# Patient Record
Sex: MALE | Race: WHITE | NOT HISPANIC OR LATINO | Employment: OTHER | URBAN - METROPOLITAN AREA
[De-identification: names, ages, dates, MRNs, and addresses within clinical notes are randomized per-mention and may not be internally consistent; named-entity substitution may affect disease eponyms.]

---

## 2017-03-09 ENCOUNTER — ALLSCRIPTS OFFICE VISIT (OUTPATIENT)
Dept: OTHER | Facility: OTHER | Age: 72
End: 2017-03-09

## 2017-03-22 ENCOUNTER — ALLSCRIPTS OFFICE VISIT (OUTPATIENT)
Dept: OTHER | Facility: OTHER | Age: 72
End: 2017-03-22

## 2017-03-22 DIAGNOSIS — E11.40 TYPE 2 DIABETES MELLITUS WITH DIABETIC NEUROPATHY (HCC): ICD-10-CM

## 2017-03-22 DIAGNOSIS — I83.812 VARICOSE VEINS OF LEFT LOWER EXTREMITY WITH PAIN: ICD-10-CM

## 2017-03-22 DIAGNOSIS — R10.11 RIGHT UPPER QUADRANT PAIN: ICD-10-CM

## 2017-03-22 DIAGNOSIS — R60.0 LOCALIZED EDEMA: ICD-10-CM

## 2017-03-22 DIAGNOSIS — D32.9 BENIGN NEOPLASM OF MENINGES (HCC): ICD-10-CM

## 2017-03-22 DIAGNOSIS — I10 ESSENTIAL (PRIMARY) HYPERTENSION: ICD-10-CM

## 2017-03-24 ENCOUNTER — GENERIC CONVERSION - ENCOUNTER (OUTPATIENT)
Dept: OTHER | Facility: OTHER | Age: 72
End: 2017-03-24

## 2017-03-24 ENCOUNTER — APPOINTMENT (OUTPATIENT)
Dept: LAB | Facility: CLINIC | Age: 72
End: 2017-03-24
Payer: COMMERCIAL

## 2017-03-24 DIAGNOSIS — E11.40 TYPE 2 DIABETES MELLITUS WITH DIABETIC NEUROPATHY (HCC): ICD-10-CM

## 2017-03-24 DIAGNOSIS — R60.0 LOCALIZED EDEMA: ICD-10-CM

## 2017-03-24 DIAGNOSIS — I10 ESSENTIAL (PRIMARY) HYPERTENSION: ICD-10-CM

## 2017-03-24 DIAGNOSIS — D32.9 BENIGN NEOPLASM OF MENINGES (HCC): ICD-10-CM

## 2017-03-24 DIAGNOSIS — I83.812 VARICOSE VEINS OF LEFT LOWER EXTREMITY WITH PAIN: ICD-10-CM

## 2017-03-24 DIAGNOSIS — R10.11 RIGHT UPPER QUADRANT PAIN: ICD-10-CM

## 2017-03-24 LAB — TSH SERPL DL<=0.05 MIU/L-ACNC: 1.93 UIU/ML (ref 0.36–3.74)

## 2017-03-24 PROCEDURE — 84443 ASSAY THYROID STIM HORMONE: CPT

## 2017-03-24 PROCEDURE — 36415 COLL VENOUS BLD VENIPUNCTURE: CPT

## 2017-06-22 ENCOUNTER — ALLSCRIPTS OFFICE VISIT (OUTPATIENT)
Dept: OTHER | Facility: OTHER | Age: 72
End: 2017-06-22

## 2017-06-22 DIAGNOSIS — R10.11 RIGHT UPPER QUADRANT PAIN: ICD-10-CM

## 2017-06-22 DIAGNOSIS — M25.569 PAIN IN KNEE: ICD-10-CM

## 2017-07-03 ENCOUNTER — HOSPITAL ENCOUNTER (OUTPATIENT)
Dept: RADIOLOGY | Facility: HOSPITAL | Age: 72
Discharge: HOME | End: 2017-07-03
Attending: NEUROLOGICAL SURGERY
Payer: COMMERCIAL

## 2017-07-05 ENCOUNTER — HOSPITAL ENCOUNTER (OUTPATIENT)
Dept: RADIOLOGY | Facility: HOSPITAL | Age: 72
Discharge: HOME | End: 2017-07-05
Attending: NEUROLOGICAL SURGERY
Payer: COMMERCIAL

## 2017-07-05 DIAGNOSIS — D32.9 BENIGN NEOPLASM OF MENINGES (HCC): ICD-10-CM

## 2017-07-05 PROCEDURE — 70553 MRI BRAIN STEM W/O & W/DYE: CPT

## 2017-07-05 PROCEDURE — A9585 GADOBUTROL INJECTION: HCPCS | Performed by: NEUROLOGICAL SURGERY

## 2017-07-05 RX ADMIN — GADOBUTROL 9 ML: 604.72 INJECTION INTRAVENOUS at 09:58

## 2017-07-17 ENCOUNTER — ALLSCRIPTS OFFICE VISIT (OUTPATIENT)
Dept: OTHER | Facility: OTHER | Age: 72
End: 2017-07-17

## 2017-07-17 ENCOUNTER — TRANSCRIBE ORDERS (OUTPATIENT)
Dept: ADMINISTRATIVE | Facility: HOSPITAL | Age: 72
End: 2017-07-17

## 2017-07-17 DIAGNOSIS — E11.40 TYPE 2 DIABETES MELLITUS WITH DIABETIC NEUROPATHY (HCC): ICD-10-CM

## 2017-07-17 DIAGNOSIS — D32.9 BENIGN NEOPLASM OF MENINGES (HCC): ICD-10-CM

## 2017-07-17 DIAGNOSIS — I10 ESSENTIAL (PRIMARY) HYPERTENSION: ICD-10-CM

## 2017-07-17 DIAGNOSIS — Z12.5 ENCOUNTER FOR SCREENING FOR MALIGNANT NEOPLASM OF PROSTATE: ICD-10-CM

## 2017-07-17 DIAGNOSIS — I83.12 VARICOSE VEINS OF LEFT LOWER EXTREMITY WITH INFLAMMATION: ICD-10-CM

## 2017-07-17 DIAGNOSIS — N52.9 IMPOTENCE OF ORGANIC ORIGIN: ICD-10-CM

## 2017-07-17 DIAGNOSIS — N52.9 MALE ERECTILE DYSFUNCTION: ICD-10-CM

## 2017-07-17 DIAGNOSIS — I83.812 VARICOSE VEINS OF LEFT LOWER EXTREMITY WITH PAIN: ICD-10-CM

## 2017-07-17 DIAGNOSIS — E13.40 DIABETIC NEUROPATHY ASSOCIATED WITH OTHER SPECIFIED DIABETES MELLITUS: ICD-10-CM

## 2017-07-17 DIAGNOSIS — I10 UNSPECIFIED ESSENTIAL HYPERTENSION: Primary | ICD-10-CM

## 2017-07-26 ENCOUNTER — ALLSCRIPTS OFFICE VISIT (OUTPATIENT)
Dept: OTHER | Facility: OTHER | Age: 72
End: 2017-07-26

## 2017-07-26 DIAGNOSIS — N52.9 MALE ERECTILE DYSFUNCTION: ICD-10-CM

## 2017-07-26 DIAGNOSIS — I83.812 VARICOSE VEINS OF LEFT LOWER EXTREMITY WITH PAIN: ICD-10-CM

## 2017-07-26 DIAGNOSIS — E11.40 TYPE 2 DIABETES MELLITUS WITH DIABETIC NEUROPATHY (HCC): ICD-10-CM

## 2017-07-26 DIAGNOSIS — D32.9 BENIGN NEOPLASM OF MENINGES (HCC): ICD-10-CM

## 2017-07-26 DIAGNOSIS — Z12.5 ENCOUNTER FOR SCREENING FOR MALIGNANT NEOPLASM OF PROSTATE: ICD-10-CM

## 2017-07-26 DIAGNOSIS — I10 ESSENTIAL (PRIMARY) HYPERTENSION: ICD-10-CM

## 2017-07-27 ENCOUNTER — APPOINTMENT (OUTPATIENT)
Dept: LAB | Facility: CLINIC | Age: 72
End: 2017-07-27
Payer: COMMERCIAL

## 2017-07-27 LAB
ALBUMIN SERPL BCP-MCNC: 3.8 G/DL (ref 3.5–5)
ALP SERPL-CCNC: 97 U/L (ref 46–116)
ALT SERPL W P-5'-P-CCNC: 14 U/L (ref 12–78)
ANION GAP SERPL CALCULATED.3IONS-SCNC: 5 MMOL/L (ref 4–13)
AST SERPL W P-5'-P-CCNC: 11 U/L (ref 5–45)
BASOPHILS # BLD AUTO: 0.01 THOUSANDS/ΜL (ref 0–0.1)
BASOPHILS NFR BLD AUTO: 0 % (ref 0–1)
BILIRUB SERPL-MCNC: 0.47 MG/DL (ref 0.2–1)
BUN SERPL-MCNC: 21 MG/DL (ref 5–25)
CALCIUM SERPL-MCNC: 8.8 MG/DL (ref 8.3–10.1)
CHLORIDE SERPL-SCNC: 105 MMOL/L (ref 100–108)
CHOLEST SERPL-MCNC: 150 MG/DL (ref 50–200)
CO2 SERPL-SCNC: 28 MMOL/L (ref 21–32)
CREAT SERPL-MCNC: 0.69 MG/DL (ref 0.6–1.3)
EOSINOPHIL # BLD AUTO: 0.11 THOUSAND/ΜL (ref 0–0.61)
EOSINOPHIL NFR BLD AUTO: 2 % (ref 0–6)
ERYTHROCYTE [DISTWIDTH] IN BLOOD BY AUTOMATED COUNT: 13.3 % (ref 11.6–15.1)
EST. AVERAGE GLUCOSE BLD GHB EST-MCNC: 146 MG/DL
GFR SERPL CREATININE-BSD FRML MDRD: 96 ML/MIN/1.73SQ M
GLUCOSE P FAST SERPL-MCNC: 115 MG/DL (ref 65–99)
HBA1C MFR BLD: 6.7 % (ref 4.2–6.3)
HCT VFR BLD AUTO: 37.9 % (ref 36.5–49.3)
HDLC SERPL-MCNC: 26 MG/DL (ref 40–60)
HGB BLD-MCNC: 13 G/DL (ref 12–17)
LDLC SERPL CALC-MCNC: 88 MG/DL (ref 0–100)
LYMPHOCYTES # BLD AUTO: 1.19 THOUSANDS/ΜL (ref 0.6–4.47)
LYMPHOCYTES NFR BLD AUTO: 26 % (ref 14–44)
MCH RBC QN AUTO: 28.6 PG (ref 26.8–34.3)
MCHC RBC AUTO-ENTMCNC: 34.3 G/DL (ref 31.4–37.4)
MCV RBC AUTO: 83 FL (ref 82–98)
MONOCYTES # BLD AUTO: 0.59 THOUSAND/ΜL (ref 0.17–1.22)
MONOCYTES NFR BLD AUTO: 13 % (ref 4–12)
NEUTROPHILS # BLD AUTO: 2.58 THOUSANDS/ΜL (ref 1.85–7.62)
NEUTS SEG NFR BLD AUTO: 59 % (ref 43–75)
NRBC BLD AUTO-RTO: 0 /100 WBCS
PLATELET # BLD AUTO: 131 THOUSANDS/UL (ref 149–390)
PMV BLD AUTO: 9.7 FL (ref 8.9–12.7)
POTASSIUM SERPL-SCNC: 4.2 MMOL/L (ref 3.5–5.3)
PROT SERPL-MCNC: 7.1 G/DL (ref 6.4–8.2)
PSA SERPL-MCNC: 0.6 NG/ML (ref 0–4)
RBC # BLD AUTO: 4.55 MILLION/UL (ref 3.88–5.62)
SODIUM SERPL-SCNC: 138 MMOL/L (ref 136–145)
TRIGL SERPL-MCNC: 182 MG/DL
TSH SERPL DL<=0.05 MIU/L-ACNC: 2.69 UIU/ML (ref 0.36–3.74)
WBC # BLD AUTO: 4.51 THOUSAND/UL (ref 4.31–10.16)

## 2017-07-27 PROCEDURE — G0103 PSA SCREENING: HCPCS | Performed by: NEUROLOGICAL SURGERY

## 2017-07-27 PROCEDURE — 80053 COMPREHEN METABOLIC PANEL: CPT | Performed by: NEUROLOGICAL SURGERY

## 2017-07-27 PROCEDURE — 80061 LIPID PANEL: CPT | Performed by: NEUROLOGICAL SURGERY

## 2017-07-27 PROCEDURE — 36415 COLL VENOUS BLD VENIPUNCTURE: CPT | Performed by: NEUROLOGICAL SURGERY

## 2017-07-27 PROCEDURE — 84443 ASSAY THYROID STIM HORMONE: CPT | Performed by: NEUROLOGICAL SURGERY

## 2017-07-27 PROCEDURE — 85025 COMPLETE CBC W/AUTO DIFF WBC: CPT | Performed by: NEUROLOGICAL SURGERY

## 2017-07-27 PROCEDURE — 83036 HEMOGLOBIN GLYCOSYLATED A1C: CPT | Performed by: NEUROLOGICAL SURGERY

## 2017-07-28 ENCOUNTER — GENERIC CONVERSION - ENCOUNTER (OUTPATIENT)
Dept: OTHER | Facility: OTHER | Age: 72
End: 2017-07-28

## 2017-09-11 ENCOUNTER — ALLSCRIPTS OFFICE VISIT (OUTPATIENT)
Dept: OTHER | Facility: OTHER | Age: 72
End: 2017-09-11

## 2017-10-02 ENCOUNTER — GENERIC CONVERSION - ENCOUNTER (OUTPATIENT)
Dept: OTHER | Facility: OTHER | Age: 72
End: 2017-10-02

## 2017-10-17 ENCOUNTER — ALLSCRIPTS OFFICE VISIT (OUTPATIENT)
Dept: OTHER | Facility: OTHER | Age: 72
End: 2017-10-17

## 2017-10-17 DIAGNOSIS — E11.40 TYPE 2 DIABETES MELLITUS WITH DIABETIC NEUROPATHY (HCC): ICD-10-CM

## 2017-10-17 DIAGNOSIS — R51.9 HEADACHE: ICD-10-CM

## 2017-10-17 DIAGNOSIS — D32.9 BENIGN NEOPLASM OF MENINGES (HCC): ICD-10-CM

## 2017-10-17 DIAGNOSIS — I83.812 VARICOSE VEINS OF LEFT LOWER EXTREMITY WITH PAIN: ICD-10-CM

## 2017-10-18 NOTE — PROGRESS NOTES
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)   5  Hypertension (401 9) (I10)    Plan  Controlled diabetes mellitus with diabetic neuropathy, Headache, Meningioma, Varicose  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    · (1) CBC/PLT/DIFF; Status:Active; Requested for:17Oct2017;    · (1) COMPREHENSIVE METABOLIC PANEL; Status:Active; Requested for:17Oct2017;    · (1) HEMOGLOBIN A1C; Status:Active; Requested for:17Oct2017;    · (1) MICROALBUMIN CREATININE RATIO, RANDOM URINE; Status:Active; Requested  for:17Oct2017;    · *1 - [facility]  *  Status: Active  Requested for: 19RDY4997  Care Summary provided  : Yes  Headache    · Gabapentin 100 MG Oral Capsule; TAKE 2 CAPSULE Bedtime   · EKG/ECG- POC; Status:Complete - Retrospective By Protocol Authorization;   Done:  36PSZ8362 12:46PM    Discussion/Summary    EKGTO NEUROLOGY, IF [facility] DOES NOT PARTICIPATE, REFER TO  NEUROLOGYCURRENT MEDICATIONSMAGNESIUM 400MG AND VITAMIN B COMPLEX GABAPENTIN FOR HA'SCALL DAUGHTER ANALI TO HELP WITH UNDERSTANDING  4 MONTHSWITH ANY PROBLEMS      Possible side effects of new medications were reviewed with the patient/guardian today  The treatment plan was reviewed with the patient/guardian  The patient/guardian understands and agrees with the treatment plan      Chief Complaint  Patient presents today for headaches        History of Present Illness  HPI: HERE FOR C/O HEADACHEHAS HEADACHE ALL THE TIME, SAME HEADACHE AS BEFOREHA NEVER COMPLETELY GOES AWAY, THERE ALL THE TIMEBLURRED VISION OR DOUBLE VISIONNO HA CURRENTLY TO SEE IF HIS HEART WAS OK, FEELING WELL, NO C/O CHEST PAIN OR SOBTAKING THE GABAPENTIN THAT WAS PRESCRIBED LAST YEARSAW NEUROLOGY AS ORDERED BACK IN 2015 AND 2016PT TO HAVE LANGUAGE LINE AND PT DECLINED AGAIN   RIGHT NOW NO HA, CAN GET HA IN THE BACK OF HIS HEADCAN GET HA IN THE FRONT OF HIS HEAD ACROSS THE FRONTAL SINUS HEAD   Diabetes Type II (Follow-Up): The patient states he has been doing well with his Type II Diabetes control since the last visit  Comorbid Illnesses: hypertension,-- hyperlipidemia-- and-- obesity  He has no known diabetic complications  He has no significant interval events  Symptoms: The patient is currently asymptomatic  Associated symptoms include no polyuria,-- no polydipsia,-- no recent weight gain-- and-- no recent weight loss  Home monitoring: The patient checks his blood sugar sporadically  -- Glycemic control has been good  -- the patient reports no symptomatic hypoglycemic episodes  Medications: the patient is adherent with his medication regimen  -- He denies medication side effects  The patient is doing well with his diabetes goals  Due For: a urine microalbumin-- and-- a hemoglobin A1c  Hypertension (Follow-Up): The patient presents for follow-up of essential hypertension  The patient states he has been doing well with his blood pressure control since the last visit  He has no comorbid illnesses  He has no significant interval events  Symptoms: The patient is currently asymptomatic  Associated symptoms include headache, but-- no focal neurologic deficits-- and-- no memory loss  Home monitoring: The patient is not checking blood pressure at home  Blood pressure control has been poor  Medications: the patient is adherent with his medication regimen  -- He denies medication side effects  Disease Management: the patient is not doing well with his blood pressure goals  The patient is due for a lipid panel-- and-- a serum creatinine  Headache (Follow-Up): The patient is being seen for follow-up of headache  The patient reports no change in the condition  He has had no significant interval events     Interval symptoms:  worsened headache,-- denies nausea,-- denies vomiting,-- denies photophobia,-- denies phonophobia,-- denies paresthesias,-- denies localized weakness,-- denies aura-- and-- denies scotoma  Associated symptoms: neck pain, but-- no nasal congestion,-- no facial pain,-- no vertigo-- and-- no lightheadedness  The patient is not currently on medication for this problem  Disease management:  the patient is not doing well with his goals  Review of Systems    Constitutional: no fever or chills, feels well, no tiredness, no recent weight loss or weight gain  ENT: no complaints of earache, no loss of hearing, no nosebleeds or nasal discharge, no sore throat or hoarseness  Cardiovascular: no complaints of slow or fast heart rate, no chest pain, no palpitations, no leg claudication or lower extremity edema  Respiratory: no complaints of shortness of breath, no wheezing or cough, no dyspnea on exertion, no orthopnea or PND  Gastrointestinal: no complaints of abdominal pain, no constipation, no nausea or vomiting, no diarrhea or bloody stools  Genitourinary: no complaints of dysuria or incontinence, no hesitancy, no nocturia, no genital lesion, no inadequacy of penile erection  Musculoskeletal: as noted in HPI  Integumentary: no complaints of skin rash or lesion, no itching or dry skin, no skin wounds  Neurological: as noted in HPI  Active Problems  1  Acid indigestion (536 8) (K30)   2  Brain tumor (239 6) (D49 6)   3  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)   4  Controlled diabetes mellitus with diabetic neuropathy (250 60,357 2) (E11 40)   5  Dry skin (701 1) (L85 3)   6  Encounter for colorectal cancer screening (V76 51) (Z12 11,Z12 12)   7  Encounter for prostate cancer screening (V76 44) (Z12 5)   8  Epigastric pain (789 06) (R10 13)   9  Episodic cluster headache, not intractable (339 01) (G44 019)   10  GERD (gastroesophageal reflux disease) (530 81) (K21 9)   11  Headache (784 0) (R51)   12  Heart attack (410 90) (I21 9)   13  Hepatitis C (070 70) (B19 20)   14   Hip pain, left (719 45) (M25 552)   15  Hypertension (401 9) (I10)   16  Knee joint pain (719 46) (M25 569)   17  Knee Pain Elicited By Motion   18  Left elbow pain (719 42) (M25 522)   19  Male erectile dysfunction, unspecified (607 84) (N52 9)   20  Meningioma (225 2) (D32 9)   21  Overweight (278 02) (E66 3)   22  Pre-ulcerative corn or callous (700) (L84)   23  Screening for genitourinary condition (V81 6) (Z13 89)   24  Varicose 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 (454 8) (L59 342)    Past Medical History  Active Problems And Past Medical History Reviewed: The active problems and past medical history were reviewed and updated today  Surgical History  Surgical History Reviewed: The surgical history was reviewed and updated today  Social History   · Denied: History of Drug use   · Employed in [employer]   · Five children   · Former smoker (Q10 15) (J08 715)   ·    · Number of children   · 5   · Retired   · Uses safety equipment  The social history was reviewed and updated today  The social history was reviewed and is unchanged  Family History  Family History Reviewed: The family history was reviewed and updated today  Current Meds   1  Ammonium Lactate 12 % External Cream; APPLY  AND RUB  IN A THIN FILM TO   AFFECTED AREAS TWICE DAILY  (AM AND PM); Therapy: 69Snq7094 to (Evaluate:11Oct2017)  Requested for: 35Mvm6416; Last   Rx:34Lby9633 Ordered   2  Amoxicillin 875 MG Oral Tablet; TAKE 1 TABLET EVERY 12 HOURS DAILY; Therapy: 46GBC7738 to (Evaluate:03Hxd9729)  Requested for: 84EIN3077; Last   Rx:71Rwq7592 Ordered   3  Aspirin 81 MG TABS; TAKE 1 TABLET DAILY FOR STROKE PREVENTION; Therapy: 09ITE2284 to (Evaluate:60Uyu1076)  Requested for: 12SLN5363; Last   Rx:01Mar2016 Ordered   4  Blood Pressure Monitor Device; use as directed  dx 401 9   electronic; Therapy: 46UJF0715 to (Last Rx:15Jun2016) Ordered   5   Gabapentin 100 MG Oral Capsule; TAKE 2 CAPSULE Bedtime; Therapy: 99KDA0881 to (Evaluate:03Wdu8859)  Requested for: 25JJZ9970; Last   Rx:01Mar2016 Ordered   6  Lisinopril 20 MG Oral Tablet; Take 1 tablet daily; Therapy: 19Aac9037 to (Evaluate:18Mar2018)  Requested for: 47Uij3044; Last   Rx:22Svx2585 Ordered   7  MetFORMIN HCl - 500 MG Oral Tablet; TAKE 1 TABLET TWICE DAILY  Requested for:   02DRB1445; Last Rx:43Sea6167 Ordered   8  Omeprazole 20 MG Oral Capsule Delayed Release; TAKE 1 CAPSULE DAILY EVERY   MORNING BEFORE BREAKFAST; Therapy: 72Qmn0850 to (Evaluate:46Kva0248)  Requested for: 93IKG7942; Last   Rx:26Pnh7901 Ordered   9  Omeprazole 20 MG Oral Tablet Delayed Release; 1 tab po bid; Therapy: 37Qvf1458 to (Evaluate:08Jun2018)  Requested for: 85Fsv3343; Last   Rx:73Ayp9834 Ordered   10  OneTouch Ultra Blue In Vitro Strip; TEST ONCE DAILY; Therapy: 28YAZ3274 to (72 240 26 09)  Requested for: 40WRP9224; Last    Rx:09Mar2017 Ordered   11  Sildenafil Citrate 20 MG Oral Tablet; TAKE 1 TO 5 TABLETS DAILY AS NEEDED; Therapy: 46CQT1796 to (Last Rx:15Jun2016)  Requested for: 15PSE1167 Ordered   12  Viagra 50 MG Oral Tablet; TAKE AS DIRECTED; Therapy: 22DWP7645 to (Last Rx:44Sjr0026) Ordered    The medication list was reviewed and updated today  Allergies  1  No Known Drug Allergies  2  No Known Environmental Allergies   3  No Known Food Allergies    Vitals   Recorded: 46YTU4335 11:48AM   Temperature 97 9 F   Heart Rate 70   Respiration 16   Systolic 505   Diastolic 82   Height 5 ft 8 in   Weight 213 lb 0 8 oz   BMI Calculated 32 39   BSA Calculated 2 1   O2 Saturation 98     Physical Exam    Constitutional   General appearance: No acute distress, well appearing and well nourished  Eyes   Conjunctiva and lids: No swelling, erythema, or discharge  Pupils and irises: Equal, round and reactive to light      Ears, Nose, Mouth, and Throat   External inspection of ears and nose: Normal     Otoscopic examination: Tympanic membrance translucent with normal light reflex  Canals patent without erythema  Nasal mucosa, septum, and turbinates: Normal without edema or erythema  Oropharynx: Normal with no erythema, edema, exudate or lesions  Pulmonary   Respiratory effort: No increased work of breathing or signs of respiratory distress  Auscultation of lungs: Clear to auscultation, equal breath sounds bilaterally, no wheezes, no rales, no rhonci  Cardiovascular   Auscultation of heart: Normal rate and rhythm, normal S1 and S2, without murmurs  Examination of extremities for edema and/or varicosities: Normal     Abdomen   Abdomen: Non-tender, no masses  Liver and spleen: No hepatomegaly or splenomegaly  Lymphatic   Palpation of lymph nodes in neck: No lymphadenopathy  Musculoskeletal   Gait and station: Normal     Digits and nails: Normal without clubbing or cyanosis  Inspection/palpation of joints, bones, and muscles: Normal     Skin   Skin and subcutaneous tissue: Normal without rashes or lesions  Neurologic   Cranial nerves: Cranial nerves 2-12 intact  -- RHOMBERG NEG, 5/5 STRENGTH B/L UE, HEEL/TOE UNSTEADY  Reflexes: 2+ and symmetric  Sensation: No sensory loss  Psychiatric   Orientation to person, place and time: Normal     Mood and affect: Normal          Results/Data  PHQ-2 Adult Depression Screening 90VOL1431 11:51AM User, Ahs     Test Name Result Flag Reference   PHQ-2 Adult Depression Score 2     Over the last two weeks, how often have you been bothered by any of the following problems?   Little interest or pleasure in doing things: Several days - 1  Feeling down, depressed, or hopeless: Several days - 1   PHQ-2 Adult Depression Screening Negative         Future Appointments    Date/Time Provider Specialty Site   11/29/2017 08:00 AM [facility] [address]   02/28/2018 10:30 AM [facility] [address]   07/16/2018 02:00 PM Gwendolyn Damian, [facility]   07/16/2018 02:15 PM Yue Whyte MD PhD Raad Duval   Electronically signed by : Antione Trejo; Oct 17 2017  1:40PM EST                       (Author)    Electronically signed by : SUYAPA Gallo ; Oct 17 2017  4:56PM EST                       (Review)

## 2017-10-31 ENCOUNTER — TRANSCRIBE ORDERS (OUTPATIENT)
Dept: LAB | Facility: CLINIC | Age: 72
End: 2017-10-31

## 2017-10-31 ENCOUNTER — APPOINTMENT (OUTPATIENT)
Dept: LAB | Facility: CLINIC | Age: 72
End: 2017-10-31
Payer: COMMERCIAL

## 2017-10-31 DIAGNOSIS — R51.9 FACIAL PAIN: ICD-10-CM

## 2017-10-31 DIAGNOSIS — I83.812 VARICOSE VEINS OF LEFT LOWER EXTREMITY WITH PAIN: ICD-10-CM

## 2017-10-31 DIAGNOSIS — E13.49 OTHER DIABETIC NEUROLOGICAL COMPLICATION ASSOCIATED WITH OTHER SPECIFIED DIABETES MELLITUS (HCC): Primary | ICD-10-CM

## 2017-10-31 DIAGNOSIS — D32.9 BENIGN NEOPLASM OF MENINGES (HCC): ICD-10-CM

## 2017-10-31 LAB
ALBUMIN SERPL BCP-MCNC: 3.9 G/DL (ref 3.5–5)
ALP SERPL-CCNC: 105 U/L (ref 46–116)
ALT SERPL W P-5'-P-CCNC: 16 U/L (ref 12–78)
ANION GAP SERPL CALCULATED.3IONS-SCNC: 3 MMOL/L (ref 4–13)
AST SERPL W P-5'-P-CCNC: 9 U/L (ref 5–45)
BASOPHILS # BLD AUTO: 0.01 THOUSANDS/ΜL (ref 0–0.1)
BASOPHILS NFR BLD AUTO: 0 % (ref 0–1)
BILIRUB SERPL-MCNC: 0.62 MG/DL (ref 0.2–1)
BUN SERPL-MCNC: 24 MG/DL (ref 5–25)
CALCIUM SERPL-MCNC: 9.2 MG/DL (ref 8.3–10.1)
CHLORIDE SERPL-SCNC: 103 MMOL/L (ref 100–108)
CO2 SERPL-SCNC: 31 MMOL/L (ref 21–32)
CREAT SERPL-MCNC: 0.77 MG/DL (ref 0.6–1.3)
CREAT UR-MCNC: 115 MG/DL
EOSINOPHIL # BLD AUTO: 0.15 THOUSAND/ΜL (ref 0–0.61)
EOSINOPHIL NFR BLD AUTO: 2 % (ref 0–6)
ERYTHROCYTE [DISTWIDTH] IN BLOOD BY AUTOMATED COUNT: 13.2 % (ref 11.6–15.1)
EST. AVERAGE GLUCOSE BLD GHB EST-MCNC: 143 MG/DL
GFR SERPL CREATININE-BSD FRML MDRD: 91 ML/MIN/1.73SQ M
GLUCOSE P FAST SERPL-MCNC: 137 MG/DL (ref 65–99)
HBA1C MFR BLD: 6.6 % (ref 4.2–6.3)
HCT VFR BLD AUTO: 40.4 % (ref 36.5–49.3)
HGB BLD-MCNC: 13.7 G/DL (ref 12–17)
LYMPHOCYTES # BLD AUTO: 1.47 THOUSANDS/ΜL (ref 0.6–4.47)
LYMPHOCYTES NFR BLD AUTO: 22 % (ref 14–44)
MCH RBC QN AUTO: 28.3 PG (ref 26.8–34.3)
MCHC RBC AUTO-ENTMCNC: 33.9 G/DL (ref 31.4–37.4)
MCV RBC AUTO: 84 FL (ref 82–98)
MICROALBUMIN UR-MCNC: 12.5 MG/L (ref 0–20)
MICROALBUMIN/CREAT 24H UR: 11 MG/G CREATININE (ref 0–30)
MONOCYTES # BLD AUTO: 0.62 THOUSAND/ΜL (ref 0.17–1.22)
MONOCYTES NFR BLD AUTO: 9 % (ref 4–12)
NEUTROPHILS # BLD AUTO: 4.31 THOUSANDS/ΜL (ref 1.85–7.62)
NEUTS SEG NFR BLD AUTO: 67 % (ref 43–75)
NRBC BLD AUTO-RTO: 0 /100 WBCS
PLATELET # BLD AUTO: 170 THOUSANDS/UL (ref 149–390)
PMV BLD AUTO: 9.7 FL (ref 8.9–12.7)
POTASSIUM SERPL-SCNC: 4.4 MMOL/L (ref 3.5–5.3)
PROT SERPL-MCNC: 7.8 G/DL (ref 6.4–8.2)
RBC # BLD AUTO: 4.84 MILLION/UL (ref 3.88–5.62)
SODIUM SERPL-SCNC: 137 MMOL/L (ref 136–145)
WBC # BLD AUTO: 6.58 THOUSAND/UL (ref 4.31–10.16)

## 2017-10-31 PROCEDURE — 82043 UR ALBUMIN QUANTITATIVE: CPT

## 2017-10-31 PROCEDURE — 36415 COLL VENOUS BLD VENIPUNCTURE: CPT

## 2017-10-31 PROCEDURE — 85025 COMPLETE CBC W/AUTO DIFF WBC: CPT

## 2017-10-31 PROCEDURE — 82570 ASSAY OF URINE CREATININE: CPT

## 2017-10-31 PROCEDURE — 80053 COMPREHEN METABOLIC PANEL: CPT

## 2017-10-31 PROCEDURE — 83036 HEMOGLOBIN GLYCOSYLATED A1C: CPT

## 2017-11-03 ENCOUNTER — GENERIC CONVERSION - ENCOUNTER (OUTPATIENT)
Dept: OTHER | Facility: OTHER | Age: 72
End: 2017-11-03

## 2017-11-29 ENCOUNTER — ALLSCRIPTS OFFICE VISIT (OUTPATIENT)
Dept: OTHER | Facility: OTHER | Age: 72
End: 2017-11-29

## 2017-11-29 DIAGNOSIS — M25.569 PAIN IN KNEE: ICD-10-CM

## 2017-12-13 ENCOUNTER — TRANSCRIBE ORDERS (OUTPATIENT)
Dept: RADIOLOGY | Facility: HOSPITAL | Age: 72
End: 2017-12-13

## 2017-12-13 ENCOUNTER — HOSPITAL ENCOUNTER (OUTPATIENT)
Dept: RADIOLOGY | Facility: HOSPITAL | Age: 72
Discharge: HOME | End: 2017-12-13
Payer: COMMERCIAL

## 2017-12-13 DIAGNOSIS — M25.569 PAIN IN KNEE: ICD-10-CM

## 2017-12-13 PROCEDURE — 73562 X-RAY EXAM OF KNEE 3: CPT

## 2017-12-26 ENCOUNTER — GENERIC CONVERSION - ENCOUNTER (OUTPATIENT)
Dept: OTHER | Facility: OTHER | Age: 72
End: 2017-12-26

## 2017-12-27 ENCOUNTER — ALLSCRIPTS OFFICE VISIT (OUTPATIENT)
Dept: OTHER | Facility: OTHER | Age: 72
End: 2017-12-27

## 2018-01-10 NOTE — RESULT NOTES
Message   [facility] [place] [address] , WHOMEVER IS IN NETWORK         Verified Results  (1) CBC/PLT/DIFF 31Oct2017 09:19AM Stella Awad     Test Name Result Flag Reference   WBC COUNT 6 58 Thousand/uL  4 31-10 16   RBC COUNT 4 84 Million/uL  3 88-5 62   HEMOGLOBIN 13 7 g/dL  12 0-17 0   HEMATOCRIT 40 4 %  36 5-49 3   MCV 84 fL  82-98   MCH 28 3 pg  26 8-34 3   MCHC 33 9 g/dL  31 4-37 4   RDW 13 2 %  11 6-15 1   MPV 9 7 fL  8 9-12 7   PLATELET COUNT 514 Thousands/uL  149-390   nRBC AUTOMATED 0 /100 WBCs     NEUTROPHILS RELATIVE PERCENT 67 %  43-75   LYMPHOCYTES RELATIVE PERCENT 22 %  14-44   MONOCYTES RELATIVE PERCENT 9 %  4-12   EOSINOPHILS RELATIVE PERCENT 2 %  0-6   BASOPHILS RELATIVE PERCENT 0 %  0-1   NEUTROPHILS ABSOLUTE COUNT 4 31 Thousands/? ??L  1 85-7 62   LYMPHOCYTES ABSOLUTE COUNT 1 47 Thousands/? ??L  0 60-4 47   MONOCYTES ABSOLUTE COUNT 0 62 Thousand/? ??L  0 17-1 22   EOSINOPHILS ABSOLUTE COUNT 0 15 Thousand/? ??L  0 00-0 61   BASOPHILS ABSOLUTE COUNT 0 01 Thousands/? ??L  0 00-0 10   This is a patient instruction: This test is non-fasting  Please drink two glasses of water morning of bloodwork  (1) HEMOGLOBIN A1C 31Oct2017 09:19AM Stella Awad     Test Name Result Flag Reference   HEMOGLOBIN A1C 6 6 % H 4 2-6 3   EST  AVG   GLUCOSE 143 mg/dl       (1) COMPREHENSIVE METABOLIC PANEL 92HDA0723 32:37DJ Stella Awad     Test Name Result Flag Reference   SODIUM 137 mmol/L  136-145   POTASSIUM 4 4 mmol/L  3 5-5 3   CHLORIDE 103 mmol/L  100-108   CARBON DIOXIDE 31 mmol/L  21-32   ANION GAP (CALC) 3 mmol/L L 4-13   BLOOD UREA NITROGEN 24 mg/dL  5-25   CREATININE 0 77 mg/dL  0 60-1 30   Standardized to IDMS reference method   CALCIUM 9 2 mg/dL  8 3-10 1   BILI, TOTAL 0 62 mg/dL  0 20-1 00   ALK PHOSPHATAS 105 U/L     ALT (SGPT) 16 U/L  12-78   Specimen collection should occur prior to Sulfasalazine and/or Sulfapyridine administration due to the potential for falsely depressed results  AST(SGOT) 9 U/L  5-45   Specimen collection should occur prior to Sulfasalazine administration due to the potential for falsely depressed results  ALBUMIN 3 9 g/dL  3 5-5 0   TOTAL PROTEIN 7 8 g/dL  6 4-8 2   eGFR 91 ml/min/1 73sq m     National Kidney Disease Education Program recommendations are as follows:  GFR calculation is accurate only with a steady state creatinine  Chronic Kidney disease less than 60 ml/min/1 73 sq  meters  Kidney failure less than 15 ml/min/1 73 sq  meters  GLUCOSE FASTING 137 mg/dL H 65-99   Specimen collection should occur prior to Sulfasalazine administration due to the potential for falsely depressed results  Specimen collection should occur prior to Sulfapyridine administration due to the potential for falsely elevated results  (1) MICROALBUMIN CREATININE RATIO, RANDOM URINE 31Oct2017 09:19AM Andrea Morgan     Test Name Result Flag Reference   MICROALBUMIN/ CREAT R 11 mg/g creatinine  0-30   MICROALBUMIN,URINE 12 5 mg/L  0 0-20 0   CREATININE URINE 115 0 mg/dL         Discussion/Summary   DIABETES AND LABS WELL CONTROLLED    YOU NEED TO FOLLOW UP WITH THE NEUROLOGIST FOR THE HEADACHES YOU ARE HAVING  PLEASE MAKE APPOINTMENT WITH THEM

## 2018-01-11 NOTE — MISCELLANEOUS
Provider Comments  Provider Comments:   Pt was a n/s  Try to call phone but it said messages is full        Signatures   Electronically signed by : Nathaniel Mulligan, [address]; Sep 14 2016  2:49PM EST                       (Author)

## 2018-01-11 NOTE — RESULT NOTES
Verified Results  (1) COMPREHENSIVE METABOLIC PANEL [id number] 86:21PH Guillermina Mix Kidney Disease Education Program recommendations are as follows:  GFR calculation is accurate only with a steady state creatinine  Chronic Kidney disease less than 60 ml/min/1 73 sq  meters  Kidney failure less than 15 ml/min/1 73 sq  meters  Test Name Result Flag Reference   GLUCOSE,RANDM 128 mg/dL     SODIUM 140 mmol/L  136-145   POTASSIUM 4 6 mmol/L  3 5-5 3   CHLORIDE 105 mmol/L  100-108   CARBON DIOXIDE 31 mmol/L  21-32   ANION GAP (CALC) 4 mmol/L  4-13   BLOOD UREA NITROGEN 22 mg/dL  5-25   CREATININE 0 74 mg/dL  0 60-1 30   Standardized to IDMS reference method   CALCIUM 9 0 mg/dL  8 3-10 1   BILI, TOTAL 0 75 mg/dL  0 20-1 00   ALK PHOSPHATAS 106 U/L     ALT (SGPT) 12 U/L  12-78   AST(SGOT) 13 U/L  5-45   ALBUMIN 4 2 g/dL  3 5-5 0   TOTAL PROTEIN 7 4 g/dL  6 4-8 2   eGFR Non-African American      >60 0 ml/min/1 73sq m     (1) HEMOGLOBIN A1C 11VRR7762 09:25AM Jenn Callejas   5 7-6 4% impaired fasting glucose  >=6 5% diagnosis of diabetes    Falsely low levels are seen in conditions linked to short RBC life span-  hemolytic anemia, and splenomegaly  Falsely elevated levels are seen in situations where there is an increased production of RBC- receipt of erythropoietin or blood transfusions  Adopted from ADA-Clinical Practice Recommendations     Test Name Result Flag Reference   HEMOGLOBIN A1C 5 8 % H 4 0-5 6   EST  AVG   GLUCOSE 120 mg/dl       (1) CBC/PLT/DIFF [id number] 09:25AM Musa Gill     Test Name Result Flag Reference   WBC COUNT 5 45 Thousand/uL  4 31-10 16   RBC COUNT 5 03 Million/uL  3 88-5 62   HEMOGLOBIN 14 3 g/dL  12 0-17 0   HEMATOCRIT 42 1 %  36 5-49 3   MCV 84 fL  82-98   MCH 28 4 pg  26 8-34 3   MCHC 34 0 g/dL  31 4-37 4   RDW 13 2 %  11 6-15 1   MPV 10 1 fL  8 9-12 7   PLATELET COUNT 217 Thousands/uL  149-390   nRBC AUTOMATED 0 /100 WBCs     NEUTROPHILS RELATIVE PERCENT 60 % 43-75   LYMPHOCYTES RELATIVE PERCENT 29 %  14-44   MONOCYTES RELATIVE PERCENT 9 %  4-12   EOSINOPHILS RELATIVE PERCENT 2 %  0-6   BASOPHILS RELATIVE PERCENT 0 %  0-1   NEUTROPHILS ABSOLUTE COUNT 3 25 Thousands/µL  1 85-7 62   LYMPHOCYTES ABSOLUTE COUNT 1 58 Thousands/µL  0 60-4 47   MONOCYTES ABSOLUTE COUNT 0 50 Thousand/µL  0 17-1 22   EOSINOPHILS ABSOLUTE COUNT 0 10 Thousand/µL  0 00-0 61   BASOPHILS ABSOLUTE COUNT 0 01 Thousands/µL  0 00-0 10       Signatures   Electronically signed by : Ron Barber; Mar  3 2016  6:25AM EST                       (Author)

## 2018-01-12 VITALS
HEART RATE: 77 BPM | TEMPERATURE: 98.1 F | BODY MASS INDEX: 32.43 KG/M2 | OXYGEN SATURATION: 97 % | RESPIRATION RATE: 16 BRPM | HEIGHT: 68 IN | SYSTOLIC BLOOD PRESSURE: 136 MMHG | DIASTOLIC BLOOD PRESSURE: 80 MMHG | WEIGHT: 214 LBS

## 2018-01-12 VITALS
TEMPERATURE: 98.5 F | WEIGHT: 208.04 LBS | DIASTOLIC BLOOD PRESSURE: 80 MMHG | HEIGHT: 68 IN | HEART RATE: 69 BPM | BODY MASS INDEX: 31.53 KG/M2 | SYSTOLIC BLOOD PRESSURE: 140 MMHG | RESPIRATION RATE: 16 BRPM | OXYGEN SATURATION: 96 %

## 2018-01-12 VITALS
HEIGHT: 68 IN | WEIGHT: 216.04 LBS | SYSTOLIC BLOOD PRESSURE: 140 MMHG | RESPIRATION RATE: 18 BRPM | OXYGEN SATURATION: 98 % | HEART RATE: 71 BPM | TEMPERATURE: 98 F | DIASTOLIC BLOOD PRESSURE: 80 MMHG | BODY MASS INDEX: 32.74 KG/M2

## 2018-01-12 NOTE — RESULT NOTES
Verified Results  (1) TSH WITH FT4 REFLEX [id number] 08:09AM Juan José Fuentes Order Number: [id number]     Test Name Result Flag Reference   TSH 1 930 uIU/mL  0 358-3 740   Patients undergoing fluorescein dye angiography may retain small amounts of fluorescein in the body for 48-72 hours post procedure  Samples containing fluorescein can produce falsely depressed TSH values  If the patient had this procedure,a specimen should be resubmitted post fluorescein clearance         Discussion/Summary   THYROID IS GOOD

## 2018-01-13 VITALS
DIASTOLIC BLOOD PRESSURE: 82 MMHG | RESPIRATION RATE: 16 BRPM | TEMPERATURE: 97.9 F | BODY MASS INDEX: 32.29 KG/M2 | HEART RATE: 70 BPM | OXYGEN SATURATION: 98 % | SYSTOLIC BLOOD PRESSURE: 124 MMHG | WEIGHT: 213.05 LBS | HEIGHT: 68 IN

## 2018-01-13 VITALS
SYSTOLIC BLOOD PRESSURE: 126 MMHG | HEIGHT: 68 IN | DIASTOLIC BLOOD PRESSURE: 68 MMHG | WEIGHT: 203 LBS | RESPIRATION RATE: 16 BRPM | OXYGEN SATURATION: 97 % | HEART RATE: 88 BPM | TEMPERATURE: 97.7 F | BODY MASS INDEX: 30.77 KG/M2

## 2018-01-13 NOTE — MISCELLANEOUS
Provider Comments  Provider Comments:   Pt was a NOS  LMOM to call back and make new appt        Signatures   Electronically signed by : Hedy Arguelles; Oct  4 2017  6:04PM EST                       (Author)

## 2018-01-14 VITALS
HEART RATE: 61 BPM | SYSTOLIC BLOOD PRESSURE: 136 MMHG | RESPIRATION RATE: 16 BRPM | TEMPERATURE: 97.9 F | BODY MASS INDEX: 31.47 KG/M2 | WEIGHT: 207 LBS | OXYGEN SATURATION: 99 % | DIASTOLIC BLOOD PRESSURE: 82 MMHG

## 2018-01-14 VITALS
HEART RATE: 71 BPM | SYSTOLIC BLOOD PRESSURE: 140 MMHG | RESPIRATION RATE: 16 BRPM | OXYGEN SATURATION: 97 % | TEMPERATURE: 98.4 F | WEIGHT: 214.01 LBS | DIASTOLIC BLOOD PRESSURE: 68 MMHG | HEIGHT: 68 IN | BODY MASS INDEX: 32.43 KG/M2

## 2018-01-14 NOTE — RESULT NOTES
Verified Results  (1) TSH WITH FT4 REFLEX [id number] 08:40AM Johnson Ann Order Number: [id number]     Test Name Result Flag Reference   TSH 2 690 uIU/mL  0 358-3 740   Patients undergoing fluorescein dye angiography may retain small amounts of fluorescein in the body for 48-72 hours post procedure  Samples containing fluorescein can produce falsely depressed TSH values  If the patient had this procedure,a specimen should be resubmitted post fluorescein clearance  (1) LIPID PANEL FASTING W DIRECT LDL REFLEX [id number] 08:40AM Johnson Ann Order Number: [id number]     Test Name Result Flag Reference   CHOLESTEROL 150 mg/dL     LDL CHOLESTEROL CALCULATED 88 mg/dL  0-100   Triglyceride:         Normal              <150 mg/dl       Borderline High    150-199 mg/dl       High               200-499 mg/dl       Very High          >499 mg/dl  Cholesterol:         Desirable        <200 mg/dl      Borderline High  200-239 mg/dl      High             >239 mg/dl  HDL Cholesterol:        High    >59 mg/dL      Low     <41 mg/dL  LDL Cholesterol:        Optimal          <100 mg/dl        Near Optimal     100-129 mg/dl        Above Optimal          Borderline High   130-159 mg/dl          High              160-189 mg/dl          Very High        >189 mg/dl  LDL CALCULATED:    This screening LDL is a calculated result  It does not have the accuracy of the Direct Measured LDL in the monitoring of patients with hyperlipidemia and/or statin therapy  Direct Measure LDL (QQH801) must be ordered separately in these patients  TRIGLYCERIDES 182 mg/dL H <=150   Specimen collection should occur prior to N-Acetylcysteine or Metamizole administration due to the potential for falsely depressed results  HDL,DIRECT 26 mg/dL L 40-60   Specimen collection should occur prior to Metamizole administration due to the potential for falsely depressed results       (1) CBC/PLT/DIFF [id number] 08:40AM [employer] [facility]     Test Name Result Flag Reference   WBC COUNT 4 51 Thousand/uL  4 31-10 16   RBC COUNT 4 55 Million/uL  3 88-5 62   HEMOGLOBIN 13 0 g/dL  12 0-17 0   HEMATOCRIT 37 9 %  36 5-49 3   MCV 83 fL  82-98   MCH 28 6 pg  26 8-34 3   MCHC 34 3 g/dL  31 4-37 4   RDW 13 3 %  11 6-15 1   MPV 9 7 fL  8 9-12 7   PLATELET COUNT 117 Thousands/uL L 149-390   nRBC AUTOMATED 0 /100 WBCs     NEUTROPHILS RELATIVE PERCENT 59 %  43-75   LYMPHOCYTES RELATIVE PERCENT 26 %  14-44   MONOCYTES RELATIVE PERCENT 13 % H 4-12   EOSINOPHILS RELATIVE PERCENT 2 %  0-6   BASOPHILS RELATIVE PERCENT 0 %  0-1   NEUTROPHILS ABSOLUTE COUNT 2 58 Thousands/? ??L  1 85-7 62   LYMPHOCYTES ABSOLUTE COUNT 1 19 Thousands/? ??L  0 60-4 47   MONOCYTES ABSOLUTE COUNT 0 59 Thousand/? ??L  0 17-1 22   EOSINOPHILS ABSOLUTE COUNT 0 11 Thousand/? ??L  0 00-0 61   BASOPHILS ABSOLUTE COUNT 0 01 Thousands/? ??L  0 00-0 10   This bloodwork is non-fasting  Please drink two glasses of water morning of  bloodwork  (1) COMPREHENSIVE METABOLIC PANEL [id number] 16:93RG [employer]     Test Name Result Flag Reference   SODIUM 138 mmol/L  136-145   POTASSIUM 4 2 mmol/L  3 5-5 3   CHLORIDE 105 mmol/L  100-108   CARBON DIOXIDE 28 mmol/L  21-32   ANION GAP (CALC) 5 mmol/L  4-13   BLOOD UREA NITROGEN 21 mg/dL  5-25   CREATININE 0 69 mg/dL  0 60-1 30   Standardized to IDMS reference method   CALCIUM 8 8 mg/dL  8 3-10 1   BILI, TOTAL 0 47 mg/dL  0 20-1 00   ALK PHOSPHATAS 97 U/L     ALT (SGPT) 14 U/L  12-78   AST(SGOT) 11 U/L  5-45   ALBUMIN 3 8 g/dL  3 5-5 0   TOTAL PROTEIN 7 1 g/dL  6 4-8 2   eGFR 96 ml/min/1 73sq m     National Kidney Disease Education Program recommendations are as follows:  GFR calculation is accurate only with a steady state creatinine  Chronic Kidney disease less than 60 ml/min/1 73 sq  meters  Kidney failure less than 15 ml/min/1 73 sq  meters     GLUCOSE FASTING 115 mg/dL H 65-99     (1) PSA (SCREEN) (Dx V76 44 Screen for Prostate Cancer) [id number] 08:40AM [place] [facility]     Test Name Result Flag Reference   PROSTATE SPECIFIC ANTIGEN 0 6 ng/mL  0 0-4 0   American Urological Association Guidelines define biochemical recurrence of prostate cancer as a detectable or rising PSA value post-radical prostatectomy that is greater than or equal to 0 2 ng/mL with a second confirmatory level of greater than or equal to 0 2 ng/mL  This bloodwork is non-fasting  Please drink two glasses of water morning of  bloodwork  (1) HEMOGLOBIN A1C [id number] 08:40AM [place] [facility]     Test Name Result Flag Reference   HEMOGLOBIN A1C 6 7 % H 4 2-6 3   EST  AVG  GLUCOSE 146 mg/dl         Discussion/Summary   DIABETES A LITTLE HIGHER BUT OK  PLATELETS A LITTLE LOWER BUT OK, WILL MONITOR FOR NOW  PROSTATE OK

## 2018-01-16 NOTE — MISCELLANEOUS
Provider Comments  Provider Comments:   Pt was a n/s  SWP's daughter she was not aware of appt, will call back to make a new  appt        Signatures   Electronically signed by : Rosana Blackwell, [facility]; Nov 14 2016  7:50PM EST                       (Author)

## 2018-01-16 NOTE — PROGRESS NOTES
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)    Plan    · (1) BUN; Status:Active; Requested for:64Qmv7785;    Perform:[facility]; Due:78Lfx2550; Ordered; Nighat Sas; Ordered By:Moulding, Nobie Parents;   · (1) CREATININE; Status:Active; Requested for:69Dlb8965;    Perform:[facility]; Due:46Clr2747; Ordered; Nighat Sas; Ordered By:Moulding, Nobie Parents;   · * MRI BRAIN W WO CONTRAST; Status:Need Information - Financial Authorization; Requested for:38Erc1405;    Perform:[facility]; EHF:18OPV7195; Ordered; Nighat Sas; Ordered By:Moulding, Nobie Parents;   · Follow-up visit in 1 year Evaluation and Treatment  Follow-up  Status: Complete  Done:  43BUU7218   Ordered; For: Meningioma; Ordered By: Pervis Net Performed:  Due: 08NEE9445; Last Updated By: Mana Ludwig; 7/17/2017 4:11:13 PM    Discussion/Summary    I reviewed with the patient and his family his imaging studies  Shows a calcified right Roxana apex/incisural mass consistent with a meningioma  Difficult to attribute headaches to this finding  Headaches could easily be related to his elevated blood pressure, which the patient and should in the past  Episode of vertigo/nausea with which the patient presented, have not recurred  The mass does cause brain stem compression  However, this appears to be of long-standing  In addition, the masses heavily calcified, again speaking to its long standing presence  The patient did previously mention prior transient right facial numbness, and this lesion does abut/displace cranial nerve 5, and could be related  Patient has disconjugate gaze and ptosis on left, but as the mass is on the right, this would not explain the ptosis  May explain disconjugate gaze, although the patient denies diplopia  Patient denies right facial pain  NCCN guidelines were reviewed with the patient and his family   I have recommended repeat heat imaging in 12 months to assess for stability/growth  Other options for management such as radiation and surgery were briefly discussed  The patient and family are not interested in surgery, and I concur  His other medical conditions, not to mention the location of this mass, would make resection very treacherous with, at present, no clear up side  Should lesion show progression on followup imaging, radiosurgery/radiation therapy would be reasonable  The patient and his family agreed to observation, and we will see the patient back in 12 months with a new MRI, which I ordered  We discussed headache management  I made several suggestions for over-the-counter medications  He has not really tried any of these, should they fail, we can provide referral to Dr Marcus Steward, if appropriate  The patient was to speak about his wife's care  Since her last encounter, she underwent surgery with Dr Deborah Can for a sphenoid wing meningioma  Postoperative gamma knife/SRS was discussed but no referral as it has been made  She has some complaints of headache although they appear relatively mild  EQ5D5L 1 000, VAS   History of Present Illness  The patient is being seen for a routine clinic follow-up of a primary brain tumor  The tumor is located in the brainstem and incisura  The tumor is presumed to be a meningioma  Initial presentation was 8 month(s) ago  Presentation included headache, vertigo and nausea  Past evaluation has included brain MRI  This problem has not been previously treated  Symptoms:  no seizure, no cognitive impairment, no memory loss, no personality changes, no localized weakness, no speech difficulties, no visual field defect, no tinnitus, no gait disturbance and no nausea    The patient presents with complaints of intermittent episodes of moderate headache, described as stinging, non-radiating  Episodes years ago, each episode lasting about 10 minutes   Symptoms are made worse by exertion Symptoms are unchanged (0-8/10 daily , headaches locations vary , will use Advil 1-2 daily prn )  The patient presents with complaints of occasional episodes of no diplopia, described as blurry vision Symptoms are resolved (reported by daughter)   The patient presents with complaints of left ear hearing loss (daughter reports )   The patient presents with complaints of no vertigo  Symptoms are resolved  The patient presents with complaints of no loss of balance Symptoms are resolved (fall 3-4 mo ago)   The patient presents with complaints of no vomiting (x 1 reported by daughter )   The patient presents with complaints of no sensory symptoms (had previous <1 mo ago right facial numbness - at least V1 and V3 distribution - has resolved) (advil 1=2 po daily prn HA's ) Pertinent medical history:  Hep C, but no Von Hippel-Lindau syndrome, no HIV infection, no neurofibromatosis type 1, no neurofibromatosis type 2 and no tuberous sclerosis  Review of Systems    Constitutional: No fever or chills, feels well, no tiredness, no recent weight gain or weight loss  Eyes: No complaints of eye pain, no red eyes, no discharge from eyes, no itchy eyes  ENT: hearing loss  Cardiovascular: lower extremity edema and slight heart attack 2016, HTN, but the heart rate was not slow, no intermittent leg claudication and the heart rate was not fast    Respiratory: former smoker, but No complaints of shortness of breath, no wheezing, no cough, no SOB on exertion, no orthopnea or PND  Gastrointestinal: appetite good, but No complaints of abdominal pain, no constipation, no nausea or vomiting, no diarrhea or bloody stools  Genitourinary: occasional urgency, but No complaints of dysuria, no incontinence, no hesitancy, no nocturia, no genital lesion, no testicular pain  Musculoskeletal: arthralgias and cramps in legs  Integumentary: No complaints of skin rash or skin lesions, no itching, no skin wound, no dry skin     Neurological: headache, but no numbness, no tingling, no confusion, no dizziness, no limb weakness, no convulsions, no fainting and no difficulty walking  Psychiatric: Is not suicidal, no sleep disturbances, no anxiety or depression, no change in personality, no emotional problems  Endocrine: DM, but No complaints of proptosis, no hot flashes, no muscle weakness, no erectile dysfunction, no deepening of the voice, no feelings of weakness  Hematologic/Lymphatic: Hep C , asa 81 mg, but No complaints of swollen glands, no swollen glands in the neck, does not bleed easily, no easy bruising  Active Problems    1  Abdominal pain, acute, right upper quadrant (789 01,338 19) (R10 11)   2  Abdominal pain, RUQ (789 01) (R10 11)   3  Acid indigestion (536 8) (K30)   4  Brain tumor (239 6) (D49 6)   5  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)   6  Controlled diabetes mellitus with diabetic neuropathy (250 60,357 2) (E11 40)   7  Edema of left lower extremity (782 3) (R60 0)   8  Encounter for prostate cancer screening (V76 44) (Z12 5)   9  Epigastric pain (789 06) (R10 13)   10  Episodic cluster headache, not intractable (339 01) (G44 019)   11  Headache (784 0) (R51)   12  Heart attack (410 90) (I21 3)   13  Hepatitis C (070 70) (B19 20)   14  Hip pain, left (719 45) (M25 552)   15  Hypertension (401 9) (I10)   16  Knee joint pain (719 46) (M25 569)   17  Knee Pain Elicited By Motion   18  Left elbow pain (719 42) (M25 522)   19  Male erectile dysfunction, unspecified (607 84) (N52 9)   20  Meningioma (225 2) (D32 9)   21  Overweight (278 02) (E66 3)   22  Pre-ulcerative corn or callous (700) (L84)   23  Screening for genitourinary condition (V81 6) (Z13 89)   24  Varicose 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 (454 8) (H01 701)    Past Medical History    1  History of Acute nonintractable headache, unspecified headache type (784 0) (R51)   2  History of Brain mass (348 9) (G93 9)   3  History of Diabetes mellitus type 2, uncontrolled (250 02) (E11 65)   4  History of chest pain (V13 89) (Z87 898)   5  History of cough   6  History of upper respiratory infection (V12 09) (Z87 09)   7  History of Impaired fasting glucose (790 21) (R73 01)   8  History of Irregular heartbeat (427 9) (I49 9)   9  History of Need for prophylactic vaccination and inoculation against influenza (V04 81)   (Z23)   10  History of Need for vaccination with 13-polyvalent pneumococcal conjugate vaccine    (V03 82) (Z23)   11  History of Pain, joint, shoulder (719 41) (M25 519)   12  History of Screening for depression (V79 0) (Z13 89)   13  History of Screening for neurological condition (V80 09) (Z13 89)   14  History of Varicose 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 (454 8) (O66 813)    Surgical History    1  History of Inguinal Hernia Repair   2  History of Varicose Vein Ligation    Family History  Mother    1  No pertinent family history    Social History    · Denied: History of Drug use   · Employed in construction trade   · Five children   · Former smoker (V15 82) (V87 406)   ·    · Number of children   · Retired   · Uses safety equipment    Current Meds   1  Aspirin 81 MG TABS; TAKE 1 TABLET DAILY FOR STROKE PREVENTION; Therapy: 31LQS5040 to (Evaluate:29Gsj3090)  Requested for: 00OYC3309; Last   Rx:01Mar2016 Ordered   2  Blood Pressure Monitor Device; use as directed  dx 401 9   electronic; Therapy: 41YLA5927 to (Last Rx:15Jun2016) Ordered   3  Gabapentin 100 MG Oral Capsule; TAKE 2 CAPSULE Bedtime; Therapy: 94ZHU6379 to (Evaluate:00Ilv2575)  Requested for: 33DEL1337; Last   Rx:01Mar2016 Ordered   4  Lisinopril 20 MG Oral Tablet; Take 1 tablet daily  Requested for: 56NJJ0446; Last   Rx:22Mar2017 Ordered   5  MetFORMIN HCl - 500 MG Oral Tablet; TAKE 1 TABLET TWICE DAILY  Requested for:   09SKP1667; Last Rx:22Mar2017 Ordered   6  Omeprazole 20 MG Oral Capsule Delayed Release; TAKE 1 CAPSULE DAILY EVERY   MORNING BEFORE BREAKFAST;    Therapy: 52Vlg9426 to (Evaluate:41Uvr5483) Requested for: 40LPU7158; Last   Rx:15Jun2016 Ordered   7  OneTouch Ultra Blue In Vitro Strip; TEST ONCE DAILY; Therapy: 89WJP6697 to (22 217168)  Requested for: 43LFW7087; Last   Rx:09Mar2017 Ordered   8  Sildenafil Citrate 20 MG Oral Tablet; TAKE 1 TO 5 TABLETS DAILY AS NEEDED; Therapy: 09JEH1266 to (Last Rx:15Jun2016)  Requested for: 36MGR7572 Ordered   9  Viagra 100 MG Oral Tablet; TAKE AS DIRECTED; Therapy: 37NKO1609 to (Last Rx:22Jun2017) Ordered    Allergies    1  No Known Drug Allergies    2  No Known Environmental Allergies   3  No Known Food Allergies    Vitals  Vital Signs    Recorded: 60GPI6215 03:25PM   Temperature 98 8 F, Oral   Heart Rate 74   Respiration 16   Systolic 987, RUE, Sitting   Diastolic 70, RUE, Sitting   Height 5 ft 8 in   Weight 211 lb    BMI Calculated 32 08   BSA Calculated 2 09   Pain Scale 0     Physical Exam     Constitutional Patient appears healthy and well developed  No signs of acute distress present  well developed, comfortable, well nourished and overweight  Respiratory Respiratory effort: Normal    Neurologic - Mental Status: Alert and Oriented x3  Mood and affect: Affect is normal   Attention is WNL  Shelvy Mingle Speech is articulated and fluent  Grossly nonfocal   Judgment and insight: Normal     Cranial Nerve Exam:   3rd, 4th, and 6th cranial nerve: Abnormal   (EOM seem intact, but dysconjugate; no obvious palsy) Bilateral eyes: strabismus  dysconjugate gaze; ptosis on LEFT  7th cranial nerve: Abnormal   normal facial muscle strength  Motor System non focal   Coordination: Involuntary movements: None   Gait and Station: [place] with a normal gait  no aids  Results/Data  * MRI BRAIN W [facility] CONTRAST [id number] 08:58AM Beena You Order Number: [id number]   Performing Comments: in 1 year; include BRAVO   - Patient Instructions: To schedule this appointment, please contact Central Scheduling at [phone]     SCHEDULED ARACELY CENTENO  [facility] CARE [place] ZACHARIAH 6/8/17 @@ 10:30AM  PLEASE ARRIVE 15 MINUTES EARLY /SG10     Test Name Result Flag Reference   MRI BRAIN W WO CONTRAST (Report)     This is a summary report  The complete report is available in the patient's medical record  If you cannot access the medical record, please contact the sending organization for a detailed fax or copy  MRI BRAIN WITH AND WITHOUT CONTRAST     INDICATION: Meningioma  COMPARISON: 5/19/2016     TECHNIQUE:   Sagittal T1, axial T2, axial FLAIR, axial T1, axial Gradient, axial diffusion  Sagittal, axial and coronal T1 postcontrast  Axial BRAVO post contrast       IV Contrast: 9 mL of gadobutrol injection (MULTI-DOSE)       IMAGE QUALITY:  Diagnostic  FINDINGS:     BRAIN PARENCHYMA: Stable cerebral volume loss stable periventricular white matter hyperintensity on T2 and FLAIR imaging scattered within the cerebral hemispheres suggestive of chronic microangiopathy  Diffusion imaging demonstrates no evidence of    acute ischemia  Normal corpus callosum and hypothalamus  Brainstem and cerebellum appear stable  There is a calcified mass within the right anterior prepontine cistern which is hypointense on T2-weighted imaging resulting in moderate mass effect upon the right anterolateral aspect of the oliver  Mild edema within the adjacent prominence  This mass    is unchanged in size with heterogeneous enhancement  This measures 1 2 x 2 1 x 1 9 cm with thickening of the adjacent right anterior tentorial incisure  Thickening of the tentorium and dura extends anteriorly, to the superior aspect of the cavernous    sinus  Mass is located immediately below the right posterior cerebral artery  No evidence of cavernous sinus thrombosis  Postcontrast imaging of the remainder of the brain parenchyma is stable and unchanged  VENTRICLES: Mildly enlarged, consistent with the degree of atrophy and chronic microangiopathy       SELLA AND PITUITARY GLAND: Normal      ORBITS: Normal      PARANASAL SINUSES: Retention cyst formation within the inferior aspect of the right maxillary sinus  VASCULATURE: Evaluation of the major intracranial vasculature demonstrates appropriate flow voids  CALVARIUM AND SKULL BASE: Stable  EXTRACRANIAL SOFT TISSUES: Normal        IMPRESSION:     Stable calcified, heterogeneously enhancing extra-axial mass resulting in mass effect upon the right anterolateral aspect of the brainstem  Findings consistent with meningioma  Stable atrophy and chronic microangiopathic change of the brain parenchyma         Workstation performed: [id number]     Signed by:   Brandi Macias DO   7/5/17     Future Appointments    Date/Time Provider Specialty Site   07/26/2017 10:00 AM Walker Hinton, [address]  [facility]   08/22/2017 05:30 PM Blair Ross [facility]     Signatures   Electronically signed by : Myke Gilmore MD PhD; Jul 17 2017  4:12PM EST                       (Author)

## 2018-01-22 VITALS
HEIGHT: 68 IN | BODY MASS INDEX: 31.98 KG/M2 | HEART RATE: 74 BPM | RESPIRATION RATE: 16 BRPM | TEMPERATURE: 98.8 F | DIASTOLIC BLOOD PRESSURE: 70 MMHG | WEIGHT: 211 LBS | SYSTOLIC BLOOD PRESSURE: 132 MMHG

## 2018-01-22 VITALS
HEART RATE: 83 BPM | WEIGHT: 222 LBS | TEMPERATURE: 97.8 F | HEIGHT: 68 IN | DIASTOLIC BLOOD PRESSURE: 74 MMHG | RESPIRATION RATE: 18 BRPM | BODY MASS INDEX: 33.65 KG/M2 | SYSTOLIC BLOOD PRESSURE: 148 MMHG | OXYGEN SATURATION: 97 %

## 2018-01-23 NOTE — RESULT NOTES
Verified Results  * XR KNEE 3 VW LEFT NON INJURY 69Rrb3970 01:00PM Powered by [employer] Order Number: [id number]     Test Name Result Flag Reference   XR KNEE 3 VW LEFT (Report)     LEFT KNEE     INDICATION: Left knee pain  COMPARISON: None     VIEWS: 3     IMAGES: 3     FINDINGS:     There is deformity within the distal femur suggesting sequela from prior trauma  There is no acute fracture  Near complete loss of joint space is seen within the medial compartment with subchondral sclerosis within the medial tibial plateau  Moderate narrowing and marginal osteophyte summation is seen within the lateral and patellofemoral regions  No lytic or blastic lesions are seen  Soft tissues are unremarkable  IMPRESSION:     Advanced osteoarthritis left knee       Workstation performed: [id number]     Signed by:   Jesus Carreon MD   12/17/17     * XR KNEE 3 VW RIGHT NON INJURY 13Ukn6267 01:00PM Powered by [employer] Order Number: [id number]     Test Name Result Flag Reference   XR KNEE 3 VW RIGHT (Report)     RIGHT KNEE     INDICATION: Right knee pain  COMPARISON: None     VIEWS: 3     IMAGES: 3     FINDINGS:     There is no acute fracture or dislocation  There is no joint effusion  Tricompartmental joint space narrowing is noted  This is most pronounced in the patellofemoral region  Calcification of menisci seen laterally suggesting chondrocalcinosis  No lytic or blastic lesions are seen  Soft tissues are unremarkable  IMPRESSION:     Moderately advanced osteoarthritis right knee  Chondrocalcinosis       Workstation performed: [id number]     Signed by:   Jesus Carreon MD   12/17/17       Discussion/Summary   ADVANCED ARTHRITIS IN BOTH KNEES  YOU WOULD BENEFIT FROM SEEING AN ORTHOPEDIC DOCTOR   YOU MAY BENEFIT FROM INJECTIONS IN THE KNEE AND EVENTUALLY MAY NEED KNEE REPLACEMENTS IN FUTURE YEARS

## 2018-02-23 DIAGNOSIS — E11.9 TYPE 2 DIABETES MELLITUS WITHOUT COMPLICATION, WITHOUT LONG-TERM CURRENT USE OF INSULIN (HCC): Primary | ICD-10-CM

## 2018-05-09 ENCOUNTER — TELEPHONE (OUTPATIENT)
Dept: FAMILY MEDICINE CLINIC | Facility: CLINIC | Age: 73
End: 2018-05-09

## 2018-05-09 ENCOUNTER — OFFICE VISIT (OUTPATIENT)
Dept: FAMILY MEDICINE CLINIC | Facility: CLINIC | Age: 73
End: 2018-05-09
Payer: COMMERCIAL

## 2018-05-09 VITALS
RESPIRATION RATE: 18 BRPM | SYSTOLIC BLOOD PRESSURE: 148 MMHG | BODY MASS INDEX: 32.22 KG/M2 | WEIGHT: 212.6 LBS | DIASTOLIC BLOOD PRESSURE: 74 MMHG | HEART RATE: 74 BPM | HEIGHT: 68 IN

## 2018-05-09 DIAGNOSIS — I10 ESSENTIAL HYPERTENSION: ICD-10-CM

## 2018-05-09 DIAGNOSIS — Z00.00 MEDICARE ANNUAL WELLNESS VISIT, SUBSEQUENT: Primary | ICD-10-CM

## 2018-05-09 DIAGNOSIS — Z12.11 SCREENING FOR COLON CANCER: ICD-10-CM

## 2018-05-09 DIAGNOSIS — E78.49 OTHER HYPERLIPIDEMIA: ICD-10-CM

## 2018-05-09 DIAGNOSIS — M79.645 PAIN OF LEFT THUMB: Primary | ICD-10-CM

## 2018-05-09 DIAGNOSIS — E11.9 TYPE 2 DIABETES MELLITUS WITHOUT COMPLICATION, WITHOUT LONG-TERM CURRENT USE OF INSULIN (HCC): ICD-10-CM

## 2018-05-09 DIAGNOSIS — K59.00 CONSTIPATION, UNSPECIFIED CONSTIPATION TYPE: ICD-10-CM

## 2018-05-09 LAB — SL AMB POCT GLUCOSE BLD: 138

## 2018-05-09 PROCEDURE — G0439 PPPS, SUBSEQ VISIT: HCPCS | Performed by: NURSE PRACTITIONER

## 2018-05-09 PROCEDURE — 3725F SCREEN DEPRESSION PERFORMED: CPT | Performed by: NURSE PRACTITIONER

## 2018-05-09 PROCEDURE — 82948 REAGENT STRIP/BLOOD GLUCOSE: CPT | Performed by: NURSE PRACTITIONER

## 2018-05-09 RX ORDER — POLYETHYLENE GLYCOL 1000
1 POWDER (GRAM) MISCELLANEOUS DAILY
COMMUNITY
Start: 2017-11-29 | End: 2018-12-19

## 2018-05-09 RX ORDER — OMEPRAZOLE 20 MG/1
1 CAPSULE, DELAYED RELEASE ORAL 2 TIMES DAILY
Status: ON HOLD | COMMUNITY
Start: 2017-09-11 | End: 2018-07-10

## 2018-05-09 RX ORDER — GABAPENTIN 100 MG/1
200 CAPSULE ORAL
Refills: 2 | COMMUNITY
Start: 2018-03-24 | End: 2018-09-04 | Stop reason: SDUPTHER

## 2018-05-09 RX ORDER — SILDENAFIL CITRATE 20 MG/1
TABLET ORAL
COMMUNITY
Start: 2016-06-15 | End: 2018-12-19

## 2018-05-09 RX ORDER — AMMONIUM LACTATE 12 G/100G
CREAM TOPICAL 2 TIMES DAILY
COMMUNITY
Start: 2017-09-11 | End: 2021-03-17

## 2018-05-09 RX ORDER — ATORVASTATIN CALCIUM 10 MG/1
10 TABLET, FILM COATED ORAL DAILY
Qty: 90 TABLET | Refills: 1 | Status: SHIPPED | OUTPATIENT
Start: 2018-05-09 | End: 2018-11-06 | Stop reason: SDUPTHER

## 2018-05-09 RX ORDER — LISINOPRIL 20 MG/1
20 TABLET ORAL DAILY
Refills: 5 | COMMUNITY
Start: 2018-02-23 | End: 2018-05-09 | Stop reason: ALTCHOICE

## 2018-05-09 RX ORDER — ASPIRIN 81 MG/1
81 TABLET, CHEWABLE ORAL
COMMUNITY
Start: 2017-01-30 | End: 2018-05-09 | Stop reason: ALTCHOICE

## 2018-05-09 RX ORDER — LISINOPRIL 10 MG/1
10 TABLET ORAL DAILY
Qty: 90 TABLET | Refills: 1 | Status: SHIPPED | OUTPATIENT
Start: 2018-05-09 | End: 2018-11-06 | Stop reason: SDUPTHER

## 2018-05-09 RX ORDER — LISINOPRIL 5 MG/1
10 TABLET ORAL
COMMUNITY
Start: 2017-01-30 | End: 2018-05-09 | Stop reason: SDUPTHER

## 2018-05-09 RX ORDER — OMEPRAZOLE 40 MG/1
1 CAPSULE, DELAYED RELEASE ORAL DAILY
COMMUNITY
Start: 2015-08-25 | End: 2018-05-09 | Stop reason: ALTCHOICE

## 2018-05-09 RX ORDER — DOCUSATE SODIUM 100 MG/1
100 CAPSULE, LIQUID FILLED ORAL 2 TIMES DAILY
Qty: 60 CAPSULE | Refills: 5 | Status: SHIPPED | OUTPATIENT
Start: 2018-05-09 | End: 2019-07-24

## 2018-05-09 RX ORDER — LANCETS
EACH MISCELLANEOUS
Qty: 100 EACH | Refills: 0 | Status: SHIPPED | OUTPATIENT
Start: 2018-05-09 | End: 2018-06-26 | Stop reason: SDUPTHER

## 2018-05-09 RX ORDER — BLOOD PRESSURE TEST KIT-LARGE
KIT MISCELLANEOUS
COMMUNITY
Start: 2016-03-01 | End: 2018-05-11 | Stop reason: ALTCHOICE

## 2018-05-09 RX ORDER — SILDENAFIL 50 MG/1
50 TABLET, FILM COATED ORAL
COMMUNITY
Start: 2016-12-14 | End: 2018-10-08 | Stop reason: SDUPTHER

## 2018-05-09 RX ORDER — LISINOPRIL 20 MG/1
1 TABLET ORAL DAILY
COMMUNITY
Start: 2017-09-19 | End: 2018-05-09 | Stop reason: ALTCHOICE

## 2018-05-09 RX ORDER — TOPIRAMATE 50 MG/1
1 TABLET, FILM COATED ORAL DAILY
COMMUNITY
Start: 2017-12-27 | End: 2021-03-17

## 2018-05-09 NOTE — TELEPHONE ENCOUNTER
I will order an xray    It was his left thumb I believe, he showed it to me  Let me know if that is incorrect  jaki

## 2018-05-10 ENCOUNTER — APPOINTMENT (OUTPATIENT)
Dept: LAB | Facility: CLINIC | Age: 73
End: 2018-05-10
Payer: COMMERCIAL

## 2018-05-10 DIAGNOSIS — E78.49 OTHER HYPERLIPIDEMIA: ICD-10-CM

## 2018-05-10 DIAGNOSIS — E11.9 TYPE 2 DIABETES MELLITUS WITHOUT COMPLICATION, WITHOUT LONG-TERM CURRENT USE OF INSULIN (HCC): ICD-10-CM

## 2018-05-10 DIAGNOSIS — I10 ESSENTIAL HYPERTENSION: ICD-10-CM

## 2018-05-10 DIAGNOSIS — K59.00 CONSTIPATION, UNSPECIFIED CONSTIPATION TYPE: ICD-10-CM

## 2018-05-10 LAB
ALBUMIN SERPL BCP-MCNC: 3.6 G/DL (ref 3.5–5)
ALP SERPL-CCNC: 113 U/L (ref 46–116)
ALT SERPL W P-5'-P-CCNC: 19 U/L (ref 12–78)
ANION GAP SERPL CALCULATED.3IONS-SCNC: 6 MMOL/L (ref 4–13)
AST SERPL W P-5'-P-CCNC: 11 U/L (ref 5–45)
BASOPHILS # BLD AUTO: 0.01 THOUSANDS/ΜL (ref 0–0.1)
BASOPHILS NFR BLD AUTO: 0 % (ref 0–1)
BILIRUB SERPL-MCNC: 0.45 MG/DL (ref 0.2–1)
BUN SERPL-MCNC: 23 MG/DL (ref 5–25)
CALCIUM SERPL-MCNC: 8.8 MG/DL (ref 8.3–10.1)
CHLORIDE SERPL-SCNC: 106 MMOL/L (ref 100–108)
CHOLEST SERPL-MCNC: 152 MG/DL (ref 50–200)
CO2 SERPL-SCNC: 27 MMOL/L (ref 21–32)
CREAT SERPL-MCNC: 0.76 MG/DL (ref 0.6–1.3)
CREAT UR-MCNC: 99.2 MG/DL
EOSINOPHIL # BLD AUTO: 0.19 THOUSAND/ΜL (ref 0–0.61)
EOSINOPHIL NFR BLD AUTO: 3 % (ref 0–6)
ERYTHROCYTE [DISTWIDTH] IN BLOOD BY AUTOMATED COUNT: 13.4 % (ref 11.6–15.1)
EST. AVERAGE GLUCOSE BLD GHB EST-MCNC: 134 MG/DL
GFR SERPL CREATININE-BSD FRML MDRD: 92 ML/MIN/1.73SQ M
GLUCOSE P FAST SERPL-MCNC: 124 MG/DL (ref 65–99)
HBA1C MFR BLD: 6.3 % (ref 4.2–6.3)
HCT VFR BLD AUTO: 39.1 % (ref 36.5–49.3)
HDLC SERPL-MCNC: 30 MG/DL (ref 40–60)
HGB BLD-MCNC: 13.1 G/DL (ref 12–17)
LDLC SERPL CALC-MCNC: 92 MG/DL (ref 0–100)
LYMPHOCYTES # BLD AUTO: 1.56 THOUSANDS/ΜL (ref 0.6–4.47)
LYMPHOCYTES NFR BLD AUTO: 24 % (ref 14–44)
MCH RBC QN AUTO: 28.2 PG (ref 26.8–34.3)
MCHC RBC AUTO-ENTMCNC: 33.5 G/DL (ref 31.4–37.4)
MCV RBC AUTO: 84 FL (ref 82–98)
MICROALBUMIN UR-MCNC: 6.2 MG/L (ref 0–20)
MICROALBUMIN/CREAT 24H UR: 6 MG/G CREATININE (ref 0–30)
MONOCYTES # BLD AUTO: 0.69 THOUSAND/ΜL (ref 0.17–1.22)
MONOCYTES NFR BLD AUTO: 11 % (ref 4–12)
NEUTROPHILS # BLD AUTO: 4.02 THOUSANDS/ΜL (ref 1.85–7.62)
NEUTS SEG NFR BLD AUTO: 62 % (ref 43–75)
NRBC BLD AUTO-RTO: 0 /100 WBCS
PLATELET # BLD AUTO: 161 THOUSANDS/UL (ref 149–390)
PMV BLD AUTO: 10.2 FL (ref 8.9–12.7)
POTASSIUM SERPL-SCNC: 4.2 MMOL/L (ref 3.5–5.3)
PROT SERPL-MCNC: 7.2 G/DL (ref 6.4–8.2)
RBC # BLD AUTO: 4.65 MILLION/UL (ref 3.88–5.62)
SODIUM SERPL-SCNC: 139 MMOL/L (ref 136–145)
TRIGL SERPL-MCNC: 149 MG/DL
TSH SERPL DL<=0.05 MIU/L-ACNC: 2.27 UIU/ML (ref 0.36–3.74)
WBC # BLD AUTO: 6.5 THOUSAND/UL (ref 4.31–10.16)

## 2018-05-10 PROCEDURE — 85025 COMPLETE CBC W/AUTO DIFF WBC: CPT

## 2018-05-10 PROCEDURE — 80061 LIPID PANEL: CPT

## 2018-05-10 PROCEDURE — 36415 COLL VENOUS BLD VENIPUNCTURE: CPT

## 2018-05-10 PROCEDURE — 80053 COMPREHEN METABOLIC PANEL: CPT

## 2018-05-10 PROCEDURE — 82043 UR ALBUMIN QUANTITATIVE: CPT | Performed by: NURSE PRACTITIONER

## 2018-05-10 PROCEDURE — 83036 HEMOGLOBIN GLYCOSYLATED A1C: CPT

## 2018-05-10 PROCEDURE — 82570 ASSAY OF URINE CREATININE: CPT | Performed by: NURSE PRACTITIONER

## 2018-05-10 PROCEDURE — 84443 ASSAY THYROID STIM HORMONE: CPT

## 2018-05-11 PROBLEM — K21.9 GERD (GASTROESOPHAGEAL REFLUX DISEASE): Status: ACTIVE | Noted: 2017-09-11

## 2018-05-11 PROBLEM — K59.00 CONSTIPATION: Status: ACTIVE | Noted: 2017-11-29

## 2018-05-11 PROBLEM — G44.019 EPISODIC CLUSTER HEADACHE, NOT INTRACTABLE: Status: ACTIVE | Noted: 2017-03-09

## 2018-05-11 PROBLEM — I83.812 VARICOSE VEINS OF LEFT LOWER EXTREMITY WITH PAIN: Status: ACTIVE | Noted: 2017-06-22

## 2018-05-11 NOTE — PROGRESS NOTES
HPI:  Caryn Mao is a 71 y o  male here for his Subsequent Wellness Visit      Patient Active Problem List   Diagnosis    [facility]    Chronic hepatitis C virus infection ([facility] )    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 )    Constipation    Controlled diabetes mellitus with diabetic neuropathy ([facility] )    Episodic cluster headache, not intractable    GERD (gastroesophageal reflux disease)    Headache    Hypertension    ED (erectile dysfunction) of organic origin    Knee joint pain    Mening[facility]oma ([facility]    Varicose 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     Past Medical History:   Diagnosis Date    Brain mass     Last Assessed; 11/5/2015    Chest pain     Last Assessed: 1/22/2015    Diabetes type 2, uncontrolled ([facility] )     Last Assessed: 3/1/2016    Edema of left lower extremity     Last Assessed: 3/22/2017    Impaired fasting glucose     Last Assessed: 11/10/2015    Irregular heartbeat     Varicose 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     Last Assessed: 3/22/2017     Past Surgical History:   Procedure Laterality Date    INGUINAL HERNIA REPAIR      20+ years ago - [facility]; Last Assessed: 10/23/2014    VARICOSE VEIN SURGERY Left     x2 left leg - 40+ yrs ago, [facility]     Family History   Problem Relation Age of Onset    No Known Problems Mother      History   Smoking Status    Never Smoker   Smokeless Tobacco    Never Used     History   Alcohol use Not on file      History   Drug Use No     /74   Pulse 74   Resp 18   Ht 5' 8 19" (1 732 m)   Wt 96 4 kg (212 lb 9 6 oz)   BMI 32 15 kg/m²       Current Outpatient Prescriptions   Medication Sig Dispense Refill    ammonium lactate (LAC-HYDRIN) 12 % cream Apply topically Twice daily      aspirin 81 MG tablet Take by mouth      Glucose Blood (ONETOUCH ULTRA BLUE VI) by In Vitro route daily      lisinopril (ZESTRIL) 10 mg tablet Take 1 tablet (10 mg total) by mouth daily 90 tablet 1    omeprazole (PriLOSEC) 20 mg delayed release capsule Take 1 tablet by mouth 2 (two) times a day      Polyethylene Glycol 1000 LIQD Take 1 packet by mouth daily      sildenafil (REVATIO) 20 mg tablet Take by mouth      sildenafil (VIAGRA) 50 MG tablet Take 50 mg by mouth      topiramate (TOPAMAX) 50 MG tablet Take 1 tablet by mouth daily      atorvastatin (LIPITOR) 10 mg tablet Take 1 tablet (10 mg total) by mouth daily 90 tablet 1    docusate sodium (COLACE) 100 mg capsule Take 1 capsule (100 mg total) by mouth 2 (two) times a day 60 capsule 5    gabapentin (NEURONTIN) 100 mg capsule Take 200 mg by mouth daily at bedtime  2    glucose blood (ONE TOUCH ULTRA TEST) test strip Test once daily, e11 9 50 each 0    Lancets (ONETOUCH ULTRASOFT) lancets Use as instructed 100 each 0    metFORMIN (GLUCOPHAGE) 500 mg tablet Take 500 mg by mouth 2 (two) times a day  6     No current facility-administered medications for this visit        No Known Allergies  Immunization History   Administered Date(s) Administered    Influenza 10/27/2015, 10/01/2016, 10/13/2016    Influenza Split High Dose Preservative Free IM 10/23/2014, 10/27/2015, 10/01/2016    Pneumococcal Conjugate 13-Valent 08/25/2015, 01/19/2017       Patient Care Team:  Jurgen Mckeon DO as PCP - General (Family Medicine)  MD Rosana Obregon CRNP    Medicare Screening Tests and Risk Assessments:  AWV Clinical     ISAR:       Once in a Lifetime Medicare Screening:       Medicare Screening Tests and Risk Assessment:   AAA Risk Assessment    Osteoporosis Risk Assessment    HIV Risk Assessment        Drug and Alcohol Use:   Tobacco use    Tobacco use duration    Tobacco Cessation Readiness    Alcohol use    Alcohol Treatment Readiness   Illicit Drug Use        Diet & Exercise:   Diet   How many servings a day of the following:   Exercise        Cognitive Impairment Screening:   Cognitive Impairment Screening        Functional Ability/Level of Safety:   Hearing    Hearing Impairment Assessment    Current Activities    Help needed with the folllowing:    ADL    Fall Risk   Injury History       Home Safety:   Home Safety Risk Factors       Advanced Directives:   Advanced Directives    Patient's End of Life Decisions        Urinary Incontinence:       Glaucoma:            Provider Screening    No data filed        No exam data present    Physical Exam :  Physical Exam    phys   [facility] Prior Wellness Visits:   Last Medicare wellness visit information was reviewed, patient interviewed , no change since last AWVyes  Last Medicare wellness visit information was reviewed, patient interviewed and updates made to the record today yes    Assessment and Plan:  1  Medicare annual wellness visit, subsequent     2  Type 2 diabetes mellitus without complication, without long-term current use of insulin ([facility])  glucose blood (ONE TOUCH ULTRA TEST) test strip    Lancets (ONETOUCH ULTRASOFT) lancets    HEMOGLOBIN A1C W/ EAG ESTIMATION    Comprehensive metabolic panel    CBC and differential    TSH, 3rd generation with T4 reflex    Lipid Panel with Direct LDL reflex    TSH, 3rd generation with T4 reflex    Microalbumin / creatinine urine ratio    POCT blood glucose   3  Other hyperlipidemia  atorvastatin (LIPITOR) 10 mg tablet    HEMOGLOBIN A1C W/ EAG ESTIMATION    Comprehensive metabolic panel    CBC and differential    TSH, 3rd generation with T4 reflex    Lipid Panel with Direct LDL reflex    TSH, 3rd generation with T4 reflex    Microalbumin / creatinine urine ratio   4  Essential hypertension  lisinopril (ZESTRIL) 10 mg tablet    HEMOGLOBIN A1C W/ EAG ESTIMATION    Comprehensive metabolic panel    CBC and differential    TSH, 3rd generation with T4 reflex    Lipid Panel with Direct LDL reflex    TSH, 3rd generation with T4 reflex    Microalbumin / creatinine urine ratio   5   Constipation, unspecified constipation type  docusate sodium (COLACE) 100 mg capsule    Ambulatory referral to Gastroenterology HEMOGLOBIN A1C W/ EAG ESTIMATION    Comprehensive metabolic panel    CBC and differential    TSH, 3rd generation with T4 reflex    Lipid Panel with Direct LDL reflex    TSH, 3rd generation with T4 reflex    Microalbumin / creatinine urine ratio       Health Maintenance Due   Topic Date Due    COLONOSCOPY  1945    OPHTHALMOLOGY EXAM  07/11/1956    DTaP,Tdap,and Td Vaccines (1 - Tdap) 07/11/1967    PNEUMOCOCCAL POLYSACCHARIDE VACCINE AGE 65 AND OVER  07/11/2011    GLAUCOMA SCREENING 67+ YR  07/11/2013

## 2018-05-25 ENCOUNTER — TELEPHONE (OUTPATIENT)
Dept: FAMILY MEDICINE CLINIC | Facility: CLINIC | Age: 73
End: 2018-05-25

## 2018-05-29 ENCOUNTER — TELEPHONE (OUTPATIENT)
Dept: FAMILY MEDICINE CLINIC | Facility: CLINIC | Age: 73
End: 2018-05-29

## 2018-05-30 DIAGNOSIS — B18.2 CHRONIC HEPATITIS C WITHOUT HEPATIC COMA (HCC): Primary | ICD-10-CM

## 2018-05-30 NOTE — TELEPHONE ENCOUNTER
We did not test his hepatitis, last time it was ok  I could put in order for hepatitis  I did respond about his other labs  I will put order in for hepatitis  jaki

## 2018-05-31 ENCOUNTER — TELEPHONE (OUTPATIENT)
Dept: FAMILY MEDICINE CLINIC | Facility: CLINIC | Age: 73
End: 2018-05-31

## 2018-06-05 ENCOUNTER — APPOINTMENT (OUTPATIENT)
Dept: LAB | Facility: CLINIC | Age: 73
End: 2018-06-05
Payer: COMMERCIAL

## 2018-06-05 DIAGNOSIS — B18.2 CHRONIC HEPATITIS C WITHOUT HEPATIC COMA (HCC): ICD-10-CM

## 2018-06-05 PROCEDURE — 36415 COLL VENOUS BLD VENIPUNCTURE: CPT

## 2018-06-05 PROCEDURE — 87522 HEPATITIS C REVRS TRNSCRPJ: CPT

## 2018-06-08 LAB
HCV RNA SERPL NAA+PROBE-ACNC: NORMAL IU/ML
TEST INFORMATION: NORMAL

## 2018-06-15 ENCOUNTER — OFFICE VISIT (OUTPATIENT)
Dept: FAMILY MEDICINE CLINIC | Facility: CLINIC | Age: 73
End: 2018-06-15
Payer: COMMERCIAL

## 2018-06-15 VITALS
SYSTOLIC BLOOD PRESSURE: 110 MMHG | DIASTOLIC BLOOD PRESSURE: 60 MMHG | WEIGHT: 203.8 LBS | BODY MASS INDEX: 30.89 KG/M2 | HEIGHT: 68 IN

## 2018-06-15 DIAGNOSIS — H61.23 BILATERAL IMPACTED CERUMEN: Primary | ICD-10-CM

## 2018-06-15 PROCEDURE — 99213 OFFICE O/P EST LOW 20 MIN: CPT | Performed by: NURSE PRACTITIONER

## 2018-06-15 RX ORDER — LANCETS 33 GAUGE
EACH MISCELLANEOUS
COMMUNITY
End: 2018-09-04 | Stop reason: SDUPTHER

## 2018-06-15 RX ORDER — OMEPRAZOLE 10 MG/1
CAPSULE, DELAYED RELEASE ORAL
COMMUNITY
End: 2018-12-19 | Stop reason: SDUPTHER

## 2018-06-18 PROCEDURE — 69209 REMOVE IMPACTED EAR WAX UNI: CPT | Performed by: NURSE PRACTITIONER

## 2018-06-18 NOTE — PROGRESS NOTES
Assessment/Plan:         Problem List Items Addressed This Visit     None      Visit Diagnoses     Bilateral impacted cerumen    -  Primary    Relevant Orders    Ear cerumen removal            Subjective:      Patient ID: Alison Galeana is a 71 y o  male  Here for c/o not being able to hear out of both ears  Hx of wax in ears in past          The following portions of the patient's history were reviewed and updated as appropriate: allergies, current medications, past family history, past medical history, past social history, past surgical history and problem list     Review of Systems   Constitutional: Negative for fatigue and fever  HENT: Negative for tinnitus  Neurological: Negative for dizziness, light-headedness and headaches  Hematological: Negative for adenopathy           Objective:      /60   Ht 5' 8" (1 727 m)   Wt 92 4 kg (203 lb 12 8 oz)   BMI 30 99 kg/m²     Family History   Problem Relation Age of Onset    No Known Problems Mother      Past Medical History:   Diagnosis Date    Brain mass     Last Assessed; 11/5/2015    Chest pain     Last Assessed: 1/22/2015    Diabetes type 2, uncontrolled ([facility] [address] )     Last Assessed: 3/1/2016    Edema of left lower extremity     Last Assessed: 3/22/2017    Impaired fasting glucose     Last Assessed: 11/10/2015    Irregular heartbeat     Varicose 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     Last Assessed: 3/22/2017     Social History     Social History    Marital status: /Civil Union     Spouse name: N/A    Number of children: 11    Years of education: N/A     Occupational History    Retired: Construction      Social History Main Topics    Smoking status: Never Smoker    Smokeless tobacco: Never Used    Alcohol use Not on file    Drug use: No    Sexual activity: Not on file     Other Topics Concern    Not on file     Social History Narrative    Uses safety equipment               Current Outpatient Prescriptions:     ammonium lactate (LAC-HYDRIN) 12 % cream, Apply topically Twice daily, Disp: , Rfl:     aspirin 81 MG tablet, Take by mouth, Disp: , Rfl:     atorvastatin (LIPITOR) 10 mg tablet, Take 1 tablet (10 mg total) by mouth daily, Disp: 90 tablet, Rfl: 1    docusate sodium (COLACE) 100 mg capsule, Take 1 capsule (100 mg total) by mouth 2 (two) times a day, Disp: 60 capsule, Rfl: 5    gabapentin (NEURONTIN) 100 mg capsule, Take 200 mg by mouth daily at bedtime, Disp: , Rfl: 2    glucose blood (ONE TOUCH ULTRA TEST) test strip, Test once daily, e11 9, Disp: 50 each, Rfl: 0    Glucose Blood (ONETOUCH ULTRA BLUE VI), by In Vitro route daily, Disp: , Rfl:     glucose blood test strip, OneTouch Ultra [employer], Disp: , Rfl:     Lancets (ONETOUCH ULTRASOFT) lancets, Use as instructed, Disp: 100 each, Rfl: 0    lisinopril (ZESTRIL) 10 mg tablet, Take 1 tablet (10 mg total) by mouth daily, Disp: 90 tablet, Rfl: 1    metFORMIN (GLUCOPHAGE) 500 mg tablet, Take 500 mg by mouth 2 (two) times a day, Disp: , Rfl: 6    omeprazole (PriLOSEC) 10 mg delayed release capsule, omeprazole 20 mg capsule,delayed release, Disp: , Rfl:     omeprazole (PriLOSEC) 20 mg delayed release capsule, Take 1 tablet by mouth 2 (two) times a day, Disp: , Rfl:     ONETOUCH DELICA LANCETS 52P MISC, OneTouch Delica Lancets 33 gauge, Disp: , Rfl:     Polyethylene Glycol 1000 LIQD, Take 1 packet by mouth daily, Disp: , Rfl:     sildenafil (REVATIO) 20 mg tablet, Take by mouth, Disp: , Rfl:     sildenafil (VIAGRA) 50 MG tablet, Take 50 mg by mouth, Disp: , Rfl:     topiramate (TOPAMAX) 50 MG tablet, Take 1 tablet by mouth daily, Disp: , Rfl:   No Known Allergies     Physical Exam   Constitutional: He is oriented to person, place, and time  He appears well-developed and well-nourished  HENT:   Head: Normocephalic and atraumatic  Cerumen impaction bilateral ears    Neurological: He is alert and oriented to person, place, and time  Skin: Skin is warm and dry  Psychiatric: He has a normal mood and affect  His behavior is normal  Judgment and thought content normal    Nursing note and vitals reviewed  Ear cerumen removal  Date/Time: 6/18/2018 7:49 AM  Performed by: Maribell Banuelos by: Anitra Greenberg     Patient location:  Clinic  Other Assisting Provider: No    Consent:     Consent obtained:  Verbal    Consent given by:  Patient    Risks discussed:  Bleeding, dizziness, infection, incomplete removal, pain and TM perforation    Alternatives discussed:  No treatment, alternative treatment, observation and referral  Universal protocol:     Procedure explained and questions answered to patient or proxy's satisfaction: yes      Patient identity confirmed:  Verbally with patient  Procedure details:     Location:  L ear and R ear    Procedure type: irrigation      Approach:  Natural orifice endoscopic  Post-procedure details:     Complication:  None    Hearing quality:  Improved    Patient tolerance of procedure:   Tolerated well, no immediate complications

## 2018-06-26 DIAGNOSIS — E11.9 TYPE 2 DIABETES MELLITUS WITHOUT COMPLICATION, WITHOUT LONG-TERM CURRENT USE OF INSULIN (HCC): ICD-10-CM

## 2018-06-26 RX ORDER — LANCETS
EACH MISCELLANEOUS
Qty: 100 EACH | Refills: 5 | Status: SHIPPED | OUTPATIENT
Start: 2018-06-26 | End: 2018-09-25 | Stop reason: SDUPTHER

## 2018-07-06 ENCOUNTER — TELEPHONE (OUTPATIENT)
Dept: NEUROSURGERY | Facility: CLINIC | Age: 73
End: 2018-07-06

## 2018-07-09 DIAGNOSIS — E11.9 TYPE 2 DIABETES MELLITUS WITHOUT COMPLICATION, WITHOUT LONG-TERM CURRENT USE OF INSULIN (HCC): ICD-10-CM

## 2018-07-09 RX ORDER — LANCETS
EACH MISCELLANEOUS
Qty: 100 EACH | Refills: 5 | Status: SHIPPED | OUTPATIENT
Start: 2018-07-09 | End: 2018-12-19 | Stop reason: SDUPTHER

## 2018-07-10 ENCOUNTER — ANESTHESIA (OUTPATIENT)
Dept: GASTROENTEROLOGY | Facility: HOSPITAL | Age: 73
End: 2018-07-10
Payer: COMMERCIAL

## 2018-07-10 ENCOUNTER — OFFICE VISIT (OUTPATIENT)
Dept: FAMILY MEDICINE CLINIC | Facility: CLINIC | Age: 73
End: 2018-07-10
Payer: COMMERCIAL

## 2018-07-10 ENCOUNTER — HOSPITAL ENCOUNTER (OUTPATIENT)
Facility: HOSPITAL | Age: 73
Setting detail: OUTPATIENT SURGERY
Discharge: HOME | End: 2018-07-10
Attending: SURGERY | Admitting: SURGERY
Payer: COMMERCIAL

## 2018-07-10 ENCOUNTER — ANESTHESIA EVENT (OUTPATIENT)
Dept: GASTROENTEROLOGY | Facility: HOSPITAL | Age: 73
End: 2018-07-10
Payer: COMMERCIAL

## 2018-07-10 VITALS
HEART RATE: 58 BPM | DIASTOLIC BLOOD PRESSURE: 73 MMHG | HEIGHT: 68 IN | WEIGHT: 205 LBS | TEMPERATURE: 97 F | SYSTOLIC BLOOD PRESSURE: 128 MMHG | RESPIRATION RATE: 16 BRPM | OXYGEN SATURATION: 98 % | BODY MASS INDEX: 31.07 KG/M2

## 2018-07-10 VITALS
WEIGHT: 207.4 LBS | RESPIRATION RATE: 14 BRPM | HEART RATE: 78 BPM | HEIGHT: 68 IN | SYSTOLIC BLOOD PRESSURE: 150 MMHG | DIASTOLIC BLOOD PRESSURE: 80 MMHG | BODY MASS INDEX: 31.43 KG/M2

## 2018-07-10 DIAGNOSIS — E11.9 TYPE 2 DIABETES MELLITUS WITHOUT COMPLICATION, WITHOUT LONG-TERM CURRENT USE OF INSULIN (HCC): ICD-10-CM

## 2018-07-10 DIAGNOSIS — H61.23 BILATERAL IMPACTED CERUMEN: Primary | ICD-10-CM

## 2018-07-10 DIAGNOSIS — Z12.11 ENCOUNTER FOR SCREENING FOR MALIGNANT NEOPLASM OF COLON: ICD-10-CM

## 2018-07-10 PROCEDURE — 99213 OFFICE O/P EST LOW 20 MIN: CPT | Performed by: NURSE PRACTITIONER

## 2018-07-10 PROCEDURE — 88305 TISSUE EXAM BY PATHOLOGIST: CPT | Performed by: PATHOLOGY

## 2018-07-10 PROCEDURE — 69210 REMOVE IMPACTED EAR WAX UNI: CPT | Performed by: NURSE PRACTITIONER

## 2018-07-10 RX ORDER — LIDOCAINE HYDROCHLORIDE 10 MG/ML
INJECTION, SOLUTION INFILTRATION; PERINEURAL AS NEEDED
Status: DISCONTINUED | OUTPATIENT
Start: 2018-07-10 | End: 2018-07-10 | Stop reason: SURG

## 2018-07-10 RX ORDER — POLYETHYLENE GLYCOL 3350, SODIUM CHLORIDE, SODIUM BICARBONATE, POTASSIUM CHLORIDE 420; 11.2; 5.72; 1.48 G/4L; G/4L; G/4L; G/4L
POWDER, FOR SOLUTION ORAL
Refills: 0 | COMMUNITY
Start: 2018-07-09 | End: 2018-12-19

## 2018-07-10 RX ORDER — SODIUM CHLORIDE 9 MG/ML
INJECTION, SOLUTION INTRAVENOUS CONTINUOUS PRN
Status: DISCONTINUED | OUTPATIENT
Start: 2018-07-10 | End: 2018-07-10 | Stop reason: SURG

## 2018-07-10 RX ORDER — SODIUM CHLORIDE 9 MG/ML
125 INJECTION, SOLUTION INTRAVENOUS CONTINUOUS
Status: DISCONTINUED | OUTPATIENT
Start: 2018-07-10 | End: 2018-07-10 | Stop reason: HOSPADM

## 2018-07-10 RX ORDER — PROPOFOL 10 MG/ML
INJECTION, EMULSION INTRAVENOUS AS NEEDED
Status: DISCONTINUED | OUTPATIENT
Start: 2018-07-10 | End: 2018-07-10 | Stop reason: SURG

## 2018-07-10 RX ADMIN — PROPOFOL 30 MG: 10 INJECTION, EMULSION INTRAVENOUS at 09:09

## 2018-07-10 RX ADMIN — PROPOFOL 20 MG: 10 INJECTION, EMULSION INTRAVENOUS at 09:14

## 2018-07-10 RX ADMIN — LIDOCAINE HYDROCHLORIDE 50 MG: 10 INJECTION, SOLUTION INFILTRATION; PERINEURAL at 09:06

## 2018-07-10 RX ADMIN — PROPOFOL 150 MG: 10 INJECTION, EMULSION INTRAVENOUS at 09:06

## 2018-07-10 RX ADMIN — PROPOFOL 10 MG: 10 INJECTION, EMULSION INTRAVENOUS at 09:16

## 2018-07-10 RX ADMIN — PROPOFOL 10 MG: 10 INJECTION, EMULSION INTRAVENOUS at 09:10

## 2018-07-10 RX ADMIN — SODIUM CHLORIDE: 9 INJECTION, SOLUTION INTRAVENOUS at 09:01

## 2018-07-10 RX ADMIN — PROPOFOL 30 MG: 10 INJECTION, EMULSION INTRAVENOUS at 09:12

## 2018-07-10 NOTE — OP NOTE
**** GI/ENDOSCOPY REPORT ****     PATIENT NAME: Basil Pinto ------ VISIT ID:  Patient ID: [id number]   YOB: 1945     INTRODUCTION: Colonoscopy - A 71 male patient presents for an outpatient   Colonoscopy at [address] [place] [address]  PREVIOUS COLONOSCOPY:     INDICATIONS:Previous polyps Surveillance  CONSENT:  The benefits, risks, and alternatives to the procedure were   discussed and informed consent was obtained from the patient  PREPARATION: EKG, pulse, pulse oximetry and blood pressure were monitored   throughout the procedure  The patient was identified by myself both   verbally and by visual inspection of ID band  Airway Assessment   Classification: Airway class 2 - Visualization of the soft palate, fauces   and uvula  ASA Classification: Class 2 - Patient has mild to moderate   systemic disturbance that may or may not be related to the disorder   requiring surgery  MEDICATIONS: Anesthesia-check records     PROCEDURE:  The endoscope was passed without difficulty through the anus   under direct visualization and advanced to the cecum, confirmed by   appendiceal orifice and ileocecal valve  The scope was withdrawn and the   mucosa was carefully examined  A 1 0 cm sessile polyp was removed from the   transverse colon by hot forceps  The quality of the preparation was good  Cecal Intubation Time: Minute(s) Scope Withdrawal Time: Minute(s)     RECTAL EXAM: Normal rectal exam      FINDINGS: Polyp     COMPLICATIONS: There were no complications  IMPRESSIONS:Polyp     RECOMMENDATIONS:     ESTIMATED BLOOD LOSS:     PATHOLOGY SPECIMENS: Polyp     PROCEDURE CODES: : Colonoscopy with removal of tumor(s), polyp(s), or   other lesion(s) by hot biopsy forceps or bipolar cautery     ICD-9 Codes:     ICD-10 Codes:     PERFORMED BY: SUYAPA Zavala  on 07/10/2018  Version 1, electronically signed by SUYAPA Corona  on   07/10/2018 at 09:24

## 2018-07-10 NOTE — PRE-PROCEDURE INSTRUCTIONS
Shayne Tolliver bus call for ride [phone]  [facility] wife, in waiting room

## 2018-07-10 NOTE — PROGRESS NOTES
Assessment/Plan:           Problem List Items Addressed This Visit     None      Visit Diagnoses     Bilateral impacted cerumen    -  Primary    Relevant Orders    Ear cerumen removal            Subjective:      Patient ID: Raj Lucio is a 71 y o  male  Here for wax in his ears  Trouble hearing and discomfort both ears          The following portions of the patient's history were reviewed and updated as appropriate: allergies, current medications, past family history, past medical history, past social history, past surgical history and problem list     Review of Systems   Constitutional: Negative for fatigue  HENT: Negative for ear discharge, ear pain and rhinorrhea  Eyes: Negative for photophobia and visual disturbance  Respiratory: Negative for cough, shortness of breath and wheezing  Cardiovascular: Negative for chest pain and palpitations  Neurological: Negative for dizziness, light-headedness and headaches  Psychiatric/Behavioral: Negative for agitation and confusion           Objective:      /80   Pulse 78   Resp 14   Ht 5' 8" (1 727 m)   Wt 94 1 kg (207 lb 6 4 oz)   BMI 31 54 kg/m²     Family History   Problem Relation Age of Onset    No Known Problems Mother      Past Medical History:   Diagnosis Date    Brain mass     Last Assessed; 11/5/2015    Chest pain     Last Assessed: 1/22/2015    Diabetes type 2, uncontrolled ([facility] [address] )     Last Assessed: 3/1/2016    Edema of left lower extremity     Last Assessed: 3/22/2017    GERD (gastroesophageal reflux disease)     Hyperlipidemia     Hypertension     Impaired fasting glucose     Last Assessed: 11/10/2015    Irregular heartbeat     Varicose 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     Last Assessed: 3/22/2017     Social History     Social History    Marital status: /Civil Union     Spouse name: N/A    Number of children: 5    Years of education: N/A     Occupational History    Retired: Construction      Social History Main Topics    Smoking status: Former Smoker    Smokeless tobacco: Never Used    Alcohol use No    Drug use: No    Sexual activity: Not on file     Other Topics Concern    Not on file     Social History Narrative    Uses safety equipment               Current Outpatient Prescriptions:     ammonium lactate (LAC-HYDRIN) 12 % cream, Apply topically Twice daily, Disp: , Rfl:     aspirin 81 MG tablet, Take by mouth, Disp: , Rfl:     atorvastatin (LIPITOR) 10 mg tablet, Take 1 tablet (10 mg total) by mouth daily, Disp: 90 tablet, Rfl: 1    docusate sodium (COLACE) 100 mg capsule, Take 1 capsule (100 mg total) by mouth 2 (two) times a day, Disp: 60 capsule, Rfl: 5    gabapentin (NEURONTIN) 100 mg capsule, Take 200 mg by mouth daily at bedtime, Disp: , Rfl: 2    glucose blood (ONE TOUCH ULTRA TEST) test strip, Test once daily, e11 9, Disp: 50 each, Rfl: 0    Glucose Blood (ONETOUCH ULTRA BLUE VI), by In Vitro route daily, Disp: , Rfl:     glucose blood test strip, OneTouch Ultra [employer], Disp: , Rfl:     Lancets (ONETOUCH ULTRASOFT) lancets, Use as instructed Test once daily e05 1, Disp: 100 each, Rfl: 5    Lancets (ONETOUCH ULTRASOFT) lancets, USE AS INSTRUCTED, Disp: 100 each, Rfl: 5    lisinopril (ZESTRIL) 10 mg tablet, Take 1 tablet (10 mg total) by mouth daily, Disp: 90 tablet, Rfl: 1    metFORMIN (GLUCOPHAGE) 500 mg tablet, Take 500 mg by mouth 2 (two) times a day, Disp: , Rfl: 6    omeprazole (PriLOSEC) 10 mg delayed release capsule, omeprazole 20 mg capsule,delayed release, Disp: , Rfl:     ONETOUCH DELICA LANCETS 67X MISC, OneTouch [employer] Lancets 33 gauge, Disp: , Rfl:     Polyethylene Glycol 1000 LIQD, Take 1 packet by mouth daily, Disp: , Rfl:     polyethylene glycol-electrolytes (NULYTELY) 4000 mL solution, take 236 milliliter by mouth as directed, Disp: , Rfl: 0    sildenafil (REVATIO) 20 mg tablet, Take by mouth, Disp: , Rfl:     sildenafil (VIAGRA) 50 MG tablet, Take 50 mg by mouth, Disp: , Rfl:     topiramate (TOPAMAX) 50 MG tablet, Take 1 tablet by mouth daily, Disp: , Rfl:   No current facility-administered medications for this visit  No Known Allergies     Physical Exam   Constitutional: He is oriented to person, place, and time  He appears well-developed and well-nourished  HENT:   Head: Normocephalic and atraumatic  Nose: Nose normal    Mouth/Throat: Oropharynx is clear and moist    Cerumen impaction b/l ears     Cardiovascular: Normal rate, regular rhythm and normal heart sounds  Pulmonary/Chest: Effort normal and breath sounds normal    Musculoskeletal: Normal range of motion  Neurological: He is alert and oriented to person, place, and time  Skin: Skin is warm and dry  Psychiatric: He has a normal mood and affect  His behavior is normal  Judgment and thought content normal    Nursing note and vitals reviewed  Ear cerumen removal  Date/Time: 7/10/2018 3:47 PM  Performed by: Derrel Bernheim by: Cordell Dominique     Patient location:  Clinic  Other Assisting Provider: No    Consent:     Consent obtained:  Verbal    Consent given by:  Patient    Risks discussed:  Pain, TM perforation, incomplete removal, dizziness, infection and bleeding    Alternatives discussed:  No treatment, delayed treatment and referral  Universal protocol:     Procedure explained and questions answered to patient or proxy's satisfaction: yes      Patient identity confirmed:  Verbally with patient  Procedure details:     Local anesthetic:  None    Location:  L ear and R ear    Procedure type: irrigation      Approach:  Natural orifice    Equipment used:  Syringe, otoscope, hydrogen peroxide, water, curette, tweezers  Post-procedure details:     Complication:  None    Hearing quality:  Improved    Patient tolerance of procedure:   Tolerated well, no immediate complications

## 2018-07-10 NOTE — ANESTHESIA PREPROCEDURE EVALUATION
Review of Systems/Medical History          Cardiovascular  Hyperlipidemia, Hypertension ,    Pulmonary  Negative pulmonary ROS        GI/Hepatic    GERD well controlled, Liver disease , Hepatitis C,        Negative  ROS        Endo/Other  Diabetes well controlled type 2 ,      GYN       Hematology  Negative hematology ROS      Musculoskeletal  Negative musculoskeletal ROS        Neurology    Headaches,    Psychology   Negative psychology ROS              Physical Exam    Airway    Mallampati score: II  TM Distance: >3 FB  Neck ROM: full     Dental   Comment: Partial upper dentures , upper dentures,     Cardiovascular  Cardiovascular exam normal    Pulmonary  Pulmonary exam normal     Other Findings        Anesthesia Plan  ASA Score- 2     Anesthesia Type- IV sedation with anesthesia with ASA Monitors  Additional Monitors:   Airway Plan:         Plan Factors-  Patient did not smoke on day of surgery  Induction- intravenous  Postoperative Plan-     Informed Consent- Anesthetic plan and risks discussed with patient  I personally reviewed this patient with the CRNA  Discussed and agreed on the Anesthesia Plan with the CRNA  Adelso Santizo

## 2018-07-10 NOTE — ANESTHESIA POSTPROCEDURE EVALUATION
Post-Op Assessment Note      CV Status:  Stable    Mental Status:  Alert and awake    Hydration Status:  Euvolemic    PONV Controlled:  Controlled    Airway Patency:  Patent    Post Op Vitals Reviewed: Yes          Staff: CRNA           BP 95/58 (07/10/18 0925)    Temp     Pulse 63 (07/10/18 0925)   Resp 16 (07/10/18 0925)    SpO2 100 % (07/10/18 0925)

## 2018-07-11 NOTE — TELEPHONE ENCOUNTER
MRI dept called again to note the pt still ha not had required labs done  Called pt with no response  Message left again reminding him of labs needed prior to MRI tomorrow  Encourage a call back with questions

## 2018-07-13 ENCOUNTER — TELEPHONE (OUTPATIENT)
Dept: FAMILY MEDICINE CLINIC | Facility: CLINIC | Age: 73
End: 2018-07-13

## 2018-07-13 ENCOUNTER — TELEPHONE (OUTPATIENT)
Dept: NEUROSURGERY | Facility: CLINIC | Age: 73
End: 2018-07-13

## 2018-07-13 NOTE — TELEPHONE ENCOUNTER
Patient was notified that he missed his mri appointmentt on 7/12/18  Patient was instructed to call central scheduling to reschedule the mri and to call our office to reschedule our appointmentt  Both phone numbers were provided in message

## 2018-07-13 NOTE — TELEPHONE ENCOUNTER
Patient does not know what the original lancets were called, and how big the gauge is supposed to be

## 2018-07-13 NOTE — TELEPHONE ENCOUNTER
Patient came in to drop off the two boxes of lancets that were ordered - OneTouch UltraSoft lancets  Patient explained these were the wrong lancets as they do not fit   patient was adamant on not taking the lancets back  Patient does not want to pay for new ones  Please advise

## 2018-07-16 NOTE — TELEPHONE ENCOUNTER
I have no idea what lancets are correct, can the medical assistant call his pharmacy and get me the correct ones please  jaki

## 2018-07-17 NOTE — TELEPHONE ENCOUNTER
Called pharmacy, looks like the patient has used OneTouch Delica lancets at 35 gauge  Verbal order was given for patient

## 2018-07-19 ENCOUNTER — PROCEDURE VISIT (OUTPATIENT)
Dept: FAMILY MEDICINE CLINIC | Facility: CLINIC | Age: 73
End: 2018-07-19
Payer: COMMERCIAL

## 2018-07-19 VITALS
RESPIRATION RATE: 16 BRPM | TEMPERATURE: 97.3 F | WEIGHT: 206.4 LBS | SYSTOLIC BLOOD PRESSURE: 122 MMHG | DIASTOLIC BLOOD PRESSURE: 74 MMHG | HEART RATE: 80 BPM | BODY MASS INDEX: 31.38 KG/M2

## 2018-07-19 DIAGNOSIS — D32.9 MENINGIOMA (HCC): ICD-10-CM

## 2018-07-19 DIAGNOSIS — R51.9 NONINTRACTABLE HEADACHE, UNSPECIFIED CHRONICITY PATTERN, UNSPECIFIED HEADACHE TYPE: Primary | ICD-10-CM

## 2018-07-19 DIAGNOSIS — H60.532 ACUTE CONTACT OTITIS EXTERNA OF LEFT EAR: ICD-10-CM

## 2018-07-19 PROCEDURE — 99213 OFFICE O/P EST LOW 20 MIN: CPT | Performed by: NURSE PRACTITIONER

## 2018-07-19 RX ORDER — OFLOXACIN 3 MG/ML
10 SOLUTION AURICULAR (OTIC) DAILY
Qty: 5 ML | Refills: 0 | Status: SHIPPED | OUTPATIENT
Start: 2018-07-19 | End: 2018-12-19

## 2018-07-20 NOTE — PROGRESS NOTES
Assessment/Plan:           Problem List Items Addressed This Visit        Nervous and Auditory    [facility] ([facility]    Relevant Orders    MRI brain w wo contrast       Other    Headache - Primary      Other Visit Diagnoses     Acute contact otitis externa of left ear        Relevant Medications    ofloxacin (FLOXIN) 0 3 % otic solution            Subjective:      Patient ID: Joanna Adams is a 68 y o  male  Here for f/u to headache and to flush left ear one more time due to being unsuccessful last visit  Same headache as before   Still has not gone to see neurology  Overdue for f/u mri for meningioma  Follows with neurosurgery for this          The following portions of the patient's history were reviewed and updated as appropriate: allergies, current medications, past family history, past medical history, past social history, past surgical history and problem list     Review of Systems   Constitutional: Negative for fatigue and fever  HENT: Negative for ear pain and postnasal drip  Respiratory: Negative for cough, shortness of breath and wheezing  Cardiovascular: Negative for chest pain and palpitations  Gastrointestinal: Negative for constipation, diarrhea, nausea and vomiting  Musculoskeletal: Negative for back pain and myalgias  Skin: Negative for rash  Neurological: Positive for headaches  Negative for dizziness and light-headedness  Hematological: Negative for adenopathy  Psychiatric/Behavioral: Negative for agitation and dysphoric mood           Objective:      /74 (BP Location: Left arm, Patient Position: Sitting, Cuff Size: Standard)   Pulse 80   Temp (!) 97 3 °F (36 3 °C)   Resp 16   Wt 93 6 kg (206 lb 6 4 oz)   BMI 31 38 kg/m²     Family History   Problem Relation Age of Onset    No Known Problems Mother      Past Medical History:   Diagnosis Date    Brain mass     Last Assessed; 11/5/2015    Chest pain     Last Assessed: 1/22/2015    Diabetes type 2, uncontrolled ([facility] [address] ) Last Assessed: 3/1/2016    Edema of left lower extremity     Last Assessed: 3/22/2017    GERD (gastroesophageal reflux disease)     Hyperlipidemia     Hypertension     Impaired fasting glucose     Last Assessed: 11/10/2015    Irregular heartbeat     Varicose 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     Last Assessed: 3/22/2017     Social History     Social History    Marital status: /Civil Union     Spouse name: N/A    Number of children: 5    Years of education: N/A     Occupational History    Retired: Construction      Social History Main Topics    Smoking status: Former Smoker    Smokeless tobacco: Never Used    Alcohol use No    Drug use: No    Sexual activity: Not on file     Other Topics Concern    Not on file     Social History Narrative    Uses safety equipment               Current Outpatient Prescriptions:     ammonium lactate (LAC-HYDRIN) 12 % cream, Apply topically Twice daily, Disp: , Rfl:     aspirin 81 MG tablet, Take by mouth, Disp: , Rfl:     atorvastatin (LIPITOR) 10 mg tablet, Take 1 tablet (10 mg total) by mouth daily, Disp: 90 tablet, Rfl: 1    docusate sodium (COLACE) 100 mg capsule, Take 1 capsule (100 mg total) by mouth 2 (two) times a day, Disp: 60 capsule, Rfl: 5    gabapentin (NEURONTIN) 100 mg capsule, Take 200 mg by mouth daily at bedtime, Disp: , Rfl: 2    glucose blood (ONE TOUCH ULTRA TEST) test strip, Test once daily, e11 9, Disp: 50 each, Rfl: 11    Glucose Blood (ONETOUCH ULTRA BLUE VI), by In Vitro route daily, Disp: , Rfl:     glucose blood test strip, OneTouch Ultra [employer], Disp: , Rfl:     Lancets (ONETOUCH ULTRASOFT) lancets, Use as instructed Test once daily e05 1, Disp: 100 each, Rfl: 5    Lancets (ONETOUCH ULTRASOFT) lancets, USE AS INSTRUCTED, Disp: 100 each, Rfl: 5    lisinopril (ZESTRIL) 10 mg tablet, Take 1 tablet (10 mg total) by mouth daily, Disp: 90 tablet, Rfl: 1    metFORMIN (GLUCOPHAGE) 500 mg tablet, Take 500 mg by mouth 2 (two) times a day, Disp: , Rfl: 6    ofloxacin (FLOXIN) 0 3 % otic solution, Administer 10 drops into the left ear daily, Disp: 5 mL, Rfl: 0    omeprazole (PriLOSEC) 10 mg delayed release capsule, omeprazole 20 mg capsule,delayed release, Disp: , Rfl:     ONETOUCH DELICA LANCETS 90X MISC, OneTouch [employer] Lancets 33 gauge, Disp: , Rfl:     Polyethylene Glycol 1000 LIQD, Take 1 packet by mouth daily, Disp: , Rfl:     polyethylene glycol-electrolytes (NULYTELY) 4000 mL solution, take 236 milliliter by mouth as directed, Disp: , Rfl: 0    sildenafil (REVATIO) 20 mg tablet, Take by mouth, Disp: , Rfl:     sildenafil (VIAGRA) 50 MG tablet, Take 50 mg by mouth, Disp: , Rfl:     topiramate (TOPAMAX) 50 MG tablet, Take 1 tablet by mouth daily, Disp: , Rfl:   No Known Allergies     Physical Exam   Constitutional: He is oriented to person, place, and time  He appears well-developed and well-nourished  HENT:   Head: Normocephalic and atraumatic  Right Ear: External ear normal    Left Ear: External ear normal    Nose: Nose normal    Mouth/Throat: Oropharynx is clear and moist    Cardiovascular: Normal rate, regular rhythm and normal heart sounds  Pulmonary/Chest: Effort normal and breath sounds normal    Abdominal: Soft  Bowel sounds are normal    Musculoskeletal: Normal range of motion  Neurological: He is alert and oriented to person, place, and time  Skin: Skin is warm and dry  Psychiatric: He has a normal mood and affect  His behavior is normal  Judgment and thought content normal    Nursing note and vitals reviewed

## 2018-08-06 ENCOUNTER — TELEPHONE (OUTPATIENT)
Dept: NEUROSURGERY | Facility: CLINIC | Age: 73
End: 2018-08-06

## 2018-08-06 NOTE — TELEPHONE ENCOUNTER
Radiology LM on nurse line requesting BW for patient that is scheduled for an MRI next Monday  They require a creat and eGFR  Not sure if patient had any recent bloodwork done  Called patient to see if he has and if not order BW script but patient did not answer  LMOM for patient to call back at his convenience

## 2018-08-08 ENCOUNTER — TELEPHONE (OUTPATIENT)
Dept: NEUROSURGERY | Facility: CLINIC | Age: 73
End: 2018-08-08

## 2018-08-08 DIAGNOSIS — Z01.818 PREPROCEDURAL EXAMINATION: Primary | ICD-10-CM

## 2018-08-08 NOTE — TELEPHONE ENCOUNTER
Received call from radiology indicating the patient needed BUN and Creat completed prior to his MRI - noted no order placed - put order in to have labs drawn - plans to have completed today at [facility]

## 2018-08-09 ENCOUNTER — APPOINTMENT (OUTPATIENT)
Dept: LAB | Facility: CLINIC | Age: 73
End: 2018-08-09
Payer: COMMERCIAL

## 2018-08-09 DIAGNOSIS — Z01.818 PREPROCEDURAL EXAMINATION: ICD-10-CM

## 2018-08-09 LAB
BUN SERPL-MCNC: 35 MG/DL (ref 5–25)
CREAT SERPL-MCNC: 0.86 MG/DL (ref 0.6–1.3)
GFR SERPL CREATININE-BSD FRML MDRD: 87 ML/MIN/1.73SQ M

## 2018-08-09 PROCEDURE — 84520 ASSAY OF UREA NITROGEN: CPT

## 2018-08-09 PROCEDURE — 82565 ASSAY OF CREATININE: CPT

## 2018-08-09 PROCEDURE — 36415 COLL VENOUS BLD VENIPUNCTURE: CPT

## 2018-08-26 DIAGNOSIS — E11.9 TYPE 2 DIABETES MELLITUS WITHOUT COMPLICATION, WITHOUT LONG-TERM CURRENT USE OF INSULIN (HCC): ICD-10-CM

## 2018-08-31 ENCOUNTER — HOSPITAL ENCOUNTER (OUTPATIENT)
Dept: RADIOLOGY | Facility: HOSPITAL | Age: 73
Discharge: HOME | End: 2018-08-31
Attending: NEUROLOGICAL SURGERY
Payer: COMMERCIAL

## 2018-08-31 DIAGNOSIS — D32.9 BENIGN NEOPLASM OF MENINGES (HCC): ICD-10-CM

## 2018-08-31 PROCEDURE — 70553 MRI BRAIN STEM W/O & W/DYE: CPT

## 2018-08-31 PROCEDURE — A9585 GADOBUTROL INJECTION: HCPCS | Performed by: NEUROLOGICAL SURGERY

## 2018-08-31 RX ADMIN — GADOBUTROL 9 ML: 604.72 INJECTION INTRAVENOUS at 17:55

## 2018-09-04 ENCOUNTER — OFFICE VISIT (OUTPATIENT)
Dept: FAMILY MEDICINE CLINIC | Facility: CLINIC | Age: 73
End: 2018-09-04
Payer: COMMERCIAL

## 2018-09-04 VITALS
TEMPERATURE: 98.6 F | HEIGHT: 68 IN | SYSTOLIC BLOOD PRESSURE: 150 MMHG | DIASTOLIC BLOOD PRESSURE: 72 MMHG | WEIGHT: 205.8 LBS | OXYGEN SATURATION: 97 % | RESPIRATION RATE: 12 BRPM | HEART RATE: 62 BPM | BODY MASS INDEX: 31.19 KG/M2

## 2018-09-04 DIAGNOSIS — M25.561 ARTHRALGIA OF BOTH KNEES: ICD-10-CM

## 2018-09-04 DIAGNOSIS — E11.40 CONTROLLED TYPE 2 DIABETES MELLITUS WITH DIABETIC NEUROPATHY, WITHOUT LONG-TERM CURRENT USE OF INSULIN (HCC): ICD-10-CM

## 2018-09-04 DIAGNOSIS — I10 ESSENTIAL HYPERTENSION: ICD-10-CM

## 2018-09-04 DIAGNOSIS — G44.019 EPISODIC CLUSTER HEADACHE, NOT INTRACTABLE: ICD-10-CM

## 2018-09-04 DIAGNOSIS — M25.562 ARTHRALGIA OF BOTH KNEES: ICD-10-CM

## 2018-09-04 DIAGNOSIS — K21.9 GASTROESOPHAGEAL REFLUX DISEASE WITHOUT ESOPHAGITIS: ICD-10-CM

## 2018-09-04 DIAGNOSIS — E11.9 TYPE 2 DIABETES MELLITUS WITHOUT COMPLICATION, WITHOUT LONG-TERM CURRENT USE OF INSULIN (HCC): Primary | ICD-10-CM

## 2018-09-04 DIAGNOSIS — D32.9 MENINGIOMA (HCC): ICD-10-CM

## 2018-09-04 PROBLEM — M79.605 PAIN IN BOTH LOWER EXTREMITIES: Status: ACTIVE | Noted: 2018-09-04

## 2018-09-04 PROBLEM — M79.604 PAIN IN BOTH LOWER EXTREMITIES: Status: ACTIVE | Noted: 2018-09-04

## 2018-09-04 PROCEDURE — 99214 OFFICE O/P EST MOD 30 MIN: CPT | Performed by: NURSE PRACTITIONER

## 2018-09-04 RX ORDER — LANCETS 33 GAUGE
EACH MISCELLANEOUS DAILY
Qty: 100 EACH | Refills: 5 | Status: SHIPPED | OUTPATIENT
Start: 2018-09-04 | End: 2018-09-25 | Stop reason: SDUPTHER

## 2018-09-04 RX ORDER — GABAPENTIN 100 MG/1
200 CAPSULE ORAL
Qty: 60 CAPSULE | Refills: 5 | Status: SHIPPED | OUTPATIENT
Start: 2018-09-04 | End: 2018-11-05 | Stop reason: SDUPTHER

## 2018-09-06 NOTE — ASSESSMENT & PLAN NOTE
Lab Results   Component Value Date    HGBA1C 6 3 05/10/2018       No results for input(s): POCGLU in the last 72 hours      Blood Sugar Average: Last 72 hrs:  refill and cont current meds

## 2018-09-06 NOTE — PROGRESS NOTES
Assessment/Plan:           Problem List Items Addressed This Visit        Digestive    GERD (gastroesophageal reflux disease)     Cont current meds              Endocrine    Controlled diabetes mellitus with diabetic neuropathy ([facility] [address] )     Lab Results   Component Value Date    HGBA1C 6 3 05/10/2018       No results for input(s): POCGLU in the last 72 hours  Blood Sugar Average: Last 72 hrs:  refill and cont current meds              Cardiovascular and Mediastinum    Hypertension       Nervous and Auditory    Episodic cluster headache, not intractable    Relevant Medications    gabapentin (NEURONTIN) 100 mg capsule    Meningioma (HCC)     Stable on mri   Cont f/u with neurosurgery              Other    Knee joint pain    Relevant Medications    gabapentin (NEURONTIN) 100 mg capsule    Elastic Bandages & Supports ([address] [place] [address]) [facility]      Other Visit Diagnoses     Type 2 diabetes mellitus without complication, without long-term current use of insulin ([facility])    -  Primary    Relevant Medications    glucose blood (ONE TOUCH ULTRA TEST) test strip    ONETOUCH DELICA LANCETS 91S MISC            Subjective:      Patient ID: Mariaelena Pollock is a 68 y o  male      Here for f/u to chronic medical conditions  Diabetes well controlled, last labs reviewed with pt  He needs refills of diabetes supplies  States gets headaches on and off  Reviewed most recent mri of brain with patient, stable from last year, follows with neurosurgery for meningioma  Continues with b/l knee pain, was asking for knee braces to aid in ambulation/pain  Hep c was treated with medications and is stable  Feels well overall  Worried about his wife who had a cardiac event and is fatigued and not feeling well         The following portions of the patient's history were reviewed and updated as appropriate: allergies, current medications, past family history, past medical history, past social history, past surgical history and problem list     Review of Systems   Constitutional: Negative for fatigue and fever  HENT: Negative for congestion, postnasal drip and rhinorrhea  Eyes: Negative for photophobia and visual disturbance  Respiratory: Negative for cough, shortness of breath and wheezing  Cardiovascular: Negative for chest pain, palpitations and leg swelling  Gastrointestinal: Positive for constipation  Negative for diarrhea, nausea and vomiting  Endocrine: Negative for polydipsia, polyphagia and polyuria  Genitourinary: Negative for dysuria and frequency  Musculoskeletal: Negative for back pain, joint swelling and neck pain  Skin: Negative for rash  Neurological: Positive for headaches  Negative for dizziness and light-headedness  Hematological: Negative for adenopathy  Psychiatric/Behavioral: Negative for dysphoric mood and sleep disturbance  The patient is not nervous/anxious            Objective:      /72 (BP Location: Left arm, Patient Position: Sitting, Cuff Size: Standard)   Pulse 62   Temp 98 6 °F (37 °C)   Resp 12   Ht 5' 8" (1 727 m)   Wt 93 4 kg (205 lb 12 8 oz)   SpO2 97%   BMI 31 29 kg/m²     Family History   Problem Relation Age of Onset    No Known Problems Mother      Past Medical History:   Diagnosis Date    Brain mass     Last Assessed; 11/5/2015    Chest pain     Last Assessed: 1/22/2015    Diabetes type 2, uncontrolled ([address] )     Last Assessed: 3/1/2016    Edema of left lower extremity     Last Assessed: 3/22/2017    GERD (gastroesophageal reflux disease)     Hyperlipidemia     Hypertension     Impaired fasting glucose     Last Assessed: 11/10/2015    Irregular heartbeat     Varicose 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     Last Assessed: 3/22/2017     Social History     Social History    Marital status: /Civil Union     Spouse name: N/A    Number of children: 5    Years of education: N/A     Occupational History    Retired: Construction      Social History Main Topics    Smoking status: Former Smoker    Smokeless tobacco: Never Used    Alcohol use No    Drug use: No    Sexual activity: Not on file     Other Topics Concern    Not on file     Social History Narrative    Uses safety equipment               Current Outpatient Prescriptions:     ammonium lactate (LAC-HYDRIN) 12 % cream, Apply topically Twice daily, Disp: , Rfl:     aspirin 81 MG tablet, Take by mouth, Disp: , Rfl:     atorvastatin (LIPITOR) 10 mg tablet, Take 1 tablet (10 mg total) by mouth daily, Disp: 90 tablet, Rfl: 1    docusate sodium (COLACE) 100 mg capsule, Take 1 capsule (100 mg total) by mouth 2 (two) times a day, Disp: 60 capsule, Rfl: 5    Elastic Bandages & Supports ([address] [place] [address]) [facility], by Does not apply route daily, Disp: 2 each, Rfl: 0    gabapentin (NEURONTIN) 100 mg capsule, Take 2 capsules (200 mg total) by mouth daily at bedtime, Disp: 60 capsule, Rfl: 5    glucose blood (ONE TOUCH ULTRA TEST) test strip, Test once daily, e11 9, Disp: 50 each, Rfl: 11    glucose blood (ONE TOUCH ULTRA TEST) test strip, 1 each by Other route daily Test, Disp: 50 each, Rfl: 5    Glucose Blood (ONETOUCH ULTRA BLUE VI), by In Vitro route daily, Disp: , Rfl:     glucose blood test strip, OneTouch Ultra [employer], Disp: , Rfl:     Lancets (ONETOUCH ULTRASOFT) lancets, Use as instructed Test once daily e05 1, Disp: 100 each, Rfl: 5    Lancets (ONETOUCH ULTRASOFT) lancets, USE AS INSTRUCTED, Disp: 100 each, Rfl: 5    lisinopril (ZESTRIL) 10 mg tablet, Take 1 tablet (10 mg total) by mouth daily, Disp: 90 tablet, Rfl: 1    metFORMIN (GLUCOPHAGE) 500 mg tablet, Take 500 mg by mouth 2 (two) times a day, Disp: , Rfl: 6    ofloxacin (FLOXIN) 0 3 % otic solution, Administer 10 drops into the left ear daily, Disp: 5 mL, Rfl: 0    omeprazole (PriLOSEC) 10 mg delayed release capsule, omeprazole 20 mg capsule,delayed release, Disp: , Rfl:     ONETOUCH DELICA LANCETS 01L MISC, Inject under the skin daily, Disp: 100 each, Rfl: 5    Polyethylene Glycol 1000 LIQD, Take 1 packet by mouth daily, Disp: , Rfl:     polyethylene glycol-electrolytes (NULYTELY) 4000 mL solution, take 236 milliliter by mouth as directed, Disp: , Rfl: 0    sildenafil (REVATIO) 20 mg tablet, Take by mouth, Disp: , Rfl:     sildenafil (VIAGRA) 50 MG tablet, Take 50 mg by mouth, Disp: , Rfl:     topiramate (TOPAMAX) 50 MG tablet, Take 1 tablet by mouth daily, Disp: , Rfl:   No Known Allergies     Physical Exam   Constitutional: He is oriented to person, place, and time  He appears well-developed and well-nourished  HENT:   Head: Normocephalic and atraumatic  Right Ear: External ear normal    Left Ear: External ear normal    Nose: Nose normal    Mouth/Throat: Oropharynx is clear and moist    Eyes: Conjunctivae are normal    Neck: Normal range of motion  Neck supple  No thyromegaly present  Cardiovascular: Normal rate, regular rhythm and normal heart sounds  Pulses are no weak pulses  Pulses:       Dorsalis pedis pulses are 1+ on the right side, and 1+ on the left side  Posterior tibial pulses are 1+ on the right side, and 1+ on the left side  Pulmonary/Chest: Effort normal and breath sounds normal    Abdominal: Soft  Bowel sounds are normal    Musculoskeletal: Normal range of motion  Feet:   Right Foot:   Skin Integrity: Negative for ulcer, skin breakdown, erythema, warmth, callus or dry skin  Left Foot:   Skin Integrity: Negative for ulcer, skin breakdown, erythema, warmth, callus or dry skin  Neurological: He is alert and oriented to person, place, and time  Skin: Skin is warm and dry  Psychiatric: He has a normal mood and affect  His behavior is normal  Judgment and thought content normal    Nursing note and vitals reviewed  Patient's shoes and socks removed  Right Foot/Ankle   Right Foot Inspection  Skin Exam: skin normal and skin intact no dry skin, no warmth, no callus, no erythema, no maceration, no abnormal color, no pre-ulcer, no ulcer and no callus                          Toe Exam: ROM and strength within normal limits  Sensory   Vibration: intact  Proprioception: intact   Monofilament testing: intact  Vascular  Capillary refills: < 3 seconds  The right DP pulse is 1+  The right PT pulse is 1+  Left Foot/Ankle  Left Foot Inspection  Skin Exam: skin normal and skin intactno dry skin, no warmth, no erythema, no maceration, normal color, no pre-ulcer, no ulcer and no callus                         Toe Exam: ROM and strength within normal limits                   Sensory   Vibration: intact  Proprioception: intact  Monofilament: intact  Vascular  Capillary refills: < 3 seconds  The left DP pulse is 1+  The left PT pulse is 1+  Assign Risk Category:  No deformity present; No loss of protective sensation;  No weak pulses       Risk: 0

## 2018-09-11 ENCOUNTER — OFFICE VISIT (OUTPATIENT)
Dept: FAMILY MEDICINE CLINIC | Facility: CLINIC | Age: 73
End: 2018-09-11
Payer: COMMERCIAL

## 2018-09-11 VITALS
DIASTOLIC BLOOD PRESSURE: 84 MMHG | RESPIRATION RATE: 12 BRPM | SYSTOLIC BLOOD PRESSURE: 130 MMHG | BODY MASS INDEX: 31.55 KG/M2 | TEMPERATURE: 98.4 F | HEART RATE: 74 BPM | OXYGEN SATURATION: 98 % | WEIGHT: 208.2 LBS | HEIGHT: 68 IN

## 2018-09-11 DIAGNOSIS — I83.812 VARICOSE VEINS OF LEFT LOWER EXTREMITY WITH PAIN: ICD-10-CM

## 2018-09-11 DIAGNOSIS — M54.41 ACUTE BILATERAL LOW BACK PAIN WITH RIGHT-SIDED SCIATICA: ICD-10-CM

## 2018-09-11 DIAGNOSIS — M25.562 ARTHRALGIA OF BOTH KNEES: Primary | ICD-10-CM

## 2018-09-11 DIAGNOSIS — M25.561 ARTHRALGIA OF BOTH KNEES: Primary | ICD-10-CM

## 2018-09-11 PROCEDURE — 99213 OFFICE O/P EST LOW 20 MIN: CPT | Performed by: NURSE PRACTITIONER

## 2018-09-11 NOTE — PROGRESS NOTES
Assessment/Plan:           Problem List Items Addressed This Visit        Cardiovascular and Mediastinum    Varicose 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    Relevant Orders    Compression Stocking       Other    Knee joint pain - Primary    Relevant Orders    Ambulatory referral to Physical Therapy    Acute bilateral low back pain with right-sided sciatica    Relevant Orders    MRI lumbar spine wo contrast    Ambulatory referral to Physical Therapy            Subjective:      Patient ID: Armaan Amin is a 68 y o  male  Here with c/o right side sciatica  Radiating down to ankle  Having numbness and tingling of right leg and foot   Started last week  No known recent trauma or injury  Denies loss of bowel or bladder control  Also c/o bilat knee pain after fall earlier in year  Continues with knee pain  Limping from the pain  Knees feel weak and at times feels unsteady  Denies need for walker or cane despite feeling unsteady at times  Knees not giving out  Decrease rom of lumbar spine          The following portions of the patient's history were reviewed and updated as appropriate: allergies, current medications, past family history, past medical history, past social history, past surgical history and problem list     Review of Systems   Constitutional: Negative for fatigue, fever and unexpected weight change  Respiratory: Negative for cough, shortness of breath and wheezing  Cardiovascular: Negative for chest pain and palpitations  Gastrointestinal: Negative for abdominal pain, constipation, diarrhea, nausea and vomiting  Genitourinary: Negative for dysuria, frequency and urgency  Musculoskeletal: Positive for back pain, gait problem and joint swelling  Negative for arthralgias, myalgias and neck pain  Skin: Negative for rash  Neurological: Negative for dizziness, light-headedness and headaches  Hematological: Negative for adenopathy  Psychiatric/Behavioral: Negative for dysphoric mood   The patient is not nervous/anxious            Objective:      /84 (BP Location: Right arm, Patient Position: Sitting, Cuff Size: Standard)   Pulse 74   Temp 98 4 °F (36 9 °C)   Resp 12   Ht 5' 8" (1 727 m)   Wt 94 4 kg (208 lb 3 2 oz)   SpO2 98%   BMI 31 66 kg/m²     Family History   Problem Relation Age of Onset    No Known Problems Mother      Past Medical History:   Diagnosis Date    Brain mass     Last Assessed; 11/5/2015    Chest pain     Last Assessed: 1/22/2015    Diabetes type 2, uncontrolled ([facility] [address] )     Last Assessed: 3/1/2016    Edema of left lower extremity     Last Assessed: 3/22/2017    GERD (gastroesophageal reflux disease)     Hyperlipidemia     Hypertension     Impaired fasting glucose     Last Assessed: 11/10/2015    Irregular heartbeat     Varicose 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     Last Assessed: 3/22/2017     Social History     Social History    Marital status: /Civil Union     Spouse name: N/A    Number of children: 5    Years of education: N/A     Occupational History    Retired: Construction      Social History Main Topics    Smoking status: Former Smoker    Smokeless tobacco: Never Used    Alcohol use No    Drug use: No    Sexual activity: Not on file     Other Topics Concern    Not on file     Social History Narrative    Uses safety equipment               Current Outpatient Prescriptions:     ammonium lactate (LAC-HYDRIN) 12 % cream, Apply topically Twice daily, Disp: , Rfl:     aspirin 81 MG tablet, Take by mouth, Disp: , Rfl:     atorvastatin (LIPITOR) 10 mg tablet, Take 1 tablet (10 mg total) by mouth daily, Disp: 90 tablet, Rfl: 1    docusate sodium (COLACE) 100 mg capsule, Take 1 capsule (100 mg total) by mouth 2 (two) times a day, Disp: 60 capsule, Rfl: 5    Elastic Bandages & Supports ([address] [place] [address]) [facility], by Does not apply route daily, Disp: 2 each, Rfl: 0    gabapentin (NEURONTIN) 100 mg capsule, Take 2 capsules (200 mg total) by mouth daily at bedtime, Disp: 60 capsule, Rfl: 5    glucose blood (ONE TOUCH ULTRA TEST) test strip, Test once daily, e11 9, Disp: 50 each, Rfl: 11    glucose blood (ONE TOUCH ULTRA TEST) test strip, 1 each by Other route daily Test, Disp: 50 each, Rfl: 5    Glucose Blood (ONETOUCH ULTRA BLUE VI), by In Vitro route daily, Disp: , Rfl:     glucose blood test strip, OneTouch Ultra [employer], Disp: , Rfl:     Lancets (ONETOUCH ULTRASOFT) lancets, Use as instructed Test once daily e05 1, Disp: 100 each, Rfl: 5    Lancets (ONETOUCH ULTRASOFT) lancets, USE AS INSTRUCTED, Disp: 100 each, Rfl: 5    lisinopril (ZESTRIL) 10 mg tablet, Take 1 tablet (10 mg total) by mouth daily, Disp: 90 tablet, Rfl: 1    metFORMIN (GLUCOPHAGE) 500 mg tablet, Take 500 mg by mouth 2 (two) times a day, Disp: , Rfl: 6    ofloxacin (FLOXIN) 0 3 % otic solution, Administer 10 drops into the left ear daily, Disp: 5 mL, Rfl: 0    omeprazole (PriLOSEC) 10 mg delayed release capsule, omeprazole 20 mg capsule,delayed release, Disp: , Rfl:     ONETOUCH DELICA LANCETS 10Q MISC, Inject under the skin daily, Disp: 100 each, Rfl: 5    Polyethylene Glycol 1000 LIQD, Take 1 packet by mouth daily, Disp: , Rfl:     polyethylene glycol-electrolytes (NULYTELY) 4000 mL solution, take 236 milliliter by mouth as directed, Disp: , Rfl: 0    sildenafil (REVATIO) 20 mg tablet, Take by mouth, Disp: , Rfl:     sildenafil (VIAGRA) 50 MG tablet, Take 50 mg by mouth, Disp: , Rfl:     topiramate (TOPAMAX) 50 MG tablet, Take 1 tablet by mouth daily, Disp: , Rfl:   No Known Allergies     Physical Exam   Constitutional: He is oriented to person, place, and time  He appears well-developed and well-nourished  Eyes: Conjunctivae are normal    Cardiovascular: Normal rate, regular rhythm and normal heart sounds  Pulmonary/Chest: Effort normal and breath sounds normal    Musculoskeletal:        Right hip: He exhibits decreased range of motion  Right knee: He exhibits swelling  Left knee: He exhibits swelling  Lumbar back: He exhibits decreased range of motion, tenderness, bony tenderness and pain  Neurological: He is alert and oriented to person, place, and time  He has normal strength and normal reflexes  He displays a negative Romberg sign  Skin: Skin is warm and dry  Psychiatric: He has a normal mood and affect  His behavior is normal  Judgment and thought content normal    Nursing note and vitals reviewed

## 2018-09-25 DIAGNOSIS — E11.9 TYPE 2 DIABETES MELLITUS WITHOUT COMPLICATION, WITHOUT LONG-TERM CURRENT USE OF INSULIN (HCC): Primary | ICD-10-CM

## 2018-09-25 RX ORDER — LANCETS
EACH MISCELLANEOUS
Qty: 100 EACH | Refills: 5 | Status: SHIPPED | OUTPATIENT
Start: 2018-09-25 | End: 2018-12-19

## 2018-09-25 RX ORDER — LANCETS 33 GAUGE
EACH MISCELLANEOUS DAILY
Qty: 100 EACH | Refills: 5 | Status: SHIPPED | OUTPATIENT
Start: 2018-09-25 | End: 2018-12-19 | Stop reason: SDUPTHER

## 2018-09-25 NOTE — TELEPHONE ENCOUNTER
metFORMIN (GLUCOPHAGE) 500 mg tablet  Sig: Take 500 mg by mouth 2 (two) times a day  glucose blood (ONE TOUCH ULTRA TEST) test strip Si each by Other route daily Test   [facility] 19V MISC Sig: Inject under the skin daily  LAST OV 2018  Pharmacy - [facility]

## 2018-10-01 ENCOUNTER — HOSPITAL ENCOUNTER (OUTPATIENT)
Dept: RADIOLOGY | Facility: HOSPITAL | Age: 73
Discharge: HOME | End: 2018-10-01
Payer: COMMERCIAL

## 2018-10-01 DIAGNOSIS — M54.41 ACUTE BILATERAL LOW BACK PAIN WITH RIGHT-SIDED SCIATICA: ICD-10-CM

## 2018-10-01 PROCEDURE — 72148 MRI LUMBAR SPINE W/O DYE: CPT

## 2018-10-04 DIAGNOSIS — M51.26 BULGING LUMBAR DISC: ICD-10-CM

## 2018-10-04 DIAGNOSIS — N28.1 BILATERAL RENAL CYSTS: Primary | ICD-10-CM

## 2018-10-04 DIAGNOSIS — M48.07 SPINAL STENOSIS OF LUMBOSACRAL REGION: ICD-10-CM

## 2018-10-08 ENCOUNTER — OFFICE VISIT (OUTPATIENT)
Dept: FAMILY MEDICINE CLINIC | Facility: CLINIC | Age: 73
End: 2018-10-08
Payer: COMMERCIAL

## 2018-10-08 VITALS
TEMPERATURE: 97.7 F | RESPIRATION RATE: 12 BRPM | BODY MASS INDEX: 31.52 KG/M2 | HEIGHT: 68 IN | DIASTOLIC BLOOD PRESSURE: 90 MMHG | WEIGHT: 208 LBS | HEART RATE: 67 BPM | OXYGEN SATURATION: 98 % | SYSTOLIC BLOOD PRESSURE: 140 MMHG

## 2018-10-08 DIAGNOSIS — I83.812 VARICOSE VEINS OF LEFT LOWER EXTREMITY WITH PAIN: ICD-10-CM

## 2018-10-08 DIAGNOSIS — N52.9 ED (ERECTILE DYSFUNCTION) OF ORGANIC ORIGIN: ICD-10-CM

## 2018-10-08 DIAGNOSIS — M54.41 ACUTE BILATERAL LOW BACK PAIN WITH RIGHT-SIDED SCIATICA: ICD-10-CM

## 2018-10-08 DIAGNOSIS — M48.07 SPINAL STENOSIS OF LUMBOSACRAL REGION: Primary | ICD-10-CM

## 2018-10-08 PROCEDURE — 99213 OFFICE O/P EST LOW 20 MIN: CPT | Performed by: NURSE PRACTITIONER

## 2018-10-08 RX ORDER — SILDENAFIL 50 MG/1
50 TABLET, FILM COATED ORAL DAILY PRN
Qty: 10 TABLET | Refills: 2 | Status: SHIPPED | OUTPATIENT
Start: 2018-10-08 | End: 2018-12-19

## 2018-10-08 RX ORDER — DOCUSATE CALCIUM 240 MG
CAPSULE ORAL
COMMUNITY
Start: 2018-09-17 | End: 2018-12-19 | Stop reason: SDUPTHER

## 2018-10-08 NOTE — PROGRESS NOTES
Assessment/Plan:           Problem List Items Addressed This Visit        Cardiovascular and Mediastinum    Varicose 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     Compression stocking prn            Genitourinary    ED (erectile dysfunction) of organic origin    Relevant Medications    sildenafil (VIAGRA) 50 MG tablet       Other    Acute bilateral low back pain with right-sided sciatica     Refer to pain management  Cont gabapentin           Other Visit Diagnoses     Spinal stenosis of lumbosacral region    -  Primary    Relevant Orders    Ambulatory referral to Pain Management            Subjective:      Patient ID: Colton Cantor is a 68 y o  male  Here for f/u to leg pain and low back pain  Only going to physical therapy a few times, too costly $40 per session  Unable to afford too many visits  Having b/l leg pain  Right worse than left  Does have varicose veins of left leg but not reason for pain  Mri reviewed with pt   Spoke to shama velez on the phone  Also incidental b/l renal cysts, needs ultrasound  Pt using spc now to ambulate         The following portions of the patient's history were reviewed and updated as appropriate: allergies, current medications, past family history, past medical history, past social history, past surgical history and problem list     Review of Systems   Constitutional: Negative for fatigue and fever  Respiratory: Negative for cough, shortness of breath and wheezing  Cardiovascular: Negative for chest pain and palpitations  Gastrointestinal: Negative for constipation, diarrhea, nausea and vomiting  Genitourinary: Negative for dysuria and hematuria  Musculoskeletal: Positive for back pain and gait problem  Negative for myalgias  Skin: Negative for rash  Neurological: Negative for dizziness, light-headedness and headaches  Hematological: Negative for adenopathy  Psychiatric/Behavioral: Negative for agitation, dysphoric mood and sleep disturbance   The patient is not nervous/anxious            Objective:      /90 (BP Location: Left arm, Patient Position: Sitting, Cuff Size: Standard)   Pulse 67   Temp 97 7 °F (36 5 °C)   Resp 12   Ht 5' 8" (1 727 m)   Wt 94 3 kg (208 lb)   SpO2 98%   BMI 31 63 kg/m²   Family History   Problem Relation Age of Onset    No Known Problems Mother      Past Medical History:   Diagnosis Date    Brain mass     Last Assessed; 11/5/2015    Chest pain     Last Assessed: 1/22/2015    Diabetes type 2, uncontrolled ([facility] [address] )     Last Assessed: 3/1/2016    Edema of left lower extremity     Last Assessed: 3/22/2017    GERD (gastroesophageal reflux disease)     Hyperlipidemia     Hypertension     Impaired fasting glucose     Last Assessed: 11/10/2015    Irregular heartbeat     Varicose 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     Last Assessed: 3/22/2017     Social History     Social History    Marital status: /Civil Union     Spouse name: N/A    Number of children: 5    Years of education: N/A     Occupational History    Retired: Construction      Social History Main Topics    Smoking status: Former Smoker    Smokeless tobacco: Never Used    Alcohol use No    Drug use: No    Sexual activity: Not on file     Other Topics Concern    Not on file     Social History Narrative    Uses safety equipment               Current Outpatient Prescriptions:     docusate calcium (SURFAK) 240 mg capsule, TAKE 1 CAPSULE BY MOUTH TWICE A DAY, Disp: , Rfl:     ammonium lactate (LAC-HYDRIN) 12 % cream, Apply topically Twice daily, Disp: , Rfl:     aspirin 81 MG tablet, Take by mouth, Disp: , Rfl:     atorvastatin (LIPITOR) 10 mg tablet, Take 1 tablet (10 mg total) by mouth daily, Disp: 90 tablet, Rfl: 1    docusate sodium (COLACE) 100 mg capsule, Take 1 capsule (100 mg total) by mouth 2 (two) times a day, Disp: 60 capsule, Rfl: 5    Elastic Bandages & Supports ([facility]) MISC, by Does not apply route daily, Disp: 2 each, Rfl: 0   gabapentin (NEURONTIN) 100 mg capsule, Take 2 capsules (200 mg total) by mouth daily at bedtime, Disp: 60 capsule, Rfl: 5    glucose blood (ONE TOUCH ULTRA TEST) test strip, Test once daily, e11 9, Disp: 50 each, Rfl: 11    glucose blood (ONE TOUCH ULTRA TEST) test strip, 1 each by Other route daily Test, Disp: 50 each, Rfl: 5    Glucose Blood (ONETOUCH ULTRA BLUE VI), by In Vitro route daily, Disp: , Rfl:     glucose blood test strip, OneTouch Ultra [employer], Disp: , Rfl:     Lancets (ONETOUCH ULTRASOFT) lancets, USE AS INSTRUCTED, Disp: 100 each, Rfl: 5    Lancets (ONETOUCH ULTRASOFT) lancets, Use as instructed Test once daily, Disp: 100 each, Rfl: 5    lisinopril (ZESTRIL) 10 mg tablet, Take 1 tablet (10 mg total) by mouth daily, Disp: 90 tablet, Rfl: 1    metFORMIN (GLUCOPHAGE) 500 mg tablet, Take 1 tablet (500 mg total) by mouth 2 (two) times a day for 90 days, Disp: 30 tablet, Rfl: 3    ofloxacin (FLOXIN) 0 3 % otic solution, Administer 10 drops into the left ear daily, Disp: 5 mL, Rfl: 0    omeprazole (PriLOSEC) 10 mg delayed release capsule, omeprazole 20 mg capsule,delayed release, Disp: , Rfl:     ONETOUCH DELICA LANCETS 40B MISC, Inject under the skin daily for 90 days, Disp: 100 each, Rfl: 5    Polyethylene Glycol 1000 LIQD, Take 1 packet by mouth daily, Disp: , Rfl:     polyethylene glycol-electrolytes (NULYTELY) 4000 mL solution, take 236 milliliter by mouth as directed, Disp: , Rfl: 0    sildenafil (REVATIO) 20 mg tablet, Take by mouth, Disp: , Rfl:     sildenafil (VIAGRA) 50 MG tablet, Take 50 mg by mouth, Disp: , Rfl:     topiramate (TOPAMAX) 50 MG tablet, Take 1 tablet by mouth daily, Disp: , Rfl:   No Known Allergies       Physical Exam   Constitutional: He appears well-developed and well-nourished  HENT:   Head: Normocephalic and atraumatic     Right Ear: External ear normal    Left Ear: External ear normal    Nose: Nose normal    Mouth/Throat: Oropharynx is clear and moist    Cardiovascular: Normal rate, regular rhythm and normal heart sounds  Pulmonary/Chest: Effort normal and breath sounds normal    Musculoskeletal:        Lumbar back: He exhibits decreased range of motion, pain and spasm  Nursing note and vitals reviewed

## 2018-10-12 ENCOUNTER — CONSULT (OUTPATIENT)
Dept: PAIN MEDICINE | Facility: CLINIC | Age: 73
End: 2018-10-12
Payer: COMMERCIAL

## 2018-10-12 VITALS
HEART RATE: 70 BPM | HEIGHT: 68 IN | BODY MASS INDEX: 32.13 KG/M2 | DIASTOLIC BLOOD PRESSURE: 86 MMHG | SYSTOLIC BLOOD PRESSURE: 142 MMHG | WEIGHT: 212 LBS

## 2018-10-12 DIAGNOSIS — M51.26 BULGING LUMBAR DISC: ICD-10-CM

## 2018-10-12 DIAGNOSIS — M48.07 SPINAL STENOSIS OF LUMBOSACRAL REGION: Primary | ICD-10-CM

## 2018-10-12 DIAGNOSIS — M43.17 SPONDYLOLISTHESIS AT L5-S1 LEVEL: ICD-10-CM

## 2018-10-12 PROCEDURE — 99204 OFFICE O/P NEW MOD 45 MIN: CPT | Performed by: ANESTHESIOLOGY

## 2018-10-12 RX ORDER — MELOXICAM 7.5 MG/1
7.5 TABLET ORAL DAILY
Qty: 30 TABLET | Refills: 0 | Status: SHIPPED | OUTPATIENT
Start: 2018-10-12 | End: 2020-06-08

## 2018-10-12 NOTE — PROGRESS NOTES
Assessment:  1  Spinal stenosis of lumbosacral region    2  Spondylolisthesis at L5-S1 level    3  Bulging lumbar disc        Plan:  The patient's symptoms, history/physical are consistent with pain that is multifactorial in origin but predominantly result of his spinal stenosis at L5-S1 which is into the bilateral radicular symptoms  At this time, I discussed performing bilateral L5 transforaminal epidural steroid injections to help reduce swelling and inflammation which is leading to his pain symptoms  He was apprised of the most common risks and would like to proceed  He will be scheduled for an upcoming Tuesday or Thursday under fluoroscopic guidance  In the meantime, I will start him on meloxicam 7 5 milligrams daily to help with inflammation and pain  Complete risks and benefits including bleeding, infection, tissue reaction, nerve injury and allergic reaction were discussed  The approach was demonstrated using models and literature was provided  Verbal and written consent was obtained  My impressions and treatment recommendations were discussed in detail with the patient who verbalized understanding and had no further questions  Discharge instructions were provided  I personally saw and examined the patient and I agree with the above discussed plan of care  Orders Placed This Encounter   Procedures    FL spine and pain procedure     Standing Status:   Future     Standing Expiration Date:   10/12/2022     Order Specific Question:   Reason for Exam:     Answer:   Bilateral L5 TF YONG     Order Specific Question:   Anticoagulant hold needed?      Answer:   No     New Medications Ordered This Visit   Medications    meloxicam (MOBIC) 7 5 mg tablet     Sig: Take 1 tablet (7 5 mg total) by mouth daily     Dispense:  30 tablet     Refill:  0       History of Present Illness:    Brent Babcock is a 68 y o  male who presents for consultation in regards to lower back and bilateral leg pain as well as wrist pain  Symptoms have been present for 8 months after he fell in February 2018  Since then he has been experiencing moderate to severe pain that is rated 9-10/10 on a numeric rating scale and felt nearly constantly  Symptoms are burning, cramping, sharp, throbbing, pressure-like with numbness and paresthesias in both legs as well as both wrists  Symptoms are aggravated lying down, standing, bending, sitting, walking, exercise, coughing, sneezing and bowel movements  Treatment history has included heat/ice which has provided no relief  He takes Tylenol and Motrin which provides minimal relief  I have personally reviewed and/or updated the patient's past medical history, past surgical history, family history, social history, current medications, allergies, and vital signs today  Review of Systems:    Review of Systems   Constitutional: Negative for fever and unexpected weight change  HENT: Negative for trouble swallowing  Eyes: Negative for visual disturbance  Respiratory: Negative for shortness of breath and wheezing  Cardiovascular: Negative for chest pain and palpitations  Gastrointestinal: Negative for constipation, diarrhea, nausea and vomiting  Endocrine: Negative for cold intolerance, heat intolerance and polydipsia  Genitourinary: Negative for difficulty urinating and frequency  Musculoskeletal: Positive for joint swelling  Negative for arthralgias, gait problem and myalgias  Skin: Negative for rash  Neurological: Positive for weakness  Negative for dizziness, seizures, syncope and headaches  Hematological: Does not bruise/bleed easily  Psychiatric/Behavioral: Negative for dysphoric mood  All other systems reviewed and are negative        Patient Active Problem List   Diagnosis    [facility]    Chronic hepatitis C virus infection ([facility] )    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[facility] )    Constipation    Controlled diabetes mellitus with diabetic neuropathy (HCC)    Episodic cluster headache, not intractable    GERD (gastroesophageal reflux disease)    Headache    Hypertension    ED (erectile dysfunction) of organic origin    Knee joint pain    Meningioma (HCC)    Varicose 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    Pain in both lower extremities    Acute bilateral low back pain with right-sided sciatica    Spondylolisthesis at L5-S1 level       Past Medical History:   Diagnosis Date    Brain mass     Last Assessed; 11/5/2015    Chest pain     Last Assessed: 1/22/2015    Diabetes type 2, uncontrolled ([address] )     Last Assessed: 3/1/2016    Edema of left lower extremity     Last Assessed: 3/22/2017    GERD (gastroesophageal reflux disease)     Hyperlipidemia     Hypertension     Impaired fasting glucose     Last Assessed: 11/10/2015    Irregular heartbeat     Varicose 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     Last Assessed: 3/22/2017       Past Surgical History:   Procedure Laterality Date    COLONOSCOPY      COLONOSCOPY N/A 7/10/2018    Procedure: COLONOSCOPY;  Surgeon: Sagrario Vallejo MD;  Location: [facility];   Service: Colorectal    INGUINAL HERNIA REPAIR      20+ years ago - [address] [place] [address]; Last Assessed: 10/23/2014    TONSILLECTOMY      VARICOSE VEIN SURGERY Left     x2 left leg - 40+ yrs ago, [facility]       Family History   Problem Relation Age of Onset    No Known Problems Mother        Social History     Occupational History    Retired: Construction      Social History Main Topics    Smoking status: Former Smoker    Smokeless tobacco: Never Used    Alcohol use No    Drug use: No    Sexual activity: Not on file       Current Outpatient Prescriptions on File Prior to Visit   Medication Sig    ammonium lactate (LAC-HYDRIN) 12 % cream Apply topically Twice daily    aspirin 81 MG tablet Take by mouth    atorvastatin (LIPITOR) 10 mg tablet Take 1 tablet (10 mg total) by mouth daily    docusate calcium (SURFAK) 240 mg capsule TAKE 1 CAPSULE BY MOUTH TWICE A DAY    docusate sodium (COLACE) 100 mg capsule Take 1 capsule (100 mg total) by mouth 2 (two) times a day    Elastic Bandages & Supports ([facility]) MISC by Does not apply route daily    gabapentin (NEURONTIN) 100 mg capsule Take 2 capsules (200 mg total) by mouth daily at bedtime    glucose blood (ONE TOUCH ULTRA TEST) test strip Test once daily, e11 9    glucose blood (ONE TOUCH ULTRA TEST) test strip 1 each by Other route daily Test    Glucose Blood (ONETOUCH ULTRA BLUE VI) by In Vitro route daily    glucose blood test strip OneTouch Ultra Blue Test Strip    Lancets (ONETOUCH ULTRASOFT) lancets USE AS INSTRUCTED    Lancets (ONETOUCH ULTRASOFT) lancets Use as instructed Test once daily    lisinopril (ZESTRIL) 10 mg tablet Take 1 tablet (10 mg total) by mouth daily    metFORMIN (GLUCOPHAGE) 500 mg tablet Take 1 tablet (500 mg total) by mouth 2 (two) times a day for 90 days    ofloxacin (FLOXIN) 0 3 % otic solution Administer 10 drops into the left ear daily    omeprazole (PriLOSEC) 10 mg delayed release capsule omeprazole 20 mg capsule,delayed release    ONETOUCH DELICA LANCETS 19U MISC Inject under the skin daily for 90 days    Polyethylene Glycol 1000 LIQD Take 1 packet by mouth daily    polyethylene glycol-electrolytes (NULYTELY) 4000 mL solution take 236 milliliter by mouth as directed    sildenafil (REVATIO) 20 mg tablet Take by mouth    sildenafil (VIAGRA) 50 MG tablet Take 1 tablet (50 mg total) by mouth daily as needed for erectile dysfunction    topiramate (TOPAMAX) 50 MG tablet Take 1 tablet by mouth daily     No current facility-administered medications on file prior to visit  No Known Allergies    Physical Exam:    /86   Pulse 70   Ht 5' 8" (1 727 m)   Wt 96 2 kg (212 lb)   BMI 32 23 kg/m²     Constitutional: normal, well developed, well nourished, alert, in no distress and non-toxic and no overt pain behavior   and obese  Eyes: anicteric  HEENT: grossly intact  Neck: supple, symmetric, trachea midline and no masses   Pulmonary:even and unlabored  Cardiovascular:No edema or pitting edema present  Skin:Normal without rashes or lesions and well hydrated  Psychiatric:Mood and affect appropriate  Neurologic:Cranial Nerves II-XII grossly intact  Musculoskeletal:shuffling     Lumbar Spine Exam  Appearance:  Normal lordosis  Palpation/Tenderness:  left lumbar paraspinal tenderness  right lumbar paraspinal tenderness  left sacroiliac joint tenderness  right sacroiliac joint tenderness  left piriformis tenderness  right piriformis tenderness  Sensory:  no sensory deficits noted  Range of Motion:  Flexion: Moderately limited  with pain  Extension:  Moderately limited  with pain  Lateral Flexion - Left:  No limitation  with pain  Lateral Flexion - Right:  Moderately limited  with pain  Rotation - Left:  Moderately limited  with pain  Rotation - Right:  Moderately limited  with pain  Motor Strength:  Left hip flexion:  5/5  Left hip extension:  5/5  Right hip flexion:  5/5  Right hip extension:  5/5  Left knee flexion:  5/5  Left knee extension:  5/5  Right knee flexion:  5/5  Right knee extension:  5/5  Left foot dorsiflexion:  5/5  Left foot plantar flexion:  5/5  Right foot dorsiflexion:  5/5  Right foot plantar flexion:  5/5  Reflexes:  Left Patellar:  1+   Right Patellar:  1+   Left Achilles:  1+   Right Achilles:  1+   Special Tests:  Left Straight Leg Test:  positive  Right Straight Leg Test:  positive    Imaging    MRI LUMBAR SPINE WITHOUT CONTRAST (10/1/2018)     INDICATION: [id number]: Lumbago with sciatica, right side      COMPARISON:  None      TECHNIQUE:  Sagittal T1, sagittal T2, sagittal inversion recovery, axial T1 and axial T2, coronal T2        IMAGE QUALITY:  Diagnostic     FINDINGS:     ALIGNMENT:  Grade 1 anterior spondylolisthesis of L5 upon S1 with chronic bilateral L5 spondylolysis  No compression fracture    No scoliosis      MARROW SIGNAL:  Normal marrow signal is identified within the visualized bony structures  No discrete marrow lesion      DISTAL CORD AND CONUS:  Normal size and signal within the distal cord and conus  The conus ends at the L1-L2 level      PARASPINAL SOFT TISSUES:  Multiple bilateral renal cysts are identified  Some of these are larger and more exophytic all others are contained within the kidneys  These are incompletely evaluated on this examination      SACRUM:  Normal signal within the sacrum  No evidence of insufficiency or stress fracture      LOWER THORACIC DISC SPACES:  At T11-12 there is posterior element hypertrophic change on the left resulting in slight canal stenosis without impingement of the distal CORD or foraminal narrowing  T12-L1 is normal      LUMBAR DISC SPACES:     L1-L2:  Normal disc height and signal   No disc herniation, canal stenosis or foraminal narrowing      L2-L3:  Mild annular bulging  No disc herniation  No canal stenosis or foraminal narrowing      L3-L4:  There is a small focal right foraminal disc protrusion present  No canal stenosis  No foraminal nerve impingement      L4-L5:  Mild annular bulging with a small central annular fissure  No canal stenosis or foraminal narrowing      L5-S1:  There is loss of disc height  Chronic L5 spondylolysis with grade 1 anterior spondylolisthesis  There is diffuse annular bulging with uncovering of the cephalad disc margin  Mild endplate and facet arthropathy  There is no canal stenosis  Mild to moderate bilateral foraminal narrowing with disc material abutting both exiting nerves  Correlate for bilateral L5 radiculopathy      IMPRESSION:     Chronic L5 spondylolysis with grade 1 anterior spondylolisthesis and associated degenerative disc disease with mild to moderate bilateral foraminal narrowing    Correlate for corresponding L5 radiculopathy      Mild noncompressive lumbar degenerative disc disease L2-3, L3-4 and L4-5      Numerous bilateral renal cysts are identified which are incompletely evaluated on this examination    Consider renal ultrasound follow-up

## 2018-10-15 ENCOUNTER — TELEPHONE (OUTPATIENT)
Dept: PAIN MEDICINE | Facility: CLINIC | Age: 73
End: 2018-10-15

## 2018-10-16 NOTE — TELEPHONE ENCOUNTER
Patients daughter Devin Mcwilliams returned your call  Please contact Devin Mcwilliams after 12pm or after 3pm at [phone]  Thank you

## 2018-10-16 NOTE — TELEPHONE ENCOUNTER
S/w pt's dgtr, Merary Polk who is returning your call to discuss procedure, please call her tomorrow (before 11AM or ASAP   Call back # [phone]

## 2018-10-17 NOTE — TELEPHONE ENCOUNTER
Spoke to Catrachita and explained the diagnosis and the procedure    She will discuss with her mother and father and they will call back to schedule if they wish to proceed

## 2018-10-22 ENCOUNTER — OFFICE VISIT (OUTPATIENT)
Dept: FAMILY MEDICINE CLINIC | Facility: CLINIC | Age: 73
End: 2018-10-22
Payer: COMMERCIAL

## 2018-10-22 VITALS
RESPIRATION RATE: 12 BRPM | WEIGHT: 215.2 LBS | OXYGEN SATURATION: 96 % | BODY MASS INDEX: 32.72 KG/M2 | HEART RATE: 70 BPM | DIASTOLIC BLOOD PRESSURE: 78 MMHG | SYSTOLIC BLOOD PRESSURE: 122 MMHG

## 2018-10-22 DIAGNOSIS — M54.41 ACUTE BILATERAL LOW BACK PAIN WITH RIGHT-SIDED SCIATICA: ICD-10-CM

## 2018-10-22 DIAGNOSIS — R07.89 OTHER CHEST PAIN: Primary | ICD-10-CM

## 2018-10-22 PROCEDURE — 99213 OFFICE O/P EST LOW 20 MIN: CPT | Performed by: NURSE PRACTITIONER

## 2018-10-22 PROCEDURE — 4040F PNEUMOC VAC/ADMIN/RCVD: CPT | Performed by: NURSE PRACTITIONER

## 2018-10-24 PROBLEM — R07.89 OTHER CHEST PAIN: Status: ACTIVE | Noted: 2018-10-24

## 2018-10-24 PROCEDURE — 93000 ELECTROCARDIOGRAM COMPLETE: CPT | Performed by: NURSE PRACTITIONER

## 2018-10-24 NOTE — PROGRESS NOTES
Assessment/Plan:           Problem List Items Addressed This Visit        Other    Acute bilateral low back pain with right-sided sciatica     Encouraged patient to follow dr Nguyễn Cruz advice and make appt for injections for low back, he is in agreement           Other chest pain - Primary     ekg wnl  To er with any further chest pain           Relevant Orders    POCT ECG (Completed)            Subjective:      Patient ID: Magdy Davison is a 68 y o  male  Here to review his back pain  Saw pain management, was recommended to have back injections  He wanted to review this with me and see what my recommendation would be  He continues with radicular pain down both legs from his back pain  He is also complaining of intermittent chest wall pain for past few days  Denies shortness of breath or diaphoresis          The following portions of the patient's history were reviewed and updated as appropriate: allergies, current medications, past family history, past medical history, past social history, past surgical history and problem list     Review of Systems   Constitutional: Positive for fatigue  Negative for fever  HENT: Negative for rhinorrhea, sinus pain and sinus pressure  Eyes: Negative for photophobia and visual disturbance  Respiratory: Positive for chest tightness  Negative for cough, shortness of breath and wheezing  Cardiovascular: Positive for chest pain  Negative for palpitations and leg swelling  Gastrointestinal: Negative for constipation, diarrhea, nausea and vomiting  Endocrine: Negative for polydipsia, polyphagia and polyuria  Genitourinary: Negative for dysuria, frequency and hematuria  Musculoskeletal: Positive for back pain and gait problem  Skin: Negative for rash  Neurological: Negative for dizziness, light-headedness and headaches  Hematological: Negative for adenopathy  Psychiatric/Behavioral: Negative for dysphoric mood  The patient is not nervous/anxious  Objective:      /78 (BP Location: Left arm, Patient Position: Sitting, Cuff Size: Standard)   Pulse 70   Resp 12   Wt 97 6 kg (215 lb 3 2 oz)   SpO2 96%   BMI 32 72 kg/m²     Family History   Problem Relation Age of Onset    No Known Problems Mother      Past Medical History:   Diagnosis Date    Brain mass     Last Assessed; 11/5/2015    Chest pain     Last Assessed: 1/22/2015    Diabetes type 2, uncontrolled ([facility] )     Last Assessed: 3/1/2016    Edema of left lower extremity     Last Assessed: 3/22/2017    GERD (gastroesophageal reflux disease)     Hyperlipidemia     Hypertension     Impaired fasting glucose     Last Assessed: 11/10/2015    Irregular heartbeat     Varicose 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     Last Assessed: 3/22/2017     Social History     Social History    Marital status: /Civil Union     Spouse name: N/A    Number of children: 5    Years of education: N/A     Occupational History    Retired: Construction      Social History Main Topics    Smoking status: Former Smoker    Smokeless tobacco: Never Used    Alcohol use No    Drug use: No    Sexual activity: Not on file     Other Topics Concern    Not on file     Social History Narrative    Uses safety equipment               Current Outpatient Prescriptions:     ammonium lactate (LAC-HYDRIN) 12 % cream, Apply topically Twice daily, Disp: , Rfl:     aspirin 81 MG tablet, Take by mouth, Disp: , Rfl:     atorvastatin (LIPITOR) 10 mg tablet, Take 1 tablet (10 mg total) by mouth daily, Disp: 90 tablet, Rfl: 1    docusate calcium (SURFAK) 240 mg capsule, TAKE 1 CAPSULE BY MOUTH TWICE A DAY, Disp: , Rfl:     docusate sodium (COLACE) 100 mg capsule, Take 1 capsule (100 mg total) by mouth 2 (two) times a day, Disp: 60 capsule, Rfl: 5    Elastic Bandages & Supports ([address] [place] [address]) MISC, by Does not apply route daily, Disp: 2 each, Rfl: 0    gabapentin (NEURONTIN) 100 mg capsule, Take 2 capsules (200 mg total) by mouth daily at bedtime, Disp: 60 capsule, Rfl: 5    glucose blood (ONE TOUCH ULTRA TEST) test strip, Test once daily, e11 9, Disp: 50 each, Rfl: 11    glucose blood (ONE TOUCH ULTRA TEST) test strip, 1 each by Other route daily Test, Disp: 50 each, Rfl: 5    Glucose Blood (ONETOUCH ULTRA BLUE VI), by In Vitro route daily, Disp: , Rfl:     glucose blood test strip, OneTouch Ultra [employer], Disp: , Rfl:     Lancets (ONETOUCH ULTRASOFT) lancets, USE AS INSTRUCTED, Disp: 100 each, Rfl: 5    Lancets (ONETOUCH ULTRASOFT) lancets, Use as instructed Test once daily, Disp: 100 each, Rfl: 5    lisinopril (ZESTRIL) 10 mg tablet, Take 1 tablet (10 mg total) by mouth daily, Disp: 90 tablet, Rfl: 1    meloxicam (MOBIC) 7 5 mg tablet, Take 1 tablet (7 5 mg total) by mouth daily, Disp: 30 tablet, Rfl: 0    metFORMIN (GLUCOPHAGE) 500 mg tablet, Take 1 tablet (500 mg total) by mouth 2 (two) times a day for 90 days, Disp: 30 tablet, Rfl: 3    ofloxacin (FLOXIN) 0 3 % otic solution, Administer 10 drops into the left ear daily, Disp: 5 mL, Rfl: 0    omeprazole (PriLOSEC) 10 mg delayed release capsule, omeprazole 20 mg capsule,delayed release, Disp: , Rfl:     ONETOUCH DELICA LANCETS 37T MISC, Inject under the skin daily for 90 days, Disp: 100 each, Rfl: 5    Polyethylene Glycol 1000 LIQD, Take 1 packet by mouth daily, Disp: , Rfl:     polyethylene glycol-electrolytes (NULYTELY) 4000 mL solution, take 236 milliliter by mouth as directed, Disp: , Rfl: 0    sildenafil (REVATIO) 20 mg tablet, Take by mouth, Disp: , Rfl:     sildenafil (VIAGRA) 50 MG tablet, Take 1 tablet (50 mg total) by mouth daily as needed for erectile dysfunction, Disp: 10 tablet, Rfl: 2    topiramate (TOPAMAX) 50 MG tablet, Take 1 tablet by mouth daily, Disp: , Rfl:   No Known Allergies     Physical Exam   Constitutional: He is oriented to person, place, and time  He appears well-developed and well-nourished     HENT:   Head: Normocephalic and atraumatic  Right Ear: External ear normal    Left Ear: External ear normal    Nose: Nose normal    Mouth/Throat: Oropharynx is clear and moist    Eyes: Conjunctivae are normal    Neck: Normal range of motion  Neck supple  No thyromegaly present  Cardiovascular: Normal rate, regular rhythm and normal heart sounds  Pulmonary/Chest: Effort normal and breath sounds normal    Abdominal: Soft  Bowel sounds are normal    Musculoskeletal:        Right knee: He exhibits decreased range of motion  Right ankle: Achilles tendon exhibits pain  Lumbar back: He exhibits decreased range of motion, pain and spasm  Neurological: He is alert and oriented to person, place, and time  Skin: Skin is warm and dry  Psychiatric: He has a normal mood and affect  His behavior is normal  Judgment and thought content normal    Nursing note and vitals reviewed

## 2018-10-24 NOTE — ASSESSMENT & PLAN NOTE
Encouraged patient to follow dr Cony Miles advice and make appt for injections for low back, he is in agreement

## 2018-11-05 DIAGNOSIS — M25.561 ARTHRALGIA OF BOTH KNEES: ICD-10-CM

## 2018-11-05 DIAGNOSIS — M25.562 ARTHRALGIA OF BOTH KNEES: ICD-10-CM

## 2018-11-05 DIAGNOSIS — G44.019 EPISODIC CLUSTER HEADACHE, NOT INTRACTABLE: ICD-10-CM

## 2018-11-05 RX ORDER — GABAPENTIN 100 MG/1
200 CAPSULE ORAL
Qty: 60 CAPSULE | Refills: 1 | Status: CANCELLED | OUTPATIENT
Start: 2018-11-05

## 2018-11-05 RX ORDER — GABAPENTIN 100 MG/1
200 CAPSULE ORAL
Qty: 60 CAPSULE | Refills: 5 | Status: SHIPPED | OUTPATIENT
Start: 2018-11-05 | End: 2018-12-19 | Stop reason: SDUPTHER

## 2018-11-06 DIAGNOSIS — I10 ESSENTIAL HYPERTENSION: ICD-10-CM

## 2018-11-06 DIAGNOSIS — E78.49 OTHER HYPERLIPIDEMIA: ICD-10-CM

## 2018-11-07 RX ORDER — LISINOPRIL 10 MG/1
TABLET ORAL
Qty: 90 TABLET | Refills: 1 | Status: SHIPPED | OUTPATIENT
Start: 2018-11-07 | End: 2018-12-19 | Stop reason: SDUPTHER

## 2018-11-07 RX ORDER — ATORVASTATIN CALCIUM 10 MG/1
TABLET, FILM COATED ORAL
Qty: 90 TABLET | Refills: 1 | Status: SHIPPED | OUTPATIENT
Start: 2018-11-07 | End: 2018-12-19 | Stop reason: SDUPTHER

## 2018-11-08 DIAGNOSIS — M48.07 SPINAL STENOSIS OF LUMBOSACRAL REGION: ICD-10-CM

## 2018-11-08 NOTE — TELEPHONE ENCOUNTER
SW with daughter Jasen states that she will have to s/w her father to see if he is taking the Meloxicam and if it is helping him  Earl Rocha  Is going to ask her Dad about scheduling the B/L L5 TFESI procedure that was discussed with MERVIN  Jasen will give our office a call back later today

## 2018-11-16 NOTE — TELEPHONE ENCOUNTER
Left detailed vm for daughter Rodriguez asking if she could c/b and let us know if she s/w her Dad about meloxicam- if he is still taking and is it helping that he needs the refill that their [facility] has sent us and if they made any decisions about doing the B/L L5 TFESI inj  [id number] [address] for [facility] to c/b and leave us a detailed message  C/B # and [place] provided

## 2018-11-23 NOTE — TELEPHONE ENCOUNTER
I assume that they do not need Rx for  meloxicam since we s/w the daughter 2 wks ago and she was going to c/b after s/w her Dad and we also left a vm for daughter 1 wk ago and she has not called back reagrding the meloxicam Rf or the injection  Can you please refuse the refill request so we can complete this task?

## 2018-11-26 RX ORDER — MELOXICAM 7.5 MG/1
TABLET ORAL
Qty: 30 TABLET | Refills: 0 | OUTPATIENT
Start: 2018-11-26

## 2018-11-29 DIAGNOSIS — M48.07 SPINAL STENOSIS OF LUMBOSACRAL REGION: ICD-10-CM

## 2018-11-29 RX ORDER — MELOXICAM 7.5 MG/1
TABLET ORAL
Qty: 30 TABLET | Refills: 0 | OUTPATIENT
Start: 2018-11-29

## 2018-11-30 DIAGNOSIS — E11.9 TYPE 2 DIABETES MELLITUS WITHOUT COMPLICATION, WITHOUT LONG-TERM CURRENT USE OF INSULIN (HCC): ICD-10-CM

## 2018-12-19 ENCOUNTER — TRANSCRIBE ORDERS (OUTPATIENT)
Dept: LAB | Facility: CLINIC | Age: 73
End: 2018-12-19

## 2018-12-19 ENCOUNTER — OFFICE VISIT (OUTPATIENT)
Dept: FAMILY MEDICINE CLINIC | Facility: CLINIC | Age: 73
End: 2018-12-19
Payer: COMMERCIAL

## 2018-12-19 VITALS
RESPIRATION RATE: 12 BRPM | HEART RATE: 79 BPM | DIASTOLIC BLOOD PRESSURE: 70 MMHG | OXYGEN SATURATION: 97 % | TEMPERATURE: 97.4 F | WEIGHT: 213 LBS | BODY MASS INDEX: 32.28 KG/M2 | SYSTOLIC BLOOD PRESSURE: 142 MMHG | HEIGHT: 68 IN

## 2018-12-19 DIAGNOSIS — I10 ESSENTIAL HYPERTENSION: ICD-10-CM

## 2018-12-19 DIAGNOSIS — K21.9 GASTROESOPHAGEAL REFLUX DISEASE WITHOUT ESOPHAGITIS: ICD-10-CM

## 2018-12-19 DIAGNOSIS — K59.01 SLOW TRANSIT CONSTIPATION: ICD-10-CM

## 2018-12-19 DIAGNOSIS — D32.9 MENINGIOMA (HCC): ICD-10-CM

## 2018-12-19 DIAGNOSIS — E78.49 OTHER HYPERLIPIDEMIA: ICD-10-CM

## 2018-12-19 DIAGNOSIS — M79.605 PAIN IN BOTH LOWER EXTREMITIES: ICD-10-CM

## 2018-12-19 DIAGNOSIS — M25.562 ARTHRALGIA OF BOTH KNEES: ICD-10-CM

## 2018-12-19 DIAGNOSIS — E11.40 CONTROLLED TYPE 2 DIABETES MELLITUS WITH DIABETIC NEUROPATHY, WITHOUT LONG-TERM CURRENT USE OF INSULIN (HCC): Primary | ICD-10-CM

## 2018-12-19 DIAGNOSIS — E11.9 TYPE 2 DIABETES MELLITUS WITHOUT COMPLICATION, WITHOUT LONG-TERM CURRENT USE OF INSULIN (HCC): ICD-10-CM

## 2018-12-19 DIAGNOSIS — B18.2 CHRONIC HEPATITIS C WITHOUT HEPATIC COMA (HCC): ICD-10-CM

## 2018-12-19 DIAGNOSIS — G93.5: ICD-10-CM

## 2018-12-19 DIAGNOSIS — I83.812 VARICOSE VEINS OF LEFT LOWER EXTREMITY WITH PAIN: ICD-10-CM

## 2018-12-19 DIAGNOSIS — M43.17 SPONDYLOLISTHESIS AT L5-S1 LEVEL: ICD-10-CM

## 2018-12-19 DIAGNOSIS — M79.604 PAIN IN BOTH LOWER EXTREMITIES: ICD-10-CM

## 2018-12-19 DIAGNOSIS — Z23 NEED FOR INFLUENZA VACCINATION: ICD-10-CM

## 2018-12-19 DIAGNOSIS — M25.561 ARTHRALGIA OF BOTH KNEES: ICD-10-CM

## 2018-12-19 DIAGNOSIS — N52.9 ED (ERECTILE DYSFUNCTION) OF ORGANIC ORIGIN: ICD-10-CM

## 2018-12-19 DIAGNOSIS — G44.019 EPISODIC CLUSTER HEADACHE, NOT INTRACTABLE: ICD-10-CM

## 2018-12-19 PROBLEM — R07.89 OTHER CHEST PAIN: Status: RESOLVED | Noted: 2018-10-24 | Resolved: 2018-12-19

## 2018-12-19 PROBLEM — M54.41 ACUTE BILATERAL LOW BACK PAIN WITH RIGHT-SIDED SCIATICA: Status: RESOLVED | Noted: 2018-09-11 | Resolved: 2018-12-19

## 2018-12-19 PROCEDURE — G0008 ADMIN INFLUENZA VIRUS VAC: HCPCS

## 2018-12-19 PROCEDURE — 99214 OFFICE O/P EST MOD 30 MIN: CPT | Performed by: NURSE PRACTITIONER

## 2018-12-19 PROCEDURE — 90662 IIV NO PRSV INCREASED AG IM: CPT

## 2018-12-19 RX ORDER — LANCETS 33 GAUGE
EACH MISCELLANEOUS DAILY
Qty: 100 EACH | Refills: 0 | Status: SHIPPED | OUTPATIENT
Start: 2018-12-19 | End: 2019-03-19

## 2018-12-19 RX ORDER — GABAPENTIN 100 MG/1
200 CAPSULE ORAL
Qty: 60 CAPSULE | Refills: 5 | Status: SHIPPED | OUTPATIENT
Start: 2018-12-19 | End: 2019-02-18 | Stop reason: SDUPTHER

## 2018-12-19 RX ORDER — LISINOPRIL 10 MG/1
10 TABLET ORAL DAILY
Qty: 90 TABLET | Refills: 3 | Status: SHIPPED | OUTPATIENT
Start: 2018-12-19 | End: 2019-02-18

## 2018-12-19 RX ORDER — DOCUSATE CALCIUM 240 MG
240 CAPSULE ORAL 2 TIMES DAILY
Qty: 60 CAPSULE | Refills: 3 | Status: SHIPPED | OUTPATIENT
Start: 2018-12-19 | End: 2019-07-24

## 2018-12-19 RX ORDER — OMEPRAZOLE 20 MG/1
20 CAPSULE, DELAYED RELEASE ORAL DAILY
Qty: 30 CAPSULE | Refills: 5 | Status: SHIPPED | OUTPATIENT
Start: 2018-12-19 | End: 2021-03-17

## 2018-12-19 RX ORDER — LANCETS
EACH MISCELLANEOUS
Qty: 100 EACH | Refills: 11 | Status: SHIPPED | OUTPATIENT
Start: 2018-12-19 | End: 2019-10-14 | Stop reason: SDUPTHER

## 2018-12-19 RX ORDER — ATORVASTATIN CALCIUM 10 MG/1
10 TABLET, FILM COATED ORAL DAILY
Qty: 90 TABLET | Refills: 3 | Status: SHIPPED | OUTPATIENT
Start: 2018-12-19 | End: 2020-03-16

## 2018-12-19 RX ORDER — SILDENAFIL 100 MG/1
100 TABLET, FILM COATED ORAL DAILY PRN
Qty: 10 TABLET | Refills: 5 | Status: SHIPPED | OUTPATIENT
Start: 2018-12-19 | End: 2019-02-18

## 2018-12-19 NOTE — PROGRESS NOTES
Assessment/Plan:           Problem List Items Addressed This Visit        Digestive    Chronic hepatitis C virus infection ([facility] [address] )     Stable           Relevant Orders    HEMOGLOBIN A1C W/ EAG ESTIMATION    Comprehensive metabolic panel    CBC and differential    Lipid Panel with Direct LDL reflex    GERD (gastroesophageal reflux disease)     Stable  Cont current meds  refill         Relevant Medications    omeprazole (PriLOSEC) 20 mg delayed release capsule    Other Relevant Orders    HEMOGLOBIN A1C W/ EAG ESTIMATION    Comprehensive metabolic panel    CBC and differential    Lipid Panel with Direct LDL reflex       Endocrine    Controlled diabetes mellitus with diabetic neuropathy (HCC) - Primary    Relevant Medications    metFORMIN (GLUCOPHAGE) 500 mg tablet    Other Relevant Orders    HEMOGLOBIN A1C W/ EAG ESTIMATION    Comprehensive metabolic panel    CBC and differential    Lipid Panel with Direct LDL reflex       Cardiovascular and Mediastinum    Hypertension    Relevant Medications    lisinopril (ZESTRIL) 10 mg tablet    Other Relevant Orders    HEMOGLOBIN A1C W/ EAG ESTIMATION    Comprehensive metabolic panel    CBC and differential    Lipid Panel with Direct LDL reflex    Varicose 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     stable         Relevant Orders    HEMOGLOBIN A1C W/ EAG ESTIMATION    Comprehensive metabolic panel    CBC and differential    Lipid Panel with Direct LDL reflex       Nervous and Auditory    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HCC)    Relevant Orders    HEMOGLOBIN A1C W/ EAG ESTIMATION    Comprehensive metabolic panel    CBC and differential    Lipid Panel with Direct LDL reflex    Episodic cluster headache, not intractable     Stable           Relevant Medications    gabapentin (NEURONTIN) 100 mg capsule    Other Relevant Orders    HEMOGLOBIN A1C W/ EAG ESTIMATION    Comprehensive metabolic panel    CBC and differential    Lipid Panel with Direct LDL reflex    Meningioma (HCC)    Relevant Orders HEMOGLOBIN A1C W/ EAG ESTIMATION    Comprehensive metabolic panel    CBC and differential    Lipid Panel with Direct LDL reflex       Musculoskeletal and Integument    Spondylolisthesis at L5-S1 level     stable         Relevant Orders    HEMOGLOBIN A1C W/ EAG ESTIMATION    Comprehensive metabolic panel    CBC and differential    Lipid Panel with Direct LDL reflex       Genitourinary    ED (erectile dysfunction) of organic origin    Relevant Medications    sildenafil (VIAGRA) 100 mg tablet    Other Relevant Orders    HEMOGLOBIN A1C W/ EAG ESTIMATION    Comprehensive metabolic panel    CBC and differential    Lipid Panel with Direct LDL reflex       Other    Constipation     Trial surfak bid         Relevant Medications    docusate calcium (SURFAK) 240 mg capsule    glucose blood test strip    Other Relevant Orders    HEMOGLOBIN A1C W/ EAG ESTIMATION    Comprehensive metabolic panel    CBC and differential    Lipid Panel with Direct LDL reflex    Knee joint pain    Relevant Medications    gabapentin (NEURONTIN) 100 mg capsule    Other Relevant Orders    HEMOGLOBIN A1C W/ EAG ESTIMATION    Comprehensive metabolic panel    CBC and differential    Lipid Panel with Direct LDL reflex    Pain in both lower extremities     To consider pain management back injections           Relevant Orders    HEMOGLOBIN A1C W/ EAG ESTIMATION    Comprehensive metabolic panel    CBC and differential    Lipid Panel with Direct LDL reflex      Other Visit Diagnoses     Other hyperlipidemia        Relevant Medications    atorvastatin (LIPITOR) 10 mg tablet    Other Relevant Orders    HEMOGLOBIN A1C W/ EAG ESTIMATION    Comprehensive metabolic panel    CBC and differential    Lipid Panel with Direct LDL reflex    Type 2 diabetes mellitus without complication, without long-term current use of insulin (HCC)        Relevant Medications    Lancets (ONETOUCH ULTRASOFT) lancets    metFORMIN (GLUCOPHAGE) 500 mg tablet    ONETOUCH DELICA LANCETS 15M MISC    Other Relevant Orders    HEMOGLOBIN A1C W/ EAG ESTIMATION    Comprehensive metabolic panel    CBC and differential    Lipid Panel with Direct LDL reflex            Subjective:      Patient ID: Tj Guillermo is a 68 y o  male  Here for  C/o leg pain and knee pain  Ongoing  Had appt with pain management  Not sure to get low back injections  Needs med refills  Also needs stronger laxative, having constipation      Hypertension   This is a chronic problem  The current episode started more than 1 year ago  The problem is unchanged  The problem is controlled  Pertinent negatives include no chest pain, headaches, palpitations or shortness of breath  There are no associated agents to hypertension  Risk factors for coronary artery disease include diabetes mellitus, dyslipidemia, male gender and sedentary lifestyle  The current treatment provides significant improvement  There are no compliance problems  The following portions of the patient's history were reviewed and updated as appropriate: allergies, current medications, past family history, past medical history, past social history, past surgical history and problem list     Review of Systems   Constitutional: Negative for fatigue and fever  HENT: Negative for congestion, ear pain, sinus pain and sinus pressure  Eyes: Negative for photophobia and visual disturbance  Respiratory: Negative for cough, shortness of breath and wheezing  Cardiovascular: Negative for chest pain and palpitations  Gastrointestinal: Positive for constipation  Negative for diarrhea  Endocrine: Negative for polydipsia, polyphagia and polyuria  Genitourinary: Negative for dysuria and frequency  Musculoskeletal: Negative for back pain, gait problem and myalgias  Skin: Negative for rash  Neurological: Negative for dizziness, light-headedness and headaches  Hematological: Negative for adenopathy     Psychiatric/Behavioral: Negative for dysphoric mood and sleep disturbance  The patient is not nervous/anxious  Objective:      /70 (BP Location: Left arm, Patient Position: Sitting, Cuff Size: Standard)   Pulse 79   Temp (!) 97 4 °F (36 3 °C)   Resp 12   Ht 5' 8" (1 727 m)   Wt 96 6 kg (213 lb)   SpO2 97%   BMI 32 39 kg/m²       Family History   Problem Relation Age of Onset    No Known Problems Mother      Past Medical History:   Diagnosis Date    Brain mass     Last Assessed; 11/5/2015    Chest pain     Last Assessed: 1/22/2015    Diabetes type 2, uncontrolled ([facility] [address] )     Last Assessed: 3/1/2016    Edema of left lower extremity     Last Assessed: 3/22/2017    GERD (gastroesophageal reflux disease)     Hyperlipidemia     Hypertension     Impaired fasting glucose     Last Assessed: 11/10/2015    Irregular heartbeat     Varicose 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     Last Assessed: 3/22/2017     Social History     Social History    Marital status: /Civil Union     Spouse name: N/A    Number of children: 11    Years of education: N/A     Occupational History    Retired: Construction      Social History Main Topics    Smoking status: Former Smoker    Smokeless tobacco: Never Used    Alcohol use No    Drug use: No    Sexual activity: Not on file     Other Topics Concern    Not on file     Social History Narrative    Uses safety equipment               Current Outpatient Prescriptions:     ammonium lactate (LAC-HYDRIN) 12 % cream, Apply topically Twice daily, Disp: , Rfl:     aspirin 81 MG tablet, Take by mouth, Disp: , Rfl:     atorvastatin (LIPITOR) 10 mg tablet, TAKE 1 TABLET BY MOUTH EVERY DAY, Disp: 90 tablet, Rfl: 1    docusate calcium (SURFAK) 240 mg capsule, TAKE 1 CAPSULE BY MOUTH TWICE A DAY, Disp: , Rfl:     docusate sodium (COLACE) 100 mg capsule, Take 1 capsule (100 mg total) by mouth 2 (two) times a day, Disp: 60 capsule, Rfl: 5    Elastic Bandages & Supports ([facility]) MISC, by Does not apply route daily, Disp: 2 each, Rfl: 0    gabapentin (NEURONTIN) 100 mg capsule, Take 2 capsules (200 mg total) by mouth daily at bedtime, Disp: 60 capsule, Rfl: 5    glucose blood (ONE TOUCH ULTRA TEST) test strip, Test once daily, e11 9, Disp: 50 each, Rfl: 11    glucose blood (ONE TOUCH ULTRA TEST) test strip, 1 each by Other route daily Test, Disp: 50 each, Rfl: 5    Glucose Blood (ONETOUCH ULTRA BLUE VI), by In Vitro route daily, Disp: , Rfl:     glucose blood test strip, OneTouch Ultra [employer], Disp: , Rfl:     Lancets (ONETOUCH ULTRASOFT) lancets, USE AS INSTRUCTED, Disp: 100 each, Rfl: 5    Lancets (ONETOUCH ULTRASOFT) lancets, Use as instructed Test once daily, Disp: 100 each, Rfl: 5    lisinopril (ZESTRIL) 10 mg tablet, TAKE 1 TABLET BY MOUTH EVERY DAY, Disp: 90 tablet, Rfl: 1    meloxicam (MOBIC) 7 5 mg tablet, Take 1 tablet (7 5 mg total) by mouth daily, Disp: 30 tablet, Rfl: 0    metFORMIN (GLUCOPHAGE) 500 mg tablet, TAKE 1 TABLET BY MOUTH TWICE A DAY, Disp: 30 tablet, Rfl: 5    ofloxacin (FLOXIN) 0 3 % otic solution, Administer 10 drops into the left ear daily, Disp: 5 mL, Rfl: 0    omeprazole (PriLOSEC) 10 mg delayed release capsule, omeprazole 20 mg capsule,delayed release, Disp: , Rfl:     ONETOUCH DELICA LANCETS 97F MISC, Inject under the skin daily for 90 days, Disp: 100 each, Rfl: 5    Polyethylene Glycol 1000 LIQD, Take 1 packet by mouth daily, Disp: , Rfl:     polyethylene glycol-electrolytes (NULYTELY) 4000 mL solution, take 236 milliliter by mouth as directed, Disp: , Rfl: 0    sildenafil (REVATIO) 20 mg tablet, Take by mouth, Disp: , Rfl:     sildenafil (VIAGRA) 50 MG tablet, Take 1 tablet (50 mg total) by mouth daily as needed for erectile dysfunction, Disp: 10 tablet, Rfl: 2    topiramate (TOPAMAX) 50 MG tablet, Take 1 tablet by mouth daily, Disp: , Rfl:   No Known Allergies     Physical Exam   Constitutional: He is oriented to person, place, and time   He appears well-developed and well-nourished  HENT:   Head: Normocephalic and atraumatic  Right Ear: External ear normal    Left Ear: External ear normal    Nose: Nose normal    Mouth/Throat: Oropharynx is clear and moist    Eyes: Conjunctivae are normal    Neck: Normal range of motion  Neck supple  No thyromegaly present  Cardiovascular: Normal rate, regular rhythm and normal heart sounds  Pulmonary/Chest: Effort normal and breath sounds normal    Abdominal: Soft  Bowel sounds are normal    Musculoskeletal: Normal range of motion  Neurological: He is alert and oriented to person, place, and time  Skin: Skin is warm and dry  Psychiatric: He has a normal mood and affect  His behavior is normal  Judgment and thought content normal    Nursing note and vitals reviewed

## 2018-12-20 ENCOUNTER — APPOINTMENT (OUTPATIENT)
Dept: LAB | Facility: CLINIC | Age: 73
End: 2018-12-20
Payer: COMMERCIAL

## 2018-12-20 DIAGNOSIS — I10 ESSENTIAL HYPERTENSION: ICD-10-CM

## 2018-12-20 DIAGNOSIS — E11.40 CONTROLLED TYPE 2 DIABETES MELLITUS WITH DIABETIC NEUROPATHY, WITHOUT LONG-TERM CURRENT USE OF INSULIN (HCC): ICD-10-CM

## 2018-12-20 DIAGNOSIS — E78.49 OTHER HYPERLIPIDEMIA: ICD-10-CM

## 2018-12-20 DIAGNOSIS — E11.9 TYPE 2 DIABETES MELLITUS WITHOUT COMPLICATION, WITHOUT LONG-TERM CURRENT USE OF INSULIN (HCC): ICD-10-CM

## 2018-12-20 DIAGNOSIS — K59.01 SLOW TRANSIT CONSTIPATION: ICD-10-CM

## 2018-12-20 DIAGNOSIS — M25.562 ARTHRALGIA OF BOTH KNEES: ICD-10-CM

## 2018-12-20 DIAGNOSIS — G93.5: ICD-10-CM

## 2018-12-20 DIAGNOSIS — M79.605 PAIN IN BOTH LOWER EXTREMITIES: ICD-10-CM

## 2018-12-20 DIAGNOSIS — B18.2 CHRONIC HEPATITIS C WITHOUT HEPATIC COMA (HCC): ICD-10-CM

## 2018-12-20 DIAGNOSIS — G44.019 EPISODIC CLUSTER HEADACHE, NOT INTRACTABLE: ICD-10-CM

## 2018-12-20 DIAGNOSIS — K21.9 GASTROESOPHAGEAL REFLUX DISEASE WITHOUT ESOPHAGITIS: ICD-10-CM

## 2018-12-20 DIAGNOSIS — D32.9 MENINGIOMA (HCC): ICD-10-CM

## 2018-12-20 DIAGNOSIS — M43.17 SPONDYLOLISTHESIS AT L5-S1 LEVEL: ICD-10-CM

## 2018-12-20 DIAGNOSIS — N52.9 ED (ERECTILE DYSFUNCTION) OF ORGANIC ORIGIN: ICD-10-CM

## 2018-12-20 DIAGNOSIS — M25.561 ARTHRALGIA OF BOTH KNEES: ICD-10-CM

## 2018-12-20 DIAGNOSIS — I83.812 VARICOSE VEINS OF LEFT LOWER EXTREMITY WITH PAIN: ICD-10-CM

## 2018-12-20 DIAGNOSIS — M79.604 PAIN IN BOTH LOWER EXTREMITIES: ICD-10-CM

## 2018-12-20 LAB
ALBUMIN SERPL BCP-MCNC: 3.8 G/DL (ref 3.5–5)
ALP SERPL-CCNC: 113 U/L (ref 46–116)
ALT SERPL W P-5'-P-CCNC: 17 U/L (ref 12–78)
ANION GAP SERPL CALCULATED.3IONS-SCNC: 7 MMOL/L (ref 4–13)
AST SERPL W P-5'-P-CCNC: 9 U/L (ref 5–45)
BASOPHILS # BLD AUTO: 0.01 THOUSANDS/ΜL (ref 0–0.1)
BASOPHILS NFR BLD AUTO: 0 % (ref 0–1)
BILIRUB SERPL-MCNC: 0.45 MG/DL (ref 0.2–1)
BUN SERPL-MCNC: 22 MG/DL (ref 5–25)
CALCIUM SERPL-MCNC: 9.1 MG/DL (ref 8.3–10.1)
CHLORIDE SERPL-SCNC: 105 MMOL/L (ref 100–108)
CHOLEST SERPL-MCNC: 139 MG/DL (ref 50–200)
CO2 SERPL-SCNC: 27 MMOL/L (ref 21–32)
CREAT SERPL-MCNC: 0.74 MG/DL (ref 0.6–1.3)
EOSINOPHIL # BLD AUTO: 0.14 THOUSAND/ΜL (ref 0–0.61)
EOSINOPHIL NFR BLD AUTO: 2 % (ref 0–6)
ERYTHROCYTE [DISTWIDTH] IN BLOOD BY AUTOMATED COUNT: 12.7 % (ref 11.6–15.1)
EST. AVERAGE GLUCOSE BLD GHB EST-MCNC: 154 MG/DL
GFR SERPL CREATININE-BSD FRML MDRD: 92 ML/MIN/1.73SQ M
GLUCOSE P FAST SERPL-MCNC: 138 MG/DL (ref 65–99)
HBA1C MFR BLD: 7 % (ref 4.2–6.3)
HCT VFR BLD AUTO: 40.2 % (ref 36.5–49.3)
HDLC SERPL-MCNC: 33 MG/DL (ref 40–60)
HGB BLD-MCNC: 13 G/DL (ref 12–17)
IMM GRANULOCYTES # BLD AUTO: 0.02 THOUSAND/UL (ref 0–0.2)
IMM GRANULOCYTES NFR BLD AUTO: 0 % (ref 0–2)
LDLC SERPL CALC-MCNC: 75 MG/DL (ref 0–100)
LYMPHOCYTES # BLD AUTO: 0.94 THOUSANDS/ΜL (ref 0.6–4.47)
LYMPHOCYTES NFR BLD AUTO: 15 % (ref 14–44)
MCH RBC QN AUTO: 27.8 PG (ref 26.8–34.3)
MCHC RBC AUTO-ENTMCNC: 32.3 G/DL (ref 31.4–37.4)
MCV RBC AUTO: 86 FL (ref 82–98)
MONOCYTES # BLD AUTO: 0.52 THOUSAND/ΜL (ref 0.17–1.22)
MONOCYTES NFR BLD AUTO: 8 % (ref 4–12)
NEUTROPHILS # BLD AUTO: 4.86 THOUSANDS/ΜL (ref 1.85–7.62)
NEUTS SEG NFR BLD AUTO: 75 % (ref 43–75)
NRBC BLD AUTO-RTO: 0 /100 WBCS
PLATELET # BLD AUTO: 172 THOUSANDS/UL (ref 149–390)
PMV BLD AUTO: 10.3 FL (ref 8.9–12.7)
POTASSIUM SERPL-SCNC: 4.3 MMOL/L (ref 3.5–5.3)
PROT SERPL-MCNC: 7.4 G/DL (ref 6.4–8.2)
RBC # BLD AUTO: 4.67 MILLION/UL (ref 3.88–5.62)
SODIUM SERPL-SCNC: 139 MMOL/L (ref 136–145)
TRIGL SERPL-MCNC: 156 MG/DL
WBC # BLD AUTO: 6.49 THOUSAND/UL (ref 4.31–10.16)

## 2018-12-20 PROCEDURE — 36415 COLL VENOUS BLD VENIPUNCTURE: CPT

## 2018-12-20 PROCEDURE — 85025 COMPLETE CBC W/AUTO DIFF WBC: CPT

## 2018-12-20 PROCEDURE — 80061 LIPID PANEL: CPT

## 2018-12-20 PROCEDURE — 80053 COMPREHEN METABOLIC PANEL: CPT

## 2018-12-20 PROCEDURE — 83036 HEMOGLOBIN GLYCOSYLATED A1C: CPT

## 2019-02-18 ENCOUNTER — OFFICE VISIT (OUTPATIENT)
Dept: FAMILY MEDICINE CLINIC | Facility: CLINIC | Age: 74
End: 2019-02-18
Payer: COMMERCIAL

## 2019-02-18 VITALS
SYSTOLIC BLOOD PRESSURE: 132 MMHG | BODY MASS INDEX: 32.28 KG/M2 | OXYGEN SATURATION: 98 % | HEART RATE: 67 BPM | HEIGHT: 68 IN | DIASTOLIC BLOOD PRESSURE: 68 MMHG | WEIGHT: 213 LBS | RESPIRATION RATE: 12 BRPM

## 2019-02-18 DIAGNOSIS — K21.9 GASTROESOPHAGEAL REFLUX DISEASE WITHOUT ESOPHAGITIS: ICD-10-CM

## 2019-02-18 DIAGNOSIS — E11.40 CONTROLLED TYPE 2 DIABETES MELLITUS WITH DIABETIC NEUROPATHY, WITHOUT LONG-TERM CURRENT USE OF INSULIN (HCC): ICD-10-CM

## 2019-02-18 DIAGNOSIS — M25.561 ARTHRALGIA OF BOTH KNEES: ICD-10-CM

## 2019-02-18 DIAGNOSIS — M25.562 ARTHRALGIA OF BOTH KNEES: ICD-10-CM

## 2019-02-18 DIAGNOSIS — G44.019 EPISODIC CLUSTER HEADACHE, NOT INTRACTABLE: ICD-10-CM

## 2019-02-18 DIAGNOSIS — R07.9 CHEST PAIN, UNSPECIFIED TYPE: Primary | ICD-10-CM

## 2019-02-18 DIAGNOSIS — D32.9 MENINGIOMA (HCC): ICD-10-CM

## 2019-02-18 DIAGNOSIS — I10 ESSENTIAL HYPERTENSION: ICD-10-CM

## 2019-02-18 DIAGNOSIS — M79.605 PAIN IN BOTH LOWER EXTREMITIES: ICD-10-CM

## 2019-02-18 DIAGNOSIS — M79.604 PAIN IN BOTH LOWER EXTREMITIES: ICD-10-CM

## 2019-02-18 PROCEDURE — 93000 ELECTROCARDIOGRAM COMPLETE: CPT | Performed by: NURSE PRACTITIONER

## 2019-02-18 PROCEDURE — 99214 OFFICE O/P EST MOD 30 MIN: CPT | Performed by: NURSE PRACTITIONER

## 2019-02-18 RX ORDER — SILDENAFIL 50 MG/1
50 TABLET, FILM COATED ORAL
COMMUNITY
Start: 2016-12-14 | End: 2019-07-16 | Stop reason: SDUPTHER

## 2019-02-18 RX ORDER — GABAPENTIN 100 MG/1
200 CAPSULE ORAL
Qty: 60 CAPSULE | Refills: 5 | Status: SHIPPED | OUTPATIENT
Start: 2019-02-18 | End: 2021-03-17

## 2019-02-18 RX ORDER — TOPIRAMATE 50 MG/1
1 TABLET, FILM COATED ORAL
COMMUNITY
Start: 2017-12-27 | End: 2019-02-25 | Stop reason: SDUPTHER

## 2019-02-18 NOTE — ASSESSMENT & PLAN NOTE
Lab Results   Component Value Date    HGBA1C 7 0 (H) 12/20/2018     Stable  Cont current meds    No results for input(s): POCGLU in the last 72 hours      Blood Sugar Average: Last 72 hrs:

## 2019-02-18 NOTE — PROGRESS NOTES
Assessment/Plan:           Problem List Items Addressed This Visit        Digestive    GERD (gastroesophageal reflux disease)     Cont current meds              Endocrine    Controlled diabetes mellitus with diabetic neuropathy ([facility] [address] )     Lab Results   Component Value Date    HGBA1C 7 0 (H) 12/20/2018     Stable  Cont current meds    No results for input(s): POCGLU in the last 72 hours  Blood Sugar Average: Last 72 hrs:           Relevant Orders    Ambulatory referral to Cardiology       Cardiovascular and Mediastinum    Hypertension     Cont current meds           Relevant Orders    Ambulatory referral to Cardiology       Nervous and Auditory    Episodic cluster headache, not intractable    Relevant Medications    topiramate (TOPAMAX) 50 MG tablet    gabapentin (NEURONTIN) 100 mg capsule    Meningioma (HCC)       Other    Knee joint pain    Relevant Medications    gabapentin (NEURONTIN) 100 mg capsule    Pain in both lower extremities     Restart gabapentin           Chest pain - Primary     ekg  Refer to cardiology           Relevant Orders    POCT ECG    Ambulatory referral to Cardiology            Subjective:      Patient ID: Brent Babcock is a 68 y o  male      Here for f/u to chronic medical conditions  Diabetes under control per pt  No headaches recently  When gets them describes them as burning sensation posterior occipital region  Continues to have leg pain bilateral from knee to ankles  Yesterday had episode of left side chest wall pain  No diaphoresis or sob  Resolved on own  Patient with risk factors for CAD  No pain today        The following portions of the patient's history were reviewed and updated as appropriate: allergies, current medications, past family history, past medical history, past social history, past surgical history and problem list     Review of Systems      Objective:      /68 (BP Location: Right arm, Patient Position: Sitting, Cuff Size: Standard)   Pulse 67   Resp 12   Ht 5' 8" (1 727 m)   Wt 96 6 kg (213 lb)   SpO2 98%   BMI 32 39 kg/m²     Family History   Problem Relation Age of Onset    No Known Problems Mother      Past Medical History:   Diagnosis Date    Brain mass     Last Assessed; 11/5/2015    Chest pain     Last Assessed: 1/22/2015    Diabetes type 2, uncontrolled ([facility] [address] )     Last Assessed: 3/1/2016    Edema of left lower extremity     Last Assessed: 3/22/2017    GERD (gastroesophageal reflux disease)     Hyperlipidemia     Hypertension     Impaired fasting glucose     Last Assessed: 11/10/2015    Irregular heartbeat     Varicose 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     Last Assessed: 3/22/2017     Social History     Socioeconomic History    Marital status: /Civil Union     Spouse name: Not on file    Number of children: 5    Years of education: Not on file    Highest education level: Not on file   Occupational History    Occupation: Retired: Construction   Social Needs    Financial resource strain: Not on file    Food insecurity:     Worry: Not on file     Inability: Not on file   [employer]View needs:     Medical: Not on file     Non-medical: Not on file   Tobacco Use    Smoking status: Former Smoker    Smokeless tobacco: Never Used   Substance and Sexual Activity    Alcohol use: No    Drug use: No    Sexual activity: Not on file   Lifestyle    Physical activity:     Days per week: Not on file     Minutes per session: Not on file    Stress: Not on file   Relationships    Social connections:     Talks on phone: Not on file     Gets together: Not on file     Attends Confucianist service: Not on file     Active member of club or organization: Not on file     Attends meetings of clubs or organizations: Not on file     Relationship status: Not on file    Intimate partner violence:     Fear of current or ex partner: Not on file     Emotionally abused: Not on file     Physically abused: Not on file     Forced sexual activity: Not on file Other Topics Concern    Not on file   Social History Narrative    Uses safety equipment           Current Outpatient Medications:     sildenafil (VIAGRA) 50 MG tablet, Take 50 mg by mouth, Disp: , Rfl:     topiramate (TOPAMAX) 50 MG tablet, Take 1 tablet by mouth, Disp: , Rfl:     ammonium lactate (LAC-HYDRIN) 12 % cream, Apply topically Twice daily, Disp: , Rfl:     aspirin 81 MG tablet, Take by mouth, Disp: , Rfl:     atorvastatin (LIPITOR) 10 mg tablet, Take 1 tablet (10 mg total) by mouth daily, Disp: 90 tablet, Rfl: 3    docusate calcium (SURFAK) 240 mg capsule, Take 1 capsule (240 mg total) by mouth 2 (two) times a day, Disp: 60 capsule, Rfl: 3    docusate sodium (COLACE) 100 mg capsule, Take 1 capsule (100 mg total) by mouth 2 (two) times a day, Disp: 60 capsule, Rfl: 5    Elastic Bandages & Supports ([address] [place] [address]) MISC, by Does not apply route daily, Disp: 2 each, Rfl: 0    gabapentin (NEURONTIN) 100 mg capsule, Take 2 capsules (200 mg total) by mouth daily at bedtime, Disp: 60 capsule, Rfl: 5    Glucose Blood (ONETOUCH ULTRA BLUE VI), by In Vitro route daily, Disp: , Rfl:     glucose blood test strip, Test once daily e11 9, Disp: 100 each, Rfl: 11    Lancets (ONETOUCH ULTRASOFT) lancets, Test daily, e11 5, Disp: 100 each, Rfl: 11    meloxicam (MOBIC) 7 5 mg tablet, Take 1 tablet (7 5 mg total) by mouth daily, Disp: 30 tablet, Rfl: 0    metFORMIN (GLUCOPHAGE) 500 mg tablet, Take 1 tablet (500 mg total) by mouth 2 (two) times a day, Disp: 60 tablet, Rfl: 5    omeprazole (PriLOSEC) 20 mg delayed release capsule, Take 1 capsule (20 mg total) by mouth daily, Disp: 30 capsule, Rfl: 5    ONETOUCH DELICA LANCETS 79U MISC, Inject under the skin daily for 90 days, Disp: 100 each, Rfl: 0    topiramate (TOPAMAX) 50 MG tablet, Take 1 tablet by mouth daily, Disp: , Rfl:   No Known Allergies     Physical Exam    BMI Counseling: Body mass index is 32 39 kg/m²   Discussed the patient's BMI with him  The BMI is above average  BMI counseling and education was provided to the patient  Nutrition recommendations include reducing portion sizes, 3-5 servings of fruits/vegetables daily, reducing fast food intake, consuming healthier snacks, increasing intake of lean protein, reducing intake of saturated fat and trans fat and reducing intake of cholesterol  Exercise recommendations include exercising 3-5 times per week and strength training exercises       Recent Results (from the past 2688 hour(s))   HEMOGLOBIN A1C W/ EAG ESTIMATION    Collection Time: 12/20/18  8:53 AM   Result Value Ref Range    Hemoglobin A1C 7 0 (H) 4 2 - 6 3 %     mg/dl   Comprehensive metabolic panel    Collection Time: 12/20/18  8:53 AM   Result Value Ref Range    Sodium 139 136 - 145 mmol/L    Potassium 4 3 3 5 - 5 3 mmol/L    Chloride 105 100 - 108 mmol/L    CO2 27 21 - 32 mmol/L    ANION GAP 7 4 - 13 mmol/L    BUN 22 5 - 25 mg/dL    Creatinine 0 74 0 60 - 1 30 mg/dL    Glucose, Fasting 138 (H) 65 - 99 mg/dL    Calcium 9 1 8 3 - 10 1 mg/dL    AST 9 5 - 45 U/L    ALT 17 12 - 78 U/L    Alkaline Phosphatase 113 46 - 116 U/L    Total Protein 7 4 6 4 - 8 2 g/dL    Albumin 3 8 3 5 - 5 0 g/dL    Total Bilirubin 0 45 0 20 - 1 00 mg/dL    eGFR 92 ml/min/1 73sq m   CBC and differential    Collection Time: 12/20/18  8:53 AM   Result Value Ref Range    WBC 6 49 4 31 - 10 16 Thousand/uL    RBC 4 67 3 88 - 5 62 Million/uL    Hemoglobin 13 0 12 0 - 17 0 g/dL    Hematocrit 40 2 36 5 - 49 3 %    MCV 86 82 - 98 fL    MCH 27 8 26 8 - 34 3 pg    MCHC 32 3 31 4 - 37 4 g/dL    RDW 12 7 11 6 - 15 1 %    MPV 10 3 8 9 - 12 7 fL    Platelets 667 091 - 736 Thousands/uL    nRBC 0 /100 WBCs    Neutrophils Relative 75 43 - 75 %    Immat GRANS % 0 0 - 2 %    Lymphocytes Relative 15 14 - 44 %    Monocytes Relative 8 4 - 12 %    Eosinophils Relative 2 0 - 6 %    Basophils Relative 0 0 - 1 %    Neutrophils Absolute 4 86 1 85 - 7 62 Thousands/µL    Immature Grans Absolute 0 02 0 00 - 0 20 Thousand/uL    Lymphocytes Absolute 0 94 0 60 - 4 47 Thousands/µL    Monocytes Absolute 0 52 0 17 - 1 22 Thousand/µL    Eosinophils Absolute 0 14 0 00 - 0 61 Thousand/µL    Basophils Absolute 0 01 0 00 - 0 10 Thousands/µL   Lipid Panel with Direct LDL reflex    Collection Time: 12/20/18  8:53 AM   Result Value Ref Range    Cholesterol 139 50 - 200 mg/dL    Triglycerides 156 (H) <=150 mg/dL    HDL, Direct 33 (L) 40 - 60 mg/dL    LDL Calculated 75 0 - 100 mg/dL

## 2019-02-18 NOTE — PATIENT INSTRUCTIONS
Obesity   AMBULATORY CARE:   Obesity  is when your body mass index (BMI) is greater than 30  Your healthcare provider will use your height and weight to measure your BMI  The risks of obesity include  many health problems, such as injuries or physical disability  You may need tests to check for the following:  · Diabetes     · High blood pressure or high cholesterol     · Heart disease     · Gallbladder or liver disease     · Cancer of the colon, breast, prostate, liver, or kidney     · Sleep apnea     · Arthritis or gout  Seek care immediately if:   · You have a severe headache, confusion, or difficulty speaking  · You have weakness on one side of your body  · You have chest pain, sweating, or shortness of breath  Contact your healthcare provider if:   · You have symptoms of gallbladder or liver disease, such as pain in your upper abdomen  · You have knee or hip pain and discomfort while walking  · You have symptoms of diabetes, such as intense hunger and thirst, and frequent urination  · You have symptoms of sleep apnea, such as snoring or daytime sleepiness  · You have questions or concerns about your condition or care  Treatment for obesity  focuses on helping you lose weight to improve your health  Even a small decrease in BMI can reduce the risk for many health problems  Your healthcare provider will help you set a weight-loss goal   · Lifestyle changes  are the first step in treating obesity  These include making healthy food choices and getting regular physical activity  Your healthcare provider may suggest a weight-loss program that involves coaching, education, and therapy  · Medicine  may help you lose weight when it is used with a healthy diet and physical activity  · Surgery  can help you lose weight if you are very obese and have other health problems  There are several types of weight-loss surgery  Ask your healthcare provider for more information    Be successful losing weight:   · Set small, realistic goals  An example of a small goal is to walk for 20 minutes 5 days a week  Anther goal is to lose 5% of your body weight  · Tell friends, family members, and coworkers about your goals  and ask for their support  Ask a friend to lose weight with you, or join a weight-loss support group  · Identify foods or triggers that may cause you to overeat , and find ways to avoid them  Remove tempting high-calorie foods from your home and workplace  Place a bowl of fresh fruit on your kitchen counter  If stress causes you to eat, then find other ways to cope with stress  · Keep a diary to track what you eat and drink  Also write down how many minutes of physical activity you do each day  Weigh yourself once a week and record it in your diary  Eating changes: You will need to eat 500 to 1,000 fewer calories each day than you currently eat to lose 1 to 2 pounds a week  The following changes will help you cut calories:  · Eat smaller portions  Use small plates, no larger than 9 inches in diameter  Fill your plate half full of fruits and vegetables  Measure your food using measuring cups until you know what a serving size looks like  · Eat 3 meals and 1 or 2 snacks each day  Plan your meals in advance  Jame Yan and eat at home most of the time  Eat slowly  · Eat fruits and vegetables at every meal   They are low in calories and high in fiber, which makes you feel full  Do not add butter, margarine, or cream sauce to vegetables  Use herbs to season steamed vegetables  · Eat less fat and fewer fried foods  Eat more baked or grilled chicken and fish  These protein sources are lower in calories and fat than red meat  Limit fast food  Dress your salads with olive oil and vinegar instead of bottled dressing  · Limit the amount of sugar you eat  Do not drink sugary beverages  Limit alcohol  Activity changes:  Physical activity is good for your body in many ways   It helps you burn calories and build strong muscles  It decreases stress and depression, and improves your mood  It can also help you sleep better  Talk to your healthcare provider before you begin an exercise program   · Exercise for at least 30 minutes 5 days a week  Start slowly  Set aside time each day for physical activity that you enjoy and that is convenient for you  It is best to do both weight training and an activity that increases your heart rate, such as walking, bicycling, or swimming  · Find ways to be more active  Do yard work and housecleaning  Walk up the stairs instead of using elevators  Spend your leisure time going to events that require walking, such as outdoor festivals or fairs  This extra physical activity can help you lose weight and keep it off  Follow up with your healthcare provider as directed: You may need to meet with a dietitian  Write down your questions so you remember to ask them during your visits  © 2017 [employer] Information is for End User's use only and may not be sold, redistributed or otherwise used for commercial purposes  All illustrations and images included in CareNotes® are the copyrighted property of [employer]  or Claudio Marrero  The above information is an  only  It is not intended as medical advice for individual conditions or treatments  Talk to your doctor, nurse or pharmacist before following any medical regimen to see if it is safe and effective for you  Weight Management   AMBULATORY CARE:   Why it is important to manage your weight:  Being overweight increases your risk of health conditions such as heart disease, high blood pressure, type 2 diabetes, and certain types of cancer  It can also increase your risk for osteoarthritis, sleep apnea, and other respiratory problems  Aim for a slow, steady weight loss  Even a small amount of weight loss can lower your risk of health problems    How to lose weight safely:  A safe and healthy way to lose weight is to eat fewer calories and get regular exercise  You can lose up about 1 pound a week by decreasing the number of calories you eat by 500 calories each day  You can decrease calories by eating smaller portion sizes or by cutting out high-calorie foods  Read labels to find out how many calories are in the foods you eat  You can also burn calories with exercise such as walking, swimming, or biking  You will be more likely to keep weight off if you make these changes part of your lifestyle  Healthy meal plan for weight management:  A healthy meal plan includes a variety of foods, contains fewer calories, and helps you stay healthy  A healthy meal plan includes the following:  · Eat whole-grain foods more often  A healthy meal plan should contain fiber  Fiber is the part of grains, fruits, and vegetables that is not broken down by your body  Whole-grain foods are healthy and provide extra fiber in your diet  Some examples of whole-grain foods are whole-wheat breads and pastas, oatmeal, brown rice, and bulgur  · Eat a variety of vegetables every day  Include dark, leafy greens such as spinach, kale, robin greens, and mustard greens  Eat yellow and orange vegetables such as carrots, sweet potatoes, and winter squash  · Eat a variety of fruits every day  Choose fresh or canned fruit (canned in its own juice or light syrup) instead of juice  Fruit juice has very little or no fiber  · Eat low-fat dairy foods  Drink fat-free (skim) milk or 1% milk  Eat fat-free yogurt and low-fat cottage cheese  Try low-fat cheeses such as mozzarella and other reduced-fat cheeses  · Choose meat and other protein foods that are low in fat  Choose beans or other legumes such as split peas or lentils  Choose fish, skinless poultry (chicken or turkey), or lean cuts of red meat (beef or pork)  Before you cook meat or poultry, cut off any visible fat  · Use less fat and oil  Try baking foods instead of frying them  Add less fat, such as margarine, sour cream, regular salad dressing and mayonnaise to foods  Eat fewer high-fat foods  Some examples of high-fat foods include french fries, doughnuts, ice cream, and cakes  · Eat fewer sweets  Limit foods and drinks that are high in sugar  This includes candy, cookies, regular soda, and sweetened drinks  Ways to decrease calories:   · Eat smaller portions  ¨ Use a small plate with smaller servings  ¨ Do not eat second helpings  ¨ When you eat at a restaurant, ask for a box and place half of your meal in the box before you eat  ¨ Share an entrée with someone else  · Replace high-calorie snacks with healthy, low-calorie snacks  ¨ Choose fresh fruit, vegetables, fat-free rice cakes, or air-popped popcorn instead of potato chips, nuts, or chocolate  ¨ Choose water or calorie-free drinks instead of soda or sweetened drinks  · Eat regular meals  Skipping meals can lead to overeating later in the day  Eat a healthy snack in place of a meal if you do not have time to eat a regular meal      · Do not shop for groceries when you are hungry  You may be more likely to make unhealthy food choices  Take a grocery list of healthy foods and shop after you have eaten  Exercise:  Exercise at least 30 minutes per day on most days of the week  Some examples of exercise include walking, biking, dancing, and swimming  You can also fit in more physical activity by taking the stairs instead of the elevator or parking farther away from stores  Ask your healthcare provider about the best exercise plan for you  Other things to consider as you try to lose weight:   · Be aware of situations that may give you the urge to overeat, such as eating while watching television  Find ways to avoid these situations  For example, read a book, go for a walk, or do crafts      · Meet with a weight loss support group or friends who are also trying to lose weight  This may help you stay motivated to continue working on your weight loss goals  © 2017 [employer] Information is for End User's use only and may not be sold, redistributed or otherwise used for commercial purposes  All illustrations and images included in CareNotes® are the copyrighted property of [employer]  or Claudio Marrero  The above information is an  only  It is not intended as medical advice for individual conditions or treatments  Talk to your doctor, nurse or pharmacist before following any medical regimen to see if it is safe and effective for you

## 2019-02-25 ENCOUNTER — CONSULT (OUTPATIENT)
Dept: CARDIOLOGY CLINIC | Facility: CLINIC | Age: 74
End: 2019-02-25
Payer: COMMERCIAL

## 2019-02-25 VITALS
DIASTOLIC BLOOD PRESSURE: 90 MMHG | SYSTOLIC BLOOD PRESSURE: 152 MMHG | BODY MASS INDEX: 32.31 KG/M2 | HEIGHT: 68 IN | OXYGEN SATURATION: 97 % | HEART RATE: 64 BPM | WEIGHT: 213.2 LBS

## 2019-02-25 DIAGNOSIS — I10 ESSENTIAL HYPERTENSION: ICD-10-CM

## 2019-02-25 DIAGNOSIS — E11.40 CONTROLLED TYPE 2 DIABETES MELLITUS WITH DIABETIC NEUROPATHY, WITHOUT LONG-TERM CURRENT USE OF INSULIN (HCC): ICD-10-CM

## 2019-02-25 DIAGNOSIS — R07.9 CHEST PAIN, UNSPECIFIED TYPE: ICD-10-CM

## 2019-02-25 PROCEDURE — 99204 OFFICE O/P NEW MOD 45 MIN: CPT | Performed by: INTERNAL MEDICINE

## 2019-02-25 RX ORDER — AMLODIPINE BESYLATE 2.5 MG/1
5 TABLET ORAL DAILY
Qty: 30 TABLET | Refills: 6 | Status: SHIPPED | OUTPATIENT
Start: 2019-02-25 | End: 2019-06-24 | Stop reason: SDUPTHER

## 2019-02-25 NOTE — PROGRESS NOTES
Veronica Taran  1945  [id number]  [facility] [id number]    1  Chest pain, unspecified type  Ambulatory referral to Cardiology    Echo complete with contrast if indicated    NM myocardial perfusion spect (stress and/or rest)   2  Controlled type 2 diabetes mellitus with diabetic neuropathy, without long-term current use of insulin ([facility] )  Ambulatory referral to Cardiology    Echo complete with contrast if indicated    NM myocardial perfusion spect (stress and/or rest)   3  Essential hypertension  Ambulatory referral to Cardiology    Echo complete with contrast if indicated    NM myocardial perfusion spect (stress and/or rest)    amLODIPine (NORVASC) 2 5 mg tablet       Discussion/Summary:  Patient has atypical chest pain with risk factors for coronary artery disease  I have recommended an exercise nuclear stress test as well as the echocardiogram   Blood pressure is elevated I have added amlodipine 5 mg daily  Continue aspirin and statin  He has been low-dose secondary to history of hepatitis C and liver dysfunction  I counseled him on dietary modifications  I will see him back in 6 months  Interval History:  68year-old gentle with history of diabetes, hypertension, hyperlipidemia, hepatitis-C that was treated, meningioma presents for new patient evaluation on behalf of Eleuterio Spence  He had some chest heaviness in the upper region this was worse with cough and deep inspiration  He denies any exertional component  There is no diaphoresis or shortness of breath  He denies any radiation  The pain can come with and without exertion  He denies any palpitations, lightheadedness, dizziness, or syncope  He has been taking his medications as prescribed  He denies any lower extremity edema, PND, orthopnea  He has a good functional capacity for his age      Problem List     Brain tumor [facility])    Chronic hepatitis C virus infection ([facility] )    Overview Signed 5/11/2018 5:12 AM by ZAHRA Henning     Overview:   Treated in [facility] with Shanta Khan with labs confirming cure         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[facility] )    Constipation    Controlled diabetes mellitus with diabetic neuropathy ([facility] )    Overview Addendum 10/8/2018  9:35 AM by ZAHRA Henning     Transitioned From: Diabetes mellitus    Overview:   Overview:   Transitioned From: Diabetes mellitus    Last Assessment & Plan:   Formatting of this note may be different from the original   Lab Results   Component Value Date    HGBA1C 6 3 05/10/2018     No results for input(s): POCGLU in the last 72 hours  Blood Sugar Average: Last 72 hrs:  refill and cont current meds         Lab Results   Component Value Date    HGBA1C 7 0 (H) 12/20/2018       No results for input(s): POCGLU in the last 72 hours      Blood Sugar Average: Last 72 hrs:          Episodic cluster headache, not intractable    GERD (gastroesophageal reflux disease)    Hypertension    ED (erectile dysfunction) of organic origin    Knee joint pain    Meningioma ([facility] )    Overview Signed 10/8/2018  9:35 AM by ZAHRA Henning     Overview:   Last Assessment & Plan:   Stable on mri   Cont f/u with neurosurgery  [facility] - heterogeneously enhancing extra-axial mass within the right lateral prepontine cistern along the anterior aspect of the tentorium consistent with angiomatous meningioma  Stable mass effect upon the brainstem (oliver)         Varicose 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    Pain in both lower extremities    Spondylolisthesis at L5-S1 level    Chest pain        Past Medical History:   Diagnosis Date    Brain mass     Last Assessed; 11/5/2015    Chest pain     Last Assessed: 1/22/2015    Diabetes type 2, uncontrolled ([facility] )     Last Assessed: 3/1/2016    Edema of left lower extremity     Last Assessed: 3/22/2017    GERD (gastroesophageal reflux disease)     Hyperlipidemia     Hypertension     Impaired fasting glucose     Last Assessed: 11/10/2015    Irregular heartbeat     Varicose 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     Last Assessed: 3/22/2017     Social History     Socioeconomic History    Marital status: /Civil Union     Spouse name: Not on file    Number of children: 11    Years of education: Not on file    Highest education level: Not on file   Occupational History    Occupation: Retired: Construction   Social Needs    Financial resource strain: Not on file    Food insecurity:     Worry: Not on file     Inability: Not on file   [employer] needs:     Medical: Not on file     Non-medical: Not on file   Tobacco Use    Smoking status: Former Smoker    Smokeless tobacco: Never Used   Substance and Sexual Activity    Alcohol use: No    Drug use: No    Sexual activity: Not on file   Lifestyle    Physical activity:     Days per week: Not on file     Minutes per session: Not on file    Stress: Not on file   Relationships    Social connections:     Talks on phone: Not on file     Gets together: Not on file     Attends Church service: Not on file     Active member of club or organization: Not on file     Attends meetings of clubs or organizations: Not on file     Relationship status: Not on file    Intimate partner violence:     Fear of current or ex partner: Not on file     Emotionally abused: Not on file     Physically abused: Not on file     Forced sexual activity: Not on file   Other Topics Concern    Not on file   Social History Narrative    Uses safety equipment          Family History   Problem Relation Age of Onset    No Known Problems Mother      Past Surgical History:   Procedure Laterality Date    COLONOSCOPY      COLONOSCOPY N/A 7/10/2018    Procedure: COLONOSCOPY;  Surgeon: Amando Almaguer MD;  Location: [facility];   Service: Colorectal    INGUINAL HERNIA REPAIR      20+ years ago - [address] [place] [address]; Last Assessed: 10/23/2014    TONSILLECTOMY      VARICOSE VEIN SURGERY Left     x2 left leg - 40+ yrs ago, [place] and [facility]       Current Outpatient Medications:     ammonium lactate (LAC-HYDRIN) 12 % cream, Apply topically Twice daily, Disp: , Rfl:     aspirin 81 MG tablet, Take by mouth, Disp: , Rfl:     atorvastatin (LIPITOR) 10 mg tablet, Take 1 tablet (10 mg total) by mouth daily, Disp: 90 tablet, Rfl: 3    docusate calcium (SURFAK) 240 mg capsule, Take 1 capsule (240 mg total) by mouth 2 (two) times a day, Disp: 60 capsule, Rfl: 3    docusate sodium (COLACE) 100 mg capsule, Take 1 capsule (100 mg total) by mouth 2 (two) times a day, Disp: 60 capsule, Rfl: 5    Elastic Bandages & Supports ([address] [place] [address]) [facility] Does not apply route daily, Disp: 2 each, Rfl: 0    gabapentin (NEURONTIN) 100 mg capsule, Take 2 capsules (200 mg total) by mouth daily at bedtime, Disp: 60 capsule, Rfl: 5    Glucose Blood (ONETOUCH ULTRA BLUE VI), by In Vitro route daily, Disp: , Rfl:     glucose blood test strip, Test once daily e11 9, Disp: 100 each, Rfl: 11    Lancets (ONETOUCH ULTRASOFT) lancets, Test daily, e11 5, Disp: 100 each, Rfl: 11    metFORMIN (GLUCOPHAGE) 500 mg tablet, Take 1 tablet (500 mg total) by mouth 2 (two) times a day, Disp: 60 tablet, Rfl: 5    ONETOUCH DELICA LANCETS 69Z MISC, Inject under the skin daily for 90 days, Disp: 100 each, Rfl: 0    sildenafil (VIAGRA) 50 MG tablet, Take 50 mg by mouth, Disp: , Rfl:     topiramate (TOPAMAX) 50 MG tablet, Take 1 tablet by mouth daily, Disp: , Rfl:     amLODIPine (NORVASC) 2 5 mg tablet, Take 2 tablets (5 mg total) by mouth daily, Disp: 30 tablet, Rfl: 6    meloxicam (MOBIC) 7 5 mg tablet, Take 1 tablet (7 5 mg total) by mouth daily (Patient not taking: Reported on 2/25/2019), Disp: 30 tablet, Rfl: 0    omeprazole (PriLOSEC) 20 mg delayed release capsule, Take 1 capsule (20 mg total) by mouth daily (Patient not taking: Reported on 2/25/2019), Disp: 30 capsule, Rfl: 5  No Known Allergies    Labs:     Chemistry        Component Value Date/Time     10/28/2015 0852    K 4 3 12/20/2018 0853    K 4 3 10/28/2015 0852     12/20/2018 0853     10/28/2015 0852    CO2 27 12/20/2018 0853    CO2 31 10/28/2015 0852    BUN 22 12/20/2018 0853    BUN 18 05/17/2016 1135    CREATININE 0 74 12/20/2018 0853    CREATININE 0 72 (L) 05/17/2016 1135        Component Value Date/Time    CALCIUM 9 1 12/20/2018 0853    CALCIUM 9 0 10/28/2015 0852    ALKPHOS 113 12/20/2018 0853    ALKPHOS 108 08/27/2015 0844    AST 9 12/20/2018 0853    AST 12 08/27/2015 0844    ALT 17 12/20/2018 0853    ALT 19 08/27/2015 0844    BILITOT 0 53 08/27/2015 0844            Lab Results   Component Value Date    CHOL 154 08/27/2015     Lab Results   Component Value Date    HDL 33 (L) 12/20/2018    HDL 30 (L) 05/10/2018    HDL 26 (L) 07/27/2017     Lab Results   Component Value Date    LDLCALC 75 12/20/2018    LDLCALC 92 05/10/2018    LDLCALC 88 07/27/2017     Lab Results   Component Value Date    TRIG 156 (H) 12/20/2018    TRIG 149 05/10/2018    TRIG 182 (H) 07/27/2017     No results found for: CHOLHDL    Imaging: No results found  ECG:        Review of Systems   Constitution: Negative  HENT: Negative  Eyes: Negative  Cardiovascular: Positive for chest pain  Negative for dyspnea on exertion, leg swelling, orthopnea, palpitations and paroxysmal nocturnal dyspnea  Respiratory: Negative  Endocrine: Negative  Hematologic/Lymphatic: Negative  Skin: Negative  Musculoskeletal: Negative  Gastrointestinal: Negative  Genitourinary: Negative  Neurological: Negative  Psychiatric/Behavioral: Negative  Vitals:    02/25/19 1039   BP: 152/90   Pulse: 64   SpO2: 97%     Vitals:    02/25/19 1039   Weight: 96 7 kg (213 lb 3 2 oz)     Height: 5' 8" (172 7 cm)   Body mass index is 32 42 kg/m²  Physical Exam:  Vital signs reviewed    General appearance:  Appears stated age, alert, well appearing and in no distress  HEENT:  PERRLA, EOMI, no scleral icterus, no conjunctival pallor  NECK:  Supple, No elevated JVP, no thyromegaly, no carotid bruits  HEART:  Regular rate and rhythm, normal S1/S2, no S3/S4, no murmur or rub  LUNGS:  Clear to auscultation bilaterally no wheezes rales or rhonchi  ABDOMEN:  Soft, non-tender, positive bowel sounds, no rebound or guarding, no organomegaly   EXTREMITIES:  No edema no clubbing cyanosis good range of motion  VASCULAR:  Normal pedal pulses   SKIN: No lesions or rashes on exposed skin  NEURO:  CN II-XII intact, no focal deficits

## 2019-03-01 ENCOUNTER — APPOINTMENT (EMERGENCY)
Dept: RADIOLOGY | Facility: HOSPITAL | Age: 74
End: 2019-03-01
Payer: COMMERCIAL

## 2019-03-01 ENCOUNTER — HOSPITAL ENCOUNTER (EMERGENCY)
Facility: HOSPITAL | Age: 74
Discharge: HOME | End: 2019-03-02
Attending: EMERGENCY MEDICINE
Payer: COMMERCIAL

## 2019-03-01 VITALS
SYSTOLIC BLOOD PRESSURE: 155 MMHG | RESPIRATION RATE: 16 BRPM | HEART RATE: 69 BPM | TEMPERATURE: 97.7 F | DIASTOLIC BLOOD PRESSURE: 82 MMHG | OXYGEN SATURATION: 97 %

## 2019-03-01 DIAGNOSIS — I10 ESSENTIAL HYPERTENSION: Primary | ICD-10-CM

## 2019-03-01 DIAGNOSIS — R51.9 HEADACHE: ICD-10-CM

## 2019-03-01 LAB
ANION GAP SERPL CALCULATED.3IONS-SCNC: 5 MMOL/L (ref 4–13)
BUN SERPL-MCNC: 26 MG/DL (ref 5–25)
CALCIUM SERPL-MCNC: 8.5 MG/DL (ref 8.3–10.1)
CHLORIDE SERPL-SCNC: 104 MMOL/L (ref 100–108)
CO2 SERPL-SCNC: 29 MMOL/L (ref 21–32)
CREAT SERPL-MCNC: 0.82 MG/DL (ref 0.6–1.3)
GFR SERPL CREATININE-BSD FRML MDRD: 88 ML/MIN/1.73SQ M
GLUCOSE SERPL-MCNC: 195 MG/DL (ref 65–140)
MAGNESIUM SERPL-MCNC: 2.1 MG/DL (ref 1.6–2.6)
POTASSIUM SERPL-SCNC: 4.3 MMOL/L (ref 3.5–5.3)
SODIUM SERPL-SCNC: 138 MMOL/L (ref 136–145)
TROPONIN I SERPL-MCNC: <0.02 NG/ML

## 2019-03-01 PROCEDURE — 36415 COLL VENOUS BLD VENIPUNCTURE: CPT | Performed by: EMERGENCY MEDICINE

## 2019-03-01 PROCEDURE — 80048 BASIC METABOLIC PNL TOTAL CA: CPT | Performed by: EMERGENCY MEDICINE

## 2019-03-01 PROCEDURE — 70450 CT HEAD/BRAIN W/O DYE: CPT

## 2019-03-01 PROCEDURE — 84484 ASSAY OF TROPONIN QUANT: CPT | Performed by: EMERGENCY MEDICINE

## 2019-03-01 PROCEDURE — 83735 ASSAY OF MAGNESIUM: CPT | Performed by: EMERGENCY MEDICINE

## 2019-03-01 PROCEDURE — 96374 THER/PROPH/DIAG INJ IV PUSH: CPT

## 2019-03-01 PROCEDURE — 99284 EMERGENCY DEPT VISIT MOD MDM: CPT

## 2019-03-01 RX ORDER — LABETALOL 20 MG/4 ML (5 MG/ML) INTRAVENOUS SYRINGE
10 ONCE
Status: DISCONTINUED | OUTPATIENT
Start: 2019-03-01 | End: 2019-03-01

## 2019-03-01 RX ORDER — METOCLOPRAMIDE HYDROCHLORIDE 5 MG/ML
10 INJECTION INTRAMUSCULAR; INTRAVENOUS ONCE
Status: COMPLETED | OUTPATIENT
Start: 2019-03-01 | End: 2019-03-01

## 2019-03-01 RX ORDER — ACETAMINOPHEN 325 MG/1
975 TABLET ORAL ONCE
Status: DISCONTINUED | OUTPATIENT
Start: 2019-03-01 | End: 2019-03-01

## 2019-03-01 RX ADMIN — METOCLOPRAMIDE 10 MG: 5 INJECTION, SOLUTION INTRAMUSCULAR; INTRAVENOUS at 22:57

## 2019-03-02 NOTE — ED ATTENDING ATTESTATION
I, Isaiah Washington DO, saw and evaluated the patient  I have discussed the patient with the resident/non-physician practitioner and agree with the resident's/non-physician practitioner's findings, Plan of Care, and MDM as documented in the resident's/non-physician practitioner's note, except where noted  All available labs and Radiology studies were reviewed  I was present for key portions of any procedure(s) performed by the resident/non-physician practitioner and I was immediately available to provide assistance  At this point I agree with the current assessment done in the Emergency Department  I have conducted an independent evaluation of this patient a history and physical is as follows:      Critical Care Time  Procedures     68 yr old male to the ED with headache with HTN since this am   Nausea wo vomiting  Recently started on amlodipine for BP incr  Headache similar to prior in location and tigre but incr in intensity  No neck pain, chest pain, sob  Ex; cranial grossly intact  Neck supple  Oral moist   Heart: rrr  Lungs: cta  Pressure incr    Pln: tx for pressure, lab screen, CT head

## 2019-03-02 NOTE — ED PROVIDER NOTES
History  Chief Complaint   Patient presents with    Hypertension     Pt c/o HTN and nausea  Pt states he has a heaviness of his head but denies CP/SOB  Pt recently started taking amlodipine  67-year-old male with history of brain tumor (meningioma), diabetes, and hypertension presents to emergency department for headache and hypertension  Patient says that at 0900 he has developed a gradual onset headache localized to the bilateral frontal area radiating to the parietal area, it of 10 severity, sharp in quality, no relief with Tylenol  He has had similar headaches in the past most recently 2 days ago  He also complains of nausea without vomiting  He denies any fever, chills, vision changes, focal neurological symptoms, difficulty speaking, weakness, neck pain or stiffness, chest pain or shortness of breath, any additional complaints  Prior to Admission Medications   Prescriptions Last Dose Informant Patient Reported? Taking?    Elastic Bandages & Supports (FLEXLITE KNEE BRACE) [facility]  Child No No   Sig: by Does not apply route daily   Glucose Blood (ONETOUCH ULTRA BLUE VI)  Child Yes No   Sig: by In Vitro route daily   Lancets (ONETOUCH ULTRASOFT) lancets  Child No No   Sig: Test daily, J81 1   ONETOUCH DELICA LANCETS 53A MISC  Child No No   Sig: Inject under the skin daily for 90 days   amLODIPine (NORVASC) 2 5 mg tablet   No No   Sig: Take 2 tablets (5 mg total) by mouth daily   ammonium lactate (LAC-HYDRIN) 12 % cream  Child Yes No   Sig: Apply topically Twice daily   aspirin 81 MG tablet  Child Yes No   Sig: Take by mouth   atorvastatin (LIPITOR) 10 mg tablet  Child No No   Sig: Take 1 tablet (10 mg total) by mouth daily   docusate calcium (SURFAK) 240 mg capsule  Child No No   Sig: Take 1 capsule (240 mg total) by mouth 2 (two) times a day   docusate sodium (COLACE) 100 mg capsule  Child No No   Sig: Take 1 capsule (100 mg total) by mouth 2 (two) times a day   gabapentin (NEURONTIN) 100 mg capsule  Child No No   Sig: Take 2 capsules (200 mg total) by mouth daily at bedtime   glucose blood test strip  Child No No   Sig: Test once daily e11 9   meloxicam (MOBIC) 7 5 mg tablet  Child No No   Sig: Take 1 tablet (7 5 mg total) by mouth daily   Patient not taking: Reported on 2/25/2019   metFORMIN (GLUCOPHAGE) 500 mg tablet  Child No No   Sig: Take 1 tablet (500 mg total) by mouth 2 (two) times a day   omeprazole (PriLOSEC) 20 mg delayed release capsule  Child No No   Sig: Take 1 capsule (20 mg total) by mouth daily   Patient not taking: Reported on 2/25/2019   sildenafil (VIAGRA) 50 MG tablet  Child Yes No   Sig: Take 50 mg by mouth   topiramate (TOPAMAX) 50 MG tablet  Child Yes No   Sig: Take 1 tablet by mouth daily      Facility-Administered Medications: None       Past Medical History:   Diagnosis Date    Brain mass     Last Assessed; 11/5/2015    Chest pain     Last Assessed: 1/22/2015    Diabetes type 2, uncontrolled ([facility] [facility] )     Last Assessed: 3/1/2016    Edema of left lower extremity     Last Assessed: 3/22/2017    GERD (gastroesophageal reflux disease)     Hyperlipidemia     Hypertension     Impaired fasting glucose     Last Assessed: 11/10/2015    Irregular heartbeat     Varicose 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     Last Assessed: 3/22/2017       Past Surgical History:   Procedure Laterality Date    COLONOSCOPY      COLONOSCOPY N/A 7/10/2018    Procedure: COLONOSCOPY;  Surgeon: Rhina Gracia MD;  Location: [facility]; Service: Colorectal    INGUINAL HERNIA REPAIR      20+ years ago - [facility]; Last Assessed: 10/23/2014    TONSILLECTOMY      VARICOSE VEIN SURGERY Left     x2 left leg - 40+ yrs ago, [facility]       Family History   Problem Relation Age of Onset    No Known Problems Mother      I have reviewed and agree with the history as documented      Social History     Tobacco Use    Smoking status: Former Smoker    Smokeless tobacco: Never Used   Substance Use Topics  Alcohol use: No    Drug use: No        Review of Systems   Constitutional: Negative  Negative for chills and fever  HENT: Negative  Negative for congestion and rhinorrhea  Eyes: Negative  Respiratory: Negative  Negative for cough and shortness of breath  Cardiovascular: Negative  Negative for chest pain and leg swelling  Gastrointestinal: Positive for nausea  Negative for abdominal distention, abdominal pain, diarrhea and vomiting  Musculoskeletal: Negative  Negative for back pain and neck pain  Skin: Negative  Negative for rash  Neurological: Positive for headaches  Negative for light-headedness  Hematological: Negative  All other systems reviewed and are negative  Physical Exam  ED Triage Vitals [03/01/19 2217]   Temperature Pulse Respirations Blood Pressure SpO2   97 7 °F (36 5 °C) 69 16 (!) 201/93 97 %      Temp Source Heart Rate Source Patient Position - Orthostatic VS BP Location FiO2 (%)   Oral Monitor -- -- --      Pain Score       --           Orthostatic Vital Signs  Vitals:    03/01/19 2217 03/01/19 2254   BP: (!) 201/93 155/82   Pulse: 69        Physical Exam   Constitutional: He is oriented to person, place, and time  He appears well-developed and well-nourished  No distress  HENT:   Head: Normocephalic and atraumatic  Mouth/Throat: Oropharynx is clear and moist    Eyes: Pupils are equal, round, and reactive to light  Conjunctivae and EOM are normal    Neck: Normal range of motion  Neck supple  Cardiovascular: Normal rate, regular rhythm, normal heart sounds and intact distal pulses  Exam reveals no gallop and no friction rub  No murmur heard  Pulmonary/Chest: Effort normal and breath sounds normal  No respiratory distress  He has no wheezes  He has no rales  Abdominal: Soft  There is no tenderness  There is no rebound and no guarding  Musculoskeletal: He exhibits no edema or tenderness     Neurological: He is alert and oriented to person, place, and time  No cranial nerve deficit or sensory deficit  He exhibits normal muscle tone  Coordination normal    Clear fluent speech  5/5 strength in all four extremities  No pronator drift  Normal finger nose finger  Normal heel to shin  No dysdiadochokinesis  Visual fields intact  Skin: Skin is warm and dry  Capillary refill takes less than 2 seconds  Psychiatric: He has a normal mood and affect  Nursing note and vitals reviewed  ED Medications  Medications   metoclopramide (REGLAN) injection 10 mg (10 mg Intravenous Given 3/1/19 2257)       Diagnostic Studies  Results Reviewed     Procedure Component Value Units Date/Time    Troponin I [[id number]]  (Normal) Collected:  03/01/19 2251    Lab Status:  Final result Specimen:  Blood from Arm, Right Updated:  03/01/19 2345     Troponin I <0 02 ng/mL     Basic metabolic panel [[id number]]  (Abnormal) Collected:  03/01/19 2251    Lab Status:  Final result Specimen:  Blood from Arm, Right Updated:  03/01/19 2334     Sodium 138 mmol/L      Potassium 4 3 mmol/L      Chloride 104 mmol/L      CO2 29 mmol/L      ANION GAP 5 mmol/L      BUN 26 mg/dL      Creatinine 0 82 mg/dL      Glucose 195 mg/dL      Calcium 8 5 mg/dL      eGFR 88 ml/min/1 73sq m     Narrative:       National Kidney Disease Education Program recommendations are as follows:  GFR calculation is accurate only with a steady state creatinine  Chronic Kidney disease less than 60 ml/min/1 73 sq  meters  Kidney failure less than 15 ml/min/1 73 sq  meters      Magnesium [[id number]]  (Normal) Collected:  03/01/19 2251    Lab Status:  Final result Specimen:  Blood from Arm, Right Updated:  03/01/19 2330     Magnesium 2 1 mg/dL                  CT head without contrast   Final Result by Ariane Piedra DO (03/02 0030)   Redemonstration of calcified mass in the posterior right suprasellar cistern/supraclinoid region, measures approximately 1 7 cm in maximal dimensions, consistent with the patient's history of calcified meningioma, this does not appear significantly    changed when compared to the prior studies  No acute intracranial abnormality is seen  Other findings as above  Workstation performed: [id number]               Procedures  Procedures      Phone Consults  ED Phone Contact    ED Course                               MDM  Number of Diagnoses or Management Options  Essential hypertension:   Headache:   Diagnosis management comments: 52-year-old male with history of brain tumor (meningioma), diabetes, and hypertension presents to emergency department for headache and hypertension  Obtained cardiac labs and head CT, which were unremarkable for anything acute  No other signs of end-organ injury and patient is asymptomatic on re-examine after receiving IV Reglan  His hypertension has resolved spontaneously  Strict ED return precautions provided should symptoms worsen and patient can otherwise follow up outpatient  Patient expresses an understanding and agreement with the plan and remains in good condition for discharge  Disposition  Final diagnoses:   Essential hypertension   Headache     Time reflects when diagnosis was documented in both MDM as applicable and the Disposition within this note     Time User Action Codes Description Comment    3/2/2019 12:45 AM Jenn Espinoza Add [I10] Essential hypertension     3/2/2019 12:45 AM Jenn Espinoza Add [R51] Headache       ED Disposition     ED Disposition Condition Date/Time Comment    Discharge Good Sat Mar 2, 2019 12:45 AM Angela Hands discharge to home/self care              Follow-up Information     Follow up With Specialties Details Why Contact Info Additional Information    Carrie Victoria, [address], Internal Medicine, Nurse Practitioner Call in 1 day To make an appointment [address] [place] [address] [phone]       [address] [place] [address] Emergency Department Emergency Medicine Go to If symptoms worsen [address]  [phone]  ED, [address] [facility], [place], [place], [address]          Discharge Medication List as of 3/2/2019  1:14 AM      CONTINUE these medications which have NOT CHANGED    Details   amLODIPine (NORVASC) 2 5 mg tablet Take 2 tablets (5 mg total) by mouth daily, Starting Mon 2/25/2019, Normal      ammonium lactate (LAC-HYDRIN) 12 % cream Apply topically Twice daily, Starting Mon 9/11/2017, Historical Med      aspirin 81 MG tablet Take by mouth, Starting Tue 3/1/2016, Historical Med      atorvastatin (LIPITOR) 10 mg tablet Take 1 tablet (10 mg total) by mouth daily, Starting Wed 12/19/2018, Normal      docusate calcium (SURFAK) 240 mg capsule Take 1 capsule (240 mg total) by mouth 2 (two) times a day, Starting Wed 12/19/2018, Normal      docusate sodium (COLACE) 100 mg capsule Take 1 capsule (100 mg total) by mouth 2 (two) times a day, Starting Wed 5/9/2018, Normal      Elastic Bandages & Supports (FLEXLITE KNEE BRACE) MISC by Does not apply route daily, Starting Tue 9/4/2018, Print      gabapentin (NEURONTIN) 100 mg capsule Take 2 capsules (200 mg total) by mouth daily at bedtime, Starting Mon 2/18/2019, Normal      !! Glucose Blood (ONETOUCH ULTRA BLUE VI) by In Vitro route daily, Starting Fri 1/8/2016, Historical Med      !! glucose blood test strip Test once daily e11 9, Normal      !!  Lancets (ONETOUCH ULTRASOFT) lancets Test daily, e11 5, Normal      meloxicam (MOBIC) 7 5 mg tablet Take 1 tablet (7 5 mg total) by mouth daily, Starting Fri 10/12/2018, Normal      metFORMIN (GLUCOPHAGE) 500 mg tablet Take 1 tablet (500 mg total) by mouth 2 (two) times a day, Starting Wed 12/19/2018, Normal      omeprazole (PriLOSEC) 20 mg delayed release capsule Take 1 capsule (20 mg total) by mouth daily, Starting Wed 12/19/2018, Normal      !! ONETOUCH DELICA LANCETS 27W MISC Inject under the skin daily for 90 days, Starting Wed 12/19/2018, Until Tue 3/19/2019, Normal      sildenafil (VIAGRA) 50 MG tablet Take 50 mg by mouth, Starting Wed 12/14/2016, Historical Med      topiramate (TOPAMAX) 50 MG tablet Take 1 tablet by mouth daily, Starting Wed 12/27/2017, Historical Med       !! - Potential duplicate medications found  Please discuss with provider  No discharge procedures on file  ED Provider  Attending physically available and evaluated Alexis Button  I managed the patient along with the ED Attending      Electronically Signed by         Chance Delgado MD  03/02/19 5331

## 2019-03-04 LAB
ATRIAL RATE: 65 BPM
P AXIS: 69 DEGREES
PR INTERVAL: 162 MS
QRS AXIS: 22 DEGREES
QRSD INTERVAL: 78 MS
QT INTERVAL: 390 MS
QTC INTERVAL: 405 MS
T WAVE AXIS: 32 DEGREES
VENTRICULAR RATE: 65 BPM

## 2019-03-04 PROCEDURE — 93010 ELECTROCARDIOGRAM REPORT: CPT | Performed by: INTERNAL MEDICINE

## 2019-05-31 DIAGNOSIS — N52.9 ED (ERECTILE DYSFUNCTION) OF ORGANIC ORIGIN: Primary | ICD-10-CM

## 2019-05-31 RX ORDER — SILDENAFIL CITRATE 50 MG
50 TABLET ORAL DAILY PRN
Qty: 10 TABLET | Refills: 3 | Status: SHIPPED | OUTPATIENT
Start: 2019-05-31 | End: 2019-06-12 | Stop reason: SDUPTHER

## 2019-06-04 DIAGNOSIS — E11.9 TYPE 2 DIABETES MELLITUS WITHOUT COMPLICATION, WITHOUT LONG-TERM CURRENT USE OF INSULIN (HCC): ICD-10-CM

## 2019-06-12 DIAGNOSIS — N52.9 ED (ERECTILE DYSFUNCTION) OF ORGANIC ORIGIN: ICD-10-CM

## 2019-06-12 RX ORDER — SILDENAFIL CITRATE 50 MG
50 TABLET ORAL DAILY PRN
Qty: 10 TABLET | Refills: 3 | Status: SHIPPED | OUTPATIENT
Start: 2019-06-12 | End: 2019-07-16

## 2019-06-14 DIAGNOSIS — H53.9 VISION CHANGES: Primary | ICD-10-CM

## 2019-06-24 DIAGNOSIS — I10 ESSENTIAL HYPERTENSION: ICD-10-CM

## 2019-06-24 RX ORDER — AMLODIPINE BESYLATE 2.5 MG/1
5 TABLET ORAL DAILY
Qty: 90 TABLET | Refills: 2 | Status: SHIPPED | OUTPATIENT
Start: 2019-06-24 | End: 2022-01-02 | Stop reason: SDUPTHER

## 2019-07-16 ENCOUNTER — OFFICE VISIT (OUTPATIENT)
Dept: FAMILY MEDICINE CLINIC | Facility: CLINIC | Age: 74
End: 2019-07-16
Payer: COMMERCIAL

## 2019-07-16 VITALS
RESPIRATION RATE: 18 BRPM | HEART RATE: 68 BPM | HEIGHT: 68 IN | DIASTOLIC BLOOD PRESSURE: 78 MMHG | SYSTOLIC BLOOD PRESSURE: 132 MMHG | OXYGEN SATURATION: 98 % | BODY MASS INDEX: 31.92 KG/M2 | WEIGHT: 210.6 LBS | TEMPERATURE: 97.5 F

## 2019-07-16 DIAGNOSIS — M25.562 ARTHRALGIA OF BOTH KNEES: ICD-10-CM

## 2019-07-16 DIAGNOSIS — E11.40 CONTROLLED TYPE 2 DIABETES MELLITUS WITH DIABETIC NEUROPATHY, WITHOUT LONG-TERM CURRENT USE OF INSULIN (HCC): ICD-10-CM

## 2019-07-16 DIAGNOSIS — I10 ESSENTIAL HYPERTENSION: ICD-10-CM

## 2019-07-16 DIAGNOSIS — R10.11 RUQ PAIN: Primary | ICD-10-CM

## 2019-07-16 DIAGNOSIS — K59.09 OTHER CONSTIPATION: ICD-10-CM

## 2019-07-16 DIAGNOSIS — N52.9 ED (ERECTILE DYSFUNCTION) OF ORGANIC ORIGIN: ICD-10-CM

## 2019-07-16 DIAGNOSIS — M25.561 ARTHRALGIA OF BOTH KNEES: ICD-10-CM

## 2019-07-16 PROBLEM — R07.9 CHEST PAIN: Status: RESOLVED | Noted: 2019-02-18 | Resolved: 2019-07-16

## 2019-07-16 PROCEDURE — 3725F SCREEN DEPRESSION PERFORMED: CPT | Performed by: NURSE PRACTITIONER

## 2019-07-16 PROCEDURE — 99214 OFFICE O/P EST MOD 30 MIN: CPT | Performed by: NURSE PRACTITIONER

## 2019-07-16 RX ORDER — CELECOXIB 100 MG/1
100 CAPSULE ORAL 2 TIMES DAILY
Qty: 60 CAPSULE | Refills: 1 | Status: SHIPPED | OUTPATIENT
Start: 2019-07-16 | End: 2019-08-07 | Stop reason: SDUPTHER

## 2019-07-16 RX ORDER — SILDENAFIL 100 MG/1
100 TABLET, FILM COATED ORAL AS NEEDED
Qty: 30 TABLET | Refills: 5 | Status: SHIPPED | OUTPATIENT
Start: 2019-07-16 | End: 2020-09-16

## 2019-07-16 RX ORDER — POLYETHYLENE GLYCOL 3350 17 G/17G
17 POWDER, FOR SOLUTION ORAL DAILY
Qty: 500 G | Refills: 5 | Status: SHIPPED | OUTPATIENT
Start: 2019-07-16 | End: 2019-07-24 | Stop reason: SDUPTHER

## 2019-07-16 NOTE — PROGRESS NOTES
Assessment/Plan:           Problem List Items Addressed This Visit        Endocrine    Controlled diabetes mellitus with diabetic neuropathy (HCC)    Relevant Orders    Comprehensive metabolic panel    Amylase    Lipase    Lipid Panel with Direct LDL reflex    CBC and differential    HEMOGLOBIN A1C W/ EAG ESTIMATION       Cardiovascular and Mediastinum    Hypertension    Relevant Orders    Comprehensive metabolic panel    Amylase    Lipase    Lipid Panel with Direct LDL reflex    CBC and differential    HEMOGLOBIN A1C W/ EAG ESTIMATION       Genitourinary    ED (erectile dysfunction) of organic origin    Relevant Medications    sildenafil (VIAGRA) 100 mg tablet       Other    Constipation    Relevant Medications    polyethylene glycol (GLYCOLAX) powder    Other Relevant Orders    Comprehensive metabolic panel    Amylase    Lipase    Lipid Panel with Direct LDL reflex    CBC and differential    HEMOGLOBIN A1C W/ EAG ESTIMATION    Knee joint pain    Relevant Medications    celecoxib (CeleBREX) 100 mg capsule    RUQ pain - Primary    Relevant Orders    US abdomen complete    Comprehensive metabolic panel    Amylase    Lipase    Lipid Panel with Direct LDL reflex    CBC and differential    HEMOGLOBIN A1C W/ EAG ESTIMATION            Subjective:      Patient ID: Anup Salvador is a 69 y o  male  HPI    The following portions of the patient's history were reviewed and updated as appropriate: allergies, current medications, past family history, past medical history, past social history, past surgical history and problem list     Review of Systems   Constitutional: Negative for fatigue and fever  HENT: Negative for congestion, postnasal drip and rhinorrhea  Eyes: Negative for photophobia and visual disturbance  Respiratory: Negative for cough, shortness of breath and wheezing  Cardiovascular: Negative for chest pain and palpitations     Gastrointestinal: Negative for abdominal pain, constipation, diarrhea, nausea and vomiting  Endocrine: Negative for polydipsia, polyphagia and polyuria  Genitourinary: Negative for dysuria, frequency and hematuria  Musculoskeletal: Negative for back pain and myalgias  Skin: Negative for rash  Neurological: Negative for dizziness, light-headedness and headaches  Hematological: Negative for adenopathy  Psychiatric/Behavioral: Negative for decreased concentration and sleep disturbance  The patient is not nervous/anxious            Objective:      /80   Pulse 68   Temp 97 5 °F (36 4 °C)   Resp 18   Ht 5' 8" (1 727 m)   Wt 95 5 kg (210 lb 9 6 oz)   SpO2 98%   BMI 32 02 kg/m²     /78   Pulse 68   Temp 97 5 °F (36 4 °C)   Resp 18   Ht 5' 8" (1 727 m)   Wt 95 5 kg (210 lb 9 6 oz)   SpO2 98%   BMI 32 02 kg/m²       Family History   Problem Relation Age of Onset    No Known Problems Mother      Past Medical History:   Diagnosis Date    Brain mass     Last Assessed; 11/5/2015    Chest pain     Last Assessed: 1/22/2015    [facility]     Last Assessed: 3/1/2016    Edema of left lower extremity     Last Assessed: 3/22/2017    GERD (gastroesophageal reflux disease)     Hyperlipidemia     Hypertension     Impaired fasting glucose     Last Assessed: 11/10/2015    Irregular heartbeat     Varicose 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     Last Assessed: 3/22/2017     Social History     Socioeconomic History    Marital status: /Civil Union     Spouse name: Not on file    Number of children: 5    Years of education: Not on file    Highest education level: Not on file   Occupational History    Occupation: Retired: Construction   Social Needs    Financial resource strain: Not on file    Food insecurity:     Worry: Not on file     Inability: Not on file   [employer] needs:     Medical: Not on file     Non-medical: Not on file   Tobacco Use    Smoking status: Former Smoker    Smokeless tobacco: Never Used   Substance and Sexual Activity    Alcohol use: No    Drug use: No    Sexual activity: Not on file   Lifestyle    Physical activity:     Days per week: Not on file     Minutes per session: Not on file    Stress: Not on file   Relationships    Social connections:     Talks on phone: Not on file     Gets together: Not on file     Attends Jainism service: Not on file     Active member of club or organization: Not on file     Attends meetings of clubs or organizations: Not on file     Relationship status: Not on file    Intimate partner violence:     Fear of current or ex partner: Not on file     Emotionally abused: Not on file     Physically abused: Not on file     Forced sexual activity: Not on file   Other Topics Concern    Not on file   Social History Narrative    Uses safety equipment           Current Outpatient Medications:     amLODIPine (NORVASC) 2 5 mg tablet, TAKE 2 TABLETS (5 MG TOTAL) BY MOUTH DAILY, Disp: 90 tablet, Rfl: 2    ammonium lactate (LAC-HYDRIN) 12 % cream, Apply topically Twice daily, Disp: , Rfl:     aspirin 81 MG tablet, Take by mouth, Disp: , Rfl:     atorvastatin (LIPITOR) 10 mg tablet, Take 1 tablet (10 mg total) by mouth daily, Disp: 90 tablet, Rfl: 3    docusate calcium (SURFAK) 240 mg capsule, Take 1 capsule (240 mg total) by mouth 2 (two) times a day, Disp: 60 capsule, Rfl: 3    docusate sodium (COLACE) 100 mg capsule, Take 1 capsule (100 mg total) by mouth 2 (two) times a day, Disp: 60 capsule, Rfl: 5    Elastic Bandages & Supports ([address] [place] [address]) MISC, by Does not apply route daily, Disp: 2 each, Rfl: 0    gabapentin (NEURONTIN) 100 mg capsule, Take 2 capsules (200 mg total) by mouth daily at bedtime, Disp: 60 capsule, Rfl: 5    Glucose Blood (ONETOUCH ULTRA BLUE VI), by In Vitro route daily, Disp: , Rfl:     glucose blood test strip, Test once daily e11 9, Disp: 100 each, Rfl: 11    Lancets (ONETOUCH ULTRASOFT) lancets, Test daily, e11 5, Disp: 100 each, Rfl: 11   metFORMIN (GLUCOPHAGE) 500 mg tablet, TAKE 1 TABLET BY MOUTH TWICE A DAY, Disp: 60 tablet, Rfl: 4    sildenafil (VIAGRA) 50 MG tablet, Take 50 mg by mouth, Disp: , Rfl:     topiramate (TOPAMAX) 50 MG tablet, Take 1 tablet by mouth daily, Disp: , Rfl:     VIAGRA 50 MG tablet, Take 1 tablet (50 mg total) by mouth daily as needed for erectile dysfunction Brand necessary, Disp: 10 tablet, Rfl: 3    meloxicam (MOBIC) 7 5 mg tablet, Take 1 tablet (7 5 mg total) by mouth daily (Patient not taking: Reported on 7/16/2019), Disp: 30 tablet, Rfl: 0    omeprazole (PriLOSEC) 20 mg delayed release capsule, Take 1 capsule (20 mg total) by mouth daily (Patient not taking: Reported on 2/25/2019), Disp: 30 capsule, Rfl: 5    ONETOUCH DELICA LANCETS 67Z MISC, Inject under the skin daily for 90 days, Disp: 100 each, Rfl: 0    polyethylene glycol (GLYCOLAX) powder, Take 17 g by mouth daily, Disp: 500 g, Rfl: 5  No Known Allergies   Physical Exam   Constitutional: He is oriented to person, place, and time  He appears well-developed and well-nourished  HENT:   Head: Normocephalic and atraumatic  Right Ear: External ear normal    Left Ear: External ear normal    Nose: Nose normal    Mouth/Throat: Oropharynx is clear and moist    Eyes: Conjunctivae are normal    Neck: Normal range of motion  Neck supple  Cardiovascular: Normal rate, regular rhythm and normal heart sounds  Pulmonary/Chest: Effort normal and breath sounds normal    Abdominal: Soft  Normal appearance and bowel sounds are normal    Musculoskeletal: Normal range of motion  Neurological: He is alert and oriented to person, place, and time  Skin: Skin is warm and dry  Psychiatric: He has a normal mood and affect  His behavior is normal  Judgment and thought content normal    Nursing note and vitals reviewed

## 2019-07-18 ENCOUNTER — APPOINTMENT (OUTPATIENT)
Dept: LAB | Facility: CLINIC | Age: 74
End: 2019-07-18
Payer: COMMERCIAL

## 2019-07-18 LAB
CREAT UR-MCNC: 87.2 MG/DL
MICROALBUMIN UR-MCNC: 8.2 MG/L (ref 0–20)
MICROALBUMIN/CREAT 24H UR: 9 MG/G CREATININE (ref 0–30)

## 2019-07-18 PROCEDURE — 82043 UR ALBUMIN QUANTITATIVE: CPT | Performed by: NURSE PRACTITIONER

## 2019-07-18 PROCEDURE — 82570 ASSAY OF URINE CREATININE: CPT | Performed by: NURSE PRACTITIONER

## 2019-07-19 ENCOUNTER — HOSPITAL ENCOUNTER (OUTPATIENT)
Dept: RADIOLOGY | Facility: HOSPITAL | Age: 74
Discharge: HOME | End: 2019-07-19
Payer: COMMERCIAL

## 2019-07-19 DIAGNOSIS — R10.11 RUQ PAIN: ICD-10-CM

## 2019-07-19 PROCEDURE — 76700 US EXAM ABDOM COMPLETE: CPT

## 2019-07-24 DIAGNOSIS — K59.01 SLOW TRANSIT CONSTIPATION: ICD-10-CM

## 2019-07-24 DIAGNOSIS — K59.09 OTHER CONSTIPATION: ICD-10-CM

## 2019-07-24 RX ORDER — POLYETHYLENE GLYCOL 3350 17 G/17G
17 POWDER, FOR SOLUTION ORAL DAILY
Qty: 500 G | Refills: 5 | Status: SHIPPED | OUTPATIENT
Start: 2019-07-24 | End: 2020-09-16

## 2019-07-30 ENCOUNTER — OFFICE VISIT (OUTPATIENT)
Dept: FAMILY MEDICINE CLINIC | Facility: CLINIC | Age: 74
End: 2019-07-30
Payer: COMMERCIAL

## 2019-07-30 VITALS
BODY MASS INDEX: 31.95 KG/M2 | DIASTOLIC BLOOD PRESSURE: 70 MMHG | SYSTOLIC BLOOD PRESSURE: 112 MMHG | OXYGEN SATURATION: 95 % | WEIGHT: 210.8 LBS | HEIGHT: 68 IN | RESPIRATION RATE: 12 BRPM | HEART RATE: 87 BPM

## 2019-07-30 DIAGNOSIS — N28.1 RENAL CYST: ICD-10-CM

## 2019-07-30 DIAGNOSIS — R10.11 RIGHT UPPER QUADRANT ABDOMINAL PAIN: Primary | ICD-10-CM

## 2019-07-30 PROCEDURE — 99213 OFFICE O/P EST LOW 20 MIN: CPT | Performed by: NURSE PRACTITIONER

## 2019-07-30 NOTE — PROGRESS NOTES
Assessment/Plan:           Problem List Items Addressed This Visit        Genitourinary    Renal cyst    Relevant Orders    Ambulatory referral to Urology      Other Visit Diagnoses     Right upper quadrant abdominal pain    -  Primary    Relevant Orders    Ambulatory referral to Gastroenterology            Subjective:      Patient ID: Jai Robins is a 69 y o  male  Here for f/u to abd pain  RUQ abd pain  On and off  Continues to have intermittent constipation  Needs to have testing completed  No n/v/d  No sob/caldera  No chest pain  Due for labs       The following portions of the patient's history were reviewed and updated as appropriate: allergies, current medications, past family history, past medical history, past social history, past surgical history and problem list     Review of Systems   Constitutional: Negative for fatigue and fever  Eyes: Negative for photophobia and visual disturbance  Respiratory: Negative for cough, shortness of breath and wheezing  Cardiovascular: Negative for chest pain and palpitations  Gastrointestinal: Positive for abdominal distention, abdominal pain and constipation  Negative for diarrhea, nausea and vomiting  Genitourinary: Negative for dysuria, frequency and hematuria  Musculoskeletal: Positive for arthralgias and myalgias  Neurological: Negative for dizziness, light-headedness and headaches  Hematological: Negative for adenopathy  Psychiatric/Behavioral: Negative for decreased concentration and sleep disturbance  The patient is not nervous/anxious            Objective:      /70 (BP Location: Right arm, Patient Position: Sitting, Cuff Size: Standard)   Pulse 87   Resp 12   Ht 5' 8" (1 727 m)   Wt 95 6 kg (210 lb 12 8 oz)   SpO2 95%   BMI 32 05 kg/m²     Family History   Problem Relation Age of Onset    No Known Problems Mother      Past Medical History:   Diagnosis Date    Brain mass     Last Assessed; 11/5/2015    Chest pain     Last Assessed: 1/22/2015    Diabetes type 2, uncontrolled ([facility] [address] )     Last Assessed: 3/1/2016    Edema of left lower extremity     Last Assessed: 3/22/2017    GERD (gastroesophageal reflux disease)     Hyperlipidemia     Hypertension     Impaired fasting glucose     Last Assessed: 11/10/2015    Irregular heartbeat     Varicose 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     Last Assessed: 3/22/2017     Social History     Socioeconomic History    Marital status: /Civil Union     Spouse name: Not on file    Number of children: 5    Years of education: Not on file    Highest education level: Not on file   Occupational History    Occupation: Retired: Construction   Social Needs    Financial resource strain: Not on file    Food insecurity:     Worry: Not on file     Inability: Not on file   Elastica needs:     Medical: Not on file     Non-medical: Not on file   Tobacco Use    Smoking status: Former Smoker    Smokeless tobacco: Never Used   Substance and Sexual Activity    Alcohol use: No    Drug use: No    Sexual activity: Not on file   Lifestyle    Physical activity:     Days per week: Not on file     Minutes per session: Not on file    Stress: Not on file   Relationships    Social connections:     Talks on phone: Not on file     Gets together: Not on file     Attends Orthodox service: Not on file     Active member of club or organization: Not on file     Attends meetings of clubs or organizations: Not on file     Relationship status: Not on file    Intimate partner violence:     Fear of current or ex partner: Not on file     Emotionally abused: Not on file     Physically abused: Not on file     Forced sexual activity: Not on file   Other Topics Concern    Not on file   Social History Narrative    Uses safety equipment           Current Outpatient Medications:     amLODIPine (NORVASC) 2 5 mg tablet, TAKE 2 TABLETS (5 MG TOTAL) BY MOUTH DAILY, Disp: 90 tablet, Rfl: 2    ammonium lactate (LAC-HYDRIN) 12 % cream, Apply topically Twice daily, Disp: , Rfl:     aspirin 81 MG tablet, Take by mouth, Disp: , Rfl:     atorvastatin (LIPITOR) 10 mg tablet, Take 1 tablet (10 mg total) by mouth daily, Disp: 90 tablet, Rfl: 3    celecoxib (CeleBREX) 100 mg capsule, Take 1 capsule (100 mg total) by mouth 2 (two) times a day, Disp: 60 capsule, Rfl: 1    Elastic Bandages & Supports ([address] [place] [address]) MISC, by Does not apply route daily, Disp: 2 each, Rfl: 0    gabapentin (NEURONTIN) 100 mg capsule, Take 2 capsules (200 mg total) by mouth daily at bedtime, Disp: 60 capsule, Rfl: 5    Glucose Blood (ONETOUCH ULTRA BLUE VI), by In Vitro route daily, Disp: , Rfl:     glucose blood test strip, Test once daily e11 9, Disp: 100 each, Rfl: 11    Lancets (ONETOUCH ULTRASOFT) lancets, Test daily, e11 5, Disp: 100 each, Rfl: 11    meloxicam (MOBIC) 7 5 mg tablet, Take 1 tablet (7 5 mg total) by mouth daily (Patient not taking: Reported on 7/16/2019), Disp: 30 tablet, Rfl: 0    metFORMIN (GLUCOPHAGE) 500 mg tablet, TAKE 1 TABLET BY MOUTH TWICE A DAY, Disp: 60 tablet, Rfl: 4    omeprazole (PriLOSEC) 20 mg delayed release capsule, Take 1 capsule (20 mg total) by mouth daily (Patient not taking: Reported on 2/25/2019), Disp: 30 capsule, Rfl: 5    ONETOUCH DELICA LANCETS 94P MISC, Inject under the skin daily for 90 days, Disp: 100 each, Rfl: 0    polyethylene glycol (GLYCOLAX) powder, Take 17 g by mouth daily, Disp: 500 g, Rfl: 5    sildenafil (VIAGRA) 100 mg tablet, Take 1 tablet (100 mg total) by mouth as needed for erectile dysfunction, Disp: 30 tablet, Rfl: 5    topiramate (TOPAMAX) 50 MG tablet, Take 1 tablet by mouth daily, Disp: , Rfl:   No Known Allergies     Physical Exam   Constitutional: He is oriented to person, place, and time  He appears well-developed and well-nourished  Eyes: Conjunctivae are normal    Cardiovascular: Normal rate, regular rhythm and normal heart sounds     Pulmonary/Chest: Effort normal and breath sounds normal    Abdominal: Soft  Normal appearance and bowel sounds are normal    Musculoskeletal: Normal range of motion  Neurological: He is alert and oriented to person, place, and time  Skin: Skin is warm and dry  Capillary refill takes less than 2 seconds  Psychiatric: He has a normal mood and affect  His behavior is normal  Judgment and thought content normal    Nursing note and vitals reviewed

## 2019-08-07 DIAGNOSIS — M25.561 ARTHRALGIA OF BOTH KNEES: ICD-10-CM

## 2019-08-07 DIAGNOSIS — M25.562 ARTHRALGIA OF BOTH KNEES: ICD-10-CM

## 2019-08-07 RX ORDER — CELECOXIB 100 MG/1
CAPSULE ORAL
Qty: 60 CAPSULE | Refills: 1 | Status: SHIPPED | OUTPATIENT
Start: 2019-08-07 | End: 2019-09-01 | Stop reason: SDUPTHER

## 2019-08-09 DIAGNOSIS — K59.01 SLOW TRANSIT CONSTIPATION: ICD-10-CM

## 2019-08-12 ENCOUNTER — TELEPHONE (OUTPATIENT)
Dept: FAMILY MEDICINE CLINIC | Facility: CLINIC | Age: 74
End: 2019-08-12

## 2019-08-12 NOTE — TELEPHONE ENCOUNTER
Just tell her to ask for a urologist at [facility] urolog in [place]  Whole group of them  Any one is ok  jaki

## 2019-08-12 NOTE — TELEPHONE ENCOUNTER
PT DTR CALLED AND ASKED IF THERE IS A UROLOGIST CLOSER TO HIM IN [facility] HE CAN GO TOO   PLS ADVISE

## 2019-08-28 PROCEDURE — 88305 TISSUE EXAM BY PATHOLOGIST: CPT | Performed by: PATHOLOGY

## 2019-08-28 PROCEDURE — 88342 IMHCHEM/IMCYTCHM 1ST ANTB: CPT | Performed by: PATHOLOGY

## 2019-08-29 ENCOUNTER — LAB REQUISITION (OUTPATIENT)
Dept: LAB | Facility: HOSPITAL | Age: 74
End: 2019-08-29
Payer: COMMERCIAL

## 2019-08-29 DIAGNOSIS — R10.11 RIGHT UPPER QUADRANT PAIN: ICD-10-CM

## 2019-08-29 DIAGNOSIS — K22.89 OTHER SPECIFIED DISEASES OF ESOPHAGUS: ICD-10-CM

## 2019-08-29 DIAGNOSIS — R13.19 OTHER DYSPHAGIA: ICD-10-CM

## 2019-09-01 DIAGNOSIS — M25.562 ARTHRALGIA OF BOTH KNEES: ICD-10-CM

## 2019-09-01 DIAGNOSIS — M25.561 ARTHRALGIA OF BOTH KNEES: ICD-10-CM

## 2019-09-01 RX ORDER — CELECOXIB 100 MG/1
CAPSULE ORAL
Qty: 60 CAPSULE | Refills: 1 | Status: SHIPPED | OUTPATIENT
Start: 2019-09-01 | End: 2019-09-26 | Stop reason: SDUPTHER

## 2019-09-03 ENCOUNTER — OFFICE VISIT (OUTPATIENT)
Dept: FAMILY MEDICINE CLINIC | Facility: CLINIC | Age: 74
End: 2019-09-03
Payer: COMMERCIAL

## 2019-09-03 DIAGNOSIS — M25.562 ARTHRALGIA OF BOTH KNEES: Primary | ICD-10-CM

## 2019-09-03 DIAGNOSIS — D49.6 BRAIN TUMOR (HCC): ICD-10-CM

## 2019-09-03 DIAGNOSIS — E11.40 CONTROLLED TYPE 2 DIABETES MELLITUS WITH DIABETIC NEUROPATHY, WITHOUT LONG-TERM CURRENT USE OF INSULIN (HCC): ICD-10-CM

## 2019-09-03 DIAGNOSIS — R07.89 CHEST PRESSURE: ICD-10-CM

## 2019-09-03 DIAGNOSIS — G44.019 EPISODIC CLUSTER HEADACHE, NOT INTRACTABLE: ICD-10-CM

## 2019-09-03 DIAGNOSIS — M25.561 ARTHRALGIA OF BOTH KNEES: Primary | ICD-10-CM

## 2019-09-03 DIAGNOSIS — D32.9 MENINGIOMA (HCC): ICD-10-CM

## 2019-09-03 PROCEDURE — 99213 OFFICE O/P EST LOW 20 MIN: CPT | Performed by: NURSE PRACTITIONER

## 2019-09-03 NOTE — PROGRESS NOTES
Assessment/Plan:           Problem List Items Addressed This Visit        Endocrine    Controlled diabetes mellitus with diabetic neuropathy [facility])    Relevant Orders    Ambulatory referral to Ophthalmology    Ambulatory referral to Cardiology       Nervous and Auditory    Brain tumor [facility])    Relevant Orders    Ambulatory referral to Neurosurgery    Episodic cluster headache, not intractable    Relevant Orders    Ambulatory referral to Neurology    Ambulatory referral to Neurosurgery    Meningioma [facility])    Relevant Orders    Ambulatory referral to Neurosurgery       Other    Knee joint pain - Primary    Relevant Orders    XR knee 3 vw right non injury    XR knee 3 vw left non injury      Other Visit Diagnoses     Chest pressure        Relevant Orders    Ambulatory referral to Cardiology            Subjective:      Patient ID: Mariaelena Pollock is a 69 y o  male  Here for continued complaints of headache and pain occipital region of head  Discussed with patient this has been and on and off again problem and myself and neurosurgery referred him to neurology, he has not gone yet  Also c/o bilat knee pain  Again this has been an on and off again problem  He needs updated xrays and refer to ortho  Pain comes and goes in knees and worse with weight bearing  Ha, no loc, no blurry vision  Not worst ha of life  Stopped gabapentin given to him in past  Using tylenol for pain  Patient asking to see cardiology due to risk factors and family hx of cad  Diabetes good control lately and taking meds as prescribed        The following portions of the patient's history were reviewed and updated as appropriate: allergies, current medications, past family history, past medical history, past social history, past surgical history and problem list     Review of Systems   Constitutional: Positive for fatigue  Negative for fever and unexpected weight change  HENT: Negative for congestion, postnasal drip and rhinorrhea      Respiratory: Negative for cough, shortness of breath and wheezing  Cardiovascular: Negative for chest pain, palpitations and leg swelling  Gastrointestinal: Negative for constipation, diarrhea, nausea and vomiting  Musculoskeletal: Positive for joint swelling  Negative for neck pain  Skin: Negative for rash  Neurological: Negative for dizziness and light-headedness  Hematological: Negative for adenopathy  Psychiatric/Behavioral: Negative for dysphoric mood  The patient is not nervous/anxious            Objective:      /90 (BP Location: Left arm, Patient Position: Sitting, Cuff Size: Standard)   Pulse 72   Resp 12   Ht 5' 8" (1 727 m)   Wt 97 5 kg (215 lb)   SpO2 97%   BMI 32 69 kg/m²       /88   Pulse 72   Resp 12   Ht 5' 8" (1 727 m)   Wt 97 5 kg (215 lb)   SpO2 97%   BMI 32 69 kg/m²       Family History   Problem Relation Age of Onset    No Known Problems Mother      Past Medical History:   Diagnosis Date    Brain mass     Last Assessed; 11/5/2015    Chest pain     Last Assessed: 1/22/2015    Diabetes type 2, uncontrolled ([facility] [address] )     Last Assessed: 3/1/2016    Edema of left lower extremity     Last Assessed: 3/22/2017    GERD (gastroesophageal reflux disease)     Hyperlipidemia     Hypertension     Impaired fasting glucose     Last Assessed: 11/10/2015    Irregular heartbeat     Varicose 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     Last Assessed: 3/22/2017     Social History     Socioeconomic History    Marital status: /Civil Union     Spouse name: Not on file    Number of children: 5    Years of education: Not on file    Highest education level: Not on file   Occupational History    Occupation: Retired: Construction   Social Needs    Financial resource strain: Not on file    Food insecurity:     Worry: Not on file     Inability: Not on file   Exalead needs:     Medical: Not on file     Non-medical: Not on file   Tobacco Use    Smoking status: Former Smoker  Smokeless tobacco: Never Used   Substance and Sexual Activity    Alcohol use: No    Drug use: No    Sexual activity: Not on file   Lifestyle    Physical activity:     Days per week: Not on file     Minutes per session: Not on file    Stress: Not on file   Relationships    Social connections:     Talks on phone: Not on file     Gets together: Not on file     Attends Mosque service: Not on file     Active member of club or organization: Not on file     Attends meetings of clubs or organizations: Not on file     Relationship status: Not on file    Intimate partner violence:     Fear of current or ex partner: Not on file     Emotionally abused: Not on file     Physically abused: Not on file     Forced sexual activity: Not on file   Other Topics Concern    Not on file   Social History Narrative    Uses safety equipment           Current Outpatient Medications:     amLODIPine (NORVASC) 2 5 mg tablet, TAKE 2 TABLETS (5 MG TOTAL) BY MOUTH DAILY, Disp: 90 tablet, Rfl: 2    ammonium lactate (LAC-HYDRIN) 12 % cream, Apply topically Twice daily, Disp: , Rfl:     aspirin 81 MG tablet, Take by mouth, Disp: , Rfl:     atorvastatin (LIPITOR) 10 mg tablet, Take 1 tablet (10 mg total) by mouth daily, Disp: 90 tablet, Rfl: 3    celecoxib (CeleBREX) 100 mg capsule, TOME ALLY CAPSULA DOS VECES AL JEAN MARIE, Disp: 60 capsule, Rfl: 1    Elastic Bandages & Supports ([address] [place] [address]) MISC, by Does not apply route daily, Disp: 2 each, Rfl: 0    gabapentin (NEURONTIN) 100 mg capsule, Take 2 capsules (200 mg total) by mouth daily at bedtime, Disp: 60 capsule, Rfl: 5    Glucose Blood (ONETOUCH ULTRA BLUE VI), by In Vitro route daily, Disp: , Rfl:     glucose blood test strip, Test once daily e11 9, Disp: 100 each, Rfl: 11    Lancets (ONETOUCH ULTRASOFT) lancets, Test daily, e11 5, Disp: 100 each, Rfl: 11    meloxicam (MOBIC) 7 5 mg tablet, Take 1 tablet (7 5 mg total) by mouth daily, Disp: 30 tablet, Rfl: 0    metFORMIN (GLUCOPHAGE) 500 mg tablet, TAKE 1 TABLET BY MOUTH TWICE A DAY, Disp: 60 tablet, Rfl: 4    omeprazole (PriLOSEC) 20 mg delayed release capsule, Take 1 capsule (20 mg total) by mouth daily, Disp: 30 capsule, Rfl: 5    polyethylene glycol (GLYCOLAX) powder, Take 17 g by mouth daily, Disp: 500 g, Rfl: 5    sildenafil (VIAGRA) 100 mg tablet, Take 1 tablet (100 mg total) by mouth as needed for erectile dysfunction, Disp: 30 tablet, Rfl: 5    topiramate (TOPAMAX) 50 MG tablet, Take 1 tablet by mouth daily, Disp: , Rfl:     ONETOUCH DELICA LANCETS 20C MISC, Inject under the skin daily for 90 days, Disp: 100 each, Rfl: 0  No Known Allergies      Physical Exam   Constitutional: He is oriented to person, place, and time  He appears well-developed and well-nourished  HENT:   Head: Normocephalic  Right Ear: External ear normal    Left Ear: External ear normal    Nose: Nose normal    Mouth/Throat: Oropharynx is clear and moist    Eyes: Conjunctivae are normal    Neck: Normal range of motion  Neck supple  Cardiovascular: Normal rate, regular rhythm and normal heart sounds  Pulses are no weak pulses  Pulses:       Dorsalis pedis pulses are 2+ on the right side, and 2+ on the left side  Posterior tibial pulses are 2+ on the right side, and 2+ on the left side  Pulmonary/Chest: Effort normal and breath sounds normal    Abdominal: Soft  Bowel sounds are normal    Musculoskeletal:        Feet:    Feet:   Right Foot:   Skin Integrity: Negative for ulcer, skin breakdown, erythema, warmth, callus or dry skin  Left Foot:   Skin Integrity: Negative for ulcer, skin breakdown, erythema, warmth, callus or dry skin  Neurological: He is alert and oriented to person, place, and time  Skin: Skin is warm and dry  Capillary refill takes less than 2 seconds  Psychiatric: He has a normal mood and affect  His behavior is normal        Patient's shoes and socks removed  Right Foot/Ankle   Right Foot Inspection  Skin Exam: skin normal and skin intact no dry skin, no warmth, no callus, no erythema, no maceration, no abnormal color, no pre-ulcer, no ulcer and no callus                          Toe Exam: ROM and strength within normal limits  Sensory   Vibration: intact  Proprioception: intact   Monofilament testing: diminished  Vascular  Capillary refills: < 3 seconds  The right DP pulse is 2+  The right PT pulse is 2+  Left Foot/Ankle  Left Foot Inspection  Skin Exam: skin normal and skin intactno dry skin, no warmth, no erythema, no maceration, normal color, no pre-ulcer, no ulcer and no callus                         Toe Exam: ROM and strength within normal limits                   Sensory   Vibration: intact  Proprioception: intact  Monofilament: diminished  Vascular  Capillary refills: < 3 seconds  The left DP pulse is 2+  The left PT pulse is 2+  Assign Risk Category:  No deformity present; Loss of protective sensation;  No weak pulses       Risk: 2

## 2019-09-05 ENCOUNTER — TELEPHONE (OUTPATIENT)
Dept: NEUROSURGERY | Facility: CLINIC | Age: 74
End: 2019-09-05

## 2019-09-05 VITALS
SYSTOLIC BLOOD PRESSURE: 138 MMHG | OXYGEN SATURATION: 97 % | HEIGHT: 68 IN | BODY MASS INDEX: 32.58 KG/M2 | WEIGHT: 215 LBS | RESPIRATION RATE: 12 BRPM | HEART RATE: 72 BPM | DIASTOLIC BLOOD PRESSURE: 88 MMHG

## 2019-09-05 DIAGNOSIS — D32.9 BENIGN NEOPLASM OF MENINGES (HCC): Primary | ICD-10-CM

## 2019-09-05 NOTE — TELEPHONE ENCOUNTER
Called and spoke to patient's daughter Ofelia Young that Renae Emmanuel is due for follow up with our office and referral was received for same by PCP Gaetano Vanessa  As discussed with Dr Kurt Gupta, patient is to complete MRI and then come in for appointment with PA-C    Order placed for MRI Brain and is in system, Cece will call Central scheduling for MRI appointment and then call our office to schedule appointment for results follow up with a PA-C (she stated she would like a Monday appointment, but that can be confirmed when she calls to schedule)  Provided her numbers for both Central scheduling and our office  She was appreciative for the call

## 2019-09-26 ENCOUNTER — OFFICE VISIT (OUTPATIENT)
Dept: FAMILY MEDICINE CLINIC | Facility: CLINIC | Age: 74
End: 2019-09-26
Payer: COMMERCIAL

## 2019-09-26 VITALS
RESPIRATION RATE: 16 BRPM | OXYGEN SATURATION: 98 % | HEIGHT: 69 IN | BODY MASS INDEX: 31.76 KG/M2 | HEART RATE: 76 BPM | TEMPERATURE: 97.2 F | WEIGHT: 214.4 LBS | SYSTOLIC BLOOD PRESSURE: 132 MMHG | DIASTOLIC BLOOD PRESSURE: 72 MMHG

## 2019-09-26 DIAGNOSIS — M25.562 ARTHRALGIA OF BOTH KNEES: ICD-10-CM

## 2019-09-26 DIAGNOSIS — L30.9 DERMATITIS: Primary | ICD-10-CM

## 2019-09-26 DIAGNOSIS — M25.561 ARTHRALGIA OF BOTH KNEES: ICD-10-CM

## 2019-09-26 PROCEDURE — 99213 OFFICE O/P EST LOW 20 MIN: CPT | Performed by: FAMILY MEDICINE

## 2019-09-26 RX ORDER — CEPHALEXIN 500 MG/1
500 CAPSULE ORAL EVERY 12 HOURS SCHEDULED
Qty: 20 CAPSULE | Refills: 0 | Status: SHIPPED | OUTPATIENT
Start: 2019-09-26 | End: 2019-10-06

## 2019-09-26 RX ORDER — CELECOXIB 100 MG/1
CAPSULE ORAL
Qty: 60 CAPSULE | Refills: 1 | Status: SHIPPED | OUTPATIENT
Start: 2019-09-26 | End: 2020-06-08

## 2019-09-26 NOTE — PROGRESS NOTES
Assessment/Plan:    No problem-specific Assessment & Plan notes found for this encounter  Diagnoses and all orders for this visit:    Dermatitis  Comments:  Possible insect bites with localized reaction  Treat with with Westcort Cream topically, and Cephalexin as a precaution  Orders:  -     cephalexin (KEFLEX) 500 mg capsule; Take 1 capsule (500 mg total) by mouth every 12 (twelve) hours for 10 days  -     hydrocortisone (WESTCORT) 0 2 % cream; Apply topically 2 (two) times a day for 10 days          Subjective:      Patient ID: Kanu Becker is a 69 y o  male  Skin rash, itchy, to the left forearm / wrist x 1 day  He was painting at his apartment yesterday  No known bites  No pain at the site          The following portions of the patient's history were reviewed and updated as appropriate: allergies, current medications, past family history, past social history, past surgical history and problem list     Past Medical History:   Diagnosis Date    Brain mass     Last Assessed; 11/5/2015    Chest pain     Last Assessed: 1/22/2015    Diabetes type 2, uncontrolled ([facility] [address] )     Last Assessed: 3/1/2016    Edema of left lower extremity     Last Assessed: 3/22/2017    GERD (gastroesophageal reflux disease)     Hyperlipidemia     Hypertension     Impaired fasting glucose     Last Assessed: 11/10/2015    Irregular heartbeat     Varicose 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     Last Assessed: 3/22/2017       Current Outpatient Medications:     amLODIPine (NORVASC) 2 5 mg tablet, TAKE 2 TABLETS (5 MG TOTAL) BY MOUTH DAILY, Disp: 90 tablet, Rfl: 2    ammonium lactate (LAC-HYDRIN) 12 % cream, Apply topically Twice daily, Disp: , Rfl:     aspirin 81 MG tablet, Take by mouth, Disp: , Rfl:     atorvastatin (LIPITOR) 10 mg tablet, Take 1 tablet (10 mg total) by mouth daily, Disp: 90 tablet, Rfl: 3    celecoxib (CeleBREX) 100 mg capsule, TOME ALLY CAPSULA DOS VECES AL JEAN MARIE, Disp: 60 capsule, Rfl: 1   Elastic Bandages & Supports ([facility]) [facility], by Does not apply route daily, Disp: 2 each, Rfl: 0    gabapentin (NEURONTIN) 100 mg capsule, Take 2 capsules (200 mg total) by mouth daily at bedtime, Disp: 60 capsule, Rfl: 5    Glucose Blood (ONETOUCH ULTRA BLUE VI), by In Vitro route daily, Disp: , Rfl:     glucose blood test strip, Test once daily e11 9, Disp: 100 each, Rfl: 11    Lancets (ONETOUCH ULTRASOFT) lancets, Test daily, e11 5, Disp: 100 each, Rfl: 11    meloxicam (MOBIC) 7 5 mg tablet, Take 1 tablet (7 5 mg total) by mouth daily, Disp: 30 tablet, Rfl: 0    metFORMIN (GLUCOPHAGE) 500 mg tablet, TAKE 1 TABLET BY MOUTH TWICE A DAY, Disp: 60 tablet, Rfl: 4    omeprazole (PriLOSEC) 20 mg delayed release capsule, Take 1 capsule (20 mg total) by mouth daily, Disp: 30 capsule, Rfl: 5    polyethylene glycol (GLYCOLAX) powder, Take 17 g by mouth daily, Disp: 500 g, Rfl: 5    sildenafil (VIAGRA) 100 mg tablet, Take 1 tablet (100 mg total) by mouth as needed for erectile dysfunction, Disp: 30 tablet, Rfl: 5    topiramate (TOPAMAX) 50 MG tablet, Take 1 tablet by mouth daily, Disp: , Rfl:     cephalexin (KEFLEX) 500 mg capsule, Take 1 capsule (500 mg total) by mouth every 12 (twelve) hours for 10 days, Disp: 20 capsule, Rfl: 0    hydrocortisone (WESTCORT) 0 2 % cream, Apply topically 2 (two) times a day for 10 days, Disp: 45 g, Rfl: 0    ONETOUCH DELICA LANCETS 28M MISC, Inject under the skin daily for 90 days, Disp: 100 each, Rfl: 0    No Known Allergies    Social History     Tobacco Use    Smoking status: Former Smoker    Smokeless tobacco: Never Used   Substance Use Topics    Alcohol use: No    Drug use: No       Review of Systems   Constitutional: Negative for activity change and fever  Respiratory: Negative for shortness of breath  Cardiovascular: Negative for chest pain  Musculoskeletal: Negative for arthralgias  Skin: Positive for rash  Neurological: Negative for headaches  Objective:      /72   Pulse 76   Temp (!) 97 2 °F (36 2 °C)   Resp 16   Ht 5' 9 29" (1 76 m)   Wt 97 3 kg (214 lb 6 4 oz)   SpO2 98%   BMI 31 40 kg/m²          Physical Exam   Constitutional: He is oriented to person, place, and time  He appears well-developed and well-nourished  No distress  HENT:   Head: Normocephalic and atraumatic  Musculoskeletal:        Hands:  Neurological: He is alert and oriented to person, place, and time  Skin: He is not diaphoretic  Psychiatric: He has a normal mood and affect  His behavior is normal  Judgment and thought content normal    Nursing note and vitals reviewed

## 2019-10-01 DIAGNOSIS — E11.9 TYPE 2 DIABETES MELLITUS WITHOUT COMPLICATION, WITHOUT LONG-TERM CURRENT USE OF INSULIN (HCC): ICD-10-CM

## 2019-10-14 ENCOUNTER — OFFICE VISIT (OUTPATIENT)
Dept: FAMILY MEDICINE CLINIC | Facility: CLINIC | Age: 74
End: 2019-10-14
Payer: COMMERCIAL

## 2019-10-14 VITALS
WEIGHT: 217.2 LBS | SYSTOLIC BLOOD PRESSURE: 152 MMHG | TEMPERATURE: 97.4 F | HEIGHT: 69 IN | RESPIRATION RATE: 16 BRPM | BODY MASS INDEX: 32.17 KG/M2 | HEART RATE: 65 BPM | OXYGEN SATURATION: 97 % | DIASTOLIC BLOOD PRESSURE: 72 MMHG

## 2019-10-14 DIAGNOSIS — Z00.00 MEDICARE ANNUAL WELLNESS VISIT, SUBSEQUENT: Primary | ICD-10-CM

## 2019-10-14 DIAGNOSIS — K59.01 SLOW TRANSIT CONSTIPATION: ICD-10-CM

## 2019-10-14 DIAGNOSIS — E11.9 TYPE 2 DIABETES MELLITUS WITHOUT COMPLICATION, WITHOUT LONG-TERM CURRENT USE OF INSULIN (HCC): ICD-10-CM

## 2019-10-14 DIAGNOSIS — D32.9 MENINGIOMA (HCC): ICD-10-CM

## 2019-10-14 DIAGNOSIS — Z23 NEED FOR VACCINATION AGAINST STREPTOCOCCUS PNEUMONIAE USING PNEUMOCOCCAL CONJUGATE VACCINE 13: ICD-10-CM

## 2019-10-14 DIAGNOSIS — K21.9 GASTROESOPHAGEAL REFLUX DISEASE WITHOUT ESOPHAGITIS: ICD-10-CM

## 2019-10-14 DIAGNOSIS — E11.40 CONTROLLED TYPE 2 DIABETES MELLITUS WITH DIABETIC NEUROPATHY, WITHOUT LONG-TERM CURRENT USE OF INSULIN (HCC): ICD-10-CM

## 2019-10-14 DIAGNOSIS — Z23 NEED FOR INFLUENZA VACCINATION: ICD-10-CM

## 2019-10-14 PROCEDURE — 90662 IIV NO PRSV INCREASED AG IM: CPT

## 2019-10-14 PROCEDURE — 1170F FXNL STATUS ASSESSED: CPT

## 2019-10-14 PROCEDURE — G0008 ADMIN INFLUENZA VIRUS VAC: HCPCS

## 2019-10-14 PROCEDURE — 1125F AMNT PAIN NOTED PAIN PRSNT: CPT

## 2019-10-14 PROCEDURE — G0439 PPPS, SUBSEQ VISIT: HCPCS | Performed by: NURSE PRACTITIONER

## 2019-10-14 PROCEDURE — G0009 ADMIN PNEUMOCOCCAL VACCINE: HCPCS

## 2019-10-14 PROCEDURE — 90670 PCV13 VACCINE IM: CPT

## 2019-10-14 RX ORDER — LANCETS
EACH MISCELLANEOUS
Qty: 100 EACH | Refills: 11 | Status: SHIPPED | OUTPATIENT
Start: 2019-10-14 | End: 2021-07-08

## 2019-10-14 NOTE — PROGRESS NOTES
Assessment and Plan:     Problem List Items Addressed This Visit     None      Visit Diagnoses     Medicare annual wellness visit, subsequent    -  Primary           Preventive health issues were discussed with patient, and age appropriate screening tests were ordered as noted in patient's After Visit Summary  Personalized health advice and appropriate referrals for health education or preventive services given if needed, as noted in patient's After Visit Summary  History of Present Illness:     Patient presents for Welcome to Medicare visit  Patient Care Team:  ZAHRA Casanova as PCP - General (Family Medicine)  MD Genoveva Zamorano MD as Endoscopist     Review of Systems:     Review of Systems   Constitutional: Negative for fatigue and fever  HENT: Positive for hearing loss  Negative for congestion, ear discharge, ear pain and facial swelling  Eyes: Negative for photophobia and visual disturbance  Respiratory: Negative for cough, chest tightness, shortness of breath and wheezing  Cardiovascular: Negative for chest pain and palpitations  Gastrointestinal: Negative for abdominal pain and diarrhea  Endocrine: Negative for polydipsia, polyphagia and polyuria  Genitourinary: Negative for dysuria and frequency  Musculoskeletal: Negative for arthralgias and myalgias  Skin: Negative for rash  Neurological: Positive for headaches  Negative for dizziness and light-headedness  Hematological: Negative for adenopathy  Psychiatric/Behavioral: Negative for dysphoric mood  The patient is not nervous/anxious         Problem List:     Patient Active Problem List   Diagnosis    Brain tumor [facility] [address] [facility]    Chronic hepatitis C virus infection ([facility] [address] [facility]    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[facility] [address] [facility]    Constipation    Controlled diabetes mellitus with diabetic neuropathy ([facility] [address] )    Episodic cluster headache, not intractable    GERD (gastroesophageal reflux disease)    Hypertension    ED (erectile dysfunction) of organic origin    Knee joint pain    Meningioma (HCC)    Varicose 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    Pain in both lower extremities    Spondylolisthesis at L5-S1 level    RUQ pain    Renal cyst      Past Medical and Surgical History:     Past Medical History:   Diagnosis Date    Brain mass     Last Assessed; 11/5/2015    Chest pain     Last Assessed: 1/22/2015    Diabetes type 2, uncontrolled ([address] )     Last Assessed: 3/1/2016    Edema of left lower extremity     Last Assessed: 3/22/2017    GERD (gastroesophageal reflux disease)     Hyperlipidemia     Hypertension     Impaired fasting glucose     Last Assessed: 11/10/2015    Irregular heartbeat     Varicose 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     Last Assessed: 3/22/2017     Past Surgical History:   Procedure Laterality Date    COLONOSCOPY      COLONOSCOPY N/A 7/10/2018    Procedure: COLONOSCOPY;  Surgeon: Young Mason MD;  Location: [facility];   Service: Colorectal    INGUINAL HERNIA REPAIR      20+ years ago - [facility]; Last Assessed: 10/23/2014    TONSILLECTOMY      VARICOSE VEIN SURGERY Left     x2 left leg - 40+ yrs ago, [facility]      Family History:     Family History   Problem Relation Age of Onset    No Known Problems Mother       Social History:     Social History     Socioeconomic History    Marital status: /Civil Union     Spouse name: Not on file    Number of children: 11    Years of education: Not on file    Highest education level: Not on file   Occupational History    Occupation: Retired: Construction   Social Needs    Financial resource strain: Not on file    Food insecurity:     Worry: Not on file     Inability: Not on file   [employer] needs:     Medical: Not on file     Non-medical: Not on file   Tobacco Use    Smoking status: Former Smoker    Smokeless tobacco: Never Used   Substance and Sexual Activity    Alcohol use: No    Drug use: No    Sexual activity: Not on file   Lifestyle    Physical activity:     Days per week: Not on file     Minutes per session: Not on file    Stress: Not on file   Relationships    Social connections:     Talks on phone: Not on file     Gets together: Not on file     Attends Hoahaoism service: Not on file     Active member of club or organization: Not on file     Attends meetings of clubs or organizations: Not on file     Relationship status: Not on file    Intimate partner violence:     Fear of current or ex partner: Not on file     Emotionally abused: Not on file     Physically abused: Not on file     Forced sexual activity: Not on file   Other Topics Concern    Not on file   Social History Narrative    Uses safety equipment          Medications and Allergies:     Current Outpatient Medications   Medication Sig Dispense Refill    amLODIPine (NORVASC) 2 5 mg tablet TAKE 2 TABLETS (5 MG TOTAL) BY MOUTH DAILY 90 tablet 2    ammonium lactate (LAC-HYDRIN) 12 % cream Apply topically Twice daily      aspirin 81 MG tablet Take by mouth      atorvastatin (LIPITOR) 10 mg tablet Take 1 tablet (10 mg total) by mouth daily 90 tablet 3    celecoxib (CeleBREX) 100 mg capsule TOME ALLY CAPSULA DOS VECES AL JEAN MARIE 60 capsule 1    Elastic Bandages & Supports (FLEXLITE KNEE BRACE) MISC by Does not apply route daily 2 each 0    gabapentin (NEURONTIN) 100 mg capsule Take 2 capsules (200 mg total) by mouth daily at bedtime 60 capsule 5    Glucose Blood (ONETOUCH ULTRA BLUE VI) by In Vitro route daily      glucose blood test strip Test once daily e11 9 100 each 11    Lancets (ONETOUCH ULTRASOFT) lancets Test daily, e11 5 100 each 11    meloxicam (MOBIC) 7 5 mg tablet Take 1 tablet (7 5 mg total) by mouth daily 30 tablet 0    metFORMIN (GLUCOPHAGE) 500 mg tablet TAKE 1 TABLET BY MOUTH TWICE A  tablet 1    omeprazole (PriLOSEC) 20 mg delayed release capsule Take 1 capsule (20 mg total) by mouth daily 30 capsule 5    polyethylene glycol (GLYCOLAX) powder Take 17 g by mouth daily 500 g 5    sildenafil (VIAGRA) 100 mg tablet Take 1 tablet (100 mg total) by mouth as needed for erectile dysfunction 30 tablet 5    topiramate (TOPAMAX) 50 MG tablet Take 1 tablet by mouth daily      hydrocortisone (WESTCORT) 0 2 % cream Apply topically 2 (two) times a day for 10 days 45 g 0    ONETOUCH DELICA LANCETS 36V MISC Inject under the skin daily for 90 days 100 each 0     No current facility-administered medications for this visit  No Known Allergies   Immunizations:     Immunization History   Administered Date(s) Administered    INFLUENZA 10/27/2015, 10/01/2016, 10/13/2016, 12/19/2018    Influenza Split High Dose Preservative Free IM 10/23/2014, 10/27/2015, 10/01/2016, 10/13/2016, 10/13/2016, 10/13/2016    Influenza, high dose seasonal 0 5 mL 12/19/2018    Pneumococcal Conjugate 13-Valent 08/25/2015, 01/19/2017      Health Maintenance:         Topic Date Due    CRC Screening: Colonoscopy  07/10/2028    Hepatitis C Screening  Discontinued         Topic Date Due    INFLUENZA VACCINE  07/01/2019      Medicare Screening Tests and Risk Assessments:     Rose Marie Hickman is here for his Subsequent Wellness visit  Health Risk Assessment:   Patient rates overall health as good  Patient feels that their physical health rating is same  Eyesight was rated as same  Hearing was rated as same  Patient feels that their emotional and mental health rating is same  Pain experienced in the last 7 days has been some  Patient's pain rating has been 4/10  Patient states that he has experienced no weight loss or gain in last 6 months  Depression Screening:   PHQ-2 Score: 0      Fall Risk Screening: In the past year, patient has experienced: no history of falling in past year      Home Safety:  Patient does not have trouble with stairs inside or outside of their home  Patient has working smoke alarms and has working carbon monoxide detector   Home safety hazards include: none      Nutrition:   Current diet is Diabetic and Limited junk food  Medications:   Patient is not currently taking any over-the-counter supplements  Patient is able to manage medications  Activities of Daily Living (ADLs)/Instrumental Activities of Daily Living (IADLs):   Walk and transfer into and out of bed and chair?: Yes  Dress and groom yourself?: Yes    Bathe or shower yourself?: Yes    Feed yourself? Yes  Do your laundry/housekeeping?: Yes  Manage your money, pay your bills and track your expenses?: Yes  Make your own meals?: Yes    Do your own shopping?: Yes    Previous Hospitalizations:   Any hospitalizations or ED visits within the last 12 months?: No      Advance Care Planning:   Living will: No    Durable POA for healthcare: No    Advanced directive: No    Advanced directive counseling given: No    Five wishes given: Yes    End of Life Decisions reviewed with patient: Yes      Cognitive Screening:   Provider or family/friend/caregiver concerned regarding cognition?: No    PREVENTIVE SCREENINGS      Cardiovascular Screening:    General: Screening Not Indicated and History Lipid Disorder      Diabetes Screening:     General: Screening Not Indicated and History Diabetes      Colorectal Cancer Screening:     General: Screening Current      Prostate Cancer Screening:    General: Patient Declines      Osteoporosis Screening:    General: Screening Not Indicated      Abdominal Aortic Aneurysm (AAA) Screening:    Risk factors include: age between 73-69 yo and tobacco use        Lung Cancer Screening:     General: Screening Not Indicated      Hepatitis C Screening:    General: Screening Not Indicated and History Hepatitis C    Other Counseling Topics:   Car/seat belt/driving safety, skin self-exam, sunscreen and regular weightbearing exercise       No exam data present     Physical Exam:     /72 (BP Location: Right arm, Patient Position: Sitting, Cuff Size: Standard)   Pulse 65   Temp (!) 97 4 °F (36 3 °C) (Tympanic)   Resp 16   Ht 5' 9 29" (1 76 m)   Wt 98 5 kg (217 lb 3 2 oz)   SpO2 97%   BMI 31 81 kg/m²       Family History   Problem Relation Age of Onset    No Known Problems Mother      Past Medical History:   Diagnosis Date    Brain mass     Last Assessed; 11/5/2015    Chest pain     Last Assessed: 1/22/2015    Diabetes type 2, uncontrolled ([facility] [address] )     Last Assessed: 3/1/2016    Edema of left lower extremity     Last Assessed: 3/22/2017    GERD (gastroesophageal reflux disease)     Hyperlipidemia     Hypertension     Impaired fasting glucose     Last Assessed: 11/10/2015    Irregular heartbeat     Varicose 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     Last Assessed: 3/22/2017     Social History     Socioeconomic History    Marital status: /Civil Union     Spouse name: Not on file    Number of children: 5    Years of education: Not on file    Highest education level: Not on file   Occupational History    Occupation: Retired: Construction   Social Needs    Financial resource strain: Not on file    Food insecurity:     Worry: Not on file     Inability: Not on file   [employer] needs:     Medical: Not on file     Non-medical: Not on file   Tobacco Use    Smoking status: Former Smoker    Smokeless tobacco: Never Used   Substance and Sexual Activity    Alcohol use: No    Drug use: No    Sexual activity: Not on file   Lifestyle    Physical activity:     Days per week: Not on file     Minutes per session: Not on file    Stress: Not on file   Relationships    Social connections:     Talks on phone: Not on file     Gets together: Not on file     Attends Gnosticism service: Not on file     Active member of club or organization: Not on file     Attends meetings of clubs or organizations: Not on file     Relationship status: Not on file    Intimate partner violence:     Fear of current or ex partner: Not on file     Emotionally abused: Not on file     Physically abused: Not on file     Forced sexual activity: Not on file   Other Topics Concern    Not on file   Social History Narrative    Uses safety equipment           Current Outpatient Medications:     amLODIPine (NORVASC) 2 5 mg tablet, TAKE 2 TABLETS (5 MG TOTAL) BY MOUTH DAILY, Disp: 90 tablet, Rfl: 2    ammonium lactate (LAC-HYDRIN) 12 % cream, Apply topically Twice daily, Disp: , Rfl:     aspirin 81 MG tablet, Take by mouth, Disp: , Rfl:     atorvastatin (LIPITOR) 10 mg tablet, Take 1 tablet (10 mg total) by mouth daily, Disp: 90 tablet, Rfl: 3    celecoxib (CeleBREX) 100 mg capsule, TOME ALLY CAPSULA DOS VECES AL JEAN MARIE, Disp: 60 capsule, Rfl: 1    Elastic Bandages & Supports ([facility]) MIS, by Does not apply route daily, Disp: 2 each, Rfl: 0    gabapentin (NEURONTIN) 100 mg capsule, Take 2 capsules (200 mg total) by mouth daily at bedtime, Disp: 60 capsule, Rfl: 5    Glucose Blood (ONETOUCH ULTRA BLUE VI), by In Vitro route daily, Disp: , Rfl:     glucose blood test strip, Test once daily e11 9, Disp: 100 each, Rfl: 11    Lancets (ONETOUCH ULTRASOFT) lancets, Test daily, e11 5, Disp: 100 each, Rfl: 11    meloxicam (MOBIC) 7 5 mg tablet, Take 1 tablet (7 5 mg total) by mouth daily, Disp: 30 tablet, Rfl: 0    metFORMIN (GLUCOPHAGE) 500 mg tablet, TAKE 1 TABLET BY MOUTH TWICE A DAY, Disp: 180 tablet, Rfl: 1    omeprazole (PriLOSEC) 20 mg delayed release capsule, Take 1 capsule (20 mg total) by mouth daily, Disp: 30 capsule, Rfl: 5    polyethylene glycol (GLYCOLAX) powder, Take 17 g by mouth daily, Disp: 500 g, Rfl: 5    sildenafil (VIAGRA) 100 mg tablet, Take 1 tablet (100 mg total) by mouth as needed for erectile dysfunction, Disp: 30 tablet, Rfl: 5    topiramate (TOPAMAX) 50 MG tablet, Take 1 tablet by mouth daily, Disp: , Rfl:     hydrocortisone (WESTCORT) 0 2 % cream, Apply topically 2 (two) times a day for 10 days, Disp: 45 g, Rfl: 0    ONETOUCH DELICA LANCETS 43O MISC, Inject under the skin daily for 90 days, Disp: 100 each, Rfl: 0  No Known Allergies     Physical Exam   Constitutional: He is oriented to person, place, and time  He appears well-developed and well-nourished  HENT:   Head: Normocephalic  Right Ear: External ear normal    Left Ear: External ear normal    Nose: Nose normal    Mouth/Throat: Oropharynx is clear and moist    Eyes: Conjunctivae are normal    Neck: Normal range of motion  Neck supple  Cardiovascular: Normal rate, regular rhythm and normal heart sounds  Pulmonary/Chest: Effort normal and breath sounds normal    Abdominal: Soft  Bowel sounds are normal    Musculoskeletal: Normal range of motion  Neurological: He is alert and oriented to person, place, and time  Skin: Skin is warm and dry  Capillary refill takes less than 2 seconds  Psychiatric: He has a normal mood and affect  His behavior is normal  Judgment and thought content normal    Nursing note and vitals reviewed        ZAHRA Velázquez

## 2019-10-14 NOTE — PATIENT INSTRUCTIONS

## 2019-10-17 ENCOUNTER — HOSPITAL ENCOUNTER (OUTPATIENT)
Dept: RADIOLOGY | Facility: HOSPITAL | Age: 74
Discharge: HOME | End: 2019-10-17
Attending: NEUROLOGICAL SURGERY
Payer: COMMERCIAL

## 2019-10-17 DIAGNOSIS — D32.9 BENIGN NEOPLASM OF MENINGES (HCC): ICD-10-CM

## 2019-10-17 PROCEDURE — A9585 GADOBUTROL INJECTION: HCPCS | Performed by: NEUROLOGICAL SURGERY

## 2019-10-17 PROCEDURE — 70553 MRI BRAIN STEM W/O & W/DYE: CPT

## 2019-10-17 RX ADMIN — GADOBUTROL 10 ML: 604.72 INJECTION INTRAVENOUS at 09:26

## 2019-10-30 DIAGNOSIS — M25.561 ARTHRALGIA OF BOTH KNEES: ICD-10-CM

## 2019-10-30 DIAGNOSIS — M25.562 ARTHRALGIA OF BOTH KNEES: ICD-10-CM

## 2019-10-30 RX ORDER — CELECOXIB 100 MG/1
CAPSULE ORAL
Qty: 60 CAPSULE | Refills: 1 | Status: SHIPPED | OUTPATIENT
Start: 2019-10-30 | End: 2019-11-29 | Stop reason: SDUPTHER

## 2019-11-29 DIAGNOSIS — M25.561 ARTHRALGIA OF BOTH KNEES: ICD-10-CM

## 2019-11-29 DIAGNOSIS — M25.562 ARTHRALGIA OF BOTH KNEES: ICD-10-CM

## 2019-11-29 RX ORDER — CELECOXIB 100 MG/1
CAPSULE ORAL
Qty: 60 CAPSULE | Refills: 1 | Status: SHIPPED | OUTPATIENT
Start: 2019-11-29 | End: 2020-09-16

## 2019-12-17 ENCOUNTER — OFFICE VISIT (OUTPATIENT)
Dept: FAMILY MEDICINE CLINIC | Facility: CLINIC | Age: 74
End: 2019-12-17
Payer: COMMERCIAL

## 2019-12-17 VITALS
HEIGHT: 69 IN | SYSTOLIC BLOOD PRESSURE: 138 MMHG | BODY MASS INDEX: 33.21 KG/M2 | HEART RATE: 77 BPM | OXYGEN SATURATION: 97 % | WEIGHT: 224.2 LBS | DIASTOLIC BLOOD PRESSURE: 88 MMHG | RESPIRATION RATE: 18 BRPM

## 2019-12-17 DIAGNOSIS — M54.50 ACUTE BILATERAL LOW BACK PAIN WITHOUT SCIATICA: ICD-10-CM

## 2019-12-17 DIAGNOSIS — E11.40 CONTROLLED TYPE 2 DIABETES MELLITUS WITH DIABETIC NEUROPATHY, WITHOUT LONG-TERM CURRENT USE OF INSULIN (HCC): Primary | ICD-10-CM

## 2019-12-17 DIAGNOSIS — I10 ESSENTIAL HYPERTENSION: ICD-10-CM

## 2019-12-17 DIAGNOSIS — K21.9 GASTROESOPHAGEAL REFLUX DISEASE WITHOUT ESOPHAGITIS: ICD-10-CM

## 2019-12-17 LAB — SL AMB POCT HEMOGLOBIN AIC: 7 (ref ?–6.5)

## 2019-12-17 PROCEDURE — 99214 OFFICE O/P EST MOD 30 MIN: CPT | Performed by: NURSE PRACTITIONER

## 2019-12-17 PROCEDURE — 83036 HEMOGLOBIN GLYCOSYLATED A1C: CPT | Performed by: NURSE PRACTITIONER

## 2019-12-17 NOTE — PROGRESS NOTES
Assessment/Plan:           Problem List Items Addressed This Visit        Digestive    GERD (gastroesophageal reflux disease)    Relevant Orders    Comprehensive metabolic panel    CBC and differential    TSH, 3rd generation with Free T4 reflex       Endocrine    Controlled diabetes mellitus with diabetic neuropathy ([facility] [address] ) - Primary    Relevant Orders    Comprehensive metabolic panel    CBC and differential    TSH, 3rd generation with Free T4 reflex       Cardiovascular and Mediastinum    Hypertension    Relevant Orders    Comprehensive metabolic panel    CBC and differential    TSH, 3rd generation with Free T4 reflex       Other    Acute bilateral low back pain without sciatica    Relevant Orders    Ambulatory referral to Physical Therapy            Subjective:      Patient ID: Gerry De La Cruz is a 69 y o  male  Here for f/u to chronic medical conditions  Feeling well  Diabetes under control  Occasional headache and knee pain  Now having low back pain  No loss of bowel or bladder control  No numbness or tingling of b/l le  Did not take bp meds this am      The following portions of the patient's history were reviewed and updated as appropriate: allergies, current medications, past family history, past medical history, past social history, past surgical history and problem list     Review of Systems   Constitutional: Negative for fatigue and fever  HENT: Negative for congestion and postnasal drip  Respiratory: Negative for cough and shortness of breath  Cardiovascular: Negative for chest pain and palpitations  Gastrointestinal: Negative for constipation and diarrhea  Genitourinary: Negative for dysuria and frequency  Musculoskeletal: Positive for back pain  Negative for arthralgias and myalgias  Skin: Negative for rash  Neurological: Negative for dizziness and headaches  Hematological: Negative for adenopathy  Psychiatric/Behavioral: Negative for dysphoric mood   The patient is not nervous/anxious            Objective:    /88 (BP Location: Left arm, Patient Position: Sitting, Cuff Size: Standard)   Pulse 77   Resp 18   Ht 5' 9 29" (1 76 m)   Wt 102 kg (224 lb 3 2 oz)   SpO2 97%   BMI 32 83 kg/m²   Family History   Problem Relation Age of Onset    No Known Problems Mother      Past Medical History:   Diagnosis Date    Brain mass     Last Assessed; 11/5/2015    Chest pain     Last Assessed: 1/22/2015    Diabetes type 2, uncontrolled ([facility] [address] [facility]     Last Assessed: 3/1/2016    Edema of left lower extremity     Last Assessed: 3/22/2017    GERD (gastroesophageal reflux disease)     Hyperlipidemia     Hypertension     Impaired fasting glucose     Last Assessed: 11/10/2015    Irregular heartbeat     Varicose 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     Last Assessed: 3/22/2017     Social History     Socioeconomic History    Marital status: /Civil Union     Spouse name: Not on file    Number of children: 5    Years of education: Not on file    Highest education level: Not on file   Occupational History    Occupation: Retired: Construction   Social Needs    Financial resource strain: Not on file    Food insecurity:     Worry: Not on file     Inability: Not on file   [employer] needs:     Medical: Not on file     Non-medical: Not on file   Tobacco Use    Smoking status: Former Smoker    Smokeless tobacco: Never Used   Substance and Sexual Activity    Alcohol use: No    Drug use: No    Sexual activity: Not on file   Lifestyle    Physical activity:     Days per week: Not on file     Minutes per session: Not on file    Stress: Not on file   Relationships    Social connections:     Talks on phone: Not on file     Gets together: Not on file     Attends Jainism service: Not on file     Active member of club or organization: Not on file     Attends meetings of clubs or organizations: Not on file     Relationship status: Not on file    Intimate partner violence: Fear of current or ex partner: Not on file     Emotionally abused: Not on file     Physically abused: Not on file     Forced sexual activity: Not on file   Other Topics Concern    Not on file   Social History Narrative    Uses safety equipment           Current Outpatient Medications:     amLODIPine (NORVASC) 2 5 mg tablet, TAKE 2 TABLETS (5 MG TOTAL) BY MOUTH DAILY, Disp: 90 tablet, Rfl: 2    ammonium lactate (LAC-HYDRIN) 12 % cream, Apply topically Twice daily, Disp: , Rfl:     aspirin 81 MG tablet, Take by mouth, Disp: , Rfl:     atorvastatin (LIPITOR) 10 mg tablet, Take 1 tablet (10 mg total) by mouth daily, Disp: 90 tablet, Rfl: 3    celecoxib (CeleBREX) 100 mg capsule, TOME ALLY CAPSULA DOS VECES AL JEAN MARIE, Disp: 60 capsule, Rfl: 1    celecoxib (CeleBREX) 100 mg capsule, TOME ALLY CAPSULA DOS VECES AL JEAN MARIE, Disp: 60 capsule, Rfl: 1    Elastic Bandages & Supports ([address] [place] [address]) MIS, by Does not apply route daily, Disp: 2 each, Rfl: 0    gabapentin (NEURONTIN) 100 mg capsule, Take 2 capsules (200 mg total) by mouth daily at bedtime, Disp: 60 capsule, Rfl: 5    Glucose Blood (ONETOUCH ULTRA BLUE VI), by In Vitro route daily, Disp: , Rfl:     glucose blood test strip, Test once daily e11 9, Disp: 100 each, Rfl: 11    Lancets (ONETOUCH ULTRASOFT) lancets, Test daily, e11 5, Disp: 100 each, Rfl: 11    meloxicam (MOBIC) 7 5 mg tablet, Take 1 tablet (7 5 mg total) by mouth daily, Disp: 30 tablet, Rfl: 0    metFORMIN (GLUCOPHAGE) 500 mg tablet, TAKE 1 TABLET BY MOUTH TWICE A DAY, Disp: 180 tablet, Rfl: 1    omeprazole (PriLOSEC) 20 mg delayed release capsule, Take 1 capsule (20 mg total) by mouth daily, Disp: 30 capsule, Rfl: 5    polyethylene glycol (GLYCOLAX) powder, Take 17 g by mouth daily, Disp: 500 g, Rfl: 5    sildenafil (VIAGRA) 100 mg tablet, Take 1 tablet (100 mg total) by mouth as needed for erectile dysfunction, Disp: 30 tablet, Rfl: 5    topiramate (TOPAMAX) 50 MG tablet, Take 1 tablet by mouth daily, Disp: , Rfl:     hydrocortisone (WESTCORT) 0 2 % cream, Apply topically 2 (two) times a day for 10 days, Disp: 45 g, Rfl: 0    ONETOUCH DELICA LANCETS 88B MISC, Inject under the skin daily for 90 days, Disp: 100 each, Rfl: 0  No Known Allergies           Physical Exam   Constitutional: He is oriented to person, place, and time  He appears well-developed and well-nourished  HENT:   Head: Normocephalic  Right Ear: External ear normal    Left Ear: External ear normal    Nose: Nose normal    Mouth/Throat: Oropharynx is clear and moist    Eyes: Conjunctivae are normal    Neck: Normal range of motion  Neck supple  No thyromegaly present  Cardiovascular: Normal rate, regular rhythm and normal heart sounds  Pulmonary/Chest: Effort normal and breath sounds normal    Abdominal: Soft  Bowel sounds are normal    Musculoskeletal: Normal range of motion  Neurological: He is alert and oriented to person, place, and time  Skin: Skin is warm and dry  Capillary refill takes less than 2 seconds  Psychiatric: He has a normal mood and affect  His behavior is normal  Judgment and thought content normal    Nursing note and vitals reviewed

## 2019-12-18 ENCOUNTER — APPOINTMENT (OUTPATIENT)
Dept: LAB | Facility: CLINIC | Age: 74
End: 2019-12-18
Payer: COMMERCIAL

## 2019-12-18 DIAGNOSIS — K59.01 SLOW TRANSIT CONSTIPATION: ICD-10-CM

## 2019-12-18 DIAGNOSIS — I10 ESSENTIAL HYPERTENSION: ICD-10-CM

## 2019-12-18 DIAGNOSIS — Z00.00 MEDICARE ANNUAL WELLNESS VISIT, SUBSEQUENT: ICD-10-CM

## 2019-12-18 DIAGNOSIS — K21.9 GASTROESOPHAGEAL REFLUX DISEASE WITHOUT ESOPHAGITIS: ICD-10-CM

## 2019-12-18 DIAGNOSIS — D32.9 MENINGIOMA (HCC): ICD-10-CM

## 2019-12-18 DIAGNOSIS — E11.40 CONTROLLED TYPE 2 DIABETES MELLITUS WITH DIABETIC NEUROPATHY, WITHOUT LONG-TERM CURRENT USE OF INSULIN (HCC): ICD-10-CM

## 2019-12-18 LAB
ALBUMIN SERPL BCP-MCNC: 4.1 G/DL (ref 3.5–5)
ALP SERPL-CCNC: 104 U/L (ref 46–116)
ALT SERPL W P-5'-P-CCNC: 21 U/L (ref 12–78)
ANION GAP SERPL CALCULATED.3IONS-SCNC: 3 MMOL/L (ref 4–13)
AST SERPL W P-5'-P-CCNC: 9 U/L (ref 5–45)
BASOPHILS # BLD AUTO: 0.02 THOUSANDS/ΜL (ref 0–0.1)
BASOPHILS NFR BLD AUTO: 0 % (ref 0–1)
BILIRUB SERPL-MCNC: 0.64 MG/DL (ref 0.2–1)
BUN SERPL-MCNC: 25 MG/DL (ref 5–25)
CALCIUM SERPL-MCNC: 9.3 MG/DL (ref 8.3–10.1)
CHLORIDE SERPL-SCNC: 105 MMOL/L (ref 100–108)
CHOLEST SERPL-MCNC: 169 MG/DL (ref 50–200)
CO2 SERPL-SCNC: 31 MMOL/L (ref 21–32)
CREAT SERPL-MCNC: 0.85 MG/DL (ref 0.6–1.3)
EOSINOPHIL # BLD AUTO: 0.14 THOUSAND/ΜL (ref 0–0.61)
EOSINOPHIL NFR BLD AUTO: 2 % (ref 0–6)
ERYTHROCYTE [DISTWIDTH] IN BLOOD BY AUTOMATED COUNT: 13 % (ref 11.6–15.1)
EST. AVERAGE GLUCOSE BLD GHB EST-MCNC: 154 MG/DL
GFR SERPL CREATININE-BSD FRML MDRD: 86 ML/MIN/1.73SQ M
GLUCOSE P FAST SERPL-MCNC: 141 MG/DL (ref 65–99)
HBA1C MFR BLD: 7 % (ref 4.2–6.3)
HCT VFR BLD AUTO: 42.3 % (ref 36.5–49.3)
HDLC SERPL-MCNC: 34 MG/DL
HGB BLD-MCNC: 13.9 G/DL (ref 12–17)
IMM GRANULOCYTES # BLD AUTO: 0.03 THOUSAND/UL (ref 0–0.2)
IMM GRANULOCYTES NFR BLD AUTO: 1 % (ref 0–2)
LDLC SERPL CALC-MCNC: 105 MG/DL (ref 0–100)
LYMPHOCYTES # BLD AUTO: 1.44 THOUSANDS/ΜL (ref 0.6–4.47)
LYMPHOCYTES NFR BLD AUTO: 23 % (ref 14–44)
MCH RBC QN AUTO: 28.6 PG (ref 26.8–34.3)
MCHC RBC AUTO-ENTMCNC: 32.9 G/DL (ref 31.4–37.4)
MCV RBC AUTO: 87 FL (ref 82–98)
MONOCYTES # BLD AUTO: 0.6 THOUSAND/ΜL (ref 0.17–1.22)
MONOCYTES NFR BLD AUTO: 10 % (ref 4–12)
NEUTROPHILS # BLD AUTO: 4.11 THOUSANDS/ΜL (ref 1.85–7.62)
NEUTS SEG NFR BLD AUTO: 64 % (ref 43–75)
NRBC BLD AUTO-RTO: 0 /100 WBCS
PLATELET # BLD AUTO: 166 THOUSANDS/UL (ref 149–390)
PMV BLD AUTO: 10 FL (ref 8.9–12.7)
POTASSIUM SERPL-SCNC: 4.3 MMOL/L (ref 3.5–5.3)
PROT SERPL-MCNC: 7.6 G/DL (ref 6.4–8.2)
PSA SERPL-MCNC: 0.7 NG/ML (ref 0–4)
RBC # BLD AUTO: 4.86 MILLION/UL (ref 3.88–5.62)
SODIUM SERPL-SCNC: 139 MMOL/L (ref 136–145)
TRIGL SERPL-MCNC: 151 MG/DL
TSH SERPL DL<=0.05 MIU/L-ACNC: 2.89 UIU/ML (ref 0.36–3.74)
WBC # BLD AUTO: 6.34 THOUSAND/UL (ref 4.31–10.16)

## 2019-12-18 PROCEDURE — 85025 COMPLETE CBC W/AUTO DIFF WBC: CPT

## 2019-12-18 PROCEDURE — 36415 COLL VENOUS BLD VENIPUNCTURE: CPT

## 2019-12-18 PROCEDURE — 80053 COMPREHEN METABOLIC PANEL: CPT

## 2019-12-18 PROCEDURE — 83036 HEMOGLOBIN GLYCOSYLATED A1C: CPT

## 2019-12-18 PROCEDURE — G0103 PSA SCREENING: HCPCS

## 2019-12-18 PROCEDURE — 84443 ASSAY THYROID STIM HORMONE: CPT

## 2019-12-18 PROCEDURE — 80061 LIPID PANEL: CPT

## 2020-01-09 DIAGNOSIS — I10 ESSENTIAL HYPERTENSION: ICD-10-CM

## 2020-01-09 RX ORDER — LISINOPRIL 10 MG/1
TABLET ORAL
Qty: 90 TABLET | Refills: 3 | Status: SHIPPED | OUTPATIENT
Start: 2020-01-09 | End: 2020-12-18

## 2020-01-31 ENCOUNTER — OFFICE VISIT (OUTPATIENT)
Dept: FAMILY MEDICINE CLINIC | Facility: CLINIC | Age: 75
End: 2020-01-31
Payer: COMMERCIAL

## 2020-01-31 DIAGNOSIS — E11.40 CONTROLLED TYPE 2 DIABETES MELLITUS WITH DIABETIC NEUROPATHY, WITHOUT LONG-TERM CURRENT USE OF INSULIN (HCC): ICD-10-CM

## 2020-01-31 DIAGNOSIS — D32.9 MENINGIOMA (HCC): ICD-10-CM

## 2020-01-31 DIAGNOSIS — I10 ESSENTIAL HYPERTENSION: ICD-10-CM

## 2020-01-31 DIAGNOSIS — H61.22 IMPACTED CERUMEN OF LEFT EAR: Primary | ICD-10-CM

## 2020-01-31 DIAGNOSIS — R07.89 OTHER CHEST PAIN: ICD-10-CM

## 2020-01-31 DIAGNOSIS — B18.2 CHRONIC HEPATITIS C WITHOUT HEPATIC COMA (HCC): ICD-10-CM

## 2020-01-31 PROCEDURE — 3078F DIAST BP <80 MM HG: CPT | Performed by: NURSE PRACTITIONER

## 2020-01-31 PROCEDURE — 3075F SYST BP GE 130 - 139MM HG: CPT | Performed by: NURSE PRACTITIONER

## 2020-01-31 PROCEDURE — 4040F PNEUMOC VAC/ADMIN/RCVD: CPT | Performed by: NURSE PRACTITIONER

## 2020-01-31 PROCEDURE — 1160F RVW MEDS BY RX/DR IN RCRD: CPT | Performed by: NURSE PRACTITIONER

## 2020-01-31 PROCEDURE — 99214 OFFICE O/P EST MOD 30 MIN: CPT | Performed by: NURSE PRACTITIONER

## 2020-01-31 PROCEDURE — 1036F TOBACCO NON-USER: CPT | Performed by: NURSE PRACTITIONER

## 2020-02-13 VITALS
WEIGHT: 228 LBS | SYSTOLIC BLOOD PRESSURE: 138 MMHG | HEART RATE: 81 BPM | OXYGEN SATURATION: 97 % | BODY MASS INDEX: 33.77 KG/M2 | DIASTOLIC BLOOD PRESSURE: 78 MMHG | HEIGHT: 69 IN | RESPIRATION RATE: 16 BRPM

## 2020-02-13 PROBLEM — Z86.19 HISTORY OF HEPATITIS C: Status: ACTIVE | Noted: 2020-02-13

## 2020-02-13 PROCEDURE — 69210 REMOVE IMPACTED EAR WAX UNI: CPT | Performed by: NURSE PRACTITIONER

## 2020-02-13 RX ORDER — OMEPRAZOLE 40 MG/1
CAPSULE, DELAYED RELEASE ORAL
COMMUNITY
Start: 2020-01-08 | End: 2021-07-28

## 2020-02-13 NOTE — PROGRESS NOTES
Assessment/Plan:    Other chest pain  Refer to cardiology  any further cp to er for eval  Cont current meds      Meningioma ([facility] )  Stable on most recent imaging  Cont yearly follow up with neurosurgery    Controlled diabetes mellitus with diabetic neuropathy ([facility] )  Cont current meds and monitor labs    Lab Results   Component Value Date    HGBA1C 7 0 (H) 12/18/2019       Hypertension  Stable   Cont current meds           Problem List Items Addressed This Visit        Digestive    RESOLVED: Chronic hepatitis C virus infection ([facility] )       Endocrine    Controlled diabetes mellitus with diabetic neuropathy ([facility] )     Cont current meds and monitor labs    Lab Results   Component Value Date    HGBA1C 7 0 (H) 12/18/2019               Cardiovascular and Mediastinum    Hypertension     Stable   Cont current meds           Relevant Orders    Ambulatory referral to Cardiology       Nervous and Auditory    Meningioma ([facility] )     Stable on most recent imaging  Cont yearly follow up with neurosurgery            Other    Other chest pain     Refer to cardiology  any further cp to er for eval  Cont current meds           Relevant Orders    Ambulatory referral to Cardiology      Other Visit Diagnoses     Impacted cerumen of left ear    -  Primary    Relevant Orders    Ear cerumen removal            Subjective:      Patient ID: Richard Garnder is a 69 y o  male  Here for c/o wax in left ear  Trouble hearing  Did not try meds at home  Minimal discomfort  Also s/o chest pain on and off  States hx of MI  Has not followed with a cardiologist  No cp today  No caldera, sob, diaphoresis, palpitations, n/v/d        The following portions of the patient's history were reviewed and updated as appropriate: allergies, current medications, past family history, past medical history, past social history, past surgical history and problem list     Review of Systems   Constitutional: Negative for fatigue and fever     HENT: Negative for congestion and postnasal drip  Respiratory: Negative for cough and shortness of breath  Cardiovascular: Positive for chest pain  Negative for palpitations  Gastrointestinal: Negative for constipation and diarrhea  Musculoskeletal: Negative for arthralgias and myalgias  Skin: Negative for rash  Neurological: Negative for dizziness and headaches  Hematological: Negative for adenopathy  Psychiatric/Behavioral: Negative for dysphoric mood  The patient is not nervous/anxious            Objective:      /78 (BP Location: Right arm, Patient Position: Sitting, Cuff Size: Large)   Pulse 81   Resp 16   Ht 5' 9 29" (1 76 m)   Wt 103 kg (228 lb)   SpO2 97%   BMI 33 39 kg/m²     Family History   Problem Relation Age of Onset    No Known Problems Mother      Past Medical History:   Diagnosis Date    Brain mass     Last Assessed; 11/5/2015    Chest pain     Last Assessed: 1/22/2015    Diabetes type 2, uncontrolled ([facility] )     Last Assessed: 3/1/2016    Edema of left lower extremity     Last Assessed: 3/22/2017    GERD (gastroesophageal reflux disease)     Hyperlipidemia     Hypertension     Impaired fasting glucose     Last Assessed: 11/10/2015    Irregular heartbeat     Varicose 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     Last Assessed: 3/22/2017     Social History     Socioeconomic History    Marital status: /Civil Union     Spouse name: Not on file    Number of children: 5    Years of education: Not on file    Highest education level: Not on file   Occupational History    Occupation: Retired: Construction   Social Needs    Financial resource strain: Not on file    Food insecurity:     Worry: Not on file     Inability: Not on file   Smart Checkout needs:     Medical: Not on file     Non-medical: Not on file   Tobacco Use    Smoking status: Former Smoker    Smokeless tobacco: Never Used   Substance and Sexual Activity    Alcohol use: No    Drug use: No    Sexual activity: Not on file Lifestyle    Physical activity:     Days per week: Not on file     Minutes per session: Not on file    Stress: Not on file   Relationships    Social connections:     Talks on phone: Not on file     Gets together: Not on file     Attends Sabianism service: Not on file     Active member of club or organization: Not on file     Attends meetings of clubs or organizations: Not on file     Relationship status: Not on file    Intimate partner violence:     Fear of current or ex partner: Not on file     Emotionally abused: Not on file     Physically abused: Not on file     Forced sexual activity: Not on file   Other Topics Concern    Not on file   Social History Narrative    Uses safety equipment           Current Outpatient Medications:     amLODIPine (NORVASC) 2 5 mg tablet, TAKE 2 TABLETS (5 MG TOTAL) BY MOUTH DAILY, Disp: 90 tablet, Rfl: 2    ammonium lactate (LAC-HYDRIN) 12 % cream, Apply topically Twice daily, Disp: , Rfl:     aspirin 81 MG tablet, Take by mouth, Disp: , Rfl:     atorvastatin (LIPITOR) 10 mg tablet, Take 1 tablet (10 mg total) by mouth daily, Disp: 90 tablet, Rfl: 3    celecoxib (CeleBREX) 100 mg capsule, TOME ALLY CAPSULA DOS VECES AL JEAN MARIE, Disp: 60 capsule, Rfl: 1    celecoxib (CeleBREX) 100 mg capsule, TOME ALLY CAPSULA DOS VECES AL JEAN MARIE, Disp: 60 capsule, Rfl: 1    Elastic Bandages & Supports ([address] [place] [address]) MISC, by Does not apply route daily, Disp: 2 each, Rfl: 0    gabapentin (NEURONTIN) 100 mg capsule, Take 2 capsules (200 mg total) by mouth daily at bedtime, Disp: 60 capsule, Rfl: 5    Glucose Blood (ONETOUCH ULTRA BLUE VI), by In Vitro route daily, Disp: , Rfl:     glucose blood test strip, Test once daily e11 9, Disp: 100 each, Rfl: 11    hydrocortisone (WESTCORT) 0 2 % cream, Apply topically 2 (two) times a day for 10 days, Disp: 45 g, Rfl: 0    Lancets (ONETOUCH ULTRASOFT) lancets, Test daily, e11 5, Disp: 100 each, Rfl: 11    lisinopril (ZESTRIL) 10 mg tablet, TAKE 1 TABLET BY MOUTH EVERY DAY, Disp: 90 tablet, Rfl: 3    meloxicam (MOBIC) 7 5 mg tablet, Take 1 tablet (7 5 mg total) by mouth daily, Disp: 30 tablet, Rfl: 0    metFORMIN (GLUCOPHAGE) 500 mg tablet, TAKE 1 TABLET BY MOUTH TWICE A DAY, Disp: 180 tablet, Rfl: 1    omeprazole (PriLOSEC) 20 mg delayed release capsule, Take 1 capsule (20 mg total) by mouth daily, Disp: 30 capsule, Rfl: 5    omeprazole (PriLOSEC) 40 MG capsule, TAKE 1 CAPSULE BY MOUTH EVERY DAY 1/2 HOUR BEFORE BREAKFAST, Disp: , Rfl:     ONETOUCH DELICA LANCETS 39W MISC, Inject under the skin daily for 90 days, Disp: 100 each, Rfl: 0    polyethylene glycol (GLYCOLAX) powder, Take 17 g by mouth daily, Disp: 500 g, Rfl: 5    sildenafil (VIAGRA) 100 mg tablet, Take 1 tablet (100 mg total) by mouth as needed for erectile dysfunction, Disp: 30 tablet, Rfl: 5    topiramate (TOPAMAX) 50 MG tablet, Take 1 tablet by mouth daily, Disp: , Rfl:   No Known Allergies     Physical Exam   Constitutional: He is oriented to person, place, and time  He appears well-developed and well-nourished  HENT:   Head: Normocephalic and atraumatic  Right Ear: External ear normal    Nose: Nose normal    Mouth/Throat: Oropharynx is clear and moist    Cerumen occlusion left ear canal   Eyes: Conjunctivae are normal    Neck: Normal range of motion  Neck supple  Cardiovascular: Normal rate, regular rhythm, normal heart sounds and intact distal pulses  Pulses are weak pulses  Pulses:       Dorsalis pedis pulses are 1+ on the right side, and 1+ on the left side  Posterior tibial pulses are 1+ on the right side, and 1+ on the left side  Pulmonary/Chest: Effort normal and breath sounds normal    Abdominal: Soft  Bowel sounds are normal    Musculoskeletal: Normal range of motion  Feet:    Feet:   Right Foot:   Skin Integrity: Negative for ulcer, skin breakdown, erythema, warmth, callus or dry skin     Left Foot:   Skin Integrity: Negative for ulcer, skin breakdown, erythema, warmth, callus or dry skin  Neurological: He is alert and oriented to person, place, and time  Skin: Skin is warm and dry  Capillary refill takes less than 2 seconds  Psychiatric: He has a normal mood and affect  His behavior is normal    Nursing note and vitals reviewed  Ear cerumen removal  Date/Time: 2/13/2020 12:20 PM  Performed by: ZAHRA Villanueva  Authorized by: ZAHRA Villanueva     Patient location:  Clinic  Other Assisting Provider: No    Consent:     Consent obtained:  Verbal    Consent given by:  Patient    Risks discussed:  Bleeding, dizziness, infection, incomplete removal, pain and TM perforation    Alternatives discussed:  Delayed treatment, alternative treatment, observation, referral and no treatment  Universal protocol:     Procedure explained and questions answered to patient or proxy's satisfaction: yes    Procedure details:     Local anesthetic:  None    Location:  L ear    Procedure type: irrigation with instrumentation      Instrumentation: curette and forceps      Approach:  Natural orifice  Post-procedure details:     Complication:  None    Hearing quality:  Improved    Patient tolerance of procedure: Tolerated well, no immediate complications    Patient's shoes and socks removed  Right Foot/Ankle   Right Foot Inspection  Skin Exam: skin normal and skin intact no dry skin, no warmth, no callus, no erythema, no maceration, no abnormal color, no pre-ulcer, no ulcer and no callus                          Toe Exam: ROM and strength within normal limits  Sensory   Vibration: intact  Proprioception: intact   Monofilament testing: diminished  Vascular  Capillary refills: < 3 seconds  The right DP pulse is 1+  The right PT pulse is 1+       Left Foot/Ankle  Left Foot Inspection  Skin Exam: skin normal and skin intactno dry skin, no warmth, no erythema, no maceration, normal color, no pre-ulcer, no ulcer and no callus Sensory   Vibration: intact  Proprioception: intact  Monofilament: diminished  Vascular  Capillary refills: < 3 seconds  The left DP pulse is 1+  The left PT pulse is 1+  Assign Risk Category:  No deformity present; Loss of protective sensation; Weak pulses       Risk: 2        BMI Counseling: Body mass index is 33 39 kg/m²  The BMI is above normal  Nutrition recommendations include reducing portion sizes, decreasing overall calorie intake, 3-5 servings of fruits/vegetables daily, reducing fast food intake, consuming healthier snacks, decreasing soda and/or juice intake, moderation in carbohydrate intake, increasing intake of lean protein, reducing intake of saturated fat and trans fat and reducing intake of cholesterol  Exercise recommendations include moderate aerobic physical activity for 150 minutes/week, exercising 3-5 times per week and strength training exercises

## 2020-02-13 NOTE — ASSESSMENT & PLAN NOTE
Cont current meds and monitor labs    Lab Results   Component Value Date    HGBA1C 7 0 (H) 12/18/2019

## 2020-03-14 DIAGNOSIS — E78.49 OTHER HYPERLIPIDEMIA: ICD-10-CM

## 2020-03-16 RX ORDER — ATORVASTATIN CALCIUM 10 MG/1
TABLET, FILM COATED ORAL
Qty: 90 TABLET | Refills: 3 | Status: SHIPPED | OUTPATIENT
Start: 2020-03-16 | End: 2021-12-19 | Stop reason: SDUPTHER

## 2020-04-01 NOTE — PROGRESS NOTES
Assessment   1  Headache (784 0) (R51)   2  Meningioma (225 2) (D32 9)   3  Knee joint pain (719 46) (M25 569)   4  Acid indigestion (536 8) (K30)    Plan    Acid indigestion    · Omeprazole 40 MG Oral Capsule Delayed Release; take 1 capsule by mouth    daily  Controlled diabetes mellitus with diabetic neuropathy, Headache, Meningioma, Varicose    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    · *1 - [facility]  *  Status: Active  Requested for: 83TQM0511  Care Summary provided  : Yes  Headache    · Topiramate 50 MG Oral Tablet; TAKE 1 TABLET DAILY   · *1 - [facility]  *  Status: Active  Requested for: 87Yrg0776  Care Summary provided  : Yes  Knee joint pain    · 1 - Slime EPSTEIN, Minerva Sparks  (Orthopedic Surgery) Co-Management  *  Status: Active     Requested for: 01Bks4481  Care Summary provided  : Yes      Sildenafil Citrate 20 MG Oral Tablet; TAKE 1 TO 5 TABLETS DAILY AS NEEDED; Therapy: 67LFG7456 to (Evaluate:26Jan2018)  Requested for: 44Vpx6583; Last Rx:28Jyd8976; Status: ACTIVE Ordered     Rx By: Varghese Fuentes; Dispense: 30 Days ; #:30 Tablet; Refill: 0;      For: Male erectile dysfunction, unspecified; APRIL = N; Verified Transmission to [id number] Last Updated By: System, [employer]; 12/27/2017 2:50:10 PM       Discussion/Summary      See orthopedic for knee pain diet, increase omeprazole  neuro for headache  Possible side effects of new medications were reviewed with the patient/guardian today  The treatment plan was reviewed with the patient/guardian  The patient/guardian understands and agrees with the treatment plan      Chief Complaint   Patient presents today with headaches and stomach pain      Advance Directives   Advance Directive [facility]:    The patient is in agreement to receive the Advance Care Planning service--       YES - Patient has an advance health care directive        History of Present Illness   HPI: patient is here for co of headache that has been ongoing and persistent takes one gabapentin at night, has no been increasing the dose like instructed has a meningioma but neurosurgeon said that should not cause headache   not seen a neurologist like suggested   co epigastric pain, takes omeprazole 20mg   co bilat knee pain for a few weeks      Review of Systems        ENT: no earache-- and-- no hearing loss  Respiratory: no shortness of breath,-- no cough,-- no wheezing-- and-- no shortness of breath during exertion  Gastrointestinal: abdominal pain, but-- no nausea,-- no constipation-- and-- no diarrhea  Musculoskeletal: arthralgias, but-- no joint swelling,-- no myalgias-- and-- no joint stiffness  Integumentary: no rashes,-- no itching,-- no skin lesions-- and-- no skin wound  Neurological: headache, but-- no numbness,-- no confusion-- and-- no dizziness  Active Problems   1  Acid indigestion (536 8) (K30)   2  Advance directive discussed with patient (V65 49) (Z71 89)   3  Brain tumor (239 6) (D49 6)   4  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)   5  Constipation (564 00) (K59 00)   6  Controlled diabetes mellitus with diabetic neuropathy (250 60,357 2) (E11 40)   7  Dry skin (701 1) (L85 3)   8  Encounter for colorectal cancer screening (V76 51) (Z12 11,Z12 12)   9  Encounter for prostate cancer screening (V76 44) (Z12 5)   10  Epigastric pain (789 06) (R10 13)   11  Episodic cluster headache, not intractable (339 01) (G44 019)   12  GERD (gastroesophageal reflux disease) (530 81) (K21 9)   13  Headache (784 0) (R51)   14  Heart attack (410 90) (I21 9)   15  Hepatitis C (070 70) (B19 20)   16  Hip pain, left (719 45) (M25 552)   17  Hypertension (401 9) (I10)   18  Knee joint pain (719 46) (M25 569)   19  Knee Pain Elicited By Motion   20  Left elbow pain (719 42) (M25 522)   21  Male erectile dysfunction, unspecified (607 84) (N52 9)   22  Meningioma (225 2) (D32 9)   23  Overweight (278 02) (E66 3)   24   Pre-ulcerative corn or callous (700) (L84)   25  Right hand pain (729 5) (M79 641)   26  Screening for genitourinary condition (V81 6) (Z13 89)   27  Varicose 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 (454 8) (Q96 852)    Past Medical History   Active Problems And Past Medical History Reviewed: The active problems and past medical history were reviewed and updated today  Surgical History   Surgical History Reviewed: The surgical history was reviewed and updated today  Social History    · Denied: History of Drug use   · Employed in construction trade   · Five children   · Former smoker (I81 82) (Q38 242)   ·    · Number of children   · 5   · Retired   · Uses safety equipment  The social history was reviewed and updated today  The social history was reviewed and is unchanged  Family History   Family History Reviewed: The family history was reviewed and updated today  Current Meds    1  Ammonium Lactate 12 % External Cream; APPLY  AND RUB  IN A THIN FILM TO     AFFECTED AREAS TWICE DAILY  (AM AND PM); Therapy: 91Hzp2239 to (Evaluate:11Oct2017)  Requested for: 12Yly4240; Last     Rx:74Xrg6540 Ordered   2  Amoxicillin 875 MG Oral Tablet; TAKE 1 TABLET EVERY 12 HOURS DAILY; Therapy: 43ZOS2634 to (Evaluate:65Bfz8276)  Requested for: 25VNN5416; Last     Rx:27Evg5920 Ordered   3  Aspirin 81 MG TABS; TAKE 1 TABLET DAILY FOR STROKE PREVENTION; Therapy: 47HZO2799 to (Evaluate:00Djz0522)  Requested for: 70PZZ5144; Last     Rx:01Mar2016 Ordered   4  Blood Pressure Monitor Device; use as directed  dx 401 9     electronic; Therapy: 36MHB4616 to (Last Rx:15Jun2016) Ordered   5  Gabapentin 100 MG Oral Capsule; TAKE 1 CAPSULE DAILY AT BEDTIME -MAY     INCREASE TO 3 CAPSULES ATBEDTIMEAS TOLERATED -MAY CAUSE DROWSINESS     -DO NOT TAKE WITH ALCOHOL; Therapy: 76MPT3769 to ()  Requested for: 77HON8918; Last     Rx:29Nov2017 Ordered   6  Lisinopril 20 MG Oral Tablet;  Take 1 tablet daily; Therapy: 60Kwd2765 to (Evaluate:18Mar2018)  Requested for: 81Wuy7578; Last     Rx:12Kju2217 Ordered   7  MetFORMIN HCl - 500 MG Oral Tablet; TAKE 1 TABLET TWICE DAILY  Requested for:     86PBM2805; Last Rx:69Arq8707 Ordered   8  Omeprazole 20 MG Oral Capsule Delayed Release; TAKE 1 CAPSULE DAILY EVERY     MORNING BEFORE BREAKFAST; Therapy: 40Zkc4937 to (Evaluate:09Kpd1910)  Requested for: 69ICL0994; Last     Rx:36Cpd6631 Ordered   9  Omeprazole 20 MG Oral Tablet Delayed Release; 1 tab po bid; Therapy: 29Nhx2125 to (Evaluate:08Jun2018)  Requested for: 70Cxe2734; Last     Rx:55Efn6733 Ordered   10  OneTouch Ultra Blue In Vitro Strip; TEST ONCE DAILY; Therapy: 17VGN0814 to (Betito Hightower)  Requested for: 63OVR0294; Last      Rx:53Ads7461 Ordered   11  Polyethylene Glycol 3350 Oral Packet; take 1 packet daily; Therapy: 72OZG0305 to (Last Rx:72Nzm1945) Ordered   12  Sildenafil Citrate 20 MG Oral Tablet; TAKE 1 TO 5 TABLETS DAILY AS NEEDED; Therapy: 93XWE3767 to (Last Rx:15Jun2016)  Requested for: 88ZHA6600 Ordered   13  Viagra 50 MG Oral Tablet; TAKE AS DIRECTED; Therapy: 10IEY9963 to (Last Rx:71Dha9390) Ordered     The medication list was reviewed and updated today  Allergies   1  No Known Drug Allergies  2  No Known Environmental Allergies   3  No Known Food Allergies    Vitals    Recorded: 13XZR1691 01:07PM   Temperature 97 8 F, Rectal   Heart Rate 83   Respiration 18   Systolic 358, Sitting   Diastolic 74, Sitting   Height 5 ft 8 in   Weight 222 lb    BMI Calculated 33 76   BSA Calculated 2 14   O2 Saturation 97     Physical Exam        Constitutional      General appearance: No acute distress, well appearing and well nourished  Eyes      Conjunctiva and lids: No swelling, erythema, or discharge  Pupils and irises: Equal, round and reactive to light         Ears, Nose, Mouth, and Throat      External inspection of ears and nose: Normal        Otoscopic examination: Tympanic membrance translucent with normal light reflex  Canals patent without erythema  Nasal mucosa, septum, and turbinates: Normal without edema or erythema  Oropharynx: Normal with no erythema, edema, exudate or lesions  Pulmonary      Respiratory effort: No increased work of breathing or signs of respiratory distress  Auscultation of lungs: Clear to auscultation, equal breath sounds bilaterally, no wheezes, no rales, no rhonci  Cardiovascular      Palpation of heart: Normal PMI, no thrills  Auscultation of heart: Normal rate and rhythm, normal S1 and S2, without murmurs  Examination of extremities for edema and/or varicosities: Normal        Carotid pulses: Normal        Abdomen      Abdomen: Non-tender, no masses  Liver and spleen: No hepatomegaly or splenomegaly  Lymphatic      Palpation of lymph nodes in neck: No lymphadenopathy  Musculoskeletal      Gait and station: Normal        Digits and nails: Normal without clubbing or cyanosis  Inspection/palpation of joints, bones, and muscles: Normal        Skin      Skin and subcutaneous tissue: Normal without rashes or lesions  Neurologic      Cranial nerves: Cranial nerves 2-12 intact  Reflexes: 2+ and symmetric  Sensation: No sensory loss  Psychiatric      Orientation to person, place and time: Normal        Mood and affect: Normal           Results/Data   Falls Risk Assessment (Dx Z13 89 Screen for Neurologic Disorder) 34KVO4080 01:07PM User, s      Test Name Result Flag Reference   Falls Risk      No falls in the past year        Future Appointments      Date/Time Provider Specialty Site   02/07/2018 02:15 PM SUYAPA Talley   Orthopedic Surgery [facility]   02/28/2018 10:30 AM Jeremiah Lu [facility]   07/16/2018 02:00 PM Raghavendra Bernal, [facility] 07/16/2018 02:15 PM Noman Davis MD PhD [facility]    Electronically signed by : Valerie Gallardo; Jan 2 2018 10:11AM EST                       (Author)     Electronically signed by : Thuy Lang DO; Jan 2 2018  6:58PM EST                       (Author) PA KIMMIE: trop 20, 17, Cr elevated 1.58-1.73, CXR linear opacity in left lung base likely atelectasis. Discussed with attending, IVF ordered. Spoke with hospitalist, accepted patient. Pt admitted for generalized weakness, failure to thrive, r/o COVID. MAR text paged.

## 2020-04-03 DIAGNOSIS — E11.9 TYPE 2 DIABETES MELLITUS WITHOUT COMPLICATION, WITHOUT LONG-TERM CURRENT USE OF INSULIN (HCC): ICD-10-CM

## 2020-04-09 ENCOUNTER — TELEPHONE (OUTPATIENT)
Dept: CARDIOLOGY CLINIC | Facility: CLINIC | Age: 75
End: 2020-04-09

## 2020-04-14 ENCOUNTER — TELEPHONE (OUTPATIENT)
Dept: CARDIOLOGY CLINIC | Facility: CLINIC | Age: 75
End: 2020-04-14

## 2020-04-16 ENCOUNTER — TELEPHONE (OUTPATIENT)
Dept: NEUROLOGY | Facility: CLINIC | Age: 75
End: 2020-04-16

## 2020-05-15 ENCOUNTER — OFFICE VISIT (OUTPATIENT)
Dept: FAMILY MEDICINE CLINIC | Facility: CLINIC | Age: 75
End: 2020-05-15
Payer: COMMERCIAL

## 2020-05-15 VITALS
DIASTOLIC BLOOD PRESSURE: 82 MMHG | TEMPERATURE: 97 F | OXYGEN SATURATION: 98 % | HEART RATE: 62 BPM | SYSTOLIC BLOOD PRESSURE: 138 MMHG | HEIGHT: 69 IN | BODY MASS INDEX: 31.7 KG/M2 | RESPIRATION RATE: 18 BRPM | WEIGHT: 214 LBS

## 2020-05-15 DIAGNOSIS — M25.561 ARTHRALGIA OF BOTH KNEES: ICD-10-CM

## 2020-05-15 DIAGNOSIS — E11.40 CONTROLLED TYPE 2 DIABETES MELLITUS WITH DIABETIC NEUROPATHY, WITHOUT LONG-TERM CURRENT USE OF INSULIN (HCC): ICD-10-CM

## 2020-05-15 DIAGNOSIS — M25.562 ARTHRALGIA OF BOTH KNEES: ICD-10-CM

## 2020-05-15 DIAGNOSIS — R07.9 CHEST PAIN, UNSPECIFIED TYPE: Primary | ICD-10-CM

## 2020-05-15 PROBLEM — R10.11 RUQ PAIN: Status: RESOLVED | Noted: 2019-07-16 | Resolved: 2020-05-15

## 2020-05-15 PROCEDURE — 1036F TOBACCO NON-USER: CPT | Performed by: NURSE PRACTITIONER

## 2020-05-15 PROCEDURE — 4040F PNEUMOC VAC/ADMIN/RCVD: CPT | Performed by: NURSE PRACTITIONER

## 2020-05-15 PROCEDURE — 99214 OFFICE O/P EST MOD 30 MIN: CPT | Performed by: NURSE PRACTITIONER

## 2020-05-15 PROCEDURE — 93000 ELECTROCARDIOGRAM COMPLETE: CPT | Performed by: NURSE PRACTITIONER

## 2020-05-15 PROCEDURE — 3008F BODY MASS INDEX DOCD: CPT | Performed by: NURSE PRACTITIONER

## 2020-05-15 PROCEDURE — 3075F SYST BP GE 130 - 139MM HG: CPT | Performed by: NURSE PRACTITIONER

## 2020-05-15 PROCEDURE — 3079F DIAST BP 80-89 MM HG: CPT | Performed by: NURSE PRACTITIONER

## 2020-05-15 PROCEDURE — 1160F RVW MEDS BY RX/DR IN RCRD: CPT | Performed by: NURSE PRACTITIONER

## 2020-06-02 ENCOUNTER — OFFICE VISIT (OUTPATIENT)
Dept: URGENT CARE | Age: 75
End: 2020-06-02
Payer: COMMERCIAL

## 2020-06-02 VITALS
DIASTOLIC BLOOD PRESSURE: 78 MMHG | SYSTOLIC BLOOD PRESSURE: 143 MMHG | RESPIRATION RATE: 18 BRPM | TEMPERATURE: 98.7 F | OXYGEN SATURATION: 96 %

## 2020-06-02 DIAGNOSIS — R22.0 LOCALIZED SWELLING, MASS AND LUMP, HEAD: Primary | ICD-10-CM

## 2020-06-02 PROCEDURE — 99213 OFFICE O/P EST LOW 20 MIN: CPT | Performed by: NURSE PRACTITIONER

## 2020-06-08 ENCOUNTER — TELEPHONE (OUTPATIENT)
Dept: FAMILY MEDICINE CLINIC | Facility: CLINIC | Age: 75
End: 2020-06-08

## 2020-06-08 ENCOUNTER — OFFICE VISIT (OUTPATIENT)
Dept: FAMILY MEDICINE CLINIC | Facility: CLINIC | Age: 75
End: 2020-06-08
Payer: COMMERCIAL

## 2020-06-08 VITALS
SYSTOLIC BLOOD PRESSURE: 152 MMHG | RESPIRATION RATE: 18 BRPM | HEIGHT: 69 IN | OXYGEN SATURATION: 98 % | DIASTOLIC BLOOD PRESSURE: 100 MMHG | BODY MASS INDEX: 32.29 KG/M2 | WEIGHT: 218 LBS | HEART RATE: 66 BPM | TEMPERATURE: 97 F

## 2020-06-08 DIAGNOSIS — L98.9 SCALP LESION: Primary | ICD-10-CM

## 2020-06-08 DIAGNOSIS — M25.562 ARTHRALGIA OF BOTH KNEES: ICD-10-CM

## 2020-06-08 DIAGNOSIS — M25.561 ARTHRALGIA OF BOTH KNEES: ICD-10-CM

## 2020-06-08 DIAGNOSIS — E11.40 CONTROLLED TYPE 2 DIABETES MELLITUS WITH DIABETIC NEUROPATHY, WITHOUT LONG-TERM CURRENT USE OF INSULIN (HCC): ICD-10-CM

## 2020-06-08 DIAGNOSIS — R51.9 NONINTRACTABLE HEADACHE, UNSPECIFIED CHRONICITY PATTERN, UNSPECIFIED HEADACHE TYPE: ICD-10-CM

## 2020-06-08 DIAGNOSIS — I10 ESSENTIAL HYPERTENSION: ICD-10-CM

## 2020-06-08 DIAGNOSIS — R07.9 CHEST PAIN, UNSPECIFIED TYPE: ICD-10-CM

## 2020-06-08 PROCEDURE — 1160F RVW MEDS BY RX/DR IN RCRD: CPT | Performed by: NURSE PRACTITIONER

## 2020-06-08 PROCEDURE — 99214 OFFICE O/P EST MOD 30 MIN: CPT | Performed by: NURSE PRACTITIONER

## 2020-06-08 PROCEDURE — 4040F PNEUMOC VAC/ADMIN/RCVD: CPT | Performed by: NURSE PRACTITIONER

## 2020-06-08 PROCEDURE — 3077F SYST BP >= 140 MM HG: CPT | Performed by: NURSE PRACTITIONER

## 2020-06-08 PROCEDURE — 3080F DIAST BP >= 90 MM HG: CPT | Performed by: NURSE PRACTITIONER

## 2020-06-08 PROCEDURE — 1036F TOBACCO NON-USER: CPT | Performed by: NURSE PRACTITIONER

## 2020-06-08 RX ORDER — TRAMADOL HYDROCHLORIDE 50 MG/1
50 TABLET ORAL EVERY 6 HOURS PRN
Qty: 30 TABLET | Refills: 0 | Status: SHIPPED | OUTPATIENT
Start: 2020-06-08 | End: 2020-09-16

## 2020-06-12 ENCOUNTER — OFFICE VISIT (OUTPATIENT)
Dept: DERMATOLOGY | Facility: CLINIC | Age: 75
End: 2020-06-12
Payer: COMMERCIAL

## 2020-06-12 VITALS — BODY MASS INDEX: 31.83 KG/M2 | WEIGHT: 210 LBS | HEIGHT: 68 IN | TEMPERATURE: 97.7 F

## 2020-06-12 DIAGNOSIS — D48.5 NEOPLASM OF UNCERTAIN BEHAVIOR OF SKIN: Primary | ICD-10-CM

## 2020-06-12 DIAGNOSIS — L57.0 ACTINIC KERATOSIS: ICD-10-CM

## 2020-06-12 DIAGNOSIS — L25.8 CONTACT DERMATITIS DUE TO OTHER AGENT, UNSPECIFIED CONTACT DERMATITIS TYPE: ICD-10-CM

## 2020-06-12 PROCEDURE — 88305 TISSUE EXAM BY PATHOLOGIST: CPT | Performed by: STUDENT IN AN ORGANIZED HEALTH CARE EDUCATION/TRAINING PROGRAM

## 2020-06-12 PROCEDURE — 88341 IMHCHEM/IMCYTCHM EA ADD ANTB: CPT | Performed by: STUDENT IN AN ORGANIZED HEALTH CARE EDUCATION/TRAINING PROGRAM

## 2020-06-12 PROCEDURE — 88342 IMHCHEM/IMCYTCHM 1ST ANTB: CPT | Performed by: STUDENT IN AN ORGANIZED HEALTH CARE EDUCATION/TRAINING PROGRAM

## 2020-06-12 PROCEDURE — 99204 OFFICE O/P NEW MOD 45 MIN: CPT | Performed by: DERMATOLOGY

## 2020-06-12 PROCEDURE — 17003 DESTRUCT PREMALG LES 2-14: CPT | Performed by: DERMATOLOGY

## 2020-06-12 PROCEDURE — 11102 TANGNTL BX SKIN SINGLE LES: CPT | Performed by: DERMATOLOGY

## 2020-06-12 PROCEDURE — 17000 DESTRUCT PREMALG LESION: CPT | Performed by: DERMATOLOGY

## 2020-06-12 RX ORDER — CLOBETASOL PROPIONATE 0.05 MG/G
1 GEL TOPICAL 2 TIMES DAILY
Qty: 60 EACH | Refills: 1 | Status: SHIPPED | OUTPATIENT
Start: 2020-06-12 | End: 2021-03-17

## 2020-06-22 ENCOUNTER — TELEPHONE (OUTPATIENT)
Dept: HEMATOLOGY ONCOLOGY | Facility: CLINIC | Age: 75
End: 2020-06-22

## 2020-06-22 ENCOUNTER — TELEPHONE (OUTPATIENT)
Dept: DERMATOLOGY | Facility: CLINIC | Age: 75
End: 2020-06-22

## 2020-06-29 ENCOUNTER — TELEPHONE (OUTPATIENT)
Dept: SURGICAL ONCOLOGY | Facility: CLINIC | Age: 75
End: 2020-06-29

## 2020-07-01 ENCOUNTER — CONSULT (OUTPATIENT)
Dept: SURGICAL ONCOLOGY | Facility: CLINIC | Age: 75
End: 2020-07-01
Payer: COMMERCIAL

## 2020-07-01 VITALS
TEMPERATURE: 97.3 F | HEART RATE: 65 BPM | BODY MASS INDEX: 32.89 KG/M2 | HEIGHT: 68 IN | WEIGHT: 217 LBS | DIASTOLIC BLOOD PRESSURE: 86 MMHG | SYSTOLIC BLOOD PRESSURE: 128 MMHG

## 2020-07-01 DIAGNOSIS — L98.9 SCALP LESION: Primary | ICD-10-CM

## 2020-07-01 PROCEDURE — 99204 OFFICE O/P NEW MOD 45 MIN: CPT | Performed by: SURGERY

## 2020-07-01 NOTE — LETTER
July 1, 2020     Karthikeyan Lowry [facility]  [address] [address]    Patient: Alexis Hewitt   YOB: 1945   Date of Visit: 7/1/2020       Dear Dr Sharp Lunch:    Thank you for referring Alexis Hewitt to me for evaluation  Below are my notes for this consultation  If you have questions, please do not hesitate to call me  I look forward to following your patient along with you  Sincerely,        Mer Collins MD        CC: Price Spatz, MD Mills Carina, MD Majel Beady, MD  7/1/2020  1:44 PM  Sign at close encounter               Surgical Oncology Consult       [address] [facility]  [address] [facility] [address]    Alexis Hewitt  1945  [id number]  [address] [facility] SURGICAL ONCOLOGY [place]  [address] [facility] [address]    Diagnoses and all orders for this visit:    Scalp lesion        Chief Complaint   Patient presents with    Consult       No follow-ups on file  No history exists  History of Present Illness:  80-year-old male who in May of this year noticed a scalp lesion that was rapidly growing in size and bleeding  Biopsy was performed and this revealed an atypical spindle cell neoplasm  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  He comes in now to discuss the therapy  He is currently feeling well  No abdominal pain, nausea or vomiting  No unintentional weight loss  No shortness of breath no adenopathy  Review of Systems  Complete ROS Surg Onc:   Constitutional: The patient denies new or recent history of general fatigue, no recent weight loss, no change in appetite  Eyes: No complaints of visual problems, no scleral icterus  ENT: no complaints of ear pain, no hoarseness, no difficulty swallowing,  no tinnitus and no new masses in head, oral cavity, or neck     Cardiovascular: No complaints of chest pain, no palpitations, no ankle edema  Respiratory: No complaints of shortness of breath, no cough  Gastrointestinal: No complaints of jaundice, no bloody stools, no pale stools  Genitourinary: No complaints of dysuria, no hematuria, no nocturia, no frequent urination, no urethral discharge  Musculoskeletal: No complaints of weakness, paralysis, joint stiffness or arthralgias  Integumentary: No complaints of rash, no new lesions  Neurological: No complaints of convulsions, no seizures, no dizziness  Hematologic/Lymphatic: No complaints of easy bruising  Endocrine:  No hot or cold intolerance  No polydipsia, polyphagia, or polyuria  Allergy/immunology:  No environmental allergies  No food allergies  Not immunocompromised  Skin:  No pallor or rash  No wound            Patient Active Problem List   Diagnosis    Brain tumor ([facility] )    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[facility] )    Constipation    Controlled diabetes mellitus with diabetic neuropathy (HCC)    Episodic cluster headache, not intractable    GERD (gastroesophageal reflux disease)    Nonintractable headache    Hypertension    ED (erectile dysfunction) of organic origin    Knee joint pain    Meningioma ([facility] )    Varicose 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    Pain in both lower extremities    Spondylolisthesis at L5-S1 level    Other chest pain    Chest pain    Renal cyst    Acute bilateral low back pain without sciatica    History of hepatitis C    Scalp lesion     Past Medical History:   Diagnosis Date    Brain mass     Last Assessed; 11/5/2015    Chest pain     Last Assessed: 1/22/2015    Diabetes type 2, uncontrolled ([facility] )     Last Assessed: 3/1/2016    Edema of left lower extremity     Last Assessed: 3/22/2017    GERD (gastroesophageal reflux disease)     Hyperlipidemia     Hypertension     Impaired fasting glucose     Last Assessed: 11/10/2015    Irregular heartbeat     Varicose 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 Last Assessed: 3/22/2017     Past Surgical History:   Procedure Laterality Date    COLONOSCOPY      COLONOSCOPY N/A 7/10/2018    Procedure: COLONOSCOPY;  Surgeon: Raine Mendez MD;  [facility];   Service: Colorectal    INGUINAL HERNIA REPAIR      20+ years ago - [place]; Last Assessed: 10/23/2014    TONSILLECTOMY      VARICOSE VEIN SURGERY Left     x2 left leg - 40+ yrs ago, [place] and [place]     Family History   Problem Relation Age of Onset    No Known Problems Mother      Social History     Socioeconomic History    Marital status: /Civil Union     Spouse name: Not on file    Number of children: 11    Years of education: Not on file    Highest education level: Not on file   Occupational History    Occupation: Retired: Construction   Social Needs    Financial resource strain: Not on file    Food insecurity:     Worry: Not on file     Inability: Not on file   [employer] needs:     Medical: Not on file     Non-medical: Not on file   Tobacco Use    Smoking status: Former Smoker    Smokeless tobacco: Never Used   Substance and Sexual Activity    Alcohol use: No    Drug use: No    Sexual activity: Not on file   Lifestyle    Physical activity:     Days per week: Not on file     Minutes per session: Not on file    Stress: Not on file   Relationships    Social connections:     Talks on phone: Not on file     Gets together: Not on file     Attends Gnosticist service: Not on file     Active member of club or organization: Not on file     Attends meetings of clubs or organizations: Not on file     Relationship status: Not on file    Intimate partner violence:     Fear of current or ex partner: Not on file     Emotionally abused: Not on file     Physically abused: Not on file     Forced sexual activity: Not on file   Other Topics Concern    Not on file   Social History Narrative    Uses safety equipment           Current Outpatient Medications:     amLODIPine (NORVASC) 2 5 mg tablet, TAKE 2 TABLETS (5 MG TOTAL) BY MOUTH DAILY, Disp: 90 tablet, Rfl: 2    ammonium lactate (LAC-HYDRIN) 12 % cream, Apply topically Twice daily, Disp: , Rfl:     aspirin 81 MG tablet, Take by mouth, Disp: , Rfl:     atorvastatin (LIPITOR) 10 mg tablet, TAKE 1 TABLET BY MOUTH EVERY DAY, Disp: 90 tablet, Rfl: 3    celecoxib (CeleBREX) 100 mg capsule, TOME ALLY CAPSULA DOS VECES AL JEAN MARIE (Patient not taking: Reported on 6/12/2020), Disp: 60 capsule, Rfl: 1    clobetasol (TEMOVATE) 0 05 % GEL, Apply 1 application topically 2 (two) times a day for 14 days, Disp: 60 each, Rfl: 1    Elastic Bandages & Supports ([address] [place] [address]) [facility], by Does not apply route daily (Patient not taking: Reported on 6/12/2020), Disp: 2 each, Rfl: 0    gabapentin (NEURONTIN) 100 mg capsule, Take 2 capsules (200 mg total) by mouth daily at bedtime, Disp: 60 capsule, Rfl: 5    Glucose Blood (ONETOUCH ULTRA BLUE VI), by In Vitro route daily, Disp: , Rfl:     glucose blood test strip, Test once daily e11 9, Disp: 100 each, Rfl: 11    Lancets (ONETOUCH ULTRASOFT) lancets, Test daily, e11 5, Disp: 100 each, Rfl: 11    lisinopril (ZESTRIL) 10 mg tablet, TAKE 1 TABLET BY MOUTH EVERY DAY, Disp: 90 tablet, Rfl: 3    metFORMIN (GLUCOPHAGE) 500 mg tablet, TAKE 1 TABLET BY MOUTH TWICE A DAY, Disp: 180 tablet, Rfl: 3    omeprazole (PriLOSEC) 20 mg delayed release capsule, Take 1 capsule (20 mg total) by mouth daily, Disp: 30 capsule, Rfl: 5    omeprazole (PriLOSEC) 40 MG capsule, TAKE 1 CAPSULE BY MOUTH EVERY DAY 1/2 HOUR BEFORE BREAKFAST, Disp: , Rfl:     polyethylene glycol (GLYCOLAX) powder, Take 17 g by mouth daily (Patient not taking: Reported on 6/12/2020), Disp: 500 g, Rfl: 5    sildenafil (VIAGRA) 100 mg tablet, Take 1 tablet (100 mg total) by mouth as needed for erectile dysfunction (Patient not taking: Reported on 6/12/2020), Disp: 30 tablet, Rfl: 5    topiramate (TOPAMAX) 50 MG tablet, Take 1 tablet by mouth daily, Disp: , Rfl:    traMADol (ULTRAM) 50 mg tablet, Take 1 tablet (50 mg total) by mouth every 6 (six) hours as needed for moderate pain (Patient not taking: Reported on 6/12/2020), Disp: 30 tablet, Rfl: 0  No Known Allergies  Vitals:    07/01/20 1308   BP: 128/86   Pulse: 65   Temp: (!) 97 3 °F (36 3 °C)       Physical Exam   Constitutional: General appearance: The Patient is well-developed and well-nourished who appears the stated age in no acute distress  Patient is pleasant and talkative  HEENT:  Normocephalic  Sclerae are anicteric  Mucous membranes are moist  Neck is supple without adenopathy  No JVD  Chest: The lungs are clear to auscultation  Cardiac: Heart is regular rate  Abdomen: Abdomen is soft, non-tender, non-distended and without masses  Extremities: There is no clubbing or cyanosis  There is no edema  Symmetric  Neuro: Grossly nonfocal  Gait is normal      Lymphatic: No evidence of cervical adenopathy bilaterally  No evidence of axillary adenopathy bilaterally  No evidence of inguinal adenopathy bilaterally  Skin: Warm, anicteric  There are multiple healing scars on scalp  Psych:  Patient is pleasant and talkative  Breasts:      Pathology:  Final Diagnosis   A  Skin, left vertex, shave biopsy:     Atypical intradermal spindle cell neoplasm; 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 (see note)      Note: Diffuse ulceration and peripheral solar elastosis are present  The histologic differential diagnosis includes atypical fibroxanthoma or pleomorphic dermal sarcoma; this distinction depends on localization to the dermis or involvement of the subcutis, respectively  Accordingly, a more precise classification is deferred to the excision  Of note, foci consistent with tumor necrosis are seen, which would favor a diagnosis of pleomorphic dermal sarcoma; however, these foci are in close proximity to the surface ulceration, and so their significance is less clear   The lesional cells are negative for the immunostains SOX10, MART-1, CK AE1/AE3, p40, desmin, and CD31 and show focal positivity for SMA               Electronically signed by Alla Sharp MD on 6/18/2020 at  6:12 PM         Labs:      Imaging  No results found  I reviewed the above laboratory and imaging data  Discussion/Summary:  81-year-old male with an atypical intradermal spindle cell neoplasm  There are positive margins  It is unclear if this is an atypical fibroxanthoma verses pleomorphic dermal sarcoma  At any rate, I would recommend that this be further excised with wider margins  He will need a skin graft for closure  The risks were explained including bleeding, infection, need for further surgery, complications, and skin graft failure  Informed consent was obtained  We will schedule this at our earliest mutual convenience  Will set up with Plastic surgery to discuss skin graft  We also discussed observation without any further surgery but have elected wider excision  He and his family are agreeable to this  All their questions were answered

## 2020-07-01 NOTE — PROGRESS NOTES
Surgical Oncology Consult       [address] [facility]  [facility] [place]  [address] [facility] [address]    Zuleima Fear  1945  [id number]  [address] [facility]  [facility] [place]  [address] [facility] [address]  [facility] [address]    Diagnoses and all orders for this visit:    Scalp lesion        Chief Complaint   Patient presents with    Consult       No follow-ups on file  No history exists  History of Present Illness:  78-year-old male who in May of this year noticed a scalp lesion that was rapidly growing in size and bleeding  Biopsy was performed and this revealed an atypical spindle cell neoplasm  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  He comes in now to discuss the therapy  He is currently feeling well  No abdominal pain, nausea or vomiting  No unintentional weight loss  No shortness of breath no adenopathy  Review of Systems  Complete ROS Surg Onc:   Constitutional: The patient denies new or recent history of general fatigue, no recent weight loss, no change in appetite  Eyes: No complaints of visual problems, no scleral icterus  ENT: no complaints of ear pain, no hoarseness, no difficulty swallowing,  no tinnitus and no new masses in head, oral cavity, or neck  Cardiovascular: No complaints of chest pain, no palpitations, no ankle edema  Respiratory: No complaints of shortness of breath, no cough  Gastrointestinal: No complaints of jaundice, no bloody stools, no pale stools  Genitourinary: No complaints of dysuria, no hematuria, no nocturia, no frequent urination, no urethral discharge  Musculoskeletal: No complaints of weakness, paralysis, joint stiffness or arthralgias  Integumentary: No complaints of rash, no new lesions  Neurological: No complaints of convulsions, no seizures, no dizziness  Hematologic/Lymphatic: No complaints of easy bruising     Endocrine:  No hot or cold intolerance  No polydipsia, polyphagia, or polyuria  Allergy/immunology:  No environmental allergies  No food allergies  Not immunocompromised  Skin:  No pallor or rash  No wound  Patient Active Problem List   Diagnosis    [facility] )    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[facility] )    Constipation    Controlled diabetes mellitus with diabetic neuropathy (HCC)    Episodic cluster headache, not intractable    GERD (gastroesophageal reflux disease)    Nonintractable headache    Hypertension    ED (erectile dysfunction) of organic origin    Knee joint pain    Meningioma ([facility] )    Varicose 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    Pain in both lower extremities    Spondylolisthesis at L5-S1 level    Other chest pain    Chest pain    Renal cyst    Acute bilateral low back pain without sciatica    History of hepatitis C    Scalp lesion     Past Medical History:   Diagnosis Date    Brain mass     Last Assessed; 11/5/2015    Chest pain     Last Assessed: 1/22/2015    Diabetes type 2, uncontrolled ([facility] )     Last Assessed: 3/1/2016    Edema of left lower extremity     Last Assessed: 3/22/2017    GERD (gastroesophageal reflux disease)     Hyperlipidemia     Hypertension     Impaired fasting glucose     Last Assessed: 11/10/2015    Irregular heartbeat     Varicose 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     Last Assessed: 3/22/2017     Past Surgical History:   Procedure Laterality Date    COLONOSCOPY      COLONOSCOPY N/A 7/10/2018    Procedure: COLONOSCOPY;  Surgeon: Keyla Echavarria MD;  [facility];   Service: Colorectal    INGUINAL HERNIA REPAIR      20+ years ago - [facility]; Last Assessed: 10/23/2014    TONSILLECTOMY      VARICOSE VEIN SURGERY Left     x2 left leg - 40+ yrs ago, [facility]     Family History   Problem Relation Age of Onset    No Known Problems Mother      Social History     Socioeconomic History    Marital status: /Civil Union     Spouse name: Not on file    Number of children: 11    Years of education: Not on file    Highest education level: Not on file   Occupational History    Occupation: Retired: Construction   Social Needs    Financial resource strain: Not on file    Food insecurity:     Worry: Not on file     Inability: Not on file   [employer] needs:     Medical: Not on file     Non-medical: Not on file   Tobacco Use    Smoking status: Former Smoker    Smokeless tobacco: Never Used   Substance and Sexual Activity    Alcohol use: No    Drug use: No    Sexual activity: Not on file   Lifestyle    Physical activity:     Days per week: Not on file     Minutes per session: Not on file    Stress: Not on file   Relationships    Social connections:     Talks on phone: Not on file     Gets together: Not on file     Attends [employer] service: Not on file     Active member of club or organization: Not on file     Attends meetings of clubs or organizations: Not on file     Relationship status: Not on file    Intimate partner violence:     Fear of current or ex partner: Not on file     Emotionally abused: Not on file     Physically abused: Not on file     Forced sexual activity: Not on file   Other Topics Concern    Not on file   Social History Narrative    Uses safety equipment           Current Outpatient Medications:     amLODIPine (NORVASC) 2 5 mg tablet, TAKE 2 TABLETS (5 MG TOTAL) BY MOUTH DAILY, Disp: 90 tablet, Rfl: 2    ammonium lactate (LAC-HYDRIN) 12 % cream, Apply topically Twice daily, Disp: , Rfl:     aspirin 81 MG tablet, Take by mouth, Disp: , Rfl:     atorvastatin (LIPITOR) 10 mg tablet, TAKE 1 TABLET BY MOUTH EVERY DAY, Disp: 90 tablet, Rfl: 3    celecoxib (CeleBREX) 100 mg capsule, TOME ALLY CAPSULA DOS VECES AL JEAN MARIE (Patient not taking: Reported on 6/12/2020), Disp: 60 capsule, Rfl: 1    clobetasol (TEMOVATE) 0 05 % GEL, Apply 1 application topically 2 (two) times a day for 14 days, Disp: 60 each, Rfl: 1    Elastic Bandages & Supports ([address] [place] [address]) [facility], by Does not apply route daily (Patient not taking: Reported on 6/12/2020), Disp: 2 each, Rfl: 0    gabapentin (NEURONTIN) 100 mg capsule, Take 2 capsules (200 mg total) by mouth daily at bedtime, Disp: 60 capsule, Rfl: 5    Glucose Blood (ONETOUCH ULTRA BLUE VI), by In Vitro route daily, Disp: , Rfl:     glucose blood test strip, Test once daily e11 9, Disp: 100 each, Rfl: 11    Lancets (ONETOUCH ULTRASOFT) lancets, Test daily, e11 5, Disp: 100 each, Rfl: 11    lisinopril (ZESTRIL) 10 mg tablet, TAKE 1 TABLET BY MOUTH EVERY DAY, Disp: 90 tablet, Rfl: 3    metFORMIN (GLUCOPHAGE) 500 mg tablet, TAKE 1 TABLET BY MOUTH TWICE A DAY, Disp: 180 tablet, Rfl: 3    omeprazole (PriLOSEC) 20 mg delayed release capsule, Take 1 capsule (20 mg total) by mouth daily, Disp: 30 capsule, Rfl: 5    omeprazole (PriLOSEC) 40 MG capsule, TAKE 1 CAPSULE BY MOUTH EVERY DAY 1/2 HOUR BEFORE BREAKFAST, Disp: , Rfl:     polyethylene glycol (GLYCOLAX) powder, Take 17 g by mouth daily (Patient not taking: Reported on 6/12/2020), Disp: 500 g, Rfl: 5    sildenafil (VIAGRA) 100 mg tablet, Take 1 tablet (100 mg total) by mouth as needed for erectile dysfunction (Patient not taking: Reported on 6/12/2020), Disp: 30 tablet, Rfl: 5    topiramate (TOPAMAX) 50 MG tablet, Take 1 tablet by mouth daily, Disp: , Rfl:     traMADol (ULTRAM) 50 mg tablet, Take 1 tablet (50 mg total) by mouth every 6 (six) hours as needed for moderate pain (Patient not taking: Reported on 6/12/2020), Disp: 30 tablet, Rfl: 0  No Known Allergies  Vitals:    07/01/20 1308   BP: 128/86   Pulse: 65   Temp: (!) 97 3 °F (36 3 °C)       Physical Exam   Constitutional: General appearance: The Patient is well-developed and well-nourished who appears the stated age in no acute distress  Patient is pleasant and talkative  HEENT:  Normocephalic  Sclerae are anicteric   Mucous membranes are moist  Neck is supple without adenopathy  No JVD  Chest: The lungs are clear to auscultation  Cardiac: Heart is regular rate  Abdomen: Abdomen is soft, non-tender, non-distended and without masses  Extremities: There is no clubbing or cyanosis  There is no edema  Symmetric  Neuro: Grossly nonfocal  Gait is normal      Lymphatic: No evidence of cervical adenopathy bilaterally  No evidence of axillary adenopathy bilaterally  No evidence of inguinal adenopathy bilaterally  Skin: Warm, anicteric  There are multiple healing scars on scalp  Psych:  Patient is pleasant and talkative  Breasts:      Pathology:  Final Diagnosis   A  Skin, left vertex, shave biopsy:     Atypical intradermal spindle cell neoplasm; 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 (see note)      Note: Diffuse ulceration and peripheral solar elastosis are present  The histologic differential diagnosis includes atypical fibroxanthoma or pleomorphic dermal sarcoma; this distinction depends on localization to the dermis or involvement of the subcutis, respectively  Accordingly, a more precise classification is deferred to the excision  Of note, foci consistent with tumor necrosis are seen, which would favor a diagnosis of pleomorphic dermal sarcoma; however, these foci are in close proximity to the surface ulceration, and so their significance is less clear  The lesional cells are negative for the immunostains SOX10, MART-1, CK AE1/AE3, p40, desmin, and CD31 and show focal positivity for SMA               Electronically signed by Jayne Mark MD on 6/18/2020 at  6:12 PM         Labs:      Imaging  No results found  I reviewed the above laboratory and imaging data  Discussion/Summary:  71-year-old male with an atypical intradermal spindle cell neoplasm  There are positive margins  It is unclear if this is an atypical fibroxanthoma verses pleomorphic dermal sarcoma  At any rate, I would recommend that this be further excised with wider margins    He will need a skin graft for closure  The risks were explained including bleeding, infection, need for further surgery, complications, and skin graft failure  Informed consent was obtained  We will schedule this at our earliest mutual convenience  Will set up with Plastic surgery to discuss skin graft  We also discussed observation without any further surgery but have elected wider excision  He and his family are agreeable to this  All their questions were answered

## 2020-07-07 ENCOUNTER — TELEPHONE (OUTPATIENT)
Dept: PLASTIC SURGERY | Facility: CLINIC | Age: 75
End: 2020-07-07

## 2020-07-07 NOTE — TELEPHONE ENCOUNTER
Left v/m for patient to call back regarding if patient is still taking the medication  Dr Kalpana Knox would also like to know if the medication is helping

## 2020-07-09 ENCOUNTER — TELEPHONE (OUTPATIENT)
Dept: SURGICAL ONCOLOGY | Facility: CLINIC | Age: 75
End: 2020-07-09

## 2020-07-09 NOTE — TELEPHONE ENCOUNTER
I reached out to Dr Erasmo Rose questioning the location of the recent biopsy  Per Dr Erasmo Rose, on clinical image dated 7/1/2020, the more anterior scar is the one that corresponds to the recent biopsy

## 2020-07-14 ENCOUNTER — TELEPHONE (OUTPATIENT)
Dept: PLASTIC SURGERY | Facility: CLINIC | Age: 75
End: 2020-07-14

## 2020-08-06 ENCOUNTER — TELEPHONE (OUTPATIENT)
Dept: DERMATOLOGY | Facility: CLINIC | Age: 75
End: 2020-08-06

## 2020-08-10 ENCOUNTER — OFFICE VISIT (OUTPATIENT)
Dept: FAMILY MEDICINE CLINIC | Facility: CLINIC | Age: 75
End: 2020-08-10
Payer: COMMERCIAL

## 2020-08-10 DIAGNOSIS — C80.1 MALIGNANT SPINDLE CELL NEOPLASM (HCC): ICD-10-CM

## 2020-08-10 DIAGNOSIS — D49.6 BRAIN TUMOR (HCC): Primary | ICD-10-CM

## 2020-08-10 DIAGNOSIS — E11.40 CONTROLLED TYPE 2 DIABETES MELLITUS WITH DIABETIC NEUROPATHY, WITHOUT LONG-TERM CURRENT USE OF INSULIN (HCC): ICD-10-CM

## 2020-08-10 DIAGNOSIS — I10 ESSENTIAL HYPERTENSION: ICD-10-CM

## 2020-08-10 DIAGNOSIS — D32.9 MENINGIOMA (HCC): ICD-10-CM

## 2020-08-10 PROCEDURE — 4040F PNEUMOC VAC/ADMIN/RCVD: CPT | Performed by: NURSE PRACTITIONER

## 2020-08-10 PROCEDURE — 99214 OFFICE O/P EST MOD 30 MIN: CPT | Performed by: NURSE PRACTITIONER

## 2020-08-10 PROCEDURE — 1160F RVW MEDS BY RX/DR IN RCRD: CPT | Performed by: NURSE PRACTITIONER

## 2020-08-10 PROCEDURE — 3079F DIAST BP 80-89 MM HG: CPT | Performed by: NURSE PRACTITIONER

## 2020-08-10 PROCEDURE — 3075F SYST BP GE 130 - 139MM HG: CPT | Performed by: NURSE PRACTITIONER

## 2020-08-10 PROCEDURE — 1036F TOBACCO NON-USER: CPT | Performed by: NURSE PRACTITIONER

## 2020-08-10 RX ORDER — MELOXICAM 7.5 MG/1
7.5 TABLET ORAL DAILY
COMMUNITY
Start: 2020-06-08 | End: 2021-03-17

## 2020-08-10 NOTE — PROGRESS NOTES
Assessment/Plan:           Problem List Items Addressed This Visit        Endocrine    Controlled diabetes mellitus with diabetic neuropathy ([facility] )       Lab Results   Component Value Date    HGBA1C 7 0 (H) 12/18/2019   labs   Cont current meds  Due for eye exam              Cardiovascular and Mediastinum    Hypertension     Stable  Cont current meds              Nervous and Auditory    Brain tumor ([facility] ) - Primary    Relevant Orders    MRI brain w wo contrast    Meningioma ([facility] )    Relevant Orders    MRI brain w wo contrast       Other    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[facility] )     Declines recommended procedure with skin graft  Discussed with patient risk and he continues to decline                   Subjective:      Patient ID: Adri Vega is a 77 y o  male  Here for f/u to chronic medical conditions  Reviewed pathology and results of skin lesion  He declines another surgical procedure with skin graft  Due for labs   Does not check blood sugars at home  Having headache on and off        The following portions of the patient's history were reviewed and updated as appropriate: allergies, current medications, past family history, past medical history, past social history, past surgical history and problem list     Review of Systems   Constitutional: Negative for fatigue and fever  HENT: Positive for hearing loss  Negative for congestion and postnasal drip  Eyes: Negative for photophobia and visual disturbance  Respiratory: Negative for cough and shortness of breath  Cardiovascular: Negative for chest pain and palpitations  Gastrointestinal: Negative for constipation and diarrhea  Endocrine: Negative for polydipsia, polyphagia and polyuria  Genitourinary: Negative for dysuria and frequency  Musculoskeletal: Negative for arthralgias and myalgias  Skin: Negative for rash  Neurological: Positive for headaches  Hematological: Negative for adenopathy     Psychiatric/Behavioral: Negative for dysphoric mood and sleep disturbance  The patient is not nervous/anxious            Objective:      /82   Pulse 85   Temp 98 2 °F (36 8 °C)   Resp 18   Ht 5' 8" (1 727 m)   Wt 98 4 kg (217 lb)   SpO2 96%   BMI 32 99 kg/m²     Family History   Problem Relation Age of Onset    No Known Problems Mother     No Known Problems Father     Melanoma Daughter 16     Past Medical History:   Diagnosis Date    Brain mass     Last Assessed; 11/5/2015    Chest pain     Last Assessed: 1/22/2015    Diabetes type 2, uncontrolled ([facility] )     Last Assessed: 3/1/2016    Edema of left lower extremity     Last Assessed: 3/22/2017    GERD (gastroesophageal reflux disease)     Hyperlipidemia     Hypertension     Impaired fasting glucose     Last Assessed: 11/10/2015    Irregular heartbeat     Varicose 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     Last Assessed: 3/22/2017     Social History     Socioeconomic History    Marital status: /Civil Union     Spouse name: Not on file    Number of children: 5    Years of education: Not on file    Highest education level: Not on file   Occupational History    Occupation: Retired: Construction   Social Needs    Financial resource strain: Not on file    Food insecurity     Worry: Not on file     Inability: Not on file   [employer] needs     Medical: Not on file     Non-medical: Not on file   Tobacco Use    Smoking status: Never Smoker    Smokeless tobacco: Never Used   Substance and Sexual Activity    Alcohol use: No    Drug use: No    Sexual activity: Not on file   Lifestyle    Physical activity     Days per week: Not on file     Minutes per session: Not on file    Stress: Not on file   Relationships    Social connections     Talks on phone: Not on file     Gets together: Not on file     Attends Judaism service: Not on file     Active member of club or organization: Not on file     Attends meetings of clubs or organizations: Not on file Relationship status: Not on file    Intimate partner violence     Fear of current or ex partner: Not on file     Emotionally abused: Not on file     Physically abused: Not on file     Forced sexual activity: Not on file   Other Topics Concern    Not on file   Social History Narrative    Uses safety equipment           Current Outpatient Medications:     amLODIPine (NORVASC) 2 5 mg tablet, TAKE 2 TABLETS (5 MG TOTAL) BY MOUTH DAILY, Disp: 90 tablet, Rfl: 2    ammonium lactate (LAC-HYDRIN) 12 % cream, Apply topically Twice daily, Disp: , Rfl:     aspirin 81 MG tablet, Take by mouth, Disp: , Rfl:     atorvastatin (LIPITOR) 10 mg tablet, TAKE 1 TABLET BY MOUTH EVERY DAY, Disp: 90 tablet, Rfl: 3    gabapentin (NEURONTIN) 100 mg capsule, Take 2 capsules (200 mg total) by mouth daily at bedtime, Disp: 60 capsule, Rfl: 5    Glucose Blood (ONETOUCH ULTRA BLUE VI), by In Vitro route daily, Disp: , Rfl:     glucose blood test strip, Test once daily e11 9, Disp: 100 each, Rfl: 11    Lancets (ONETOUCH ULTRASOFT) lancets, Test daily, e11 5, Disp: 100 each, Rfl: 11    lisinopril (ZESTRIL) 10 mg tablet, TAKE 1 TABLET BY MOUTH EVERY DAY, Disp: 90 tablet, Rfl: 3    metFORMIN (GLUCOPHAGE) 500 mg tablet, TAKE 1 TABLET BY MOUTH TWICE A DAY, Disp: 180 tablet, Rfl: 3    omeprazole (PriLOSEC) 20 mg delayed release capsule, Take 1 capsule (20 mg total) by mouth daily, Disp: 30 capsule, Rfl: 5    omeprazole (PriLOSEC) 40 MG capsule, TAKE 1 CAPSULE BY MOUTH EVERY DAY 1/2 HOUR BEFORE BREAKFAST, Disp: , Rfl:     topiramate (TOPAMAX) 50 MG tablet, Take 1 tablet by mouth daily, Disp: , Rfl:     celecoxib (CeleBREX) 100 mg capsule, TOME ALLY GANESHA DOS VECES AL JEAN MARIE (Patient not taking: Reported on 6/12/2020), Disp: 60 capsule, Rfl: 1    clobetasol (TEMOVATE) 0 05 % GEL, Apply 1 application topically 2 (two) times a day for 14 days, Disp: 60 each, Rfl: 1    Elastic Bandages & Supports ([address] [place] [address]) MISC, by Does not apply route daily (Patient not taking: Reported on 6/12/2020), Disp: 2 each, Rfl: 0    meloxicam (MOBIC) 7 5 mg tablet, Take 7 5 mg by mouth daily, Disp: , Rfl:     polyethylene glycol (GLYCOLAX) powder, Take 17 g by mouth daily (Patient not taking: Reported on 6/12/2020), Disp: 500 g, Rfl: 5    sildenafil (VIAGRA) 100 mg tablet, Take 1 tablet (100 mg total) by mouth as needed for erectile dysfunction (Patient not taking: Reported on 6/12/2020), Disp: 30 tablet, Rfl: 5    traMADol (ULTRAM) 50 mg tablet, Take 1 tablet (50 mg total) by mouth every 6 (six) hours as needed for moderate pain (Patient not taking: Reported on 6/12/2020), Disp: 30 tablet, Rfl: 0  No Known Allergies   Physical Exam  Vitals signs and nursing note reviewed  Constitutional:       Appearance: Normal appearance  HENT:      Head: Normocephalic and atraumatic  Right Ear: Tympanic membrane, ear canal and external ear normal       Left Ear: Tympanic membrane, ear canal and external ear normal       Mouth/Throat:      Mouth: Mucous membranes are moist    Eyes:      Conjunctiva/sclera: Conjunctivae normal    Neck:      Musculoskeletal: Normal range of motion and neck supple  Cardiovascular:      Rate and Rhythm: Normal rate and regular rhythm  Pulses: Normal pulses  Heart sounds: Normal heart sounds  Pulmonary:      Effort: Pulmonary effort is normal       Breath sounds: Normal breath sounds  Abdominal:      General: Bowel sounds are normal    Musculoskeletal: Normal range of motion  Skin:     General: Skin is warm and dry  Capillary Refill: Capillary refill takes less than 2 seconds  Neurological:      General: No focal deficit present  Mental Status: He is alert and oriented to person, place, and time  Psychiatric:         Mood and Affect: Mood normal          Behavior: Behavior normal          Thought Content:  Thought content normal          Judgment: Judgment normal

## 2020-08-12 VITALS
HEIGHT: 68 IN | BODY MASS INDEX: 32.89 KG/M2 | SYSTOLIC BLOOD PRESSURE: 134 MMHG | DIASTOLIC BLOOD PRESSURE: 82 MMHG | OXYGEN SATURATION: 96 % | WEIGHT: 217 LBS | HEART RATE: 85 BPM | TEMPERATURE: 98.2 F | RESPIRATION RATE: 18 BRPM

## 2020-08-12 PROBLEM — M79.604 PAIN IN BOTH LOWER EXTREMITIES: Status: RESOLVED | Noted: 2018-09-04 | Resolved: 2020-08-12

## 2020-08-12 PROBLEM — R07.9 CHEST PAIN: Status: RESOLVED | Noted: 2019-02-18 | Resolved: 2020-08-12

## 2020-08-12 PROBLEM — C80.1 MALIGNANT SPINDLE CELL NEOPLASM (HCC): Status: ACTIVE | Noted: 2020-08-12

## 2020-08-12 PROBLEM — R07.89 OTHER CHEST PAIN: Status: RESOLVED | Noted: 2018-10-24 | Resolved: 2020-08-12

## 2020-08-12 PROBLEM — M54.50 ACUTE BILATERAL LOW BACK PAIN WITHOUT SCIATICA: Status: RESOLVED | Noted: 2019-12-17 | Resolved: 2020-08-12

## 2020-08-12 PROBLEM — M79.605 PAIN IN BOTH LOWER EXTREMITIES: Status: RESOLVED | Noted: 2018-09-04 | Resolved: 2020-08-12

## 2020-08-12 PROBLEM — K59.00 CONSTIPATION: Status: RESOLVED | Noted: 2017-11-29 | Resolved: 2020-08-12

## 2020-08-12 NOTE — ASSESSMENT & PLAN NOTE
Declines recommended procedure with skin graft  Discussed with patient risk and he continues to decline

## 2020-08-12 NOTE — ASSESSMENT & PLAN NOTE
Lab Results   Component Value Date    HGBA1C 7 0 (H) 12/18/2019   labs   Cont current meds  Due for eye exam

## 2020-08-13 RX ORDER — MELOXICAM 7.5 MG/1
TABLET ORAL
Qty: 30 TABLET | Refills: 0 | OUTPATIENT
Start: 2020-08-13

## 2020-08-21 DIAGNOSIS — K59.01 SLOW TRANSIT CONSTIPATION: ICD-10-CM

## 2020-08-21 RX ORDER — BLOOD SUGAR DIAGNOSTIC
STRIP MISCELLANEOUS
Qty: 25 EACH | Refills: 47 | Status: SHIPPED | OUTPATIENT
Start: 2020-08-21 | End: 2021-09-01

## 2020-08-25 ENCOUNTER — APPOINTMENT (OUTPATIENT)
Dept: LAB | Facility: HOSPITAL | Age: 75
End: 2020-08-25
Payer: COMMERCIAL

## 2020-08-25 DIAGNOSIS — L98.9 SCALP LESION: ICD-10-CM

## 2020-08-25 DIAGNOSIS — R07.9 CHEST PAIN, UNSPECIFIED TYPE: ICD-10-CM

## 2020-08-25 DIAGNOSIS — R51.9 NONINTRACTABLE HEADACHE, UNSPECIFIED CHRONICITY PATTERN, UNSPECIFIED HEADACHE TYPE: ICD-10-CM

## 2020-08-25 DIAGNOSIS — I10 ESSENTIAL HYPERTENSION: ICD-10-CM

## 2020-08-25 DIAGNOSIS — M25.562 ARTHRALGIA OF BOTH KNEES: ICD-10-CM

## 2020-08-25 DIAGNOSIS — E11.40 CONTROLLED TYPE 2 DIABETES MELLITUS WITH DIABETIC NEUROPATHY, WITHOUT LONG-TERM CURRENT USE OF INSULIN (HCC): ICD-10-CM

## 2020-08-25 DIAGNOSIS — M25.561 ARTHRALGIA OF BOTH KNEES: ICD-10-CM

## 2020-08-25 LAB
ALBUMIN SERPL BCP-MCNC: 3.9 G/DL (ref 3.5–5)
ALP SERPL-CCNC: 112 U/L (ref 46–116)
ALT SERPL W P-5'-P-CCNC: 19 U/L (ref 12–78)
ANION GAP SERPL CALCULATED.3IONS-SCNC: 4 MMOL/L (ref 4–13)
AST SERPL W P-5'-P-CCNC: 7 U/L (ref 5–45)
BASOPHILS # BLD AUTO: 0.02 THOUSANDS/ΜL (ref 0–0.1)
BASOPHILS NFR BLD AUTO: 0 % (ref 0–1)
BILIRUB SERPL-MCNC: 0.63 MG/DL (ref 0.2–1)
BUN SERPL-MCNC: 21 MG/DL (ref 5–25)
CALCIUM SERPL-MCNC: 9.1 MG/DL (ref 8.3–10.1)
CHLORIDE SERPL-SCNC: 106 MMOL/L (ref 100–108)
CHOLEST SERPL-MCNC: 195 MG/DL (ref 50–200)
CO2 SERPL-SCNC: 29 MMOL/L (ref 21–32)
CREAT SERPL-MCNC: 0.71 MG/DL (ref 0.6–1.3)
CREAT UR-MCNC: 39.8 MG/DL
EOSINOPHIL # BLD AUTO: 0.15 THOUSAND/ΜL (ref 0–0.61)
EOSINOPHIL NFR BLD AUTO: 3 % (ref 0–6)
ERYTHROCYTE [DISTWIDTH] IN BLOOD BY AUTOMATED COUNT: 13 % (ref 11.6–15.1)
EST. AVERAGE GLUCOSE BLD GHB EST-MCNC: 146 MG/DL
GFR SERPL CREATININE-BSD FRML MDRD: 92 ML/MIN/1.73SQ M
GLUCOSE P FAST SERPL-MCNC: 136 MG/DL (ref 65–99)
HBA1C MFR BLD: 6.7 %
HCT VFR BLD AUTO: 39.9 % (ref 36.5–49.3)
HDLC SERPL-MCNC: 31 MG/DL
HGB BLD-MCNC: 13.3 G/DL (ref 12–17)
IMM GRANULOCYTES # BLD AUTO: 0.06 THOUSAND/UL (ref 0–0.2)
IMM GRANULOCYTES NFR BLD AUTO: 1 % (ref 0–2)
LDLC SERPL CALC-MCNC: 127 MG/DL (ref 0–100)
LYMPHOCYTES # BLD AUTO: 1.4 THOUSANDS/ΜL (ref 0.6–4.47)
LYMPHOCYTES NFR BLD AUTO: 24 % (ref 14–44)
MCH RBC QN AUTO: 28.5 PG (ref 26.8–34.3)
MCHC RBC AUTO-ENTMCNC: 33.3 G/DL (ref 31.4–37.4)
MCV RBC AUTO: 85 FL (ref 82–98)
MICROALBUMIN UR-MCNC: 5.3 MG/L (ref 0–20)
MICROALBUMIN/CREAT 24H UR: 13 MG/G CREATININE (ref 0–30)
MONOCYTES # BLD AUTO: 0.45 THOUSAND/ΜL (ref 0.17–1.22)
MONOCYTES NFR BLD AUTO: 8 % (ref 4–12)
NEUTROPHILS # BLD AUTO: 3.69 THOUSANDS/ΜL (ref 1.85–7.62)
NEUTS SEG NFR BLD AUTO: 64 % (ref 43–75)
NRBC BLD AUTO-RTO: 0 /100 WBCS
PLATELET # BLD AUTO: 167 THOUSANDS/UL (ref 149–390)
PMV BLD AUTO: 9.8 FL (ref 8.9–12.7)
POTASSIUM SERPL-SCNC: 4.5 MMOL/L (ref 3.5–5.3)
PROT SERPL-MCNC: 7.3 G/DL (ref 6.4–8.2)
RBC # BLD AUTO: 4.67 MILLION/UL (ref 3.88–5.62)
SODIUM SERPL-SCNC: 139 MMOL/L (ref 136–145)
TRIGL SERPL-MCNC: 184 MG/DL
WBC # BLD AUTO: 5.77 THOUSAND/UL (ref 4.31–10.16)

## 2020-08-25 PROCEDURE — 82570 ASSAY OF URINE CREATININE: CPT | Performed by: NURSE PRACTITIONER

## 2020-08-25 PROCEDURE — 83036 HEMOGLOBIN GLYCOSYLATED A1C: CPT

## 2020-08-25 PROCEDURE — 80061 LIPID PANEL: CPT

## 2020-08-25 PROCEDURE — 36415 COLL VENOUS BLD VENIPUNCTURE: CPT

## 2020-08-25 PROCEDURE — 82043 UR ALBUMIN QUANTITATIVE: CPT | Performed by: NURSE PRACTITIONER

## 2020-08-25 PROCEDURE — 85025 COMPLETE CBC W/AUTO DIFF WBC: CPT

## 2020-08-25 PROCEDURE — 80053 COMPREHEN METABOLIC PANEL: CPT

## 2020-08-27 ENCOUNTER — TELEPHONE (OUTPATIENT)
Dept: SURGICAL ONCOLOGY | Facility: CLINIC | Age: 75
End: 2020-08-27

## 2020-09-16 ENCOUNTER — OFFICE VISIT (OUTPATIENT)
Dept: FAMILY MEDICINE CLINIC | Facility: CLINIC | Age: 75
End: 2020-09-16
Payer: COMMERCIAL

## 2020-09-16 VITALS
OXYGEN SATURATION: 99 % | BODY MASS INDEX: 32.95 KG/M2 | TEMPERATURE: 96.6 F | HEIGHT: 68 IN | HEART RATE: 74 BPM | WEIGHT: 217.4 LBS | DIASTOLIC BLOOD PRESSURE: 80 MMHG | RESPIRATION RATE: 18 BRPM | SYSTOLIC BLOOD PRESSURE: 128 MMHG

## 2020-09-16 DIAGNOSIS — M17.0 PRIMARY OSTEOARTHRITIS OF BOTH KNEES: ICD-10-CM

## 2020-09-16 DIAGNOSIS — H61.23 BILATERAL IMPACTED CERUMEN: Primary | ICD-10-CM

## 2020-09-16 PROCEDURE — 99213 OFFICE O/P EST LOW 20 MIN: CPT | Performed by: NURSE PRACTITIONER

## 2020-09-16 PROCEDURE — 69210 REMOVE IMPACTED EAR WAX UNI: CPT | Performed by: NURSE PRACTITIONER

## 2020-09-16 NOTE — PROGRESS NOTES
Assessment/Plan:           Problem List Items Addressed This Visit        Nervous and Auditory    Bilateral impacted cerumen - Primary     Patient tolerated well              Other    Primary osteoarthritis of both knees    Relevant Medications    diclofenac sodium (VOLTAREN) 1 %             Subjective:      Patient ID: Howie Hernandez is a 77 y o  male      Here for c/o wax in ears with decreased hearing  Also c/o bilat knee pain, chronic  Would like to try cream for pain        The following portions of the patient's history were reviewed and updated as appropriate: allergies, current medications, past family history, past medical history, past social history, past surgical history and problem list     Review of Systems      Objective:      /80 (BP Location: Left arm, Patient Position: Sitting, Cuff Size: Standard)   Pulse 74   Temp (!) 96 6 °F (35 9 °C) (Tympanic)   Resp 18   Ht 5' 8" (1 727 m)   Wt 98 6 kg (217 lb 6 4 oz)   SpO2 99%   BMI 33 06 kg/m²     Family History   Problem Relation Age of Onset    No Known Problems Mother     No Known Problems Father     Melanoma Daughter 16     Past Medical History:   Diagnosis Date    Brain mass     Last Assessed; 11/5/2015    Chest pain     Last Assessed: 1/22/2015    Diabetes type 2, uncontrolled ([facility] )     Last Assessed: 3/1/2016    Edema of left lower extremity     Last Assessed: 3/22/2017    GERD (gastroesophageal reflux disease)     Hyperlipidemia     Hypertension     Impaired fasting glucose     Last Assessed: 11/10/2015    Irregular heartbeat     Varicose 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     Last Assessed: 3/22/2017     Social History     Socioeconomic History    Marital status: /Civil Union     Spouse name: Not on file    Number of children: 11    Years of education: Not on file    Highest education level: Not on file   Occupational History    Occupation: Retired: Construction   Social Needs    Financial resource strain: Not on file    Food insecurity     Worry: Not on file     Inability: Not on file    Transportation needs     Medical: Not on file     Non-medical: Not on file   Tobacco Use    Smoking status: Never Smoker    Smokeless tobacco: Never Used   Substance and Sexual Activity    Alcohol use: No    Drug use: No    Sexual activity: Not on file   Lifestyle    Physical activity     Days per week: Not on file     Minutes per session: Not on file    Stress: Not on file   Relationships    Social connections     Talks on phone: Not on file     Gets together: Not on file     Attends Lutheran service: Not on file     Active member of club or organization: Not on file     Attends meetings of clubs or organizations: Not on file     Relationship status: Not on file    Intimate partner violence     Fear of current or ex partner: Not on file     Emotionally abused: Not on file     Physically abused: Not on file     Forced sexual activity: Not on file   Other Topics Concern    Not on file   Social History Narrative    Uses safety equipment           Current Outpatient Medications:     amLODIPine (NORVASC) 2 5 mg tablet, TAKE 2 TABLETS (5 MG TOTAL) BY MOUTH DAILY, Disp: 90 tablet, Rfl: 2    ammonium lactate (LAC-HYDRIN) 12 % cream, Apply topically Twice daily, Disp: , Rfl:     aspirin 81 MG tablet, Take by mouth, Disp: , Rfl:     atorvastatin (LIPITOR) 10 mg tablet, TAKE 1 TABLET BY MOUTH EVERY DAY, Disp: 90 tablet, Rfl: 3    clobetasol (TEMOVATE) 0 05 % GEL, Apply 1 application topically 2 (two) times a day for 14 days, Disp: 60 each, Rfl: 1    diclofenac sodium (VOLTAREN) 1 %, Apply 2 g topically 4 (four) times a day, Disp: 150 g, Rfl: 1    gabapentin (NEURONTIN) 100 mg capsule, Take 2 capsules (200 mg total) by mouth daily at bedtime, Disp: 60 capsule, Rfl: 5    Glucose Blood (ONETOUCH ULTRA BLUE VI), by In Vitro route daily, Disp: , Rfl:     Lancets (ONETOUCH ULTRASOFT) lancets, Test daily, e11 5, Disp: 100 each, Rfl: 11    lisinopril (ZESTRIL) 10 mg tablet, TAKE 1 TABLET BY MOUTH EVERY DAY, Disp: 90 tablet, Rfl: 3    meloxicam (MOBIC) 7 5 mg tablet, Take 7 5 mg by mouth daily, Disp: , Rfl:     metFORMIN (GLUCOPHAGE) 500 mg tablet, TAKE 1 TABLET BY MOUTH TWICE A DAY, Disp: 180 tablet, Rfl: 3    omeprazole (PriLOSEC) 20 mg delayed release capsule, Take 1 capsule (20 mg total) by mouth daily, Disp: 30 capsule, Rfl: 5    omeprazole (PriLOSEC) 40 MG capsule, TAKE 1 CAPSULE BY MOUTH EVERY DAY 1/2 HOUR BEFORE BREAKFAST, Disp: , Rfl:     OneTouch Ultra test strip, TEST ONCE DAILY E11 9, Disp: 25 each, Rfl: 47    topiramate (TOPAMAX) 50 MG tablet, Take 1 tablet by mouth daily, Disp: , Rfl:   No Known Allergies     Physical Exam  Vitals signs and nursing note reviewed  Constitutional:       Appearance: Normal appearance  HENT:      Head: Normocephalic and atraumatic  Right Ear: There is impacted cerumen  Left Ear: There is impacted cerumen  Eyes:      Conjunctiva/sclera: Conjunctivae normal    Cardiovascular:      Rate and Rhythm: Normal rate and regular rhythm  Pulmonary:      Effort: Pulmonary effort is normal       Breath sounds: Normal breath sounds  Musculoskeletal: Normal range of motion  Skin:     General: Skin is warm and dry  Neurological:      Mental Status: He is alert and oriented to person, place, and time     Psychiatric:         Mood and Affect: Mood normal          Behavior: Behavior normal        Ear cerumen removal    Date/Time: 9/16/2020 12:02 PM  Performed by: ZAHRA Moyer  Authorized by: ZAHRA Moyer     Patient location:  Clinic  Indications / Diagnosis:  Cerumen impaction  Other Assisting Provider: Yes (comment)    Consent:     Consent obtained:  Verbal    Consent given by:  Patient    Risks discussed:  Bleeding, dizziness, infection, incomplete removal, pain and TM perforation    Alternatives discussed:  No treatment, delayed treatment, alternative treatment, observation and referral  Universal protocol:     Procedure explained and questions answered to patient or proxy's satisfaction: yes    Procedure details:     Local anesthetic:  None    Location:  L ear and R ear    Procedure type: irrigation with instrumentation      Instrumentation: curette      Approach:  Natural orifice  Post-procedure details:     Complication:  None    Hearing quality:  Improved    Patient tolerance of procedure:   Tolerated well, no immediate complications

## 2020-10-01 ENCOUNTER — TELEPHONE (OUTPATIENT)
Dept: FAMILY MEDICINE CLINIC | Facility: CLINIC | Age: 75
End: 2020-10-01

## 2020-10-05 ENCOUNTER — OFFICE VISIT (OUTPATIENT)
Dept: FAMILY MEDICINE CLINIC | Facility: CLINIC | Age: 75
End: 2020-10-05
Payer: COMMERCIAL

## 2020-10-05 VITALS
OXYGEN SATURATION: 97 % | RESPIRATION RATE: 18 BRPM | BODY MASS INDEX: 33.43 KG/M2 | HEART RATE: 101 BPM | SYSTOLIC BLOOD PRESSURE: 138 MMHG | HEIGHT: 68 IN | DIASTOLIC BLOOD PRESSURE: 88 MMHG | WEIGHT: 220.6 LBS | TEMPERATURE: 97 F
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
## 2020-10-05 DIAGNOSIS — M25.561 ACUTE PAIN OF BOTH KNEES: ICD-10-CM

## 2020-10-05 DIAGNOSIS — M25.562 ACUTE PAIN OF BOTH KNEES: ICD-10-CM

## 2020-10-05 DIAGNOSIS — M25.521 ELBOW PAIN, RIGHT: ICD-10-CM

## 2020-10-05 PROCEDURE — 99214 OFFICE O/P EST MOD 30 MIN: CPT | Performed by: NURSE PRACTITIONER

## 2020-10-05 RX ORDER — TRAMADOL HYDROCHLORIDE 50 MG/1
50 TABLET ORAL EVERY 6 HOURS PRN
Qty: 30 TABLET | Refills: 0 | Status: SHIPPED | OUTPATIENT
Start: 2020-10-05 | End: 2021-03-17

## 2020-10-07 ENCOUNTER — HOSPITAL ENCOUNTER (OUTPATIENT)
Dept: RADIOLOGY | Facility: HOSPITAL | Age: 75
Discharge: HOME | End: 2020-10-07
Payer: COMMERCIAL

## 2020-10-07 ENCOUNTER — TRANSCRIBE ORDERS (OUTPATIENT)
Dept: RADIOLOGY | Facility: HOSPITAL | Age: 75
End: 2020-10-07

## 2020-10-07 DIAGNOSIS — M25.521 ELBOW PAIN, RIGHT: ICD-10-CM
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
## 2020-10-07 DIAGNOSIS — M25.561 ACUTE PAIN OF BOTH KNEES: ICD-10-CM

## 2020-10-07 DIAGNOSIS — M25.562 ACUTE PAIN OF BOTH KNEES: ICD-10-CM

## 2020-10-07 PROCEDURE — 73564 X-RAY EXAM KNEE 4 OR MORE: CPT

## 2020-10-07 PROCEDURE — 73080 X-RAY EXAM OF ELBOW: CPT

## 2020-10-13 ENCOUNTER — EVALUATION (OUTPATIENT)
Dept: PHYSICAL THERAPY | Facility: CLINIC | Age: 75
End: 2020-10-13
Payer: COMMERCIAL

## 2020-10-13 DIAGNOSIS — M25.521 ELBOW PAIN, RIGHT: ICD-10-CM

## 2020-10-13 DIAGNOSIS — M25.561 ACUTE PAIN OF BOTH KNEES: Primary | ICD-10-CM

## 2020-10-13 DIAGNOSIS — M17.0 PRIMARY OSTEOARTHRITIS OF BOTH KNEES: ICD-10-CM

## 2020-10-13 DIAGNOSIS — M25.562 ACUTE PAIN OF BOTH KNEES: Primary | ICD-10-CM

## 2020-10-13 PROCEDURE — 97110 THERAPEUTIC EXERCISES: CPT | Performed by: PHYSICAL THERAPIST

## 2020-10-13 PROCEDURE — 97535 SELF CARE MNGMENT TRAINING: CPT | Performed by: PHYSICAL THERAPIST

## 2020-10-13 PROCEDURE — 97162 PT EVAL MOD COMPLEX 30 MIN: CPT | Performed by: PHYSICAL THERAPIST

## 2020-10-13 PROCEDURE — 97112 NEUROMUSCULAR REEDUCATION: CPT | Performed by: PHYSICAL THERAPIST

## 2020-10-14 ENCOUNTER — HOSPITAL ENCOUNTER (OUTPATIENT)
Dept: RADIOLOGY | Facility: HOSPITAL | Age: 75
Discharge: HOME | End: 2020-10-14
Payer: COMMERCIAL

## 2020-10-14 DIAGNOSIS — D32.9 MENINGIOMA (HCC): ICD-10-CM

## 2020-10-14 DIAGNOSIS — D49.6 BRAIN TUMOR (HCC): ICD-10-CM

## 2020-10-14 PROCEDURE — G1004 CDSM NDSC: HCPCS

## 2020-10-14 PROCEDURE — A9585 GADOBUTROL INJECTION: HCPCS | Performed by: NURSE PRACTITIONER

## 2020-10-14 PROCEDURE — 70553 MRI BRAIN STEM W/O & W/DYE: CPT

## 2020-10-14 RX ADMIN — GADOBUTROL 10 ML: 604.72 INJECTION INTRAVENOUS at 18:47

## 2020-10-19 ENCOUNTER — OFFICE VISIT (OUTPATIENT)
Dept: PHYSICAL THERAPY | Facility: CLINIC | Age: 75
End: 2020-10-19
Payer: COMMERCIAL

## 2020-10-19 DIAGNOSIS — M25.561 ACUTE PAIN OF BOTH KNEES: ICD-10-CM

## 2020-10-19 DIAGNOSIS — M17.0 PRIMARY OSTEOARTHRITIS OF BOTH KNEES: Primary | ICD-10-CM

## 2020-10-19 DIAGNOSIS — M25.562 ACUTE PAIN OF BOTH KNEES: ICD-10-CM

## 2020-10-19 DIAGNOSIS — M25.521 ELBOW PAIN, RIGHT: ICD-10-CM

## 2020-10-19 PROCEDURE — 97140 MANUAL THERAPY 1/> REGIONS: CPT

## 2020-10-19 PROCEDURE — 97110 THERAPEUTIC EXERCISES: CPT

## 2020-10-26 ENCOUNTER — OFFICE VISIT (OUTPATIENT)
Dept: PHYSICAL THERAPY | Facility: CLINIC | Age: 75
End: 2020-10-26
Payer: COMMERCIAL

## 2020-10-26 DIAGNOSIS — M17.0 PRIMARY OSTEOARTHRITIS OF BOTH KNEES: Primary | ICD-10-CM

## 2020-10-26 DIAGNOSIS — M25.521 ELBOW PAIN, RIGHT: ICD-10-CM

## 2020-10-26 DIAGNOSIS — M25.562 ACUTE PAIN OF BOTH KNEES: ICD-10-CM

## 2020-10-26 DIAGNOSIS — M25.561 ACUTE PAIN OF BOTH KNEES: ICD-10-CM

## 2020-10-26 PROCEDURE — 97140 MANUAL THERAPY 1/> REGIONS: CPT | Performed by: PHYSICAL THERAPIST

## 2020-10-26 PROCEDURE — 97110 THERAPEUTIC EXERCISES: CPT | Performed by: PHYSICAL THERAPIST

## 2020-10-27 ENCOUNTER — TELEPHONE (OUTPATIENT)
Dept: SURGICAL ONCOLOGY | Facility: CLINIC | Age: 75
End: 2020-10-27

## 2020-11-02 ENCOUNTER — OFFICE VISIT (OUTPATIENT)
Dept: FAMILY MEDICINE CLINIC | Facility: CLINIC | Age: 75
End: 2020-11-02
Payer: COMMERCIAL

## 2020-11-02 VITALS
WEIGHT: 224.4 LBS | BODY MASS INDEX: 34.01 KG/M2 | TEMPERATURE: 96.7 F | RESPIRATION RATE: 18 BRPM | HEART RATE: 56 BPM | OXYGEN SATURATION: 97 % | HEIGHT: 68 IN | SYSTOLIC BLOOD PRESSURE: 126 MMHG | DIASTOLIC BLOOD PRESSURE: 62 MMHG

## 2020-11-02 DIAGNOSIS — E11.40 CONTROLLED TYPE 2 DIABETES MELLITUS WITH DIABETIC NEUROPATHY, WITHOUT LONG-TERM CURRENT USE OF INSULIN (HCC): ICD-10-CM

## 2020-11-02 DIAGNOSIS — Z00.00 MEDICARE ANNUAL WELLNESS VISIT, SUBSEQUENT: Primary | ICD-10-CM

## 2020-11-02 DIAGNOSIS — M17.0 PRIMARY OSTEOARTHRITIS OF BOTH KNEES: ICD-10-CM

## 2020-11-02 DIAGNOSIS — D32.9 MENINGIOMA (HCC): ICD-10-CM

## 2020-11-02 DIAGNOSIS — C80.1 MALIGNANT SPINDLE CELL NEOPLASM (HCC): ICD-10-CM

## 2020-11-02 DIAGNOSIS — I10 ESSENTIAL HYPERTENSION: ICD-10-CM

## 2020-11-02 DIAGNOSIS — R51.9 NONINTRACTABLE HEADACHE, UNSPECIFIED CHRONICITY PATTERN, UNSPECIFIED HEADACHE TYPE: ICD-10-CM

## 2020-11-02 PROCEDURE — 99214 OFFICE O/P EST MOD 30 MIN: CPT | Performed by: NURSE PRACTITIONER

## 2020-11-02 PROCEDURE — G0439 PPPS, SUBSEQ VISIT: HCPCS | Performed by: NURSE PRACTITIONER

## 2020-11-03 ENCOUNTER — OFFICE VISIT (OUTPATIENT)
Dept: PHYSICAL THERAPY | Facility: CLINIC | Age: 75
End: 2020-11-03
Payer: COMMERCIAL

## 2020-11-03 DIAGNOSIS — M25.561 ACUTE PAIN OF BOTH KNEES: ICD-10-CM

## 2020-11-03 DIAGNOSIS — M25.562 ACUTE PAIN OF BOTH KNEES: ICD-10-CM

## 2020-11-03 DIAGNOSIS — M17.0 PRIMARY OSTEOARTHRITIS OF BOTH KNEES: Primary | ICD-10-CM

## 2020-11-03 DIAGNOSIS — M25.521 ELBOW PAIN, RIGHT: ICD-10-CM

## 2020-11-03 PROBLEM — H61.23 BILATERAL IMPACTED CERUMEN: Status: RESOLVED | Noted: 2020-09-16 | Resolved: 2020-11-03

## 2020-11-03 PROCEDURE — 97140 MANUAL THERAPY 1/> REGIONS: CPT | Performed by: PHYSICAL THERAPIST

## 2020-11-03 PROCEDURE — 97112 NEUROMUSCULAR REEDUCATION: CPT | Performed by: PHYSICAL THERAPIST

## 2020-11-03 PROCEDURE — 97110 THERAPEUTIC EXERCISES: CPT | Performed by: PHYSICAL THERAPIST

## 2020-11-09 ENCOUNTER — OFFICE VISIT (OUTPATIENT)
Dept: PHYSICAL THERAPY | Facility: CLINIC | Age: 75
End: 2020-11-09
Payer: COMMERCIAL

## 2020-11-09 ENCOUNTER — TELEPHONE (OUTPATIENT)
Dept: FAMILY MEDICINE CLINIC | Facility: CLINIC | Age: 75
End: 2020-11-09

## 2020-11-09 DIAGNOSIS — G89.29 CHRONIC BILATERAL LOW BACK PAIN WITHOUT SCIATICA: ICD-10-CM

## 2020-11-09 DIAGNOSIS — M54.2 NECK PAIN: Primary | ICD-10-CM

## 2020-11-09 DIAGNOSIS — M54.50 CHRONIC BILATERAL LOW BACK PAIN WITHOUT SCIATICA: ICD-10-CM

## 2020-11-09 DIAGNOSIS — M25.521 ELBOW PAIN, RIGHT: ICD-10-CM

## 2020-11-09 DIAGNOSIS — M25.562 ACUTE PAIN OF BOTH KNEES: ICD-10-CM

## 2020-11-09 DIAGNOSIS — M17.0 PRIMARY OSTEOARTHRITIS OF BOTH KNEES: Primary | ICD-10-CM

## 2020-11-09 DIAGNOSIS — M25.561 ACUTE PAIN OF BOTH KNEES: ICD-10-CM

## 2020-11-09 PROCEDURE — 97112 NEUROMUSCULAR REEDUCATION: CPT | Performed by: PHYSICAL THERAPIST

## 2020-11-09 PROCEDURE — 97140 MANUAL THERAPY 1/> REGIONS: CPT | Performed by: PHYSICAL THERAPIST

## 2020-11-09 PROCEDURE — 97110 THERAPEUTIC EXERCISES: CPT | Performed by: PHYSICAL THERAPIST

## 2020-11-11 DIAGNOSIS — G89.29 CHRONIC THORACIC BACK PAIN, UNSPECIFIED BACK PAIN LATERALITY: ICD-10-CM

## 2020-11-11 DIAGNOSIS — M54.2 NECK PAIN: Primary | ICD-10-CM

## 2020-11-11 DIAGNOSIS — M54.6 CHRONIC THORACIC BACK PAIN, UNSPECIFIED BACK PAIN LATERALITY: ICD-10-CM

## 2020-11-17 ENCOUNTER — OFFICE VISIT (OUTPATIENT)
Dept: PHYSICAL THERAPY | Facility: CLINIC | Age: 75
End: 2020-11-17
Payer: COMMERCIAL

## 2020-11-17 DIAGNOSIS — M17.0 PRIMARY OSTEOARTHRITIS OF BOTH KNEES: Primary | ICD-10-CM

## 2020-11-17 DIAGNOSIS — M25.562 ACUTE PAIN OF BOTH KNEES: ICD-10-CM

## 2020-11-17 DIAGNOSIS — M25.521 ELBOW PAIN, RIGHT: ICD-10-CM

## 2020-11-17 DIAGNOSIS — M25.561 ACUTE PAIN OF BOTH KNEES: ICD-10-CM

## 2020-11-17 PROCEDURE — 97140 MANUAL THERAPY 1/> REGIONS: CPT

## 2020-11-17 PROCEDURE — 97110 THERAPEUTIC EXERCISES: CPT

## 2020-11-17 PROCEDURE — 97112 NEUROMUSCULAR REEDUCATION: CPT

## 2020-11-23 ENCOUNTER — EVALUATION (OUTPATIENT)
Dept: PHYSICAL THERAPY | Facility: CLINIC | Age: 75
End: 2020-11-23
Payer: COMMERCIAL

## 2020-11-23 DIAGNOSIS — M25.561 ACUTE PAIN OF BOTH KNEES: ICD-10-CM

## 2020-11-23 DIAGNOSIS — M25.521 ELBOW PAIN, RIGHT: ICD-10-CM

## 2020-11-23 DIAGNOSIS — M54.50 ACUTE LOW BACK PAIN WITHOUT SCIATICA, UNSPECIFIED BACK PAIN LATERALITY: ICD-10-CM

## 2020-11-23 DIAGNOSIS — M25.562 ACUTE PAIN OF BOTH KNEES: ICD-10-CM

## 2020-11-23 DIAGNOSIS — M54.2 CERVICAL PAIN: ICD-10-CM

## 2020-11-23 DIAGNOSIS — M17.0 PRIMARY OSTEOARTHRITIS OF BOTH KNEES: Primary | ICD-10-CM

## 2020-11-23 PROCEDURE — 97112 NEUROMUSCULAR REEDUCATION: CPT | Performed by: PHYSICAL THERAPIST

## 2020-11-23 PROCEDURE — 97110 THERAPEUTIC EXERCISES: CPT | Performed by: PHYSICAL THERAPIST

## 2020-11-23 PROCEDURE — 97140 MANUAL THERAPY 1/> REGIONS: CPT | Performed by: PHYSICAL THERAPIST

## 2020-11-30 ENCOUNTER — VBI (OUTPATIENT)
Dept: ADMINISTRATIVE | Facility: OTHER | Age: 75
End: 2020-11-30

## 2020-12-01 ENCOUNTER — OFFICE VISIT (OUTPATIENT)
Dept: PHYSICAL THERAPY | Facility: CLINIC | Age: 75
End: 2020-12-01
Payer: COMMERCIAL

## 2020-12-01 DIAGNOSIS — M54.2 CERVICAL PAIN: ICD-10-CM

## 2020-12-01 DIAGNOSIS — M25.521 ELBOW PAIN, RIGHT: ICD-10-CM

## 2020-12-01 DIAGNOSIS — M17.0 PRIMARY OSTEOARTHRITIS OF BOTH KNEES: Primary | ICD-10-CM

## 2020-12-01 DIAGNOSIS — M25.562 ACUTE PAIN OF BOTH KNEES: ICD-10-CM

## 2020-12-01 DIAGNOSIS — M25.561 ACUTE PAIN OF BOTH KNEES: ICD-10-CM

## 2020-12-01 DIAGNOSIS — M54.50 ACUTE LOW BACK PAIN WITHOUT SCIATICA, UNSPECIFIED BACK PAIN LATERALITY: ICD-10-CM

## 2020-12-01 PROCEDURE — 97112 NEUROMUSCULAR REEDUCATION: CPT

## 2020-12-01 PROCEDURE — 97110 THERAPEUTIC EXERCISES: CPT

## 2020-12-01 PROCEDURE — 97140 MANUAL THERAPY 1/> REGIONS: CPT

## 2020-12-07 ENCOUNTER — APPOINTMENT (OUTPATIENT)
Dept: PHYSICAL THERAPY | Facility: CLINIC | Age: 75
End: 2020-12-07
Payer: COMMERCIAL

## 2020-12-08 ENCOUNTER — APPOINTMENT (OUTPATIENT)
Dept: PHYSICAL THERAPY | Facility: CLINIC | Age: 75
End: 2020-12-08
Payer: COMMERCIAL

## 2020-12-11 ENCOUNTER — OFFICE VISIT (OUTPATIENT)
Dept: PHYSICAL THERAPY | Facility: CLINIC | Age: 75
End: 2020-12-11
Payer: COMMERCIAL

## 2020-12-11 DIAGNOSIS — M54.2 CERVICAL PAIN: ICD-10-CM

## 2020-12-11 DIAGNOSIS — M54.50 ACUTE LOW BACK PAIN WITHOUT SCIATICA, UNSPECIFIED BACK PAIN LATERALITY: ICD-10-CM

## 2020-12-11 DIAGNOSIS — M25.561 ACUTE PAIN OF BOTH KNEES: ICD-10-CM

## 2020-12-11 DIAGNOSIS — M17.0 PRIMARY OSTEOARTHRITIS OF BOTH KNEES: Primary | ICD-10-CM

## 2020-12-11 DIAGNOSIS — M25.562 ACUTE PAIN OF BOTH KNEES: ICD-10-CM

## 2020-12-11 DIAGNOSIS — M25.521 ELBOW PAIN, RIGHT: ICD-10-CM

## 2020-12-11 PROCEDURE — 97112 NEUROMUSCULAR REEDUCATION: CPT | Performed by: PHYSICAL THERAPIST

## 2020-12-11 PROCEDURE — 97140 MANUAL THERAPY 1/> REGIONS: CPT | Performed by: PHYSICAL THERAPIST

## 2020-12-11 PROCEDURE — 97110 THERAPEUTIC EXERCISES: CPT | Performed by: PHYSICAL THERAPIST

## 2020-12-15 ENCOUNTER — OFFICE VISIT (OUTPATIENT)
Dept: PHYSICAL THERAPY | Facility: CLINIC | Age: 75
End: 2020-12-15
Payer: COMMERCIAL

## 2020-12-15 DIAGNOSIS — M54.2 CERVICAL PAIN: ICD-10-CM

## 2020-12-15 DIAGNOSIS — M17.0 PRIMARY OSTEOARTHRITIS OF BOTH KNEES: Primary | ICD-10-CM

## 2020-12-15 DIAGNOSIS — M25.562 ACUTE PAIN OF BOTH KNEES: ICD-10-CM

## 2020-12-15 DIAGNOSIS — M25.561 ACUTE PAIN OF BOTH KNEES: ICD-10-CM

## 2020-12-15 DIAGNOSIS — M25.521 ELBOW PAIN, RIGHT: ICD-10-CM

## 2020-12-15 DIAGNOSIS — M54.50 ACUTE LOW BACK PAIN WITHOUT SCIATICA, UNSPECIFIED BACK PAIN LATERALITY: ICD-10-CM

## 2020-12-15 PROCEDURE — 97140 MANUAL THERAPY 1/> REGIONS: CPT

## 2020-12-15 PROCEDURE — 97112 NEUROMUSCULAR REEDUCATION: CPT

## 2020-12-15 PROCEDURE — 97110 THERAPEUTIC EXERCISES: CPT

## 2020-12-18 DIAGNOSIS — I10 ESSENTIAL HYPERTENSION: ICD-10-CM

## 2020-12-18 RX ORDER — LISINOPRIL 10 MG/1
TABLET ORAL
Qty: 90 TABLET | Refills: 3 | Status: SHIPPED | OUTPATIENT
Start: 2020-12-18 | End: 2021-12-17 | Stop reason: SDUPTHER

## 2020-12-22 ENCOUNTER — OFFICE VISIT (OUTPATIENT)
Dept: PHYSICAL THERAPY | Facility: CLINIC | Age: 75
End: 2020-12-22
Payer: COMMERCIAL

## 2020-12-22 DIAGNOSIS — M25.561 ACUTE PAIN OF BOTH KNEES: ICD-10-CM

## 2020-12-22 DIAGNOSIS — M25.521 ELBOW PAIN, RIGHT: ICD-10-CM

## 2020-12-22 DIAGNOSIS — M17.0 PRIMARY OSTEOARTHRITIS OF BOTH KNEES: Primary | ICD-10-CM

## 2020-12-22 DIAGNOSIS — M54.50 ACUTE LOW BACK PAIN WITHOUT SCIATICA, UNSPECIFIED BACK PAIN LATERALITY: ICD-10-CM

## 2020-12-22 DIAGNOSIS — M54.2 CERVICAL PAIN: ICD-10-CM

## 2020-12-22 DIAGNOSIS — M25.562 ACUTE PAIN OF BOTH KNEES: ICD-10-CM

## 2020-12-22 PROCEDURE — 97140 MANUAL THERAPY 1/> REGIONS: CPT

## 2020-12-22 PROCEDURE — 97110 THERAPEUTIC EXERCISES: CPT

## 2020-12-22 PROCEDURE — 97112 NEUROMUSCULAR REEDUCATION: CPT

## 2020-12-29 ENCOUNTER — APPOINTMENT (OUTPATIENT)
Dept: PHYSICAL THERAPY | Facility: CLINIC | Age: 75
End: 2020-12-29
Payer: COMMERCIAL

## 2020-12-30 ENCOUNTER — OFFICE VISIT (OUTPATIENT)
Dept: PHYSICAL THERAPY | Facility: CLINIC | Age: 75
End: 2020-12-30
Payer: COMMERCIAL

## 2020-12-30 DIAGNOSIS — M17.0 PRIMARY OSTEOARTHRITIS OF BOTH KNEES: Primary | ICD-10-CM

## 2020-12-30 DIAGNOSIS — M54.2 CERVICAL PAIN: ICD-10-CM

## 2020-12-30 DIAGNOSIS — M25.562 ACUTE PAIN OF BOTH KNEES: ICD-10-CM

## 2020-12-30 DIAGNOSIS — M54.50 ACUTE LOW BACK PAIN WITHOUT SCIATICA, UNSPECIFIED BACK PAIN LATERALITY: ICD-10-CM

## 2020-12-30 DIAGNOSIS — M25.561 ACUTE PAIN OF BOTH KNEES: ICD-10-CM

## 2020-12-30 DIAGNOSIS — M25.521 ELBOW PAIN, RIGHT: ICD-10-CM

## 2020-12-30 PROCEDURE — 97112 NEUROMUSCULAR REEDUCATION: CPT | Performed by: PHYSICAL THERAPIST

## 2020-12-30 PROCEDURE — 97110 THERAPEUTIC EXERCISES: CPT | Performed by: PHYSICAL THERAPIST

## 2020-12-30 PROCEDURE — 97140 MANUAL THERAPY 1/> REGIONS: CPT | Performed by: PHYSICAL THERAPIST

## 2021-01-04 ENCOUNTER — APPOINTMENT (OUTPATIENT)
Dept: PHYSICAL THERAPY | Facility: CLINIC | Age: 76
End: 2021-01-04
Payer: COMMERCIAL

## 2021-01-05 ENCOUNTER — OFFICE VISIT (OUTPATIENT)
Dept: PHYSICAL THERAPY | Facility: CLINIC | Age: 76
End: 2021-01-05
Payer: COMMERCIAL

## 2021-01-05 DIAGNOSIS — M25.561 ACUTE PAIN OF BOTH KNEES: ICD-10-CM

## 2021-01-05 DIAGNOSIS — M54.2 CERVICAL PAIN: ICD-10-CM

## 2021-01-05 DIAGNOSIS — M17.0 PRIMARY OSTEOARTHRITIS OF BOTH KNEES: Primary | ICD-10-CM

## 2021-01-05 DIAGNOSIS — M25.521 ELBOW PAIN, RIGHT: ICD-10-CM

## 2021-01-05 DIAGNOSIS — M54.50 ACUTE LOW BACK PAIN WITHOUT SCIATICA, UNSPECIFIED BACK PAIN LATERALITY: ICD-10-CM

## 2021-01-05 DIAGNOSIS — M25.562 ACUTE PAIN OF BOTH KNEES: ICD-10-CM

## 2021-01-05 PROCEDURE — 97110 THERAPEUTIC EXERCISES: CPT

## 2021-01-05 PROCEDURE — 97112 NEUROMUSCULAR REEDUCATION: CPT

## 2021-01-05 PROCEDURE — 97140 MANUAL THERAPY 1/> REGIONS: CPT

## 2021-01-12 ENCOUNTER — APPOINTMENT (OUTPATIENT)
Dept: PHYSICAL THERAPY | Facility: CLINIC | Age: 76
End: 2021-01-12
Payer: COMMERCIAL

## 2021-01-13 ENCOUNTER — OFFICE VISIT (OUTPATIENT)
Dept: PHYSICAL THERAPY | Facility: CLINIC | Age: 76
End: 2021-01-13
Payer: COMMERCIAL

## 2021-01-13 DIAGNOSIS — M25.521 ELBOW PAIN, RIGHT: ICD-10-CM

## 2021-01-13 DIAGNOSIS — M25.561 ACUTE PAIN OF BOTH KNEES: ICD-10-CM

## 2021-01-13 DIAGNOSIS — M17.0 PRIMARY OSTEOARTHRITIS OF BOTH KNEES: Primary | ICD-10-CM

## 2021-01-13 DIAGNOSIS — M54.2 CERVICAL PAIN: ICD-10-CM

## 2021-01-13 DIAGNOSIS — M54.50 ACUTE LOW BACK PAIN WITHOUT SCIATICA, UNSPECIFIED BACK PAIN LATERALITY: ICD-10-CM

## 2021-01-13 DIAGNOSIS — M25.562 ACUTE PAIN OF BOTH KNEES: ICD-10-CM

## 2021-01-13 PROCEDURE — 97140 MANUAL THERAPY 1/> REGIONS: CPT

## 2021-01-13 PROCEDURE — 97110 THERAPEUTIC EXERCISES: CPT

## 2021-01-13 PROCEDURE — 97112 NEUROMUSCULAR REEDUCATION: CPT

## 2021-01-13 NOTE — PROGRESS NOTES
Daily Note     Today's date: 2021  Patient name: Allie Padilla  : 1945  MRN: [id number]  Referring provider: ZAHRA Maldonado  Dx:   Encounter Diagnosis     ICD-10-CM    1  Primary osteoarthritis of both knees  M17 0    2  Elbow pain, right  M25 521    3  Acute pain of both knees  M25 561     M25 562    4  Acute low back pain without sciatica, unspecified back pain laterality  M54 5    5  Cervical pain  M54 2                   Subjective: Pt reports 7/10 cv and low back pain pre tx  Objective: See treatment diary below  Assessment: Pt demonstrated high R UT and moderate QL tone  Pt required max vcs for TE program for correct form with exercises  Pt demonstrated decreased pain levels post tx  Plan: Continue per plan of care        Precautions: requires close supervision for correct exercise form due to cognitive deficits, GERD, DM, HTN, Hx of meningioma, headaches      Manuals           B Knee PROM             R elbow PROM             IASTM: R UT/LS 4 min 5 min 5 min          IASTM: R QL 4 min 5 min 5 min          Neuro Re-Ed             Biodex: FT EO             Biodex: FT EC             Biodex: semi-tandem             B Quad Set             Scap Retraction 5"x15 5"x15 5"x15          TA Set 5"x15 5"x15 5"x15          Supine Clamshells 3x12 RTB 3x12  RTB 3x15 RTB          Ther Ex             B Heel slides             Gastroc St               HS St               SAQ             Elbow Flex St               Elbow Ext St               Elbow AROM             Bike: S10 5 min 5 min 5 min          UT St   20"x3 20"x3 20"x3          LS St   20"x3 20"x3 20"x3          Seated Lumbar Flex St   20"x3  20"x3          B LTR 20"x3 ea 20"x3   ea 20"x3 ea                       Ther Activity                                       Gait Training                                       Modalities

## 2021-01-14 ENCOUNTER — OFFICE VISIT (OUTPATIENT)
Dept: FAMILY MEDICINE CLINIC | Facility: CLINIC | Age: 76
End: 2021-01-14
Payer: COMMERCIAL

## 2021-01-14 VITALS
WEIGHT: 220.2 LBS | HEIGHT: 68 IN | HEART RATE: 68 BPM | DIASTOLIC BLOOD PRESSURE: 80 MMHG | OXYGEN SATURATION: 96 % | BODY MASS INDEX: 33.37 KG/M2 | TEMPERATURE: 97.5 F | RESPIRATION RATE: 20 BRPM | SYSTOLIC BLOOD PRESSURE: 136 MMHG

## 2021-01-14 DIAGNOSIS — I10 ESSENTIAL HYPERTENSION: Primary | ICD-10-CM

## 2021-01-14 DIAGNOSIS — E11.40 CONTROLLED TYPE 2 DIABETES MELLITUS WITH DIABETIC NEUROPATHY, WITHOUT LONG-TERM CURRENT USE OF INSULIN (HCC): ICD-10-CM

## 2021-01-14 DIAGNOSIS — R51.9 NONINTRACTABLE HEADACHE, UNSPECIFIED CHRONICITY PATTERN, UNSPECIFIED HEADACHE TYPE: ICD-10-CM

## 2021-01-14 DIAGNOSIS — K21.9 GASTROESOPHAGEAL REFLUX DISEASE WITHOUT ESOPHAGITIS: ICD-10-CM

## 2021-01-14 DIAGNOSIS — C80.1 MALIGNANT SPINDLE CELL NEOPLASM (HCC): ICD-10-CM

## 2021-01-14 PROCEDURE — 99214 OFFICE O/P EST MOD 30 MIN: CPT | Performed by: FAMILY MEDICINE

## 2021-01-14 NOTE — PROGRESS NOTES
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[facility])  Assessment & Plan:  Long discussion with patient about getting back to derm to get lesion rechecked  It was gone and now returned  Discussed risk with patient of type of skin lesion it is and risk  Will have staff call daughter Sommer Crowe and discuss with her and he finally agreed to go and see specialist again with his daughter  Orders:  -     Comprehensive metabolic panel; Future  -     HEMOGLOBIN A1C W/ EAG ESTIMATION; Future  -     Microalbumin / creatinine urine ratio  -     TSH, 3rd generation with Free T4 reflex; Future  -     Lipid Panel with Direct LDL reflex; Future  -     CBC and differential; Future    3  Gastroesophageal reflux disease without esophagitis  -     Comprehensive metabolic panel; Future  -     HEMOGLOBIN A1C W/ EAG ESTIMATION; Future  -     Microalbumin / creatinine urine ratio  -     TSH, 3rd generation with Free T4 reflex; Future  -     Lipid Panel with Direct LDL reflex; Future  -     CBC and differential; Future    4  Nonintractable headache, unspecified chronicity pattern, unspecified headache type  -     Comprehensive metabolic panel; Future  -     HEMOGLOBIN A1C W/ EAG ESTIMATION; Future  -     Microalbumin / creatinine urine ratio  -     TSH, 3rd generation with Free T4 reflex; Future  -     Lipid Panel with Direct LDL reflex; Future  -     CBC and differential; Future    5   Controlled type 2 diabetes mellitus with diabetic neuropathy, without long-term current use of insulin ([facility] [address] )  Assessment & Plan:  Cont current meds  Stable    Lab Results   Component Value Date    HGBA1C 6 7 (H) 08/25/2020                    Chief Complaint     Chief Complaint   Patient presents with    Follow-up       History of Present Illness   Whitney Bautista is a 77y o -year-old male who is here for f/u to chronic medical condition  Diabetes is well controlled  Due for labs  Continues to have knee pain and following with physical therapy  Cost is a factor, cannot afford copays  Had cancer lesion on forehead, was to have repeat surgery and he declined  Lesion is now back and getting larger  Discussed with patient risk of spread of cancer, 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  Review of Systems   Review of Systems   Constitutional: Negative for fatigue and fever  HENT: Negative for congestion and postnasal drip  Eyes: Negative for photophobia and visual disturbance  Respiratory: Negative for cough and shortness of breath  Cardiovascular: Negative for chest pain and palpitations  Gastrointestinal: Negative for constipation and diarrhea  Genitourinary: Negative for dysuria and frequency  Musculoskeletal: Negative for arthralgias and myalgias  Skin: Positive for wound  Neurological: Negative for dizziness, light-headedness and headaches  Hematological: Negative for adenopathy  Psychiatric/Behavioral: Negative for dysphoric mood and sleep disturbance  The patient is not nervous/anxious          Active Problem List     Patient Active Problem List   Diagnosis    Brain tumor ([facility] [address] )    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[address] )    Controlled diabetes mellitus with diabetic neuropathy (HCC)    Episodic cluster headache, not intractable    GERD (gastroesophageal reflux disease)    Nonintractable headache    Hypertension    Acute pain of both knees    Meningioma ([address] )    Varicose 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    Spondylolisthesis at L5-S1 level    Renal cyst    History of hepatitis C    Scalp lesion    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HCC)    Primary osteoarthritis of both knees         Past Medical History:  Past Medical History:   Diagnosis Date    Brain mass     Last Assessed; 11/5/2015    Chest pain     Last Assessed: 1/22/2015    Diabetes type 2, uncontrolled ([address] )     Last Assessed: 3/1/2016    Edema of left lower extremity     Last Assessed: 3/22/2017    GERD (gastroesophageal reflux disease)     Hyperlipidemia     Hypertension     Impaired fasting glucose     Last Assessed: 11/10/2015    Irregular heartbeat     Varicose 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     Last Assessed: 3/22/2017       Past Surgical History:  Past Surgical History:   Procedure Laterality Date    COLONOSCOPY      COLONOSCOPY N/A 7/10/2018    Procedure: COLONOSCOPY;  Surgeon: Sky Arellano MD;  Location: [facility];   Service: Colorectal    INGUINAL HERNIA REPAIR      20+ years ago - [facility]; Last Assessed: 10/23/2014    TONSILLECTOMY      VARICOSE VEIN SURGERY Left     x2 left leg - 40+ yrs ago, [facility]       Family History:  Family History   Problem Relation Age of Onset    No Known Problems Mother     No Known Problems Father     Melanoma Daughter 16       Social History:  Social History     Socioeconomic History    Marital status: /Civil Union     Spouse name: Not on file    Number of children: 11    Years of education: Not on file    Highest education level: Not on file   Occupational History    Occupation: Retired: Construction   Social Needs    Financial resource strain: Not on file    Food insecurity     Worry: Not on file     Inability: Not on file   [employer] needs     Medical: Not on file     Non-medical: Not on file   Tobacco Use    Smoking status: Never Smoker    Smokeless tobacco: Never Used   Substance and Sexual Activity    Alcohol use: No    Drug use: No    Sexual activity: Not on file Lifestyle    Physical activity     Days per week: Not on file     Minutes per session: Not on file    Stress: Not on file   Relationships    Social connections     Talks on phone: Not on file     Gets together: Not on file     Attends Yazdanism service: Not on file     Active member of club or organization: Not on file     Attends meetings of clubs or organizations: Not on file     Relationship status: Not on file    Intimate partner violence     Fear of current or ex partner: Not on file     Emotionally abused: Not on file     Physically abused: Not on file     Forced sexual activity: Not on file   Other Topics Concern    Not on file   Social History Narrative    Uses safety equipment           Objective     Vitals:    01/14/21 1327   BP: 136/80   Pulse: 68   Resp: 20   Temp: 97 5 °F (36 4 °C)   SpO2: 96%     Wt Readings from Last 3 Encounters:   01/14/21 99 9 kg (220 lb 3 2 oz)   11/02/20 102 kg (224 lb 6 4 oz)   10/05/20 100 kg (220 lb 9 6 oz)       Physical Exam  Vitals signs and nursing note reviewed  Constitutional:       Appearance: Normal appearance  HENT:      Head: Normocephalic and atraumatic  Right Ear: Tympanic membrane, ear canal and external ear normal       Left Ear: Tympanic membrane, ear canal and external ear normal       Nose: Nose normal       Mouth/Throat:      Mouth: Mucous membranes are moist    Eyes:      Extraocular Movements: Extraocular movements intact  Conjunctiva/sclera: Conjunctivae normal       Pupils: Pupils are equal, round, and reactive to light  Neck:      Musculoskeletal: Normal range of motion and neck supple  Cardiovascular:      Rate and Rhythm: Normal rate and regular rhythm  Pulses: no weak pulses          Dorsalis pedis pulses are 1+ on the right side and 1+ on the left side  Posterior tibial pulses are 1+ on the right side and 1+ on the left side  Heart sounds: Normal heart sounds     Pulmonary:      Effort: Pulmonary effort is normal       Breath sounds: Normal breath sounds  Abdominal:      General: Bowel sounds are normal    Musculoskeletal: Normal range of motion  Feet:      Right foot:      Skin integrity: No ulcer, skin breakdown, erythema, warmth, callus or dry skin  Left foot:      Skin integrity: No ulcer, skin breakdown, erythema, warmth, callus or dry skin  Skin:     General: Skin is warm  Capillary Refill: Capillary refill takes less than 2 seconds  Comments: 2mm scab on forehead noted  No erythema  Non tender      Neurological:      General: No focal deficit present  Mental Status: He is alert and oriented to person, place, and time  Psychiatric:         Mood and Affect: Mood normal          Behavior: Behavior normal          Thought Content: Thought content normal          Judgment: Judgment normal          Pertinent Laboratory/Diagnostic Studies:  Lab Results   Component Value Date    GLUCOSE 121 10/28/2015    BUN 21 08/25/2020    CREATININE 0 71 08/25/2020    CALCIUM 9 1 08/25/2020     10/28/2015    K 4 5 08/25/2020    CO2 29 08/25/2020     08/25/2020     Lab Results   Component Value Date    ALT 19 08/25/2020    AST 7 08/25/2020    ALKPHOS 112 08/25/2020    BILITOT 0 53 08/27/2015       Lab Results   Component Value Date    WBC 5 77 08/25/2020    HGB 13 3 08/25/2020    HCT 39 9 08/25/2020    MCV 85 08/25/2020     08/25/2020       No results found for: TSH    Lab Results   Component Value Date    CHOL 154 08/27/2015     Lab Results   Component Value Date    TRIG 184 (H) 08/25/2020     Lab Results   Component Value Date    HDL 31 (L) 08/25/2020     Lab Results   Component Value Date    LDLCALC 127 (H) 08/25/2020     Lab Results   Component Value Date    HGBA1C 6 7 (H) 08/25/2020       Results for orders placed or performed in visit on 08/25/20   HEMOGLOBIN A1C W/ EAG ESTIMATION   Result Value Ref Range    Hemoglobin A1C 6 7 (H) Normal 3 8-5 6%;  PreDiabetic 5 7-6 4%; Diabetic >=6 5%; Glycemic control for adults with diabetes <7 0% %     mg/dl   CBC and differential   Result Value Ref Range    WBC 5 77 4 31 - 10 16 Thousand/uL    RBC 4 67 3 88 - 5 62 Million/uL    Hemoglobin 13 3 12 0 - 17 0 g/dL    Hematocrit 39 9 36 5 - 49 3 %    MCV 85 82 - 98 fL    MCH 28 5 26 8 - 34 3 pg    MCHC 33 3 31 4 - 37 4 g/dL    RDW 13 0 11 6 - 15 1 %    MPV 9 8 8 9 - 12 7 fL    Platelets 386 884 - 828 Thousands/uL    nRBC 0 /100 WBCs    Neutrophils Relative 64 43 - 75 %    Immat GRANS % 1 0 - 2 %    Lymphocytes Relative 24 14 - 44 %    Monocytes Relative 8 4 - 12 %    Eosinophils Relative 3 0 - 6 %    Basophils Relative 0 0 - 1 %    Neutrophils Absolute 3 69 1 85 - 7 62 Thousands/µL    Immature Grans Absolute 0 06 0 00 - 0 20 Thousand/uL    Lymphocytes Absolute 1 40 0 60 - 4 47 Thousands/µL    Monocytes Absolute 0 45 0 17 - 1 22 Thousand/µL    Eosinophils Absolute 0 15 0 00 - 0 61 Thousand/µL    Basophils Absolute 0 02 0 00 - 0 10 Thousands/µL   Comprehensive metabolic panel   Result Value Ref Range    Sodium 139 136 - 145 mmol/L    Potassium 4 5 3 5 - 5 3 mmol/L    Chloride 106 100 - 108 mmol/L    CO2 29 21 - 32 mmol/L    ANION GAP 4 4 - 13 mmol/L    BUN 21 5 - 25 mg/dL    Creatinine 0 71 0 60 - 1 30 mg/dL    Glucose, Fasting 136 (H) 65 - 99 mg/dL    Calcium 9 1 8 3 - 10 1 mg/dL    AST 7 5 - 45 U/L    ALT 19 12 - 78 U/L    Alkaline Phosphatase 112 46 - 116 U/L    Total Protein 7 3 6 4 - 8 2 g/dL    Albumin 3 9 3 5 - 5 0 g/dL    Total Bilirubin 0 63 0 20 - 1 00 mg/dL    eGFR 92 ml/min/1 73sq m   Lipid Panel with Direct LDL reflex   Result Value Ref Range    Cholesterol 195 50 - 200 mg/dL    Triglycerides 184 (H) <=150 mg/dL    HDL, Direct 31 (L) >=40 mg/dL    LDL Calculated 127 (H) 0 - 100 mg/dL       Orders Placed This Encounter   Procedures    Comprehensive metabolic panel    HEMOGLOBIN A1C W/ EAG ESTIMATION    Microalbumin / creatinine urine ratio    TSH, 3rd generation with Free T4 reflex    Lipid Panel with Direct LDL reflex    CBC and differential       ALLERGIES:  No Known Allergies    Current Medications     Current Outpatient Medications   Medication Sig Dispense Refill    amLODIPine (NORVASC) 2 5 mg tablet TAKE 2 TABLETS (5 MG TOTAL) BY MOUTH DAILY 90 tablet 2    ammonium lactate (LAC-HYDRIN) 12 % cream Apply topically Twice daily      aspirin 81 MG tablet Take by mouth      atorvastatin (LIPITOR) 10 mg tablet TAKE 1 TABLET BY MOUTH EVERY DAY 90 tablet 3    clobetasol (TEMOVATE) 0 05 % GEL Apply 1 application topically 2 (two) times a day for 14 days 60 each 1    diclofenac sodium (VOLTAREN) 1 % Apply 2 g topically 4 (four) times a day 150 g 1    gabapentin (NEURONTIN) 100 mg capsule Take 2 capsules (200 mg total) by mouth daily at bedtime 60 capsule 5    Glucose Blood (ONETOUCH ULTRA BLUE VI) by In Vitro route daily      Lancets (ONETOUCH ULTRASOFT) lancets Test daily, e11 5 100 each 11    lisinopril (ZESTRIL) 10 mg tablet TOME ALLY TABLETA TODOS LOS NAVA 90 tablet 3    meloxicam (MOBIC) 7 5 mg tablet Take 7 5 mg by mouth daily      metFORMIN (GLUCOPHAGE) 500 mg tablet TAKE 1 TABLET BY MOUTH TWICE A  tablet 3    omeprazole (PriLOSEC) 20 mg delayed release capsule Take 1 capsule (20 mg total) by mouth daily 30 capsule 5    omeprazole (PriLOSEC) 40 MG capsule TAKE 1 CAPSULE BY MOUTH EVERY DAY 1/2 HOUR BEFORE BREAKFAST      OneTouch Ultra test strip TEST ONCE DAILY E11 9 25 each 47    topiramate (TOPAMAX) 50 MG tablet Take 1 tablet by mouth daily      traMADol (ULTRAM) 50 mg tablet Take 1 tablet (50 mg total) by mouth every 6 (six) hours as needed for moderate pain 30 tablet 0     No current facility-administered medications for this visit            Health Maintenance     Health Maintenance   Topic Date Due    Hepatitis A Vaccine (1 of 2 - Risk 2-dose series) 07/11/1946    DM Eye Exam  07/11/1955    Influenza Vaccine (1) 09/01/2020    Pneumococcal Vaccine: 65+ Years (2 of 2 - PPSV23) 10/14/2020    PT PLAN OF CARE  12/23/2020    BMI: Followup Plan  02/13/2021    Diabetic Foot Exam  02/13/2021    HEMOGLOBIN A1C  02/25/2021    Hepatitis B Vaccine (1 of 3 - Risk 3-dose series) 05/15/2021 (Originally 7/11/1964)    DTaP,Tdap,and Td Vaccines (1 - Tdap) 08/12/2021 (Originally 7/11/1966)    Depression Screening PHQ  11/02/2021    Medicare Annual Wellness Visit (AWV)  11/02/2021    Falls: Plan of Care  11/03/2021    Fall Risk  11/23/2021    BMI: Adult  01/14/2022    Colorectal Cancer Screening  07/10/2028    HIB Vaccine  Aged Out    IPV Vaccine  Aged Out    Meningococcal ACWY Vaccine  Aged Out    HPV Vaccine  Aged Out    Hepatitis C Screening  Discontinued     Immunization History   Administered Date(s) Administered    INFLUENZA 10/27/2015, 10/01/2016, 10/13/2016, 12/19/2018    Influenza Split High Dose Preservative Free IM 10/23/2014, 10/27/2015, 10/01/2016, 10/13/2016, 10/13/2016, 10/13/2016    Influenza, high dose seasonal 0 7 mL 12/19/2018, 10/14/2019    Pneumococcal Conjugate 13-Valent 08/25/2015, 01/19/2017, 10/14/2019     BMI Counseling: Body mass index is 33 48 kg/m²  The BMI is above normal  Nutrition recommendations include decreasing portion sizes, encouraging healthy choices of fruits and vegetables, decreasing fast food intake, consuming healthier snacks, limiting drinks that contain sugar, moderation in carbohydrate intake, increasing intake of lean protein, reducing intake of saturated and trans fat and reducing intake of cholesterol  Exercise recommendations include moderate physical activity 150 minutes/week, exercising 3-5 times per week and strength training exercises  No pharmacotherapy was ordered  Falls Plan of Care: balance, strength, and gait training instructions were provided  Patient's shoes and socks removed  Right Foot/Ankle   Right Foot Inspection  Skin Exam: skin normal and skin intact no dry skin, no warmth, no callus, no erythema, no maceration, no abnormal color, no pre-ulcer, no ulcer and no callus                          Toe Exam: ROM and strength within normal limits  Sensory   Vibration: intact  Proprioception: intact   Monofilament testing: intact  Vascular  Capillary refills: < 3 seconds  The right DP pulse is 1+  The right PT pulse is 1+  Left Foot/Ankle  Left Foot Inspection  Skin Exam: skin normal and skin intactno dry skin, no warmth, no erythema, no maceration, normal color, no pre-ulcer, no ulcer and no callus                         Toe Exam: ROM and strength within normal limits                   Sensory   Vibration: intact  Proprioception: intact  Monofilament: intact  Vascular  Capillary refills: < 3 seconds  The left DP pulse is 1+  The left PT pulse is 1+  Assign Risk Category:  No deformity present; No loss of protective sensation;  No weak pulses       Risk: ZAHRA Singleton
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
## 2021-01-15 NOTE — ASSESSMENT & PLAN NOTE
Long discussion with patient about getting back to derm to get lesion rechecked  It was gone and now returned  Discussed risk with patient of type of skin lesion it is and risk  Will have staff call daughter Alee Fernandez and discuss with her and he finally agreed to go and see specialist again with his daughter

## 2021-01-21 ENCOUNTER — TELEPHONE (OUTPATIENT)
Dept: FAMILY MEDICINE CLINIC | Facility: CLINIC | Age: 76
End: 2021-01-21

## 2021-01-21 NOTE — TELEPHONE ENCOUNTER
Left message on daughter Cece's voicemail that Byron Gonzalez was in to see Leti Doss for f/u and she is concerned b/c the lesion on his head is back  Pt has declined tx for this before but stressed to her that he should have this examined by dermatology  Left our number to call back if she needs to discuss

## 2021-01-28 DIAGNOSIS — C80.1 MALIGNANT SPINDLE CELL NEOPLASM (HCC): ICD-10-CM

## 2021-01-28 DIAGNOSIS — L98.9 SCALP LESION: Primary | ICD-10-CM

## 2021-03-10 DIAGNOSIS — Z23 ENCOUNTER FOR IMMUNIZATION: ICD-10-CM

## 2021-03-17 ENCOUNTER — OFFICE VISIT (OUTPATIENT)
Dept: FAMILY MEDICINE CLINIC | Facility: CLINIC | Age: 76
End: 2021-03-17
Payer: COMMERCIAL

## 2021-03-17 DIAGNOSIS — R30.0 BURNING WITH URINATION: ICD-10-CM

## 2021-03-17 DIAGNOSIS — C80.1 MALIGNANT SPINDLE CELL NEOPLASM (HCC): ICD-10-CM

## 2021-03-17 DIAGNOSIS — E11.40 CONTROLLED TYPE 2 DIABETES MELLITUS WITH DIABETIC NEUROPATHY, WITHOUT LONG-TERM CURRENT USE OF INSULIN (HCC): ICD-10-CM

## 2021-03-17 DIAGNOSIS — D32.9 MENINGIOMA (HCC): Primary | ICD-10-CM

## 2021-03-17 LAB
SL AMB  POCT GLUCOSE, UA: NORMAL
SL AMB LEUKOCYTE ESTERASE,UA: NORMAL
SL AMB POCT BILIRUBIN,UA: NORMAL
SL AMB POCT BLOOD,UA: NORMAL
SL AMB POCT CLARITY,UA: CLEAR
SL AMB POCT COLOR,UA: YELLOW
SL AMB POCT KETONES,UA: NORMAL
SL AMB POCT NITRITE,UA: NORMAL
SL AMB POCT PH,UA: 6
SL AMB POCT SPECIFIC GRAVITY,UA: 1.02
SL AMB POCT URINE PROTEIN: NORMAL
SL AMB POCT UROBILINOGEN: NORMAL

## 2021-03-17 PROCEDURE — 81002 URINALYSIS NONAUTO W/O SCOPE: CPT | Performed by: NURSE PRACTITIONER

## 2021-03-17 PROCEDURE — 82043 UR ALBUMIN QUANTITATIVE: CPT | Performed by: NURSE PRACTITIONER

## 2021-03-17 PROCEDURE — 99214 OFFICE O/P EST MOD 30 MIN: CPT | Performed by: NURSE PRACTITIONER

## 2021-03-17 PROCEDURE — 82570 ASSAY OF URINE CREATININE: CPT | Performed by: NURSE PRACTITIONER

## 2021-03-17 PROCEDURE — 87086 URINE CULTURE/COLONY COUNT: CPT | Performed by: NURSE PRACTITIONER

## 2021-03-17 NOTE — PROGRESS NOTES
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[facility])  Assessment & Plan:  Continues to refuse follow up for this lesion on scalp  Discussed in past with daughter to have him follow up with derm or oncology, I do not see any follow up in chart                   Chief Complaint     Chief Complaint   Patient presents with    Painful Urination       History of Present Illness   Leola Barlow is a 77y o -year-old male who is here with c/o urinary frequency and burning with urination for the past few days  No other symptoms  Due for labs  Continues to c/o intermittent ha but this is chronic and not new for patient      Review of Systems   Review of Systems   Constitutional: Negative for fatigue and fever  Respiratory: Negative for cough and shortness of breath  Cardiovascular: Negative for chest pain and palpitations  Gastrointestinal: Negative for constipation and diarrhea  Genitourinary: Positive for dysuria, frequency and urgency  Negative for decreased urine volume, discharge, enuresis, flank pain, genital sores, hematuria, penile pain, penile swelling, scrotal swelling and testicular pain  Musculoskeletal: Negative for arthralgias and myalgias  Skin: Negative for rash  Neurological: Positive for headaches  Negative for dizziness  Hematological: Negative for adenopathy  Psychiatric/Behavioral: Negative for dysphoric mood  The patient is not nervous/anxious          Active Problem List     Patient Active Problem List   Diagnosis    Brain tumor ([facility] )    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[facility] )    Controlled diabetes mellitus with diabetic neuropathy ([facility] )    Episodic cluster headache, not intractable    GERD (gastroesophageal reflux disease)    Nonintractable headache    Hypertension    Acute pain of both knees    Meningioma ([facility] )    Varicose 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    Spondylolisthesis at L5-S1 level    Renal cyst    History of hepatitis C    Scalp lesion    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HCC)    Primary osteoarthritis of both knees         Past Medical History:  Past Medical History:   Diagnosis Date    Brain mass     Last Assessed; 11/5/2015    Chest pain     Last Assessed: 1/22/2015    Diabetes type 2, uncontrolled ([facility] )     Last Assessed: 3/1/2016    Edema of left lower extremity     Last Assessed: 3/22/2017    GERD (gastroesophageal reflux disease)     Hyperlipidemia     Hypertension     Impaired fasting glucose     Last Assessed: 11/10/2015    Irregular heartbeat     Varicose 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     Last Assessed: 3/22/2017       Past Surgical History:  Past Surgical History:   Procedure Laterality Date    COLONOSCOPY      COLONOSCOPY N/A 7/10/2018    Procedure: COLONOSCOPY;  Surgeon: Young Mason MD;  Location: [facility];   Service: Colorectal    INGUINAL HERNIA REPAIR      20+ years ago - [address] [place] [address]; Last Assessed: 10/23/2014    TONSILLECTOMY      VARICOSE VEIN SURGERY Left     x2 left leg - 40+ yrs ago, [address] [place] [address]       Family History:  Family History   Problem Relation Age of Onset    No Known Problems Mother     No Known Problems Father     Melanoma Daughter 16       Social History:  Social History     Socioeconomic History    Marital status: /Civil Union     Spouse name: Not on file    Number of children: 11    Years of education: Not on file    Highest education level: Not on file   Occupational History    Occupation: Retired: Construction   Social Needs    Financial resource strain: Not on file    Food insecurity     Worry: Not on file     Inability: Not on file    Transportation needs     Medical: Not on file     Non-medical: Not on file   Tobacco Use    Smoking status: Never Smoker    Smokeless tobacco: Never Used   Substance and Sexual Activity    Alcohol use: No    Drug use: No    Sexual activity: Not on file   Lifestyle    Physical activity     Days per week: Not on file     Minutes per session: Not on file    Stress: Not on file   Relationships    Social connections     Talks on phone: Not on file     Gets together: Not on file     Attends [employer] service: Not on file     Active member of club or organization: Not on file     Attends meetings of clubs or organizations: Not on file     Relationship status: Not on file    Intimate partner violence     Fear of current or ex partner: Not on file     Emotionally abused: Not on file     Physically abused: Not on file     Forced sexual activity: Not on file   Other Topics Concern    Not on file   Social History Narrative    Uses safety equipment           Objective     Vitals:    03/17/21 1718   BP: 120/70   Pulse: 72   Resp: 18   Temp: 97 7 °F (36 5 °C)   SpO2: 97%     Wt Readings from Last 3 Encounters:   03/17/21 97 kg (213 lb 12 8 oz)   01/14/21 99 9 kg (220 lb 3 2 oz)   11/02/20 102 kg (224 lb 6 4 oz)       Physical Exam  Vitals signs and nursing note reviewed  Constitutional:       Appearance: Normal appearance  HENT:      Head: Normocephalic and atraumatic  Eyes:      Conjunctiva/sclera: Conjunctivae normal    Cardiovascular:      Rate and Rhythm: Normal rate and regular rhythm  Heart sounds: Normal heart sounds  Pulmonary:      Effort: Pulmonary effort is normal       Breath sounds: Normal breath sounds  Musculoskeletal: Normal range of motion  Skin:     General: Skin is warm and dry  Neurological:      Mental Status: He is alert and oriented to person, place, and time  Psychiatric:         Mood and Affect: Mood normal          Behavior: Behavior normal          Thought Content: Thought content normal          Judgment: Judgment normal          Pertinent Laboratory/Diagnostic Studies:  Lab Results   Component Value Date    GLUCOSE 121 10/28/2015    BUN 21 08/25/2020    CREATININE 0 71 08/25/2020    CALCIUM 9 1 08/25/2020     10/28/2015    K 4 5 08/25/2020    CO2 29 08/25/2020     08/25/2020     Lab Results   Component Value Date    ALT 19 08/25/2020    AST 7 08/25/2020    ALKPHOS 112 08/25/2020    BILITOT 0 53 08/27/2015       Lab Results   Component Value Date    WBC 5 77 08/25/2020    HGB 13 3 08/25/2020    HCT 39 9 08/25/2020    MCV 85 08/25/2020     08/25/2020       No results found for: TSH    Lab Results   Component Value Date    CHOL 154 08/27/2015     Lab Results   Component Value Date    TRIG 184 (H) 08/25/2020     Lab Results   Component Value Date    HDL 31 (L) 08/25/2020     Lab Results   Component Value Date    LDLCALC 127 (H) 08/25/2020     Lab Results   Component Value Date    HGBA1C 6 7 (H) 08/25/2020       Results for orders placed or performed in visit on 03/17/21   Urine culture    Specimen: Urine, Other   Result Value Ref Range    Urine Culture No Growth <1000 cfu/mL    Microalbumin / creatinine urine ratio   Result Value Ref Range    Creatinine, Ur 79 5 mg/dL    Microalbum  ,U,Random 8 6 0 0 - 20 0 mg/L    Microalb Creat Ratio 11 0 - 30 mg/g creatinine   POCT urine dip   Result Value Ref Range    LEUKOCYTE ESTERASE,UA neg     NITRITE,UA neg     SL AMB POCT UROBILINOGEN +-     POCT URINE PROTEIN neg      PH,UA 6 0     BLOOD,UA neg     SPECIFIC GRAVITY,UA 1 025     KETONES,UA neg     BILIRUBIN,UA neg     GLUCOSE, UA neg      COLOR,UA yellow     CLARITY,UA clear        Orders Placed This Encounter   Procedures    Urine culture    Microalbumin / creatinine urine ratio    Ambulatory referral to Neurosurgery    POCT urine dip ALLERGIES:  No Known Allergies    Current Medications     Current Outpatient Medications   Medication Sig Dispense Refill    amLODIPine (NORVASC) 2 5 mg tablet TAKE 2 TABLETS (5 MG TOTAL) BY MOUTH DAILY 90 tablet 2    aspirin 81 MG tablet Take by mouth      atorvastatin (LIPITOR) 10 mg tablet TAKE 1 TABLET BY MOUTH EVERY DAY 90 tablet 3    diclofenac sodium (VOLTAREN) 1 % Apply 2 g topically 4 (four) times a day 150 g 1    Glucose Blood (ONETOUCH ULTRA BLUE VI) by In Vitro route daily      Lancets (ONETOUCH ULTRASOFT) lancets Test daily, e11 5 100 each 11    lisinopril (ZESTRIL) 10 mg tablet TOME ALLY TABLETA TODOS LOS NAVA 90 tablet 3    metFORMIN (GLUCOPHAGE) 500 mg tablet TAKE 1 TABLET BY MOUTH TWICE A  tablet 3    omeprazole (PriLOSEC) 40 MG capsule TAKE 1 CAPSULE BY MOUTH EVERY DAY 1/2 HOUR BEFORE BREAKFAST      OneTouch Ultra test strip TEST ONCE DAILY E11 9 25 each 47     No current facility-administered medications for this visit            Health Maintenance     Health Maintenance   Topic Date Due    DM Eye Exam  Never done    COVID-19 Vaccine (1) Never done    Pneumococcal Vaccine: 65+ Years (2 of 2 - PPSV23) 10/14/2020    HEMOGLOBIN A1C  02/25/2021    Hepatitis B Vaccine (1 of 3 - Risk 3-dose series) 05/15/2021 (Originally 7/11/1964)    Influenza Vaccine (1) 06/30/2021 (Originally 9/1/2020)    DTaP,Tdap,and Td Vaccines (1 - Tdap) 08/12/2021 (Originally [birth year]11/1966)    Medicare Annual Wellness Visit (AWV)  11/02/2021    Depression Screening PHQ  01/14/2022    Fall Risk  01/15/2022    BMI: Followup Plan  01/15/2022    Falls: Plan of Care  01/15/2022    Diabetic Foot Exam  01/15/2022    BMI: Adult  03/17/2022    Colorectal Cancer Screening  07/10/2028    HIB Vaccine  Aged Out    IPV Vaccine  Aged Out    Hepatitis A Vaccine  Aged Out    Meningococcal ACWY Vaccine  Aged Out    HPV Vaccine  Aged Out    Hepatitis C Screening  Discontinued     Immunization History Administered Date(s) Administered    INFLUENZA 10/27/2015, 10/01/2016, 10/13/2016, 12/19/2018    Influenza Split High Dose Preservative Free IM 10/23/2014, 10/27/2015, 10/01/2016, 10/13/2016, 10/13/2016, 10/13/2016    Influenza, high dose seasonal 0 7 mL 12/19/2018, 10/14/2019    Pneumococcal Conjugate 13-Valent 08/25/2015, 01/19/2017, 10/14/2019          Almond Harada, CRNP

## 2021-03-18 LAB
BACTERIA UR CULT: NORMAL
CREAT UR-MCNC: 79.5 MG/DL
MICROALBUMIN UR-MCNC: 8.6 MG/L (ref 0–20)
MICROALBUMIN/CREAT 24H UR: 11 MG/G CREATININE (ref 0–30)

## 2021-03-19 ENCOUNTER — TELEPHONE (OUTPATIENT)
Dept: FAMILY MEDICINE CLINIC | Facility: CLINIC | Age: 76
End: 2021-03-19

## 2021-03-19 VITALS
WEIGHT: 213.8 LBS | SYSTOLIC BLOOD PRESSURE: 120 MMHG | HEIGHT: 68 IN | OXYGEN SATURATION: 97 % | RESPIRATION RATE: 18 BRPM | DIASTOLIC BLOOD PRESSURE: 70 MMHG | BODY MASS INDEX: 32.4 KG/M2 | TEMPERATURE: 97.7 F | HEART RATE: 72 BPM

## 2021-03-19 DIAGNOSIS — R30.0 BURNING WITH URINATION: Primary | ICD-10-CM

## 2021-03-19 NOTE — ASSESSMENT & PLAN NOTE
Continues to refuse follow up for this lesion on scalp  Discussed in past with daughter to have him follow up with derm or oncology, I do not see any follow up in chart

## 2021-03-19 NOTE — TELEPHONE ENCOUNTER
Daughter called re: checking on the status of the lab results  Advised to be reviewed by provider and notified

## 2021-03-25 ENCOUNTER — TELEPHONE (OUTPATIENT)
Dept: UROLOGY | Facility: AMBULATORY SURGERY CENTER | Age: 76
End: 2021-03-25

## 2021-03-25 NOTE — TELEPHONE ENCOUNTER
Working on [employer], 1st attempt to contact pt, lmom to call the office to schedule an appointment  NEW PT being referred to Dr Ginette Bustillo from Dr Marilyn Cox, [facility] for R30 0 (ICD-10-CM) - Burning with urination     Stan Rothman No appointment spot held for this patient

## 2021-03-29 ENCOUNTER — IMMUNIZATIONS (OUTPATIENT)
Dept: FAMILY MEDICINE CLINIC | Facility: HOSPITAL | Age: 76
End: 2021-03-29

## 2021-03-29 DIAGNOSIS — Z23 ENCOUNTER FOR IMMUNIZATION: Primary | ICD-10-CM

## 2021-03-29 PROCEDURE — 0001A SARS-COV-2 / COVID-19 MRNA VACCINE (PFIZER-BIONTECH) 30 MCG: CPT

## 2021-03-29 PROCEDURE — 91300 SARS-COV-2 / COVID-19 MRNA VACCINE (PFIZER-BIONTECH) 30 MCG: CPT

## 2021-04-01 DIAGNOSIS — E11.9 TYPE 2 DIABETES MELLITUS WITHOUT COMPLICATION, WITHOUT LONG-TERM CURRENT USE OF INSULIN (HCC): ICD-10-CM

## 2021-04-21 ENCOUNTER — IMMUNIZATIONS (OUTPATIENT)
Dept: FAMILY MEDICINE CLINIC | Facility: HOSPITAL | Age: 76
End: 2021-04-21

## 2021-04-21 DIAGNOSIS — Z23 ENCOUNTER FOR IMMUNIZATION: Primary | ICD-10-CM

## 2021-04-21 PROCEDURE — 91300 SARS-COV-2 / COVID-19 MRNA VACCINE (PFIZER-BIONTECH) 30 MCG: CPT

## 2021-04-21 PROCEDURE — 0002A SARS-COV-2 / COVID-19 MRNA VACCINE (PFIZER-BIONTECH) 30 MCG: CPT

## 2021-04-28 ENCOUNTER — HOSPITAL ENCOUNTER (EMERGENCY)
Facility: HOSPITAL | Age: 76
Discharge: HOME | End: 2021-04-29
Attending: EMERGENCY MEDICINE | Admitting: EMERGENCY MEDICINE
Payer: COMMERCIAL

## 2021-04-28 ENCOUNTER — APPOINTMENT (EMERGENCY)
Dept: RADIOLOGY | Facility: HOSPITAL | Age: 76
End: 2021-04-28
Payer: COMMERCIAL

## 2021-04-28 DIAGNOSIS — N13.9 URINARY OBSTRUCTION: ICD-10-CM

## 2021-04-28 DIAGNOSIS — Z97.8 FOLEY CATHETER IN PLACE: ICD-10-CM

## 2021-04-28 DIAGNOSIS — K62.89 PROCTITIS: ICD-10-CM

## 2021-04-28 DIAGNOSIS — K59.00 CONSTIPATION: Primary | ICD-10-CM

## 2021-04-28 DIAGNOSIS — K56.41 FECAL IMPACTION (HCC): ICD-10-CM

## 2021-04-28 LAB
ALBUMIN SERPL BCP-MCNC: 4.3 G/DL (ref 3.5–5)
ALP SERPL-CCNC: 102 U/L (ref 46–116)
ALT SERPL W P-5'-P-CCNC: 17 U/L (ref 12–78)
ANION GAP SERPL CALCULATED.3IONS-SCNC: 6 MMOL/L (ref 4–13)
AST SERPL W P-5'-P-CCNC: 12 U/L (ref 5–45)
BACTERIA UR QL AUTO: NORMAL /HPF
BASOPHILS # BLD AUTO: 0.02 THOUSANDS/ΜL (ref 0–0.1)
BASOPHILS NFR BLD AUTO: 0 % (ref 0–1)
BILIRUB SERPL-MCNC: 0.9 MG/DL (ref 0.2–1)
BILIRUB UR QL STRIP: NEGATIVE
BUN SERPL-MCNC: 19 MG/DL (ref 5–25)
CALCIUM SERPL-MCNC: 9.4 MG/DL (ref 8.3–10.1)
CHLORIDE SERPL-SCNC: 107 MMOL/L (ref 100–108)
CLARITY UR: CLEAR
CO2 SERPL-SCNC: 24 MMOL/L (ref 21–32)
COLOR UR: YELLOW
COLOR, POC: YELLOW
CREAT SERPL-MCNC: 0.75 MG/DL (ref 0.6–1.3)
EOSINOPHIL # BLD AUTO: 0.01 THOUSAND/ΜL (ref 0–0.61)
EOSINOPHIL NFR BLD AUTO: 0 % (ref 0–6)
ERYTHROCYTE [DISTWIDTH] IN BLOOD BY AUTOMATED COUNT: 13.1 % (ref 11.6–15.1)
GFR SERPL CREATININE-BSD FRML MDRD: 90 ML/MIN/1.73SQ M
GLUCOSE SERPL-MCNC: 153 MG/DL (ref 65–140)
GLUCOSE UR STRIP-MCNC: ABNORMAL MG/DL
HCT VFR BLD AUTO: 40 % (ref 36.5–49.3)
HGB BLD-MCNC: 13.5 G/DL (ref 12–17)
HGB UR QL STRIP.AUTO: ABNORMAL
HYALINE CASTS #/AREA URNS LPF: NORMAL /LPF
IMM GRANULOCYTES # BLD AUTO: 0.04 THOUSAND/UL (ref 0–0.2)
IMM GRANULOCYTES NFR BLD AUTO: 0 % (ref 0–2)
KETONES UR STRIP-MCNC: NEGATIVE MG/DL
LEUKOCYTE ESTERASE UR QL STRIP: NEGATIVE
LIPASE SERPL-CCNC: 14 U/L (ref 73–393)
LYMPHOCYTES # BLD AUTO: 1.05 THOUSANDS/ΜL (ref 0.6–4.47)
LYMPHOCYTES NFR BLD AUTO: 11 % (ref 14–44)
MCH RBC QN AUTO: 28.4 PG (ref 26.8–34.3)
MCHC RBC AUTO-ENTMCNC: 33.8 G/DL (ref 31.4–37.4)
MCV RBC AUTO: 84 FL (ref 82–98)
MONOCYTES # BLD AUTO: 0.66 THOUSAND/ΜL (ref 0.17–1.22)
MONOCYTES NFR BLD AUTO: 7 % (ref 4–12)
NEUTROPHILS # BLD AUTO: 8.04 THOUSANDS/ΜL (ref 1.85–7.62)
NEUTS SEG NFR BLD AUTO: 82 % (ref 43–75)
NITRITE UR QL STRIP: NEGATIVE
NON-SQ EPI CELLS URNS QL MICRO: NORMAL /HPF
NRBC BLD AUTO-RTO: 0 /100 WBCS
PH UR STRIP.AUTO: 5 [PH] (ref 4.5–8)
PLATELET # BLD AUTO: 197 THOUSANDS/UL (ref 149–390)
PMV BLD AUTO: 9.8 FL (ref 8.9–12.7)
POTASSIUM SERPL-SCNC: 3.5 MMOL/L (ref 3.5–5.3)
PROT SERPL-MCNC: 8 G/DL (ref 6.4–8.2)
PROT UR STRIP-MCNC: NEGATIVE MG/DL
RBC # BLD AUTO: 4.75 MILLION/UL (ref 3.88–5.62)
RBC #/AREA URNS AUTO: NORMAL /HPF
SODIUM SERPL-SCNC: 137 MMOL/L (ref 136–145)
SP GR UR STRIP.AUTO: >=1.03 (ref 1–1.03)
TROPONIN I SERPL-MCNC: <0.02 NG/ML
UROBILINOGEN UR QL STRIP.AUTO: 0.2 E.U./DL
WBC # BLD AUTO: 9.82 THOUSAND/UL (ref 4.31–10.16)
WBC #/AREA URNS AUTO: NORMAL /HPF

## 2021-04-28 PROCEDURE — 93005 ELECTROCARDIOGRAM TRACING: CPT

## 2021-04-28 PROCEDURE — 36415 COLL VENOUS BLD VENIPUNCTURE: CPT | Performed by: EMERGENCY MEDICINE

## 2021-04-28 PROCEDURE — 80053 COMPREHEN METABOLIC PANEL: CPT | Performed by: EMERGENCY MEDICINE

## 2021-04-28 PROCEDURE — G1004 CDSM NDSC: HCPCS

## 2021-04-28 PROCEDURE — 85025 COMPLETE CBC W/AUTO DIFF WBC: CPT | Performed by: EMERGENCY MEDICINE

## 2021-04-28 PROCEDURE — 99285 EMERGENCY DEPT VISIT HI MDM: CPT | Performed by: EMERGENCY MEDICINE

## 2021-04-28 PROCEDURE — 99284 EMERGENCY DEPT VISIT MOD MDM: CPT

## 2021-04-28 PROCEDURE — 74177 CT ABD & PELVIS W/CONTRAST: CPT

## 2021-04-28 PROCEDURE — 81001 URINALYSIS AUTO W/SCOPE: CPT

## 2021-04-28 PROCEDURE — 84484 ASSAY OF TROPONIN QUANT: CPT | Performed by: EMERGENCY MEDICINE

## 2021-04-28 PROCEDURE — 83690 ASSAY OF LIPASE: CPT | Performed by: EMERGENCY MEDICINE

## 2021-04-28 RX ORDER — POLYETHYLENE GLYCOL 3350 17 G/17G
17 POWDER, FOR SOLUTION ORAL 2 TIMES DAILY
Qty: 238 G | Refills: 0 | Status: SHIPPED | OUTPATIENT
Start: 2021-04-28 | End: 2021-05-17

## 2021-04-28 RX ORDER — LIDOCAINE HYDROCHLORIDE 20 MG/ML
1 JELLY TOPICAL ONCE
Status: DISCONTINUED | OUTPATIENT
Start: 2021-04-28 | End: 2021-04-28

## 2021-04-28 RX ORDER — ACETAMINOPHEN 325 MG/1
975 TABLET ORAL ONCE
Status: COMPLETED | OUTPATIENT
Start: 2021-04-28 | End: 2021-04-28

## 2021-04-28 RX ADMIN — IOHEXOL 100 ML: 350 INJECTION, SOLUTION INTRAVENOUS at 21:01

## 2021-04-28 RX ADMIN — ACETAMINOPHEN 975 MG: 325 TABLET ORAL at 20:01

## 2021-04-29 ENCOUNTER — HOSPITAL ENCOUNTER (EMERGENCY)
Facility: HOSPITAL | Age: 76
Discharge: HOME | End: 2021-04-30
Attending: EMERGENCY MEDICINE
Payer: COMMERCIAL

## 2021-04-29 VITALS
HEIGHT: 68 IN | RESPIRATION RATE: 20 BRPM | OXYGEN SATURATION: 94 % | BODY MASS INDEX: 33.34 KG/M2 | WEIGHT: 220 LBS | SYSTOLIC BLOOD PRESSURE: 174 MMHG | DIASTOLIC BLOOD PRESSURE: 77 MMHG | HEART RATE: 72 BPM | TEMPERATURE: 98.5 F

## 2021-04-29 DIAGNOSIS — R10.9 ABDOMINAL PAIN: ICD-10-CM

## 2021-04-29 DIAGNOSIS — N39.0 UTI (URINARY TRACT INFECTION): ICD-10-CM

## 2021-04-29 DIAGNOSIS — K59.00 CONSTIPATION: Primary | ICD-10-CM

## 2021-04-29 LAB
ATRIAL RATE: 91 BPM
P AXIS: 46 DEGREES
PR INTERVAL: 162 MS
QRS AXIS: 20 DEGREES
QRSD INTERVAL: 84 MS
QT INTERVAL: 330 MS
QTC INTERVAL: 405 MS
T WAVE AXIS: -4 DEGREES
VENTRICULAR RATE: 91 BPM

## 2021-04-29 PROCEDURE — 99285 EMERGENCY DEPT VISIT HI MDM: CPT

## 2021-04-29 PROCEDURE — 93010 ELECTROCARDIOGRAM REPORT: CPT | Performed by: INTERNAL MEDICINE

## 2021-04-29 NOTE — ED ATTENDING ATTESTATION
4/28/2021  IRosi MD, saw and evaluated the patient  I have discussed the patient with the resident/non-physician practitioner and agree with the resident's/non-physician practitioner's findings, Plan of Care, and MDM as documented in the resident's/non-physician practitioner's note, except where noted  All available labs and Radiology studies were reviewed  I was present for key portions of any procedure(s) performed by the resident/non-physician practitioner and I was immediately available to provide assistance  At this point I agree with the current assessment done in the Emergency Department  I have conducted an independent evaluation of this patient a history and physical is as follows:  Patient is fasting for Ramadan, decreased p o  For fluids and solids during the day  Patient has had difficult time moving his bowels  Had an episode of diarrhea today, which she states had blood in it  Patient additionally has been having difficulty urinating and pain with urination  No fevers or chills  Complains of lower abdominal cramping pain which radiates into his back  Review of systems otherwise -12 systems reviewed  On exam patient has a palpable bladder fundus  He has mild tenderness over his distended bladder  The remainder of his exam is benign  Impression:  Likely constipation with overflow diarrhea and urinary retention from the constipation    Will plan to CT to rule out surgical pathology, place Pryor catheter, check urine for infection, bowel regimen  ED Course         Critical Care Time  Procedures

## 2021-04-29 NOTE — ED NOTES
Pt had small amount of liquid stool  Dr Osito Villaseñor advised        Yoselin Garcia, RN  04/28/21 0993

## 2021-04-29 NOTE — DISCHARGE INSTRUCTIONS
I attempted to reach your daughter to discuss this further, I left a voicemail for her  We discussed the options for loosening your stool, and you agreed at this time you would rather take oral medications instead of rectal medications despite your fasting at this time  You will have to keep the lion catheter in place until you have several bowel movements over the next day or two, ensuring us that the obstruction has improved  Otherwise, you may develop urinary obstruction again because of your constipation  You have been prescribed miralax, please take as prescribed  Return in 1-2 days to the ED or your primary care doctor for removal of the catheter  Please empty the bag regularly  If you develop any worsening abdominal pain, you are not draining urine, nausea or vomiting, worsening diarrhea, please return for re-evaluation  Additionally, follow all provided return precautions  Thank you

## 2021-04-29 NOTE — ED PROVIDER NOTES
History  Chief Complaint   Patient presents with    Abdominal Pain     Pt reports generalized abdominal pain, diarrhea, rectal bleeding  Family reports pt is a [employer] and has been fasting so he is dehydrated     77year old M who presents for constipation x2 days, followed by bloody diarrhea today and suprapubic pain  Patient is currently fasting for Ramadan and has not been eating for most of the day  Patient states he has been taking his medications however  He describes that after feeling constipated for several days, today he had large amounts of diarrhea that was bloody, he says he was unsure how much blood was observed  He describes that he has had pain with urination in his penis, and has also had feelings that he had to push  He thought his urine has been dark recently  He does report bilateral lower back pain, as well as suprapubic pain  No fevers, no chest pain, no shortness of breath or cough  No N/V  ROS otherwise negative  Prior to Admission Medications   Prescriptions Last Dose Informant Patient Reported? Taking?    Glucose Blood (ONETOUCH ULTRA BLUE VI)  Child Yes No   Sig: by In Vitro route daily   Lancets (ONETOUCH ULTRASOFT) lancets   No No   Sig: Test daily, e11 5   OneTouch Ultra test strip   No No   Sig: TEST ONCE DAILY E11 9   amLODIPine (NORVASC) 2 5 mg tablet   No Yes   Sig: TAKE 2 TABLETS (5 MG TOTAL) BY MOUTH DAILY   aspirin 81 MG tablet  Child Yes No   Sig: Take by mouth   atorvastatin (LIPITOR) 10 mg tablet   No Yes   Sig: TAKE 1 TABLET BY MOUTH EVERY DAY   diclofenac sodium (VOLTAREN) 1 %   No Yes   Sig: Apply 2 g topically 4 (four) times a day   lisinopril (ZESTRIL) 10 mg tablet   No Yes   Sig: TOME ALLY TABLETA TODOS LOS NAVA   metFORMIN (GLUCOPHAGE) 500 mg tablet   No Yes   Sig: TAKE 1 TABLET BY MOUTH TWICE A DAY   omeprazole (PriLOSEC) 40 MG capsule   Yes Yes   Sig: TAKE 1 CAPSULE BY MOUTH EVERY DAY 1/2 HOUR BEFORE BREAKFAST      Facility-Administered Medications: None       Past Medical History:   Diagnosis Date    Brain mass     Last Assessed; 11/5/2015    Chest pain     Last Assessed: 1/22/2015    Diabetes type 2, uncontrolled ([address] )     Last Assessed: 3/1/2016    Edema of left lower extremity     Last Assessed: 3/22/2017    GERD (gastroesophageal reflux disease)     Hyperlipidemia     Hypertension     Impaired fasting glucose     Last Assessed: 11/10/2015    Irregular heartbeat     Varicose 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     Last Assessed: 3/22/2017       Past Surgical History:   Procedure Laterality Date    COLONOSCOPY      COLONOSCOPY N/A 7/10/2018    Procedure: COLONOSCOPY;  Surgeon: Wan Javier MD;  Location: [facility]; Service: Colorectal    INGUINAL HERNIA REPAIR      20+ years ago - [facility]; Last Assessed: 10/23/2014    TONSILLECTOMY      VARICOSE VEIN SURGERY Left     x2 left leg - 40+ yrs ago, [facility] and [facility]       Family History   Problem Relation Age of Onset    No Known Problems Mother     No Known Problems Father     Melanoma Daughter 16     I have reviewed and agree with the history as documented  E-Cigarette/Vaping    E-Cigarette Use Never User      E-Cigarette/Vaping Substances     Social History     Tobacco Use    Smoking status: Never Smoker    Smokeless tobacco: Never Used   Substance Use Topics    Alcohol use: No    Drug use: No        Review of Systems   Constitutional: Negative for chills, diaphoresis, fatigue and fever  HENT: Negative for congestion and sore throat  Eyes: Negative for visual disturbance  Respiratory: Negative for cough, chest tightness and shortness of breath  Cardiovascular: Negative for chest pain, palpitations and leg swelling  Gastrointestinal: Positive for abdominal pain and diarrhea  Negative for abdominal distention, constipation, nausea and vomiting  Genitourinary: Positive for difficulty urinating  Negative for dysuria and hematuria     Musculoskeletal: Positive for back pain  Negative for arthralgias and myalgias  Skin: Negative for rash  Neurological: Negative for dizziness, weakness, light-headedness, numbness and headaches  Psychiatric/Behavioral: Negative for agitation, behavioral problems and confusion  The patient is not nervous/anxious  All other systems reviewed and are negative  Physical Exam  ED Triage Vitals [04/28/21 1928]   Temperature Pulse Respirations Blood Pressure SpO2   98 5 °F (36 9 °C) 94 18 (!) 205/99 96 %      Temp Source Heart Rate Source Patient Position - Orthostatic VS BP Location FiO2 (%)   Oral Monitor Lying Right arm --      Pain Score       Worst Possible Pain             Orthostatic Vital Signs  Vitals:    04/28/21 1928 04/28/21 2200 04/28/21 2300   BP: (!) 205/99 143/68 154/86   Pulse: 94 68 80   Patient Position - Orthostatic VS: Lying Lying Lying       Physical Exam  Constitutional:       Appearance: He is well-developed  HENT:      Head: Normocephalic and atraumatic  Cardiovascular:      Rate and Rhythm: Normal rate and regular rhythm  Heart sounds: Normal heart sounds  No murmur  Pulmonary:      Effort: Pulmonary effort is normal  No respiratory distress  Breath sounds: Normal breath sounds  Abdominal:      General: Bowel sounds are normal  There is no distension  Palpations: Abdomen is soft  Tenderness: There is abdominal tenderness in the right lower quadrant, suprapubic area and left lower quadrant  There is no right CVA tenderness or left CVA tenderness  Musculoskeletal:         General: No deformity  Skin:     General: Skin is warm  Findings: No rash  Neurological:      Mental Status: He is alert and oriented to person, place, and time  Psychiatric:         Behavior: Behavior normal          Thought Content:  Thought content normal          Judgment: Judgment normal          ED Medications  Medications   acetaminophen (TYLENOL) tablet 975 mg (975 mg Oral Given 4/28/21 2001) iohexol (OMNIPAQUE) 350 MG/ML injection (MULTI-DOSE) 100 mL (100 mL Intravenous Given 4/28/21 2101)       Diagnostic Studies  Results Reviewed     Procedure Component Value Units Date/Time    Urine Microscopic [[id number]]  (Normal) Collected: 04/28/21 2053    Lab Status: Final result Specimen: Urine, Clean Catch Updated: 04/28/21 2114     RBC, UA 2-4 /hpf      WBC, UA None Seen /hpf      Epithelial Cells None Seen /hpf      Bacteria, UA None Seen /hpf      Hyaline Casts, UA None Seen /lpf     POCT urinalysis dipstick [[id number]]  (Normal) Resulted: 04/28/21 2056    Lab Status: Final result Updated: 04/28/21 2056     Color, UA yellow    Troponin I [[id number]]  (Normal) Collected: 04/28/21 2018    Lab Status: Final result Specimen: Blood from Arm, Left Updated: 04/28/21 2055     Troponin I <0 02 ng/mL     Urine Macroscopic, POC [[id number]]  (Abnormal) Collected: 04/28/21 2053    Lab Status: Final result Specimen: Urine Updated: 04/28/21 2055     Color, UA Yellow     Clarity, UA Clear     pH, UA 5 0     Leukocytes, UA Negative     Nitrite, UA Negative     Protein, UA Negative mg/dl      Glucose,  (1/4%) mg/dl      Ketones, UA Negative mg/dl      Urobilinogen, UA 0 2 E U /dl      Bilirubin, UA Negative     Blood, UA Trace     Specific Gravity, UA >=1 030    Narrative:      CLINITEK RESULT    Comprehensive metabolic panel [[id number]]  (Abnormal) Collected: 04/28/21 2000    Lab Status: Final result Specimen: Blood from Arm, Left Updated: 04/28/21 2036     Sodium 137 mmol/L      Potassium 3 5 mmol/L      Chloride 107 mmol/L      CO2 24 mmol/L      ANION GAP 6 mmol/L      BUN 19 mg/dL      Creatinine 0 75 mg/dL      Glucose 153 mg/dL      Calcium 9 4 mg/dL      AST 12 U/L      ALT 17 U/L      Alkaline Phosphatase 102 U/L      Total Protein 8 0 g/dL      Albumin 4 3 g/dL      Total Bilirubin 0 90 mg/dL      eGFR 90 ml/min/1 73sq m     Narrative:      [place] guidelines for Chronic Kidney Disease (CKD):     Stage 1 with normal or high GFR (GFR > 90 mL/min/1 73 square meters)    Stage 2 Mild CKD (GFR = 60-89 mL/min/1 73 square meters)    Stage 3A Moderate CKD (GFR = 45-59 mL/min/1 73 square meters)    Stage 3B Moderate CKD (GFR = 30-44 mL/min/1 73 square meters)    Stage 4 Severe CKD (GFR = 15-29 mL/min/1 73 square meters)    Stage 5 End Stage CKD (GFR <15 mL/min/1 73 square meters)  Note: GFR calculation is accurate only with a steady state creatinine    Lipase [[id number]]  (Abnormal) Collected: 04/28/21 2000    Lab Status: Final result Specimen: Blood from Arm, Left Updated: 04/28/21 2036     Lipase 14 u/L     CBC and differential [[id number]]  (Abnormal) Collected: 04/28/21 2000    Lab Status: Final result Specimen: Blood from Arm, Left Updated: 04/28/21 2013     WBC 9 82 Thousand/uL      RBC 4 75 Million/uL      Hemoglobin 13 5 g/dL      Hematocrit 40 0 %      MCV 84 fL      MCH 28 4 pg      MCHC 33 8 g/dL      RDW 13 1 %      MPV 9 8 fL      Platelets 953 Thousands/uL      nRBC 0 /100 WBCs      Neutrophils Relative 82 %      Immat GRANS % 0 %      Lymphocytes Relative 11 %      Monocytes Relative 7 %      Eosinophils Relative 0 %      Basophils Relative 0 %      Neutrophils Absolute 8 04 Thousands/µL      Immature Grans Absolute 0 04 Thousand/uL      Lymphocytes Absolute 1 05 Thousands/µL      Monocytes Absolute 0 66 Thousand/µL      Eosinophils Absolute 0 01 Thousand/µL      Basophils Absolute 0 02 Thousands/µL                  CT abdomen pelvis with contrast   Final Result by Nichelle Cruz DO (04/28 2132)   Exam was limited by excessive motion artifact  Evaluation of GI tract further limited without oral contrast    Mild cardiomegaly  Intrapulmonary vascular congestion  Splenomegaly  Bilateral simple cysts both kidneys as well as subcentimeter lesions which are too small to characterize, also likely cysts  Small sliding hiatal hernia    Large volume of formed stool in the distal sigmoid and rectum causing distention of the rectum up to 7 5 cm diameter likely representing fecal impaction, and mild circumferential rectal wall thickening/mild perirectal    inflammation suggesting proctitis  The study was marked in [facility] for immediate notification  Workstation performed: [id number]               Procedures  Procedures      ED Course  ED Course as of Apr 29 0004   Wed Apr 28, 2021 2053 Creatinine: 0 75                             SBIRT 22yo+      Most Recent Value   SBIRT (23 yo +)   In order to provide better care to our patients, we are screening all of our patients for alcohol and drug use  Would it be okay to ask you these screening questions? Yes Filed at: 04/28/2021 1937   Initial Alcohol Screen: US AUDIT-C    1  How often do you have a drink containing alcohol?  0 Filed at: 04/28/2021 1937   2  How many drinks containing alcohol do you have on a typical day you are drinking? 0 Filed at: 04/28/2021 1937   3a  Male UNDER 65: How often do you have five or more drinks on one occasion? 0 Filed at: 04/28/2021 1939   3b  FEMALE Any Age, or MALE 65+: How often do you have 4 or more drinks on one occassion? 0 Filed at: 04/28/2021 1939   Audit-C Score  0 Filed at: 04/28/2021 1939   GÉNESIS: How many times in the past year have you    Used an illegal drug or used a prescription medication for non-medical reasons? Never Filed at: 04/28/2021 1939                MDM  Number of Diagnoses or Management Options  Constipation:   Fecal impaction [facility]): Pryor catheter in place:   Proctitis:   Urinary obstruction:   Diagnosis management comments: Patient's presentation is concerning for constipation with secondary bladder obstruction  Will perform CT abdomen pelvis, will perform GI labs, bladder ultrasound showed a full bladder and a Pryor catheter was placed  CT abdomen pelvis showed significant amount of stool with proctitis and a sigmoid colon and rectum    Soapsuds enema was performed  Fluids were given  Urinalysis not show infection  Patient was discharged with script for MiraLax, oral, as patient described back for me that he did not want a per rectum medication and would take his oral medication despite fasting  Patient was discharged with strict return precautions  The voicemail was left for the daughter explaining this information  Disposition  Final diagnoses:   Constipation   Fecal impaction ([address] )   Proctitis   Urinary obstruction   Pryor catheter in place     Time reflects when diagnosis was documented in both MDM as applicable and the Disposition within this note     Time User Action Codes Description Comment    4/28/2021 11:24 PM Yvetta Royals Add [K59 00] Constipation     4/28/2021 11:24 PM Yvetta Royals Add [K56 41] Fecal impaction ([address] )     4/28/2021 11:25 PM Saba Palumbo [K62 89] Proctitis     4/28/2021 11:25 PM Yvetta Royals Add [N13 9] Urinary obstruction     4/28/2021 11:25 PM Yvetta Royals Add [Z97 8] Pryor catheter in place       ED Disposition     ED Disposition Condition Date/Time Comment    Discharge Stable Wed Apr 28, 2021 11:24 PM Efraín Tavo discharge to home/self care  Follow-up Information     Follow up With Specialties Details Why Contact Info    Vickie Ma, [address], Internal Medicine, Nurse Practitioner Call  For re-evaluation [address] [place] [address]  [phone]            Patient's Medications   Discharge Prescriptions    POLYETHYLENE GLYCOL (MIRALAX) 17 G PACKET    Take 17 g by mouth 2 (two) times a day for 7 days       Start Date: 4/28/2021 End Date: 5/5/2021       Order Dose: 17 g       Quantity: 238 g    Refills: 0     No discharge procedures on file  PDMP Review       Value Time User    PDMP Reviewed  Yes 10/5/2020  2:20 PM Vickie Ma, [address]           ED Provider  Attending physically available and evaluated Edarlette Vo   I managed the patient along with the ED Attending      Electronically Signed by         Ryan Cabral MD  04/29/21 7012

## 2021-04-30 ENCOUNTER — APPOINTMENT (EMERGENCY)
Dept: CT IMAGING | Facility: HOSPITAL | Age: 76
End: 2021-04-30
Payer: COMMERCIAL

## 2021-04-30 ENCOUNTER — OFFICE VISIT (OUTPATIENT)
Dept: FAMILY MEDICINE CLINIC | Facility: CLINIC | Age: 76
End: 2021-04-30
Payer: COMMERCIAL

## 2021-04-30 ENCOUNTER — TELEPHONE (OUTPATIENT)
Dept: UROLOGY | Facility: MEDICAL CENTER | Age: 76
End: 2021-04-30

## 2021-04-30 VITALS
BODY MASS INDEX: 33.08 KG/M2 | SYSTOLIC BLOOD PRESSURE: 141 MMHG | HEART RATE: 72 BPM | HEIGHT: 68 IN | TEMPERATURE: 98.3 F | WEIGHT: 218.26 LBS | OXYGEN SATURATION: 96 % | DIASTOLIC BLOOD PRESSURE: 82 MMHG | RESPIRATION RATE: 16 BRPM

## 2021-04-30 VITALS
BODY MASS INDEX: 31.28 KG/M2 | HEIGHT: 68 IN | WEIGHT: 206.4 LBS | TEMPERATURE: 97.2 F | HEART RATE: 83 BPM | DIASTOLIC BLOOD PRESSURE: 70 MMHG | RESPIRATION RATE: 18 BRPM | SYSTOLIC BLOOD PRESSURE: 128 MMHG | OXYGEN SATURATION: 98 %

## 2021-04-30 DIAGNOSIS — Z96.0 INDWELLING URINARY CATHETER PRESENT: Primary | ICD-10-CM

## 2021-04-30 DIAGNOSIS — R33.8 ACUTE URINARY RETENTION: ICD-10-CM

## 2021-04-30 LAB
ALBUMIN SERPL BCP-MCNC: 3.9 G/DL (ref 3.5–5)
ALP SERPL-CCNC: 101 U/L (ref 46–116)
ALT SERPL W P-5'-P-CCNC: 18 U/L (ref 12–78)
ANION GAP SERPL CALCULATED.3IONS-SCNC: 11 MMOL/L (ref 4–13)
APTT PPP: 27 SECONDS (ref 23–37)
AST SERPL W P-5'-P-CCNC: 17 U/L (ref 5–45)
BACTERIA UR QL AUTO: NORMAL /HPF
BASOPHILS # BLD AUTO: 0.02 THOUSANDS/ΜL (ref 0–0.1)
BASOPHILS NFR BLD AUTO: 0 % (ref 0–1)
BILIRUB SERPL-MCNC: 0.83 MG/DL (ref 0.2–1)
BILIRUB UR QL STRIP: NEGATIVE
BUN SERPL-MCNC: 16 MG/DL (ref 5–25)
CALCIUM SERPL-MCNC: 8.7 MG/DL (ref 8.3–10.1)
CHLORIDE SERPL-SCNC: 103 MMOL/L (ref 100–108)
CLARITY UR: CLEAR
CO2 SERPL-SCNC: 27 MMOL/L (ref 21–32)
COLOR UR: ABNORMAL
CREAT SERPL-MCNC: 0.9 MG/DL (ref 0.6–1.3)
EOSINOPHIL # BLD AUTO: 0.14 THOUSAND/ΜL (ref 0–0.61)
EOSINOPHIL NFR BLD AUTO: 2 % (ref 0–6)
ERYTHROCYTE [DISTWIDTH] IN BLOOD BY AUTOMATED COUNT: 13.1 % (ref 11.6–15.1)
GFR SERPL CREATININE-BSD FRML MDRD: 84 ML/MIN/1.73SQ M
GLUCOSE SERPL-MCNC: 164 MG/DL (ref 65–140)
GLUCOSE SERPL-MCNC: 173 MG/DL (ref 65–140)
GLUCOSE UR STRIP-MCNC: NEGATIVE MG/DL
HCT VFR BLD AUTO: 38.3 % (ref 36.5–49.3)
HGB BLD-MCNC: 12.8 G/DL (ref 12–17)
HGB UR QL STRIP.AUTO: ABNORMAL
IMM GRANULOCYTES # BLD AUTO: 0.04 THOUSAND/UL (ref 0–0.2)
IMM GRANULOCYTES NFR BLD AUTO: 1 % (ref 0–2)
INR PPP: 1.12 (ref 0.84–1.19)
KETONES UR STRIP-MCNC: NEGATIVE MG/DL
LACTATE SERPL-SCNC: 1 MMOL/L (ref 0.5–2)
LEUKOCYTE ESTERASE UR QL STRIP: ABNORMAL
LIPASE SERPL-CCNC: 28 U/L (ref 73–393)
LYMPHOCYTES # BLD AUTO: 1.33 THOUSANDS/ΜL (ref 0.6–4.47)
LYMPHOCYTES NFR BLD AUTO: 15 % (ref 14–44)
MCH RBC QN AUTO: 28.3 PG (ref 26.8–34.3)
MCHC RBC AUTO-ENTMCNC: 33.4 G/DL (ref 31.4–37.4)
MCV RBC AUTO: 85 FL (ref 82–98)
MONOCYTES # BLD AUTO: 0.96 THOUSAND/ΜL (ref 0.17–1.22)
MONOCYTES NFR BLD AUTO: 11 % (ref 4–12)
NEUTROPHILS # BLD AUTO: 6.29 THOUSANDS/ΜL (ref 1.85–7.62)
NEUTS SEG NFR BLD AUTO: 71 % (ref 43–75)
NITRITE UR QL STRIP: NEGATIVE
NON-SQ EPI CELLS URNS QL MICRO: NORMAL /HPF
NRBC BLD AUTO-RTO: 0 /100 WBCS
PH UR STRIP.AUTO: 6 [PH]
PLATELET # BLD AUTO: 177 THOUSANDS/UL (ref 149–390)
PMV BLD AUTO: 9.9 FL (ref 8.9–12.7)
POTASSIUM SERPL-SCNC: 3.4 MMOL/L (ref 3.5–5.3)
PROT SERPL-MCNC: 7.4 G/DL (ref 6.4–8.2)
PROT UR STRIP-MCNC: NEGATIVE MG/DL
PROTHROMBIN TIME: 14.5 SECONDS (ref 11.6–14.5)
RBC # BLD AUTO: 4.52 MILLION/UL (ref 3.88–5.62)
RBC #/AREA URNS AUTO: NORMAL /HPF
SODIUM SERPL-SCNC: 141 MMOL/L (ref 136–145)
SP GR UR STRIP.AUTO: <=1.005 (ref 1–1.03)
UROBILINOGEN UR QL STRIP.AUTO: 0.2 E.U./DL
WBC # BLD AUTO: 8.78 THOUSAND/UL (ref 4.31–10.16)
WBC #/AREA URNS AUTO: NORMAL /HPF

## 2021-04-30 PROCEDURE — 82948 REAGENT STRIP/BLOOD GLUCOSE: CPT

## 2021-04-30 PROCEDURE — 85610 PROTHROMBIN TIME: CPT | Performed by: PHYSICIAN ASSISTANT

## 2021-04-30 PROCEDURE — 85730 THROMBOPLASTIN TIME PARTIAL: CPT | Performed by: PHYSICIAN ASSISTANT

## 2021-04-30 PROCEDURE — 83605 ASSAY OF LACTIC ACID: CPT | Performed by: PHYSICIAN ASSISTANT

## 2021-04-30 PROCEDURE — 81001 URINALYSIS AUTO W/SCOPE: CPT | Performed by: PHYSICIAN ASSISTANT

## 2021-04-30 PROCEDURE — 85025 COMPLETE CBC W/AUTO DIFF WBC: CPT | Performed by: PHYSICIAN ASSISTANT

## 2021-04-30 PROCEDURE — 83690 ASSAY OF LIPASE: CPT | Performed by: PHYSICIAN ASSISTANT

## 2021-04-30 PROCEDURE — 74177 CT ABD & PELVIS W/CONTRAST: CPT

## 2021-04-30 PROCEDURE — 96361 HYDRATE IV INFUSION ADD-ON: CPT

## 2021-04-30 PROCEDURE — 80053 COMPREHEN METABOLIC PANEL: CPT | Performed by: PHYSICIAN ASSISTANT

## 2021-04-30 PROCEDURE — 96374 THER/PROPH/DIAG INJ IV PUSH: CPT

## 2021-04-30 PROCEDURE — 36415 COLL VENOUS BLD VENIPUNCTURE: CPT | Performed by: PHYSICIAN ASSISTANT

## 2021-04-30 PROCEDURE — 93005 ELECTROCARDIOGRAM TRACING: CPT

## 2021-04-30 PROCEDURE — 99285 EMERGENCY DEPT VISIT HI MDM: CPT | Performed by: PHYSICIAN ASSISTANT

## 2021-04-30 PROCEDURE — 96375 TX/PRO/DX INJ NEW DRUG ADDON: CPT

## 2021-04-30 PROCEDURE — 87040 BLOOD CULTURE FOR BACTERIA: CPT | Performed by: PHYSICIAN ASSISTANT

## 2021-04-30 PROCEDURE — 99213 OFFICE O/P EST LOW 20 MIN: CPT | Performed by: NURSE PRACTITIONER

## 2021-04-30 RX ORDER — KETOROLAC TROMETHAMINE 30 MG/ML
15 INJECTION, SOLUTION INTRAMUSCULAR; INTRAVENOUS ONCE
Status: COMPLETED | OUTPATIENT
Start: 2021-04-30 | End: 2021-04-30

## 2021-04-30 RX ORDER — SULFAMETHOXAZOLE AND TRIMETHOPRIM 800; 160 MG/1; MG/1
1 TABLET ORAL 2 TIMES DAILY
Qty: 14 TABLET | Refills: 0 | Status: SHIPPED | OUTPATIENT
Start: 2021-04-30 | End: 2021-05-07

## 2021-04-30 RX ORDER — SULFAMETHOXAZOLE AND TRIMETHOPRIM 800; 160 MG/1; MG/1
1 TABLET ORAL ONCE
Status: COMPLETED | OUTPATIENT
Start: 2021-04-30 | End: 2021-04-30

## 2021-04-30 RX ORDER — FENTANYL CITRATE 50 UG/ML
50 INJECTION, SOLUTION INTRAMUSCULAR; INTRAVENOUS ONCE
Status: COMPLETED | OUTPATIENT
Start: 2021-04-30 | End: 2021-04-30

## 2021-04-30 RX ADMIN — SULFAMETHOXAZOLE AND TRIMETHOPRIM 1 TABLET: 800; 160 TABLET ORAL at 04:33

## 2021-04-30 RX ADMIN — IOHEXOL 50 ML: 240 INJECTION, SOLUTION INTRATHECAL; INTRAVASCULAR; INTRAVENOUS; ORAL at 01:30

## 2021-04-30 RX ADMIN — SODIUM CHLORIDE 1000 ML: 0.9 INJECTION, SOLUTION INTRAVENOUS at 00:27

## 2021-04-30 RX ADMIN — KETOROLAC TROMETHAMINE 15 MG: 30 INJECTION, SOLUTION INTRAMUSCULAR at 04:34

## 2021-04-30 RX ADMIN — IOHEXOL 85 ML: 350 INJECTION, SOLUTION INTRAVENOUS at 03:56

## 2021-04-30 RX ADMIN — FENTANYL CITRATE 50 MCG: 50 INJECTION INTRAMUSCULAR; INTRAVENOUS at 00:24

## 2021-04-30 NOTE — TELEPHONE ENCOUNTER
Please Triage - [facility] Patient-     What is the reason for the patients appointment? Clista Purchase from [employer]  Patient was in ER for urine retention  Patent has a lion catheter  They would like appointment for lion catheter removal  Patient can be contacted directly       Imaging/Lab Results:      Do we accept the patient's insurance or is the patient Self-Pay? Provider & Plan: aarp medicare complete  Member ID#: Has the patient had any previous urologist(s)?no       Have patient records been requested?in epic       Has the patient had any outside testing done? Does the patient have a personal history of cancer? Meningioma, spindal cell neoplasm      Patient can be reached at :[phone] (m)

## 2021-04-30 NOTE — PROGRESS NOTES
FAMILY PRACTICE OFFICE VISIT       NAME: Sada Cooley  AGE: 77 y o  SEX: male       : 1945        MRN: [id number]    DATE: 2021  TIME: 12:13 PM    Assessment and Plan   1  Indwelling urinary catheter present  -     Ambulatory referral to Urology; Future    2  Acute urinary retention  -     Ambulatory referral to Urology; Future                 Chief Complaint     Chief Complaint   Patient presents with    Follow-up     ER follow up       History of Present Illness   Sada Cooley is a 77y o -year-old male who is here for er f/u x2  Originally went to er for abdominal pain  He was having loose stools, abd pain and blood in his stools  He is a practicing Oriental orthodox and is fasting for Ramadan  Also having pain with urination  Ct abd showed large formed stool in distal sigmoid and rectum causing distention of the rectum up to 7 5cm likely representing fecal impaction and perirectal inflammation suggesting proctitis  He had a lion placed and was sent home instructed to use miralax  Patient admitted to having a large stool and felt much improvement  Yesterday developed fever, was instructed to go back to ER for check for urosepsis  Testing was negative  Here today for f/u  Needs urology f/u   Anxious to get lion removed  States he is no longer going to fast for Ramadan due to medical conditions  Feeling improvement and no further abdominal pain          Review of Systems   Review of Systems   Constitutional: Negative for fatigue and fever  HENT: Negative for congestion and postnasal drip  Respiratory: Negative for cough and shortness of breath  Cardiovascular: Negative for chest pain and palpitations  Gastrointestinal: Negative for constipation and diarrhea  Genitourinary: Negative for decreased urine volume, discharge, dysuria, frequency, scrotal swelling, testicular pain and urgency  Musculoskeletal: Negative for arthralgias and myalgias  Skin: Negative for rash     Neurological: Negative for dizziness and headaches  Hematological: Negative for adenopathy  Psychiatric/Behavioral: Negative for dysphoric mood and sleep disturbance  The patient is not nervous/anxious  Active Problem List     Patient Active Problem List   Diagnosis    [facility]    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[facility] )    Controlled diabetes mellitus with diabetic neuropathy (HCC)    Episodic cluster headache, not intractable    GERD (gastroesophageal reflux disease)    Nonintractable headache    Hypertension    Acute pain of both knees    Meningioma ([facility] )    Varicose 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    Spondylolisthesis at L5-S1 level    Renal cyst    History of hepatitis C    Scalp lesion    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HCC)    Primary osteoarthritis of both knees         Past Medical History:  Past Medical History:   Diagnosis Date    Brain mass     Last Assessed; 11/5/2015    Chest pain     Last Assessed: 1/22/2015    Diabetes type 2, uncontrolled ([facility] )     Last Assessed: 3/1/2016    Edema of left lower extremity     Last Assessed: 3/22/2017    GERD (gastroesophageal reflux disease)     Hyperlipidemia     Hypertension     Impaired fasting glucose     Last Assessed: 11/10/2015    Irregular heartbeat     Varicose 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     Last Assessed: 3/22/2017       Past Surgical History:  Past Surgical History:   Procedure Laterality Date    COLONOSCOPY      COLONOSCOPY N/A 7/10/2018    Procedure: COLONOSCOPY;  Surgeon: Catheline Favre, MD;  Location: [facility];   Service: Colorectal    INGUINAL HERNIA REPAIR      20+ years ago - [facility]; Last Assessed: 10/23/2014    TONSILLECTOMY      VARICOSE VEIN SURGERY Left     x2 left leg - 40+ yrs ago, [facility]       Family History:  Family History   Problem Relation Age of Onset    No Known Problems Mother     No Known Problems Father     Melanoma Daughter 16       Social History:  Social History     Socioeconomic History    Marital status: /Civil Union     Spouse name: Not on file    Number of children: 11    Years of education: Not on file    Highest education level: Not on file   Occupational History    Occupation: Retired: Construction   Social Needs    Financial resource strain: Not on file    Food insecurity     Worry: Not on file     Inability: Not on file   [employer] needs     Medical: Not on file     Non-medical: Not on file   Tobacco Use    Smoking status: Never Smoker    Smokeless tobacco: Never Used   Substance and Sexual Activity    Alcohol use: No    Drug use: No    Sexual activity: Not on file   Lifestyle    Physical activity     Days per week: Not on file     Minutes per session: Not on file    Stress: Not on file   Relationships    Social connections     Talks on phone: Not on file     Gets together: Not on file     Attends Rastafari service: Not on file     Active member of club or organization: Not on file     Attends meetings of clubs or organizations: Not on file     Relationship status: Not on file    Intimate partner violence     Fear of current or ex partner: Not on file     Emotionally abused: Not on file     Physically abused: Not on file     Forced sexual activity: Not on file   Other Topics Concern    Not on file   Social History Narrative    Uses safety equipment           Objective     Vitals:    04/30/21 0935   BP: 128/70   Pulse: 83   Resp: 18   Temp: (!) 97 2 °F (36 2 °C)   SpO2: 98%     Wt Readings from Last 3 Encounters:   04/30/21 93 6 kg (206 lb 6 4 oz)   04/29/21 99 kg (218 lb 4 1 oz)   04/28/21 99 8 kg (220 lb)       Physical Exam  Vitals signs and nursing note reviewed  Constitutional:       Appearance: Normal appearance  HENT:      Head: Normocephalic and atraumatic  Nose: Nose normal    Eyes:      Conjunctiva/sclera: Conjunctivae normal    Neck:      Musculoskeletal: Normal range of motion and neck supple     Cardiovascular:      Rate and Rhythm: Normal rate and regular rhythm  Heart sounds: Normal heart sounds  Pulmonary:      Effort: Pulmonary effort is normal       Breath sounds: Normal breath sounds  Abdominal:      General: Bowel sounds are normal    Genitourinary:     Comments: Pryor patent for clear yellow urine    Musculoskeletal: Normal range of motion  Skin:     General: Skin is warm and dry  Capillary Refill: Capillary refill takes less than 2 seconds  Neurological:      General: No focal deficit present  Mental Status: He is alert and oriented to person, place, and time  Psychiatric:         Mood and Affect: Mood normal          Behavior: Behavior normal          Pertinent Laboratory/Diagnostic Studies:  Lab Results   Component Value Date    GLUCOSE 121 10/28/2015    BUN 16 04/30/2021    CREATININE 0 90 04/30/2021    CALCIUM 8 7 04/30/2021     10/28/2015    K 3 4 (L) 04/30/2021    CO2 27 04/30/2021     04/30/2021     Lab Results   Component Value Date    ALT 18 04/30/2021    AST 17 04/30/2021    ALKPHOS 101 04/30/2021    BILITOT 0 53 08/27/2015       Lab Results   Component Value Date    WBC 8 78 04/30/2021    HGB 12 8 04/30/2021    HCT 38 3 04/30/2021    MCV 85 04/30/2021     04/30/2021       No results found for: TSH    Lab Results   Component Value Date    CHOL 154 08/27/2015     Lab Results   Component Value Date    TRIG 184 (H) 08/25/2020     Lab Results   Component Value Date    HDL 31 (L) 08/25/2020     Lab Results   Component Value Date    LDLCALC 127 (H) 08/25/2020     Lab Results   Component Value Date    HGBA1C 6 7 (H) 08/25/2020       Results for orders placed or performed during the hospital encounter of 04/29/21   Blood culture #1    Specimen: Arm, Right; Blood   Result Value Ref Range    Blood Culture Received in Microbiology Lab  Culture in Progress  Blood culture #2    Specimen: Arm, Left; Blood   Result Value Ref Range    Blood Culture Received in Microbiology Lab   Culture in Progress      CBC and differential   Result Value Ref Range    WBC 8 78 4 31 - 10 16 Thousand/uL    RBC 4 52 3 88 - 5 62 Million/uL    Hemoglobin 12 8 12 0 - 17 0 g/dL    Hematocrit 38 3 36 5 - 49 3 %    MCV 85 82 - 98 fL    MCH 28 3 26 8 - 34 3 pg    MCHC 33 4 31 4 - 37 4 g/dL    RDW 13 1 11 6 - 15 1 %    MPV 9 9 8 9 - 12 7 fL    Platelets 461 384 - 957 Thousands/uL    nRBC 0 /100 WBCs    Neutrophils Relative 71 43 - 75 %    Immat GRANS % 1 0 - 2 %    Lymphocytes Relative 15 14 - 44 %    Monocytes Relative 11 4 - 12 %    Eosinophils Relative 2 0 - 6 %    Basophils Relative 0 0 - 1 %    Neutrophils Absolute 6 29 1 85 - 7 62 Thousands/µL    Immature Grans Absolute 0 04 0 00 - 0 20 Thousand/uL    Lymphocytes Absolute 1 33 0 60 - 4 47 Thousands/µL    Monocytes Absolute 0 96 0 17 - 1 22 Thousand/µL    Eosinophils Absolute 0 14 0 00 - 0 61 Thousand/µL    Basophils Absolute 0 02 0 00 - 0 10 Thousands/µL   Comprehensive metabolic panel   Result Value Ref Range    Sodium 141 136 - 145 mmol/L    Potassium 3 4 (L) 3 5 - 5 3 mmol/L    Chloride 103 100 - 108 mmol/L    CO2 27 21 - 32 mmol/L    ANION GAP 11 4 - 13 mmol/L    BUN 16 5 - 25 mg/dL    Creatinine 0 90 0 60 - 1 30 mg/dL    Glucose 173 (H) 65 - 140 mg/dL    Calcium 8 7 8 3 - 10 1 mg/dL    AST 17 5 - 45 U/L    ALT 18 12 - 78 U/L    Alkaline Phosphatase 101 46 - 116 U/L    Total Protein 7 4 6 4 - 8 2 g/dL    Albumin 3 9 3 5 - 5 0 g/dL    Total Bilirubin 0 83 0 20 - 1 00 mg/dL    eGFR 84 ml/min/1 73sq m   Lactic acid, plasma   Result Value Ref Range    LACTIC ACID 1 0 0 5 - 2 0 mmol/L   Lipase   Result Value Ref Range    Lipase 28 (L) 73 - 393 u/L   Protime-INR   Result Value Ref Range    Protime 14 5 11 6 - 14 5 seconds    INR 1 12 0 84 - 1 19   APTT   Result Value Ref Range    PTT 27 23 - 37 seconds   UA w Reflex to Microscopic w Reflex to Culture    Specimen: Urine, Indwelling Pryor Catheter   Result Value Ref Range    Color, UA Light Yellow     Clarity, UA Clear Specific Gravity, UA <=1 005 1 003 - 1 030    pH, UA 6 0 4 5, 5 0, 5 5, 6 0, 6 5, 7 0, 7 5, 8 0    Leukocytes, UA Small (A) Negative    Nitrite, UA Negative Negative    Protein, UA Negative Negative mg/dl    Glucose, UA Negative Negative mg/dl    Ketones, UA Negative Negative mg/dl    Urobilinogen, UA 0 2 0 2, 1 0 E U /dl E U /dl    Bilirubin, UA Negative Negative    Blood, UA Moderate (A) Negative   Urine Microscopic   Result Value Ref Range    RBC, UA 1-2 None Seen, 0-1, 1-2, 2-4, 0-5 /hpf    WBC, UA 2-4 None Seen, 0-1, 1-2, 0-5, 2-4 /hpf    Epithelial Cells None Seen None Seen, Occasional /hpf    Bacteria, UA None Seen None Seen, Occasional /hpf   Fingerstick Glucose (POCT)   Result Value Ref Range    POC Glucose 164 (H) 65 - 140 mg/dl       Orders Placed This Encounter   Procedures    Ambulatory referral to Urology       ALLERGIES:  No Known Allergies    Current Medications     Current Outpatient Medications   Medication Sig Dispense Refill    amLODIPine (NORVASC) 2 5 mg tablet TAKE 2 TABLETS (5 MG TOTAL) BY MOUTH DAILY 90 tablet 2    aspirin 81 MG tablet Take 81 mg by mouth daily       atorvastatin (LIPITOR) 10 mg tablet TAKE 1 TABLET BY MOUTH EVERY DAY 90 tablet 3    diclofenac sodium (VOLTAREN) 1 % Apply 2 g topically 4 (four) times a day 150 g 1    Glucose Blood (ONETOUCH ULTRA BLUE VI) by In Vitro route daily      Lancets (ONETOUCH ULTRASOFT) lancets Test daily, e11 5 100 each 11    lisinopril (ZESTRIL) 10 mg tablet TOME ALLY TABLETA TODOS LOS NAVA 90 tablet 3    metFORMIN (GLUCOPHAGE) 500 mg tablet TAKE 1 TABLET BY MOUTH TWICE A  tablet 3    omeprazole (PriLOSEC) 40 MG capsule TAKE 1 CAPSULE BY MOUTH EVERY DAY 1/2 HOUR BEFORE BREAKFAST      OneTouch Ultra test strip TEST ONCE DAILY E11 9 25 each 47    polyethylene glycol (MIRALAX) 17 g packet Take 17 g by mouth 2 (two) times a day for 7 days (Patient not taking: Reported on 4/29/2021) 238 g 0    sulfamethoxazole-trimethoprim (BACTRIM DS) 800-160 mg per tablet Take 1 tablet by mouth 2 (two) times a day for 7 days smx-tmp DS (BACTRIM) 800-160 mg tabs (1tab q12 D10) 14 tablet 0     No current facility-administered medications for this visit            Health Maintenance     Health Maintenance   Topic Date Due    DM Eye Exam  Never done    Pneumococcal Vaccine: 65+ Years (2 of 2 - PPSV23) 10/14/2020    HEMOGLOBIN A1C  02/25/2021    Hepatitis B Vaccine (1 of 3 - Risk 3-dose series) 05/15/2021 (Originally 7/11/1964)    Influenza Vaccine (1) 06/30/2021 (Originally 9/1/2020)    DTaP,Tdap,and Td Vaccines (1 - Tdap) 08/12/2021 (Originally 7/11/1967)    Medicare Annual Wellness Visit (AWV)  11/02/2021    Fall Risk  01/15/2022    BMI: Followup Plan  01/15/2022    Falls: Plan of Care  01/15/2022    Diabetic Foot Exam  01/15/2022    Depression Screening PHQ  03/17/2022    BMI: Adult  04/30/2022    Colorectal Cancer Screening  07/10/2028    COVID-19 Vaccine  Completed    HIB Vaccine  Aged Out    IPV Vaccine  Aged Out    Hepatitis A Vaccine  Aged Out    Meningococcal ACWY Vaccine  Aged Out    HPV Vaccine  Aged Out    Hepatitis C Screening  Discontinued     Immunization History   Administered Date(s) Administered    INFLUENZA 10/27/2015, 10/01/2016, 10/13/2016, 12/19/2018    Influenza Split High Dose Preservative Free IM 10/23/2014, 10/27/2015, 10/01/2016, 10/13/2016, 10/13/2016, 10/13/2016    Influenza, high dose seasonal 0 7 mL 12/19/2018, 10/14/2019    Pneumococcal Conjugate 13-Valent 08/25/2015, 01/19/2017, 10/14/2019    SARS-CoV-2 / COVID-19 mRNA IM (Pfizer-BioNTech) 03/29/2021, 04/21/2021          ZAHRA Bailey

## 2021-04-30 NOTE — TELEPHONE ENCOUNTER
Spoke with Jerone Pallas and scheduled Nellie Simeon for next Friday @ 8:00 and 2:00 Explained that needs to see provider first and then nurse in afternoon to make sure he is uriating

## 2021-05-02 ENCOUNTER — HOSPITAL ENCOUNTER (EMERGENCY)
Facility: HOSPITAL | Age: 76
Discharge: HOME | End: 2021-05-02
Attending: EMERGENCY MEDICINE | Admitting: EMERGENCY MEDICINE
Payer: COMMERCIAL

## 2021-05-02 VITALS
SYSTOLIC BLOOD PRESSURE: 180 MMHG | TEMPERATURE: 97.1 F | WEIGHT: 205 LBS | RESPIRATION RATE: 18 BRPM | HEART RATE: 78 BPM | DIASTOLIC BLOOD PRESSURE: 81 MMHG | OXYGEN SATURATION: 99 % | BODY MASS INDEX: 31.17 KG/M2

## 2021-05-02 DIAGNOSIS — Z46.6 ENCOUNTER FOR FOLEY CATHETER REMOVAL: ICD-10-CM

## 2021-05-02 DIAGNOSIS — T83.9XXA FOLEY CATHETER PROBLEM, INITIAL ENCOUNTER (HCC): Primary | ICD-10-CM

## 2021-05-02 LAB
ATRIAL RATE: 234 BPM
QRS AXIS: 10 DEGREES
QRSD INTERVAL: 88 MS
QT INTERVAL: 390 MS
QTC INTERVAL: 427 MS
T WAVE AXIS: 8 DEGREES
VENTRICULAR RATE: 72 BPM

## 2021-05-02 PROCEDURE — 99283 EMERGENCY DEPT VISIT LOW MDM: CPT

## 2021-05-02 PROCEDURE — 99282 EMERGENCY DEPT VISIT SF MDM: CPT | Performed by: EMERGENCY MEDICINE

## 2021-05-02 PROCEDURE — 93010 ELECTROCARDIOGRAM REPORT: CPT | Performed by: INTERNAL MEDICINE

## 2021-05-02 RX ORDER — ACETAMINOPHEN 325 MG/1
650 TABLET ORAL EVERY 6 HOURS PRN
Qty: 30 TABLET | Refills: 0 | Status: SHIPPED | OUTPATIENT
Start: 2021-05-02

## 2021-05-02 RX ORDER — ACETAMINOPHEN 325 MG/1
975 TABLET ORAL ONCE
Status: COMPLETED | OUTPATIENT
Start: 2021-05-02 | End: 2021-05-02

## 2021-05-02 RX ADMIN — ACETAMINOPHEN 975 MG: 325 TABLET, FILM COATED ORAL at 11:22

## 2021-05-02 NOTE — ED PROVIDER NOTES
Final Diagnosis:  1  Lion catheter problem, initial encounter ([facility] [address] )    2  Encounter for Lion catheter removal      ED Course as of May 02 1202   Hai Lopez May 02, 2021   1201 Patient doesn't feel he needs to urinate  I gave oral return precautions for what to return for in addition to the written return precautions  The patient (and any family present: son) verbalized understanding of the discharge instructions and warnings that would necessitate return to the Emergency Department  I specifically highlighted areas of special concern regarding the written and verbal discharge instructions and return precautions  All questions were answered prior to discharge  Chief Complaint   Patient presents with    Urinary Catheter Problem     pt presents ambulatory with c/o discomfort in lion catheter  placed several days ago d/t retention     This is a 77 y o  old male presenting for evaluation of the following: The patient was doing okay but then was constipated  He came in because he was unable to have a bowel movement  He came in on 4/28  He had imaging done which demonstrated a hiatal hernia, some kidney cysts, splenomegaly, but other wise seemed like constipation  The patient was then given a soap suds enema, had a lion placed for the urinary retention and discharged home  Since going home, on Saturday he had "the biggest poop ever" (they have pictures of it in the toilet)  It was large, hard  There was some associated blood  Denied f/ch/n/v/cp/sob  Ever since that large bowel movement though he has been feeling more or less back to normal except for his penis  He has been noticing on the LEFT lateral edge of the meatus, there's been some irritation where the catheter was under some tension and also had a small amount of blood noted opposite to this on the underwear  He has come in for evaluation of this   He was supposed to have the catheter removed this Friday but his PCP told him to have it removed in the ER if it is bothering him that much today  He has urine that is urinoid smell, not bloody  PMH:   has a past medical history of Brain mass, Chest pain, Diabetes type 2, uncontrolled ([address] ), Edema of left lower extremity, GERD (gastroesophageal reflux disease), Hyperlipidemia, Hypertension, Impaired fasting glucose, Irregular heartbeat, and Varicose 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  PSH:   has a past surgical history that includes Inguinal hernia repair; Varicose vein surgery (Left); Colonoscopy; Tonsillectomy; and Colonoscopy (N/A, 7/10/2018)  Social:  Social History     Substance and Sexual Activity   Alcohol Use No     Social History     Tobacco Use   Smoking Status Never Smoker   Smokeless Tobacco Never Used     Social History     Substance and Sexual Activity   Drug Use No     PE:   Vitals:    05/02/21 1102   BP: (!) 180/81   Pulse: 78   Resp: 18   Temp: (!) 97 1 °F (36 2 °C)   TempSrc: Tympanic   SpO2: 99%   Weight: 93 kg (205 lb)       General: VSS, NAD, awake, alert  Well-nourished, well-developed  Appears stated age  Head: Normocephalic, atraumatic, nontender  Eyes: PERRL, EOM-I  No diplopia  No hyphema  No subconjunctival hemorrhages  Symmetrical lids  ENTAtraumatic external nose and ears  MMM  No stridor  Normal phonation  No drooling  Base of mouth is soft  No mastoid tenderness  Neck: Symmetric, trachea midline  No JVD  CV: Peripheral pulses +2 throughout  No chest wall tenderness  Lungs:   Unlabored   No retractions  No crepitus  No tachypnea  No paradoxical motion  Abd: +BS, soft, NT/ND    MSK:   FROM   No lower extremity edema  Back:   No CVAT  Skin: Dry, intact  Neuro: AAOx3, GCS 15, CN II-XII grossly intact  Motor grossly intact  Psychiatric/Behavioral: Appropriate mood and affect   Exam: normal gait  Exam done with nurse in the room    There was some small amount (very tiny spotting) onto the underwear opposite to the catheter  The catheter itself had a tiny stringy blood at the meatus  We removed the catheter without any issues  It looks like the catheter was under tension, hanging lateral and pulling LEFT lateral due to the tension causing irritation at meatus  A/P:  - Pryor irritation ? removed  - Discussed RTER precautions for urinary retention  - Discussed possibility of needing to have it replaced  - Son who is translating (the patient himself is from [place], speaks okay English, but son is fluent in [place] and    whatever language Yesi-tommy speak)   - Anyways, he is comfortable going home  Discussed keeping him here for about 30 minutes to see if he can pee  - If he feels he needs to urinate and cannot, will do bladder scan  If empty, DC, RTER in 12 hours if still no urine    - Patient + son understand      - 13 point ROS was performed and all are normal unless stated in the history above  - Nursing note reviewed  Vitals reviewed  - Orders placed by myself and/or advanced practitioner / resident     - Previous chart was reviewed  - [place] language barrier    - History obtained from patient son  - There are no limitations to the history obtained  - Critical care time: Not applicable for this patient  Medications   acetaminophen (TYLENOL) tablet 975 mg (975 mg Oral Given 5/2/21 1122)     No orders to display     Orders Placed This Encounter   Procedures    Diet Regular; Regular House    Nursing Communication Please remove Pryor  Labs Reviewed - No data to display  Time reflects when diagnosis was documented in both MDM as applicable and the Disposition within this note     Time User Action Codes Description Comment    5/2/2021 11:15 AM Brenda Lea Add [T83  9XXA] Pryor catheter problem[facility] )     5/2/2021 11:15 AM Brenda Lea Add [Z46 [id number]] Encounter for Pryor catheter removal       ED Disposition     ED Disposition Condition Date/Time Comment    Discharge Stable Sun May 2, 2021 11:15 AM Keri Lopez discharge to home/self care  Follow-up Information     Follow up With Specialties Details Why Contact Info Additional [address] Emergency Department Emergency Medicine Go to  If symptoms worsen [address]  [address] [facility] Emergency Department, [address], [facility] [address]    [facility] Call today To make appt  for evaluation in the next month [address] [facility] [address]  [address] [facility], [address] [place], [place], [address]        Patient's Medications   Discharge Prescriptions    ACETAMINOPHEN (TYLENOL) 325 MG TABLET    Take 2 tablets (650 mg total) by mouth every 6 (six) hours as needed for mild pain or fever       Start Date: 5/2/2021  End Date: --       Order Dose: 650 mg       Quantity: 30 tablet    Refills: 0     No discharge procedures on file  Prior to Admission Medications   Prescriptions Last Dose Informant Patient Reported? Taking?    Glucose Blood (ONETOUCH ULTRA BLUE VI)  Child Yes No   Sig: by In Vitro route daily   Lancets (ONETOUCH ULTRASOFT) lancets   No No   Sig: Test daily, e11 5   OneTouch Ultra test strip   No No   Sig: TEST ONCE DAILY E11 9   amLODIPine (NORVASC) 2 5 mg tablet   No No   Sig: TAKE 2 TABLETS (5 MG TOTAL) BY MOUTH DAILY   aspirin 81 MG tablet  Child Yes No   Sig: Take 81 mg by mouth daily    atorvastatin (LIPITOR) 10 mg tablet   No No   Sig: TAKE 1 TABLET BY MOUTH EVERY DAY   diclofenac sodium (VOLTAREN) 1 %   No No   Sig: Apply 2 g topically 4 (four) times a day   lisinopril (ZESTRIL) 10 mg tablet   No No   Sig: TOME ALLY TABLETA TODOS LOS NAVA   metFORMIN (GLUCOPHAGE) 500 mg tablet   No No   Sig: TAKE 1 TABLET BY MOUTH TWICE A DAY   omeprazole (PriLOSEC) 40 MG capsule   Yes No   Sig: TAKE 1 CAPSULE BY MOUTH EVERY DAY 1/2 HOUR BEFORE BREAKFAST   polyethylene glycol (MIRALAX) 17 g packet   No No   Sig: Take 17 g by mouth 2 (two) times a day for 7 days   Patient not taking: Reported on 4/29/2021   sulfamethoxazole-trimethoprim (BACTRIM DS) 800-160 mg per tablet   No No   Sig: Take 1 tablet by mouth 2 (two) times a day for 7 days smx-tmp DS (BACTRIM) 800-160 mg tabs (1tab q12 D10)      Facility-Administered Medications: None       Portions of the record may have been created with voice recognition software  Occasional wrong word or "sound a like" substitutions may have occurred due to the inherent limitations of voice recognition software  Read the chart carefully and recognize, using context, where substitutions have occurred      Electronically signed by:  Ulises Vidal MD  05/02/21 [id number]

## 2021-05-02 NOTE — DISCHARGE INSTRUCTIONS
If you're unable to produce urine for 12 hours    OR    Feel like you need to pee but are unable to    Come back to the ER for the lion to be replaced  Expect the urine will burn for a couple days due to the abraded tissue like we talked about

## 2021-05-03 ENCOUNTER — OFFICE VISIT (OUTPATIENT)
Dept: NEUROSURGERY | Facility: CLINIC | Age: 76
End: 2021-05-03
Payer: COMMERCIAL

## 2021-05-03 VITALS
BODY MASS INDEX: 31.83 KG/M2 | HEIGHT: 68 IN | DIASTOLIC BLOOD PRESSURE: 70 MMHG | WEIGHT: 210 LBS | TEMPERATURE: 97 F | SYSTOLIC BLOOD PRESSURE: 130 MMHG

## 2021-05-03 DIAGNOSIS — D32.9 MENINGIOMA (HCC): ICD-10-CM

## 2021-05-03 PROCEDURE — 99204 OFFICE O/P NEW MOD 45 MIN: CPT | Performed by: PHYSICIAN ASSISTANT

## 2021-05-03 NOTE — PATIENT INSTRUCTIONS
Meningioma   WHAT YOU NEED TO KNOW:   A meningioma is a tumor that starts in the meninges of the brain and spinal cord  The meninges are the tissues that cover the brain and spinal cord  They prevent germs and other substances from entering the brain and spinal cord  Most meningiomas are slow-growing and benign (not cancer)  DISCHARGE INSTRUCTIONS:   Medicines:   · Medicines  may be given to kill the tumor cells and decrease the size of the meningioma  · Take your medicine as directed  Contact your healthcare provider if you think your medicine is not helping or if you have side effects  Tell him or her if you are allergic to any medicine  Keep a list of the medicines, vitamins, and herbs you take  Include the amounts, and when and why you take them  Bring the list or the pill bottles to follow-up visits  Carry your medicine list with you in case of an emergency  Follow up with your healthcare provider or surgeon as directed:  Write down your questions so you remember to ask them during your visits  Self-care:   · Drink liquids as directed  Ask how much liquid to drink each day and which liquids are best for you  If you have nausea or diarrhea from treatment, extra liquids may help decrease your risk for dehydration  · Eat healthy foods  Healthy foods include fruits, vegetables, whole-grain breads, low-fat dairy products, beans, lean meats, and fish  This may help you feel better during treatment and decrease side effects  You may need to change what you eat during treatment  Do not eat foods or drink liquids that cause gas, such as cabbage, beans, onions, or soft drinks  A nutritionist may help to plan the best meals and snacks for you  · Exercise  Ask about the best exercise plan for you  Exercise may improve your energy levels and appetite  Contact your healthcare provider if:   · You have a fever  · You vomit repeatedly, and cannot keep any food or liquids down      · You have a severe headache, or you feel dizzy  · You have questions or concerns about your condition or care  Return to the emergency department if:   · You have any of the following signs of a stroke:      ? Numbness or drooping on one side of your face     ? Weakness in an arm or leg    ? Confusion or difficulty speaking    ? Dizziness, a severe headache, or vision loss      © Copyright [address] Information is for End User's use only and may not be sold, redistributed or otherwise used for commercial purposes  All illustrations and images included in CareNotes® are the copyrighted property of A D A "[employer]  or [facility]   The above information is an  only  It is not intended as medical advice for individual conditions or treatments  Talk to your doctor, nurse or pharmacist before following any medical regimen to see if it is safe and effective for you

## 2021-05-03 NOTE — ASSESSMENT & PLAN NOTE
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 without gliosis  Stable chronic microangiopathic changes  Stable ventriculomegaly  Plan:   · Continue to monitor neurologic symptoms  · MRI brain in October/november for his ongoing yearly surveillance per NCCN guidelines  · Recommend follow up with Dermatology to discuss squamous cell carcinoma  · Patient appears to be relatively asymptomatic from [facility] standpoint at this time  Can consider complete a Dariela Leonard referral to [facility] if patient becomes more symptomatic  · No anticipated neurosurgical intervention at this time  · Follow-up at next scheduled appointment after completion of MRI brain for ongoing surveillance of meningioma  Call with any questions concerns

## 2021-05-03 NOTE — PROGRESS NOTES
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  Stable chronic microangiopathic changes  Stable ventriculomegaly  Plan:   · Continue to monitor neurologic symptoms  · MRI brain in October/november for his ongoing yearly surveillance per NCCN guidelines  · Recommend follow up with Dermatology to discuss squamous cell carcinoma  · Patient appears to be relatively asymptomatic from NPH standpoint at this time  Can consider complete a Vanessa Guzman referral to [facility] if patient becomes more symptomatic  · No anticipated neurosurgical intervention at this time  · Follow-up at next scheduled appointment after completion of MRI brain for ongoing surveillance of meningioma  Call with any questions concerns  Diagnoses and all orders for this visit:    Meningioma [facility])  -     Ambulatory referral to Neurosurgery  -     MRI brain w wo contrast; Future            CHIEF COMPLAINT    Chief Complaint   Patient presents with    Consult     Meningioma       HISTORY    History of Present Illness     77y o  year old male     Patient is a 80-year-old male with known history of a meningioma  He was previously following with [facility] neurosurgery and undergoing yearly MRI imaging despite not being seen in the office since 2017  He presents today as a new patient consult for ongoing follow up   Patient has been complaining of mild headaches and transient dizziness that does not persist  He states over the last year he has no significant changes aside from having a lesion biopsied on his scalp with concern for squamous cell carcinoma  We spoke about this during his visit and I encouraged him to speak with his family doctor and to see a dermatologist  From a meningioma standpoint patient has no acute issues  He has very mild balance  Difficulties, but he states its not that bad and he does not use any assistive device for ambulation  Patient and patients son state that he has been reluctant and stubborn to seek medical care for himself  His children as being more proactive about ensuring he sees necessary doctors  He currently at this time denies any headaches, change in vision, trouble speech, facial weakness, trouble eating drinking, dizziness, lightheadedness, weakness and legs  Denies any chest pain, shortness of breath pain  Patient appears to be relatively asymptomatic from an [facility] standpoint  Can consider ambulatory referral to [facility] if symptoms appear to worsen for evaluation  See Discussion    REVIEW OF SYSTEMS    Review of Systems   Constitutional: Negative  HENT: Negative  Eyes: Negative  Respiratory: Negative  Cardiovascular: Negative  Gastrointestinal: Negative  Endocrine: Negative  Genitourinary: Negative  Musculoskeletal:        Left femur metal gavin  Skin: Negative  Allergic/Immunologic: Negative  Neurological: Positive for dizziness and headaches  Hematological: Negative  Psychiatric/Behavioral: Negative            Meds/Allergies     Current Outpatient Medications   Medication Sig Dispense Refill    acetaminophen (TYLENOL) 325 mg tablet Take 2 tablets (650 mg total) by mouth every 6 (six) hours as needed for mild pain or fever 30 tablet 0    amLODIPine (NORVASC) 2 5 mg tablet TAKE 2 TABLETS (5 MG TOTAL) BY MOUTH DAILY 90 tablet 2    aspirin 81 MG tablet Take 81 mg by mouth daily       atorvastatin (LIPITOR) 10 mg tablet TAKE 1 TABLET BY MOUTH EVERY DAY 90 tablet 3    diclofenac sodium (VOLTAREN) 1 % Apply 2 g topically 4 (four) times a day 150 g 1    Glucose Blood (ONETOUCH ULTRA BLUE VI) by In Vitro route daily      Lancets (ONETOUCH ULTRASOFT) lancets Test daily, e11 5 100 each 11    lisinopril (ZESTRIL) 10 mg tablet TOME ALLY TABLETA TODOS LOS NAVA 90 tablet 3    metFORMIN (GLUCOPHAGE) 500 mg tablet TAKE 1 TABLET BY MOUTH TWICE A  tablet 3    omeprazole (PriLOSEC) 40 MG capsule TAKE 1 CAPSULE BY MOUTH EVERY DAY 1/2 HOUR BEFORE BREAKFAST      OneTouch Ultra test strip TEST ONCE DAILY E11 9 25 each 47    sulfamethoxazole-trimethoprim (BACTRIM DS) 800-160 mg per tablet Take 1 tablet by mouth 2 (two) times a day for 7 days smx-tmp DS (BACTRIM) 800-160 mg tabs (1tab q12 D10) 14 tablet 0    polyethylene glycol (MIRALAX) 17 g packet Take 17 g by mouth 2 (two) times a day for 7 days (Patient not taking: Reported on 4/29/2021) 238 g 0     No current facility-administered medications for this visit  No Known Allergies    PAST HISTORY    Past Medical History:   Diagnosis Date    Brain mass     Last Assessed; 11/5/2015    Chest pain     Last Assessed: 1/22/2015    [facility] )     Last Assessed: 3/1/2016    Edema of left lower extremity     Last Assessed: 3/22/2017    GERD (gastroesophageal reflux disease)     Hyperlipidemia     Hypertension     Impaired fasting glucose     Last Assessed: 11/10/2015    Irregular heartbeat     Skin cancer (melanoma) (HCC)     Scalp     Varicose 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     Last Assessed: 3/22/2017       Past Surgical History:   Procedure Laterality Date    COLONOSCOPY      COLONOSCOPY N/A 7/10/2018    Procedure: COLONOSCOPY;  Surgeon: Rhina Gracia MD;  Location: [facility];   Service: Colorectal    INGUINAL HERNIA REPAIR      20+ years ago - [place]; Last Assessed: 10/23/2014    TONSILLECTOMY      VARICOSE VEIN SURGERY Left     x2 left leg - 40+ yrs ago, [place] and [place]       Social History     Tobacco Use    Smoking status: Never Smoker    Smokeless tobacco: Never Used   Substance Use Topics    Alcohol use: No    Drug use: No       Family History   Problem Relation Age of Onset    No Known Problems Mother     No Known Problems Father     Melanoma Daughter 16         Above history personally reviewed  EXAM    Vitals:Blood pressure 130/70, temperature (!) 97 °F (36 1 °C), temperature source Temporal, height 5' 8" (1 727 m), weight 95 3 kg (210 lb)  ,Body mass index is 31 93 kg/m²  Physical Exam  Constitutional:       Appearance: Normal appearance  He is well-developed  HENT:      Head: Normocephalic and atraumatic  Eyes:      General:         Right eye: No discharge  Extraocular Movements: Extraocular movements intact and EOM normal       Conjunctiva/sclera: Conjunctivae normal       Pupils: Pupils are equal, round, and reactive to light  Neck:      Musculoskeletal: Normal range of motion and neck supple  Vascular: No JVD  Cardiovascular:      Rate and Rhythm: Normal rate  Pulmonary:      Effort: Pulmonary effort is normal    Abdominal:      General: There is no distension  Palpations: Abdomen is soft  Tenderness: There is no abdominal tenderness  Musculoskeletal: Normal range of motion  General: No tenderness or deformity  Skin:     General: Skin is warm and dry  Neurological:      General: No focal deficit present  Mental Status: He is alert and oriented to person, place, and time  Cranial Nerves: No cranial nerve deficit  Sensory: No sensory deficit  Motor: No weakness  Gait: Gait is intact  Deep Tendon Reflexes: Reflexes are normal and symmetric  Reflex Scores:       Bicep reflexes are 2+ on the right side and 2+ on the left side  Patellar reflexes are 2+ on the right side and 2+ on the left side    Psychiatric:         Speech: Speech normal          Behavior: Behavior normal          Thought Content: Thought content normal          Neurologic Exam     Mental Status   Oriented to person, place, and time  Attention: normal    Speech: speech is normal   Level of consciousness: alert  Knowledge: good  Able to perform simple calculations  Able to repeat  Normal comprehension  Cranial Nerves     CN III, IV, VI   Pupils are equal, round, and reactive to light  Extraocular motions are normal    Upgaze: normal  Downgaze: normal    CN V   Facial sensation intact  CN VII   Facial expression full, symmetric  CN VIII   CN VIII normal    Hearing: intact    CN XII   CN XII normal    Tongue deviation: none    Motor Exam   Muscle bulk: normal  Right arm tone: normal  Left arm tone: normal  Right leg tone: normal  Left leg tone: normal    Strength   Right deltoid: 5/5  Left deltoid: 5/5  Right biceps: 5/5  Left biceps: 5/5  Right triceps: 5/5  Left triceps: 5/5  Right wrist flexion: 5/5  Left wrist flexion: 5/5  Right wrist extension: 5/5  Left wrist extension: 5/5  Right iliopsoas: 5/5  Left iliopsoas: 5/5  Right quadriceps: 5/5  Left quadriceps: 5/5  Right hamstrin/5  Left hamstrin/5  Right anterior tibial: 5/5  Left anterior tibial: 5/5  Right gastroc: 5/5  Left gastroc: 5/5    Sensory Exam   Light touch normal      Gait, Coordination, and Reflexes     Gait  Gait: normal    Tremor   Resting tremor: absent  Action tremor: absent    Reflexes   Right biceps: 2+  Left biceps: 2+  Right patellar: 2+  Left patellar: 2+  Right Clark: absent  Left Clark: absent  Right ankle clonus: absent  Left ankle clonus: absent        MEDICAL DECISION MAKING    Imaging Studies:     Ct Abdomen Pelvis With Contrast    Result Date: 2021  Narrative: CT ABDOMEN AND PELVIS WITH IV CONTRAST INDICATION:   Abdominal pain, distention  COMPARISON:  2021 TECHNIQUE:  CT examination of the abdomen and pelvis was performed   Axial, sagittal, and coronal 2D reformatted images were created from the source data and submitted for interpretation  Radiation dose length product (DLP) for this visit:  604 mGy-cm   This examination, like all CT scans performed in the [facility], was performed utilizing techniques to minimize radiation dose exposure, including the use of iterative reconstruction and automated exposure control  IV Contrast:  85 mL of iohexol (OMNIPAQUE) 50 mL of iohexol (OMNIPAQUE) Enteric Contrast:  Enteric contrast was not administered  FINDINGS: ABDOMEN LOWER CHEST:  Small hiatal hernia noted  No other clinically significant abnormality identified in the visualized lower chest  LIVER/BILIARY TREE:  Unremarkable  GALLBLADDER:  No calcified gallstones  No pericholecystic inflammatory change  SPLEEN:  Unremarkable  PANCREAS:  Unremarkable  ADRENAL GLANDS:  Unremarkable  KIDNEYS/URETERS:  One or more simple renal cyst(s) is noted  Otherwise unremarkable kidneys  No hydronephrosis  STOMACH AND BOWEL:  Large rectal fecal impaction  APPENDIX:  No findings to suggest appendicitis  ABDOMINOPELVIC CAVITY:  No ascites  No pneumoperitoneum  No lymphadenopathy  VESSELS:  Unremarkable for patient's age  PELVIS REPRODUCTIVE ORGANS:  Unremarkable for patient's age  URINARY BLADDER:  Bladder wall thickening and pericystic inflammatory change  ABDOMINAL WALL/INGUINAL REGIONS:  Unremarkable  OSSEOUS STRUCTURES:  No acute fracture or destructive osseous lesion  Impression: Findings consistent with cystitis  Large rectal fecal impaction  Workstation performed: [id number]     Ct Abdomen Pelvis With Contrast    Result Date: 4/28/2021  Narrative: CT ABDOMEN AND PELVIS WITH IV CONTRAST INDICATION:   Suprapubic, LLQ, RLQ pain  COMPARISON:  Abdominal ultrasound dated July 19, 2019 TECHNIQUE:  CT examination of the abdomen and pelvis was performed  Axial, sagittal, and coronal 2D reformatted images were created from the source data and submitted for interpretation   Radiation dose length product (DLP) for this visit:  1430 86 mGy-cm   This examination, like all CT scans performed in the [facility], was performed utilizing techniques to minimize radiation dose exposure, including the use of iterative reconstruction and automated exposure control  IV Contrast:  100 mL of iohexol (OMNIPAQUE) Enteric Contrast:  Enteric contrast was not administered  FINDINGS: The exam is limited by excessive motion artifact  ABDOMEN LOWER CHEST:  Mild cardiomegaly with four-chamber enlargement  No pericardial effusion  Intrapulmonary vascular congestion  Atelectatic changes both lung bases  LIVER/BILIARY TREE:  Limited evaluation due to motion artifact  No large mass lesions are seen  Biliary ducts are blurred by artifact and could not be accurately measured  GALLBLADDER:  Grossly unremarkable although blurred by artifact  SPLEEN:  Enlarged  No obvious splenic mass  PANCREAS:  Blurred by motion artifact  No focal mass or duct dilatation  ADRENAL GLANDS:  Unremarkable  KIDNEYS/URETERS:  Bilateral simple cysts as well as smaller cortical lesions which are too small to characterize, also likely cysts  Exam limited by motion artifact  No hydronephrosis or hydroureter STOMACH AND BOWEL:  Limited evaluation of GI tract without oral contrast   Sliding hiatal hernia  Stomach mostly collapsed  Small bowel is average caliber  Small amounts of gas and stool in the right colon  Increasing amounts of gas and stool in the left colon  Increasing volume of formed stool in the distal sigmoid and rectum  The rectum measures up to 7 5 cm diameter, with mild rectal wall thickening and perirectal inflammation suggesting proctitis  APPENDIX:  No findings to suggest appendicitis  ABDOMINOPELVIC CAVITY:  No ascites  No pneumoperitoneum  No lymphadenopathy  VESSELS:  Unremarkable for patient's age  PELVIS REPRODUCTIVE ORGANS:  Unremarkable for patient's age   URINARY BLADDER:  Urinary bladder collapsed by Pryor catheter  ABDOMINAL WALL/INGUINAL REGIONS:  Unremarkable  OSSEOUS STRUCTURES:  No acute fracture or destructive osseous lesion  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  Impression: Exam was limited by excessive motion artifact  Evaluation of GI tract further limited without oral contrast  Mild cardiomegaly  Intrapulmonary vascular congestion  Splenomegaly  Bilateral simple cysts both kidneys as well as subcentimeter lesions which are too small to characterize, also likely cysts  Small sliding hiatal hernia  Large volume of formed stool in the distal sigmoid and rectum causing distention of the rectum up to 7 5 cm diameter likely representing fecal impaction, and mild circumferential rectal wall thickening/mild perirectal inflammation suggesting proctitis  The study was marked in [facility] for immediate notification  Workstation performed: [id number]       I have personally reviewed pertinent reports     and I have personally reviewed pertinent films in PACS

## 2021-05-05 LAB
BACTERIA BLD CULT: NORMAL
BACTERIA BLD CULT: NORMAL

## 2021-05-05 NOTE — TELEPHONE ENCOUNTER
Call returned to daughter  Advised to keep 8am appointment for 5/7 to evaluate and do PVR to ensure patient is emptying his bladder appropriately  Daughter verbalized understanding and agrees with plan  She states that she will have her father keep her on speakerphone while at the appointment as she is unable to attend with him

## 2021-05-05 NOTE — TELEPHONE ENCOUNTER
Patient's daughter called stating patient went on Sunday 05/02 and had lion catheter removed  He has appointment on 05/07 for lion removal  Appointment will be cancelled  She wants to know about setting up an appointment for follow up   He still experiencing some burning

## 2021-05-06 NOTE — ED PROVIDER NOTES
EMERGENCY MEDICINE NOTE        PATIENT IDENTIFICATION PHYSICIAN/SERVICE INFORMATION   Name: Veronica Chirinos  MRN: [id number]  YOB: 1945  Age/Sex: 77 y o  male  Preferred Language: English  Code Status: No Order  Encounter Date: 4/29/2021  Attending Physician: Marie Huff MD  Admitting Physician: No admitting provider for patient encounter  Primary Care Physician: ZAHRA Michaels         Primary Care Phone: [phone]       CHIEF COMPLAINT     Chief Complaint   Patient presents with    Abdominal Pain     Patient reports having severe abd pain, right groin pain, b/l flank pain  Seen at ED yesterday for constipation/fever/urinary retention - given enema and had lion cath placed  Reports good urinary output, but having >8 episodes diarrhea today  HISTORY OF PRESENT ILLNESS     Veronica Chirinos is a 77 y o  male who presents due to Abdominal Pain  Pt reports gradual onset of bloating pain abdomen with associated constipation, was seen at [address] [place] [address] and dx with heavy stool burden, given enema (not soap suds) and laxatives for constipation, as well as lion inserted as patient had been retaining likely 2/2 heavy stool burden  Pt reports he was instructed to put loin around neck to avoid this tripping him--subsequently appears to be retaining urine as bag appears relatively empty and abdomen mildly distended--relieved with gravity causing bladder to empty  Daughter reports father with diarrhea/loose stools s/p laxatives, thus stopped additional doses today  History provided by:  Relative and patient   used:  Yes    Abdominal Pain  Associated symptoms: constipation    Associated symptoms: no chest pain, no chills, no cough, no diarrhea, no dysuria, no fatigue, no fever, no hematuria, no nausea, no shortness of breath and no vomiting          PAST MEDICAL AND SURGICAL HISTORY     Past Medical History:   Diagnosis Date    Brain mass     Last Assessed; 11/5/2015    Chest pain     Last Assessed: 1/22/2015    Diabetes type 2, uncontrolled ([facility] [address] )     Last Assessed: 3/1/2016    Edema of left lower extremity     Last Assessed: 3/22/2017    GERD (gastroesophageal reflux disease)     Hyperlipidemia     Hypertension     Impaired fasting glucose     Last Assessed: 11/10/2015    Irregular heartbeat     Skin cancer (melanoma) (HCC)     Scalp     Varicose 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     Last Assessed: 3/22/2017       Past Surgical History:   Procedure Laterality Date    COLONOSCOPY      COLONOSCOPY N/A 7/10/2018    Procedure: COLONOSCOPY;  Surgeon: Ana Rosa Segovia MD;  Location: [facility]; Service: Colorectal    INGUINAL HERNIA REPAIR      20+ years ago - [facility]; Last Assessed: 10/23/2014    TONSILLECTOMY      VARICOSE VEIN SURGERY Left     x2 left leg - 40+ yrs ago, [facility]       Family History   Problem Relation Age of Onset    No Known Problems Mother     No Known Problems Father     Melanoma Daughter 16       E-Cigarette/Vaping    E-Cigarette Use Never User      E-Cigarette/Vaping Substances     Social History     Tobacco Use    Smoking status: Never Smoker    Smokeless tobacco: Never Used   Substance Use Topics    Alcohol use: No    Drug use: No         ALLERGIES     No Known Allergies      HOME MEDICATIONS     Prior to Admission Medications   Prescriptions Last Dose Informant Patient Reported? Taking?    Glucose Blood (ONETOUCH ULTRA BLUE VI)  Child Yes Yes   Sig: by In Vitro route daily   Lancets (ONETOUCH ULTRASOFT) lancets   No Yes   Sig: Test daily, e11 5   OneTouch Ultra test strip   No Yes   Sig: TEST ONCE DAILY E11 9   amLODIPine (NORVASC) 2 5 mg tablet   No Yes   Sig: TAKE 2 TABLETS (5 MG TOTAL) BY MOUTH DAILY   aspirin 81 MG tablet  Child Yes Yes   Sig: Take 81 mg by mouth daily    atorvastatin (LIPITOR) 10 mg tablet   No Yes   Sig: TAKE 1 TABLET BY MOUTH EVERY DAY   diclofenac sodium (VOLTAREN) 1 %   No Yes   Sig: Apply 2 g topically 4 (four) times a day   lisinopril (ZESTRIL) 10 mg tablet   No Yes   Sig: TRAVIS MAYES TOS LOS NAVA   metFORMIN (GLUCOPHAGE) 500 mg tablet   No Yes   Sig: TAKE 1 TABLET BY MOUTH TWICE A DAY   omeprazole (PriLOSEC) 40 MG capsule   Yes Yes   Sig: TAKE 1 CAPSULE BY MOUTH EVERY DAY 1/2 HOUR BEFORE BREAKFAST   polyethylene glycol (MIRALAX) 17 g packet Not Taking at Unknown time  No No   Sig: Take 17 g by mouth 2 (two) times a day for 7 days   Patient not taking: Reported on 4/29/2021      Facility-Administered Medications: None         REVIEW OF SYSTEMS     Review of Systems   Constitutional: Negative for activity change, appetite change, chills, diaphoresis, fatigue and fever  Respiratory: Negative for cough and shortness of breath  Cardiovascular: Negative for chest pain  Gastrointestinal: Positive for abdominal distention, abdominal pain and constipation  Negative for anal bleeding, blood in stool, diarrhea, nausea, rectal pain and vomiting  Genitourinary: Negative for decreased urine volume, difficulty urinating, discharge, dysuria, enuresis, flank pain, frequency, genital sores, hematuria, penile pain, penile swelling, scrotal swelling, testicular pain and urgency  Skin: Negative for rash  Neurological: Negative for dizziness, light-headedness and headaches  All other systems reviewed and are negative  PHYSICAL EXAMINATION     ED Triage Vitals [04/29/21 2351]   Temperature Pulse Respirations Blood Pressure SpO2   99 °F (37 2 °C) 86 22 (!) 216/95 96 %      Temp Source Heart Rate Source Patient Position - Orthostatic VS BP Location FiO2 (%)   Oral Monitor Sitting Right arm --      Pain Score       8         Wt Readings from Last 3 Encounters:   05/03/21 95 3 kg (210 lb)   05/02/21 93 kg (205 lb)   04/30/21 93 6 kg (206 lb 6 4 oz)         Physical Exam  Vitals signs and nursing note reviewed  Constitutional:       General: He is not in acute distress  Appearance: He is well-developed   He is not ill-appearing, toxic-appearing or diaphoretic  HENT:      Head: Normocephalic and atraumatic  Mouth/Throat:      Mouth: Mucous membranes are moist    Eyes:      Conjunctiva/sclera: Conjunctivae normal       Pupils: Pupils are equal, round, and reactive to light  Neck:      Musculoskeletal: Normal range of motion and neck supple  Vascular: No carotid bruit, hepatojugular reflux or JVD  Cardiovascular:      Rate and Rhythm: Normal rate and regular rhythm  No extrasystoles are present  Chest Wall: PMI is not displaced  Pulses: Normal pulses  Radial pulses are 2+ on the right side and 2+ on the left side  Dorsalis pedis pulses are 2+ on the right side and 2+ on the left side  Posterior tibial pulses are 2+ on the right side and 2+ on the left side  Heart sounds: Normal heart sounds  No murmur  No friction rub  No gallop  Pulmonary:      Effort: Pulmonary effort is normal  No respiratory distress  Breath sounds: Normal breath sounds  No stridor  No wheezing or rales  Chest:      Chest wall: No tenderness  Abdominal:      General: Bowel sounds are normal  There is no distension  Palpations: Abdomen is soft  Abdomen is not rigid  There is no mass  Tenderness: There is no abdominal tenderness  There is no guarding or rebound  Negative signs include Ryan's sign and McBurney's sign  Hernia: No hernia is present  Comments: Negative Ryan's  Negative Appendiceal signs (Psoas, Rovsing's, Obturator)  Negative Peritoneal Signs   Musculoskeletal: Normal range of motion  Skin:     General: Skin is warm and dry  Capillary Refill: Capillary refill takes less than 2 seconds  Findings: No rash  Neurological:      Mental Status: He is alert and oriented to person, place, and time             DIAGNOSTIC RESULTS     Laboratory results:    Labs Reviewed   COMPREHENSIVE METABOLIC PANEL - Abnormal       Result Value Ref Range Status Sodium 141  136 - 145 mmol/L Final    Potassium 3 4 (*) 3 5 - 5 3 mmol/L Final    Chloride 103  100 - 108 mmol/L Final    CO2 27  21 - 32 mmol/L Final    ANION GAP 11  4 - 13 mmol/L Final    BUN 16  5 - 25 mg/dL Final    Creatinine 0 90  0 60 - 1 30 mg/dL Final    Comment: Standardized to IDMS reference method    Glucose 173 (*) 65 - 140 mg/dL Final    Comment: If the patient is fasting, the ADA then defines impaired fasting glucose as > 100 mg/dL and diabetes as > or equal to 123 mg/dL  Specimen collection should occur prior to Sulfasalazine administration due to the potential for falsely depressed results  Specimen collection should occur prior to Sulfapyridine administration due to the potential for falsely elevated results  Calcium 8 7  8 3 - 10 1 mg/dL Final    AST 17  5 - 45 U/L Final    Comment: Specimen collection should occur prior to Sulfasalazine administration due to the potential for falsely depressed results  ALT 18  12 - 78 U/L Final    Comment: Specimen collection should occur prior to Sulfasalazine administration due to the potential for falsely depressed results  Alkaline Phosphatase 101  46 - 116 U/L Final    Total Protein 7 4  6 4 - 8 2 g/dL Final    Albumin 3 9  3 5 - 5 0 g/dL Final    Total Bilirubin 0 83  0 20 - 1 00 mg/dL Final    Comment: Use of this assay is not recommended for patients undergoing treatment with eltrombopag due to the potential for falsely elevated results      eGFR 84  ml/min/1 73sq m Final    Narrative:     [place] guidelines for Chronic Kidney Disease (CKD):     Stage 1 with normal or high GFR (GFR > 90 mL/min/1 73 square meters)    Stage 2 Mild CKD (GFR = 60-89 mL/min/1 73 square meters)    Stage 3A Moderate CKD (GFR = 45-59 mL/min/1 73 square meters)    Stage 3B Moderate CKD (GFR = 30-44 mL/min/1 73 square meters)    Stage 4 Severe CKD (GFR = 15-29 mL/min/1 73 square meters)    Stage 5 End Stage CKD (GFR <15 mL/min/1 73 square meters)  Note: GFR calculation is accurate only with a steady state creatinine   LIPASE - Abnormal    Lipase 28 (*) 73 - 393 u/L Final   UA W REFLEX TO MICROSCOPIC WITH REFLEX TO CULTURE - Abnormal    Color, UA Light Yellow   Final    Clarity, UA Clear   Final    Specific Gravity, UA <=1 005  1 003 - 1 030 Final    pH, UA 6 0  4 5, 5 0, 5 5, 6 0, 6 5, 7 0, 7 5, 8 0 Final    Leukocytes, UA Small (*) Negative Final    Nitrite, UA Negative  Negative Final    Protein, UA Negative  Negative mg/dl Final    Glucose, UA Negative  Negative mg/dl Final    Ketones, UA Negative  Negative mg/dl Final    Urobilinogen, UA 0 2  0 2, 1 0 E U /dl E U /dl Final    Bilirubin, UA Negative  Negative Final    Blood, UA Moderate (*) Negative Final   POCT GLUCOSE - Abnormal    POC Glucose 164 (*) 65 - 140 mg/dl Final   LACTIC ACID, PLASMA - Normal    LACTIC ACID 1 0  0 5 - 2 0 mmol/L Final    Narrative:     Result may be elevated if tourniquet was used during collection     PROTIME-INR - Normal    Protime 14 5  11 6 - 14 5 seconds Final    INR 1 12  0 84 - 1 19 Final   APTT - Normal    PTT 27  23 - 37 seconds Final    Comment: Therapeutic Heparin Range =  60-90 seconds   URINE MICROSCOPIC - Normal    RBC, UA 1-2  None Seen, 0-1, 1-2, 2-4, 0-5 /hpf Final    WBC, UA 2-4  None Seen, 0-1, 1-2, 0-5, 2-4 /hpf Final    Epithelial Cells None Seen  None Seen, Occasional /hpf Final    Bacteria, UA None Seen  None Seen, Occasional /hpf Final   CBC AND DIFFERENTIAL    WBC 8 78  4 31 - 10 16 Thousand/uL Final    RBC 4 52  3 88 - 5 62 Million/uL Final    Hemoglobin 12 8  12 0 - 17 0 g/dL Final    Hematocrit 38 3  36 5 - 49 3 % Final    MCV 85  82 - 98 fL Final    MCH 28 3  26 8 - 34 3 pg Final    MCHC 33 4  31 4 - 37 4 g/dL Final    RDW 13 1  11 6 - 15 1 % Final    MPV 9 9  8 9 - 12 7 fL Final    Platelets 640  828 - 390 Thousands/uL Final    nRBC 0  /100 WBCs Final    Neutrophils Relative 71  43 - 75 % Final    Immat GRANS % 1  0 - 2 % Final Lymphocytes Relative 15  14 - 44 % Final    Monocytes Relative 11  4 - 12 % Final    Eosinophils Relative 2  0 - 6 % Final    Basophils Relative 0  0 - 1 % Final    Neutrophils Absolute 6 29  1 85 - 7 62 Thousands/µL Final    Immature Grans Absolute 0 04  0 00 - 0 20 Thousand/uL Final    Lymphocytes Absolute 1 33  0 60 - 4 47 Thousands/µL Final    Monocytes Absolute 0 96  0 17 - 1 22 Thousand/µL Final    Eosinophils Absolute 0 14  0 00 - 0 61 Thousand/µL Final    Basophils Absolute 0 02  0 00 - 0 10 Thousands/µL Final       All labs reviewed and utilized in the medical decision making process    Radiology results:    CT abdomen pelvis with contrast   Final Result      Findings consistent with cystitis  Large rectal fecal impaction  Workstation performed: [id number]             All radiology studies independently viewed by me and interpreted by the radiologist       PROCEDURES     Procedures      Invasive Devices     None                 ASSESSMENT AND PLAN     MDM  Number of Diagnoses or Management Options  Abdominal pain: new, needed workup  Constipation: new, needed workup  UTI (urinary tract infection): new, needed workup     Amount and/or Complexity of Data Reviewed  Clinical lab tests: ordered and reviewed  Tests in the radiology section of CPT®: ordered and reviewed  Tests in the medicine section of CPT®: reviewed and ordered  Obtain history from someone other than the patient: yes  Review and summarize past medical records: yes  Independent visualization of images, tracings, or specimens: yes    Risk of Complications, Morbidity, and/or Mortality  Presenting problems: moderate  Diagnostic procedures: moderate  Management options: moderate    Patient Progress  Patient progress: improved      Initial ED assessment:  Rock Schaffer is a 77 y o  male who presents with Abdominal Pain  Vitals signs reviewed and WNL   Physical examination is remarkable for mild distended abdomen, lion now draining after reported tubing from neck    Initial Ddx  includes but is not limited to:   Constipation, obstipation, pseudo-obstruction, fecal impaction, bowel obstruction, ileus; doubt acute surgical intraabdominal process  Initial ED plan:   Plan will be to perform diagnostic testing of Blood labs, Urinalysis and CT of abdomen and treat symptomatically   Final ED summary/disposition: Discussed results of diagnostic testing with Patient and Family with Permission in detail  Greater than 10 minutes of education regarding diagnosis, testing, and ample opportunity for questions  Home care recommendations given with discharge paperwork  Return to ED instructions given if new/worsening sxs  Verbalized understanding      MDM  Reviewed: previous chart, nursing note and vitals  Reviewed previous: CT scan and labs  Interpretation: labs and CT scan          ED COURSE OF CARE AND REASSESSMENT     ED Course as of May 06 0442   Fri Apr 30, 2021   0342 Leukocytes, UA(!): Small   0442 Temperature: 98 3 °F (36 8 °C)   0442 Pulse: 70   0442 WBC: 8 78   0515 Patient with massive relief after soap sudz expelling large amount of stool                                          Medications   fentanyl citrate (PF) 100 MCG/2ML 50 mcg (50 mcg Intravenous Given 4/30/21 0024)   sodium chloride 0 9 % bolus 1,000 mL (0 mL Intravenous Stopped 4/30/21 0245)   iohexol (OMNIPAQUE) 240 MG/ML solution 50 mL (50 mL Oral Given by Other 4/30/21 0130)   iohexol (OMNIPAQUE) 350 MG/ML injection (SINGLE-DOSE) 85 mL (85 mL Intravenous Given 4/30/21 0356)   sulfamethoxazole-trimethoprim (BACTRIM DS) 800-160 mg per tablet 1 tablet (1 tablet Oral Given 4/30/21 0433)   ketorolac (TORADOL) injection 15 mg (15 mg Intravenous Given 4/30/21 0434)         FINAL IMPRESSION     Final diagnoses:   Abdominal pain   Constipation   UTI (urinary tract infection)         DISPOSITION AND PLANNING     Time reflects when diagnosis was documented in both MDM as applicable and the Disposition within this note     Time User Action Codes Description Comment    4/30/2021  4:52 AM Laurence Matters Add [R10 9] Abdominal pain     4/30/2021  4:52 AM Laurence Matters Add [K59 00] Constipation     4/30/2021  4:52 AM Laurence Matters Modify [R10 9] Abdominal pain     4/30/2021  4:52 AM Laurence Matters Modify [K59 00] Constipation     4/30/2021  4:52 AM Laurence Matters Add [N39 0] UTI (urinary tract infection)       ED Disposition     ED Disposition Condition Date/Time Comment    Discharge  Fri Apr 30, 2021  5:36 AM Uday Marquez discharge to home/self care            Follow-up Information     Follow up With Specialties Details Why Contact Info Additional Information    [employer], [address] [facility], Internal Medicine, Nurse Practitioner Call in 1 day For follow up [address] [place], [place] [id number]  [phone]       [facility] [place] [facility] [place] [facility] Call  For follow up [address] [address] [phone] [facility] [place] [facility] [place], 940 [facility] [address] [place], [place] [facility] [place], [place], [address] [facility] For Urology [facility], [place] [place] [facility] Call in 1 day  [address] [address]  704  [facility] [place] [facility] [place], [address] [facility] [place], [place], 169 [facility] 107 Emergency Department Emergency Medicine Go to  If symptoms worsen [address] [facility] [address] [facility] 107 Emergency Department, [address], [place] [facility] [place], [place], 101 [facility] [address]  [address] [facility] 83[address] 100  [address] Kerrie Briggs  [phone]    Call in 1 day  For follow up    [facility] [place] [facility] [place]  [address] [facility] 44 [phone]  Call   For follow up    [facility] For Urology [place]  [address] [facility] [facility] [address] [phone]  Call in 1 day      [facility]  [address] [place] [address]  [phone]  Go to   If symptoms worsen        DISCHARGE MEDICATIONS     Discharge Medication List as of 4/30/2021  4:53 AM      START taking these medications    Details   sulfamethoxazole-trimethoprim (BACTRIM DS) 800-160 mg per tablet Take 1 tablet by mouth 2 (two) times a day for 7 days smx-tmp DS (BACTRIM) 800-160 mg tabs (1tab q12 D10), Starting Fri 4/30/2021, Until Fri 5/7/2021, Normal         CONTINUE these medications which have NOT CHANGED    Details   amLODIPine (NORVASC) 2 5 mg tablet TAKE 2 TABLETS (5 MG TOTAL) BY MOUTH DAILY, Starting Mon 6/24/2019, Normal      atorvastatin (LIPITOR) 10 mg tablet TAKE 1 TABLET BY MOUTH EVERY DAY, Normal      diclofenac sodium (VOLTAREN) 1 % Apply 2 g topically 4 (four) times a day, Starting Wed 9/16/2020, Normal      lisinopril (ZESTRIL) 10 mg tablet TOME ALLY TABLETA TODOS LOS NAVA, Normal      metFORMIN (GLUCOPHAGE) 500 mg tablet TAKE 1 TABLET BY MOUTH TWICE A DAY, Normal      omeprazole (PriLOSEC) 40 MG capsule TAKE 1 CAPSULE BY MOUTH EVERY DAY 1/2 HOUR BEFORE BREAKFAST, Historical Med      aspirin 81 MG tablet Take 81 mg by mouth daily , Starting Tue 3/1/2016, Historical Med      !! Glucose Blood (ONETOUCH ULTRA BLUE VI) by In Vitro route daily, Starting Fri 1/8/2016, Historical Med      Lancets (ONETOUCH ULTRASOFT) lancets Test daily, e11 5, Normal      !! OneTouch Ultra test strip TEST ONCE DAILY E11 9, Normal      polyethylene glycol (MIRALAX) 17 g packet Take 17 g by mouth 2 (two) times a day for 7 days, Starting Wed 4/28/2021, Until Wed 5/5/2021, Normal       !! - Potential duplicate medications found  Please discuss with provider  No discharge procedures on file      PDMP Review       Value Time User    PDMP Reviewed  Yes 10/5/2020  2:20 PM Jacki Carter, [address], [place], [place]  05/06/21 6403

## 2021-05-13 DIAGNOSIS — K59.00 CONSTIPATION, UNSPECIFIED CONSTIPATION TYPE: Primary | ICD-10-CM

## 2021-05-13 RX ORDER — LACTULOSE 20 G/30ML
20 SOLUTION ORAL DAILY PRN
Qty: 90 ML | Refills: 0 | Status: SHIPPED | OUTPATIENT
Start: 2021-05-13 | End: 2021-05-17

## 2021-05-14 ENCOUNTER — TELEPHONE (OUTPATIENT)
Dept: FAMILY MEDICINE CLINIC | Facility: CLINIC | Age: 76
End: 2021-05-14

## 2021-05-14 NOTE — TELEPHONE ENCOUNTER
Pharmacy left a message stating that when they put the order in for the Lactulose that a warning showed that the patient has a diagnosis of GI Track issues and the Lactulose is not a recommended medication  They want to know if you still want to continue with your order

## 2021-05-16 DIAGNOSIS — K59.00 CONSTIPATION, UNSPECIFIED CONSTIPATION TYPE: ICD-10-CM

## 2021-05-17 ENCOUNTER — OFFICE VISIT (OUTPATIENT)
Dept: FAMILY MEDICINE CLINIC | Facility: CLINIC | Age: 76
End: 2021-05-17
Payer: COMMERCIAL

## 2021-05-17 VITALS
WEIGHT: 208.8 LBS | BODY MASS INDEX: 31.64 KG/M2 | HEART RATE: 80 BPM | DIASTOLIC BLOOD PRESSURE: 64 MMHG | SYSTOLIC BLOOD PRESSURE: 128 MMHG | TEMPERATURE: 97.4 F | RESPIRATION RATE: 18 BRPM | OXYGEN SATURATION: 96 % | HEIGHT: 68 IN

## 2021-05-17 DIAGNOSIS — I10 ESSENTIAL HYPERTENSION: ICD-10-CM

## 2021-05-17 DIAGNOSIS — E11.40 CONTROLLED TYPE 2 DIABETES MELLITUS WITH DIABETIC NEUROPATHY, WITHOUT LONG-TERM CURRENT USE OF INSULIN (HCC): Primary | ICD-10-CM

## 2021-05-17 DIAGNOSIS — K21.9 GASTROESOPHAGEAL REFLUX DISEASE WITHOUT ESOPHAGITIS: ICD-10-CM

## 2021-05-17 DIAGNOSIS — C80.1 MALIGNANT SPINDLE CELL NEOPLASM (HCC): ICD-10-CM

## 2021-05-17 LAB — SL AMB POCT HEMOGLOBIN AIC: 6.6 (ref ?–6.5)

## 2021-05-17 PROCEDURE — 83036 HEMOGLOBIN GLYCOSYLATED A1C: CPT | Performed by: NURSE PRACTITIONER

## 2021-05-17 PROCEDURE — 99214 OFFICE O/P EST MOD 30 MIN: CPT | Performed by: NURSE PRACTITIONER

## 2021-05-17 RX ORDER — LACTULOSE 10 G/15ML
SOLUTION ORAL
Qty: 270 ML | Refills: 1 | Status: SHIPPED | OUTPATIENT
Start: 2021-05-17 | End: 2021-09-08 | Stop reason: SDUPTHER

## 2021-05-17 NOTE — PROGRESS NOTES
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[facility])  Assessment & Plan:  Continues to refuse further treatment        4  Gastroesophageal reflux disease without esophagitis  Assessment & Plan:  Stable  Cont current meds                   Chief Complaint     Chief Complaint   Patient presents with    Follow-up       History of Present Illness   Angela Belle is a 77y o -year-old male who is here for f/u   Had brought lactulose today  Did not get to take yet  States he is feeling better now      Review of Systems   Review of Systems   Constitutional: Negative for fatigue and fever  HENT: Negative for congestion, postnasal drip and rhinorrhea  Eyes: Negative for photophobia and visual disturbance  Respiratory: Negative for cough and shortness of breath  Cardiovascular: Negative for chest pain and palpitations  Gastrointestinal: Negative for constipation, diarrhea, nausea and vomiting  Genitourinary: Negative for dysuria and hematuria  Musculoskeletal: Negative for arthralgias and myalgias  Skin: Negative for rash  Neurological: Negative for dizziness and headaches  Hematological: Negative for adenopathy  Psychiatric/Behavioral: Negative for dysphoric mood and sleep disturbance  The patient is not nervous/anxious          Active Problem List     Patient Active Problem List   Diagnosis    Brain tumor ([facility] [address] )    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[facility] [address] )    Controlled diabetes mellitus with diabetic neuropathy (HCC)    Episodic cluster headache, not intractable    GERD (gastroesophageal reflux disease)    Nonintractable headache    Hypertension    Acute pain of both knees    Meningioma ([facility] [address] )    Varicose 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    Spondylolisthesis at L5-S1 level    Renal cyst    History of hepatitis C    Scalp lesion    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HCC)    Primary osteoarthritis of both knees         Past Medical History:  Past Medical History:   Diagnosis Date    Brain mass     Last Assessed; 11/5/2015    Chest pain     Last Assessed: 1/22/2015    Diabetes type 2, uncontrolled ([facility] [address] )     Last Assessed: 3/1/2016    Edema of left lower extremity     Last Assessed: 3/22/2017    GERD (gastroesophageal reflux disease)     Hyperlipidemia     Hypertension     Impaired fasting glucose     Last Assessed: 11/10/2015    Irregular heartbeat     Skin cancer (melanoma) (HCC)     Scalp     Varicose 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     Last Assessed: 3/22/2017       Past Surgical History:  Past Surgical History:   Procedure Laterality Date    COLONOSCOPY      COLONOSCOPY N/A 7/10/2018    Procedure: COLONOSCOPY;  Surgeon: Courtney Moore MD;  [facility]: [facility];   Service: Colorectal    INGUINAL HERNIA REPAIR      20+ years ago - [employer]; Last Assessed: 10/23/2014    TONSILLECTOMY      VARICOSE VEIN SURGERY Left     x2 left leg - 40+ yrs ago, [employer] and [place]       Family History:  Family History   Problem Relation Age of Onset    No Known Problems Mother     No Known Problems Father     Melanoma Daughter 16       Social History:  Social History     Socioeconomic History    Marital status: /Civil Union     Spouse name: Not on file    Number of children: 11    Years of education: Not on file    Highest education level: Not on file   Occupational History    Occupation: Retired: Construction   Social Needs    Financial resource strain: Not on file   [place] insecurity     Worry: Not on file     Inability: Not on file    Transportation needs     Medical: Not on file     Non-medical: Not on file   Tobacco Use    Smoking status: Never Smoker    Smokeless tobacco: Never Used   Substance and Sexual Activity    Alcohol use: No    Drug use: No    Sexual activity: Not on file   Lifestyle    Physical activity     Days per week: Not on file     Minutes per session: Not on file    Stress: Not on file   Relationships    Social connections     Talks on phone: Not on file     Gets together: Not on file     Attends Taoism service: Not on file     Active member of club or organization: Not on file     Attends meetings of clubs or organizations: Not on file     Relationship status: Not on file    Intimate partner violence     Fear of current or ex partner: Not on file     Emotionally abused: Not on file     Physically abused: Not on file     Forced sexual activity: Not on file   Other Topics Concern    Not on file   Social History Narrative    Uses safety equipment           Objective     Vitals:    05/17/21 1329   BP: 128/64   Pulse: 80   Resp: 18   Temp: (!) 97 4 °F (36 3 °C)   SpO2: 96%     Wt Readings from Last 3 Encounters:   05/17/21 94 7 kg (208 lb 12 8 oz)   05/03/21 95 3 kg (210 lb)   05/02/21 93 kg (205 lb)       Physical Exam  Vitals signs and nursing note reviewed  Constitutional:       Appearance: Normal appearance  HENT:      Head: Normocephalic and atraumatic  Right Ear: Tympanic membrane, ear canal and external ear normal       Left Ear: Tympanic membrane, ear canal and external ear normal       Nose: Nose normal       Mouth/Throat:      Mouth: Mucous membranes are moist    Eyes:      Conjunctiva/sclera: Conjunctivae normal    Neck:      Musculoskeletal: Normal range of motion and neck supple  Cardiovascular:      Rate and Rhythm: Normal rate and regular rhythm  Pulses: Normal pulses  Heart sounds: Normal heart sounds     Pulmonary: Effort: Pulmonary effort is normal       Breath sounds: Normal breath sounds  Abdominal:      General: Bowel sounds are normal       Palpations: Abdomen is soft  Musculoskeletal: Normal range of motion  Skin:     General: Skin is warm and dry  Neurological:      General: No focal deficit present  Mental Status: He is alert and oriented to person, place, and time  Psychiatric:         Mood and Affect: Mood normal          Behavior: Behavior normal          Thought Content:  Thought content normal          Judgment: Judgment normal          Pertinent Laboratory/Diagnostic Studies:  Lab Results   Component Value Date    GLUCOSE 121 10/28/2015    BUN 16 04/30/2021    CREATININE 0 90 04/30/2021    CALCIUM 8 7 04/30/2021     10/28/2015    K 3 4 (L) 04/30/2021    CO2 27 04/30/2021     04/30/2021     Lab Results   Component Value Date    ALT 18 04/30/2021    AST 17 04/30/2021    ALKPHOS 101 04/30/2021    BILITOT 0 53 08/27/2015       Lab Results   Component Value Date    WBC 8 78 04/30/2021    HGB 12 8 04/30/2021    HCT 38 3 04/30/2021    MCV 85 04/30/2021     04/30/2021       No results found for: TSH    Lab Results   Component Value Date    CHOL 154 08/27/2015     Lab Results   Component Value Date    TRIG 184 (H) 08/25/2020     Lab Results   Component Value Date    HDL 31 (L) 08/25/2020     Lab Results   Component Value Date    LDLCALC 127 (H) 08/25/2020     Lab Results   Component Value Date    HGBA1C 6 6 (A) 05/17/2021       Results for orders placed or performed in visit on 05/17/21   POCT hemoglobin A1c   Result Value Ref Range    Hemoglobin A1C 6 6 (A) 6 5       Orders Placed This Encounter   Procedures    POCT hemoglobin A1c       ALLERGIES:  No Known Allergies    Current Medications     Current Outpatient Medications   Medication Sig Dispense Refill    acetaminophen (TYLENOL) 325 mg tablet Take 2 tablets (650 mg total) by mouth every 6 (six) hours as needed for mild pain or fever 30 tablet 0    amLODIPine (NORVASC) 2 5 mg tablet TAKE 2 TABLETS (5 MG TOTAL) BY MOUTH DAILY 90 tablet 2    aspirin 81 MG tablet Take 81 mg by mouth daily       atorvastatin (LIPITOR) 10 mg tablet TAKE 1 TABLET BY MOUTH EVERY DAY 90 tablet 3    diclofenac sodium (VOLTAREN) 1 % Apply 2 g topically 4 (four) times a day 150 g 1    Glucose Blood (ONETOUCH ULTRA BLUE VI) by In Vitro route daily      lactulose (CHRONULAC) 10 g/15 mL solution TAKE 30 ML (20 G TOTAL) BY MOUTH DAILY AS NEEDED (CONSTIPATION) 270 mL 1    Lancets (ONETOUCH ULTRASOFT) lancets Test daily, e11 5 100 each 11    lisinopril (ZESTRIL) 10 mg tablet TOME ALLY TABLETA TODOS LOS NAVA 90 tablet 3    metFORMIN (GLUCOPHAGE) 500 mg tablet TAKE 1 TABLET BY MOUTH TWICE A  tablet 3    omeprazole (PriLOSEC) 40 MG capsule TAKE 1 CAPSULE BY MOUTH EVERY DAY 1/2 HOUR BEFORE BREAKFAST      OneTouch Ultra test strip TEST ONCE DAILY E11 9 25 each 47     No current facility-administered medications for this visit            Health Maintenance     Health Maintenance   Topic Date Due    DM Eye Exam  Never done    Pneumococcal Vaccine: 65+ Years (2 of 2 - PPSV23) 10/14/2020    Hepatitis B Vaccine (1 of 3 - Risk 3-dose series) 06/17/2021 (Originally 7/11/1964)    DTaP,Tdap,and Td Vaccines (1 - Tdap) 08/12/2021 (Originally 7/11/1967)    Influenza Vaccine (Season Ended) 09/01/2021    Medicare Annual Wellness Visit (AWV)  11/02/2021    HEMOGLOBIN A1C  11/17/2021    Fall Risk  01/15/2022    BMI: Followup Plan  01/15/2022    Falls: Plan of Care  01/15/2022    Diabetic Foot Exam  01/15/2022    Depression Screening PHQ  03/17/2022    BMI: Adult  05/17/2022    Colorectal Cancer Screening  07/10/2028    COVID-19 Vaccine  Completed    HIB Vaccine  Aged Out    IPV Vaccine  Aged Out    Hepatitis A Vaccine  Aged Out    Meningococcal ACWY Vaccine  Aged Out    HPV Vaccine  Aged Out    Hepatitis C Screening  Discontinued     Immunization History Administered Date(s) Administered    INFLUENZA 10/27/2015, 10/01/2016, 10/13/2016, 12/19/2018    Influenza Split High Dose Preservative Free IM 10/23/2014, 10/27/2015, 10/01/2016, 10/13/2016, 10/13/2016, 10/13/2016    Influenza, high dose seasonal 0 7 mL 12/19/2018, 10/14/2019    Pneumococcal Conjugate 13-Valent 08/25/2015, 01/19/2017, 10/14/2019    SARS-CoV-2 / COVID-19 mRNA IM (Pfizer-BioNTech) 03/29/2021, 04/21/2021          ZAHRA Mg

## 2021-05-25 ENCOUNTER — OFFICE VISIT (OUTPATIENT)
Dept: FAMILY MEDICINE CLINIC | Facility: CLINIC | Age: 76
End: 2021-05-25
Payer: COMMERCIAL

## 2021-05-25 DIAGNOSIS — H61.23 BILATERAL IMPACTED CERUMEN: Primary | ICD-10-CM

## 2021-05-25 PROCEDURE — 99213 OFFICE O/P EST LOW 20 MIN: CPT | Performed by: NURSE PRACTITIONER

## 2021-05-25 PROCEDURE — 69210 REMOVE IMPACTED EAR WAX UNI: CPT | Performed by: NURSE PRACTITIONER

## 2021-05-25 NOTE — PROGRESS NOTES
FAMILY PRACTICE OFFICE VISIT       NAME: Alexis Hewitt  AGE: 77 y o  SEX: male       : 1945        MRN: [id number]    DATE: 2021  TIME: 12:24 PM    Assessment and Plan   1  Bilateral impacted cerumen  -     Ear cerumen removal                 Chief Complaint     Chief Complaint   Patient presents with    Cerumen Impaction       History of Present Illness   Alexis Hewitt is a 77y o -year-old male who is here for wax in ears and trouble hearing due to wax in ear      Review of Systems   Review of Systems   Constitutional: Negative for fatigue and fever  HENT: Positive for hearing loss  Respiratory: Negative for cough and shortness of breath  Cardiovascular: Negative for chest pain and palpitations  Skin: Negative for rash  Neurological: Negative for dizziness and headaches  Hematological: Negative for adenopathy  Psychiatric/Behavioral: Negative for dysphoric mood  The patient is not nervous/anxious          Active Problem List     Patient Active Problem List   Diagnosis    Brain tumor ([address]    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[address] )    Controlled diabetes mellitus with diabetic neuropathy (HCC)    Episodic cluster headache, not intractable    GERD (gastroesophageal reflux disease)    Nonintractable headache    Hypertension    Acute pain of both knees    Meningioma ([address] )    Varicose 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    Spondylolisthesis at L5-S1 level    Renal cyst    History of hepatitis C    Scalp lesion    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HCC)    Primary osteoarthritis of both knees         Past Medical History:  Past Medical History:   Diagnosis Date    Brain mass     Last Assessed; 2015    Chest pain     Last Assessed: 2015    Diabetes type 2, uncontrolled ([address] )     Last Assessed: 3/1/2016    Edema of left lower extremity     Last Assessed: 3/22/2017    GERD (gastroesophageal reflux disease)     Hyperlipidemia     Hypertension     Impaired fasting glucose     Last Assessed: 11/10/2015    Irregular heartbeat     Skin cancer (melanoma) (HCC)     Scalp     Varicose 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     Last Assessed: 3/22/2017       Past Surgical History:  Past Surgical History:   Procedure Laterality Date    COLONOSCOPY      COLONOSCOPY N/A 7/10/2018    Procedure: COLONOSCOPY;  Surgeon: Rhina Gracia MD;  [facility];   Service: Colorectal    INGUINAL HERNIA REPAIR      20+ years ago - [facility]; Last Assessed: 10/23/2014    TONSILLECTOMY      VARICOSE VEIN SURGERY Left     x2 left leg - 40+ yrs ago, [facility] and [place]       Family History:  Family History   Problem Relation Age of Onset    No Known Problems Mother     No Known Problems Father     Melanoma Daughter 16       Social History:  Social History     Socioeconomic History    Marital status: /Civil Union     Spouse name: Not on file    Number of children: 11    Years of education: Not on file    Highest education level: Not on file   Occupational History    Occupation: Retired: Construction   Social Needs    Financial resource strain: Not on file    Food insecurity     Worry: Not on file     Inability: Not on file   Ac[employer] needs     Medical: Not on file     Non-medical: Not on file   Tobacco Use    Smoking status: Never Smoker    Smokeless tobacco: Never Used   Substance and Sexual Activity    Alcohol use: No    Drug use: No    Sexual activity: Not on file   Lifestyle    Physical activity     Days per week: Not on file     Minutes per session: Not on file    Stress: Not on file   Relationships    Social connections     Talks on phone: Not on file     Gets together: Not on file     Attends Taoist service: Not on file     Active member of club or organization: Not on file     Attends meetings of clubs or organizations: Not on file     Relationship status: Not on file    Intimate partner violence     Fear of current or ex partner: Not on file Emotionally abused: Not on file     Physically abused: Not on file     Forced sexual activity: Not on file   Other Topics Concern    Not on file   Social History Narrative    Uses safety equipment           Objective   There were no vitals filed for this visit  Wt Readings from Last 3 Encounters:   05/17/21 94 7 kg (208 lb 12 8 oz)   05/03/21 95 3 kg (210 lb)   05/02/21 93 kg (205 lb)       Physical Exam  Vitals signs and nursing note reviewed  Constitutional:       Appearance: Normal appearance  HENT:      Head: Normocephalic and atraumatic  Right Ear: Hearing and tympanic membrane normal  There is impacted cerumen  Left Ear: Hearing and tympanic membrane normal  There is impacted cerumen  Mouth/Throat:      Mouth: Mucous membranes are moist    Eyes:      Conjunctiva/sclera: Conjunctivae normal    Neck:      Musculoskeletal: Normal range of motion  Cardiovascular:      Rate and Rhythm: Normal rate and regular rhythm  Pulses: Normal pulses  Heart sounds: Normal heart sounds  Pulmonary:      Effort: Pulmonary effort is normal       Breath sounds: Normal breath sounds  Musculoskeletal: Normal range of motion  Skin:     General: Skin is warm and dry  Capillary Refill: Capillary refill takes less than 2 seconds  Neurological:      Mental Status: He is alert and oriented to person, place, and time  Psychiatric:         Mood and Affect: Mood normal          Behavior: Behavior normal          Thought Content:  Thought content normal          Judgment: Judgment normal          Pertinent Laboratory/Diagnostic Studies:  Lab Results   Component Value Date    GLUCOSE 121 10/28/2015    BUN 16 04/30/2021    CREATININE 0 90 04/30/2021    CALCIUM 8 7 04/30/2021     10/28/2015    K 3 4 (L) 04/30/2021    CO2 27 04/30/2021     04/30/2021     Lab Results   Component Value Date    ALT 18 04/30/2021    AST 17 04/30/2021    ALKPHOS 101 04/30/2021    BILITOT 0 53 08/27/2015 Lab Results   Component Value Date    WBC 8 78 04/30/2021    HGB 12 8 04/30/2021    HCT 38 3 04/30/2021    MCV 85 04/30/2021     04/30/2021       No results found for: TSH    Lab Results   Component Value Date    CHOL 154 08/27/2015     Lab Results   Component Value Date    TRIG 184 (H) 08/25/2020     Lab Results   Component Value Date    HDL 31 (L) 08/25/2020     Lab Results   Component Value Date    LDLCALC 127 (H) 08/25/2020     Lab Results   Component Value Date    HGBA1C 6 6 (A) 05/17/2021       Results for orders placed or performed in visit on 05/17/21   POCT hemoglobin A1c   Result Value Ref Range    Hemoglobin A1C 6 6 (A) 6 5       Orders Placed This Encounter   Procedures    Ear cerumen removal       ALLERGIES:  No Known Allergies    Current Medications     Current Outpatient Medications   Medication Sig Dispense Refill    acetaminophen (TYLENOL) 325 mg tablet Take 2 tablets (650 mg total) by mouth every 6 (six) hours as needed for mild pain or fever 30 tablet 0    amLODIPine (NORVASC) 2 5 mg tablet TAKE 2 TABLETS (5 MG TOTAL) BY MOUTH DAILY 90 tablet 2    aspirin 81 MG tablet Take 81 mg by mouth daily       atorvastatin (LIPITOR) 10 mg tablet TAKE 1 TABLET BY MOUTH EVERY DAY 90 tablet 3    diclofenac sodium (VOLTAREN) 1 % Apply 2 g topically 4 (four) times a day 150 g 1    Glucose Blood (ONETOUCH ULTRA BLUE VI) by In Vitro route daily      lactulose (CHRONULAC) 10 g/15 mL solution TAKE 30 ML (20 G TOTAL) BY MOUTH DAILY AS NEEDED (CONSTIPATION) 270 mL 1    Lancets (ONETOUCH ULTRASOFT) lancets Test daily, e11 5 100 each 11    lisinopril (ZESTRIL) 10 mg tablet TOME ALLY TABLETA TODOS LOS NAVA 90 tablet 3    metFORMIN (GLUCOPHAGE) 500 mg tablet TAKE 1 TABLET BY MOUTH TWICE A  tablet 3    omeprazole (PriLOSEC) 40 MG capsule TAKE 1 CAPSULE BY MOUTH EVERY DAY 1/2 HOUR BEFORE BREAKFAST      OneTouch Ultra test strip TEST ONCE DAILY E11 9 25 each 47     No current facility-administered medications for this visit  Health Maintenance     Health Maintenance   Topic Date Due    DM Eye Exam  Never done    Pneumococcal Vaccine: 65+ Years (2 of 2 - PPSV23) 10/14/2020    Hepatitis B Vaccine (1 of 3 - Risk 3-dose series) 06/17/2021 (Originally 7/11/1964)    DTaP,Tdap,and Td Vaccines (1 - Tdap) 08/12/2021 (Originally 7/11/1967)    Influenza Vaccine (Season Ended) 09/01/2021    Medicare Annual Wellness Visit (AWV)  11/02/2021    HEMOGLOBIN A1C  11/17/2021    Fall Risk  01/15/2022    BMI: Followup Plan  01/15/2022    Falls: Plan of Care  01/15/2022    Diabetic Foot Exam  01/15/2022    Depression Screening PHQ  03/17/2022    BMI: Adult  05/17/2022    Colorectal Cancer Screening  07/10/2028    COVID-19 Vaccine  Completed    HIB Vaccine  Aged Out    IPV Vaccine  Aged Out    Hepatitis A Vaccine  Aged Out    Meningococcal ACWY Vaccine  Aged Out    HPV Vaccine  Aged Out    Hepatitis C Screening  Discontinued     Immunization History   Administered Date(s) Administered    INFLUENZA 10/27/2015, 10/01/2016, 10/13/2016, 12/19/2018    Influenza Split High Dose Preservative Free IM 10/23/2014, 10/27/2015, 10/01/2016, 10/13/2016, 10/13/2016, 10/13/2016    Influenza, high dose seasonal 0 7 mL 12/19/2018, 10/14/2019    Pneumococcal Conjugate 13-Valent 08/25/2015, 01/19/2017, 10/14/2019    SARS-CoV-2 / COVID-19 mRNA IM (Pfizer-BioNTech) 03/29/2021, 04/21/2021     Ear cerumen removal    Date/Time: 5/25/2021 12:17 PM  Performed by: ZAHRA Henning  Authorized by: ZAHRA Henning   Universal Protocol:  Consent: Verbal consent obtained    Risks and benefits: risks, benefits and alternatives were discussed  Consent given by: patient  Patient understanding: patient states understanding of the procedure being performed  Patient consent: the patient's understanding of the procedure matches consent given  Procedure consent: procedure consent matches procedure scheduled      Patient location:  Clinic  Procedure details:     Location:  L ear and R ear    Procedure type: irrigation with instrumentation      Instrumentation: forceps      Approach:  Natural orifice  Post-procedure details:     Complication:  None    Hearing quality:  Improved    Patient tolerance of procedure:   Tolerated well, no immediate complications        ZAHRA Kamara

## 2021-07-08 DIAGNOSIS — E11.9 TYPE 2 DIABETES MELLITUS WITHOUT COMPLICATION, WITHOUT LONG-TERM CURRENT USE OF INSULIN (HCC): ICD-10-CM

## 2021-07-08 RX ORDER — LANCETS 33 GAUGE
EACH MISCELLANEOUS
Qty: 100 EACH | Refills: 11 | Status: SHIPPED | OUTPATIENT
Start: 2021-07-08 | End: 2022-07-15 | Stop reason: SDUPTHER

## 2021-07-28 ENCOUNTER — OFFICE VISIT (OUTPATIENT)
Dept: FAMILY MEDICINE CLINIC | Facility: CLINIC | Age: 76
End: 2021-07-28
Payer: COMMERCIAL

## 2021-07-28 VITALS
WEIGHT: 207.4 LBS | DIASTOLIC BLOOD PRESSURE: 60 MMHG | BODY MASS INDEX: 31.43 KG/M2 | TEMPERATURE: 96.6 F | RESPIRATION RATE: 16 BRPM | SYSTOLIC BLOOD PRESSURE: 130 MMHG | HEART RATE: 63 BPM | OXYGEN SATURATION: 98 % | HEIGHT: 68 IN

## 2021-07-28 DIAGNOSIS — K21.9 GASTROESOPHAGEAL REFLUX DISEASE WITHOUT ESOPHAGITIS: Primary | ICD-10-CM

## 2021-07-28 PROCEDURE — 99213 OFFICE O/P EST LOW 20 MIN: CPT | Performed by: NURSE PRACTITIONER

## 2021-07-28 RX ORDER — OMEPRAZOLE 40 MG/1
40 CAPSULE, DELAYED RELEASE ORAL DAILY
Qty: 30 CAPSULE | Refills: 5 | Status: SHIPPED | OUTPATIENT
Start: 2021-07-28 | End: 2022-01-18

## 2021-07-28 NOTE — PROGRESS NOTES
FAMILY PRACTICE OFFICE VISIT       NAME: Senia Ram  AGE: 77 y o  SEX: male       : 1945        MRN: [id number]    DATE: 2021  TIME: 9:04 AM    Assessment and Plan   1  Gastroesophageal reflux disease without esophagitis  -     omeprazole (PriLOSEC) 40 MG capsule; Take 1 capsule (40 mg total) by mouth daily                 Chief Complaint     Chief Complaint   Patient presents with    Abdominal Pain     PT is being seen today for right upper quardant pain X 3 weeks       History of Present Illness   Senia Ram is a 77y o -year-old male who is having right upper quadrant abdominal pain   Happened before and was given medicine and it helped   Now off medicine and would like to go back on it      Review of Systems   Review of Systems   Constitutional: Negative for fatigue and fever  HENT: Negative for congestion and postnasal drip  Respiratory: Negative for cough and shortness of breath  Cardiovascular: Negative for chest pain and palpitations  Gastrointestinal: Positive for abdominal distention and abdominal pain  Endocrine: Negative for polydipsia, polyphagia and polyuria  Genitourinary: Negative for dysuria and frequency  Musculoskeletal: Negative for arthralgias and myalgias  Skin: Negative for rash  Neurological: Negative for dizziness and headaches  Hematological: Negative for adenopathy  Psychiatric/Behavioral: Negative for dysphoric mood and sleep disturbance  The patient is not nervous/anxious          Active Problem List     Patient Active Problem List   Diagnosis    [facility] [address]    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[address] )    Controlled diabetes mellitus with diabetic neuropathy (HCC)    Episodic cluster headache, not intractable    GERD (gastroesophageal reflux disease)    Nonintractable headache    Hypertension    Acute pain of both knees    Meningioma ([facility] [address] )    Varicose 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    Spondylolisthesis at L5-S1 level    Renal cyst  History of hepatitis C    Scalp lesion    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HCC)    Primary osteoarthritis of both knees         Past Medical History:  Past Medical History:   Diagnosis Date    Brain mass     Last Assessed; 11/5/2015    Chest pain     Last Assessed: 1/22/2015    Diabetes type 2, uncontrolled ([address] )     Last Assessed: 3/1/2016    Edema of left lower extremity     Last Assessed: 3/22/2017    GERD (gastroesophageal reflux disease)     Hyperlipidemia     Hypertension     Impaired fasting glucose     Last Assessed: 11/10/2015    Irregular heartbeat     Skin cancer (melanoma) (HCC)     Scalp     Varicose 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     Last Assessed: 3/22/2017       Past Surgical History:  Past Surgical History:   Procedure Laterality Date    COLONOSCOPY      COLONOSCOPY N/A 7/10/2018    Procedure: COLONOSCOPY;  Surgeon: Willam Junior MD;  [facility];   Service: Colorectal    INGUINAL HERNIA REPAIR      20+ years ago - [address] [place] [address]; Last Assessed: 10/23/2014    TONSILLECTOMY      VARICOSE VEIN SURGERY Left     x2 left leg - 40+ yrs ago, [facility]       Family History:  Family History   Problem Relation Age of Onset    No Known Problems Mother     No Known Problems Father     Melanoma Daughter 16       Social History:  Social History     Socioeconomic History    Marital status: /Civil Union     Spouse name: Not on file    Number of children: 11    Years of education: Not on file    Highest education level: Not on file   Occupational History    Occupation: Retired: Construction   Tobacco Use    Smoking status: Never Smoker    Smokeless tobacco: Never Used   Vaping Use    Vaping Use: Never used   Substance and Sexual Activity    Alcohol use: No    Drug use: No    Sexual activity: Not on file   Other Topics Concern    Not on file   Social History Narrative    Uses safety equipment         Social Determinants of Health     Financial Resource Strain:  Difficulty of Paying Living Expenses:    Food Insecurity:     Worried About Running Out of Food in the Last Year:     Ran Out of Food in the Last Year:    Transportation Needs:     Lack of Transportation (Medical):  Lack of Transportation (Non-Medical):    Physical Activity:     Days of Exercise per Week:     Minutes of Exercise per Session:    Stress:     Feeling of Stress :    Social Connections:     Frequency of Communication with Friends and Family:     Frequency of Social Gatherings with Friends and Family:     Attends Denominational Services:     Active Member of Clubs or Organizations:     Attends Club or Organization Meetings:     Marital Status:    Intimate Partner Violence:     Fear of Current or Ex-Partner:     Emotionally Abused:     Physically Abused:     Sexually Abused:        Objective     Vitals:    07/28/21 1830   BP: 130/60   Pulse: 63   Resp: 16   Temp: (!) 96 6 °F (35 9 °C)   SpO2: 98%     Wt Readings from Last 3 Encounters:   07/28/21 94 1 kg (207 lb 6 4 oz)   05/17/21 94 7 kg (208 lb 12 8 oz)   05/03/21 95 3 kg (210 lb)       Physical Exam  Vitals and nursing note reviewed  Constitutional:       Appearance: He is well-developed  HENT:      Head: Normocephalic and atraumatic  Mouth/Throat:      Mouth: Mucous membranes are moist    Eyes:      Conjunctiva/sclera: Conjunctivae normal    Cardiovascular:      Rate and Rhythm: Normal rate and regular rhythm  Pulmonary:      Effort: Pulmonary effort is normal       Breath sounds: Normal breath sounds  Abdominal:      General: Abdomen is flat  Bowel sounds are normal       Palpations: Abdomen is soft  Tenderness: There is no abdominal tenderness  Hernia: No hernia is present  Musculoskeletal:      Cervical back: Normal range of motion  Skin:     General: Skin is warm and dry  Capillary Refill: Capillary refill takes less than 2 seconds     Neurological:      Mental Status: He is alert and oriented to person, place, and time  Psychiatric:         Mood and Affect: Mood normal          Behavior: Behavior normal          Pertinent Laboratory/Diagnostic Studies:  Lab Results   Component Value Date    GLUCOSE 121 10/28/2015    BUN 16 04/30/2021    CREATININE 0 90 04/30/2021    CALCIUM 8 7 04/30/2021     10/28/2015    K 3 4 (L) 04/30/2021    CO2 27 04/30/2021     04/30/2021     Lab Results   Component Value Date    ALT 18 04/30/2021    AST 17 04/30/2021    ALKPHOS 101 04/30/2021    BILITOT 0 53 08/27/2015       Lab Results   Component Value Date    WBC 8 78 04/30/2021    HGB 12 8 04/30/2021    HCT 38 3 04/30/2021    MCV 85 04/30/2021     04/30/2021       No results found for: TSH    Lab Results   Component Value Date    CHOL 154 08/27/2015     Lab Results   Component Value Date    TRIG 184 (H) 08/25/2020     Lab Results   Component Value Date    HDL 31 (L) 08/25/2020     Lab Results   Component Value Date    LDLCALC 127 (H) 08/25/2020     Lab Results   Component Value Date    HGBA1C 6 6 (A) 05/17/2021       Results for orders placed or performed in visit on 05/17/21   POCT hemoglobin A1c   Result Value Ref Range    Hemoglobin A1C 6 6 (A) 6 5       No orders of the defined types were placed in this encounter        ALLERGIES:  No Known Allergies    Current Medications     Current Outpatient Medications   Medication Sig Dispense Refill    acetaminophen (TYLENOL) 325 mg tablet Take 2 tablets (650 mg total) by mouth every 6 (six) hours as needed for mild pain or fever 30 tablet 0    amLODIPine (NORVASC) 2 5 mg tablet TAKE 2 TABLETS (5 MG TOTAL) BY MOUTH DAILY 90 tablet 2    aspirin 81 MG tablet Take 81 mg by mouth daily       atorvastatin (LIPITOR) 10 mg tablet TAKE 1 TABLET BY MOUTH EVERY DAY 90 tablet 3    diclofenac sodium (VOLTAREN) 1 % Apply 2 g topically 4 (four) times a day 150 g 1    Glucose Blood (ONETOUCH ULTRA BLUE VI) by In Vitro route daily      lactulose (CHRONULAC) 10 g/15 mL solution TAKE 30 ML (20 G TOTAL) BY MOUTH DAILY AS NEEDED (CONSTIPATION) 270 mL 1    Lancets (OneTouch Delica Plus [id number]) MISC TEST DAILY, U87 5--XI CONFIMED DELICA 33 G 210 each 11    lisinopril (ZESTRIL) 10 mg tablet TOME ALLY TABLETA TODOS LOS NAVA 90 tablet 3    metFORMIN (GLUCOPHAGE) 500 mg tablet TAKE 1 TABLET BY MOUTH TWICE A  tablet 3    omeprazole (PriLOSEC) 40 MG capsule Take 1 capsule (40 mg total) by mouth daily 30 capsule 5    OneTouch Ultra test strip TEST ONCE DAILY E11 9 25 each 47     No current facility-administered medications for this visit           Health Maintenance     Health Maintenance   Topic Date Due    DM Eye Exam  Never done    Hepatitis B Vaccine (1 of 3 - Risk 3-dose series) Never done    Pneumococcal Vaccine: 65+ Years (1 of 2 - PPSV23) 12/09/2019    Influenza Vaccine (1) 09/01/2021    BMI: Followup Plan  01/15/2022    DTaP,Tdap,and Td Vaccines (1 - Tdap) 08/12/2021 (Originally 7/11/1966)    Medicare Annual Wellness Visit (AWV)  11/02/2021    HEMOGLOBIN A1C  11/17/2021    Fall Risk  01/15/2022    Falls: Plan of Care  01/15/2022    Diabetic Foot Exam  01/15/2022    Depression Screening PHQ  03/17/2022    BMI: Adult  07/28/2022    Colorectal Cancer Screening  07/10/2028    COVID-19 Vaccine  Completed    HIB Vaccine  Aged Out    IPV Vaccine  Aged Out    Hepatitis A Vaccine  Aged Out    Meningococcal ACWY Vaccine  Aged Out    HPV Vaccine  Aged Out    Hepatitis C Screening  Discontinued     Immunization History   Administered Date(s) Administered    INFLUENZA 10/27/2015, 10/01/2016, 10/13/2016, 12/19/2018    Influenza Split High Dose Preservative Free IM 10/23/2014, 10/27/2015, 10/01/2016, 10/13/2016, 10/13/2016, 10/13/2016    Influenza, high dose seasonal 0 7 mL 12/19/2018, 10/14/2019    Pneumococcal Conjugate 13-Valent 08/25/2015, 01/19/2017, 10/14/2019    SARS-CoV-2 / COVID-19 mRNA IM (Pfizer-BioNTech) 03/29/2021, 04/21/2021          Helen Regan Amaury Styles

## 2021-08-23 ENCOUNTER — TELEPHONE (OUTPATIENT)
Dept: FAMILY MEDICINE CLINIC | Facility: CLINIC | Age: 76
End: 2021-08-23

## 2021-08-23 NOTE — TELEPHONE ENCOUNTER
Pt called and needs a refill of hydrocortisone 0 2% cream however I dont see it on his list  pls advise

## 2021-08-27 DIAGNOSIS — M54.50 CHRONIC BILATERAL LOW BACK PAIN WITHOUT SCIATICA: Primary | ICD-10-CM

## 2021-08-27 DIAGNOSIS — G89.29 CHRONIC BILATERAL LOW BACK PAIN WITHOUT SCIATICA: Primary | ICD-10-CM

## 2021-08-28 RX ORDER — TRAMADOL HYDROCHLORIDE 50 MG/1
50 TABLET ORAL EVERY 6 HOURS PRN
Qty: 30 TABLET | Refills: 0 | Status: SHIPPED | OUTPATIENT
Start: 2021-08-28 | End: 2022-03-31 | Stop reason: SDUPTHER

## 2021-09-01 DIAGNOSIS — K59.01 SLOW TRANSIT CONSTIPATION: ICD-10-CM

## 2021-09-01 RX ORDER — BLOOD SUGAR DIAGNOSTIC
STRIP MISCELLANEOUS
Qty: 25 STRIP | Refills: 47 | Status: SHIPPED | OUTPATIENT
Start: 2021-09-01

## 2021-09-08 ENCOUNTER — TELEPHONE (OUTPATIENT)
Dept: FAMILY MEDICINE CLINIC | Facility: CLINIC | Age: 76
End: 2021-09-08

## 2021-09-29 ENCOUNTER — OFFICE VISIT (OUTPATIENT)
Dept: FAMILY MEDICINE CLINIC | Facility: CLINIC | Age: 76
End: 2021-09-29
Payer: COMMERCIAL

## 2021-09-29 VITALS
RESPIRATION RATE: 16 BRPM | DIASTOLIC BLOOD PRESSURE: 90 MMHG | TEMPERATURE: 97.5 F | HEART RATE: 89 BPM | WEIGHT: 211.4 LBS | OXYGEN SATURATION: 97 % | SYSTOLIC BLOOD PRESSURE: 150 MMHG | HEIGHT: 68 IN | BODY MASS INDEX: 32.04 KG/M2

## 2021-09-29 DIAGNOSIS — K59.09 CHRONIC CONSTIPATION: Primary | ICD-10-CM

## 2021-09-29 DIAGNOSIS — G44.229 CHRONIC TENSION-TYPE HEADACHE, NOT INTRACTABLE: ICD-10-CM

## 2021-09-29 PROCEDURE — 99213 OFFICE O/P EST LOW 20 MIN: CPT | Performed by: NURSE PRACTITIONER

## 2021-10-03 PROBLEM — K59.09 CHRONIC CONSTIPATION: Status: ACTIVE | Noted: 2017-11-29

## 2021-10-05 ENCOUNTER — OFFICE VISIT (OUTPATIENT)
Dept: FAMILY MEDICINE CLINIC | Facility: CLINIC | Age: 76
End: 2021-10-05
Payer: COMMERCIAL

## 2021-10-05 VITALS
SYSTOLIC BLOOD PRESSURE: 138 MMHG | WEIGHT: 208.4 LBS | HEIGHT: 68 IN | BODY MASS INDEX: 31.58 KG/M2 | TEMPERATURE: 95.9 F | DIASTOLIC BLOOD PRESSURE: 70 MMHG | RESPIRATION RATE: 16 BRPM | HEART RATE: 68 BPM | OXYGEN SATURATION: 98 %

## 2021-10-05 DIAGNOSIS — Z23 NEED FOR INFLUENZA VACCINATION: ICD-10-CM

## 2021-10-05 DIAGNOSIS — K21.9 GASTROESOPHAGEAL REFLUX DISEASE WITHOUT ESOPHAGITIS: ICD-10-CM

## 2021-10-05 DIAGNOSIS — Z23 NEED FOR PNEUMOCOCCAL VACCINATION: ICD-10-CM

## 2021-10-05 DIAGNOSIS — I10 PRIMARY HYPERTENSION: ICD-10-CM

## 2021-10-05 DIAGNOSIS — M17.0 PRIMARY OSTEOARTHRITIS OF BOTH KNEES: ICD-10-CM

## 2021-10-05 DIAGNOSIS — E11.40 CONTROLLED TYPE 2 DIABETES MELLITUS WITH DIABETIC NEUROPATHY, WITHOUT LONG-TERM CURRENT USE OF INSULIN (HCC): Primary | ICD-10-CM

## 2021-10-05 LAB — SL AMB POCT HEMOGLOBIN AIC: 6.6 (ref ?–6.5)

## 2021-10-05 PROCEDURE — 83036 HEMOGLOBIN GLYCOSYLATED A1C: CPT | Performed by: NURSE PRACTITIONER

## 2021-10-05 PROCEDURE — G0008 ADMIN INFLUENZA VIRUS VAC: HCPCS

## 2021-10-05 PROCEDURE — G0009 ADMIN PNEUMOCOCCAL VACCINE: HCPCS

## 2021-10-05 PROCEDURE — 99214 OFFICE O/P EST MOD 30 MIN: CPT | Performed by: NURSE PRACTITIONER

## 2021-10-05 PROCEDURE — 90662 IIV NO PRSV INCREASED AG IM: CPT

## 2021-10-05 PROCEDURE — 90732 PPSV23 VACC 2 YRS+ SUBQ/IM: CPT

## 2021-10-15 ENCOUNTER — TELEPHONE (OUTPATIENT)
Dept: FAMILY MEDICINE CLINIC | Facility: CLINIC | Age: 76
End: 2021-10-15

## 2021-10-19 ENCOUNTER — TELEPHONE (OUTPATIENT)
Dept: NEUROSURGERY | Facility: CLINIC | Age: 76
End: 2021-10-19

## 2021-11-09 ENCOUNTER — IMMUNIZATIONS (OUTPATIENT)
Dept: FAMILY MEDICINE CLINIC | Facility: HOSPITAL | Age: 76
End: 2021-11-09

## 2021-11-09 DIAGNOSIS — Z23 ENCOUNTER FOR IMMUNIZATION: Primary | ICD-10-CM

## 2021-11-09 PROCEDURE — 0001A COVID-19 PFIZER VACC 0.3 ML: CPT

## 2021-11-09 PROCEDURE — 91300 COVID-19 PFIZER VACC 0.3 ML: CPT

## 2021-11-16 ENCOUNTER — HOSPITAL ENCOUNTER (OUTPATIENT)
Dept: RADIOLOGY | Facility: HOSPITAL | Age: 76
Discharge: HOME | End: 2021-11-16
Payer: COMMERCIAL

## 2021-11-16 DIAGNOSIS — D32.9 MENINGIOMA (HCC): ICD-10-CM

## 2021-11-16 PROCEDURE — 70553 MRI BRAIN STEM W/O & W/DYE: CPT

## 2021-11-16 PROCEDURE — G1004 CDSM NDSC: HCPCS

## 2021-11-16 PROCEDURE — A9585 GADOBUTROL INJECTION: HCPCS | Performed by: PHYSICIAN ASSISTANT

## 2021-11-16 RX ADMIN — GADOBUTROL 9 ML: 604.72 INJECTION INTRAVENOUS at 09:29

## 2021-11-17 ENCOUNTER — VBI (OUTPATIENT)
Dept: ADMINISTRATIVE | Facility: OTHER | Age: 76
End: 2021-11-17

## 2021-11-18 ENCOUNTER — OFFICE VISIT (OUTPATIENT)
Dept: FAMILY MEDICINE CLINIC | Facility: CLINIC | Age: 76
End: 2021-11-18
Payer: COMMERCIAL

## 2021-11-18 VITALS
WEIGHT: 212.6 LBS | OXYGEN SATURATION: 97 % | HEIGHT: 68 IN | SYSTOLIC BLOOD PRESSURE: 126 MMHG | BODY MASS INDEX: 32.22 KG/M2 | HEART RATE: 66 BPM | DIASTOLIC BLOOD PRESSURE: 62 MMHG | RESPIRATION RATE: 16 BRPM

## 2021-11-18 DIAGNOSIS — D32.9 MENINGIOMA (HCC): ICD-10-CM

## 2021-11-18 DIAGNOSIS — D49.6 BRAIN TUMOR (HCC): Primary | ICD-10-CM

## 2021-11-18 DIAGNOSIS — K21.9 GASTROESOPHAGEAL REFLUX DISEASE WITHOUT ESOPHAGITIS: ICD-10-CM

## 2021-11-18 DIAGNOSIS — R07.9 CHEST PAIN, UNSPECIFIED TYPE: ICD-10-CM

## 2021-11-18 DIAGNOSIS — G44.229 CHRONIC TENSION-TYPE HEADACHE, NOT INTRACTABLE: ICD-10-CM

## 2021-11-18 PROBLEM — R10.9 ABDOMINAL WALL PAIN: Status: ACTIVE | Noted: 2021-11-18

## 2021-11-18 PROCEDURE — 99214 OFFICE O/P EST MOD 30 MIN: CPT | Performed by: FAMILY MEDICINE

## 2021-11-19 DIAGNOSIS — K59.09 CHRONIC CONSTIPATION: ICD-10-CM

## 2021-11-19 RX ORDER — LINACLOTIDE 145 UG/1
CAPSULE, GELATIN COATED ORAL
Qty: 30 CAPSULE | Refills: 1 | Status: SHIPPED | OUTPATIENT
Start: 2021-11-19 | End: 2022-01-10

## 2021-11-24 ENCOUNTER — PATIENT OUTREACH (OUTPATIENT)
Dept: FAMILY MEDICINE CLINIC | Facility: CLINIC | Age: 76
End: 2021-11-24

## 2021-12-10 ENCOUNTER — OFFICE VISIT (OUTPATIENT)
Dept: FAMILY MEDICINE CLINIC | Facility: CLINIC | Age: 76
End: 2021-12-10
Payer: COMMERCIAL

## 2021-12-10 VITALS
RESPIRATION RATE: 16 BRPM | OXYGEN SATURATION: 97 % | TEMPERATURE: 97.5 F | DIASTOLIC BLOOD PRESSURE: 84 MMHG | SYSTOLIC BLOOD PRESSURE: 130 MMHG | HEART RATE: 69 BPM | BODY MASS INDEX: 31.9 KG/M2 | WEIGHT: 209.8 LBS

## 2021-12-10 DIAGNOSIS — I10 PRIMARY HYPERTENSION: ICD-10-CM

## 2021-12-10 DIAGNOSIS — I20.0 UNSTABLE ANGINA (HCC): Primary | ICD-10-CM

## 2021-12-10 DIAGNOSIS — E11.40 CONTROLLED TYPE 2 DIABETES MELLITUS WITH DIABETIC NEUROPATHY, WITHOUT LONG-TERM CURRENT USE OF INSULIN (HCC): ICD-10-CM

## 2021-12-10 PROCEDURE — 99214 OFFICE O/P EST MOD 30 MIN: CPT | Performed by: FAMILY MEDICINE

## 2021-12-17 ENCOUNTER — OFFICE VISIT (OUTPATIENT)
Dept: CARDIOLOGY CLINIC | Facility: CLINIC | Age: 76
End: 2021-12-17
Payer: COMMERCIAL

## 2021-12-17 VITALS
HEART RATE: 67 BPM | HEIGHT: 68 IN | OXYGEN SATURATION: 97 % | BODY MASS INDEX: 31.99 KG/M2 | WEIGHT: 211.1 LBS | SYSTOLIC BLOOD PRESSURE: 140 MMHG | DIASTOLIC BLOOD PRESSURE: 86 MMHG

## 2021-12-17 DIAGNOSIS — I10 ESSENTIAL HYPERTENSION: ICD-10-CM

## 2021-12-17 DIAGNOSIS — R00.2 PALPITATIONS: ICD-10-CM

## 2021-12-17 DIAGNOSIS — I10 PRIMARY HYPERTENSION: Primary | ICD-10-CM

## 2021-12-17 DIAGNOSIS — R07.89 OTHER CHEST PAIN: ICD-10-CM

## 2021-12-17 PROCEDURE — 99204 OFFICE O/P NEW MOD 45 MIN: CPT | Performed by: INTERNAL MEDICINE

## 2021-12-17 RX ORDER — COLCHICINE 0.6 MG/1
0.6 TABLET ORAL DAILY
Qty: 90 TABLET | Refills: 3 | Status: SHIPPED | OUTPATIENT
Start: 2021-12-17

## 2021-12-17 RX ORDER — LISINOPRIL 10 MG/1
TABLET ORAL
Qty: 90 TABLET | Refills: 3 | Status: SHIPPED | OUTPATIENT
Start: 2021-12-17

## 2021-12-17 RX ORDER — LISINOPRIL 20 MG/1
20 TABLET ORAL DAILY
Qty: 90 TABLET | Refills: 3 | Status: SHIPPED | OUTPATIENT
Start: 2021-12-17

## 2021-12-19 DIAGNOSIS — E78.49 OTHER HYPERLIPIDEMIA: ICD-10-CM

## 2021-12-20 ENCOUNTER — APPOINTMENT (OUTPATIENT)
Dept: LAB | Facility: HOSPITAL | Age: 76
End: 2021-12-20
Payer: COMMERCIAL

## 2021-12-20 DIAGNOSIS — R07.89 OTHER CHEST PAIN: ICD-10-CM

## 2021-12-20 DIAGNOSIS — M17.0 PRIMARY OSTEOARTHRITIS OF BOTH KNEES: ICD-10-CM

## 2021-12-20 DIAGNOSIS — R51.9 NONINTRACTABLE HEADACHE, UNSPECIFIED CHRONICITY PATTERN, UNSPECIFIED HEADACHE TYPE: ICD-10-CM

## 2021-12-20 DIAGNOSIS — I10 ESSENTIAL HYPERTENSION: ICD-10-CM

## 2021-12-20 DIAGNOSIS — C80.1 MALIGNANT SPINDLE CELL NEOPLASM (HCC): ICD-10-CM

## 2021-12-20 DIAGNOSIS — K21.9 GASTROESOPHAGEAL REFLUX DISEASE WITHOUT ESOPHAGITIS: ICD-10-CM

## 2021-12-20 DIAGNOSIS — I10 PRIMARY HYPERTENSION: ICD-10-CM

## 2021-12-20 DIAGNOSIS — E11.40 CONTROLLED TYPE 2 DIABETES MELLITUS WITH DIABETIC NEUROPATHY, WITHOUT LONG-TERM CURRENT USE OF INSULIN (HCC): ICD-10-CM

## 2021-12-20 LAB
ALBUMIN SERPL BCP-MCNC: 3.9 G/DL (ref 3.5–5)
ALP SERPL-CCNC: 117 U/L (ref 46–116)
ALT SERPL W P-5'-P-CCNC: 16 U/L (ref 12–78)
ANION GAP SERPL CALCULATED.3IONS-SCNC: 1 MMOL/L (ref 4–13)
AST SERPL W P-5'-P-CCNC: 8 U/L (ref 5–45)
BASOPHILS # BLD AUTO: 0.01 THOUSANDS/ΜL (ref 0–0.1)
BASOPHILS NFR BLD AUTO: 0 % (ref 0–1)
BILIRUB SERPL-MCNC: 0.44 MG/DL (ref 0.2–1)
BUN SERPL-MCNC: 28 MG/DL (ref 5–25)
CALCIUM SERPL-MCNC: 8.9 MG/DL (ref 8.3–10.1)
CHLORIDE SERPL-SCNC: 108 MMOL/L (ref 100–108)
CHOLEST SERPL-MCNC: 191 MG/DL
CO2 SERPL-SCNC: 30 MMOL/L (ref 21–32)
CREAT SERPL-MCNC: 0.74 MG/DL (ref 0.6–1.3)
CREAT UR-MCNC: 90.4 MG/DL
CRP SERPL QL: <3 MG/L
EOSINOPHIL # BLD AUTO: 0.09 THOUSAND/ΜL (ref 0–0.61)
EOSINOPHIL NFR BLD AUTO: 2 % (ref 0–6)
ERYTHROCYTE [DISTWIDTH] IN BLOOD BY AUTOMATED COUNT: 13 % (ref 11.6–15.1)
EST. AVERAGE GLUCOSE BLD GHB EST-MCNC: 137 MG/DL
GFR SERPL CREATININE-BSD FRML MDRD: 90 ML/MIN/1.73SQ M
GLUCOSE P FAST SERPL-MCNC: 146 MG/DL (ref 65–99)
HBA1C MFR BLD: 6.4 %
HCT VFR BLD AUTO: 39.7 % (ref 36.5–49.3)
HDLC SERPL-MCNC: 33 MG/DL
HGB BLD-MCNC: 13 G/DL (ref 12–17)
IMM GRANULOCYTES # BLD AUTO: 0.02 THOUSAND/UL (ref 0–0.2)
IMM GRANULOCYTES NFR BLD AUTO: 0 % (ref 0–2)
LDLC SERPL CALC-MCNC: 126 MG/DL (ref 0–100)
LYMPHOCYTES # BLD AUTO: 1.27 THOUSANDS/ΜL (ref 0.6–4.47)
LYMPHOCYTES NFR BLD AUTO: 25 % (ref 14–44)
MCH RBC QN AUTO: 28.4 PG (ref 26.8–34.3)
MCHC RBC AUTO-ENTMCNC: 32.7 G/DL (ref 31.4–37.4)
MCV RBC AUTO: 87 FL (ref 82–98)
MICROALBUMIN UR-MCNC: 6.5 MG/L (ref 0–20)
MICROALBUMIN/CREAT 24H UR: 7 MG/G CREATININE (ref 0–30)
MONOCYTES # BLD AUTO: 0.45 THOUSAND/ΜL (ref 0.17–1.22)
MONOCYTES NFR BLD AUTO: 9 % (ref 4–12)
NEUTROPHILS # BLD AUTO: 3.22 THOUSANDS/ΜL (ref 1.85–7.62)
NEUTS SEG NFR BLD AUTO: 64 % (ref 43–75)
NRBC BLD AUTO-RTO: 0 /100 WBCS
PLATELET # BLD AUTO: 167 THOUSANDS/UL (ref 149–390)
PMV BLD AUTO: 9.8 FL (ref 8.9–12.7)
POTASSIUM SERPL-SCNC: 4.4 MMOL/L (ref 3.5–5.3)
PROT SERPL-MCNC: 7.2 G/DL (ref 6.4–8.2)
RBC # BLD AUTO: 4.58 MILLION/UL (ref 3.88–5.62)
SODIUM SERPL-SCNC: 139 MMOL/L (ref 136–145)
TRIGL SERPL-MCNC: 160 MG/DL
TSH SERPL DL<=0.05 MIU/L-ACNC: 1.67 UIU/ML (ref 0.36–3.74)
WBC # BLD AUTO: 5.06 THOUSAND/UL (ref 4.31–10.16)

## 2021-12-20 PROCEDURE — 36415 COLL VENOUS BLD VENIPUNCTURE: CPT

## 2021-12-20 PROCEDURE — 82043 UR ALBUMIN QUANTITATIVE: CPT | Performed by: NURSE PRACTITIONER

## 2021-12-20 PROCEDURE — 86140 C-REACTIVE PROTEIN: CPT

## 2021-12-20 PROCEDURE — 84443 ASSAY THYROID STIM HORMONE: CPT

## 2021-12-20 PROCEDURE — 83036 HEMOGLOBIN GLYCOSYLATED A1C: CPT

## 2021-12-20 PROCEDURE — 82570 ASSAY OF URINE CREATININE: CPT | Performed by: NURSE PRACTITIONER

## 2021-12-20 PROCEDURE — 80061 LIPID PANEL: CPT

## 2021-12-20 PROCEDURE — 85025 COMPLETE CBC W/AUTO DIFF WBC: CPT

## 2021-12-20 PROCEDURE — 80053 COMPREHEN METABOLIC PANEL: CPT | Performed by: INTERNAL MEDICINE

## 2021-12-22 RX ORDER — ATORVASTATIN CALCIUM 10 MG/1
10 TABLET, FILM COATED ORAL DAILY
Qty: 90 TABLET | Refills: 3 | Status: SHIPPED | OUTPATIENT
Start: 2021-12-22

## 2022-01-02 DIAGNOSIS — I10 ESSENTIAL HYPERTENSION: ICD-10-CM

## 2022-01-03 ENCOUNTER — TELEMEDICINE (OUTPATIENT)
Dept: NEUROSURGERY | Facility: CLINIC | Age: 77
End: 2022-01-03
Payer: COMMERCIAL

## 2022-01-03 DIAGNOSIS — Z01.812 PRE-PROCEDURAL LABORATORY EXAMINATIONS: ICD-10-CM

## 2022-01-03 DIAGNOSIS — G93.89 BRAIN MASS: Primary | ICD-10-CM

## 2022-01-03 DIAGNOSIS — D49.6 BRAIN TUMOR (HCC): ICD-10-CM

## 2022-01-03 DIAGNOSIS — D32.9 MENINGIOMA (HCC): ICD-10-CM

## 2022-01-03 PROCEDURE — 99214 OFFICE O/P EST MOD 30 MIN: CPT | Performed by: NURSE PRACTITIONER

## 2022-01-03 RX ORDER — AMLODIPINE BESYLATE 5 MG/1
5 TABLET ORAL DAILY
Qty: 90 TABLET | Refills: 3 | Status: SHIPPED | OUTPATIENT
Start: 2022-01-03

## 2022-01-03 NOTE — ASSESSMENT & PLAN NOTE
· As addressed in HPI  · Denies tinnitus, dizziness  Vertigo, gait instability , urinary incontinence   Daughter denies confusion or mental status changes   · During call he demonstrated steady gait with no balance deficit  · He admits to occasional headache about 2 times per week  Comes and goes, last one day before yesterday  · Location he points to parietal occipital area  Characterized headache as burning sensation onset right before he eats   Headache severity  6-7/10  Treated w/ acetaminophen, is efficacious     · No symptoms of NPH, gait normal no balance deficit, denies urinary incontinence , denies mental status changes, daughter attest      Chuch[address] 40 in 1 year , MRI Brain w/ and w/out contrast surveillance

## 2022-01-03 NOTE — ASSESSMENT & PLAN NOTE
· Imagining MRI  Brain 11/16/2021 8 mm Lesion left pituitary  Gland  Stable in multiple prior exams ,  · No prior hormonal assessment in record review, no evidance indicating need     ·  TSH and T3 WNL      PLAN  MRI brain with /without contrast repeat in 1 year

## 2022-01-03 NOTE — PROGRESS NOTES
Virtual Regular Visit    Verification of patient location:    Patient is located in the following state in which I hold an active license [place]      Assessment/Plan:    Problem List Items Addressed This Visit        Nervous and Auditory    Brain tumor ([facility] [address] )     · Imagining MRI  Brain 11/16/2021 8 mm Lesion left pituitary  Gland  Stable in multiple prior exams ,  · No prior hormonal assessment in record review, no evidance indicating need  ·  TSH and T3 WNL      PLAN  MRI brain with /without contrast repeat in 1 year          Meningioma ([facility] [address] )     · As addressed in HPI  · Denies tinnitus, dizziness  Vertigo, gait instability , urinary incontinence   Daughter denies confusion or mental status changes   · During call he demonstrated steady gait with no balance deficit  · He admits to occasional headache about 2 times per week  Comes and goes, last one day before yesterday  · Location he points to parietal occipital area  Characterized headache as burning sensation onset right before he eats   Headache severity  6-7/10  Treated w/ acetaminophen, is efficacious  · No symptoms of NPH, gait normal no balance deficit, denies urinary incontinence , denies mental status changes, daughter attest      Plan  RTO in 1 year , MRI Brain w/ and w/out contrast surveillance          Relevant Orders    MRI brain with and without contrast      Other Visit Diagnoses     Meningioma    -  Primary               Reason for visit is   Chief Complaint   Patient presents with    Virtual Regular Visit     follow up with MRI        Encounter provider Michael Argueta    Provider located at [address] [place]  [address] [place] [id number] [phone]      Recent Visits  No visits were found meeting these conditions    Showing recent visits within past 7 days and meeting all other requirements  Today's Visits  Date Type Provider Dept   01/03/22 Telemedicine [facility] today's visits and meeting all other requirements  Future Appointments  No visits were found meeting these conditions  Showing future appointments within next 150 days and meeting all other requirements       The patient was identified by name and date of birth  Rahel Conley was informed that this is a telemedicine visit and that the visit is being conducted through [address] [employer] and patient was informed that this is a secure, HIPAA-compliant platform  He agrees to proceed     My office door was closed  No one else was in the room  He acknowledged consent and understanding of privacy and security of the video platform  The patient has agreed to participate and understands they can discontinue the visit at any time  Patient is aware this is a billable service  Subjective  Rahel Conley is a 77 y o  male    Language barrier , native language Dive[place]      [facility]   History of meningioma returns for 1 year f/u after last office visit 5/3/2021   Seen initially in neurosurgery for meningioma last visit 2017 was to have ongoing yearly MRI for surveillance of meningioma, missed scheduled 2018 appointment  He returned to office in [phone]  As a new consult  Had been complaining of mild headaches and transient dizziness that did not persist     He had reported balance issues but was not using an assistive device  He was asymptomatic during visit  He did not show signs of NPH, no indication ofr referral to NPH clinic  He has a history of being followed by dermatology fir a suspicious scalp lesion for melanoma , biopsied in  2020  Dermatology , Dr Verner Rosella telephoned patient in June 2020 letf message to call and schedule office f/u to discuss BX result  There is another documented  Call 8/6/202  Form dermatology requesting patient call and schedule office appointment     Daughter reports patient is resistant to undergo SX , for melanoma removal , wife had similar sx now with persistent headache  She is provided with Dermatology office phone number and encouraged to call for appointment    Neurologic Exam     Mental Status   Oriented to person, place, and time  Oriented to person  Oriented to place  Oriented to country, city and area  Oriented to year, month, date, day and season  Attention: normal    Speech: speech is normal   Level of consciousness: alert  Knowledge: good  Gait, Coordination, and Reflexes     Gait  Gait: normal    Coordination   Finger to nose coordination: normal        IMAGINING  MRI Brain  11/16/21  IMPRESSION:   1   Stable 1 7 cm right petroclival extra-axial lesion most consistent with calcified meningioma  Stable mass effect upon right oliver with minimal parenchymal edema    2   Stable multiple small foci of dural calcification versus calcified meningiomas along the right infratentorial and right tentorial dura    3  An 8 mm lesion within the left pituitary gland, stable in multiple prior exams dating back to 11/4/2015 suggesting a microadenoma  Correlate with hormonal assessment    4   Stable chronic microangiopathic change    5   Prominent ventricular system slightly out of proportion to the degree of peripheral atrophy  Correlate with clinical assessment for NPH      MRI Brain 8/31/2018  BRAIN PARENCHYMA:   Dural base calcified heterogeneously enhancing lesion centered at the right petroclival region measuring approximately 2 x 1 2 x 1 6 cm (AP by transverse by craniocaudal), with some mass effect or adjacent right oliver, grossly unchanged from 11/4/2015  Nonspecific nonenhancing periventricular and deep subcortical white matter FLAIR hyperintense foci most likely representing chronic microangiopathic disease     There is no evidence of acute infarction and diffusion imaging is unremarkable       IMPRESSION:   1   Right petroclival region calcified heterogeneously enhancing extra-axial mass with dural tail and some mass effect over adjacent brainstem, not significantly changed from 11/4/2015, consistent with meningioma    2   Changes of microangiopathic disease    Workstation performed: [id number]    Past Medical History:   Diagnosis Date    Brain mass     Last Assessed; 11/5/2015    Chest pain     Last Assessed: 1/22/2015    Diabetes type 2, uncontrolled ([facility] [address] )     Last Assessed: 3/1/2016    Edema of left lower extremity     Last Assessed: 3/22/2017    GERD (gastroesophageal reflux disease)     Hyperlipidemia     Hypertension     Impaired fasting glucose     Last Assessed: 11/10/2015    Irregular heartbeat     Skin cancer (melanoma) (HCC)     Scalp     Varicose 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     Last Assessed: 3/22/2017       Past Surgical History:   Procedure Laterality Date    COLONOSCOPY      COLONOSCOPY N/A 7/10/2018    Procedure: COLONOSCOPY;  Surgeon: Major Brown MD;  [facility];   Service: Colorectal    INGUINAL HERNIA REPAIR      20+ years ago - [place]; Last Assessed: 10/23/2014    TONSILLECTOMY      VARICOSE VEIN SURGERY Left     x2 left leg - 40+ yrs ago, [place] [facility]       Current Outpatient Medications   Medication Sig Dispense Refill    acetaminophen (TYLENOL) 325 mg tablet Take 2 tablets (650 mg total) by mouth every 6 (six) hours as needed for mild pain or fever 30 tablet 0    amLODIPine (NORVASC) 5 mg tablet Take 1 tablet (5 mg total) by mouth daily 90 tablet 3    aspirin 81 MG tablet Take 81 mg by mouth daily       atorvastatin (LIPITOR) 10 mg tablet Take 1 tablet (10 mg total) by mouth daily 90 tablet 3    colchicine (COLCRYS) 0 6 mg tablet Take 1 tablet (0 6 mg total) by mouth daily 90 tablet 3    diclofenac sodium (VOLTAREN) 1 % Apply 2 g topically 4 (four) times a day 150 g 1    hydrocortisone 2 5 % cream Apply topically 2 (two) times a day as needed for rash 30 g 0    Linzess 145 MCG CAPS TAKE 1 CAPSULE BY MOUTH EVERY DAY 30 capsule 1    lisinopril (ZESTRIL) 20 mg tablet Take 1 tablet (20 mg total) by mouth daily 90 tablet 3    MELATONIN PO Take 10 mg by mouth daily at bedtime      metFORMIN (GLUCOPHAGE) 500 mg tablet TAKE 1 TABLET BY MOUTH TWICE A  tablet 3    omeprazole (PriLOSEC) 40 MG capsule Take 1 capsule (40 mg total) by mouth daily 30 capsule 5    traMADol (ULTRAM) 50 mg tablet Take 1 tablet (50 mg total) by mouth every 6 (six) hours as needed for moderate pain 30 tablet 0    Glucose Blood (ONETOUCH ULTRA BLUE VI) by In Vitro route daily      Lancets (OneTouch Delica Plus [id number]) MISC TEST DAILY, M74 1--HP CONFIMED DELICA 33 G 842 each 11    lisinopril (ZESTRIL) 10 mg tablet TAKE 1 TABLET BY MOUTH EVERY DAY (Patient not taking: Reported on 1/3/2022) 90 tablet 3    OneTouch Ultra test strip TEST ONCE DAILY 25 strip 47     No current facility-administered medications for this visit  No Known Allergies    Review of Systems   Constitutional: Negative  HENT: Negative  Eyes: Negative  Respiratory: Negative  Cardiovascular: Negative  Gastrointestinal: Positive for constipation  Endocrine: Negative  Genitourinary: Negative  Musculoskeletal: Positive for arthralgias  Left femur metal gavin     Skin: Negative  Allergic/Immunologic: Negative  Neurological: Negative for dizziness, tremors, seizures, speech difficulty, weakness, light-headedness, numbness and headaches  Hematological: Negative  Psychiatric/Behavioral: Negative  Video Exam    There were no vitals filed for this visit  Physical Exam  Exam conducted with a chaperone present (daughter Elmer fernando)  Pulmonary:      Effort: Pulmonary effort is normal  No respiratory distress  Neurological:      General: No focal deficit present  Mental Status: He is alert and oriented to person, place, and time  Coordination: Finger-Nose-Finger Test normal       Gait: Gait is intact     Psychiatric:         Mood and Affect: Mood normal          Speech: Speech normal          Behavior: Behavior normal           I spent 45 minutes with patient today in which greater than 50% of the time was spent in counseling/coordination of care regarding nature of disease , imagining findings, review of symptoms , continues  yearly surveillance per NCCN guidelines , need for compliance with other discipline follow-up , dermatology, conmtact information provided to schedule appointment  VIRTUAL VISIT DISCLAIMER      Alexis Marcelina verbally agrees to participate in [employer]  Pt is aware that [employer] could be limited without vital signs or the ability to perform a full hands-on physical Cristino Alfaro understands he or the provider may request at any time to terminate the video visit and request the patient to seek care or treatment in person

## 2022-01-03 NOTE — PATIENT INSTRUCTIONS
Brain Tumors   WHAT YOU NEED TO KNOW:   What is a brain tumor? A brain tumor is a mass that grows in your brain, or in an area near the brain  Examples include nerves in your skull, pituitary gland, or the membranes that cover your brain  The tumor may start in your brain or travel to your brain from another body area  There are many kinds of brain tumors  Each kind is named for where it begins and what it does in the brain  A tumor may be malignant (cancer), or benign (not cancer)  It may grow quickly or slowly  What causes or increases my risk for a brain tumor? · A family history of brain tumors    · Older age    · Exposure to radiation    · Certain types of cancer that can spread to the brain such as breast, lung, or colon cancer    · A weakened immune system    What are the signs and symptoms of a brain tumor? Signs and symptoms will depend on the kind of tumor you have, and where it is in your brain  Brain tumors often cause problems on only one side of the body  Some tumors can cause problems on both sides  You may have any of the following:  · New or different headaches, or headaches that become more frequent or severe    · Speech, hearing, or memory problems    · Nausea or vomiting    · Vision problems, such as blurred or double vision, or hearing loss    · Confusion, personality changes, or seizures    · Areas that are weak or numb in an arm or leg, or loss of balance    · Bladder or bowel control problems    How is a brain tumor diagnosed? Your healthcare provider will examine you and ask about your symptoms  Tell him if you have ever had cancer, and what kind you had  Tell him if you have a family history of brain tumors or cancer  You may need any of the following:  · A neurologic exam  can show healthcare providers how your brain is working  Other names for this test include neuro signs, neuro checks, or neuro status  Healthcare providers will check how your pupils react to light   They may check your memory and how easily you wake up  Your hand grasp and balance may also be tested  · X-ray, MRI, or CT pictures  may show the size and location of any tumors  You may be given contrast liquid help to help the tumors show up better  Tell the healthcare provider if you have ever had an allergic reaction to contrast liquid  Do not enter the MRI room with any metal  Metal can cause serious injury  Tell the healthcare provider if you have any metal in or on your body  · A PET scan  is used to take pictures of areas in your body  A small amount of radiation, called tracer, is put into your body before the PET scan  The tracer shows how chemicals, such as glucose (sugar), are working in your tissues  A PET scan may show a tumor or if a tumor has spread  · A biopsy  is a procedure used to take a sample of the tumor to be tested  Tests will show if the tumor is benign or malignant, and what kind of tumor it is  · A lumbar puncture,  or spinal tap, is a procedure used to take a sample of spinal cord fluid  The sample is tested for cancer cells in the fluid  This test will be used if other tests show it is a cancer that spreads through spinal fluid  How is a brain tumor treated? A tumor that travels to the brain will need treatment for the original kind of cancer  For example, breast cancer that travels to the brain may respond to drugs used to treat breast cancer  You may need treatment for the tumor, or for problems caused by the tumor  You may need any of the following:  · Medicines  may be given to lower your risk for seizures, or to reduce swelling  This may help relieve symptoms such as headaches  Hormone medicine may also be given if a part of the brain that produces hormones is affected  You may also need blood thinners to prevent a blood clot  A brain tumor can increase your risk for blood clots  · Chemotherapy  is medicine to help kill cancer cells   The medicine is usually given through an IV      · Radiation  is used to help treat brain tumors and to prevent new tumors from forming  Radiation can help you maintain your normal daily activities during treatment  · Surgery  may be used to remove the tumor  This is done if the tumor is in an area where surgery can be done safely  During surgery, such as craniotomy, healthcare providers will open your skull and remove the tumor  Surgery is used for both malignant and benign tumors  Any brain tumor can grow into another part of the brain and destroy healthy brain tissue  The goal of surgery is to remove as much of the tumor as possible  · Radiosurgery  targets cancer cells without harming healthy brain tissue  You may need radiosurgery if you have more than one tumor or if you cannot have open surgery, such as a craniotomy  What can I do to manage my symptoms? · Ask for support  A brain tumor can change the way you act, think, and feel  Your memory, concentration, and ability to learn may decline  You may act without thinking or become more emotional  Talk with family and friends about these changes and about continuing care, treatments, and home services  Go to all follow-up appointments  · Rest as needed  You may need more rest than usual, especially after cancer treatment  · Do not smoke  Nicotine and other chemicals in cigarettes and cigars can cause brain and lung damage  Ask your healthcare provider for information if you currently smoke and need help to quit  E-cigarettes or smokeless tobacco still contain nicotine  Talk to your healthcare provider before you use these products  · Eat a variety of healthy foods  Healthy foods include fruits, vegetables, whole-grain breads, low-fat dairy products, lean meats, fish, nuts, and cooked beans  Try to eat small meals if you have any nausea  Ask if you need to be on a special diet  · Exercise as directed    Exercise can increase your energy and help keep your immune system strong  Ask your healthcare provider how much exercises you need and which exercises are best for you  · Go to physical, occupational, or speech therapy as directed  A physical therapist can help you build muscle strength and coordination  An occupational therapist can help you find ways to do your daily activities more easily  A speech therapist can help you if your tumor caused problems with speaking  Where can I find support and more information? · [employer]  [address], [address] [facility] , [address] [place] [address]  Phone: 1- [phone]   Web Address: [employer] at  org    Call your local emergency number (911 in the [address]) if:   · You have a seizure  When should I seek immediate care? · Your arm or leg feels warm, tender, and painful  It may look swollen and red  · You have new problems walking or moving one side of your body  · You have new or worsening headaches or body swelling  When should I call my doctor? · You have questions or concerns about your condition or care  CARE AGREEMENT:   You have the right to help plan your care  Learn about your health condition and how it may be treated  Discuss treatment options with your healthcare providers to decide what care you want to receive  You always have the right to refuse treatment  The above information is an  only  It is not intended as medical advice for individual conditions or treatments  Talk to your doctor, nurse or pharmacist before following any medical regimen to see if it is safe and effective for you  © Copyright [employer] 2021 Information is for End User's use only and may not be sold, redistributed or otherwise used for commercial purposes   All illustrations and images included in CareNotes® are the copyrighted property of [employer]  or [address] [place], [place] [address]

## 2022-01-06 DIAGNOSIS — R21 RASH: ICD-10-CM

## 2022-01-10 DIAGNOSIS — K59.09 CHRONIC CONSTIPATION: ICD-10-CM

## 2022-01-10 RX ORDER — LINACLOTIDE 145 UG/1
CAPSULE, GELATIN COATED ORAL
Qty: 30 CAPSULE | Refills: 1 | Status: SHIPPED | OUTPATIENT
Start: 2022-01-10 | End: 2022-03-06

## 2022-01-18 DIAGNOSIS — K21.9 GASTROESOPHAGEAL REFLUX DISEASE WITHOUT ESOPHAGITIS: ICD-10-CM

## 2022-01-18 RX ORDER — OMEPRAZOLE 40 MG/1
CAPSULE, DELAYED RELEASE ORAL
Qty: 90 CAPSULE | Refills: 1 | Status: SHIPPED | OUTPATIENT
Start: 2022-01-18 | End: 2022-07-23

## 2022-01-19 ENCOUNTER — VBI (OUTPATIENT)
Dept: ADMINISTRATIVE | Facility: OTHER | Age: 77
End: 2022-01-19

## 2022-01-24 ENCOUNTER — HOSPITAL ENCOUNTER (OUTPATIENT)
Dept: NON INVASIVE DIAGNOSTICS | Facility: CLINIC | Age: 77
End: 2022-01-24
Payer: COMMERCIAL

## 2022-01-24 ENCOUNTER — HOSPITAL ENCOUNTER (OUTPATIENT)
Dept: NON INVASIVE DIAGNOSTICS | Facility: CLINIC | Age: 77
Discharge: HOME | End: 2022-01-24
Payer: COMMERCIAL

## 2022-01-24 VITALS
SYSTOLIC BLOOD PRESSURE: 140 MMHG | BODY MASS INDEX: 31.98 KG/M2 | HEART RATE: 67 BPM | HEIGHT: 68 IN | DIASTOLIC BLOOD PRESSURE: 86 MMHG | WEIGHT: 211 LBS

## 2022-01-24 DIAGNOSIS — I10 ESSENTIAL HYPERTENSION: ICD-10-CM

## 2022-01-24 DIAGNOSIS — R07.89 OTHER CHEST PAIN: ICD-10-CM

## 2022-01-24 LAB
AORTIC ROOT: 2.5 CM
AORTIC VALVE MEAN VELOCITY: 10 M/S
APICAL FOUR CHAMBER EJECTION FRACTION: 63 %
ASCENDING AORTA: 3 CM (ref 2.11–3.16)
AV LVOT MEAN GRADIENT: 1 MMHG
AV LVOT PEAK GRADIENT: 3 MMHG
AV MEAN GRADIENT: 5 MMHG
AV PEAK GRADIENT: 10 MMHG
AV REGURGITATION PRESSURE HALF TIME: 441 MS
DOP CALC AO PEAK VEL: 1.56 M/S
DOP CALC AO VTI: 29.71 CM
DOP CALC LVOT PEAK VEL VTI: 17.44 CM
DOP CALC LVOT PEAK VEL: 0.9 M/S
E WAVE DECELERATION TIME: 230 MS
E/A RATIO: 0.63
FRACTIONAL SHORTENING: 35 (ref 28–44)
INTERVENTRICULAR SEPTUM IN DIASTOLE (PARASTERNAL SHORT AXIS VIEW): 1 CM (ref 0.55–1.03)
LEFT ATRIUM AREA SYSTOLE SINGLE PLANE A4C: 16 CM2
LEFT ATRIUM SIZE: 3.2 CM
LEFT INTERNAL DIMENSION IN SYSTOLE: 3 CM (ref 2.1–4)
LEFT VENTRICULAR INTERNAL DIMENSION IN DIASTOLE: 4.6 CM (ref 5.88–8.77)
LEFT VENTRICULAR POSTERIOR WALL IN END DIASTOLE: 0.9 CM (ref 0.54–1.02)
LEFT VENTRICULAR STROKE VOLUME: 64 ML
MV E'TISSUE VEL-SEP: 8 CM/S
MV PEAK A VEL: 0.76 M/S
MV PEAK E VEL: 48 CM/S
MV STENOSIS PRESSURE HALF TIME: 0 MS
MV VALVE AREA P 1/2 METHOD: 3.3
RIGHT ATRIUM AREA SYSTOLE A4C: 14.7 CM2
RIGHT VENTRICLE ID DIMENSION: 2.9 CM
SL CV AV DECELERATION TIME RETROGRADE: 1520 MS
SL CV AV PEAK GRADIENT RETROGRADE: 75 MMHG
SL CV LV EF: 60
SL CV PED ECHO LEFT VENTRICLE DIASTOLIC VOLUME (MOD BIPLANE) 2D: 99 ML
SL CV PED ECHO LEFT VENTRICLE SYSTOLIC VOLUME (MOD BIPLANE) 2D: 35 ML

## 2022-01-24 PROCEDURE — 93306 TTE W/DOPPLER COMPLETE: CPT | Performed by: INTERNAL MEDICINE

## 2022-01-24 PROCEDURE — 93306 TTE W/DOPPLER COMPLETE: CPT

## 2022-01-28 ENCOUNTER — TELEPHONE (OUTPATIENT)
Dept: CARDIOLOGY CLINIC | Facility: CLINIC | Age: 77
End: 2022-01-28

## 2022-01-28 NOTE — TELEPHONE ENCOUNTER
Called patient to give results  No answer and couldn't leave a message mailbox was full  Please let patient know that echo looks OK  Normal cardiac function with mild regurgitation of mitral and aortic valves       ----- Message from Emili Mendoza MD sent at 1/25/2022  2:30 PM EST -----  Please let patient know that echo looks OK  Normal cardiac function with mild regurgitation of mitral and aortic valves  Thanks

## 2022-02-07 ENCOUNTER — HOSPITAL ENCOUNTER (OUTPATIENT)
Dept: NON INVASIVE DIAGNOSTICS | Facility: CLINIC | Age: 77
Discharge: HOME | End: 2022-02-07
Payer: COMMERCIAL

## 2022-02-07 VITALS
WEIGHT: 211 LBS | HEART RATE: 70 BPM | SYSTOLIC BLOOD PRESSURE: 140 MMHG | BODY MASS INDEX: 31.98 KG/M2 | DIASTOLIC BLOOD PRESSURE: 80 MMHG | OXYGEN SATURATION: 98 % | HEIGHT: 68 IN

## 2022-02-07 LAB
BASELINE ST DEPRESSION: 0 MM
MAX HR PERCENT: 64 %
NUC STRESS EJECTION FRACTION: 69 %
RATE PRESSURE PRODUCT: NORMAL
SL CV REST NUCLEAR ISOTOPE DOSE: 10.28 MCI
SL CV STRESS NUCLEAR ISOTOPE DOSE: 33 MCI
SL CV STRESS RECOVERY BP: NORMAL MMHG
SL CV STRESS RECOVERY HR: 89 BPM
SL CV STRESS RECOVERY O2 SAT: 98 %
STRESS ANGINA INDEX: 0
STRESS BASELINE BP: NORMAL MMHG
STRESS BASELINE HR: 70 BPM
STRESS DUKE TREADMILL SCORE: 3
STRESS O2 SAT REST: 98 %
STRESS PEAK HR: 93 BPM
STRESS PERCENT HR: 64 %
STRESS POST ESTIMATED WORKLOAD: 1 METS
STRESS POST EXERCISE DUR MIN: 3 MIN
STRESS POST EXERCISE DUR SEC: 0 SEC
STRESS POST O2 SAT PEAK: 98 %
STRESS POST PEAK BP: 132 MMHG
STRESS ST DEPRESSION: 0 MM
STRESS TARGET HR: 93 BPM
STRESS/REST PERFUSION RATIO: 0.95

## 2022-02-07 PROCEDURE — 78452 HT MUSCLE IMAGE SPECT MULT: CPT | Performed by: INTERNAL MEDICINE

## 2022-02-07 PROCEDURE — G1004 CDSM NDSC: HCPCS

## 2022-02-07 PROCEDURE — 93018 CV STRESS TEST I&R ONLY: CPT | Performed by: INTERNAL MEDICINE

## 2022-02-07 PROCEDURE — 93016 CV STRESS TEST SUPVJ ONLY: CPT | Performed by: INTERNAL MEDICINE

## 2022-02-07 PROCEDURE — 93017 CV STRESS TEST TRACING ONLY: CPT

## 2022-02-07 PROCEDURE — 78452 HT MUSCLE IMAGE SPECT MULT: CPT

## 2022-02-07 RX ADMIN — REGADENOSON 0.4 MG: 0.08 INJECTION, SOLUTION INTRAVENOUS at 09:15

## 2022-02-08 LAB
CHEST PAIN STATEMENT: NORMAL
MAX DIASTOLIC BP: 80 MMHG
MAX HEART RATE: 93 BPM
MAX PREDICTED HEART RATE: 145 BPM
MAX. SYSTOLIC BP: 140 MMHG
PROTOCOL NAME: NORMAL
REASON FOR TERMINATION: NORMAL
TARGET HR FORMULA: NORMAL
TEST INDICATION: NORMAL
TIME IN EXERCISE PHASE: NORMAL

## 2022-02-28 ENCOUNTER — TELEPHONE (OUTPATIENT)
Dept: CARDIOLOGY CLINIC | Facility: CLINIC | Age: 77
End: 2022-02-28

## 2022-02-28 NOTE — TELEPHONE ENCOUNTER
Daughter called stating pt will not be able to come into appt today and asking for stress results   Please advise    C/b# [phone]

## 2022-02-28 NOTE — TELEPHONE ENCOUNTER
Stress test looked OK  No obvious suggestions of significant blockages in his coronary arteries  I hope he is doing ok  Thanks

## 2022-03-06 DIAGNOSIS — K59.09 CHRONIC CONSTIPATION: ICD-10-CM

## 2022-03-06 RX ORDER — LINACLOTIDE 145 UG/1
CAPSULE, GELATIN COATED ORAL
Qty: 30 CAPSULE | Refills: 1 | Status: SHIPPED | OUTPATIENT
Start: 2022-03-06 | End: 2022-03-07 | Stop reason: SDUPTHER

## 2022-03-07 RX ORDER — LINACLOTIDE 145 UG/1
1 CAPSULE, GELATIN COATED ORAL DAILY
Qty: 30 CAPSULE | Refills: 1 | Status: SHIPPED | OUTPATIENT
Start: 2022-03-07 | End: 2022-04-09

## 2022-03-29 RX ORDER — AMOXICILLIN 500 MG/1
CAPSULE ORAL
COMMUNITY
Start: 2022-03-21

## 2022-03-30 ENCOUNTER — TELEPHONE (OUTPATIENT)
Dept: FAMILY MEDICINE CLINIC | Facility: CLINIC | Age: 77
End: 2022-03-30

## 2022-03-31 ENCOUNTER — OFFICE VISIT (OUTPATIENT)
Dept: FAMILY MEDICINE CLINIC | Facility: CLINIC | Age: 77
End: 2022-03-31
Payer: COMMERCIAL

## 2022-03-31 VITALS
HEART RATE: 63 BPM | RESPIRATION RATE: 16 BRPM | WEIGHT: 208.4 LBS | BODY MASS INDEX: 31.58 KG/M2 | TEMPERATURE: 97.3 F | OXYGEN SATURATION: 98 % | SYSTOLIC BLOOD PRESSURE: 158 MMHG | DIASTOLIC BLOOD PRESSURE: 98 MMHG | HEIGHT: 68 IN

## 2022-03-31 DIAGNOSIS — D49.6 BRAIN TUMOR (HCC): ICD-10-CM

## 2022-03-31 DIAGNOSIS — Z00.00 MEDICARE ANNUAL WELLNESS VISIT, SUBSEQUENT: Primary | ICD-10-CM

## 2022-03-31 DIAGNOSIS — R51.9 NONINTRACTABLE HEADACHE, UNSPECIFIED CHRONICITY PATTERN, UNSPECIFIED HEADACHE TYPE: ICD-10-CM

## 2022-03-31 DIAGNOSIS — E11.9 TYPE 2 DIABETES MELLITUS WITHOUT COMPLICATION, WITHOUT LONG-TERM CURRENT USE OF INSULIN (HCC): ICD-10-CM

## 2022-03-31 DIAGNOSIS — I10 PRIMARY HYPERTENSION: ICD-10-CM

## 2022-03-31 DIAGNOSIS — M54.50 CHRONIC BILATERAL LOW BACK PAIN WITHOUT SCIATICA: ICD-10-CM

## 2022-03-31 DIAGNOSIS — G89.29 CHRONIC BILATERAL LOW BACK PAIN WITHOUT SCIATICA: ICD-10-CM

## 2022-03-31 DIAGNOSIS — K21.9 GASTROESOPHAGEAL REFLUX DISEASE WITHOUT ESOPHAGITIS: ICD-10-CM

## 2022-03-31 PROBLEM — R10.9 ABDOMINAL WALL PAIN: Status: RESOLVED | Noted: 2021-11-18 | Resolved: 2022-03-31

## 2022-03-31 PROBLEM — F11.20 CONTINUOUS OPIOID DEPENDENCE (HCC): Status: RESOLVED | Noted: 2022-03-31 | Resolved: 2022-03-31

## 2022-03-31 PROBLEM — F11.20 CONTINUOUS OPIOID DEPENDENCE (HCC): Status: ACTIVE | Noted: 2022-03-31

## 2022-03-31 LAB — SL AMB POCT HEMOGLOBIN AIC: 6.8 (ref ?–6.5)

## 2022-03-31 PROCEDURE — G0439 PPPS, SUBSEQ VISIT: HCPCS | Performed by: NURSE PRACTITIONER

## 2022-03-31 PROCEDURE — 83036 HEMOGLOBIN GLYCOSYLATED A1C: CPT | Performed by: NURSE PRACTITIONER

## 2022-03-31 PROCEDURE — 99214 OFFICE O/P EST MOD 30 MIN: CPT | Performed by: NURSE PRACTITIONER

## 2022-03-31 RX ORDER — FAMOTIDINE 20 MG/1
20 TABLET, FILM COATED ORAL DAILY
Qty: 30 TABLET | Refills: 2 | Status: SHIPPED | OUTPATIENT
Start: 2022-03-31 | End: 2022-04-09

## 2022-03-31 RX ORDER — PHENOL 1.4 %
600 AEROSOL, SPRAY (ML) MUCOUS MEMBRANE DAILY
Qty: 30 TABLET | Refills: 3 | Status: SHIPPED | OUTPATIENT
Start: 2022-03-31

## 2022-03-31 RX ORDER — TRAMADOL HYDROCHLORIDE 50 MG/1
50 TABLET ORAL EVERY 6 HOURS PRN
Qty: 30 TABLET | Refills: 0 | Status: SHIPPED | OUTPATIENT
Start: 2022-03-31

## 2022-03-31 NOTE — PATIENT INSTRUCTIONS
Medicare Preventive Visit Patient Instructions  Thank you for completing your Welcome to Medicare Visit or Medicare Annual Wellness Visit today  Your next wellness visit will be due in one year (4/1/2023)  The screening/preventive services that you may require over the next 5-10 years are detailed below  Some tests may not apply to you based off risk factors and/or age  Screening tests ordered at today's visit but not completed yet may show as past due  Also, please note that scanned in results may not display below  Preventive Screenings:  Service Recommendations Previous Testing/Comments   Colorectal Cancer Screening  · Colonoscopy    · Fecal Occult Blood Test (FOBT)/Fecal Immunochemical Test (FIT)  · Fecal DNA/Cologuard Test  · Flexible Sigmoidoscopy Age: 54-65 years old   Colonoscopy: every 10 years (May be performed more frequently if at higher risk)  OR  FOBT/FIT: every 1 year  OR  Cologuard: every 3 years  OR  Sigmoidoscopy: every 5 years  Screening may be recommended earlier than age 48 if at higher risk for colorectal cancer  Also, an individualized decision between you and your healthcare provider will decide whether screening between the ages of 74-80 would be appropriate  Colonoscopy: 07/10/2018  FOBT/FIT: Not on file  Cologuard: Not on file  Sigmoidoscopy: Not on file          Prostate Cancer Screening Individualized decision between patient and health care provider in men between ages of 53-78   Medicare will cover every 12 months beginning on the day after your 50th birthday PSA: 0 7 ng/mL           Hepatitis C Screening Once for adults born between 1945 and 1965  More frequently in patients at high risk for Hepatitis C Hep C Antibody: 06/05/2018        Diabetes Screening 1-2 times per year if you're at risk for diabetes or have pre-diabetes Fasting glucose: 146 mg/dL   A1C: 6 8        Cholesterol Screening Once every 5 years if you don't have a lipid disorder   May order more often based on risk factors  Lipid panel: 12/20/2021           Other Preventive Screenings Covered by Medicare:  1  Abdominal Aortic Aneurysm (AAA) Screening: covered once if your at risk  You're considered to be at risk if you have a family history of AAA or a male between the age of 73-68 who smoking at least 100 cigarettes in your lifetime  2  Lung Cancer Screening: covers low dose CT scan once per year if you meet all of the following conditions: (1) Age 50-69; (2) No signs or symptoms of lung cancer; (3) Current smoker or have quit smoking within the last 15 years; (4) You have a tobacco smoking history of at least 30 pack years (packs per day x number of years you smoked); (5) You get a written order from a healthcare provider  3  Glaucoma Screening: covered annually if you're considered high risk: (1) You have diabetes OR (2) Family history of glaucoma OR (3)  aged 48 and older OR (3)  American aged 72 and older  3  Osteoporosis Screening: covered every 2 years if you meet one of the following conditions: (1) Have a vertebral abnormality; (2) On glucocorticoid therapy for more than 3 months; (3) Have primary hyperparathyroidism; (4) On osteoporosis medications and need to assess response to drug therapy  5  HIV Screening: covered annually if you're between the age of 12-76  Also covered annually if you are younger than 13 and older than 72 with risk factors for HIV infection  For pregnant patients, it is covered up to 3 times per pregnancy  Immunizations:  Immunization Recommendations   Influenza Vaccine Annual influenza vaccination during flu season is recommended for all persons aged >= 6 months who do not have contraindications   Pneumococcal Vaccine (Prevnar and Pneumovax)  * Prevnar = PCV13  * Pneumovax = PPSV23 Adults 25-60 years old: 1-3 doses may be recommended based on certain risk factors  Adults 72 years old: Prevnar (PCV13) vaccine recommended followed by Pneumovax (PPSV23) vaccine   If already received PPSV23 since turning 65, then PCV13 recommended at least one year after PPSV23 dose  Hepatitis B Vaccine 3 dose series if at intermediate or high risk (ex: diabetes, end stage renal disease, liver disease)   Tetanus (Td) Vaccine - COST NOT COVERED BY MEDICARE PART B Following completion of primary series, a booster dose should be given every 10 years to maintain immunity against tetanus  Td may also be given as tetanus wound prophylaxis  Tdap Vaccine - COST NOT COVERED BY MEDICARE PART B Recommended at least once for all adults  For pregnant patients, recommended with each pregnancy  Shingles Vaccine (Shingrix) - COST NOT COVERED BY MEDICARE PART B  2 shot series recommended in those aged 48 and above     Health Maintenance Due:      Topic Date Due    Colorectal Cancer Screening  07/10/2028    Hepatitis C Screening  Discontinued     Immunizations Due:  There are no preventive care reminders to display for this patient  Advance Directives   What are advance directives? Advance directives are legal documents that state your wishes and plans for medical care  These plans are made ahead of time in case you lose your ability to make decisions for yourself  Advance directives can apply to any medical decision, such as the treatments you want, and if you want to donate organs  What are the types of advance directives? There are many types of advance directives, and each state has rules about how to use them  You may choose a combination of any of the following:  · Living will: This is a written record of the treatment you want  You can also choose which treatments you do not want, which to limit, and which to stop at a certain time  This includes surgery, medicine, IV fluid, and tube feedings  · Durable power of  for healthcare [facility]): This is a written record that states who you want to make healthcare choices for you when you are unable to make them for yourself   This person, called a proxy, is usually a family member or a friend  You may choose more than 1 proxy  · Do not resuscitate (DNR) order:  A DNR order is used in case your heart stops beating or you stop breathing  It is a request not to have certain forms of treatment, such as CPR  A DNR order may be included in other types of advance directives  · Medical directive: This covers the care that you want if you are in a coma, near death, or unable to make decisions for yourself  You can list the treatments you want for each condition  Treatment may include pain medicine, surgery, blood transfusions, dialysis, IV or tube feedings, and a ventilator (breathing machine)  · Values history: This document has questions about your views, beliefs, and how you feel and think about life  This information can help others choose the care that you would choose  Why are advance directives important? An advance directive helps you control your care  Although spoken wishes may be used, it is better to have your wishes written down  Spoken wishes can be misunderstood, or not followed  Treatments may be given even if you do not want them  An advance directive may make it easier for your family to make difficult choices about your care  Weight Management   Why it is important to manage your weight:  Being overweight increases your risk of health conditions such as heart disease, high blood pressure, type 2 diabetes, and certain types of cancer  It can also increase your risk for osteoarthritis, sleep apnea, and other respiratory problems  Aim for a slow, steady weight loss  Even a small amount of weight loss can lower your risk of health problems  How to lose weight safely:  A safe and healthy way to lose weight is to eat fewer calories and get regular exercise  You can lose up about 1 pound a week by decreasing the number of calories you eat by 500 calories each day     Healthy meal plan for weight management:  A healthy meal plan includes a variety of foods, contains fewer calories, and helps you stay healthy  A healthy meal plan includes the following:  · Eat whole-grain foods more often  A healthy meal plan should contain fiber  Fiber is the part of grains, fruits, and vegetables that is not broken down by your body  Whole-grain foods are healthy and provide extra fiber in your diet  Some examples of whole-grain foods are whole-wheat breads and pastas, oatmeal, brown rice, and bulgur  · Eat a variety of vegetables every day  Include dark, leafy greens such as spinach, kale, robin greens, and mustard greens  Eat yellow and orange vegetables such as carrots, sweet potatoes, and winter squash  · Eat a variety of fruits every day  Choose fresh or canned fruit (canned in its own juice or light syrup) instead of juice  Fruit juice has very little or no fiber  · Eat low-fat dairy foods  Drink fat-free (skim) milk or 1% milk  Eat fat-free yogurt and low-fat cottage cheese  Try low-fat cheeses such as mozzarella and other reduced-fat cheeses  · Choose meat and other protein foods that are low in fat  Choose beans or other legumes such as split peas or lentils  Choose fish, skinless poultry (chicken or turkey), or lean cuts of red meat (beef or pork)  Before you cook meat or poultry, cut off any visible fat  · Use less fat and oil  Try baking foods instead of frying them  Add less fat, such as margarine, sour cream, regular salad dressing and mayonnaise to foods  Eat fewer high-fat foods  Some examples of high-fat foods include french fries, doughnuts, ice cream, and cakes  · Eat fewer sweets  Limit foods and drinks that are high in sugar  This includes candy, cookies, regular soda, and sweetened drinks  Exercise:  Exercise at least 30 minutes per day on most days of the week  Some examples of exercise include walking, biking, dancing, and swimming   You can also fit in more physical activity by taking the stairs instead of the elevator or parking farther away from stores  Ask your healthcare provider about the best exercise plan for you  © Copyright [employer] 2018 Information is for End User's use only and may not be sold, redistributed or otherwise used for commercial purposes  All illustrations and images included in CareNotes® are the copyrighted property of [employer]  or [facility] Preventive Visit Patient Instructions  Thank you for completing your Welcome to Medicare Visit or Medicare Annual Wellness Visit today  Your next wellness visit will be due in one year (4/1/2023)  The screening/preventive services that you may require over the next 5-10 years are detailed below  Some tests may not apply to you based off risk factors and/or age  Screening tests ordered at today's visit but not completed yet may show as past due  Also, please note that scanned in results may not display below  Preventive Screenings:  Service Recommendations Previous Testing/Comments   Colorectal Cancer Screening  · Colonoscopy    · Fecal Occult Blood Test (FOBT)/Fecal Immunochemical Test (FIT)  · Fecal DNA/Cologuard Test  · Flexible Sigmoidoscopy Age: 54-65 years old   Colonoscopy: every 10 years (May be performed more frequently if at higher risk)  OR  FOBT/FIT: every 1 year  OR  Cologuard: every 3 years  OR  Sigmoidoscopy: every 5 years  Screening may be recommended earlier than age 48 if at higher risk for colorectal cancer  Also, an individualized decision between you and your healthcare provider will decide whether screening between the ages of 74-80 would be appropriate   Colonoscopy: 07/10/2018  FOBT/FIT: Not on file  Cologuard: Not on file  Sigmoidoscopy: Not on file    Screening Current     Prostate Cancer Screening Individualized decision between patient and health care provider in men between ages of 53-78   Medicare will cover every 12 months beginning on the day after your 50th birthday PSA: 0 7 ng/mL     Screening Not Indicated     Hepatitis C Screening Once for adults born between 1945 and 1965  More frequently in patients at high risk for Hepatitis C Hep C Antibody: 06/05/2018    Screening Not Indicated  History Hepatitis C   Diabetes Screening 1-2 times per year if you're at risk for diabetes or have pre-diabetes Fasting glucose: 146 mg/dL   A1C: 6 8    Screening Not Indicated  History Diabetes   Cholesterol Screening Once every 5 years if you don't have a lipid disorder  May order more often based on risk factors  Lipid panel: 12/20/2021    Screening Not Indicated  History Lipid Disorder      Other Preventive Screenings Covered by Medicare:  6  Abdominal Aortic Aneurysm (AAA) Screening: covered once if your at risk  You're considered to be at risk if you have a family history of AAA or a male between the age of 73-68 who smoking at least 100 cigarettes in your lifetime  7  Lung Cancer Screening: covers low dose CT scan once per year if you meet all of the following conditions: (1) Age 50-69; (2) No signs or symptoms of lung cancer; (3) Current smoker or have quit smoking within the last 15 years; (4) You have a tobacco smoking history of at least 30 pack years (packs per day x number of years you smoked); (5) You get a written order from a healthcare provider  8  Glaucoma Screening: covered annually if you're considered high risk: (1) You have diabetes OR (2) Family history of glaucoma OR (3)  aged 48 and older OR (3)  American aged 72 and older  5  Osteoporosis Screening: covered every 2 years if you meet one of the following conditions: (1) Have a vertebral abnormality; (2) On glucocorticoid therapy for more than 3 months; (3) Have primary hyperparathyroidism; (4) On osteoporosis medications and need to assess response to drug therapy  10  HIV Screening: covered annually if you're between the age of 12-76   Also covered annually if you are younger than 13 and older than 72 with risk factors for HIV infection  For pregnant patients, it is covered up to 3 times per pregnancy  Immunizations:  Immunization Recommendations   Influenza Vaccine Annual influenza vaccination during flu season is recommended for all persons aged >= 6 months who do not have contraindications   Pneumococcal Vaccine (Prevnar and Pneumovax)  * Prevnar = PCV13  * Pneumovax = PPSV23 Adults 25-60 years old: 1-3 doses may be recommended based on certain risk factors  Adults 72 years old: Prevnar (PCV13) vaccine recommended followed by Pneumovax (PPSV23) vaccine  If already received PPSV23 since turning 65, then PCV13 recommended at least one year after PPSV23 dose  Hepatitis B Vaccine 3 dose series if at intermediate or high risk (ex: diabetes, end stage renal disease, liver disease)   Tetanus (Td) Vaccine - COST NOT COVERED BY MEDICARE PART B Following completion of primary series, a booster dose should be given every 10 years to maintain immunity against tetanus  Td may also be given as tetanus wound prophylaxis  Tdap Vaccine - COST NOT COVERED BY MEDICARE PART B Recommended at least once for all adults  For pregnant patients, recommended with each pregnancy  Shingles Vaccine (Shingrix) - COST NOT COVERED BY MEDICARE PART B  2 shot series recommended in those aged 48 and above     Health Maintenance Due:      Topic Date Due    Colorectal Cancer Screening  07/10/2028    Hepatitis C Screening  Discontinued     Immunizations Due:  There are no preventive care reminders to display for this patient  Advance Directives   What are advance directives? Advance directives are legal documents that state your wishes and plans for medical care  These plans are made ahead of time in case you lose your ability to make decisions for yourself  Advance directives can apply to any medical decision, such as the treatments you want, and if you want to donate organs  What are the types of advance directives?   There are many types of advance directives, and each state has rules about how to use them  You may choose a combination of any of the following:  · Living will: This is a written record of the treatment you want  You can also choose which treatments you do not want, which to limit, and which to stop at a certain time  This includes surgery, medicine, IV fluid, and tube feedings  · Durable power of  for healthcare [place] [facility]): This is a written record that states who you want to make healthcare choices for you when you are unable to make them for yourself  This person, called a proxy, is usually a family member or a friend  You may choose more than 1 proxy  · Do not resuscitate (DNR) order:  A DNR order is used in case your heart stops beating or you stop breathing  It is a request not to have certain forms of treatment, such as CPR  A DNR order may be included in other types of advance directives  · Medical directive: This covers the care that you want if you are in a coma, near death, or unable to make decisions for yourself  You can list the treatments you want for each condition  Treatment may include pain medicine, surgery, blood transfusions, dialysis, IV or tube feedings, and a ventilator (breathing machine)  · Values history: This document has questions about your views, beliefs, and how you feel and think about life  This information can help others choose the care that you would choose  Why are advance directives important? An advance directive helps you control your care  Although spoken wishes may be used, it is better to have your wishes written down  Spoken wishes can be misunderstood, or not followed  Treatments may be given even if you do not want them  An advance directive may make it easier for your family to make difficult choices about your care  Fall Prevention    Fall prevention  includes ways to make your home and other areas safer  It also includes ways you can move more carefully to prevent a fall   Health conditions that cause changes in your blood pressure, vision, or muscle strength and coordination may increase your risk for falls  Medicines may also increase your risk for falls if they make you dizzy, weak, or sleepy  Fall prevention tips:   · Stand or sit up slowly  · Use assistive devices as directed  · Wear shoes that fit well and have soles that   · Wear a personal alarm  · Stay active  · Manage your medical conditions  Home Safety Tips:  · Add items to prevent falls in the bathroom  · Keep paths clear  · Install bright lights in your home  · Keep items you use often on shelves within reach  · Paint or place reflective tape on the edges of your stairs  Weight Management   Why it is important to manage your weight:  Being overweight increases your risk of health conditions such as heart disease, high blood pressure, type 2 diabetes, and certain types of cancer  It can also increase your risk for osteoarthritis, sleep apnea, and other respiratory problems  Aim for a slow, steady weight loss  Even a small amount of weight loss can lower your risk of health problems  How to lose weight safely:  A safe and healthy way to lose weight is to eat fewer calories and get regular exercise  You can lose up about 1 pound a week by decreasing the number of calories you eat by 500 calories each day  Healthy meal plan for weight management:  A healthy meal plan includes a variety of foods, contains fewer calories, and helps you stay healthy  A healthy meal plan includes the following:  · Eat whole-grain foods more often  A healthy meal plan should contain fiber  Fiber is the part of grains, fruits, and vegetables that is not broken down by your body  Whole-grain foods are healthy and provide extra fiber in your diet  Some examples of whole-grain foods are whole-wheat breads and pastas, oatmeal, brown rice, and bulgur  · Eat a variety of vegetables every day    Include dark, leafy greens such as spinach, kale, robin greens, and mustard greens  Eat yellow and orange vegetables such as carrots, sweet potatoes, and winter squash  · Eat a variety of fruits every day  Choose fresh or canned fruit (canned in its own juice or light syrup) instead of juice  Fruit juice has very little or no fiber  · Eat low-fat dairy foods  Drink fat-free (skim) milk or 1% milk  Eat fat-free yogurt and low-fat cottage cheese  Try low-fat cheeses such as mozzarella and other reduced-fat cheeses  · Choose meat and other protein foods that are low in fat  Choose beans or other legumes such as split peas or lentils  Choose fish, skinless poultry (chicken or turkey), or lean cuts of red meat (beef or pork)  Before you cook meat or poultry, cut off any visible fat  · Use less fat and oil  Try baking foods instead of frying them  Add less fat, such as margarine, sour cream, regular salad dressing and mayonnaise to foods  Eat fewer high-fat foods  Some examples of high-fat foods include french fries, doughnuts, ice cream, and cakes  · Eat fewer sweets  Limit foods and drinks that are high in sugar  This includes candy, cookies, regular soda, and sweetened drinks  Exercise:  Exercise at least 30 minutes per day on most days of the week  Some examples of exercise include walking, biking, dancing, and swimming  You can also fit in more physical activity by taking the stairs instead of the elevator or parking farther away from stores  Ask your healthcare provider about the best exercise plan for you  © Copyright [employer] 2018 Information is for End User's use only and may not be sold, redistributed or otherwise used for commercial purposes   All illustrations and images included in CareNotes® are the copyrighted property of [employer]  or [address] [place] [address] [employer]

## 2022-03-31 NOTE — PROGRESS NOTES
Assessment and Plan:     Problem List Items Addressed This Visit        Digestive    GERD (gastroesophageal reflux disease)     Start famotidine           Relevant Medications    calcium carbonate (OS-ANGELIKA) 600 MG tablet    famotidine (PEPCID) 20 mg tablet       Cardiovascular and Mediastinum    Hypertension     Stable  Cont meds              Nervous and Auditory    Brain tumor (HCC)     Stable  Cont f/u with neurosurgery yearly              Other    Nonintractable headache    Relevant Medications    traMADol (ULTRAM) 50 mg tablet      Other Visit Diagnoses     Medicare annual wellness visit, subsequent    -  Primary    Type 2 diabetes mellitus without complication, without long-term current use of insulin (HCC)        Relevant Orders    POCT hemoglobin A1c (Completed)    Chronic bilateral low back pain without sciatica        Relevant Medications    traMADol (ULTRAM) 50 mg tablet           Preventive health issues were discussed with patient, and age appropriate screening tests were ordered as noted in patient's After Visit Summary  Personalized health advice and appropriate referrals for health education or preventive services given if needed, as noted in patient's After Visit Summary       History of Present Illness:     Patient presents for Medicare Annual Wellness visit    Patient Care Team:  ZAHRA Moreira as PCP - General (Family Medicine)  Kaylin Ruth DO as PCP - [address] [place], [place] [id number] [facility] ([facility])  MD Francisca Ojeda MD as Endoscopist  Gianna Turner MD (Dermatology)  Tressia Pallas, MD (Oncology)     Problem List:     Patient Active Problem List   Diagnosis    Brain tumor [facility])    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[facility] [facility] )    Chronic constipation    Controlled diabetes mellitus with diabetic neuropathy ([facility] [facility] )    Episodic cluster headache, not intractable    GERD (gastroesophageal reflux disease)    Nonintractable headache    Hypertension    Acute pain of both knees    Meningioma ([facility] )    Varicose 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    Spondylolisthesis at L5-S1 level    Other chest pain    Renal cyst    History of hepatitis C    Scalp lesion    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HCC)    Primary osteoarthritis of both knees    Unstable angina (HCC)    Palpitations      Past Medical and Surgical History:     Past Medical History:   Diagnosis Date    Brain mass     Last Assessed; 11/5/2015    Chest pain     Last Assessed: 1/22/2015    Diabetes type 2, uncontrolled ([facility] [facility] )     Last Assessed: 3/1/2016    Edema of left lower extremity     Last Assessed: 3/22/2017    GERD (gastroesophageal reflux disease)     Hyperlipidemia     Hypertension     Impaired fasting glucose     Last Assessed: 11/10/2015    Irregular heartbeat     Skin cancer (melanoma) (HCC)     Scalp     Varicose 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     Last Assessed: 3/22/2017     Past Surgical History:   Procedure Laterality Date    COLONOSCOPY      COLONOSCOPY N/A 7/10/2018    Procedure: COLONOSCOPY;  Surgeon: Shikha Bergman MD;  Location: [facility];   Service: Colorectal    INGUINAL HERNIA REPAIR      20+ years ago - [facility]; Last Assessed: 10/23/2014    TONSILLECTOMY      VARICOSE VEIN SURGERY Left     x2 left leg - 40+ yrs ago, [facility] and [place]      Family History:     Family History   Problem Relation Age of Onset    No Known Problems Mother     No Known Problems Father     Melanoma Daughter 16      Social History:     Social History     Socioeconomic History    Marital status: /Civil Union     Spouse name: None    Number of children: 5    Years of education: None    Highest education level: None   Occupational History    Occupation: Retired: Construction   Tobacco Use    Smoking status: Never Smoker    Smokeless tobacco: Never Used   Vaping Use    Vaping Use: Never used   Substance and Sexual Activity    Alcohol use: No    Drug use: No    Sexual activity: Not Currently Partners: Female   Other Topics Concern    None   Social History Narrative    Uses safety equipment         Social Determinants of Health     Financial Resource Strain: Not on file   Food Insecurity: Not on file   Transportation Needs: Not on file   Physical Activity: Not on file   Stress: Not on file   Social Connections: Not on file   Intimate Partner Violence: Not on file   Housing Stability: Not on file      Medications and Allergies:     Current Outpatient Medications   Medication Sig Dispense Refill    acetaminophen (TYLENOL) 325 mg tablet Take 2 tablets (650 mg total) by mouth every 6 (six) hours as needed for mild pain or fever 30 tablet 0    amLODIPine (NORVASC) 5 mg tablet Take 1 tablet (5 mg total) by mouth daily 90 tablet 3    amoxicillin (AMOXIL) 500 mg capsule       aspirin 81 MG tablet Take 81 mg by mouth daily       atorvastatin (LIPITOR) 10 mg tablet Take 1 tablet (10 mg total) by mouth daily 90 tablet 3    calcium carbonate (OS-ANGELIKA) 600 MG tablet Take 1 tablet (600 mg total) by mouth daily 30 tablet 3    colchicine (COLCRYS) 0 6 mg tablet Take 1 tablet (0 6 mg total) by mouth daily 90 tablet 3    diclofenac sodium (VOLTAREN) 1 % Apply 2 g topically 4 (four) times a day 150 g 1    famotidine (PEPCID) 20 mg tablet Take 1 tablet (20 mg total) by mouth daily 30 tablet 2    Glucose Blood (ONETOUCH ULTRA BLUE VI) by In Vitro route daily      hydrocortisone 2 5 % cream APPLY TOPICALLY 2 (TWO) TIMES A DAY AS NEEDED FOR RASH 28 35 g 0    Lancets (OneTouch Delica Plus [id number]) MISC TEST DAILY, Z89 4--XJ CONFIMED DELICA 33 G 537 each 11    linaCLOtide (Linzess) 145 MCG CAPS Take 1 capsule (145 mcg total) by mouth daily 30 capsule 1    lisinopril (ZESTRIL) 10 mg tablet TAKE 1 TABLET BY MOUTH EVERY DAY 90 tablet 3    lisinopril (ZESTRIL) 20 mg tablet Take 1 tablet (20 mg total) by mouth daily 90 tablet 3    MELATONIN PO Take 10 mg by mouth daily at bedtime      metFORMIN (GLUCOPHAGE) 500 mg tablet TAKE 1 TABLET BY MOUTH TWICE A  tablet 1    omeprazole (PriLOSEC) 40 MG capsule TAKE 1 CAPSULE BY MOUTH EVERY DAY 90 capsule 1    OneTouch Ultra test strip TEST ONCE DAILY 25 strip 47    traMADol (ULTRAM) 50 mg tablet Take 1 tablet (50 mg total) by mouth every 6 (six) hours as needed for moderate pain 30 tablet 0     No current facility-administered medications for this visit  No Known Allergies   Immunizations:     Immunization History   Administered Date(s) Administered    COVID-19 PFIZER VACCINE 0 3 ML IM 03/29/2021, 04/21/2021, 11/09/2021    INFLUENZA 10/27/2015, 10/01/2016, 10/13/2016, 12/19/2018    Influenza Split High Dose Preservative Free IM 10/23/2014, 10/27/2015, 10/01/2016, 10/13/2016, 10/13/2016, 10/13/2016    Influenza, high dose seasonal 0 7 mL 12/19/2018, 10/14/2019, 10/05/2021    Pneumococcal Conjugate 13-Valent 08/25/2015, 01/19/2017, 10/14/2019    Pneumococcal Polysaccharide PPV23 10/05/2021      Health Maintenance:         Topic Date Due    Colorectal Cancer Screening  07/10/2028    Hepatitis C Screening  Discontinued     There are no preventive care reminders to display for this patient  Medicare Health Risk Assessment:     /98 (BP Location: Left arm)   Pulse 63   Temp (!) 97 3 °F (36 3 °C)   Resp 16   Ht 5' 8" (1 727 m)   Wt 94 5 kg (208 lb 6 4 oz)   SpO2 98%   BMI 31 69 kg/m²      Naina Kevin is here for his Subsequent Wellness visit  Last Medicare Wellness visit information reviewed, patient interviewed and updates made to the record today  Health Risk Assessment:   Patient rates overall health as good  Patient feels that their physical health rating is same  Patient is satisfied with their life  Eyesight was rated as same  Hearing was rated as same  Patient feels that their emotional and mental health rating is same  Patients states they are never, rarely angry  Patient states they are sometimes unusually tired/fatigued   Pain experienced in the last 7 days has been some  Patient's pain rating has been 4/10  Patient states that he has experienced no weight loss or gain in last 6 months  Depression Screening:   PHQ-2 Score: 0      Fall Risk Screening: In the past year, patient has experienced: history of falling in past year    Number of falls: 1  Injured during fall?: No    Feels unsteady when standing or walking?: No    Worried about falling?: Yes      Home Safety:  Patient does not have trouble with stairs inside or outside of their home  Patient has working smoke alarms and has working carbon monoxide detector  Home safety hazards include: none  Medications:   Patient is currently taking over-the-counter supplements  OTC medications include: see medication list  Patient is able to manage medications  Activities of Daily Living (ADLs)/Instrumental Activities of Daily Living (IADLs):   Walk and transfer into and out of bed and chair?: Yes  Dress and groom yourself?: Yes    Bathe or shower yourself?: Yes    Feed yourself?  Yes  Do your laundry/housekeeping?: No  Manage your money, pay your bills and track your expenses?: Yes  Make your own meals?: No    Do your own shopping?: Yes    Previous Hospitalizations:   Any hospitalizations or ED visits within the last 12 months?: Yes    How many hospitalizations have you had in the last year?: 1-2    PREVENTIVE SCREENINGS      Cardiovascular Screening:    General: Screening Not Indicated and History Lipid Disorder      Diabetes Screening:     General: Screening Not Indicated and History Diabetes      Colorectal Cancer Screening:     General: Screening Current      Prostate Cancer Screening:    General: Screening Not Indicated      Abdominal Aortic Aneurysm (AAA) Screening:    Risk factors include: age between 73-69 yo        Lung Cancer Screening:     General: Screening Not Indicated      Hepatitis C Screening:    General: Screening Not Indicated and History Hepatitis C    Screening, Brief Intervention, and Referral to Treatment (SBIRT)    Screening  Typical number of drinks in a day: 0  Typical number of drinks in a week: 0  Interpretation: Low risk drinking behavior        ZAHRA Moreira

## 2022-03-31 NOTE — PROGRESS NOTES
FAMILY PRACTICE OFFICE VISIT       NAME: Noel Nielsen  AGE: 77 y o  SEX: male       : 1945        MRN: [id number]    DATE: 3/31/2022  TIME: 10:45 AM    Assessment and Plan   1  Medicare annual wellness visit, subsequent    2  Type 2 diabetes mellitus without complication, without long-term current use of insulin (HCC)  -     POCT hemoglobin A1c    3  Gastroesophageal reflux disease without esophagitis  Assessment & Plan:  Start famotidine      Orders:  -     calcium carbonate (OS-ANGELIKA) 600 MG tablet; Take 1 tablet (600 mg total) by mouth daily  -     famotidine (PEPCID) 20 mg tablet; Take 1 tablet (20 mg total) by mouth daily    4  Brain tumor [facility])  Assessment & Plan:  Stable  Cont f/u with neurosurgery yearly        5  Chronic bilateral low back pain without sciatica  -     traMADol (ULTRAM) 50 mg tablet; Take 1 tablet (50 mg total) by mouth every 6 (six) hours as needed for moderate pain    6  Primary hypertension  Assessment & Plan:  Stable  Cont meds        7  Nonintractable headache, unspecified chronicity pattern, unspecified headache type  -     traMADol (ULTRAM) 50 mg tablet; Take 1 tablet (50 mg total) by mouth every 6 (six) hours as needed for moderate pain               Chief Complaint     Chief Complaint   Patient presents with    Follow-up     Patient has left leg pain    Headache       History of Present Illness   Noel Nielsen is a 77y o -year-old male who is here for medicare wellness and f/u to chronic medical conditions  Continues to have headaches on and off      Review of Systems   Review of Systems   Constitutional: Negative for fatigue and fever  HENT: Negative for congestion and postnasal drip  Eyes: Negative for photophobia and visual disturbance  Respiratory: Negative for cough and shortness of breath  Cardiovascular: Negative for chest pain and palpitations  Gastrointestinal: Negative for constipation and diarrhea  Genitourinary: Negative for dysuria and frequency  Musculoskeletal: Positive for arthralgias and myalgias  Skin: Negative for rash  Neurological: Positive for headaches  Hematological: Negative for adenopathy  Psychiatric/Behavioral: Negative for dysphoric mood and sleep disturbance  The patient is not nervous/anxious  Active Problem List     Patient Active Problem List   Diagnosis    [facility] tumor ([facility] [address] )    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[facility] )    Chronic constipation    Controlled diabetes mellitus with diabetic neuropathy (HCC)    Episodic cluster headache, not intractable    GERD (gastroesophageal reflux disease)    Nonintractable headache    Hypertension    Acute pain of both knees    Meningioma ([facility] )    Varicose 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    Spondylolisthesis at L5-S1 level    Other chest pain    Renal cyst    History of hepatitis C    Scalp lesion    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HCC)    Primary osteoarthritis of both knees    Unstable angina (HCC)    Palpitations         Past Medical History:  Past Medical History:   Diagnosis Date    Brain mass     Last Assessed; 11/5/2015    Chest pain     Last Assessed: 1/22/2015    Diabetes type 2, uncontrolled ([facility] [address] )     Last Assessed: 3/1/2016    Edema of left lower extremity     Last Assessed: 3/22/2017    GERD (gastroesophageal reflux disease)     Hyperlipidemia     Hypertension     Impaired fasting glucose     Last Assessed: 11/10/2015    Irregular heartbeat     Skin cancer (melanoma) (HCC)     Scalp     Varicose 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     Last Assessed: 3/22/2017       Past Surgical History:  Past Surgical History:   Procedure Laterality Date    COLONOSCOPY      COLONOSCOPY N/A 7/10/2018    Procedure: COLONOSCOPY;  Surgeon: Mukesh Sanchez MD;  Location: [facility];   Service: Colorectal    INGUINAL HERNIA REPAIR      20+ years ago - [address] [place] [address]; Last Assessed: 10/23/2014    TONSILLECTOMY      VARICOSE VEIN SURGERY Left     x2 left leg - 40+ yrs ago, [facility]       Family History:  Family History   Problem Relation Age of Onset    No Known Problems Mother     No Known Problems Father     Melanoma Daughter 16       Social History:  Social History     Socioeconomic History    Marital status: /Civil Union     Spouse name: Not on file    Number of children: 11    Years of education: Not on file    Highest education level: Not on file   Occupational History    Occupation: Retired: Construction   Tobacco Use    Smoking status: Never Smoker    Smokeless tobacco: Never Used   Vaping Use    Vaping Use: Never used   Substance and Sexual Activity    Alcohol use: No    Drug use: No    Sexual activity: Not Currently     Partners: Female   Other Topics Concern    Not on file   Social History Narrative    Uses safety equipment         Social Determinants of Health     Financial Resource Strain: Not on file   Food Insecurity: Not on file   Transportation Needs: Not on file   Physical Activity: Not on file   Stress: Not on file   Social Connections: Not on file   Intimate Partner Violence: Not on file   Housing Stability: Not on file       Objective     Vitals:    03/31/22 0947   BP: 158/98   Pulse: 63   Resp: 16   Temp: (!) 97 3 °F (36 3 °C)   SpO2: 98%     Wt Readings from Last 3 Encounters:   03/31/22 94 5 kg (208 lb 6 4 oz)   02/07/22 95 7 kg (211 lb)   01/24/22 95 7 kg (211 lb)       Physical Exam  Vitals and nursing note reviewed  Constitutional:       Appearance: Normal appearance  HENT:      Head: Normocephalic and atraumatic  Right Ear: Tympanic membrane, ear canal and external ear normal       Left Ear: Tympanic membrane, ear canal and external ear normal       Nose: Nose normal       Mouth/Throat:      Mouth: Mucous membranes are moist    Eyes:      Conjunctiva/sclera: Conjunctivae normal    Cardiovascular:      Rate and Rhythm: Normal rate and regular rhythm  Pulses: Pulses are weak             Dorsalis pedis pulses are 1+ on the right side and 1+ on the left side  Posterior tibial pulses are 1+ on the right side and 1+ on the left side  Heart sounds: Normal heart sounds  Pulmonary:      Effort: Pulmonary effort is normal       Breath sounds: Normal breath sounds  Abdominal:      General: Bowel sounds are normal    Musculoskeletal:         General: Normal range of motion  Cervical back: Normal range of motion and neck supple  Feet:    Feet:      Right foot:      Skin integrity: No ulcer, skin breakdown, erythema, warmth, callus or dry skin  Left foot:      Skin integrity: No ulcer, skin breakdown, erythema, warmth, callus or dry skin  Skin:     General: Skin is warm and dry  Capillary Refill: Capillary refill takes less than 2 seconds  Neurological:      General: No focal deficit present  Mental Status: He is alert and oriented to person, place, and time  Psychiatric:         Mood and Affect: Mood normal          Behavior: Behavior normal          Thought Content:  Thought content normal          Judgment: Judgment normal          Pertinent Laboratory/Diagnostic Studies:  Lab Results   Component Value Date    GLUCOSE 121 10/28/2015    BUN 28 (H) 12/20/2021    CREATININE 0 74 12/20/2021    CALCIUM 8 9 12/20/2021     10/28/2015    K 4 4 12/20/2021    CO2 30 12/20/2021     12/20/2021     Lab Results   Component Value Date    ALT 16 12/20/2021    AST 8 12/20/2021    ALKPHOS 117 (H) 12/20/2021    BILITOT 0 53 08/27/2015       Lab Results   Component Value Date    WBC 5 06 12/20/2021    HGB 13 0 12/20/2021    HCT 39 7 12/20/2021    MCV 87 12/20/2021     12/20/2021       No results found for: TSH    Lab Results   Component Value Date    CHOL 154 08/27/2015     Lab Results   Component Value Date    TRIG 160 (H) 12/20/2021     Lab Results   Component Value Date    HDL 33 (L) 12/20/2021     Lab Results   Component Value Date    LDLCALC 126 (H) 12/20/2021     Lab Results Component Value Date    HGBA1C 6 8 (A) 03/31/2022       Results for orders placed or performed in visit on 03/31/22   POCT hemoglobin A1c   Result Value Ref Range    Hemoglobin A1C 6 8 (A) 6 5       Orders Placed This Encounter   Procedures    POCT hemoglobin A1c       ALLERGIES:  No Known Allergies    Current Medications     Current Outpatient Medications   Medication Sig Dispense Refill    acetaminophen (TYLENOL) 325 mg tablet Take 2 tablets (650 mg total) by mouth every 6 (six) hours as needed for mild pain or fever 30 tablet 0    amLODIPine (NORVASC) 5 mg tablet Take 1 tablet (5 mg total) by mouth daily 90 tablet 3    amoxicillin (AMOXIL) 500 mg capsule       aspirin 81 MG tablet Take 81 mg by mouth daily       atorvastatin (LIPITOR) 10 mg tablet Take 1 tablet (10 mg total) by mouth daily 90 tablet 3    calcium carbonate (OS-ANGELIKA) 600 MG tablet Take 1 tablet (600 mg total) by mouth daily 30 tablet 3    colchicine (COLCRYS) 0 6 mg tablet Take 1 tablet (0 6 mg total) by mouth daily 90 tablet 3    diclofenac sodium (VOLTAREN) 1 % Apply 2 g topically 4 (four) times a day 150 g 1    famotidine (PEPCID) 20 mg tablet Take 1 tablet (20 mg total) by mouth daily 30 tablet 2    Glucose Blood (ONETOUCH ULTRA BLUE VI) by In Vitro route daily      hydrocortisone 2 5 % cream APPLY TOPICALLY 2 (TWO) TIMES A DAY AS NEEDED FOR RASH 28 35 g 0    Lancets (OneTouch Delica Plus [id number]) MISC TEST DAILY, I67 6--FU CONFIMED DELICA 33 G 360 each 11    linaCLOtide (Linzess) 145 MCG CAPS Take 1 capsule (145 mcg total) by mouth daily 30 capsule 1    lisinopril (ZESTRIL) 10 mg tablet TAKE 1 TABLET BY MOUTH EVERY DAY 90 tablet 3    lisinopril (ZESTRIL) 20 mg tablet Take 1 tablet (20 mg total) by mouth daily 90 tablet 3    MELATONIN PO Take 10 mg by mouth daily at bedtime      metFORMIN (GLUCOPHAGE) 500 mg tablet TAKE 1 TABLET BY MOUTH TWICE A  tablet 1    omeprazole (PriLOSEC) 40 MG capsule TAKE 1 CAPSULE BY MOUTH EVERY DAY 90 capsule 1    OneTouch Ultra test strip TEST ONCE DAILY 25 strip 47    traMADol (ULTRAM) 50 mg tablet Take 1 tablet (50 mg total) by mouth every 6 (six) hours as needed for moderate pain 30 tablet 0     No current facility-administered medications for this visit  Health Maintenance     Health Maintenance   Topic Date Due    DM Eye Exam  Never done   [facility] Annual Wellness Visit (AWV)  11/02/2021    BMI: Followup Plan  01/15/2022    DTaP,Tdap,and Td Vaccines (1 - Tdap) 10/03/2022 (Originally 7/11/1966)    Hepatitis B Vaccine (1 of 3 - Risk 3-dose series) 10/03/2022 (Originally 7/11/1964)    HEMOGLOBIN A1C  09/30/2022    Fall Risk  03/31/2023    Depression Screening  03/31/2023    BMI: Adult  03/31/2023    Diabetic Foot Exam  03/31/2023    Colorectal Cancer Screening  07/10/2028    Pneumococcal Vaccine: 65+ Years  Completed    Influenza Vaccine  Completed    COVID-19 Vaccine  Completed    HIB Vaccine  Aged Out    IPV Vaccine  Aged Out    Hepatitis A Vaccine  Aged Out    Meningococcal ACWY Vaccine  Aged Out    HPV Vaccine  Aged Out    Hepatitis C Screening  Discontinued     Immunization History   Administered Date(s) Administered    COVID-19 PFIZER VACCINE 0 3 ML IM 03/29/2021, 04/21/2021, 11/09/2021    INFLUENZA 10/27/2015, 10/01/2016, 10/13/2016, 12/19/2018    Influenza Split High Dose Preservative Free IM 10/23/2014, 10/27/2015, 10/01/2016, 10/13/2016, 10/13/2016, 10/13/2016    Influenza, high dose seasonal 0 7 mL 12/19/2018, 10/14/2019, 10/05/2021    Pneumococcal Conjugate 13-Valent 08/25/2015, 01/19/2017, 10/14/2019    Pneumococcal Polysaccharide PPV23 10/05/2021     BMI Counseling: Body mass index is 31 69 kg/m²   The BMI is above normal  Nutrition recommendations include decreasing portion sizes, encouraging healthy choices of fruits and vegetables, decreasing fast food intake, consuming healthier snacks, limiting drinks that contain sugar, moderation in carbohydrate intake, increasing intake of lean protein, reducing intake of saturated and trans fat and reducing intake of cholesterol  Exercise recommendations include exercising 3-5 times per week and strength training exercises  No pharmacotherapy was ordered  Rationale for BMI follow-up plan is due to patient being overweight or obese  Depression Screening and Follow-up Plan: Patient was screened for depression during today's encounter  They screened negative with a PHQ-2 score of 0         Family History   Problem Relation Age of Onset    No Known Problems Mother     No Known Problems Father     Melanoma Daughter 16     Past Medical History:   Diagnosis Date    Brain mass     Last Assessed; 11/5/2015    Chest pain     Last Assessed: 1/22/2015    Diabetes type 2, uncontrolled ([facility] [address] )     Last Assessed: 3/1/2016    Edema of left lower extremity     Last Assessed: 3/22/2017    GERD (gastroesophageal reflux disease)     Hyperlipidemia     Hypertension     Impaired fasting glucose     Last Assessed: 11/10/2015    Irregular heartbeat     Skin cancer (melanoma) (HCC)     Scalp     Varicose 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     Last Assessed: 3/22/2017     Social History     Socioeconomic History    Marital status: /Civil Union     Spouse name: Not on file    Number of children: 5    Years of education: Not on file    Highest education level: Not on file   Occupational History    Occupation: Retired: Construction   Tobacco Use    Smoking status: Never Smoker    Smokeless tobacco: Never Used   Vaping Use    Vaping Use: Never used   Substance and Sexual Activity    Alcohol use: No    Drug use: No    Sexual activity: Not Currently     Partners: Female   Other Topics Concern    Not on file   Social History Narrative    Uses safety equipment         Social Determinants of Health     Financial Resource Strain: Not on file   Food Insecurity: Not on file   Transportation Needs: Not on file   Physical Activity: Not on file   Stress: Not on file   Social Connections: Not on file   Intimate Partner Violence: Not on file   Housing Stability: Not on file       Current Outpatient Medications:     acetaminophen (TYLENOL) 325 mg tablet, Take 2 tablets (650 mg total) by mouth every 6 (six) hours as needed for mild pain or fever, Disp: 30 tablet, Rfl: 0    amLODIPine (NORVASC) 5 mg tablet, Take 1 tablet (5 mg total) by mouth daily, Disp: 90 tablet, Rfl: 3    amoxicillin (AMOXIL) 500 mg capsule, , Disp: , Rfl:     aspirin 81 MG tablet, Take 81 mg by mouth daily , Disp: , Rfl:     atorvastatin (LIPITOR) 10 mg tablet, Take 1 tablet (10 mg total) by mouth daily, Disp: 90 tablet, Rfl: 3    calcium carbonate (OS-ANGELIKA) 600 MG tablet, Take 1 tablet (600 mg total) by mouth daily, Disp: 30 tablet, Rfl: 3    colchicine (COLCRYS) 0 6 mg tablet, Take 1 tablet (0 6 mg total) by mouth daily, Disp: 90 tablet, Rfl: 3    diclofenac sodium (VOLTAREN) 1 %, Apply 2 g topically 4 (four) times a day, Disp: 150 g, Rfl: 1    famotidine (PEPCID) 20 mg tablet, Take 1 tablet (20 mg total) by mouth daily, Disp: 30 tablet, Rfl: 2    Glucose Blood (ONETOUCH ULTRA BLUE VI), by In Vitro route daily, Disp: , Rfl:     hydrocortisone 2 5 % cream, APPLY TOPICALLY 2 (TWO) TIMES A DAY AS NEEDED FOR RASH, Disp: 28 35 g, Rfl: 0    Lancets (OneTouch Delica Plus [id number]) MISC, TEST DAILY, G07 0--JE CONFIMED DELICA 33 G, Disp: 049 each, Rfl: 11    linaCLOtide (Linzess) 145 MCG CAPS, Take 1 capsule (145 mcg total) by mouth daily, Disp: 30 capsule, Rfl: 1    lisinopril (ZESTRIL) 10 mg tablet, TAKE 1 TABLET BY MOUTH EVERY DAY, Disp: 90 tablet, Rfl: 3    lisinopril (ZESTRIL) 20 mg tablet, Take 1 tablet (20 mg total) by mouth daily, Disp: 90 tablet, Rfl: 3    MELATONIN PO, Take 10 mg by mouth daily at bedtime, Disp: , Rfl:     metFORMIN (GLUCOPHAGE) 500 mg tablet, TAKE 1 TABLET BY MOUTH TWICE A DAY, Disp: 180 tablet, Rfl: 1   omeprazole (PriLOSEC) 40 MG capsule, TAKE 1 CAPSULE BY MOUTH EVERY DAY, Disp: 90 capsule, Rfl: 1    OneTouch Ultra test strip, TEST ONCE DAILY, Disp: 25 strip, Rfl: 47    traMADol (ULTRAM) 50 mg tablet, Take 1 tablet (50 mg total) by mouth every 6 (six) hours as needed for moderate pain, Disp: 30 tablet, Rfl: 0  No Known Allergies  Patient's shoes and socks removed  Right Foot/Ankle   Right Foot Inspection  Skin Exam: skin normal and skin intact  No dry skin, no warmth, no callus, no erythema, no maceration, no abnormal color, no pre-ulcer, no ulcer and no callus  Toe Exam: ROM and strength within normal limits  Sensory   Monofilament testing: diminished    Vascular  Capillary refills: < 3 seconds  The right DP pulse is 1+  The right PT pulse is 1+  Left Foot/Ankle  Left Foot Inspection  Skin Exam: skin normal and skin intact  No dry skin, no warmth, no erythema, no maceration, normal color, no pre-ulcer, no ulcer and no callus  Toe Exam: ROM and strength within normal limits  Sensory   Monofilament testing: diminished    Vascular  Capillary refills: < 3 seconds  The left DP pulse is 1+  The left PT pulse is 1+       Assign Risk Category  No deformity present  Loss of protective sensation  Weak pulses  Risk: Deborah 224, ZAHRA

## 2022-03-31 NOTE — PROGRESS NOTES
Assessment and Plan:     Problem List Items Addressed This Visit        Digestive    GERD (gastroesophageal reflux disease)     Start famotidine           Relevant Medications    calcium carbonate (OS-ANGELIKA) 600 MG tablet       Endocrine    Type 2 diabetes mellitus without complication, without long-term current use of insulin (HCC)    Relevant Orders    POCT hemoglobin A1c (Completed)       Cardiovascular and Mediastinum    Hypertension     Stable  Cont meds              Nervous and Auditory    Brain tumor ([facility] )     Stable  Cont f/u with neurosurgery yearly              Other    Nonintractable headache    Relevant Medications    traMADol (ULTRAM) 50 mg tablet      Other Visit Diagnoses     Medicare annual wellness visit, subsequent    -  Primary    Chronic bilateral low back pain without sciatica        Relevant Medications    traMADol (ULTRAM) 50 mg tablet          Depression Screening and Follow-up Plan: Patient was screened for depression during today's encounter  They screened negative with a PHQ-2 score of 0  Preventive health issues were discussed with patient, and age appropriate screening tests were ordered as noted in patient's After Visit Summary  Personalized health advice and appropriate referrals for health education or preventive services given if needed, as noted in patient's After Visit Summary       History of Present Illness:     Patient presents for Medicare Annual Wellness visit    Patient Care Team:  ZAHRA Ornelas as PCP - General (Family Medicine)  Bryna Fabry, DO as PCP - [address] [place], [place] [address] [facility] ([facility])  MD Brodie Kearns MD as Endoscopist  Marilyn Sawyer MD (Dermatology)  Magdalena Omer MD (Oncology)     Problem List:     Patient Active Problem List   Diagnosis    Brain tumor [facility])    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[address] )    Chronic constipation    [facility] with [facility]    Episodic cluster headache, not intractable  GERD (gastroesophageal reflux disease)    Nonintractable headache    Hypertension    Acute pain of both knees    Meningioma ([address] )    Varicose 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    Spondylolisthesis at L5-S1 level    Other chest pain    Renal cyst    History of hepatitis C    Scalp lesion    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HCC)    Primary osteoarthritis of both knees    Unstable angina (HCC)    Palpitations    Type 2 diabetes mellitus without complication, without long-term current use of insulin ([address] )      Past Medical and Surgical History:     Past Medical History:   Diagnosis Date    Brain mass     Last Assessed; 11/5/2015    Chest pain     Last Assessed: 1/22/2015    Diabetes type 2, uncontrolled ([address] )     Last Assessed: 3/1/2016    Edema of left lower extremity     Last Assessed: 3/22/2017    GERD (gastroesophageal reflux disease)     Hyperlipidemia     Hypertension     Impaired fasting glucose     Last Assessed: 11/10/2015    Irregular heartbeat     Skin cancer (melanoma) (HCC)     Scalp     Varicose 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     Last Assessed: 3/22/2017     Past Surgical History:   Procedure Laterality Date    COLONOSCOPY      COLONOSCOPY N/A 7/10/2018    Procedure: COLONOSCOPY;  Surgeon: Genoveva Csaey MD;  Location: [facility];   Service: Colorectal    INGUINAL HERNIA REPAIR      20+ years ago - [employer]; Last Assessed: 10/23/2014    TONSILLECTOMY      VARICOSE VEIN SURGERY Left     x2 left leg - 40+ yrs ago, [employer] and [facility]      Family History:     Family History   Problem Relation Age of Onset    No Known Problems Mother     No Known Problems Father     Melanoma Daughter 16      Social History:     Social History     Socioeconomic History    Marital status: /Civil Union     Spouse name: None    Number of children: 5    Years of education: None    Highest education level: None   Occupational History    Occupation: Retired: Construction Tobacco Use    Smoking status: Never Smoker    Smokeless tobacco: Never Used   Vaping Use    Vaping Use: Never used   Substance and Sexual Activity    Alcohol use: No    Drug use: No    Sexual activity: Not Currently     Partners: Female   Other Topics Concern    None   Social History Narrative    Uses safety equipment         Social Determinants of Health     Financial Resource Strain: Not on file   Food Insecurity: Not on file   Transportation Needs: Not on file   Physical Activity: Not on file   Stress: Not on file   Social Connections: Not on file   Intimate Partner Violence: Not on file   Housing Stability: Not on file      Medications and Allergies:     Current Outpatient Medications   Medication Sig Dispense Refill    acetaminophen (TYLENOL) 325 mg tablet Take 2 tablets (650 mg total) by mouth every 6 (six) hours as needed for mild pain or fever 30 tablet 0    amLODIPine (NORVASC) 5 mg tablet Take 1 tablet (5 mg total) by mouth daily 90 tablet 3    amoxicillin (AMOXIL) 500 mg capsule       aspirin 81 MG tablet Take 81 mg by mouth daily       atorvastatin (LIPITOR) 10 mg tablet Take 1 tablet (10 mg total) by mouth daily 90 tablet 3    calcium carbonate (OS-ANGELIKA) 600 MG tablet Take 1 tablet (600 mg total) by mouth daily 30 tablet 3    colchicine (COLCRYS) 0 6 mg tablet Take 1 tablet (0 6 mg total) by mouth daily 90 tablet 3    diclofenac sodium (VOLTAREN) 1 % Apply 2 g topically 4 (four) times a day 150 g 1    famotidine (PEPCID) 20 mg tablet TAKE 1 TABLET BY MOUTH EVERY DAY 90 tablet 0    Glucose Blood (ONETOUCH ULTRA BLUE VI) by In Vitro route daily      hydrocortisone 2 5 % cream APPLY TOPICALLY 2 TIMES A DAY AS NEEDED FOR RASH  28 35 g 0    Lancets (OneTouch Delica Plus UHRJHC17X) MISC TEST DAILY, B84 7--BF CONFIMED DELICA 33 G 511 each 11    Linzess 145 MCG CAPS TAKE 1 CAPSULE BY MOUTH EVERY DAY 30 capsule 1    lisinopril (ZESTRIL) 10 mg tablet TAKE 1 TABLET BY MOUTH EVERY DAY 90 tablet 3    lisinopril (ZESTRIL) 20 mg tablet Take 1 tablet (20 mg total) by mouth daily 90 tablet 3    MELATONIN PO Take 10 mg by mouth daily at bedtime      metFORMIN (GLUCOPHAGE) 500 mg tablet TAKE 1 TABLET BY MOUTH TWICE A  tablet 1    omeprazole (PriLOSEC) 40 MG capsule TAKE 1 CAPSULE BY MOUTH EVERY DAY 90 capsule 1    OneTouch Ultra test strip TEST ONCE DAILY 25 strip 47    traMADol (ULTRAM) 50 mg tablet Take 1 tablet (50 mg total) by mouth every 6 (six) hours as needed for moderate pain 30 tablet 0     No current facility-administered medications for this visit  No Known Allergies   Immunizations:     Immunization History   Administered Date(s) Administered    COVID-19 PFIZER VACCINE 0 3 ML IM 03/29/2021, 04/21/2021, 11/09/2021    INFLUENZA 10/27/2015, 10/01/2016, 10/13/2016, 12/19/2018    Influenza Split High Dose Preservative Free IM 10/23/2014, 10/27/2015, 10/01/2016, 10/13/2016, 10/13/2016, 10/13/2016    Influenza, high dose seasonal 0 7 mL 12/19/2018, 10/14/2019, 10/05/2021    Pneumococcal Conjugate 13-Valent 08/25/2015, 01/19/2017, 10/14/2019    Pneumococcal Polysaccharide PPV23 10/05/2021      Health Maintenance:         Topic Date Due    Colorectal Cancer Screening  07/10/2028    Hepatitis C Screening  Discontinued     There are no preventive care reminders to display for this patient  Medicare Health Risk Assessment:     /98 (BP Location: Left arm)   Pulse 63   Temp (!) 97 3 °F (36 3 °C)   Resp 16   Ht 5' 8" (1 727 m)   Wt 94 5 kg (208 lb 6 4 oz)   SpO2 98%   BMI 31 69 kg/m²      Arturo Cruz is here for his Subsequent Wellness visit  Health Risk Assessment:   Patient rates overall health as very good  Patient feels that their physical health rating is same  Patient is satisfied with their life  Eyesight was rated as same  Hearing was rated as same  Patient feels that their emotional and mental health rating is same  Patients states they are never, rarely angry  Patient states they are never, rarely unusually tired/fatigued  Pain experienced in the last 7 days has been some  Patient's pain rating has been 5/10  Patient states that he has experienced no weight loss or gain in last 6 months  Depression Screening:   PHQ-2 Score: 0      Fall Risk Screening: In the past year, patient has experienced: no history of falling in past year      Home Safety:  Patient does not have trouble with stairs inside or outside of their home  Patient has working smoke alarms and has working carbon monoxide detector  Home safety hazards include: none  Nutrition:   Current diet is Regular and Diabetic  Medications:   Patient is not currently taking any over-the-counter supplements  Patient is able to manage medications  Activities of Daily Living (ADLs)/Instrumental Activities of Daily Living (IADLs):   Walk and transfer into and out of bed and chair?: Yes  Dress and groom yourself?: Yes    Bathe or shower yourself?: Yes    Feed yourself?  Yes  Do your laundry/housekeeping?: Yes  Manage your money, pay your bills and track your expenses?: Yes  Make your own meals?: Yes    Do your own shopping?: Yes    Previous Hospitalizations:   Any hospitalizations or ED visits within the last 12 months?: No      Advance Care Planning:   Living will: No    Durable POA for healthcare: No    Advanced directive: No    Five wishes given: Yes      Cognitive Screening:   Provider or family/friend/caregiver concerned regarding cognition?: No    PREVENTIVE SCREENINGS      Cardiovascular Screening:    General: Screening Not Indicated and History Lipid Disorder      Diabetes Screening:     General: Screening Not Indicated and History Diabetes      Colorectal Cancer Screening:     General: Screening Current      Prostate Cancer Screening:    General: Screening Not Indicated      Osteoporosis Screening:    General: Screening Not Indicated      Abdominal Aortic Aneurysm (AAA) Screening:    Risk factors include: age between 73-67 yo        Lung Cancer Screening:     General: Screening Not Indicated      Hepatitis C Screening:    General: Screening Not Indicated and History Hepatitis C    Screening, Brief Intervention, and Referral to Treatment (SBIRT)    Screening  Typical number of drinks in a day: 0  Typical number of drinks in a week: 0  Interpretation: Low risk drinking behavior  Single Item Drug Screening:  How often have you used an illegal drug (including marijuana) or a prescription medication for non-medical reasons in the past year? never    Single Item Drug Screen Score: 0  Interpretation: Negative screen for possible drug use disorder    Brief Intervention  Alcohol & drug use screenings were reviewed  No concerns regarding substance use disorder identified  Other Counseling Topics:   Car/seat belt/driving safety, skin self-exam, sunscreen and calcium and vitamin D intake and regular weightbearing exercise         ZAHRA Ruiz

## 2022-04-09 DIAGNOSIS — K59.09 CHRONIC CONSTIPATION: ICD-10-CM

## 2022-04-09 DIAGNOSIS — K21.9 GASTROESOPHAGEAL REFLUX DISEASE WITHOUT ESOPHAGITIS: ICD-10-CM

## 2022-04-09 DIAGNOSIS — R21 RASH: ICD-10-CM

## 2022-04-09 RX ORDER — FAMOTIDINE 20 MG/1
TABLET, FILM COATED ORAL
Qty: 90 TABLET | Refills: 0 | Status: SHIPPED | OUTPATIENT
Start: 2022-04-09

## 2022-04-09 RX ORDER — LINACLOTIDE 145 UG/1
CAPSULE, GELATIN COATED ORAL
Qty: 30 CAPSULE | Refills: 1 | Status: SHIPPED | OUTPATIENT
Start: 2022-04-09 | End: 2022-06-26

## 2022-04-18 PROBLEM — E11.9 TYPE 2 DIABETES MELLITUS WITHOUT COMPLICATION, WITHOUT LONG-TERM CURRENT USE OF INSULIN (HCC): Status: ACTIVE | Noted: 2022-04-18

## 2022-06-21 DIAGNOSIS — R21 RASH: ICD-10-CM

## 2022-06-22 ENCOUNTER — TELEPHONE (OUTPATIENT)
Dept: DERMATOLOGY | Facility: CLINIC | Age: 77
End: 2022-06-22

## 2022-06-26 DIAGNOSIS — K59.09 CHRONIC CONSTIPATION: ICD-10-CM

## 2022-06-26 RX ORDER — LINACLOTIDE 145 UG/1
CAPSULE, GELATIN COATED ORAL
Qty: 30 CAPSULE | Refills: 1 | Status: SHIPPED | OUTPATIENT
Start: 2022-06-26

## 2022-06-28 ENCOUNTER — OFFICE VISIT (OUTPATIENT)
Dept: DERMATOLOGY | Facility: CLINIC | Age: 77
End: 2022-06-28
Payer: COMMERCIAL

## 2022-06-28 VITALS — WEIGHT: 203 LBS | TEMPERATURE: 98.3 F | BODY MASS INDEX: 30.77 KG/M2 | HEIGHT: 68 IN

## 2022-06-28 DIAGNOSIS — L57.0 ACTINIC KERATOSIS: ICD-10-CM

## 2022-06-28 DIAGNOSIS — D48.9 NEOPLASM OF UNCERTAIN BEHAVIOR: Primary | ICD-10-CM

## 2022-06-28 PROCEDURE — 11103 TANGNTL BX SKIN EA SEP/ADDL: CPT | Performed by: DERMATOLOGY

## 2022-06-28 PROCEDURE — 88305 TISSUE EXAM BY PATHOLOGIST: CPT | Performed by: STUDENT IN AN ORGANIZED HEALTH CARE EDUCATION/TRAINING PROGRAM

## 2022-06-28 PROCEDURE — 11102 TANGNTL BX SKIN SINGLE LES: CPT | Performed by: DERMATOLOGY

## 2022-06-28 PROCEDURE — 99214 OFFICE O/P EST MOD 30 MIN: CPT | Performed by: DERMATOLOGY

## 2022-06-28 NOTE — PROGRESS NOTES
[facility] Follow Up Note    Patient Name: Enrico Thomas  Encounter Date: 6/28/2022    Today's Chief Concerns:   Concern #1:  Skin lesion spot on head       Current Medications:    Current Outpatient Medications:     acetaminophen (TYLENOL) 325 mg tablet, Take 2 tablets (650 mg total) by mouth every 6 (six) hours as needed for mild pain or fever, Disp: 30 tablet, Rfl: 0    amLODIPine (NORVASC) 5 mg tablet, Take 1 tablet (5 mg total) by mouth daily, Disp: 90 tablet, Rfl: 3    amoxicillin (AMOXIL) 500 mg capsule, , Disp: , Rfl:     aspirin 81 MG tablet, Take 81 mg by mouth daily , Disp: , Rfl:     atorvastatin (LIPITOR) 10 mg tablet, Take 1 tablet (10 mg total) by mouth daily, Disp: 90 tablet, Rfl: 3    calcium carbonate (OS-ANGELIKA) 600 MG tablet, Take 1 tablet (600 mg total) by mouth daily, Disp: 30 tablet, Rfl: 3    colchicine (COLCRYS) 0 6 mg tablet, Take 1 tablet (0 6 mg total) by mouth daily, Disp: 90 tablet, Rfl: 3    diclofenac sodium (VOLTAREN) 1 %, Apply 2 g topically 4 (four) times a day, Disp: 150 g, Rfl: 1    famotidine (PEPCID) 20 mg tablet, TAKE 1 TABLET BY MOUTH EVERY DAY, Disp: 90 tablet, Rfl: 0    Glucose Blood (ONETOUCH ULTRA BLUE VI), by In Vitro route daily, Disp: , Rfl:     hydrocortisone 2 5 % cream, APPLY TOPICALLY 2 TIMES A DAY AS NEEDED FOR RASH, Disp: 28 35 g, Rfl: 0    Lancets (OneTouch Delica Plus [id number]) MISC, TEST DAILY, V14 8--FF CONFIMED DELICA 33 G, Disp: 306 each, Rfl: 11    Linzess 145 MCG CAPS, TAKE 1 CAPSULE BY MOUTH EVERY DAY, Disp: 30 capsule, Rfl: 1    lisinopril (ZESTRIL) 10 mg tablet, TAKE 1 TABLET BY MOUTH EVERY DAY, Disp: 90 tablet, Rfl: 3    lisinopril (ZESTRIL) 20 mg tablet, Take 1 tablet (20 mg total) by mouth daily, Disp: 90 tablet, Rfl: 3    MELATONIN PO, Take 10 mg by mouth daily at bedtime, Disp: , Rfl:     metFORMIN (GLUCOPHAGE) 500 mg tablet, TAKE 1 TABLET BY MOUTH TWICE A DAY, Disp: 180 tablet, Rfl: 1    omeprazole (PriLOSEC) 40 MG capsule, TAKE 1 CAPSULE BY MOUTH EVERY DAY, Disp: 90 capsule, Rfl: 1    OneTouch Ultra test strip, TEST ONCE DAILY, Disp: 25 strip, Rfl: 47    traMADol (ULTRAM) 50 mg tablet, Take 1 tablet (50 mg total) by mouth every 6 (six) hours as needed for moderate pain, Disp: 30 tablet, Rfl: 0    CONSTITUTIONAL:   Vitals:    06/28/22 1508   Temp: 98 3 °F (36 8 °C)   TempSrc: Temporal   Weight: 92 1 kg (203 lb)   Height: 5' 8" (1 727 m)         Specific Alerts:    Have you been seen by a [facility] Dermatologist in the last 3 years? No    Are you pregnant or planning to become pregnant? N/A    Are you currently or planning to be nursing or breast feeding? N/A    No Known Allergies    May we call your Preferred Phone number to discuss your specific medical information? YES    May we leave a detailed message that includes your specific medical information? YES    Have you traveled outside of the [facility] in the past 3 months? No    Do you currently have a pacemaker or defibrillator? No    Do you have any artificial heart valves, joints, plates, screws, rods, stents, pins, etc? YES, - Left leg plate     Do you require any medications prior to a surgical procedure? No      Are you taking any medications that cause you to bleed more easily ("blood thinners") YES    Have you ever experienced a rapid heartbeat with epinephrine? No      Review of Systems:  Have you recently had or currently have any of the following?     · Fever or chills: No  · Night Sweats: No  · Headaches: YES, couple days ago   · Weight Gain: No  · Weight Loss: No  · Blurry Vision: No  · Nausea: No  · Vomiting: No  · Diarrhea: No  · Blood in Stool: No  · Abdominal Pain: No  · Itchy Skin: YES  · Painful Joints: YES  · Swollen Joints: No  · Muscle Pain: No  · Irregular Mole: No  · Sun Burn: No  · Dry Skin: YES  · Skin Color Changes: No  · Scar or Keloid: No  · Cold Sores/Fever Blisters: No  · Bacterial Infections/MRSA: No  · Anxiety: No  · Depression: No  · Suicidal or Homicidal Thoughts: No      PSYCH: Normal mood and affect  EYES: Normal conjunctiva  ENT: Normal lips and oral mucosa  CARDIOVASCULAR: No edema  RESPIRATORY: Normal respirations  HEME/LYMPH/IMMUNO:  No regional lymphadenopathy except as noted below in ASSESSMENT AND PLAN BY DIAGNOSIS    FULL ORGAN SYSTEM SKIN EXAM (SKIN)   Hair, Scalp, Ears, Face Normal except as noted below in Assessment   Neck, Cervical Chain Nodes Normal except as noted below in Assessment       NEOPLASM OF UNCERTAIN BEHAVIOR OF SKIN    Physical Exam:   (Anatomic Location); (Size and Morphological Description); (Differential Diagnosis):  o Specimen A: left temple; 5mm pearly papule; BCC  o Specimen B: right parietal scalp; 5mm hyperkeratotic papule; hypertrophic AK vs SCC   Pertinent Positives:   Pertinent Negatives: Additional History of Present Condition: Lesions were appreciated on exam      Assessment and Plan:   I have discussed with the patient that a sample of skin via a "skin biopsy would be potentially helpful to further make a specific diagnosis under the microscope   Based on a thorough discussion of this condition and the management approach to it (including a comprehensive discussion of the known risks, side effects and potential benefits of treatment), the patient (family)  They will proceed with biopsy today  PROCEDURE TANGENTIAL (SHAVE) BIOPSY NOTE:     Performing Physician: Bushra Ocampo   Anatomic Location; Clinical Description with size (cm); Pre-Op Diagnosis:   o Specimen A: left temple; 5mm pearly papule; BCC  o Specimen B: right parietal scalp; 5mm hyperkeratotic papule; hypertrophic AK vs SCC   Post-op diagnosis: Same      Local anesthesia: 1% xylocaine with epi       Topical anesthesia: None     Hemostasis: Aluminum chloride       After obtaining informed consent  at which time there was a discussion about the purpose of biopsy  and low risks of infection and bleeding  The area was prepped and draped in the usual fashion  Anesthesia was obtained with 1% lidocaine with epinephrine  A shave biopsy to an appropriate sampling depth was obtained by Shave (Dermablade or 15 blade) The resulting wound was covered with surgical ointment and bandaged appropriately  The patient tolerated the procedure well without complications and was without signs of functional compromise  Specimen has been sent for review by Dermatopathology  Standard post-procedure care has been explained and has been included in written form within the patient's copy of Informed Consent  INFORMED CONSENT DISCUSSION AND POST-OPERATIVE INSTRUCTIONS FOR PATIENT    I   RATIONALE FOR PROCEDURE  I understand that a skin biopsy allows the Dermatologist to test a lesion or rash under the microscope to obtain a diagnosis  It usually involves numbing the area with numbing medication and removing a small piece of skin; sometimes the area will be closed with sutures  In this specific procedure, sutures are not usually needed  If any sutures are placed, then they are usually need to be removed in 2 weeks or less  I understand that my Dermatologist recommends that a skin "shave" biopsy be performed today  A local anesthetic, similar to the kind that a dentist uses when filling a cavity, will be injected with a very small needle into the skin area to be sampled  The injected skin and tissue underneath "will go to sleep and become numb so no pain should be felt afterwards  An instrument shaped like a tiny "razor blade" (shave biopsy instrument) will be used to cut a small piece of tissue and skin from the area so that a sample of tissue can be taken and examined more closely under the microscope  A slight amount of bleeding will occur, but it will be stopped with direct pressure and a pressure bandage and any other appropriate methods  I understands that a scar will form where the wound was created    Surgical ointment will be applied to help protect the wound  Sutures are not usually needed  II   RISKS AND POTENTIAL COMPLICATIONS   I understand the risks and potential complications of a skin biopsy include but are not limited to the following:   Bleeding   Infection   Pain   Scar/keloid   Skin discoloration   Incomplete Removal   Recurrence   Nerve Damage/Numbness/Loss of Function   Allergic Reaction to Anesthesia   Biopsies are diagnostic procedures and based on findings additional treatment or evaluation may be required   Loss or destruction of specimen resulting in no additional findings    My Dermatologist has explained to me the nature of the condition, the nature of the procedure, and the benefits to be reasonably expected compared with alternative approaches  My Dermatologist has discussed the likelihood of major risks or complications of this procedure including the specific risks listed above, such as bleeding, infection, and scarring/keloid  I understand that a scar is expected after this procedure  I understand that my physician cannot predict if the scar will form a "keloid," which extends beyond the borders of the wound that is created  A keloid is a thick, painful, and bumpy scar  A keloid can be difficult to treat, as it does not always respond well to therapy, which includes injecting cortisone directly into the keloid every few weeks  While this usually reduces the pain and size of the scar, it does not eliminate it  I understand that photographs may be taken before and after the procedure  These will be maintained as part of the medical providers confidential records and may not be made available to me  I further authorize the medical provider to use the photographs for teaching purposes or to illustrate scientific papers, books, or lectures if in his/her judgment, medical research, education, or science may benefit from its use      I have had an opportunity to fully inquire about the risks and benefits of this procedure and its alternatives  I have been given ample time and opportunity to ask questions and to seek a second opinion if I wished to do so  I acknowledge that there have specifically been no guarantees as to the cosmetic results from the procedure  I am aware that with any procedure there is always the possibility of an unexpected complication  III  POST-PROCEDURAL CARE (WHAT YOU WILL NEED TO DO "AFTER THE BIOPSY" TO OPTIMIZE HEALING)     Keep the area clean and dry  Try NOT to remove the bandage or get it wet for the first 24 hours   Gently clean the area and apply surgical ointment (such as Vaseline petrolatum ointment, which is available "over the counter" and not a prescription) to the biopsy site for up to 2 weeks straight  This acts to protect the wound from the outside world   Sutures are not usually placed in this procedure  If any sutures were placed, return for suture removal as instructed (generally 1 week for the face, 2 weeks for the body)   Take Acetaminophen (Tylenol) for discomfort, if no contraindications  Ibuprofen or aspirin could make bleeding worse   Call our office immediately for signs of infection: fever, chills, increased redness, warmth, tenderness, discomfort/pain, or pus or foul smell coming from the wound  WHAT TO DO IF THERE IS ANY BLEEDING? If a small amount of bleeding is noticed, place a clean cloth over the area and apply firm pressure for ten minutes  Check the wound after 10 minutes of direct pressure  If bleeding persists, try one more time for an additional 10 minutes of direct pressure on the area  If the bleeding becomes heavier or does not stop after the second attempt, or if you have any other questions about this procedure, then please call your [facility] by calling [phone] (SKIN)       I hereby acknowledge that I have reviewed and verified the site with my Dermatologist and have requested and authorized my Dermatologist to proceed with the procedure  ACTINIC KERATOSIS    Physical Exam:   Anatomic Location Affected:  Diffuse scalp    Morphological Description:  Hyperkeratotic papules     Additional History of Present Condition:  Present on exam      Assessment and Plan:  Based on a thorough discussion of this condition and the management approach to it (including a comprehensive discussion of the known risks, side effects and potential benefits of treatment), the patient (family) agrees to implement the following specific plan:     During today's visit we discussed the nature of actinic keratosis   Consider cryotherapy vs 5-FU in the future  Actinic keratoses are very common on sites repeatedly exposed to the sun, especially the backs of the hands and the face, most often affecting the ears, nose, cheeks, upper lip, vermilion of the lower lip, temples, forehead and balding scalp  In severely chronically sun-damaged individuals, they may also be found on the upper trunk, upper and lower limbs, and dorsum of feet  We discussed the theoretical premalignant (pre-cancerous) nature and etiology of these growths  We discussed the prevailing notion that actinic keratoses are a reflection of abnormal skin cell development due to DNA damage by short wavelength UVB  They are more likely to appear if the immune function is poor, due to aging, recent sun exposure, predisposing disease or certain drugs  We discussed that the main concern is that actinic keratoses may predispose to squamous cell carcinoma  It is rare for a solitary actinic keratosis to evolve to squamous cell carcinoma (SCC), but the risk of SCC occurring at some stage in a patient with more than 10 actinic keratoses is thought to be about 10 to 15%  A tender, thickened, ulcerated or enlarging actinic keratosis is suspicious of SCC  Actinic keratoses may be prevented by strict sun protection   If already present, keratoses may improve with a very high sun protection factor (50+) broad-spectrum sunscreen applied at least daily to affected areas, year-round  We recommend that UPF-rated clothing and hats and sunglasses be worn whenever possible and that a sunscreen-moisturizer combination product such as Neutrogena Daily Defense be applied at least three times a day  We performed a thorough discussion of treatment options and specific risk/benefits/alternatives including but not limited to medical field treatment with medications such as the following:     Topical field area medications such as 5-fluorouracil or Aldara (specifically, the trouble with long-term compliance, blistering and local skin reaction versus the convenience of at-home therapy and that field therapy gets what is not yet seen)   Cryotherapy (specifically, local pain, scarring, dyspigmentation, blistering, possible superinfection, and treats only what we see versus directed treatment today)   Photodynamic therapy (specifically, local pain, scarring, dyspigmentation, blistering, possible superinfection, need to schedule for a later date, and time spent in the office versus field therapy that gets what is not yet seen)  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)  Physical Exam:   Anatomic Location Affected:  Left parietal scalp    Morphological Description:  Unable to locate on exam   Pertinent Positives:   Pertinent Negatives: Additional History of Present Condition:  Spot of concern was biopsied 6/12/2020  Assessment and Plan:  Based on a thorough discussion of this condition and the management approach to it (including a comprehensive discussion of the known risks, side effects and potential benefits of treatment), the patient (family) agrees to implement the following specific plan:   Unable to find this spot on exam today      Initial recommendations included re-excision      Will continue to monitor

## 2022-07-05 ENCOUNTER — TELEPHONE (OUTPATIENT)
Dept: DERMATOLOGY | Facility: CLINIC | Age: 77
End: 2022-07-05

## 2022-07-05 NOTE — TELEPHONE ENCOUNTER
Pt's Daughter called to verify appt time for today  Per Dr Nini Alejandro the appt was cancelled since they are waiting for the biopsy results and he does not need to come in today

## 2022-07-06 ENCOUNTER — TELEPHONE (OUTPATIENT)
Dept: DERMATOLOGY | Facility: CLINIC | Age: 77
End: 2022-07-06

## 2022-07-06 NOTE — RESULT ENCOUNTER NOTE
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

## 2022-07-06 NOTE — TELEPHONE ENCOUNTER
Pre- operative Mohs Telephone Scheduling Note    Do you have a pacemaker or defibrillator? no    Do you take antibiotics before skin or dental procedures? no  If yes, will likely require pre-operative antibiotics  Ask  the patient why they take the antibiotics (usually because of joint replacement)  Do you have a history of a joint replacements within the past 2 years? no   If yes, will likely require pre-operative antibiotics  Ask if orthopaedic surgeon has prescribed pre-operative antibiotics to take before procedures/dental work? Do you take any OTC medications that thin your blood (Aspirin, Aleve, Ibuprofen) or supplements that thin your blood (fish oil, garlic, vitamin E, Ginko Biloba)? yes: ASA 81mg    Do you take any prescribed medications that thin your blood (Coumadin, Plavix, Xarelto, Eliquis or another prescribed blood thinner)? no    Do you have an allergy to lidocaine or epinephrine? no    Do you have an allergy to shellfish? no    Do you smoke? no      If yes,  patient to try and stop 2 days before surgery and 7 days after the surgery  Minimizing smoking as much as possible during this time will improve healing and the cosmetic result after surgery  Date scheduled: 08/08/2022 with Dr Bienvenido Mac @ 9:30 am SCCIS right parietal scalp    Coordination of Care with other provider (Oculoplastics, Plastics, ENT) required? no   IF YES, PLEASE FORWARD TO APPROPRIATE PERSONNEL TO HELP COORDINATE  Are there remaining tumors to be scheduled? yes: BCC left temple     Was Prior Authorization obtained?  No (please use  [email or web address] to document prior auth)

## 2022-07-08 ENCOUNTER — RA CDI HCC (OUTPATIENT)
Dept: OTHER | Facility: HOSPITAL | Age: 77
End: 2022-07-08

## 2022-07-14 ENCOUNTER — TELEPHONE (OUTPATIENT)
Dept: FAMILY MEDICINE CLINIC | Facility: CLINIC | Age: 77
End: 2022-07-14

## 2022-07-14 NOTE — TELEPHONE ENCOUNTER
Medication:Lancets (OneTouch Delica Plus [id number]) MISC [      Dosage:  How Often:Sig: TEST DAILY, G70 3--RI CONFIMED DELICA 33 G  Quantity:  100  Last Office Visit: 3/31/22  Next Office Visit: 7/18/22  Last refilled: Written 7/8/21  How many pills left:  Pharmacy:   [facility] [place] [address]  Phone: [phone] Fax: [phone]

## 2022-07-15 DIAGNOSIS — E11.9 TYPE 2 DIABETES MELLITUS WITHOUT COMPLICATION, WITHOUT LONG-TERM CURRENT USE OF INSULIN (HCC): ICD-10-CM

## 2022-07-15 RX ORDER — LANCETS 33 GAUGE
EACH MISCELLANEOUS
Qty: 100 EACH | Refills: 11 | Status: SHIPPED | OUTPATIENT
Start: 2022-07-15

## 2022-07-18 ENCOUNTER — APPOINTMENT (OUTPATIENT)
Dept: LAB | Facility: CLINIC | Age: 77
End: 2022-07-18
Payer: COMMERCIAL

## 2022-07-18 ENCOUNTER — OFFICE VISIT (OUTPATIENT)
Dept: FAMILY MEDICINE CLINIC | Facility: CLINIC | Age: 77
End: 2022-07-18
Payer: COMMERCIAL

## 2022-07-18 VITALS
SYSTOLIC BLOOD PRESSURE: 130 MMHG | OXYGEN SATURATION: 98 % | HEIGHT: 68 IN | DIASTOLIC BLOOD PRESSURE: 84 MMHG | HEART RATE: 69 BPM | BODY MASS INDEX: 30.77 KG/M2 | WEIGHT: 203 LBS

## 2022-07-18 DIAGNOSIS — H61.21 IMPACTED CERUMEN OF RIGHT EAR: ICD-10-CM

## 2022-07-18 DIAGNOSIS — R53.83 FATIGUE, UNSPECIFIED TYPE: Primary | ICD-10-CM

## 2022-07-18 DIAGNOSIS — Z01.812 PRE-PROCEDURAL LABORATORY EXAMINATIONS: ICD-10-CM

## 2022-07-18 DIAGNOSIS — R53.83 FATIGUE, UNSPECIFIED TYPE: ICD-10-CM

## 2022-07-18 DIAGNOSIS — I10 PRIMARY HYPERTENSION: ICD-10-CM

## 2022-07-18 DIAGNOSIS — Z79.899 OTHER LONG TERM (CURRENT) DRUG THERAPY: ICD-10-CM

## 2022-07-18 DIAGNOSIS — M54.2 CERVICALGIA: ICD-10-CM

## 2022-07-18 LAB
ALBUMIN SERPL BCP-MCNC: 4.2 G/DL (ref 3.5–5)
ALP SERPL-CCNC: 107 U/L (ref 34–104)
ALT SERPL W P-5'-P-CCNC: 14 U/L (ref 7–52)
ANION GAP SERPL CALCULATED.3IONS-SCNC: 6 MMOL/L (ref 4–13)
AST SERPL W P-5'-P-CCNC: 13 U/L (ref 13–39)
BASOPHILS # BLD AUTO: 0.03 THOUSANDS/ΜL (ref 0–0.1)
BASOPHILS NFR BLD AUTO: 1 % (ref 0–1)
BILIRUB SERPL-MCNC: 0.54 MG/DL (ref 0.2–1)
BUN SERPL-MCNC: 25 MG/DL (ref 5–25)
CALCIUM SERPL-MCNC: 9.4 MG/DL (ref 8.4–10.2)
CHLORIDE SERPL-SCNC: 104 MMOL/L (ref 96–108)
CO2 SERPL-SCNC: 30 MMOL/L (ref 21–32)
CREAT SERPL-MCNC: 0.69 MG/DL (ref 0.6–1.3)
EOSINOPHIL # BLD AUTO: 0.11 THOUSAND/ΜL (ref 0–0.61)
EOSINOPHIL NFR BLD AUTO: 2 % (ref 0–6)
ERYTHROCYTE [DISTWIDTH] IN BLOOD BY AUTOMATED COUNT: 13.1 % (ref 11.6–15.1)
GFR SERPL CREATININE-BSD FRML MDRD: 92 ML/MIN/1.73SQ M
GLUCOSE SERPL-MCNC: 137 MG/DL (ref 65–140)
HCT VFR BLD AUTO: 39.2 % (ref 36.5–49.3)
HGB BLD-MCNC: 13.2 G/DL (ref 12–17)
IMM GRANULOCYTES # BLD AUTO: 0.05 THOUSAND/UL (ref 0–0.2)
IMM GRANULOCYTES NFR BLD AUTO: 1 % (ref 0–2)
LYMPHOCYTES # BLD AUTO: 1.59 THOUSANDS/ΜL (ref 0.6–4.47)
LYMPHOCYTES NFR BLD AUTO: 27 % (ref 14–44)
MCH RBC QN AUTO: 28.9 PG (ref 26.8–34.3)
MCHC RBC AUTO-ENTMCNC: 33.7 G/DL (ref 31.4–37.4)
MCV RBC AUTO: 86 FL (ref 82–98)
MONOCYTES # BLD AUTO: 0.56 THOUSAND/ΜL (ref 0.17–1.22)
MONOCYTES NFR BLD AUTO: 9 % (ref 4–12)
NEUTROPHILS # BLD AUTO: 3.63 THOUSANDS/ΜL (ref 1.85–7.62)
NEUTS SEG NFR BLD AUTO: 60 % (ref 43–75)
NRBC BLD AUTO-RTO: 0 /100 WBCS
PLATELET # BLD AUTO: 160 THOUSANDS/UL (ref 149–390)
PMV BLD AUTO: 10 FL (ref 8.9–12.7)
POTASSIUM SERPL-SCNC: 4.6 MMOL/L (ref 3.5–5.3)
PROT SERPL-MCNC: 7.3 G/DL (ref 6.4–8.4)
RBC # BLD AUTO: 4.57 MILLION/UL (ref 3.88–5.62)
SODIUM SERPL-SCNC: 140 MMOL/L (ref 135–147)
TSH SERPL DL<=0.05 MIU/L-ACNC: 1.35 UIU/ML (ref 0.45–4.5)
VIT B12 SERPL-MCNC: 247 PG/ML (ref 100–900)
WBC # BLD AUTO: 5.97 THOUSAND/UL (ref 4.31–10.16)

## 2022-07-18 PROCEDURE — 84443 ASSAY THYROID STIM HORMONE: CPT

## 2022-07-18 PROCEDURE — 85025 COMPLETE CBC W/AUTO DIFF WBC: CPT

## 2022-07-18 PROCEDURE — 36415 COLL VENOUS BLD VENIPUNCTURE: CPT

## 2022-07-18 PROCEDURE — 99214 OFFICE O/P EST MOD 30 MIN: CPT | Performed by: FAMILY MEDICINE

## 2022-07-18 PROCEDURE — 82607 VITAMIN B-12: CPT

## 2022-07-18 PROCEDURE — 80053 COMPREHEN METABOLIC PANEL: CPT

## 2022-07-18 RX ORDER — METHOCARBAMOL 500 MG/1
500 TABLET, FILM COATED ORAL
Qty: 14 TABLET | Refills: 0 | Status: SHIPPED | OUTPATIENT
Start: 2022-07-18

## 2022-07-18 NOTE — PROGRESS NOTES
Assessment/Plan:  Acute on chronic fatigue  Check blood work  Appears well on exam and BP within goal  Trapezium muscle strain likely causing the neck pain  Muscle relaxer ordered for NIGHTTIME use only  Aware it can increase risk for fall and cause confusion  B12, TSH, and CBC ordered for fatigue  Will schedule an appt for cerumen removal      Problem List Items Addressed This Visit        Cardiovascular and Mediastinum    Hypertension      Other Visit Diagnoses     Fatigue, unspecified type    -  Primary    Relevant Orders    CBC and differential (Completed)    Vitamin B12    Comprehensive metabolic panel (Completed)    TSH, 3rd generation with Free T4 reflex    Cervicalgia        Relevant Medications    methocarbamol (ROBAXIN) 500 mg tablet    Impacted cerumen of right ear        Other long term (current) drug therapy         Relevant Orders    Vitamin B12            Subjective:      Patient ID: Prince Mcmahan is a 69 y o  male here with fatigue, neck pain, and fullness in the ears  Fatigue is intermittent  Sleeps well at night and feels rested when he wakes up  No melena  Lost 5 lbs March to June but has been stable since  Neck pain started a few days ago  Uses tramadol intermittently for headaches  Mildly SOB and intermittent chest pain  This has improved since starting colchicine  Also denies cough, fever, or diarrhea  The following portions of the patient's history were reviewed and updated as appropriate:   He  has a past medical history of Brain mass, Chest pain, Diabetes type 2, uncontrolled, Edema of left lower extremity, GERD (gastroesophageal reflux disease), Hyperlipidemia, Hypertension, Impaired fasting glucose, Irregular heartbeat, Skin cancer (melanoma) ([facility] [address] ), and Varicose 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    He   Patient Active Problem List    Diagnosis Date Noted    Type 2 diabetes mellitus without complication, without long-term current use of insulin ([address] ) 04/18/2022    Palpitations 12/17/2021    [facility] 12/10/2021    Primary osteoarthritis of both knees 09/16/2020    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[facility] ) 08/12/2020    Scalp lesion 06/08/2020    History of hepatitis C 02/13/2020    Renal cyst 07/30/2019    Other chest pain 10/24/2018    Spondylolisthesis at L5-S1 level 10/12/2018    Chronic constipation 11/29/2017    GERD (gastroesophageal reflux disease) 09/11/2017    Varicose 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 06/22/2017    Episodic cluster headache, not intractable 03/09/2017    Controlled diabetes mellitus with diabetic neuropathy ([facility] ) 12/15/2016    Brain tumor ([place] [address] ) 11/25/2015    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[place] [address] ) 11/25/2015    Meningioma ([place] [address] ) 11/10/2015    Hypertension 03/10/2015    Nonintractable headache 01/13/2015    Acute pain of both knees 10/23/2014     He  has a past surgical history that includes Inguinal hernia repair; Varicose vein surgery (Left); Colonoscopy; Tonsillectomy; and Colonoscopy (N/A, 7/10/2018)  His family history includes Melanoma (age of onset: 16) in his daughter; No Known Problems in his father and mother  He  reports that he has never smoked  He has never used smokeless tobacco  He reports that he does not drink alcohol and does not use drugs    Current Outpatient Medications on File Prior to Visit   Medication Sig    acetaminophen (TYLENOL) 325 mg tablet Take 2 tablets (650 mg total) by mouth every 6 (six) hours as needed for mild pain or fever    amLODIPine (NORVASC) 5 mg tablet Take 1 tablet (5 mg total) by mouth daily    aspirin 81 MG tablet Take 81 mg by mouth daily     atorvastatin (LIPITOR) 10 mg tablet Take 1 tablet (10 mg total) by mouth daily    calcium carbonate (OS-ANGELIKA) 600 MG tablet Take 1 tablet (600 mg total) by mouth daily    colchicine (COLCRYS) 0 6 mg tablet Take 1 tablet (0 6 mg total) by mouth daily    diclofenac sodium (VOLTAREN) 1 % Apply 2 g topically 4 (four) times a day    famotidine (PEPCID) 20 mg tablet TAKE 1 TABLET BY MOUTH EVERY DAY    Glucose Blood (ONETOUCH ULTRA BLUE VI) by In Vitro route daily    hydrocortisone 2 5 % cream APPLY TOPICALLY 2 TIMES A DAY AS NEEDED FOR RASH    Lancets (OneTouch Delica Plus PRYZIN19A) MISC TEST DAILY, W96 5--MT CONFIMED DELICA 33 G    Linzess 145 MCG CAPS TAKE 1 CAPSULE BY MOUTH EVERY DAY    lisinopril (ZESTRIL) 10 mg tablet TAKE 1 TABLET BY MOUTH EVERY DAY    MELATONIN PO Take 10 mg by mouth daily at bedtime    metFORMIN (GLUCOPHAGE) 500 mg tablet TAKE 1 TABLET BY MOUTH TWICE A DAY    omeprazole (PriLOSEC) 40 MG capsule TAKE 1 CAPSULE BY MOUTH EVERY DAY    OneTouch Ultra test strip TEST ONCE DAILY    traMADol (ULTRAM) 50 mg tablet Take 1 tablet (50 mg total) by mouth every 6 (six) hours as needed for moderate pain    amoxicillin (AMOXIL) 500 mg capsule  (Patient not taking: Reported on 7/18/2022)    lisinopril (ZESTRIL) 20 mg tablet Take 1 tablet (20 mg total) by mouth daily (Patient not taking: Reported on 7/18/2022)     No current facility-administered medications on file prior to visit  He has No Known Allergies       Review of Systems   Constitutional: Positive for fatigue  Negative for appetite change and fever  Respiratory: Positive for shortness of breath  Negative for cough, chest tightness and wheezing  Cardiovascular: Positive for chest pain (improved )  Negative for palpitations and leg swelling  Musculoskeletal: Negative for myalgias  Skin: Negative for rash  Neurological: Negative for syncope, weakness and light-headedness  Objective:      /84 (BP Location: Left arm, Patient Position: Sitting, Cuff Size: Standard)   Pulse 69   Ht 5' 8" (1 727 m)   Wt 92 1 kg (203 lb)   SpO2 98%   BMI 30 87 kg/m²          Physical Exam  Vitals reviewed  Constitutional:       General: He is not in acute distress  Appearance: He is not ill-appearing, toxic-appearing or diaphoretic     HENT:      Head: Normocephalic and atraumatic  Left Ear: There is impacted cerumen  Eyes:      Extraocular Movements: Extraocular movements intact  Cardiovascular:      Rate and Rhythm: Normal rate and regular rhythm  Heart sounds: No murmur heard  Pulmonary:      Effort: Pulmonary effort is normal       Breath sounds: No stridor  No wheezing, rhonchi or rales  Musculoskeletal:      Right lower leg: No edema  Left lower leg: No edema  Skin:     General: Skin is warm  Neurological:      Mental Status: He is alert  Psychiatric:         Mood and Affect: Mood normal          Behavior: Behavior normal          Thought Content:  Thought content normal

## 2022-07-23 DIAGNOSIS — K21.9 GASTROESOPHAGEAL REFLUX DISEASE WITHOUT ESOPHAGITIS: ICD-10-CM

## 2022-07-23 RX ORDER — OMEPRAZOLE 40 MG/1
CAPSULE, DELAYED RELEASE ORAL
Qty: 90 CAPSULE | Refills: 1 | Status: SHIPPED | OUTPATIENT
Start: 2022-07-23

## 2022-08-04 DIAGNOSIS — R21 RASH: ICD-10-CM

## 2022-08-07 ENCOUNTER — TELEPHONE (OUTPATIENT)
Dept: OTHER | Facility: OTHER | Age: 77
End: 2022-08-07

## 2022-08-07 NOTE — TELEPHONE ENCOUNTER
Patient's family member Ronald Garcia called to cancel procedure for tomorrow 8/8/22 with Dr Jean Paul Nava  Patent is not feeling well  Please call back to reschedule

## 2022-08-08 ENCOUNTER — TELEPHONE (OUTPATIENT)
Dept: DERMATOLOGY | Facility: CLINIC | Age: 77
End: 2022-08-08

## 2022-08-08 NOTE — TELEPHONE ENCOUNTER
Telephone call to patient daughter in regards re scheduling his mohs appointment   Spoke to daughter, daughter stated she will call back with time and date she's available

## 2022-08-29 DIAGNOSIS — K59.09 CHRONIC CONSTIPATION: ICD-10-CM

## 2022-08-29 RX ORDER — LINACLOTIDE 145 UG/1
CAPSULE, GELATIN COATED ORAL
Qty: 30 CAPSULE | Refills: 1 | Status: SHIPPED | OUTPATIENT
Start: 2022-08-29

## 2022-09-05 DIAGNOSIS — K21.9 GASTROESOPHAGEAL REFLUX DISEASE WITHOUT ESOPHAGITIS: ICD-10-CM

## 2022-09-05 RX ORDER — FAMOTIDINE 20 MG/1
TABLET, FILM COATED ORAL
Qty: 90 TABLET | Refills: 0 | Status: SHIPPED | OUTPATIENT
Start: 2022-09-05

## 2022-09-09 DIAGNOSIS — I10 ESSENTIAL HYPERTENSION: ICD-10-CM

## 2022-09-09 NOTE — TELEPHONE ENCOUNTER
Telephone call to patient, In regards re scheduling MOHS  Left voice message to patient to please return my call at there earliest convenience

## 2022-09-12 RX ORDER — LISINOPRIL 20 MG/1
TABLET ORAL
Qty: 90 TABLET | Refills: 3 | Status: SHIPPED | OUTPATIENT
Start: 2022-09-12

## 2022-09-15 DIAGNOSIS — K59.01 SLOW TRANSIT CONSTIPATION: ICD-10-CM

## 2022-09-15 RX ORDER — BLOOD SUGAR DIAGNOSTIC
STRIP MISCELLANEOUS
Qty: 25 STRIP | Refills: 47 | Status: SHIPPED | OUTPATIENT
Start: 2022-09-15

## 2022-10-05 DIAGNOSIS — I10 ESSENTIAL HYPERTENSION: ICD-10-CM

## 2022-10-05 RX ORDER — AMLODIPINE BESYLATE 5 MG/1
5 TABLET ORAL DAILY
Qty: 90 TABLET | Refills: 0 | Status: SHIPPED | OUTPATIENT
Start: 2022-10-05

## 2022-10-29 DIAGNOSIS — E11.9 TYPE 2 DIABETES MELLITUS WITHOUT COMPLICATION, WITHOUT LONG-TERM CURRENT USE OF INSULIN (HCC): ICD-10-CM

## 2022-11-11 DIAGNOSIS — Z01.812 PRE-PROCEDURAL LABORATORY EXAMINATIONS: Primary | ICD-10-CM

## 2022-11-17 ENCOUNTER — TELEPHONE (OUTPATIENT)
Dept: NEUROSURGERY | Facility: CLINIC | Age: 77
End: 2022-11-17

## 2022-11-17 NOTE — TELEPHONE ENCOUNTER
Reached out to patient and left a detailed VM advising he is in need of labwork to be completed prior to his MRI on 11/21  Provided office number should he have questions or concerns

## 2022-11-17 NOTE — TELEPHONE ENCOUNTER
----- Message from Joi Winter sent at 11/15/2022 10:17 PM EST -----    Can one of you please f /u taht patient completed lab    Thank U     ----- Message -----  From: Monikaalley Garcia  Sent: [id number]/50/6039  12:24 PM EST  To: ZAHRA Billings    Patient will be having a MRI with contrast  Blood work of creatinine and eGFR needs to be doneat at least two days  prior of the MRI Study  Please contact patient  Any question please contact me at [phone]  If blood work is done and have a copy please fax it to [phone]  Thank you      [address]

## 2022-11-23 ENCOUNTER — TELEPHONE (OUTPATIENT)
Dept: NEUROSURGERY | Facility: CLINIC | Age: 77
End: 2022-11-23

## 2022-11-23 NOTE — TELEPHONE ENCOUNTER
Called and spoke to Wing 2 (Daughter)  Patient did not go for MRI 11/21, she will call CS to reschedule MRI and then return call to our office to reschedule follow up

## 2022-11-25 NOTE — TELEPHONE ENCOUNTER
I called daughter glen as well to f/u on mri no showed from 11/21/22 he needs this before 11/28/2022 apt - lm for daughter glen to call back _BA

## 2022-12-01 ENCOUNTER — TELEPHONE (OUTPATIENT)
Dept: FAMILY MEDICINE CLINIC | Facility: CLINIC | Age: 77
End: 2022-12-01

## 2022-12-01 DIAGNOSIS — K21.9 GASTROESOPHAGEAL REFLUX DISEASE WITHOUT ESOPHAGITIS: ICD-10-CM

## 2022-12-01 RX ORDER — FAMOTIDINE 20 MG/1
TABLET, FILM COATED ORAL
Qty: 90 TABLET | Refills: 0 | Status: SHIPPED | OUTPATIENT
Start: 2022-12-01

## 2022-12-01 NOTE — TELEPHONE ENCOUNTER
Patient came into the office and stated that he needs a new glucose monitor  The patient stated that his is broken  Please advise

## 2022-12-02 DIAGNOSIS — E11.9 TYPE 2 DIABETES MELLITUS WITHOUT COMPLICATION, WITHOUT LONG-TERM CURRENT USE OF INSULIN (HCC): Primary | ICD-10-CM

## 2022-12-05 ENCOUNTER — OFFICE VISIT (OUTPATIENT)
Dept: FAMILY MEDICINE CLINIC | Facility: CLINIC | Age: 77
End: 2022-12-05

## 2022-12-05 VITALS
RESPIRATION RATE: 16 BRPM | OXYGEN SATURATION: 98 % | DIASTOLIC BLOOD PRESSURE: 70 MMHG | WEIGHT: 211.4 LBS | BODY MASS INDEX: 32.04 KG/M2 | SYSTOLIC BLOOD PRESSURE: 122 MMHG | HEART RATE: 66 BPM | HEIGHT: 68 IN | TEMPERATURE: 97.5 F

## 2022-12-05 DIAGNOSIS — E11.40 CONTROLLED TYPE 2 DIABETES MELLITUS WITH DIABETIC NEUROPATHY, WITHOUT LONG-TERM CURRENT USE OF INSULIN (HCC): ICD-10-CM

## 2022-12-05 DIAGNOSIS — C80.1 MALIGNANT SPINDLE CELL NEOPLASM (HCC): ICD-10-CM

## 2022-12-05 DIAGNOSIS — D32.9 MENINGIOMA (HCC): ICD-10-CM

## 2022-12-05 DIAGNOSIS — I10 PRIMARY HYPERTENSION: ICD-10-CM

## 2022-12-05 DIAGNOSIS — E11.9 TYPE 2 DIABETES MELLITUS WITHOUT COMPLICATION, WITHOUT LONG-TERM CURRENT USE OF INSULIN (HCC): Primary | ICD-10-CM

## 2022-12-05 LAB — SL AMB POCT HEMOGLOBIN AIC: 6.7 (ref ?–6.5)

## 2022-12-05 NOTE — PROGRESS NOTES
FAMILY PRACTICE OFFICE VISIT       NAME: Zuleima Alvarez  AGE: 69 y o  SEX: male       : 1945        MRN: [id number]    DATE: 2022  TIME: 4:54 AM    Assessment and Plan   1  Type 2 diabetes mellitus without complication, without long-term current use of insulin ([facility])  Assessment & Plan:  Stable  Cont meds    Lab Results   Component Value Date    HGBA1C 6 7 (A) 2022       Orders:  -     POCT hemoglobin A1c    2  Controlled type 2 diabetes mellitus with diabetic neuropathy, without long-term current use of insulin ([facility] [address] )    3  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[facility])  Assessment & Plan: On forehead  Again recommended patient see dermatology or oncology for this lesion to be addressed and removed        4  Primary hypertension  Assessment & Plan:  Stable  Cont meds        5  Meningioma [facility])  Assessment & Plan:  Cont f/u with neurosurgery for surveillance           There are no Patient Instructions on file for this visit  Chief Complaint     Chief Complaint   Patient presents with   • Follow-up   • Diabetes   • Hypertension       History of Present Illness   Zuleima Alvarez is a 69y o -year-old male who is here for f/u to chronic medicl conditions  Feeling well overall  Has lesion on scalp, discussed it is his skin cancer, spindle cell lesion, I stated he needs to get this take care of asap, he needs to see derm or oncology  Diabetes well controlled  He states he saw eye doctor but cannot remember name of group he saw      Review of Systems   Review of Systems   Constitutional: Negative for fatigue and fever  HENT: Negative for congestion, postnasal drip and rhinorrhea  Eyes: Negative for photophobia and visual disturbance  Respiratory: Negative for cough, shortness of breath and wheezing  Cardiovascular: Negative for chest pain and palpitations  Gastrointestinal: Negative for constipation, diarrhea, nausea and vomiting  Genitourinary: Negative for dysuria and frequency  Musculoskeletal: Negative for arthralgias and myalgias  Skin: Negative for rash  Neurological: Negative for dizziness and headaches  Hematological: Negative for adenopathy  Psychiatric/Behavioral: Negative for dysphoric mood and sleep disturbance  The patient is not nervous/anxious  Active Problem List     Patient Active Problem List   Diagnosis   • Brain tumor [facility])   • 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[facility] [address] )   • Chronic constipation   • Controlled diabetes mellitus with diabetic neuropathy ([facility] )   • Episodic cluster headache, not intractable   • GERD (gastroesophageal reflux disease)   • Nonintractable headache   • Hypertension   • Acute pain of both knees   • Meningioma ([facility])   • Varicose 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   • Spondylolisthesis at L5-S1 level   • Other chest pain   • Renal cyst   • History of hepatitis C   • Scalp lesion   • 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[facility])   • Primary osteoarthritis of both knees   • Unstable angina ([facility])   • Palpitations   • Type 2 diabetes mellitus without complication, without long-term current use of insulin ([facility] )         Past Medical History:  Past Medical History:   Diagnosis Date   • Brain mass     Last Assessed; 11/5/2015   • Chest pain     Last Assessed: 1/22/2015   • Diabetes type 2, uncontrolled     Last Assessed: 3/1/2016   • Edema of left lower extremity     Last Assessed: 3/22/2017   • GERD (gastroesophageal reflux disease)    • Hyperlipidemia    • Hypertension    • Impaired fasting glucose     Last Assessed: 11/10/2015   • Irregular heartbeat    • Skin cancer (melanoma) (HCC)     Scalp    • Varicose 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     Last Assessed: 3/22/2017       Past Surgical History:  Past Surgical History:   Procedure Laterality Date   • COLONOSCOPY     • COLONOSCOPY N/A 7/10/2018    Procedure: COLONOSCOPY;  Surgeon: Keyla Echavarria MD;  Location: [facility];   Service: Colorectal   • INGUINAL HERNIA REPAIR      20+ years ago - [facility]; Last Assessed: 10/23/2014   • TONSILLECTOMY     • VARICOSE VEIN SURGERY Left     x2 left leg - 40+ yrs ago, [facility] [place]       Family History:  Family History   Problem Relation Age of Onset   • No Known Problems Mother    • No Known Problems Father    • Melanoma Daughter 16       Social History:  Social History     Socioeconomic History   • Marital status: /Civil Union     Spouse name: Not on file   • Number of children: 5   • Years of education: Not on file   • Highest education level: Not on file   Occupational History   • Occupation: Retired: Construction   Tobacco Use   • Smoking status: Never   • Smokeless tobacco: Never   Vaping Use   • Vaping Use: Never used   Substance and Sexual Activity   • Alcohol use: No   • Drug use: No   • Sexual activity: Not Currently     Partners: Female   Other Topics Concern   • Not on file   Social History Narrative    Uses safety equipment         Social Determinants of Health     Financial Resource Strain: Not on file   Food Insecurity: Not on file   Transportation Needs: Not on file   Physical Activity: Not on file   Stress: Not on file   Social Connections: Not on file   Intimate Partner Violence: Not on file   Housing Stability: Not on file       Objective     Vitals:    12/05/22 1536   BP: 122/70   Pulse: 66   Resp: 16   Temp: 97 5 °F (36 4 °C)   SpO2: 98%     Wt Readings from Last 3 Encounters:   12/05/22 95 9 kg (211 lb 6 4 oz)   07/18/22 92 1 kg (203 lb)   06/28/22 92 1 kg (203 lb)       Physical Exam  Vitals and nursing note reviewed  HENT:      Head: Normocephalic and atraumatic  Right Ear: Tympanic membrane, ear canal and external ear normal       Left Ear: Tympanic membrane, ear canal and external ear normal       Nose: Nose normal       Mouth/Throat:      Mouth: Mucous membranes are moist    Eyes:      Conjunctiva/sclera: Conjunctivae normal    Neck:      Vascular: No carotid bruit     Cardiovascular:      Rate and Rhythm: Normal rate and regular rhythm  Heart sounds: Normal heart sounds  Pulmonary:      Effort: Pulmonary effort is normal       Breath sounds: Normal breath sounds  Abdominal:      General: Bowel sounds are normal    Musculoskeletal:         General: Normal range of motion  Cervical back: Normal range of motion and neck supple  Skin:     General: Skin is warm and dry  Capillary Refill: Capillary refill takes less than 2 seconds  Comments: Flush and scabbed lesion approx 4mm round raised     Neurological:      General: No focal deficit present  Mental Status: He is alert and oriented to person, place, and time  Psychiatric:         Mood and Affect: Mood normal          Behavior: Behavior normal          Thought Content:  Thought content normal          Judgment: Judgment normal          Pertinent Laboratory/Diagnostic Studies:  Lab Results   Component Value Date    GLUCOSE 121 10/28/2015    BUN 25 07/18/2022    CREATININE 0 69 07/18/2022    CALCIUM 9 4 07/18/2022     10/28/2015    K 4 6 07/18/2022    CO2 30 07/18/2022     07/18/2022     Lab Results   Component Value Date    ALT 14 07/18/2022    AST 13 07/18/2022    ALKPHOS 107 (H) 07/18/2022    BILITOT 0 53 08/27/2015       Lab Results   Component Value Date    WBC 5 97 07/18/2022    HGB 13 2 07/18/2022    HCT 39 2 07/18/2022    MCV 86 07/18/2022     07/18/2022       No results found for: TSH    Lab Results   Component Value Date    CHOL 154 08/27/2015     Lab Results   Component Value Date    TRIG 160 (H) 12/20/2021     Lab Results   Component Value Date    HDL 33 (L) 12/20/2021     Lab Results   Component Value Date    LDLCALC 126 (H) 12/20/2021     Lab Results   Component Value Date    HGBA1C 6 7 (A) 12/05/2022       Results for orders placed or performed in visit on 12/05/22   POCT hemoglobin A1c   Result Value Ref Range    Hemoglobin A1C 6 7 (A) 6 5       Orders Placed This Encounter   Procedures   • POCT hemoglobin A1c ALLERGIES:  No Known Allergies    Current Medications     Current Outpatient Medications   Medication Sig Dispense Refill   • acetaminophen (TYLENOL) 325 mg tablet Take 2 tablets (650 mg total) by mouth every 6 (six) hours as needed for mild pain or fever 30 tablet 0   • amLODIPine (NORVASC) 5 mg tablet TAKE 1 TABLET (5 MG TOTAL) BY MOUTH DAILY   90 tablet 0   • aspirin 81 MG tablet Take 81 mg by mouth daily      • atorvastatin (LIPITOR) 10 mg tablet Take 1 tablet (10 mg total) by mouth daily 90 tablet 3   • calcium carbonate (OS-ANGELIKA) 600 MG tablet Take 1 tablet (600 mg total) by mouth daily 30 tablet 3   • colchicine (COLCRYS) 0 6 mg tablet Take 1 tablet (0 6 mg total) by mouth daily 90 tablet 3   • diclofenac sodium (VOLTAREN) 1 % Apply 2 g topically 4 (four) times a day 150 g 1   • famotidine (PEPCID) 20 mg tablet TAKE 1 TABLET BY MOUTH EVERY DAY 90 tablet 0   • Glucose Blood (ONETOUCH ULTRA BLUE VI) by In Vitro route daily     • hydrocortisone 2 5 % cream Apply topically 2 (two) times a day 28 35 g 0   • Lancets (OneTouch Delica Plus [id number]) MISC TEST DAILY, W85 2--OE CONFIMED DELICA 33 G 932 each 11   • Linzess 145 MCG CAPS TAKE 1 CAPSULE BY MOUTH EVERY DAY 30 capsule 1   • lisinopril (ZESTRIL) 10 mg tablet TAKE 1 TABLET BY MOUTH EVERY DAY 90 tablet 3   • lisinopril (ZESTRIL) 20 mg tablet TAKE 1 TABLET BY MOUTH EVERY DAY 90 tablet 3   • MELATONIN PO Take 10 mg by mouth daily at bedtime     • metFORMIN (GLUCOPHAGE) 500 mg tablet TAKE 1 TABLET BY MOUTH TWICE A  tablet 1   • methocarbamol (ROBAXIN) 500 mg tablet Take 1 tablet (500 mg total) by mouth daily at bedtime 14 tablet 0   • omeprazole (PriLOSEC) 40 MG capsule TAKE 1 CAPSULE BY MOUTH EVERY DAY 90 capsule 1   • OneTouch Ultra test strip TEST ONCE DAILY 25 strip 47   • traMADol (ULTRAM) 50 mg tablet Take 1 tablet (50 mg total) by mouth every 6 (six) hours as needed for moderate pain 30 tablet 0   • amoxicillin (AMOXIL) 500 mg capsule  (Patient not taking: Reported on 7/18/2022)       No current facility-administered medications for this visit           Health Maintenance     Health Maintenance   Topic Date Due   • DM Eye Exam  Never done   • Hepatitis B Vaccine (1 of 3 - Risk 3-dose series) Never done   • COVID-19 Vaccine (4 - Booster for Pfizer series) 03/09/2022   • Influenza Vaccine (1) 09/01/2022   • Depression Screening  03/31/2023   • BMI: Followup Plan  03/31/2023   • Medicare Annual Wellness Visit (AWV)  03/31/2023   • Diabetic Foot Exam  03/31/2023   • Fall Risk  04/18/2023   • HEMOGLOBIN A1C  06/05/2023   • BMI: Adult  12/05/2023   • Pneumococcal Vaccine: 65+ Years  Completed   • HIB Vaccine  Aged Out   • IPV Vaccine  Aged Out   • Hepatitis A Vaccine  Aged Out   • Meningococcal ACWY Vaccine  Aged Out   • HPV Vaccine  Aged Out   • Hepatitis C Screening  Discontinued   • Colorectal Cancer Screening  Discontinued     Immunization History   Administered Date(s) Administered   • COVID-19 PFIZER VACCINE 0 3 ML IM 03/29/2021, 04/21/2021, 11/09/2021   • INFLUENZA 10/27/2015, 10/01/2016, 10/13/2016, 12/19/2018   • Influenza Split High Dose Preservative Free IM 10/23/2014, 10/27/2015, 10/01/2016, 10/13/2016, 10/13/2016, 10/13/2016   • Influenza, high dose seasonal 0 7 mL 12/19/2018, 10/14/2019, 10/05/2021   • Pneumococcal Conjugate 13-Valent 08/25/2015, 01/19/2017, 10/14/2019   • Pneumococcal Polysaccharide PPV23 10/05/2021          ZAHRA Lu

## 2022-12-05 NOTE — PROGRESS NOTES
FAMILY PRACTICE OFFICE VISIT       NAME: Sada Cooley  AGE: 69 y o  SEX: male       : 1945        MRN: [id number]    DATE: 2022  TIME: 4:56 AM    Assessment and Plan   1  Type 2 diabetes mellitus without complication, without long-term current use of insulin ([facility])  Assessment & Plan:  Stable  Cont meds    Lab Results   Component Value Date    HGBA1C 6 7 (A) 2022       Orders:  -     POCT hemoglobin A1c    2  Controlled type 2 diabetes mellitus with diabetic neuropathy, without long-term current use of insulin ([facility] [address] )    3  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[facility])  Assessment & Plan: On forehead  Again recommended patient see dermatology or oncology for this lesion to be addressed and removed        4  Primary hypertension  Assessment & Plan:  Stable  Cont meds        5  Meningioma [facility])  Assessment & Plan:  Cont f/u with neurosurgery for surveillance                   Chief Complaint     Chief Complaint   Patient presents with   • Follow-up   • Diabetes   • Hypertension       History of Present Illness   Sada Cooley is a 69y o -year-old male who is here for f/u to chronic medical conditions  Overall feeling well  States had eye exam but cannot remember name of eye doctor  Offers no c/o      Review of Systems   Review of Systems   Constitutional: Negative for fatigue and fever  HENT: Negative for congestion, postnasal drip and rhinorrhea  Eyes: Negative for photophobia and visual disturbance  Respiratory: Negative for cough, shortness of breath and wheezing  Cardiovascular: Negative for chest pain and palpitations  Gastrointestinal: Negative for constipation, diarrhea and nausea  Genitourinary: Negative for dysuria and frequency  Musculoskeletal: Negative for arthralgias and myalgias  Skin: Negative for rash  Neurological: Negative for dizziness, light-headedness and headaches  Hematological: Negative for adenopathy     Psychiatric/Behavioral: Negative for dysphoric mood and sleep disturbance  The patient is not nervous/anxious  Active Problem List     Patient Active Problem List   Diagnosis   • Brain tumor [facility])   • 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[facility] [address] )   • Chronic constipation   • Controlled diabetes mellitus with diabetic neuropathy ([facility] [address] )   • Episodic cluster headache, not intractable   • GERD (gastroesophageal reflux disease)   • Nonintractable headache   • Hypertension   • Acute pain of both knees   • Meningioma (HCC)   • Varicose 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   • Spondylolisthesis at L5-S1 level   • Other chest pain   • Renal cyst   • History of hepatitis C   • Scalp lesion   • 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HCC)   • Primary osteoarthritis of both knees   • Unstable angina (HCC)   • Palpitations   • Type 2 diabetes mellitus without complication, without long-term current use of insulin ([facility] )         Past Medical History:  Past Medical History:   Diagnosis Date   • Brain mass     Last Assessed; 11/5/2015   • Chest pain     Last Assessed: 1/22/2015   • Diabetes type 2, uncontrolled     Last Assessed: 3/1/2016   • Edema of left lower extremity     Last Assessed: 3/22/2017   • GERD (gastroesophageal reflux disease)    • Hyperlipidemia    • Hypertension    • Impaired fasting glucose     Last Assessed: 11/10/2015   • Irregular heartbeat    • Skin cancer (melanoma) (HCC)     Scalp    • Varicose 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     Last Assessed: 3/22/2017       Past Surgical History:  Past Surgical History:   Procedure Laterality Date   • COLONOSCOPY     • COLONOSCOPY N/A 7/10/2018    Procedure: COLONOSCOPY;  Surgeon: Rhina Gracia MD;  Location: [facility];   Service: Colorectal   • INGUINAL HERNIA REPAIR      20+ years ago - [facility]; Last Assessed: 10/23/2014   • TONSILLECTOMY     • VARICOSE VEIN SURGERY Left     x2 left leg - 40+ yrs ago, [facility] and [facility]       Family History:  Family History   Problem Relation Age of Onset   • No Known Problems Mother    • No Known Problems Father    • Melanoma Daughter 16       Social History:  Social History     Socioeconomic History   • Marital status: /Civil Union     Spouse name: Not on file   • Number of children: 5   • Years of education: Not on file   • Highest education level: Not on file   Occupational History   • Occupation: Retired: Construction   Tobacco Use   • Smoking status: Never   • Smokeless tobacco: Never   Vaping Use   • Vaping Use: Never used   Substance and Sexual Activity   • Alcohol use: No   • Drug use: No   • Sexual activity: Not Currently     Partners: Female   Other Topics Concern   • Not on file   Social History Narrative    Uses safety equipment         Social Determinants of Health     Financial Resource Strain: Not on file   Food Insecurity: Not on file   Transportation Needs: Not on file   Physical Activity: Not on file   Stress: Not on file   Social Connections: Not on file   Intimate Partner Violence: Not on file   Housing Stability: Not on file       Objective     Vitals:    12/05/22 1536   BP: 122/70   Pulse: 66   Resp: 16   Temp: 97 5 °F (36 4 °C)   SpO2: 98%     Wt Readings from Last 3 Encounters:   12/05/22 95 9 kg (211 lb 6 4 oz)   07/18/22 92 1 kg (203 lb)   06/28/22 92 1 kg (203 lb)       Physical Exam  Vitals and nursing note reviewed  Constitutional:       Appearance: Normal appearance  HENT:      Head: Normocephalic and atraumatic  Right Ear: Tympanic membrane, ear canal and external ear normal       Left Ear: Tympanic membrane, ear canal and external ear normal       Nose: Nose normal       Mouth/Throat:      Mouth: Mucous membranes are moist    Eyes:      Conjunctiva/sclera: Conjunctivae normal    Cardiovascular:      Rate and Rhythm: Normal rate and regular rhythm  Pulses: Normal pulses  Heart sounds: Normal heart sounds  Pulmonary:      Effort: Pulmonary effort is normal       Breath sounds: Normal breath sounds     Abdominal:      General: Bowel sounds are normal  Musculoskeletal:         General: Normal range of motion  Cervical back: Normal range of motion and neck supple  Skin:     General: Skin is warm and dry  Capillary Refill: Capillary refill takes less than 2 seconds  Comments: 4mm flesh colored raised scabbed lesion    Neurological:      General: No focal deficit present  Mental Status: He is alert  Psychiatric:         Mood and Affect: Mood normal          Behavior: Behavior normal          Thought Content:  Thought content normal          Judgment: Judgment normal          Pertinent Laboratory/Diagnostic Studies:  Lab Results   Component Value Date    GLUCOSE 121 10/28/2015    BUN 25 07/18/2022    CREATININE 0 69 07/18/2022    CALCIUM 9 4 07/18/2022     10/28/2015    K 4 6 07/18/2022    CO2 30 07/18/2022     07/18/2022     Lab Results   Component Value Date    ALT 14 07/18/2022    AST 13 07/18/2022    ALKPHOS 107 (H) 07/18/2022    BILITOT 0 53 08/27/2015       Lab Results   Component Value Date    WBC 5 97 07/18/2022    HGB 13 2 07/18/2022    HCT 39 2 07/18/2022    MCV 86 07/18/2022     07/18/2022       No results found for: TSH    Lab Results   Component Value Date    CHOL 154 08/27/2015     Lab Results   Component Value Date    TRIG 160 (H) 12/20/2021     Lab Results   Component Value Date    HDL 33 (L) 12/20/2021     Lab Results   Component Value Date    LDLCALC 126 (H) 12/20/2021     Lab Results   Component Value Date    HGBA1C 6 7 (A) 12/05/2022       Results for orders placed or performed in visit on 12/05/22   POCT hemoglobin A1c   Result Value Ref Range    Hemoglobin A1C 6 7 (A) 6 5       Orders Placed This Encounter   Procedures   • POCT hemoglobin A1c       ALLERGIES:  No Known Allergies    Current Medications     Current Outpatient Medications   Medication Sig Dispense Refill   • acetaminophen (TYLENOL) 325 mg tablet Take 2 tablets (650 mg total) by mouth every 6 (six) hours as needed for mild pain or fever 30 tablet 0   • amLODIPine (NORVASC) 5 mg tablet TAKE 1 TABLET (5 MG TOTAL) BY MOUTH DAILY  90 tablet 0   • aspirin 81 MG tablet Take 81 mg by mouth daily      • atorvastatin (LIPITOR) 10 mg tablet Take 1 tablet (10 mg total) by mouth daily 90 tablet 3   • calcium carbonate (OS-ANGELIKA) 600 MG tablet Take 1 tablet (600 mg total) by mouth daily 30 tablet 3   • colchicine (COLCRYS) 0 6 mg tablet Take 1 tablet (0 6 mg total) by mouth daily 90 tablet 3   • diclofenac sodium (VOLTAREN) 1 % Apply 2 g topically 4 (four) times a day 150 g 1   • famotidine (PEPCID) 20 mg tablet TAKE 1 TABLET BY MOUTH EVERY DAY 90 tablet 0   • Glucose Blood (ONETOUCH ULTRA BLUE VI) by In Vitro route daily     • hydrocortisone 2 5 % cream Apply topically 2 (two) times a day 28 35 g 0   • Lancets (OneTouch Delica Plus [id number]) MISC TEST DAILY, V99 8--HL CONFIMED DELICA 33 G 439 each 11   • Linzess 145 MCG CAPS TAKE 1 CAPSULE BY MOUTH EVERY DAY 30 capsule 1   • lisinopril (ZESTRIL) 10 mg tablet TAKE 1 TABLET BY MOUTH EVERY DAY 90 tablet 3   • lisinopril (ZESTRIL) 20 mg tablet TAKE 1 TABLET BY MOUTH EVERY DAY 90 tablet 3   • MELATONIN PO Take 10 mg by mouth daily at bedtime     • metFORMIN (GLUCOPHAGE) 500 mg tablet TAKE 1 TABLET BY MOUTH TWICE A  tablet 1   • methocarbamol (ROBAXIN) 500 mg tablet Take 1 tablet (500 mg total) by mouth daily at bedtime 14 tablet 0   • omeprazole (PriLOSEC) 40 MG capsule TAKE 1 CAPSULE BY MOUTH EVERY DAY 90 capsule 1   • OneTouch Ultra test strip TEST ONCE DAILY 25 strip 47   • traMADol (ULTRAM) 50 mg tablet Take 1 tablet (50 mg total) by mouth every 6 (six) hours as needed for moderate pain 30 tablet 0   • amoxicillin (AMOXIL) 500 mg capsule  (Patient not taking: Reported on 7/18/2022)       No current facility-administered medications for this visit           Health Maintenance     Health Maintenance   Topic Date Due   • DM Eye Exam  Never done   • COVID-19 Vaccine (4 - Booster for [employer] series) 03/09/2022   • Influenza Vaccine (1) 09/01/2022   • Depression Screening  03/31/2023   • BMI: Followup Plan  03/31/2023   • Hepatitis B Vaccine (1 of 3 - Risk 3-dose series) 01/06/2023 (Originally 7/11/2006)   • Medicare Annual Wellness Visit (AWV)  03/31/2023   • Diabetic Foot Exam  03/31/2023   • Fall Risk  04/18/2023   • HEMOGLOBIN A1C  06/05/2023   • BMI: Adult  12/05/2023   • Pneumococcal Vaccine: 65+ Years  Completed   • HIB Vaccine  Aged Out   • IPV Vaccine  Aged Out   • Hepatitis A Vaccine  Aged Out   • Meningococcal ACWY Vaccine  Aged Out   • HPV Vaccine  Aged Out   • Hepatitis C Screening  Discontinued   • Colorectal Cancer Screening  Discontinued     Immunization History   Administered Date(s) Administered   • COVID-19 PFIZER VACCINE 0 3 ML IM 03/29/2021, 04/21/2021, 11/09/2021   • INFLUENZA 10/27/2015, 10/01/2016, 10/13/2016, 12/19/2018   • Influenza Split High Dose Preservative Free IM 10/23/2014, 10/27/2015, 10/01/2016, 10/13/2016, 10/13/2016, 10/13/2016   • Influenza, high dose seasonal 0 7 mL 12/19/2018, 10/14/2019, 10/05/2021   • Pneumococcal Conjugate 13-Valent 08/25/2015, 01/19/2017, 10/14/2019   • Pneumococcal Polysaccharide PPV23 10/05/2021        Recent Results (from the past 168 hour(s))   POCT hemoglobin A1c    Collection Time: 12/05/22  4:54 PM   Result Value Ref Range    Hemoglobin A1C 6 7 (A) 6 5       ZAHRA Askew

## 2022-12-06 NOTE — ASSESSMENT & PLAN NOTE
On forehead  Again recommended patient see dermatology or oncology for this lesion to be addressed and removed

## 2022-12-07 ENCOUNTER — TELEPHONE (OUTPATIENT)
Dept: NEUROSURGERY | Facility: CLINIC | Age: 77
End: 2022-12-07

## 2022-12-07 NOTE — TELEPHONE ENCOUNTER
12/7/22: LMOM TO CALL CENTRAL SCHEDULING  TO SCHEDULE MRI BRAIN AND CALL OUR OFFICE TO SCHEDULE OVFU SNPX W/ HDM

## 2022-12-17 DIAGNOSIS — E78.49 OTHER HYPERLIPIDEMIA: ICD-10-CM

## 2022-12-17 DIAGNOSIS — K21.9 GASTROESOPHAGEAL REFLUX DISEASE WITHOUT ESOPHAGITIS: ICD-10-CM

## 2022-12-17 DIAGNOSIS — R07.89 OTHER CHEST PAIN: ICD-10-CM

## 2022-12-17 DIAGNOSIS — R21 RASH: ICD-10-CM

## 2022-12-18 RX ORDER — OMEPRAZOLE 40 MG/1
CAPSULE, DELAYED RELEASE ORAL
Qty: 90 CAPSULE | Refills: 1 | Status: SHIPPED | OUTPATIENT
Start: 2022-12-18

## 2022-12-18 RX ORDER — FAMOTIDINE 20 MG/1
TABLET, FILM COATED ORAL
Qty: 90 TABLET | Refills: 0 | Status: SHIPPED | OUTPATIENT
Start: 2022-12-18

## 2022-12-18 RX ORDER — ATORVASTATIN CALCIUM 10 MG/1
TABLET, FILM COATED ORAL
Qty: 30 TABLET | Refills: 0 | Status: SHIPPED | OUTPATIENT
Start: 2022-12-18

## 2022-12-19 RX ORDER — COLCHICINE 0.6 MG/1
TABLET ORAL
Qty: 90 TABLET | Refills: 3 | Status: SHIPPED | OUTPATIENT
Start: 2022-12-19

## 2022-12-28 ENCOUNTER — OFFICE VISIT (OUTPATIENT)
Dept: FAMILY MEDICINE CLINIC | Facility: CLINIC | Age: 77
End: 2022-12-28

## 2022-12-28 VITALS
HEART RATE: 75 BPM | BODY MASS INDEX: 31.74 KG/M2 | RESPIRATION RATE: 16 BRPM | TEMPERATURE: 95.5 F | HEIGHT: 68 IN | DIASTOLIC BLOOD PRESSURE: 88 MMHG | WEIGHT: 209.4 LBS | OXYGEN SATURATION: 96 % | SYSTOLIC BLOOD PRESSURE: 152 MMHG

## 2022-12-28 DIAGNOSIS — R10.13 EPIGASTRIC ABDOMINAL PAIN: Primary | ICD-10-CM

## 2022-12-28 DIAGNOSIS — E13.620: ICD-10-CM

## 2022-12-28 DIAGNOSIS — K21.9 GASTROESOPHAGEAL REFLUX DISEASE WITHOUT ESOPHAGITIS: ICD-10-CM

## 2022-12-28 DIAGNOSIS — C80.1 MALIGNANT SPINDLE CELL NEOPLASM (HCC): ICD-10-CM

## 2022-12-28 DIAGNOSIS — Z86.19 HISTORY OF HEPATITIS C: ICD-10-CM

## 2022-12-28 DIAGNOSIS — Z79.4: ICD-10-CM

## 2022-12-28 NOTE — ASSESSMENT & PLAN NOTE
Patient continues to decline treatment  Daughter Cece on phone with patient and states patient continues to decline treatment for this

## 2022-12-28 NOTE — PROGRESS NOTES
FAMILY PRACTICE OFFICE VISIT       NAME: Werner Crowell  AGE: 69 y o  SEX: male       : 1945        MRN: [id number]    DATE: 2022  TIME: 5:15 PM    Assessment and Plan   1  Epigastric abdominal pain  -     Comprehensive metabolic panel; Future  -     CBC and differential; Future  -     Lipase; Future  -     Amylase; Future  -     Gamma GT; Future  -     US abdomen complete; Future; Expected date: 2022    2  History of hepatitis C  -     Comprehensive metabolic panel; Future  -     CBC and differential; Future  -     Lipase; Future  -     Amylase; Future  -     Gamma GT; Future  -     US abdomen complete; Future; Expected date: 2022    3  Gastroesophageal reflux disease without esophagitis  -     Comprehensive metabolic panel; Future  -     CBC and differential; Future  -     Lipase; Future  -     Amylase; Future  -     Gamma GT; Future  -     US abdomen complete; Future; Expected date: 2022    4  Other specified diabetes mellitus with diabetic dermatitis, with long-term current use of insulin (HCC)  -     Gamma GT; Future    5   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[facility])  Assessment & Plan:  Patient continues to decline treatment  Daughter Cece on phone with patient and states patient continues to decline treatment for this                   Chief Complaint     Chief Complaint   Patient presents with   • Follow-up     Abdominal pain         History of Present Illness   Werner Crowell is a 69y o -year-old male who is here for one week hx of ruq abd pain and epigastric pain  Cece dtr on phone to help with history as well  Not sure related to food  On and off  Only mild today  Still has gallbladder  Also discussed his skin cancer on scalp  Patient continues to decline treatment  dtr upset with father about this  We discussed risk of not getting this treated and he is aware  dtr is aware as well  Patient continues to decline      Review of Systems   Review of Systems   Constitutional: Negative for fatigue and fever  HENT: Negative for congestion and postnasal drip  Respiratory: Negative for cough, shortness of breath and wheezing  Cardiovascular: Negative for chest pain and palpitations  Gastrointestinal: Positive for abdominal pain  Negative for constipation, diarrhea, nausea and vomiting  Musculoskeletal: Negative for arthralgias and myalgias  Neurological: Positive for headaches  Negative for dizziness and light-headedness  Psychiatric/Behavioral: Negative for dysphoric mood  The patient is not nervous/anxious          Active Problem List     Patient Active Problem List   Diagnosis   • Brain tumor [facility])   • 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[facility] )   • Chronic constipation   • Controlled diabetes mellitus with diabetic neuropathy ([facility] )   • Episodic cluster headache, not intractable   • GERD (gastroesophageal reflux disease)   • Nonintractable headache   • Hypertension   • Acute pain of both knees   • Meningioma (HCC)   • Varicose 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   • Spondylolisthesis at L5-S1 level   • Other chest pain   • Renal cyst   • History of hepatitis C   • Scalp lesion   • 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HCC)   • Primary osteoarthritis of both knees   • Epigastric abdominal pain   • Unstable angina (HCC)   • Palpitations   • Type 2 diabetes mellitus without complication, without long-term current use of insulin ([facility] )         Past Medical History:  Past Medical History:   Diagnosis Date   • Brain mass     Last Assessed; 11/5/2015   • Chest pain     Last Assessed: 1/22/2015   • Diabetes type 2, uncontrolled     Last Assessed: 3/1/2016   • Edema of left lower extremity     Last Assessed: 3/22/2017   • GERD (gastroesophageal reflux disease)    • Hyperlipidemia    • Hypertension    • Impaired fasting glucose     Last Assessed: 11/10/2015   • Irregular heartbeat    • Skin cancer (melanoma) (HCC)     Scalp    • Varicose 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     Last Assessed: 3/22/2017 Past Surgical History:  Past Surgical History:   Procedure Laterality Date   • COLONOSCOPY     • COLONOSCOPY N/A 7/10/2018    Procedure: COLONOSCOPY;  Surgeon: Saba Hillman MD;  Location: [facility]; Service: Colorectal   • INGUINAL HERNIA REPAIR      20+ years ago - [facility]; Last Assessed: 10/23/2014   • TONSILLECTOMY     • VARICOSE VEIN SURGERY Left     x2 left leg - 40+ yrs ago, [facility] and [place]       Family History:  Family History   Problem Relation Age of Onset   • No Known Problems Mother    • No Known Problems Father    • Melanoma Daughter 16       Social History:  Social History     Socioeconomic History   • Marital status: /Civil Union     Spouse name: Not on file   • Number of children: 5   • Years of education: Not on file   • Highest education level: Not on file   Occupational History   • Occupation: Retired: Construction   Tobacco Use   • Smoking status: Never   • Smokeless tobacco: Never   Vaping Use   • Vaping Use: Never used   Substance and Sexual Activity   • Alcohol use: No   • Drug use: No   • Sexual activity: Not Currently     Partners: Female   Other Topics Concern   • Not on file   Social History Narrative    Uses safety equipment         Social Determinants of Health     Financial Resource Strain: Not on file   Food Insecurity: Not on file   Transportation Needs: Not on file   Physical Activity: Not on file   Stress: Not on file   Social Connections: Not on file   Intimate Partner Violence: Not on file   Housing Stability: Not on file       Objective     Vitals:    12/28/22 1615   BP: 152/88   Pulse: 75   Resp: 16   Temp: (!) 95 5 °F (35 3 °C)   SpO2: 96%     Wt Readings from Last 3 Encounters:   12/28/22 95 kg (209 lb 6 4 oz)   12/05/22 95 9 kg (211 lb 6 4 oz)   07/18/22 92 1 kg (203 lb)       Physical Exam  Vitals and nursing note reviewed  Constitutional:       Appearance: Normal appearance  HENT:      Head: Normocephalic and atraumatic     Eyes:      Conjunctiva/sclera: Conjunctivae normal    Cardiovascular:      Rate and Rhythm: Normal rate and regular rhythm  Heart sounds: Normal heart sounds  Pulmonary:      Effort: Pulmonary effort is normal       Breath sounds: Normal breath sounds  Abdominal:      Tenderness: There is abdominal tenderness in the right upper quadrant and epigastric area  Musculoskeletal:      Cervical back: Normal range of motion  Skin:     General: Skin is warm and dry  Findings: Lesion present  Neurological:      Mental Status: He is alert and oriented to person, place, and time     Psychiatric:         Mood and Affect: Mood normal          Behavior: Behavior normal          Pertinent Laboratory/Diagnostic Studies:  Lab Results   Component Value Date    GLUCOSE 121 10/28/2015    BUN 25 07/18/2022    CREATININE 0 69 07/18/2022    CALCIUM 9 4 07/18/2022     10/28/2015    K 4 6 07/18/2022    CO2 30 07/18/2022     07/18/2022     Lab Results   Component Value Date    ALT 14 07/18/2022    AST 13 07/18/2022    ALKPHOS 107 (H) 07/18/2022    BILITOT 0 53 08/27/2015       Lab Results   Component Value Date    WBC 5 97 07/18/2022    HGB 13 2 07/18/2022    HCT 39 2 07/18/2022    MCV 86 07/18/2022     07/18/2022       No results found for: TSH    Lab Results   Component Value Date    CHOL 154 08/27/2015     Lab Results   Component Value Date    TRIG 160 (H) 12/20/2021     Lab Results   Component Value Date    HDL 33 (L) 12/20/2021     Lab Results   Component Value Date    LDLCALC 126 (H) 12/20/2021     Lab Results   Component Value Date    HGBA1C 6 7 (A) 12/05/2022       Results for orders placed or performed in visit on 12/05/22   POCT hemoglobin A1c   Result Value Ref Range    Hemoglobin A1C 6 7 (A) 6 5       Orders Placed This Encounter   Procedures   • US abdomen complete   • Comprehensive metabolic panel   • CBC and differential   • Lipase   • Amylase   • Gamma GT       ALLERGIES:  No Known Allergies    Current Medications     Current Outpatient Medications   Medication Sig Dispense Refill   • acetaminophen (TYLENOL) 325 mg tablet Take 2 tablets (650 mg total) by mouth every 6 (six) hours as needed for mild pain or fever 30 tablet 0   • amLODIPine (NORVASC) 5 mg tablet TAKE 1 TABLET (5 MG TOTAL) BY MOUTH DAILY   90 tablet 0   • aspirin 81 MG tablet Take 81 mg by mouth daily      • atorvastatin (LIPITOR) 10 mg tablet TAKE 1 TABLET BY MOUTH EVERY DAY 30 tablet 0   • calcium carbonate (OS-ANGELIKA) 600 MG tablet Take 1 tablet (600 mg total) by mouth daily 30 tablet 3   • colchicine (COLCRYS) 0 6 mg tablet TAKE 1 TABLET BY MOUTH EVERY DAY 90 tablet 3   • diclofenac sodium (VOLTAREN) 1 % Apply 2 g topically 4 (four) times a day 150 g 1   • famotidine (PEPCID) 20 mg tablet TAKE 1 TABLET BY MOUTH EVERY DAY 90 tablet 0   • Glucose Blood (ONETOUCH ULTRA BLUE VI) by In Vitro route daily     • hydrocortisone 2 5 % cream APPLY TO AFFECTED AREA TWICE A DAY 28 35 g 0   • Lancets (OneTouch Delica Plus [id number]) MISC TEST DAILY, B35 6--QQ CONFIMED DELICA 33 G 441 each 11   • Linzess 145 MCG CAPS TAKE 1 CAPSULE BY MOUTH EVERY DAY 30 capsule 1   • lisinopril (ZESTRIL) 10 mg tablet TAKE 1 TABLET BY MOUTH EVERY DAY 90 tablet 3   • lisinopril (ZESTRIL) 20 mg tablet TAKE 1 TABLET BY MOUTH EVERY DAY 90 tablet 3   • MELATONIN PO Take 10 mg by mouth daily at bedtime     • metFORMIN (GLUCOPHAGE) 500 mg tablet TAKE 1 TABLET BY MOUTH TWICE A  tablet 1   • methocarbamol (ROBAXIN) 500 mg tablet Take 1 tablet (500 mg total) by mouth daily at bedtime 14 tablet 0   • omeprazole (PriLOSEC) 40 MG capsule TAKE 1 CAPSULE BY MOUTH EVERY DAY 90 capsule 1   • OneTouch Ultra test strip TEST ONCE DAILY 25 strip 47   • traMADol (ULTRAM) 50 mg tablet Take 1 tablet (50 mg total) by mouth every 6 (six) hours as needed for moderate pain 30 tablet 0   • amoxicillin (AMOXIL) 500 mg capsule  (Patient not taking: Reported on 7/18/2022)       No current facility-administered medications for this visit           Health Maintenance     Health Maintenance   Topic Date Due   • DM Eye Exam  Never done   • COVID-19 Vaccine (4 - Booster for [employer] series) 01/04/2022   • Influenza Vaccine (1) 09/01/2022   • Depression Screening  03/31/2023   • BMI: Followup Plan  03/31/2023   • Hepatitis B Vaccine (1 of 3 - Risk 3-dose series) 01/06/2023 (Originally 7/11/2006)   • Medicare Annual Wellness Visit (AWV)  03/31/2023   • Diabetic Foot Exam  03/31/2023   • Fall Risk  04/18/2023   • HEMOGLOBIN A1C  06/05/2023   • BMI: Adult  12/28/2023   • Pneumococcal Vaccine: 65+ Years  Completed   • HIB Vaccine  Aged Out   • IPV Vaccine  Aged Out   • Hepatitis A Vaccine  Aged Out   • Meningococcal ACWY Vaccine  Aged Out   • HPV Vaccine  Aged Out   • Hepatitis C Screening  Discontinued   • Colorectal Cancer Screening  Discontinued     Immunization History   Administered Date(s) Administered   • COVID-19 PFIZER VACCINE 0 3 ML IM 03/29/2021, 04/21/2021, 11/09/2021   • INFLUENZA 10/27/2015, 10/01/2016, 10/13/2016, 12/19/2018   • Influenza Split High Dose Preservative Free IM 10/23/2014, 10/27/2015, 10/01/2016, 10/13/2016, 10/13/2016, 10/13/2016   • Influenza, high dose seasonal 0 7 mL 12/19/2018, 10/14/2019, 10/05/2021   • Pneumococcal Conjugate 13-Valent 08/25/2015, 01/19/2017, 10/14/2019   • Pneumococcal Polysaccharide PPV23 10/05/2021          ZAHRA Farrell

## 2022-12-31 ENCOUNTER — APPOINTMENT (OUTPATIENT)
Dept: LAB | Facility: CLINIC | Age: 77
End: 2022-12-31

## 2022-12-31 ENCOUNTER — HOSPITAL ENCOUNTER (OUTPATIENT)
Dept: ULTRASOUND IMAGING | Facility: HOSPITAL | Age: 77
Discharge: HOME | End: 2022-12-31

## 2022-12-31 DIAGNOSIS — E13.620: ICD-10-CM

## 2022-12-31 DIAGNOSIS — R10.13 EPIGASTRIC ABDOMINAL PAIN: ICD-10-CM

## 2022-12-31 DIAGNOSIS — Z86.19 HISTORY OF HEPATITIS C: ICD-10-CM

## 2022-12-31 DIAGNOSIS — K21.9 GASTROESOPHAGEAL REFLUX DISEASE WITHOUT ESOPHAGITIS: ICD-10-CM

## 2022-12-31 DIAGNOSIS — Z79.4: ICD-10-CM

## 2022-12-31 LAB
ALBUMIN SERPL BCP-MCNC: 4.3 G/DL (ref 3.5–5)
ALP SERPL-CCNC: 98 U/L (ref 34–104)
ALT SERPL W P-5'-P-CCNC: 11 U/L (ref 7–52)
AMYLASE SERPL-CCNC: 13 IU/L (ref 29–103)
ANION GAP SERPL CALCULATED.3IONS-SCNC: 6 MMOL/L (ref 4–13)
AST SERPL W P-5'-P-CCNC: 12 U/L (ref 13–39)
BASOPHILS # BLD AUTO: 0.01 THOUSANDS/ÂΜL (ref 0–0.1)
BASOPHILS NFR BLD AUTO: 0 % (ref 0–1)
BILIRUB SERPL-MCNC: 0.58 MG/DL (ref 0.2–1)
BUN SERPL-MCNC: 29 MG/DL (ref 5–25)
CALCIUM SERPL-MCNC: 9.2 MG/DL (ref 8.4–10.2)
CHLORIDE SERPL-SCNC: 107 MMOL/L (ref 96–108)
CO2 SERPL-SCNC: 27 MMOL/L (ref 21–32)
CREAT SERPL-MCNC: 0.64 MG/DL (ref 0.6–1.3)
EOSINOPHIL # BLD AUTO: 0.16 THOUSAND/ÂΜL (ref 0–0.61)
EOSINOPHIL NFR BLD AUTO: 3 % (ref 0–6)
ERYTHROCYTE [DISTWIDTH] IN BLOOD BY AUTOMATED COUNT: 13.3 % (ref 11.6–15.1)
GFR SERPL CREATININE-BSD FRML MDRD: 95 ML/MIN/1.73SQ M
GGT SERPL-CCNC: 12 U/L (ref 5–85)
GLUCOSE P FAST SERPL-MCNC: 132 MG/DL (ref 65–99)
HCT VFR BLD AUTO: 38.1 % (ref 36.5–49.3)
HGB BLD-MCNC: 12.8 G/DL (ref 12–17)
IMM GRANULOCYTES # BLD AUTO: 0.02 THOUSAND/UL (ref 0–0.2)
IMM GRANULOCYTES NFR BLD AUTO: 0 % (ref 0–2)
LIPASE SERPL-CCNC: <6 U/L (ref 11–82)
LYMPHOCYTES # BLD AUTO: 1.32 THOUSANDS/ÂΜL (ref 0.6–4.47)
LYMPHOCYTES NFR BLD AUTO: 28 % (ref 14–44)
MCH RBC QN AUTO: 29.2 PG (ref 26.8–34.3)
MCHC RBC AUTO-ENTMCNC: 33.6 G/DL (ref 31.4–37.4)
MCV RBC AUTO: 87 FL (ref 82–98)
MONOCYTES # BLD AUTO: 0.42 THOUSAND/ÂΜL (ref 0.17–1.22)
MONOCYTES NFR BLD AUTO: 9 % (ref 4–12)
NEUTROPHILS # BLD AUTO: 2.79 THOUSANDS/ÂΜL (ref 1.85–7.62)
NEUTS SEG NFR BLD AUTO: 60 % (ref 43–75)
NRBC BLD AUTO-RTO: 0 /100 WBCS
PLATELET # BLD AUTO: 160 THOUSANDS/UL (ref 149–390)
PMV BLD AUTO: 9.8 FL (ref 8.9–12.7)
POTASSIUM SERPL-SCNC: 4.1 MMOL/L (ref 3.5–5.3)
PROT SERPL-MCNC: 6.9 G/DL (ref 6.4–8.4)
RBC # BLD AUTO: 4.38 MILLION/UL (ref 3.88–5.62)
SODIUM SERPL-SCNC: 140 MMOL/L (ref 135–147)
WBC # BLD AUTO: 4.72 THOUSAND/UL (ref 4.31–10.16)

## 2023-01-10 DIAGNOSIS — E78.49 OTHER HYPERLIPIDEMIA: ICD-10-CM

## 2023-01-10 RX ORDER — ATORVASTATIN CALCIUM 10 MG/1
TABLET, FILM COATED ORAL
Qty: 90 TABLET | Refills: 1 | Status: SHIPPED | OUTPATIENT
Start: 2023-01-10

## 2023-01-11 ENCOUNTER — RA CDI HCC (OUTPATIENT)
Dept: OTHER | Facility: HOSPITAL | Age: 78
End: 2023-01-11

## 2023-01-11 NOTE — PROGRESS NOTES
[facility]  coding opportunities       Chart reviewed, no opportunity found:   [address]        Patients Insurance     Medicare Insurance: [employer] Advantage

## 2023-01-14 DIAGNOSIS — E11.9 TYPE 2 DIABETES MELLITUS WITHOUT COMPLICATION, WITHOUT LONG-TERM CURRENT USE OF INSULIN (HCC): ICD-10-CM

## 2023-01-16 RX ORDER — LANCETS 33 GAUGE
EACH MISCELLANEOUS
Qty: 100 EACH | Refills: 11 | Status: SHIPPED | OUTPATIENT
Start: 2023-01-16

## 2023-01-24 ENCOUNTER — TELEPHONE (OUTPATIENT)
Dept: FAMILY MEDICINE CLINIC | Facility: CLINIC | Age: 78
End: 2023-01-24

## 2023-01-24 DIAGNOSIS — E11.9 TYPE 2 DIABETES MELLITUS WITHOUT COMPLICATION, WITHOUT LONG-TERM CURRENT USE OF INSULIN (HCC): Primary | ICD-10-CM

## 2023-01-24 RX ORDER — BLOOD-GLUCOSE METER
KIT MISCELLANEOUS
Qty: 1 KIT | Refills: 0 | Status: SHIPPED | OUTPATIENT
Start: 2023-01-24

## 2023-01-24 RX ORDER — LANCETS 33 GAUGE
EACH MISCELLANEOUS
Qty: 100 EACH | Refills: 3 | Status: SHIPPED | OUTPATIENT
Start: 2023-01-24

## 2023-01-24 RX ORDER — BLOOD SUGAR DIAGNOSTIC
STRIP MISCELLANEOUS
Qty: 100 EACH | Refills: 3 | Status: SHIPPED | OUTPATIENT
Start: 2023-01-24

## 2023-01-24 NOTE — TELEPHONE ENCOUNTER
Patient came into the office and stated that he needs a new glucose monitor  Can you please send in a order for him? Please advise

## 2023-01-31 ENCOUNTER — OFFICE VISIT (OUTPATIENT)
Dept: FAMILY MEDICINE CLINIC | Facility: CLINIC | Age: 78
End: 2023-01-31

## 2023-01-31 VITALS
OXYGEN SATURATION: 97 % | WEIGHT: 212 LBS | SYSTOLIC BLOOD PRESSURE: 120 MMHG | TEMPERATURE: 97.3 F | BODY MASS INDEX: 32.13 KG/M2 | HEART RATE: 69 BPM | DIASTOLIC BLOOD PRESSURE: 60 MMHG | RESPIRATION RATE: 16 BRPM | HEIGHT: 68 IN

## 2023-01-31 DIAGNOSIS — K21.9 GASTROESOPHAGEAL REFLUX DISEASE WITHOUT ESOPHAGITIS: ICD-10-CM

## 2023-01-31 DIAGNOSIS — E11.9 TYPE 2 DIABETES MELLITUS WITHOUT COMPLICATION, WITHOUT LONG-TERM CURRENT USE OF INSULIN (HCC): ICD-10-CM

## 2023-01-31 DIAGNOSIS — R51.9 NONINTRACTABLE EPISODIC HEADACHE, UNSPECIFIED HEADACHE TYPE: ICD-10-CM

## 2023-01-31 DIAGNOSIS — R10.13 EPIGASTRIC ABDOMINAL PAIN: Primary | ICD-10-CM

## 2023-01-31 DIAGNOSIS — R74.8 ABNORMAL LIVER ENZYMES: ICD-10-CM

## 2023-01-31 DIAGNOSIS — C80.1 MALIGNANT SPINDLE CELL NEOPLASM (HCC): ICD-10-CM

## 2023-01-31 LAB
CREAT UR-MCNC: 132 MG/DL
MICROALBUMIN UR-MCNC: 15.2 MG/L (ref 0–20)
MICROALBUMIN/CREAT 24H UR: 12 MG/G CREATININE (ref 0–30)

## 2023-01-31 RX ORDER — GABAPENTIN 100 MG/1
100 CAPSULE ORAL
Qty: 30 CAPSULE | Refills: 2 | Status: SHIPPED | OUTPATIENT
Start: 2023-01-31

## 2023-01-31 RX ORDER — FAMOTIDINE 40 MG/1
40 TABLET, FILM COATED ORAL DAILY
Qty: 30 TABLET | Refills: 3 | Status: SHIPPED | OUTPATIENT
Start: 2023-01-31

## 2023-01-31 NOTE — PROGRESS NOTES
FAMILY PRACTICE OFFICE VISIT       NAME: Savi Manzanares  AGE: 69 y o  SEX: male       : 1945        MRN: [id number]    DATE: 2023  TIME: 11:24 PM    Assessment and Plan   1  Epigastric abdominal pain  Assessment & Plan:  Refer to GI      Orders:  -     Ambulatory Referral to Gastroenterology; Future  -     famotidine (PEPCID) 40 MG tablet; Take 1 tablet (40 mg total) by mouth daily    2  Type 2 diabetes mellitus without complication, without long-term current use of insulin (HCC)  Assessment & Plan:  Stable  Cont meds    Lab Results   Component Value Date    HGBA1C 6 7 (A) 2022       Orders:  -     Microalbumin / creatinine urine ratio    3  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[facility])  Assessment & Plan:  Refer to dermatology/oncology  Patient continues to decline treatment  dtr aware as well        4  Nonintractable episodic headache, unspecified headache type  Assessment & Plan:  meds as ordered      Orders:  -     gabapentin (Neurontin) 100 mg capsule; Take 1 capsule (100 mg total) by mouth daily at bedtime    5  Gastroesophageal reflux disease without esophagitis  Assessment & Plan:  Refer to GI  Add pepcid      Orders:  -     Ambulatory Referral to Gastroenterology; Future  -     famotidine (PEPCID) 40 MG tablet; Take 1 tablet (40 mg total) by mouth daily    6   Abnormal liver enzymes  Assessment & Plan:  Refer to GI  Hx of hep c with treatment                   Chief Complaint     Chief Complaint   Patient presents with   • Follow-up     Stomach pain and back of the head hurts x 3 days       History of Present Illness   Savi Manzanares is a 69y o -year-old male who is here for abd discomfort  On and off for the past year  Hx of chronic constipation in past  RUQ US wnl  Labs reviewed, wnl  Will refer to GI  Has not agreed to go to derm for spindle cell neoplasm of the scalp  Have reviewed all of this with daughter in the past      Review of Systems   Review of Systems   Constitutional: Negative for fatigue and fever  HENT: Negative for congestion, rhinorrhea and sinus pain  Respiratory: Negative for cough and shortness of breath  Cardiovascular: Negative for chest pain and palpitations  Gastrointestinal: Positive for abdominal pain  Genitourinary: Negative for dysuria and frequency  Musculoskeletal: Negative for myalgias  Skin: Negative for rash  Neurological: Negative for dizziness, light-headedness and headaches  Hematological: Negative for adenopathy  Psychiatric/Behavioral: Negative for dysphoric mood and sleep disturbance  The patient is not nervous/anxious          Active Problem List     Patient Active Problem List   Diagnosis   • Brain tumor ([facility] )   • 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[facility] )   • Chronic constipation   • Episodic cluster headache, not intractable   • GERD (gastroesophageal reflux disease)   • Nonintractable headache   • Hypertension   • Acute pain of both knees   • Meningioma (HCC)   • Varicose 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   • Spondylolisthesis at L5-S1 level   • Other chest pain   • Renal cyst   • History of hepatitis C   • Scalp lesion   • 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HCC)   • Primary osteoarthritis of both knees   • Epigastric abdominal pain   • Unstable angina (HCC)   • Palpitations   • Type 2 diabetes mellitus without complication, without long-term current use of insulin ([facility] )   • Abnormal liver enzymes         Past Medical History:  Past Medical History:   Diagnosis Date   • Brain mass     Last Assessed; 11/5/2015   • Chest pain     Last Assessed: 1/22/2015   • Diabetes type 2, uncontrolled     Last Assessed: 3/1/2016   • Edema of left lower extremity     Last Assessed: 3/22/2017   • GERD (gastroesophageal reflux disease)    • Hyperlipidemia    • Hypertension    • Impaired fasting glucose     Last Assessed: 11/10/2015   • Irregular heartbeat    • Skin cancer (melanoma) (HCC)     Scalp    • Varicose 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     Last Assessed: 3/22/2017       Past Surgical History:  Past Surgical History:   Procedure Laterality Date   • COLONOSCOPY     • COLONOSCOPY N/A 7/10/2018    Procedure: COLONOSCOPY;  Surgeon: Saba Hillman MD;  Location: [facility]; Service: Colorectal   • INGUINAL HERNIA REPAIR      20+ years ago - [place]; Last Assessed: 10/23/2014   • TONSILLECTOMY     • VARICOSE VEIN SURGERY Left     x2 left leg - 40+ yrs ago, [place] and [place]       Family History:  Family History   Problem Relation Age of Onset   • No Known Problems Mother    • No Known Problems Father    • Melanoma Daughter 16       Social History:  Social History     Socioeconomic History   • Marital status: /Civil Union     Spouse name: Not on file   • Number of children: 5   • Years of education: Not on file   • Highest education level: Not on file   Occupational History   • Occupation: Retired: Construction   Tobacco Use   • Smoking status: Never   • Smokeless tobacco: Never   Vaping Use   • Vaping Use: Never used   Substance and Sexual Activity   • Alcohol use: No   • Drug use: No   • Sexual activity: Not Currently     Partners: Female   Other Topics Concern   • Not on file   Social History Narrative    Uses safety equipment         Social Determinants of Health     Financial Resource Strain: Not on file   Food Insecurity: Not on file   Transportation Needs: Not on file   Physical Activity: Not on file   Stress: Not on file   Social Connections: Not on file   Intimate Partner Violence: Not on file   Housing Stability: Not on file       Objective     Vitals:    01/31/23 1512   BP: 120/60   Pulse: 69   Resp: 16   Temp: (!) 97 3 °F (36 3 °C)   SpO2: 97%     Wt Readings from Last 3 Encounters:   01/31/23 96 2 kg (212 lb)   12/28/22 95 kg (209 lb 6 4 oz)   12/05/22 95 9 kg (211 lb 6 4 oz)       Physical Exam  Vitals and nursing note reviewed  Constitutional:       Appearance: Normal appearance  HENT:      Head: Normocephalic and atraumatic     Eyes: Conjunctiva/sclera: Conjunctivae normal    Cardiovascular:      Rate and Rhythm: Normal rate and regular rhythm  Pulses: no weak pulses          Dorsalis pedis pulses are 2+ on the right side and 2+ on the left side  Heart sounds: Normal heart sounds  Pulmonary:      Effort: Pulmonary effort is normal       Breath sounds: Normal breath sounds  Abdominal:      General: Bowel sounds are normal       Tenderness: There is abdominal tenderness in the epigastric area  Musculoskeletal:         General: Normal range of motion  Cervical back: Normal range of motion  Feet:      Right foot:      Skin integrity: No ulcer, skin breakdown, erythema, warmth, callus or dry skin  Left foot:      Skin integrity: No ulcer, skin breakdown, erythema, warmth, callus or dry skin  Skin:     General: Skin is warm and dry  Capillary Refill: Capillary refill takes less than 2 seconds  Neurological:      General: No focal deficit present  Mental Status: He is alert and oriented to person, place, and time     Psychiatric:         Mood and Affect: Mood normal          Behavior: Behavior normal          Pertinent Laboratory/Diagnostic Studies:  Lab Results   Component Value Date    GLUCOSE 121 10/28/2015    BUN 29 (H) 12/31/2022    CREATININE 0 64 12/31/2022    CALCIUM 9 2 12/31/2022     10/28/2015    K 4 1 12/31/2022    CO2 27 12/31/2022     12/31/2022     Lab Results   Component Value Date    ALT 11 12/31/2022    AST 12 (L) 12/31/2022    GGT 12 12/31/2022    ALKPHOS 98 12/31/2022    BILITOT 0 53 08/27/2015       Lab Results   Component Value Date    WBC 4 72 12/31/2022    HGB 12 8 12/31/2022    HCT 38 1 12/31/2022    MCV 87 12/31/2022     12/31/2022       No results found for: TSH    Lab Results   Component Value Date    CHOL 154 08/27/2015     Lab Results   Component Value Date    TRIG 160 (H) 12/20/2021     Lab Results   Component Value Date    HDL 33 (L) 12/20/2021     Lab Results   Component Value Date    LDLCALC 126 (H) 12/20/2021     Lab Results   Component Value Date    HGBA1C 6 7 (A) 12/05/2022       Results for orders placed or performed in visit on 01/31/23   Microalbumin / creatinine urine ratio   Result Value Ref Range    Creatinine, Ur 132 0 mg/dL    Microalbum  ,U,Random 15 2 0 0 - 20 0 mg/L    Microalb Creat Ratio 12 0 - 30 mg/g creatinine       Orders Placed This Encounter   Procedures   • Microalbumin / creatinine urine ratio   • Ambulatory Referral to Gastroenterology       ALLERGIES:  No Known Allergies    Current Medications     Current Outpatient Medications   Medication Sig Dispense Refill   • acetaminophen (TYLENOL) 325 mg tablet Take 2 tablets (650 mg total) by mouth every 6 (six) hours as needed for mild pain or fever 30 tablet 0   • amLODIPine (NORVASC) 5 mg tablet TAKE 1 TABLET (5 MG TOTAL) BY MOUTH DAILY  90 tablet 0   • aspirin 81 MG tablet Take 81 mg by mouth daily      • atorvastatin (LIPITOR) 10 mg tablet TAKE 1 TABLET BY MOUTH EVERY DAY 90 tablet 1   • Blood Glucose Monitoring Suppl (OneTouch Verio Reflect) w/Device KIT Check blood sugars once daily  Please substitute with appropriate alternative as covered by patient's insurance  Dx: E11 65 1 kit 0   • calcium carbonate (OS-ANGELIKA) 600 MG tablet Take 1 tablet (600 mg total) by mouth daily 30 tablet 3   • colchicine (COLCRYS) 0 6 mg tablet TAKE 1 TABLET BY MOUTH EVERY DAY 90 tablet 3   • diclofenac sodium (VOLTAREN) 1 % Apply 2 g topically 4 (four) times a day 150 g 1   • famotidine (PEPCID) 40 MG tablet Take 1 tablet (40 mg total) by mouth daily 30 tablet 3   • gabapentin (Neurontin) 100 mg capsule Take 1 capsule (100 mg total) by mouth daily at bedtime 30 capsule 2   • glucose blood (OneTouch Verio) test strip Check blood sugars once daily  Please substitute with appropriate alternative as covered by patient's insurance   Dx: E11 65 100 each 3   • hydrocortisone 2 5 % cream APPLY TO AFFECTED AREA TWICE A DAY 28 35 g 0   • Lancets (OneTouch Delica Plus XLPLOG23J) MISC TEST DAILY, Z13 5--WG CONFIMED DELICA 33 G 405 each 11   • Linzess 145 MCG CAPS TAKE 1 CAPSULE BY MOUTH EVERY DAY 30 capsule 1   • lisinopril (ZESTRIL) 10 mg tablet TAKE 1 TABLET BY MOUTH EVERY DAY 90 tablet 3   • lisinopril (ZESTRIL) 20 mg tablet TAKE 1 TABLET BY MOUTH EVERY DAY 90 tablet 3   • MELATONIN PO Take 10 mg by mouth daily at bedtime     • metFORMIN (GLUCOPHAGE) 500 mg tablet TAKE 1 TABLET BY MOUTH TWICE A  tablet 1   • methocarbamol (ROBAXIN) 500 mg tablet Take 1 tablet (500 mg total) by mouth daily at bedtime 14 tablet 0   • omeprazole (PriLOSEC) 40 MG capsule TAKE 1 CAPSULE BY MOUTH EVERY DAY 90 capsule 1   • OneTouch Delica Lancets 84B MISC Check blood sugars once daily  Please substitute with appropriate alternative as covered by patient's insurance  Dx: E11 65 100 each 3   • amoxicillin (AMOXIL) 500 mg capsule  (Patient not taking: Reported on 7/18/2022)       No current facility-administered medications for this visit           Health Maintenance     Health Maintenance   Topic Date Due   • DM Eye Exam  Never done   • COVID-19 Vaccine (4 - Booster for Pfizer series) 01/04/2022   • Influenza Vaccine (1) 09/01/2022   • Medicare Annual Wellness Visit (AWV)  03/31/2023   • BMI: Followup Plan  03/31/2023   • Diabetic Foot Exam  03/31/2023   • Hepatitis B Vaccine (1 of 3 - Risk 3-dose series) 02/28/2023 (Originally 7/11/2006)   • HEMOGLOBIN A1C  06/05/2023   • Kidney Health Evaluation: GFR  12/31/2023   • Fall Risk  01/31/2024   • Depression Screening  01/31/2024   • BMI: Adult  01/31/2024   • Kidney Health Evaluation: Microalbumin/Creatinine Ratio  01/31/2024   • Pneumococcal Vaccine: 65+ Years  Completed   • HIB Vaccine  Aged Out   • IPV Vaccine  Aged Out   • Hepatitis A Vaccine  Aged Out   • Meningococcal ACWY Vaccine  Aged Out   • HPV Vaccine  Aged Out   • Hepatitis C Screening  Discontinued   • Colorectal Cancer Screening  Discontinued Immunization History   Administered Date(s) Administered   • COVID-19 PFIZER VACCINE 0 3 ML IM 03/29/2021, 04/21/2021, 11/09/2021   • INFLUENZA 10/27/2015, 10/01/2016, 10/13/2016, 12/19/2018   • Influenza Split High Dose Preservative Free IM 10/23/2014, 10/27/2015, 10/01/2016, 10/13/2016, 10/13/2016, 10/13/2016   • Influenza, high dose seasonal 0 7 mL 12/19/2018, 10/14/2019, 10/05/2021   • Pneumococcal Conjugate 13-Valent 08/25/2015, 01/19/2017, 10/14/2019   • Pneumococcal Polysaccharide PPV23 10/05/2021     BMI Counseling: Body mass index is 32 23 kg/m²  The BMI is above normal  Nutrition recommendations include decreasing portion sizes, encouraging healthy choices of fruits and vegetables, decreasing fast food intake, consuming healthier snacks, limiting drinks that contain sugar, moderation in carbohydrate intake, increasing intake of lean protein, reducing intake of saturated and trans fat and reducing intake of cholesterol  Exercise recommendations include vigorous physical activity 75 minutes/week, exercising 3-5 times per week and strength training exercises  No pharmacotherapy was ordered  Rationale for BMI follow-up plan is due to patient being overweight or obese  Depression Screening and Follow-up Plan: Patient was screened for depression during today's encounter  They screened negative with a PHQ-2 score of 0  Patient's shoes and socks removed  Right Foot/Ankle   Right Foot Inspection  Skin Exam: skin normal and skin intact  No dry skin, no warmth, no callus, no erythema, no maceration, no abnormal color, no pre-ulcer, no ulcer and no callus  Toe Exam: ROM and strength within normal limits  Sensory   Monofilament testing: intact    Vascular  Capillary refills: < 3 seconds  The right DP pulse is 2+  Left Foot/Ankle  Left Foot Inspection  Skin Exam: skin normal and skin intact  No dry skin, no warmth, no erythema, no maceration, normal color, no pre-ulcer, no ulcer and no callus  Toe Exam: ROM and strength within normal limits  Sensory   Monofilament testing: intact    Vascular  Capillary refills: < 3 seconds  The left DP pulse is 2+       Assign Risk Category  No deformity present  No loss of protective sensation  No weak pulses  Risk: ZAHRA Singleton

## 2023-02-18 DIAGNOSIS — E11.9 TYPE 2 DIABETES MELLITUS WITHOUT COMPLICATION, WITHOUT LONG-TERM CURRENT USE OF INSULIN (HCC): ICD-10-CM

## 2023-02-23 DIAGNOSIS — R10.13 EPIGASTRIC ABDOMINAL PAIN: ICD-10-CM

## 2023-02-23 DIAGNOSIS — K21.9 GASTROESOPHAGEAL REFLUX DISEASE WITHOUT ESOPHAGITIS: ICD-10-CM

## 2023-02-23 RX ORDER — FAMOTIDINE 40 MG/1
TABLET, FILM COATED ORAL
Qty: 90 TABLET | Refills: 2 | Status: SHIPPED | OUTPATIENT
Start: 2023-02-23

## 2023-03-15 ENCOUNTER — OFFICE VISIT (OUTPATIENT)
Dept: FAMILY MEDICINE CLINIC | Facility: CLINIC | Age: 78
End: 2023-03-15

## 2023-03-15 VITALS
RESPIRATION RATE: 16 BRPM | DIASTOLIC BLOOD PRESSURE: 62 MMHG | OXYGEN SATURATION: 97 % | SYSTOLIC BLOOD PRESSURE: 124 MMHG | TEMPERATURE: 95.4 F | HEART RATE: 67 BPM | WEIGHT: 205.2 LBS | BODY MASS INDEX: 31.2 KG/M2

## 2023-03-15 DIAGNOSIS — M25.512 ACUTE PAIN OF LEFT SHOULDER: Primary | ICD-10-CM

## 2023-03-15 DIAGNOSIS — H53.8 BLURRY VISION, BILATERAL: ICD-10-CM

## 2023-03-15 NOTE — PROGRESS NOTES
FAMILY PRACTICE OFFICE VISIT       NAME: Jojo Estevez  AGE: 69 y o  SEX: male       : 1945        MRN: [id number]    DATE: 3/15/2023  TIME: 3:09 PM    Assessment and Plan   1  Acute pain of left shoulder  -     XR shoulder 2+ vw left; Future; Expected date: 03/15/2023    2  Blurry vision, bilateral  -     Ambulatory Referral to Ophthalmology; Future                 Chief Complaint     Chief Complaint   Patient presents with   • Follow-up     Left shoulder pain x 2 week       History of Present Illness   Jojo Estevez is a 69y o -year-old male who is here for c/o left shoulder pain  Started recently  Not dropping items  Pain front of shoulder  Decreased rom  No rash  No mass  No known injury  Took no meds for it    Also needs to see eye doctor for an exam      Review of Systems   Review of Systems   Constitutional: Negative for fatigue and fever  HENT: Negative for congestion, postnasal drip and rhinorrhea  Eyes: Negative for photophobia and visual disturbance  Respiratory: Negative for cough, shortness of breath and wheezing  Cardiovascular: Negative for chest pain and palpitations  Gastrointestinal: Negative for constipation, diarrhea, nausea and vomiting  Genitourinary: Negative for dysuria and frequency  Musculoskeletal: Positive for arthralgias and myalgias  Skin: Negative for rash  Neurological: Negative for dizziness and headaches  Hematological: Negative for adenopathy  Psychiatric/Behavioral: Negative for dysphoric mood and sleep disturbance  The patient is not nervous/anxious          Active Problem List     Patient Active Problem List   Diagnosis   • Brain tumor [facility])   • 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[facility] [address] )   • Chronic constipation   • Episodic cluster headache, not intractable   • GERD (gastroesophageal reflux disease)   • Nonintractable headache   • Hypertension   • Acute pain of both knees   • Meningioma (HCC)   • Varicose 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   • Spondylolisthesis at L5-S1 level   • Other chest pain   • Renal cyst   • History of hepatitis C   • Scalp lesion   • 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HCC)   • Primary osteoarthritis of both knees   • Epigastric abdominal pain   • Unstable angina (HCC)   • Palpitations   • Type 2 diabetes mellitus without complication, without long-term current use of insulin (HCC)   • Abnormal liver enzymes   • Acute pain of left shoulder   • Blurry vision, bilateral         Past Medical History:  Past Medical History:   Diagnosis Date   • Brain mass     Last Assessed; 11/5/2015   • Chest pain     Last Assessed: 1/22/2015   • Diabetes type 2, uncontrolled     Last Assessed: 3/1/2016   • Edema of left lower extremity     Last Assessed: 3/22/2017   • GERD (gastroesophageal reflux disease)    • Hyperlipidemia    • Hypertension    • Impaired fasting glucose     Last Assessed: 11/10/2015   • Irregular heartbeat    • Skin cancer (melanoma) (HCC)     Scalp    • Varicose 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     Last Assessed: 3/22/2017       Past Surgical History:  Past Surgical History:   Procedure Laterality Date   • COLONOSCOPY     • COLONOSCOPY N/A 7/10/2018    Procedure: COLONOSCOPY;  Surgeon: Ermias Wang MD;  [facility]: [facility];   Service: Colorectal   • INGUINAL HERNIA REPAIR      20+ years ago - [facility]; Last Assessed: 10/23/2014   • TONSILLECTOMY     • VARICOSE VEIN SURGERY Left     x2 left leg - 40+ yrs ago, [facility] and [facility]       Family History:  Family History   Problem Relation Age of Onset   • No Known Problems Mother    • No Known Problems Father    • Melanoma Daughter 16       Social History:  Social History     Socioeconomic History   • Marital status: /Civil Union     Spouse name: Not on file   • Number of children: 5   • Years of education: Not on file   • Highest education level: Not on file   Occupational History   • Occupation: Retired: Construction   Tobacco Use   • Smoking status: Never   • Smokeless tobacco: Never   Vaping Use   • Vaping Use: Never used   Substance and Sexual Activity   • Alcohol use: No   • Drug use: No   • Sexual activity: Not Currently     Partners: Female   Other Topics Concern   • Not on file   Social History Narrative    Uses safety equipment         Social Determinants of Health     Financial Resource Strain: Not on file   Food Insecurity: Not on file   Transportation Needs: Not on file   Physical Activity: Not on file   Stress: Not on file   Social Connections: Not on file   Intimate Partner Violence: Not on file   Housing Stability: Not on file       Objective     Vitals:    03/15/23 1352   BP: 124/62   Pulse: 67   Resp: 16   Temp: (!) 95 4 °F (35 2 °C)   SpO2: 97%     Wt Readings from Last 3 Encounters:   03/15/23 93 1 kg (205 lb 3 2 oz)   01/31/23 96 2 kg (212 lb)   12/28/22 95 kg (209 lb 6 4 oz)       Physical Exam  Vitals and nursing note reviewed  Constitutional:       Appearance: Normal appearance  HENT:      Head: Normocephalic and atraumatic  Eyes:      Conjunctiva/sclera: Conjunctivae normal    Cardiovascular:      Rate and Rhythm: Normal rate and regular rhythm  Heart sounds: Normal heart sounds  Pulmonary:      Effort: Pulmonary effort is normal       Breath sounds: Normal breath sounds  Abdominal:      General: Bowel sounds are normal    Musculoskeletal:      Right shoulder: Normal       Left shoulder: Tenderness present  Decreased range of motion  Normal strength  Cervical back: Normal range of motion and neck supple  Comments: Tenderness to palpation   Positive crossarm  Positive arc at 120 degrees  Positive scratch     Skin:     General: Skin is warm and dry  Capillary Refill: Capillary refill takes less than 2 seconds  Neurological:      General: No focal deficit present  Mental Status: He is alert and oriented to person, place, and time     Psychiatric:         Mood and Affect: Mood normal          Behavior: Behavior normal  Thought Content: Thought content normal          Judgment: Judgment normal          Pertinent Laboratory/Diagnostic Studies:  Lab Results   Component Value Date    GLUCOSE 121 10/28/2015    BUN 29 (H) 12/31/2022    CREATININE 0 64 12/31/2022    CALCIUM 9 2 12/31/2022     10/28/2015    K 4 1 12/31/2022    CO2 27 12/31/2022     12/31/2022     Lab Results   Component Value Date    ALT 11 12/31/2022    AST 12 (L) 12/31/2022    GGT 12 12/31/2022    ALKPHOS 98 12/31/2022    BILITOT 0 53 08/27/2015       Lab Results   Component Value Date    WBC 4 72 12/31/2022    HGB 12 8 12/31/2022    HCT 38 1 12/31/2022    MCV 87 12/31/2022     12/31/2022       No results found for: TSH    Lab Results   Component Value Date    CHOL 154 08/27/2015     Lab Results   Component Value Date    TRIG 160 (H) 12/20/2021     Lab Results   Component Value Date    HDL 33 (L) 12/20/2021     Lab Results   Component Value Date    LDLCALC 126 (H) 12/20/2021     Lab Results   Component Value Date    HGBA1C 6 7 (A) 12/05/2022       Results for orders placed or performed in visit on 01/31/23   Microalbumin / creatinine urine ratio   Result Value Ref Range    Creatinine, Ur 132 0 mg/dL    Microalbum  ,U,Random 15 2 0 0 - 20 0 mg/L    Microalb Creat Ratio 12 0 - 30 mg/g creatinine       Orders Placed This Encounter   Procedures   • XR shoulder 2+ vw left   • Ambulatory Referral to Ophthalmology       ALLERGIES:  No Known Allergies    Current Medications     Current Outpatient Medications   Medication Sig Dispense Refill   • acetaminophen (TYLENOL) 325 mg tablet Take 2 tablets (650 mg total) by mouth every 6 (six) hours as needed for mild pain or fever 30 tablet 0   • amLODIPine (NORVASC) 5 mg tablet TAKE 1 TABLET (5 MG TOTAL) BY MOUTH DAILY   90 tablet 0   • aspirin 81 MG tablet Take 81 mg by mouth daily      • atorvastatin (LIPITOR) 10 mg tablet TAKE 1 TABLET BY MOUTH EVERY DAY 90 tablet 1   • Blood Glucose Monitoring Suppl (OneTouch Verio Reflect) w/Device KIT Check blood sugars once daily  Please substitute with appropriate alternative as covered by patient's insurance  Dx: E11 65 1 kit 0   • calcium carbonate (OS-ANGELIKA) 600 MG tablet Take 1 tablet (600 mg total) by mouth daily 30 tablet 3   • colchicine (COLCRYS) 0 6 mg tablet TAKE 1 TABLET BY MOUTH EVERY DAY 90 tablet 3   • diclofenac sodium (VOLTAREN) 1 % Apply 2 g topically 4 (four) times a day 150 g 1   • famotidine (PEPCID) 40 MG tablet TAKE 1 TABLET BY MOUTH EVERY DAY 90 tablet 2   • gabapentin (Neurontin) 100 mg capsule Take 1 capsule (100 mg total) by mouth daily at bedtime 30 capsule 2   • glucose blood (OneTouch Verio) test strip Check blood sugars once daily  Please substitute with appropriate alternative as covered by patient's insurance  Dx: E11 65 100 each 3   • hydrocortisone 2 5 % cream APPLY TO AFFECTED AREA TWICE A DAY 28 35 g 0   • Lancets (OneTouch Delica Plus [id number]) MISC TEST DAILY, B71 1--DS CONFIMED DELICA 33 G 438 each 11   • Linzess 145 MCG CAPS TAKE 1 CAPSULE BY MOUTH EVERY DAY 30 capsule 1   • lisinopril (ZESTRIL) 10 mg tablet TAKE 1 TABLET BY MOUTH EVERY DAY 90 tablet 3   • lisinopril (ZESTRIL) 20 mg tablet TAKE 1 TABLET BY MOUTH EVERY DAY 90 tablet 3   • MELATONIN PO Take 10 mg by mouth daily at bedtime     • metFORMIN (GLUCOPHAGE) 500 mg tablet TAKE 1 TABLET BY MOUTH TWICE A  tablet 1   • methocarbamol (ROBAXIN) 500 mg tablet Take 1 tablet (500 mg total) by mouth daily at bedtime 14 tablet 0   • omeprazole (PriLOSEC) 40 MG capsule TAKE 1 CAPSULE BY MOUTH EVERY DAY 90 capsule 1   • OneTouch Delica Lancets 43W MISC Check blood sugars once daily  Please substitute with appropriate alternative as covered by patient's insurance  Dx: E11 65 100 each 3   • amoxicillin (AMOXIL) 500 mg capsule  (Patient not taking: Reported on 7/18/2022)       No current facility-administered medications for this visit           Health Maintenance     Health Maintenance   Topic Date Due • DM Eye Exam  Never done   • Hepatitis B Vaccine (1 of 3 - Risk 3-dose series) Never done   • COVID-19 Vaccine (4 - Booster for Pfizer series) 01/04/2022   • Influenza Vaccine (1) 09/01/2022   • Medicare Annual Wellness Visit (AWV)  03/31/2023   • HEMOGLOBIN A1C  06/05/2023   • Kidney Health Evaluation: GFR  12/31/2023   • Fall Risk  01/31/2024   • Depression Screening  01/31/2024   • BMI: Followup Plan  01/31/2024   • Kidney Health Evaluation: Microalbumin/Creatinine Ratio  01/31/2024   • Diabetic Foot Exam  02/05/2024   • BMI: Adult  03/15/2024   • Pneumococcal Vaccine: 65+ Years  Completed   • HIB Vaccine  Aged Out   • IPV Vaccine  Aged Out   • Hepatitis A Vaccine  Aged Out   • Meningococcal ACWY Vaccine  Aged Out   • HPV Vaccine  Aged Out   • Hepatitis C Screening  Discontinued   • Colorectal Cancer Screening  Discontinued     Immunization History   Administered Date(s) Administered   • COVID-19 PFIZER VACCINE 0 3 ML IM 03/29/2021, 04/21/2021, 11/09/2021   • INFLUENZA 10/27/2015, 10/01/2016, 10/13/2016, 12/19/2018   • Influenza Split High Dose Preservative Free IM 10/23/2014, 10/27/2015, 10/01/2016, 10/13/2016, 10/13/2016, 10/13/2016   • Influenza, high dose seasonal 0 7 mL 12/19/2018, 10/14/2019, 10/05/2021   • Pneumococcal Conjugate 13-Valent 08/25/2015, 01/19/2017, 10/14/2019   • Pneumococcal Polysaccharide PPV23 10/05/2021          ZAHRA Jimenez

## 2023-03-16 ENCOUNTER — TELEPHONE (OUTPATIENT)
Dept: GASTROENTEROLOGY | Facility: CLINIC | Age: 78
End: 2023-03-16

## 2023-03-16 NOTE — TELEPHONE ENCOUNTER
Patients GI provider:       Number to return call: [phone]    Reason for call: Pt's daughter Sejal Bateman calling stating her father having abdominal pain  Would like sooner apt      Scheduled procedure/appointment date if applicable: Apt 0/84/26

## 2023-03-17 NOTE — TELEPHONE ENCOUNTER
Called patient, reached voicemail, left message to call back  Patient has never seen with GI before   Please schedule patient with any provider that has soonest availability  (unless patient only wants to see Dr Prabhjot Valverde)

## 2023-03-19 DIAGNOSIS — I10 ESSENTIAL HYPERTENSION: ICD-10-CM

## 2023-03-19 DIAGNOSIS — R21 RASH: ICD-10-CM

## 2023-03-20 RX ORDER — AMLODIPINE BESYLATE 5 MG/1
5 TABLET ORAL DAILY
Qty: 90 TABLET | Refills: 0 | Status: SHIPPED | OUTPATIENT
Start: 2023-03-20

## 2023-04-03 ENCOUNTER — HOSPITAL ENCOUNTER (OUTPATIENT)
Dept: RADIOLOGY | Facility: HOSPITAL | Age: 78
Discharge: HOME | End: 2023-04-03

## 2023-04-03 DIAGNOSIS — M25.512 ACUTE PAIN OF LEFT SHOULDER: ICD-10-CM

## 2023-04-06 ENCOUNTER — OFFICE VISIT (OUTPATIENT)
Dept: FAMILY MEDICINE CLINIC | Facility: CLINIC | Age: 78
End: 2023-04-06

## 2023-04-06 VITALS
HEIGHT: 68 IN | DIASTOLIC BLOOD PRESSURE: 80 MMHG | OXYGEN SATURATION: 97 % | RESPIRATION RATE: 16 BRPM | TEMPERATURE: 96.8 F | WEIGHT: 199.8 LBS | BODY MASS INDEX: 30.28 KG/M2 | HEART RATE: 64 BPM | SYSTOLIC BLOOD PRESSURE: 138 MMHG

## 2023-04-06 DIAGNOSIS — H61.23 BILATERAL IMPACTED CERUMEN: Primary | ICD-10-CM

## 2023-04-06 NOTE — PROGRESS NOTES
FAMILY PRACTICE OFFICE VISIT       NAME: Rg Bazzi  AGE: 69 y o  SEX: male       : 1945        MRN: [id number]    DATE: 2023  TIME: 12:25 PM    Assessment and Plan   1  Bilateral impacted cerumen  Assessment & Plan:  Patient tolerated well                   Chief Complaint     Chief Complaint   Patient presents with   • Cerumen Impaction     Patient here for ear flush        History of Present Illness   Rg Bazzi is a 69y o -year-old male who is here for ear flush  Trouble hearing      Review of Systems   Review of Systems   Constitutional: Negative for fatigue and fever  HENT: Negative for congestion, ear discharge, ear pain and postnasal drip  Respiratory: Negative for cough and shortness of breath  Cardiovascular: Negative for chest pain and palpitations  Musculoskeletal: Negative for arthralgias and myalgias  Neurological: Negative for dizziness and headaches  Hematological: Negative for adenopathy  Psychiatric/Behavioral: Negative for dysphoric mood  The patient is not nervous/anxious          Active Problem List     Patient Active Problem List   Diagnosis   • Brain tumor ([address] )   • 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[address] )   • Chronic constipation   • Episodic cluster headache, not intractable   • GERD (gastroesophageal reflux disease)   • Nonintractable headache   • Hypertension   • Acute pain of both knees   • Meningioma (HCC)   • Varicose 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   • Spondylolisthesis at L5-S1 level   • Other chest pain   • Renal cyst   • History of hepatitis C   • Scalp lesion   • 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HCC)   • Bilateral impacted cerumen   • Primary osteoarthritis of both knees   • Epigastric abdominal pain   • Unstable angina (HCC)   • Palpitations   • Type 2 diabetes mellitus without complication, without long-term current use of insulin (HCC)   • Abnormal liver enzymes   • Acute pain of left shoulder   • Blurry vision, bilateral         Past Medical History:  Past Medical History:   Diagnosis Date   • Brain mass     Last Assessed; 11/5/2015   • Chest pain     Last Assessed: 1/22/2015   • Diabetes type 2, uncontrolled     Last Assessed: 3/1/2016   • Edema of left lower extremity     Last Assessed: 3/22/2017   • GERD (gastroesophageal reflux disease)    • Hyperlipidemia    • Hypertension    • Impaired fasting glucose     Last Assessed: 11/10/2015   • Irregular heartbeat    • Skin cancer (melanoma) (HCC)     Scalp    • Varicose 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     Last Assessed: 3/22/2017       Past Surgical History:  Past Surgical History:   Procedure Laterality Date   • COLONOSCOPY     • COLONOSCOPY N/A 7/10/2018    Procedure: COLONOSCOPY;  Surgeon: Bala Marshall MD;  Location: [facility];   Service: Colorectal   • INGUINAL HERNIA REPAIR      20+ years ago - [facility]; Last Assessed: 10/23/2014   • TONSILLECTOMY     • VARICOSE VEIN SURGERY Left     x2 left leg - 40+ yrs ago, [facility]       Family History:  Family History   Problem Relation Age of Onset   • No Known Problems Mother    • No Known Problems Father    • Melanoma Daughter 16       Social History:  Social History     Socioeconomic History   • Marital status: /Civil Union     Spouse name: Not on file   • Number of children: 5   • Years of education: Not on file   • Highest education level: Not on file   Occupational History   • Occupation: Retired: Construction   Tobacco Use   • Smoking status: Never   • Smokeless tobacco: Never   Vaping Use   • Vaping Use: Never used   Substance and Sexual Activity   • Alcohol use: No   • Drug use: No   • Sexual activity: Not Currently     Partners: Female   Other Topics Concern   • Not on file   Social History Narrative    Uses safety equipment         Social Determinants of Health     Financial Resource Strain: Not on file   Food Insecurity: Not on file   Transportation Needs: Not on file   Physical Activity: Not on file   Stress: Not on file   Social Connections: Not on file   Intimate Partner Violence: Not on file   Housing Stability: Not on file       Objective     Vitals:    04/06/23 1055   BP: 138/80   Pulse: 64   Resp: 16   Temp: (!) 96 8 °F (36 °C)   SpO2: 97%     Wt Readings from Last 3 Encounters:   04/06/23 90 6 kg (199 lb 12 8 oz)   03/15/23 93 1 kg (205 lb 3 2 oz)   01/31/23 96 2 kg (212 lb)       Physical Exam  Constitutional:       Appearance: Normal appearance  HENT:      Head: Normocephalic and atraumatic  Right Ear: There is impacted cerumen  Left Ear: There is impacted cerumen  Nose: Nose normal       Mouth/Throat:      Mouth: Mucous membranes are moist    Eyes:      Conjunctiva/sclera: Conjunctivae normal    Cardiovascular:      Rate and Rhythm: Normal rate and regular rhythm  Heart sounds: Normal heart sounds  Pulmonary:      Effort: Pulmonary effort is normal       Breath sounds: Normal breath sounds  Musculoskeletal:         General: Normal range of motion  Cervical back: Normal range of motion and neck supple  Skin:     General: Skin is warm  Capillary Refill: Capillary refill takes less than 2 seconds  Neurological:      General: No focal deficit present  Mental Status: He is alert and oriented to person, place, and time     Psychiatric:         Mood and Affect: Mood normal          Behavior: Behavior normal          Pertinent Laboratory/Diagnostic Studies:  Lab Results   Component Value Date    GLUCOSE 121 10/28/2015    BUN 29 (H) 12/31/2022    CREATININE 0 64 12/31/2022    CALCIUM 9 2 12/31/2022     10/28/2015    K 4 1 12/31/2022    CO2 27 12/31/2022     12/31/2022     Lab Results   Component Value Date    ALT 11 12/31/2022    AST 12 (L) 12/31/2022    GGT 12 12/31/2022    ALKPHOS 98 12/31/2022    BILITOT 0 53 08/27/2015       Lab Results   Component Value Date    WBC 4 72 12/31/2022    HGB 12 8 12/31/2022    HCT 38 1 12/31/2022    MCV 87 12/31/2022     12/31/2022       No results found for: TSH    Lab Results   Component Value Date    CHOL 154 08/27/2015     Lab Results   Component Value Date    TRIG 160 (H) 12/20/2021     Lab Results   Component Value Date    HDL 33 (L) 12/20/2021     Lab Results   Component Value Date    LDLCALC 126 (H) 12/20/2021     Lab Results   Component Value Date    HGBA1C 6 7 (A) 12/05/2022       Results for orders placed or performed in visit on 01/31/23   Microalbumin / creatinine urine ratio   Result Value Ref Range    Creatinine, Ur 132 0 mg/dL    Microalbum  ,U,Random 15 2 0 0 - 20 0 mg/L    Microalb Creat Ratio 12 0 - 30 mg/g creatinine       Orders Placed This Encounter   Procedures   • Ear cerumen removal       ALLERGIES:  No Known Allergies    Current Medications     Current Outpatient Medications   Medication Sig Dispense Refill   • acetaminophen (TYLENOL) 325 mg tablet Take 2 tablets (650 mg total) by mouth every 6 (six) hours as needed for mild pain or fever 30 tablet 0   • amLODIPine (NORVASC) 5 mg tablet TAKE 1 TABLET (5 MG TOTAL) BY MOUTH DAILY  90 tablet 0   • aspirin 81 MG tablet Take 81 mg by mouth daily      • atorvastatin (LIPITOR) 10 mg tablet TAKE 1 TABLET BY MOUTH EVERY DAY 90 tablet 1   • Blood Glucose Monitoring Suppl (OneTouch Verio Reflect) w/Device KIT Check blood sugars once daily  Please substitute with appropriate alternative as covered by patient's insurance  Dx: E11 65 1 kit 0   • calcium carbonate (OS-ANGELIKA) 600 MG tablet Take 1 tablet (600 mg total) by mouth daily 30 tablet 3   • colchicine (COLCRYS) 0 6 mg tablet TAKE 1 TABLET BY MOUTH EVERY DAY 90 tablet 3   • diclofenac sodium (VOLTAREN) 1 % Apply 2 g topically 4 (four) times a day 150 g 1   • famotidine (PEPCID) 40 MG tablet TAKE 1 TABLET BY MOUTH EVERY DAY 90 tablet 2   • gabapentin (Neurontin) 100 mg capsule Take 1 capsule (100 mg total) by mouth daily at bedtime 30 capsule 2   • glucose blood (OneTouch Verio) test strip Check blood sugars once daily   Please substitute with appropriate alternative as covered by patient's insurance  Dx: E11 65 100 each 3   • hydrocortisone 2 5 % cream APPLY TO AFFECTED AREA TWICE A DAY 28 35 g 0   • Lancets (OneTouch Delica Plus [id number]) MISC TEST DAILY, P31 0--AX CONFIMED DELICA 33 G 449 each 11   • Linzess 145 MCG CAPS TAKE 1 CAPSULE BY MOUTH EVERY DAY 30 capsule 1   • lisinopril (ZESTRIL) 10 mg tablet TAKE 1 TABLET BY MOUTH EVERY DAY 90 tablet 3   • lisinopril (ZESTRIL) 20 mg tablet TAKE 1 TABLET BY MOUTH EVERY DAY 90 tablet 3   • MELATONIN PO Take 10 mg by mouth daily at bedtime     • metFORMIN (GLUCOPHAGE) 500 mg tablet TAKE 1 TABLET BY MOUTH TWICE A  tablet 1   • methocarbamol (ROBAXIN) 500 mg tablet Take 1 tablet (500 mg total) by mouth daily at bedtime 14 tablet 0   • omeprazole (PriLOSEC) 40 MG capsule TAKE 1 CAPSULE BY MOUTH EVERY DAY 90 capsule 1   • OneTouch Delica Lancets 11B MISC Check blood sugars once daily  Please substitute with appropriate alternative as covered by patient's insurance  Dx: E11 65 100 each 3   • amoxicillin (AMOXIL) 500 mg capsule  (Patient not taking: Reported on 7/18/2022)       No current facility-administered medications for this visit           Health Maintenance     Health Maintenance   Topic Date Due   • DM Eye Exam  Never done   • Medicare Annual Wellness Visit (AWV)  03/31/2023   • HEMOGLOBIN A1C  06/05/2023   • Hepatitis B Vaccine (1 of 3 - Risk 3-dose series) 05/06/2023 (Originally 7/11/2006)   • Influenza Vaccine (1) 06/30/2023 (Originally 9/1/2022)   • COVID-19 Vaccine (4 - Booster for Pfizer series) 07/06/2023 (Originally 1/4/2022)   • Kidney Health Evaluation: GFR  12/31/2023   • Fall Risk  01/31/2024   • Depression Screening  01/31/2024   • BMI: Followup Plan  01/31/2024   • Kidney Health Evaluation: Microalbumin/Creatinine Ratio  01/31/2024   • Diabetic Foot Exam  02/05/2024   • BMI: Adult  04/06/2024   • Pneumococcal Vaccine: 65+ Years  Completed   • HIB Vaccine  Aged Out   • IPV Vaccine Aged Out   • Hepatitis A Vaccine  Aged Out   • Meningococcal ACWY Vaccine  Aged Out   • HPV Vaccine  Aged Out   • Hepatitis C Screening  Discontinued   • Colorectal Cancer Screening  Discontinued     Immunization History   Administered Date(s) Administered   • COVID-19 PFIZER VACCINE 0 3 ML IM 03/29/2021, 04/21/2021, 11/09/2021   • INFLUENZA 10/27/2015, 10/01/2016, 10/13/2016, 12/19/2018   • Influenza Split High Dose Preservative Free IM 10/23/2014, 10/27/2015, 10/01/2016, 10/13/2016, 10/13/2016, 10/13/2016   • Influenza, high dose seasonal 0 7 mL 12/19/2018, 10/14/2019, 10/05/2021   • Pneumococcal Conjugate 13-Valent 08/25/2015, 01/19/2017, 10/14/2019   • Pneumococcal Polysaccharide PPV23 10/05/2021       Ear cerumen removal    Date/Time: 4/6/2023 12:23 PM  Performed by: ZAHRA Beltran  Authorized by: ZAHRA Beltran   Universal Protocol:  Consent: Verbal consent obtained  Written consent not obtained  Risks and benefits: risks, benefits and alternatives were discussed  Consent given by: patient  Patient understanding: patient states understanding of the procedure being performed  Patient consent: the patient's understanding of the procedure matches consent given  Procedure consent: procedure consent matches procedure scheduled  Patient identity confirmed: verbally with patient      Patient location:  Clinic  Procedure details:     Local anesthetic:  None    Location:  L ear and R ear    Procedure type: irrigation with instrumentation      Instrumentation: curette      Approach:  Natural orifice  Post-procedure details:     Complication:  None    Hearing quality:  Improved    Patient tolerance of procedure:   Tolerated well, no immediate complications      ZAHRA Beltran

## 2023-05-08 DIAGNOSIS — E11.9 TYPE 2 DIABETES MELLITUS WITHOUT COMPLICATION, WITHOUT LONG-TERM CURRENT USE OF INSULIN (HCC): Primary | ICD-10-CM

## 2023-05-08 DIAGNOSIS — I10 PRIMARY HYPERTENSION: ICD-10-CM

## 2023-05-08 DIAGNOSIS — R10.13 EPIGASTRIC ABDOMINAL PAIN: ICD-10-CM

## 2023-05-09 ENCOUNTER — TELEPHONE (OUTPATIENT)
Dept: NEUROSURGERY | Facility: CLINIC | Age: 78
End: 2023-05-09

## 2023-05-09 NOTE — TELEPHONE ENCOUNTER
Called daughter glen left vm to follow up in regards to her fathers 1 yr over due follow up appointment snpx with hdm  He was a no show to both the mri and office visit in nov 2022  Our office has reached out multiple times in regards to his follow up appointment    I am sending out a letter   To remind him to     Make follow up appointment    Testing needing to be completed before appointment     - bun creatine 3 days before  Imaging    -mri brain w/wo contrast    New orders will need to be placed after office visit is scheduled

## 2023-05-13 ENCOUNTER — APPOINTMENT (OUTPATIENT)
Dept: LAB | Facility: CLINIC | Age: 78
End: 2023-05-13

## 2023-05-13 DIAGNOSIS — R10.13 EPIGASTRIC ABDOMINAL PAIN: ICD-10-CM

## 2023-05-13 DIAGNOSIS — Z01.812 PRE-PROCEDURAL LABORATORY EXAMINATIONS: ICD-10-CM

## 2023-05-13 DIAGNOSIS — I10 PRIMARY HYPERTENSION: ICD-10-CM

## 2023-05-13 DIAGNOSIS — E11.9 TYPE 2 DIABETES MELLITUS WITHOUT COMPLICATION, WITHOUT LONG-TERM CURRENT USE OF INSULIN (HCC): ICD-10-CM

## 2023-05-13 LAB
ALBUMIN SERPL BCP-MCNC: 4.1 G/DL (ref 3.5–5)
ALP SERPL-CCNC: 109 U/L (ref 34–104)
ALT SERPL W P-5'-P-CCNC: 9 U/L (ref 7–52)
AMYLASE SERPL-CCNC: 14 IU/L (ref 29–103)
ANION GAP SERPL CALCULATED.3IONS-SCNC: 4 MMOL/L (ref 4–13)
AST SERPL W P-5'-P-CCNC: 11 U/L (ref 13–39)
BASOPHILS # BLD AUTO: 0.02 THOUSANDS/ÂΜL (ref 0–0.1)
BASOPHILS NFR BLD AUTO: 0 % (ref 0–1)
BILIRUB SERPL-MCNC: 0.52 MG/DL (ref 0.2–1)
BUN SERPL-MCNC: 29 MG/DL (ref 5–25)
CALCIUM SERPL-MCNC: 9 MG/DL (ref 8.4–10.2)
CHLORIDE SERPL-SCNC: 108 MMOL/L (ref 96–108)
CO2 SERPL-SCNC: 30 MMOL/L (ref 21–32)
CREAT SERPL-MCNC: 0.63 MG/DL (ref 0.6–1.3)
EOSINOPHIL # BLD AUTO: 0.14 THOUSAND/ÂΜL (ref 0–0.61)
EOSINOPHIL NFR BLD AUTO: 3 % (ref 0–6)
ERYTHROCYTE [DISTWIDTH] IN BLOOD BY AUTOMATED COUNT: 13.2 % (ref 11.6–15.1)
EST. AVERAGE GLUCOSE BLD GHB EST-MCNC: 137 MG/DL
GFR SERPL CREATININE-BSD FRML MDRD: 95 ML/MIN/1.73SQ M
GLUCOSE P FAST SERPL-MCNC: 157 MG/DL (ref 65–99)
HBA1C MFR BLD: 6.4 %
HCT VFR BLD AUTO: 38 % (ref 36.5–49.3)
HGB BLD-MCNC: 12.4 G/DL (ref 12–17)
IMM GRANULOCYTES # BLD AUTO: 0.02 THOUSAND/UL (ref 0–0.2)
IMM GRANULOCYTES NFR BLD AUTO: 0 % (ref 0–2)
LIPASE SERPL-CCNC: 10 U/L (ref 11–82)
LYMPHOCYTES # BLD AUTO: 1.33 THOUSANDS/ÂΜL (ref 0.6–4.47)
LYMPHOCYTES NFR BLD AUTO: 26 % (ref 14–44)
MCH RBC QN AUTO: 28.8 PG (ref 26.8–34.3)
MCHC RBC AUTO-ENTMCNC: 32.6 G/DL (ref 31.4–37.4)
MCV RBC AUTO: 88 FL (ref 82–98)
MONOCYTES # BLD AUTO: 0.48 THOUSAND/ÂΜL (ref 0.17–1.22)
MONOCYTES NFR BLD AUTO: 10 % (ref 4–12)
NEUTROPHILS # BLD AUTO: 3.08 THOUSANDS/ÂΜL (ref 1.85–7.62)
NEUTS SEG NFR BLD AUTO: 61 % (ref 43–75)
NRBC BLD AUTO-RTO: 0 /100 WBCS
PLATELET # BLD AUTO: 166 THOUSANDS/UL (ref 149–390)
PMV BLD AUTO: 10 FL (ref 8.9–12.7)
POTASSIUM SERPL-SCNC: 3.8 MMOL/L (ref 3.5–5.3)
PROT SERPL-MCNC: 6.4 G/DL (ref 6.4–8.4)
RBC # BLD AUTO: 4.31 MILLION/UL (ref 3.88–5.62)
SODIUM SERPL-SCNC: 142 MMOL/L (ref 135–147)
WBC # BLD AUTO: 5.07 THOUSAND/UL (ref 4.31–10.16)

## 2023-05-16 ENCOUNTER — TELEPHONE (OUTPATIENT)
Dept: CARDIOLOGY CLINIC | Facility: CLINIC | Age: 78
End: 2023-05-16

## 2023-05-16 NOTE — TELEPHONE ENCOUNTER
----- Message from [employer] sent at 5/15/2023  8:26 AM EDT -----  Please call [facility] [place] and inform him Lipid panel is improved  HDL remain low 32  Should be closer to 40, walk daily     Spoke with pts daughter re: improved Lipid panel

## 2023-06-02 DIAGNOSIS — R07.89 OTHER CHEST PAIN: ICD-10-CM

## 2023-06-02 DIAGNOSIS — R10.13 EPIGASTRIC ABDOMINAL PAIN: ICD-10-CM

## 2023-06-02 DIAGNOSIS — E11.9 TYPE 2 DIABETES MELLITUS WITHOUT COMPLICATION, WITHOUT LONG-TERM CURRENT USE OF INSULIN (HCC): ICD-10-CM

## 2023-06-02 DIAGNOSIS — K21.9 GASTROESOPHAGEAL REFLUX DISEASE WITHOUT ESOPHAGITIS: ICD-10-CM

## 2023-06-02 DIAGNOSIS — M54.2 CERVICALGIA: ICD-10-CM

## 2023-06-02 DIAGNOSIS — K59.09 CHRONIC CONSTIPATION: ICD-10-CM

## 2023-06-02 DIAGNOSIS — I10 ESSENTIAL HYPERTENSION: ICD-10-CM

## 2023-06-02 DIAGNOSIS — E78.49 OTHER HYPERLIPIDEMIA: ICD-10-CM

## 2023-06-05 ENCOUNTER — TELEPHONE (OUTPATIENT)
Dept: NEUROSURGERY | Facility: CLINIC | Age: 78
End: 2023-06-05

## 2023-06-05 PROBLEM — H61.23 BILATERAL IMPACTED CERUMEN: Status: RESOLVED | Noted: 2020-09-16 | Resolved: 2023-06-05

## 2023-06-05 RX ORDER — METHOCARBAMOL 500 MG/1
500 TABLET, FILM COATED ORAL
Qty: 30 TABLET | Refills: 0 | Status: SHIPPED | OUTPATIENT
Start: 2023-06-05

## 2023-06-05 RX ORDER — FAMOTIDINE 40 MG/1
40 TABLET, FILM COATED ORAL DAILY
Qty: 90 TABLET | Refills: 0 | Status: SHIPPED | OUTPATIENT
Start: 2023-06-05 | End: 2023-06-12

## 2023-06-05 RX ORDER — AMLODIPINE BESYLATE 5 MG/1
5 TABLET ORAL DAILY
Qty: 90 TABLET | Refills: 0 | Status: SHIPPED | OUTPATIENT
Start: 2023-06-05

## 2023-06-05 RX ORDER — ATORVASTATIN CALCIUM 10 MG/1
10 TABLET, FILM COATED ORAL DAILY
Qty: 90 TABLET | Refills: 0 | Status: SHIPPED | OUTPATIENT
Start: 2023-06-05

## 2023-06-05 RX ORDER — LISINOPRIL 20 MG/1
20 TABLET ORAL DAILY
Qty: 90 TABLET | Refills: 0 | Status: SHIPPED | OUTPATIENT
Start: 2023-06-05

## 2023-06-05 RX ORDER — COLCHICINE 0.6 MG/1
0.6 TABLET ORAL DAILY
Qty: 90 TABLET | Refills: 0 | Status: SHIPPED | OUTPATIENT
Start: 2023-06-05

## 2023-06-05 RX ORDER — OMEPRAZOLE 40 MG/1
40 CAPSULE, DELAYED RELEASE ORAL DAILY
Qty: 90 CAPSULE | Refills: 0 | Status: SHIPPED | OUTPATIENT
Start: 2023-06-05

## 2023-06-05 RX ORDER — LINACLOTIDE 145 UG/1
145 CAPSULE, GELATIN COATED ORAL DAILY
Qty: 30 CAPSULE | Refills: 0 | Status: SHIPPED | OUTPATIENT
Start: 2023-06-05

## 2023-06-12 ENCOUNTER — CONSULT (OUTPATIENT)
Dept: GASTROENTEROLOGY | Facility: AMBULARY SURGERY CENTER | Age: 78
End: 2023-06-12
Payer: COMMERCIAL

## 2023-06-12 VITALS
BODY MASS INDEX: 29.43 KG/M2 | OXYGEN SATURATION: 98 % | WEIGHT: 194.2 LBS | SYSTOLIC BLOOD PRESSURE: 126 MMHG | HEIGHT: 68 IN | DIASTOLIC BLOOD PRESSURE: 64 MMHG | HEART RATE: 68 BPM

## 2023-06-12 DIAGNOSIS — R10.11 RIGHT UPPER QUADRANT ABDOMINAL PAIN: ICD-10-CM

## 2023-06-12 DIAGNOSIS — K59.09 OTHER CONSTIPATION: ICD-10-CM

## 2023-06-12 DIAGNOSIS — K21.9 GASTROESOPHAGEAL REFLUX DISEASE WITHOUT ESOPHAGITIS: Primary | ICD-10-CM

## 2023-06-12 PROCEDURE — 99204 OFFICE O/P NEW MOD 45 MIN: CPT | Performed by: INTERNAL MEDICINE

## 2023-06-12 RX ORDER — FAMOTIDINE 20 MG/1
20 TABLET, FILM COATED ORAL 2 TIMES DAILY
Qty: 60 TABLET | Refills: 11 | Status: SHIPPED | OUTPATIENT
Start: 2023-06-12

## 2023-06-12 RX ORDER — BISACODYL 5 MG/1
10 TABLET, DELAYED RELEASE ORAL ONCE
Qty: 2 TABLET | Refills: 0 | Status: SHIPPED | OUTPATIENT
Start: 2023-06-12 | End: 2023-06-12

## 2023-06-12 NOTE — ASSESSMENT & PLAN NOTE
Gastroesophageal reflux disease - Patient has the symptoms of chronic acid reflux for a long time  Possible hiatal hernia or LES weakness  Should rule out Green's esophagus because of chronic symptoms     -Patient was explained about the lifestyle and dietary modifications  Advised to avoid fatty foods, chocolates, caffeine, alcohol and any other triggering foods  Avoid eating for at least 3 hours before going to bed     -Discussed about the long-term side effects from omeprazole and advised him to try Pepcid instead

## 2023-06-12 NOTE — PATIENT INSTRUCTIONS
Scheduled date of EGD/colonoscopy (as of today):  08/21/23  Physician performing EGD/colonoscopy: dr Megan Poon  Location of EGD/colonoscopy:[facility]  Desired bowel prep reviewed with patient: [email or web address]  Instructions reviewed with patient by:daniel  Clearances:   diabetic

## 2023-06-12 NOTE — PROGRESS NOTES
Consultation - [facility]) [facility]  Alex Corrales 1945 male         Chief Complaint: Constipation, GERD, abdominal pain    HPI: 50-year-old male with history of diabetes mellitus, hypertension, hyperlipidemia, GERD has been having issues with constipation for a while  He was given prescription for Linzess 145 mcg last week  Complaining about difficulty with his bowel movements and usually once every 2 to 3 days  Denies any blood or mucus in the stool  Any heartburn or acid reflux but he takes omeprazole 40 mg daily  Denies any difficulty swallowing  Complains about right upper quadrant and supraumbilical pain for a while  Denies any NSAID use  Planing of decreased appetite and lost about 10 pounds in the past year  Ultrasound in December was normal    Patient speaks [place] and was interpreted by his daughter  Chaperon: Ms Tierney Obrien: Review of Systems   Constitutional: Positive for appetite change and unexpected weight change  Negative for activity change, chills, diaphoresis, fatigue and fever  HENT: Negative for ear discharge, ear pain, facial swelling, hearing loss, nosebleeds, sore throat, tinnitus and voice change  Eyes: Negative for pain, discharge, redness, itching and visual disturbance  Respiratory: Negative for apnea, cough, chest tightness, shortness of breath and wheezing  Cardiovascular: Negative for chest pain and palpitations  Gastrointestinal:        As noted in HPI   Endocrine: Negative for cold intolerance, heat intolerance and polyuria  Genitourinary: Negative for difficulty urinating, dysuria, flank pain, hematuria and urgency  Musculoskeletal: Negative for arthralgias, back pain, gait problem, joint swelling and myalgias  Skin: Negative for rash and wound  Neurological: Negative for dizziness, tremors, seizures, speech difficulty, light-headedness, numbness and headaches  Hematological: Negative for adenopathy   Does not bruise/bleed easily  Psychiatric/Behavioral: Negative for agitation, behavioral problems and confusion  The patient is not nervous/anxious  Past Medical History:   Diagnosis Date   • Brain mass     Last Assessed; 11/5/2015   • Chest pain     Last Assessed: 1/22/2015   • Diabetes type 2, uncontrolled     Last Assessed: 3/1/2016   • Edema of left lower extremity     Last Assessed: 3/22/2017   • GERD (gastroesophageal reflux disease)    • Hyperlipidemia    • Hypertension    • Impaired fasting glucose     Last Assessed: 11/10/2015   • Irregular heartbeat    • Skin cancer (melanoma) (HCC)     Scalp    • Varicose 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     Last Assessed: 3/22/2017      Past Surgical History:   Procedure Laterality Date   • COLONOSCOPY     • COLONOSCOPY N/A 7/10/2018    Procedure: COLONOSCOPY;  Surgeon: Judith France MD;  Location:  GI LAB;   Service: Colorectal   • INGUINAL HERNIA REPAIR      20+ years ago - [facility]; Last Assessed: 10/23/2014   • TONSILLECTOMY     • VARICOSE VEIN SURGERY Left     x2 left leg - 40+ yrs ago, [facility] and [facility]     Social History     Socioeconomic History   • Marital status: /Civil Union     Spouse name: Not on file   • Number of children: 5   • Years of education: Not on file   • Highest education level: Not on file   Occupational History   • Occupation: Retired: Construction   Tobacco Use   • Smoking status: Never   • Smokeless tobacco: Never   Vaping Use   • Vaping Use: Never used   Substance and Sexual Activity   • Alcohol use: No   • Drug use: No   • Sexual activity: Not Currently     Partners: Female   Other Topics Concern   • Not on file   Social History Narrative    Uses safety equipment         Social Determinants of Health     Financial Resource Strain: Not on file   Food Insecurity: Not on file   Transportation Needs: Not on file   Physical Activity: Not on file   Stress: Not on file   Social Connections: Not on file   Intimate Partner Violence: Not on file   Housing Stability: Not on file     Family History   Problem Relation Age of Onset   • No Known Problems Mother    • No Known Problems Father    • Melanoma Daughter 16     Patient has no known allergies  Current Outpatient Medications   Medication Sig Dispense Refill   • acetaminophen (TYLENOL) 325 mg tablet Take 2 tablets (650 mg total) by mouth every 6 (six) hours as needed for mild pain or fever 30 tablet 0   • amLODIPine (NORVASC) 5 mg tablet Take 1 tablet (5 mg total) by mouth daily 90 tablet 0   • bisacodyl (DULCOLAX) 5 mg EC tablet Take 2 tablets (10 mg total) by mouth once for 1 dose 2 tablet 0   • Blood Glucose Monitoring Suppl (OneTouch Verio Reflect) w/Device KIT Check blood sugars once daily  Please substitute with appropriate alternative as covered by patient's insurance  Dx: E11 65 1 kit 0   • colchicine (COLCRYS) 0 6 mg tablet Take 1 tablet (0 6 mg total) by mouth daily 90 tablet 0   • famotidine (PEPCID) 20 mg tablet Take 1 tablet (20 mg total) by mouth 2 (two) times a day 60 tablet 11   • gabapentin (Neurontin) 100 mg capsule Take 1 capsule (100 mg total) by mouth daily at bedtime 30 capsule 2   • glucose blood (OneTouch Verio) test strip Check blood sugars once daily  Please substitute with appropriate alternative as covered by patient's insurance   Dx: E11 65 100 each 3   • Lancets (OneTouch Delica Plus [id number]) MISC TEST DAILY, R98 4--DR CONFIMED DELICA 33 G 994 each 11   • linaCLOtide (Linzess) 145 MCG CAPS Take 1 capsule (145 mcg total) by mouth daily 30 capsule 0   • lisinopril (ZESTRIL) 20 mg tablet Take 1 tablet (20 mg total) by mouth daily 90 tablet 0   • metFORMIN (GLUCOPHAGE) 500 mg tablet Take 1 tablet (500 mg total) by mouth 2 (two) times a day 180 tablet 0   • methocarbamol (ROBAXIN) 500 mg tablet Take 1 tablet (500 mg total) by mouth daily at bedtime 30 tablet 0   • omeprazole (PriLOSEC) 40 MG capsule Take 1 capsule (40 mg total) by mouth daily 90 capsule 0   • OneTouch "Delica Lancets 52O MISC Check blood sugars once daily  Please substitute with appropriate alternative as covered by patient's insurance  Dx: E11 65 100 each 3   • polyethylene glycol (GOLYTELY) 4000 mL solution Take 4,000 mL by mouth once for 1 dose 4000 mL 0   • amoxicillin (AMOXIL) 500 mg capsule  (Patient not taking: Reported on 7/18/2022)     • aspirin 81 MG tablet Take 81 mg by mouth daily  (Patient not taking: Reported on 6/12/2023)     • atorvastatin (LIPITOR) 10 mg tablet Take 1 tablet (10 mg total) by mouth daily (Patient not taking: Reported on 6/12/2023) 90 tablet 0   • calcium carbonate (OS-ANGELIKA) 600 MG tablet Take 1 tablet (600 mg total) by mouth daily (Patient not taking: Reported on 6/12/2023) 30 tablet 3   • diclofenac sodium (VOLTAREN) 1 % Apply 2 g topically 4 (four) times a day (Patient not taking: Reported on 6/12/2023) 150 g 1   • hydrocortisone 2 5 % cream APPLY TO AFFECTED AREA TWICE A DAY (Patient not taking: Reported on 6/12/2023) 28 35 g 0   • MELATONIN PO Take 10 mg by mouth daily at bedtime (Patient not taking: Reported on 6/12/2023)       No current facility-administered medications for this visit  Blood pressure 126/64, pulse 68, height 5' 8\" (1 727 m), weight 88 1 kg (194 lb 3 2 oz), SpO2 98 %  PHYSICAL EXAM: Physical Exam  Constitutional:       Appearance: He is well-developed  HENT:      Head: Normocephalic and atraumatic  Eyes:      General: No scleral icterus  Right eye: No discharge  Left eye: No discharge  Conjunctiva/sclera: Conjunctivae normal       Pupils: Pupils are equal, round, and reactive to light  Neck:      Thyroid: No thyromegaly  Vascular: No JVD  Trachea: No tracheal deviation  Cardiovascular:      Rate and Rhythm: Normal rate and regular rhythm  Heart sounds: Normal heart sounds  No murmur heard  No friction rub  No gallop  Pulmonary:      Effort: Pulmonary effort is normal  No respiratory distress        " Breath sounds: Normal breath sounds  No wheezing or rales  Chest:      Chest wall: No tenderness  Abdominal:      General: Bowel sounds are normal  There is no distension  Palpations: Abdomen is soft  There is no mass  Tenderness: There is no abdominal tenderness  There is no guarding or rebound  Hernia: No hernia is present  Musculoskeletal:      Cervical back: Neck supple  Lymphadenopathy:      Cervical: No cervical adenopathy  Skin:     General: Skin is warm and dry  Findings: No erythema or rash  Neurological:      Mental Status: He is alert and oriented to person, place, and time  Psychiatric:         Behavior: Behavior normal          Thought Content: Thought content normal           Lab Results   Component Value Date    HCT 38 0 05/13/2023    HGB 12 4 05/13/2023    MCV 88 05/13/2023     05/13/2023    WBC 5 07 05/13/2023     Lab Results   Component Value Date    BUN 29 (H) 05/13/2023    CALCIUM 9 0 05/13/2023     05/13/2023    CO2 30 05/13/2023    CREATININE 0 63 05/13/2023    GLUCOSE 121 10/28/2015    K 3 8 05/13/2023     10/28/2015     Lab Results   Component Value Date    ALKPHOS 109 (H) 05/13/2023    ALT 9 05/13/2023    AST 11 (L) 05/13/2023    BILITOT 0 53 08/27/2015    GGT 12 12/31/2022     Lab Results   Component Value Date    INR 1 12 04/30/2021    PROTIME 14 5 04/30/2021       XR shoulder 2+ vw left    Result Date: 4/7/2023  Impression: No acute osseous abnormality  Workstation performed: [id number]       ASSESSMENT & PLAN:    GERD (gastroesophageal reflux disease)  Gastroesophageal reflux disease - Patient has the symptoms of chronic acid reflux for a long time  Possible hiatal hernia or LES weakness  Should rule out Green's esophagus because of chronic symptoms     -Patient was explained about the lifestyle and dietary modifications  Advised to avoid fatty foods, chocolates, caffeine, alcohol and any other triggering foods    Avoid eating for at least 3 hours before going to bed     -Discussed about the long-term side effects from omeprazole and advised him to try Pepcid instead  Right upper quadrant abdominal pain  Had a negative ultrasound  Rule out peptic ulcer disease or gastric erosions  Consider musculoskeletal pain     -Scheduled for EGD    Other constipation  Most likely physiologic and age-related  Rule out colorectal lesions  -Advised patient's daughter to make sure that he is taking Linzess regularly  -May take MiraLAX and stool softeners    -Schedule for colonoscopy  -High-fiber diet     -Patient was given instructions about the colonoscopy prep     -Patient was explained about the risks and benefits of the procedure  Risks including but not limited to bleeding, infection, perforation were explained in detail  Also explained about less than 100% sensitivity with the exam and other alternatives

## 2023-06-12 NOTE — ASSESSMENT & PLAN NOTE
[facility] Care Coordination Contact      [facility]:  Emailed completed PCA assessment to [facility].  Faxed copy of PCA assessment to [facility] Agency and mailed copy to member.  Faxed MD Communication to PCP.     Requested CC complete dates on budget worksheet to CMS can complete EOV.    Member was mailed a copy of the PCA signature sheet to sign and return mail with a SASE.  This assessment was completed telephonically due to Covid-19.    Mirta Weir  Care Management Specialist  [facility]  [phone]       Had a negative ultrasound  Rule out peptic ulcer disease or gastric erosions    Consider musculoskeletal pain     -Scheduled for EGD

## 2023-06-12 NOTE — ASSESSMENT & PLAN NOTE
Most likely physiologic and age-related  Rule out colorectal lesions  -Advised patient's daughter to make sure that he is taking Linzess regularly  -May take MiraLAX and stool softeners    -Schedule for colonoscopy  -High-fiber diet     -Patient was given instructions about the colonoscopy prep     -Patient was explained about the risks and benefits of the procedure  Risks including but not limited to bleeding, infection, perforation were explained in detail  Also explained about less than 100% sensitivity with the exam and other alternatives

## 2023-07-21 ENCOUNTER — APPOINTMENT (EMERGENCY)
Dept: RADIOLOGY | Facility: HOSPITAL | Age: 78
End: 2023-07-21
Payer: COMMERCIAL

## 2023-07-21 ENCOUNTER — HOSPITAL ENCOUNTER (EMERGENCY)
Facility: HOSPITAL | Age: 78
Discharge: HOME | End: 2023-07-21
Attending: EMERGENCY MEDICINE
Payer: COMMERCIAL

## 2023-07-21 VITALS
DIASTOLIC BLOOD PRESSURE: 94 MMHG | HEART RATE: 72 BPM | SYSTOLIC BLOOD PRESSURE: 208 MMHG | RESPIRATION RATE: 18 BRPM | OXYGEN SATURATION: 98 % | TEMPERATURE: 98 F

## 2023-07-21 DIAGNOSIS — R33.9 URINARY RETENTION: ICD-10-CM

## 2023-07-21 DIAGNOSIS — K59.00 CONSTIPATION: Primary | ICD-10-CM

## 2023-07-21 LAB
ALBUMIN SERPL BCP-MCNC: 4.2 G/DL (ref 3.5–5)
ALP SERPL-CCNC: 127 U/L (ref 46–116)
ALT SERPL W P-5'-P-CCNC: 23 U/L (ref 12–78)
ANION GAP SERPL CALCULATED.3IONS-SCNC: 5 MMOL/L
AST SERPL W P-5'-P-CCNC: 24 U/L (ref 5–45)
BACTERIA UR QL AUTO: ABNORMAL /HPF
BASOPHILS # BLD AUTO: 0.02 THOUSANDS/ÂΜL (ref 0–0.1)
BASOPHILS NFR BLD AUTO: 0 % (ref 0–1)
BILIRUB SERPL-MCNC: 0.8 MG/DL (ref 0.2–1)
BILIRUB UR QL STRIP: NEGATIVE
BUN SERPL-MCNC: 17 MG/DL (ref 5–25)
CALCIUM SERPL-MCNC: 9.4 MG/DL (ref 8.3–10.1)
CHLORIDE SERPL-SCNC: 109 MMOL/L (ref 96–108)
CLARITY UR: CLEAR
CO2 SERPL-SCNC: 27 MMOL/L (ref 21–32)
COLOR UR: YELLOW
CREAT SERPL-MCNC: 0.79 MG/DL (ref 0.6–1.3)
EOSINOPHIL # BLD AUTO: 0.09 THOUSAND/ÂΜL (ref 0–0.61)
EOSINOPHIL NFR BLD AUTO: 1 % (ref 0–6)
ERYTHROCYTE [DISTWIDTH] IN BLOOD BY AUTOMATED COUNT: 12.9 % (ref 11.6–15.1)
GFR SERPL CREATININE-BSD FRML MDRD: 86 ML/MIN/1.73SQ M
GLUCOSE SERPL-MCNC: 142 MG/DL (ref 65–140)
GLUCOSE UR STRIP-MCNC: NEGATIVE MG/DL
HCT VFR BLD AUTO: 41.9 % (ref 36.5–49.3)
HGB BLD-MCNC: 13.9 G/DL (ref 12–17)
HGB UR QL STRIP.AUTO: ABNORMAL
IMM GRANULOCYTES # BLD AUTO: 0.03 THOUSAND/UL (ref 0–0.2)
IMM GRANULOCYTES NFR BLD AUTO: 0 % (ref 0–2)
KETONES UR STRIP-MCNC: NEGATIVE MG/DL
LEUKOCYTE ESTERASE UR QL STRIP: NEGATIVE
LYMPHOCYTES # BLD AUTO: 1.03 THOUSANDS/ÂΜL (ref 0.6–4.47)
LYMPHOCYTES NFR BLD AUTO: 12 % (ref 14–44)
MCH RBC QN AUTO: 27.7 PG (ref 26.8–34.3)
MCHC RBC AUTO-ENTMCNC: 33.2 G/DL (ref 31.4–37.4)
MCV RBC AUTO: 84 FL (ref 82–98)
MONOCYTES # BLD AUTO: 0.67 THOUSAND/ÂΜL (ref 0.17–1.22)
MONOCYTES NFR BLD AUTO: 8 % (ref 4–12)
MUCOUS THREADS UR QL AUTO: ABNORMAL
NEUTROPHILS # BLD AUTO: 6.85 THOUSANDS/ÂΜL (ref 1.85–7.62)
NEUTS SEG NFR BLD AUTO: 79 % (ref 43–75)
NITRITE UR QL STRIP: NEGATIVE
NON-SQ EPI CELLS URNS QL MICRO: ABNORMAL /HPF
NRBC BLD AUTO-RTO: 0 /100 WBCS
PH UR STRIP.AUTO: 5.5 [PH] (ref 4.5–8)
PLATELET # BLD AUTO: 182 THOUSANDS/UL (ref 149–390)
PMV BLD AUTO: 9.8 FL (ref 8.9–12.7)
POTASSIUM SERPL-SCNC: 3.9 MMOL/L (ref 3.5–5.3)
PROT SERPL-MCNC: 7.6 G/DL (ref 6.4–8.4)
PROT UR STRIP-MCNC: NEGATIVE MG/DL
RBC # BLD AUTO: 5.01 MILLION/UL (ref 3.88–5.62)
RBC #/AREA URNS AUTO: ABNORMAL /HPF
SODIUM SERPL-SCNC: 141 MMOL/L (ref 135–147)
SP GR UR STRIP.AUTO: 1.02 (ref 1–1.03)
UROBILINOGEN UR QL STRIP.AUTO: 0.2 E.U./DL
WBC # BLD AUTO: 8.69 THOUSAND/UL (ref 4.31–10.16)
WBC #/AREA URNS AUTO: ABNORMAL /HPF

## 2023-07-21 PROCEDURE — G1004 CDSM NDSC: HCPCS

## 2023-07-21 PROCEDURE — 99284 EMERGENCY DEPT VISIT MOD MDM: CPT | Performed by: EMERGENCY MEDICINE

## 2023-07-21 PROCEDURE — 74177 CT ABD & PELVIS W/CONTRAST: CPT

## 2023-07-21 PROCEDURE — 51798 US URINE CAPACITY MEASURE: CPT

## 2023-07-21 PROCEDURE — 85025 COMPLETE CBC W/AUTO DIFF WBC: CPT

## 2023-07-21 PROCEDURE — 96374 THER/PROPH/DIAG INJ IV PUSH: CPT

## 2023-07-21 PROCEDURE — 36415 COLL VENOUS BLD VENIPUNCTURE: CPT

## 2023-07-21 PROCEDURE — 96361 HYDRATE IV INFUSION ADD-ON: CPT

## 2023-07-21 PROCEDURE — 81001 URINALYSIS AUTO W/SCOPE: CPT

## 2023-07-21 PROCEDURE — 99285 EMERGENCY DEPT VISIT HI MDM: CPT

## 2023-07-21 PROCEDURE — 80053 COMPREHEN METABOLIC PANEL: CPT

## 2023-07-21 RX ORDER — ENEMA 19; 7 G/133ML; G/133ML
1 ENEMA RECTAL ONCE
Status: COMPLETED | OUTPATIENT
Start: 2023-07-21 | End: 2023-07-21

## 2023-07-21 RX ORDER — LISINOPRIL 20 MG/1
20 TABLET ORAL ONCE
Status: COMPLETED | OUTPATIENT
Start: 2023-07-21 | End: 2023-07-21

## 2023-07-21 RX ORDER — AMLODIPINE BESYLATE 5 MG/1
5 TABLET ORAL ONCE
Status: COMPLETED | OUTPATIENT
Start: 2023-07-21 | End: 2023-07-21

## 2023-07-21 RX ORDER — KETOROLAC TROMETHAMINE 30 MG/ML
15 INJECTION, SOLUTION INTRAMUSCULAR; INTRAVENOUS ONCE
Status: COMPLETED | OUTPATIENT
Start: 2023-07-21 | End: 2023-07-21

## 2023-07-21 RX ADMIN — SODIUM PHOSPHATE 1 ENEMA: 7; 19 ENEMA RECTAL at 15:07

## 2023-07-21 RX ADMIN — KETOROLAC TROMETHAMINE 15 MG: 30 INJECTION, SOLUTION INTRAMUSCULAR; INTRAVENOUS at 13:00

## 2023-07-21 RX ADMIN — AMLODIPINE BESYLATE 5 MG: 5 TABLET ORAL at 15:06

## 2023-07-21 RX ADMIN — SODIUM CHLORIDE 1000 ML: 0.9 INJECTION, SOLUTION INTRAVENOUS at 12:46

## 2023-07-21 RX ADMIN — LISINOPRIL 20 MG: 20 TABLET ORAL at 15:06

## 2023-07-21 RX ADMIN — IOHEXOL 100 ML: 350 INJECTION, SOLUTION INTRAVENOUS at 13:24

## 2023-07-21 NOTE — ED NOTES
Pt urinated 10ml. Post void was >561. Dr. Adams Wing made aware.       Miguelito Mera RN  07/21/23 [id number]

## 2023-07-21 NOTE — ED ATTENDING ATTESTATION
7/21/2023  I, Dejon Yrok MD, saw and evaluated the patient. I have discussed the patient with the resident/non-physician practitioner and agree with the resident's/non-physician practitioner's findings, Plan of Care, and MDM as documented in the resident's/non-physician practitioner's note, except where noted. All available labs and Radiology studies were reviewed. I was present for key portions of any procedure(s) performed by the resident/non-physician practitioner and I was immediately available to provide assistance. At this point I agree with the current assessment done in the Emergency Department. I have conducted an independent evaluation of this patient a history and physical is as follows: Is a 81-year-old male who presents to the emergency department with severe abdominal pain. Patient states that his pain is a 10 on 10 and started yesterday. States that it started slowly and got worse during the night. Patient has history of constipation. He has not moved his bowels in about 4 days. He has no history of intra-abdominal surgeries. The patient has not had vomiting. He has not had fevers or chills. He has not had diaphoresis. He states that the pain is crampy, comes and goes in intensity, but is always present. He does not have flank pain. He has had some scant blood in his stools. He last urinated this morning, but with a very small amount. No limb swelling. No shortness of breath. No chest pain. Review of systems otherwise -12 systems reviewed. On exam the patient is hypertensive. He is awake, alert, interactive. He is sitting forward and appears uncomfortable. He has no pallor. He is anicteric. His neck is supple and nontender with no adenopathy. His heart is regular without murmurs, rubs, gallops or his lungs are clear with good air movement throughout. Abdomen is soft.   He has significant suprapubic tenderness, with some voluntary guarding which may represent a bladder fundus. He has no lesions to his backside. His extremities are intact without lateralizing edema and normal rashes. MEDICAL DECISION MAKING    Number and Complexity of Problems  • Differential diagnosis: Fecal impaction, constipation, bowel obstruction, malignancy, urinary retention, urinary tract infection, renal failure    Medical Decision Making Data  • External documents reviewed: Prior GI visits were reviewed, patient with long history of constipation  • My EKG interpretation:   • My CT interpretation: My interpretation of CT shows large stool burden, multiple kidney cysts  • My X-ray interpretation:   • My ultrasound interpretation:     CT abdomen pelvis with contrast   ED Interpretation   Multiple large renal cysts on b/l kidneys. Large stool burden. Bladder distended. Final Result      Large colonic stool burden in the sigmoid colon with a large rectal stool ball.       Workstation performed: [id number]             Labs Reviewed   CBC AND DIFFERENTIAL - Abnormal       Result Value Ref Range Status    WBC 8.69  4.31 - 10.16 Thousand/uL Final    RBC 5.01  3.88 - 5.62 Million/uL Final    Hemoglobin 13.9  12.0 - 17.0 g/dL Final    Hematocrit 41.9  36.5 - 49.3 % Final    MCV 84  82 - 98 fL Final    MCH 27.7  26.8 - 34.3 pg Final    MCHC 33.2  31.4 - 37.4 g/dL Final    RDW 12.9  11.6 - 15.1 % Final    MPV 9.8  8.9 - 12.7 fL Final    Platelets 128  980 - 390 Thousands/uL Final    nRBC 0  /100 WBCs Final    Neutrophils Relative 79 (*) 43 - 75 % Final    Immat GRANS % 0  0 - 2 % Final    Lymphocytes Relative 12 (*) 14 - 44 % Final    Monocytes Relative 8  4 - 12 % Final    Eosinophils Relative 1  0 - 6 % Final    Basophils Relative 0  0 - 1 % Final    Neutrophils Absolute 6.85  1.85 - 7.62 Thousands/µL Final    Immature Grans Absolute 0.03  0.00 - 0.20 Thousand/uL Final    Lymphocytes Absolute 1.03  0.60 - 4.47 Thousands/µL Final    Monocytes Absolute 0.67  0.17 - 1.22 Thousand/µL Final    Eosinophils Absolute 0.09  0.00 - 0.61 Thousand/µL Final    Basophils Absolute 0.02  0.00 - 0.10 Thousands/µL Final   COMPREHENSIVE METABOLIC PANEL - Abnormal    Sodium 141  135 - 147 mmol/L Final    Potassium 3.9  3.5 - 5.3 mmol/L Final    Comment: Slightly Hemolyzed; Results May be Affected    Chloride 109 (*) 96 - 108 mmol/L Final    CO2 27  21 - 32 mmol/L Final    ANION GAP 5  mmol/L Final    BUN 17  5 - 25 mg/dL Final    Creatinine 0.79  0.60 - 1.30 mg/dL Final    Comment: Standardized to IDMS reference method    Glucose 142 (*) 65 - 140 mg/dL Final    Comment: If the patient is fasting, the ADA then defines impaired fasting glucose as > 100 mg/dL and diabetes as > or equal to 123 mg/dL. Specimen collection should occur prior to Sulfasalazine administration due to the potential for falsely depressed results. Specimen collection should occur prior to Sulfapyridine administration due to the potential for falsely elevated results. Calcium 9.4  8.3 - 10.1 mg/dL Final    AST 24  5 - 45 U/L Final    Comment: Slightly Hemolyzed; Results May be Affected  Specimen collection should occur prior to Sulfasalazine administration due to the potential for falsely depressed results. ALT 23  12 - 78 U/L Final    Comment: Specimen collection should occur prior to Sulfasalazine and/or Sulfapyridine administration due to the potential for falsely depressed results. Alkaline Phosphatase 127 (*) 46 - 116 U/L Final    Total Protein 7.6  6.4 - 8.4 g/dL Final    Albumin 4.2  3.5 - 5.0 g/dL Final    Total Bilirubin 0.80  0.20 - 1.00 mg/dL Final    Comment: Use of this assay is not recommended for patients undergoing treatment with eltrombopag due to the potential for falsely elevated results.     eGFR 86  ml/min/1.73sq m Final    Narrative:     [place] guidelines for Chronic Kidney Disease (CKD):   •  Stage 1 with normal or high GFR (GFR > 90 mL/min/1.73 square meters)  •  Stage 2 Mild CKD (GFR = 60-89 mL/min/1.73 square meters)  •  Stage 3A Moderate CKD (GFR = 45-59 mL/min/1.73 square meters)  •  Stage 3B Moderate CKD (GFR = 30-44 mL/min/1.73 square meters)  •  Stage 4 Severe CKD (GFR = 15-29 mL/min/1.73 square meters)  •  Stage 5 End Stage CKD (GFR <15 mL/min/1.73 square meters)  Note: GFR calculation is accurate only with a steady state creatinine   URINE MICROSCOPIC - Abnormal    RBC, UA 1-2  None Seen, 1-2 /hpf Final    WBC, UA None Seen  None Seen, 1-2 /hpf Final    Epithelial Cells Occasional  None Seen, Occasional /hpf Final    Bacteria, UA None Seen  None Seen, Occasional /hpf Final    MUCUS THREADS Occasional (*) None Seen Final   URINE MACROSCOPIC, POC - Abnormal    Color, UA Yellow   Final    Clarity, UA Clear   Final    pH, UA 5.5  4.5 - 8.0 Final    Leukocytes, UA Negative  Negative Final    Nitrite, UA Negative  Negative Final    Protein, UA Negative  Negative mg/dl Final    Glucose, UA Negative  Negative mg/dl Final    Ketones, UA Negative  Negative mg/dl Final    Urobilinogen, UA 0.2  0.2, 1.0 E.U./dl E.U./dl Final    Bilirubin, UA Negative  Negative Final    Occult Blood, UA Trace (*) Negative Final    Specific Gravity, UA 1.020  1.003 - 1.030 Final    Narrative:     CLINITEK RESULT       • Labs reviewed by me are significant for: No kidney injury, no evidence of urinary tract infection    • Clinical decision rules/scores are significant for:     • Discussed case with:   • Considered admission for:     Treatment and Disposition  ED course: Patient only able to put out 10 cc of urine here. Patient with postvoid residual greater than 700. Patient cathed, Pryor catheter placed. Patient underwent CT with large stool burden. We will plan to give enema here to to relieve stool burden. Will offer for patient to go home with Rpyor versus having it taken out here.   Patient will ultimately be discharged home with diagnosis of 1 fecal impaction, 2 urinary retention, 3 abdominal pain  Shared decision making:   Code status:     ED Course         Critical Care Time  Procedures

## 2023-07-21 NOTE — ED PROVIDER NOTES
History  Chief Complaint   Patient presents with   • Constipation     For past 6 days   • Abdominal Pain     Bilateral lower quadrant     HPI  Patient is 69 y.o. 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 Patient reports worsening constipation for 6 days, has had small pellet like stools with streaks of blood on stool, but no normal bowel movements. Patient also reports yesterday had episode of stool leaking. Patient reports since yesterday 10/10 lower abdominal pain with urinary hesitancy and dysuria. Patient denies fever, n/v, urinary incontinence, saddle anesthesia, chest pain, SOB. Patient speaks [place]. Daughter translated at patient's request.        Prior to Admission Medications   Prescriptions Last Dose Informant Patient Reported? Taking? Blood Glucose Monitoring Suppl (OneTouch Verio Reflect) w/Device KIT  Child No No   Sig: Check blood sugars once daily. Please substitute with appropriate alternative as covered by patient's insurance. Dx: E11.65   Lancets (OneTouch Delica Plus [id number]) MISC  Child No No   Sig: TEST DAILY, M70.1--TS CONFIMED DELICA 33 G   MELATONIN PO  Child Yes No   Sig: Take 10 mg by mouth daily at bedtime   Patient not taking: Reported on 7/82/8138   OneTouch Delica Lancets 03A MISC  Child No No   Sig: Check blood sugars once daily. Please substitute with appropriate alternative as covered by patient's insurance.  Dx: E11.65   acetaminophen (TYLENOL) 325 mg tablet  Child No No   Sig: Take 2 tablets (650 mg total) by mouth every 6 (six) hours as needed for mild pain or fever   amLODIPine (NORVASC) 5 mg tablet   No No   Sig: Take 1 tablet (5 mg total) by mouth daily   amoxicillin (AMOXIL) 500 mg capsule  Child Yes No   Patient not taking: Reported on 7/18/2022   aspirin 81 MG tablet  Child Yes No   Sig: Take 81 mg by mouth daily    Patient not taking: Reported on 6/12/2023   atorvastatin (LIPITOR) 10 mg tablet   No No   Sig: Take 1 tablet (10 mg total) by mouth daily   Patient not taking: Reported on 6/12/2023   bisacodyl (DULCOLAX) 5 mg EC tablet   No No   Sig: Take 2 tablets (10 mg total) by mouth once for 1 dose   calcium carbonate (OS-ANGELIKA) 600 MG tablet  Child No No   Sig: Take 1 tablet (600 mg total) by mouth daily   Patient not taking: Reported on 6/12/2023   colchicine (COLCRYS) 0.6 mg tablet   No No   Sig: Take 1 tablet (0.6 mg total) by mouth daily   diclofenac sodium (VOLTAREN) 1 %  Child No No   Sig: Apply 2 g topically 4 (four) times a day   Patient not taking: Reported on 6/12/2023   famotidine (PEPCID) 20 mg tablet   No No   Sig: TAKE 1 TABLET BY MOUTH TWICE A DAY   gabapentin (Neurontin) 100 mg capsule  Child No No   Sig: Take 1 capsule (100 mg total) by mouth daily at bedtime   glucose blood (OneTouch Verio) test strip  Child No No   Sig: Check blood sugars once daily. Please substitute with appropriate alternative as covered by patient's insurance.  Dx: E11.65   hydrocortisone 2.5 % cream  Child No No   Sig: APPLY TO AFFECTED AREA TWICE A DAY   Patient not taking: Reported on 6/12/2023   linaCLOtide (Linzess) 145 MCG CAPS   No No   Sig: Take 1 capsule (145 mcg total) by mouth daily   lisinopril (ZESTRIL) 20 mg tablet   No No   Sig: Take 1 tablet (20 mg total) by mouth daily   metFORMIN (GLUCOPHAGE) 500 mg tablet   No No   Sig: Take 1 tablet (500 mg total) by mouth 2 (two) times a day   methocarbamol (ROBAXIN) 500 mg tablet   No No   Sig: Take 1 tablet (500 mg total) by mouth daily at bedtime   omeprazole (PriLOSEC) 40 MG capsule   No No   Sig: Take 1 capsule (40 mg total) by mouth daily   polyethylene glycol (GOLYTELY) 4000 mL solution   No No   Sig: Take 4,000 mL by mouth once for 1 dose      Facility-Administered Medications: None       Past Medical History:   Diagnosis Date   • Brain mass     Last Assessed; 11/5/2015   • Chest pain     Last Assessed: 1/22/2015   • Diabetes type 2, uncontrolled     Last Assessed: 3/1/2016   • Edema of left lower extremity     Last Assessed: 3/22/2017   • GERD (gastroesophageal reflux disease)    • Hyperlipidemia    • Hypertension    • Impaired fasting glucose     Last Assessed: 11/10/2015   • Irregular heartbeat    • Skin cancer (melanoma) (HCC)     Scalp    • Varicose 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     Last Assessed: 3/22/2017       Past Surgical History:   Procedure Laterality Date   • COLONOSCOPY     • COLONOSCOPY N/A 7/10/2018    Procedure: COLONOSCOPY;  Surgeon: Efraín Duran MD;  Location:  GI LAB; Service: Colorectal   • INGUINAL HERNIA REPAIR      20+ years ago - [address]; Last Assessed: 10/23/2014   • TONSILLECTOMY     • VARICOSE VEIN SURGERY Left     x2 left leg - 40+ yrs ago, [address]       Family History   Problem Relation Age of Onset   • No Known Problems Mother    • No Known Problems Father    • Melanoma Daughter 16     I have reviewed and agree with the history as documented. E-Cigarette/Vaping   • E-Cigarette Use Never User      E-Cigarette/Vaping Substances   • Nicotine No    • THC No    • CBD No    • Flavoring No    • Other No    • Unknown No      Social History     Tobacco Use   • Smoking status: Never   • Smokeless tobacco: Never   Vaping Use   • Vaping Use: Never used   Substance Use Topics   • Alcohol use: No   • Drug use: No        Review of Systems   Constitutional: Negative for chills and fever. HENT: Negative for ear pain and sore throat. Eyes: Negative for pain and visual disturbance. Respiratory: Negative for cough and shortness of breath. Cardiovascular: Negative for chest pain and palpitations. Gastrointestinal: Positive for abdominal pain, blood in stool and constipation. Negative for nausea and vomiting. Stool incontinence    Genitourinary: Positive for dysuria. Negative for hematuria.         Retention Musculoskeletal: Negative for arthralgias. Skin: Negative for color change and rash. Neurological: Negative for seizures, syncope and headaches. All other systems reviewed and are negative. Physical Exam  ED Triage Vitals [07/21/23 1141]   Temperature Pulse Respirations Blood Pressure SpO2   98 °F (36.7 °C) 92 20 (!) 180/88 98 %      Temp Source Heart Rate Source Patient Position - Orthostatic VS BP Location FiO2 (%)   Oral Monitor Sitting Right arm --      Pain Score       10 - Worst Possible Pain             Orthostatic Vital Signs  Vitals:    07/21/23 1141 07/21/23 1500 07/21/23 1506 07/21/23 1545   BP: (!) 180/88 (!) 178/86 (!) 178/86 (!) 208/94   Pulse: 92 70  72   Patient Position - Orthostatic VS: Sitting Sitting  Sitting       Physical Exam  Vitals reviewed. Constitutional:       General: He is awake. HENT:      Head: Normocephalic and atraumatic. Mouth/Throat:      Mouth: Mucous membranes are dry. Pharynx: No pharyngeal swelling, oropharyngeal exudate, posterior oropharyngeal erythema or uvula swelling. Tonsils: No tonsillar exudate or tonsillar abscesses. Eyes:      Extraocular Movements: Extraocular movements intact. Conjunctiva/sclera: Conjunctivae normal.      Pupils: Pupils are equal, round, and reactive to light. Cardiovascular:      Rate and Rhythm: Normal rate and regular rhythm. Pulses: Normal pulses. Heart sounds: Normal heart sounds, S1 normal and S2 normal. Heart sounds not distant. No murmur heard. Pulmonary:      Breath sounds: No wheezing, rhonchi or rales. Comments: CTA b/l   Abdominal:      General: Bowel sounds are normal.      Palpations: Abdomen is soft. Comments: Diffuse lower abdomen TTP, worse suprapubic and LLQ, voluntary guarding     No hemorrhoids observed on external exam, single skin tag at 3 o'clock. Chaperoned by Tech: Lani Scott. Musculoskeletal:      Right lower leg: No edema. Left lower leg: No edema. Skin:     General: Skin is warm and dry. Capillary Refill: Capillary refill takes less than 2 seconds. Comments:  Pt has    Neurological:      Mental Status: He is alert and oriented to person, place, and time. GCS: GCS eye subscore is 4. GCS verbal subscore is 5. GCS motor subscore is 6. Cranial Nerves: Cranial nerves 2-12 are intact. Motor: No weakness or pronator drift.       Coordination: Finger-Nose-Finger Test normal.      Comments: No saddle anesthesia          ED Medications  Medications   sodium chloride 0.9 % bolus 1,000 mL (0 mL Intravenous Stopped 7/21/23 1549)   ketorolac (TORADOL) injection 15 mg (15 mg Intravenous Given 7/21/23 1300)   iohexol (OMNIPAQUE) 350 MG/ML injection (SINGLE-DOSE) 100 mL (100 mL Intravenous Given 7/21/23 1324)   sodium phosphate-biphosphate (FLEET) enema 1 enema (1 enema Rectal Given 7/21/23 1507)   lisinopril (ZESTRIL) tablet 20 mg (20 mg Oral Given 7/21/23 1506)   amLODIPine (NORVASC) tablet 5 mg (5 mg Oral Given 7/21/23 1506)       Diagnostic Studies  Results Reviewed     Procedure Component Value Units Date/Time    Urine Microscopic [[id number]]  (Abnormal) Collected: 07/21/23 1351    Lab Status: Final result Specimen: Urine, Indwelling Pryor Catheter Updated: 07/21/23 1424     RBC, UA 1-2 /hpf      WBC, UA None Seen /hpf      Epithelial Cells Occasional /hpf      Bacteria, UA None Seen /hpf      MUCUS THREADS Occasional    Urine Macroscopic, POC [[id number]]  (Abnormal) Collected: 07/21/23 1351    Lab Status: Final result Specimen: Urine Updated: 07/21/23 1352     Color, UA Yellow     Clarity, UA Clear     pH, UA 5.5     Leukocytes, UA Negative     Nitrite, UA Negative     Protein, UA Negative mg/dl      Glucose, UA Negative mg/dl      Ketones, UA Negative mg/dl      Urobilinogen, UA 0.2 E.U./dl      Bilirubin, UA Negative     Occult Blood, UA Trace     Specific Gravity, UA 1.020    Narrative:      CLINITEK RESULT    Comprehensive metabolic panel [[id number]]  (Abnormal) Collected: 07/21/23 1245    Lab Status: Final result Specimen: Blood from Arm, Left Updated: 07/21/23 1308     Sodium 141 mmol/L      Potassium 3.9 mmol/L      Chloride 109 mmol/L      CO2 27 mmol/L      ANION GAP 5 mmol/L      BUN 17 mg/dL      Creatinine 0.79 mg/dL      Glucose 142 mg/dL      Calcium 9.4 mg/dL      AST 24 U/L      ALT 23 U/L      Alkaline Phosphatase 127 U/L      Total Protein 7.6 g/dL      Albumin 4.2 g/dL      Total Bilirubin 0.80 mg/dL      eGFR 86 ml/min/1.73sq m     Narrative:      National Kidney Disease Foundation guidelines for Chronic Kidney Disease (CKD):   •  Stage 1 with normal or high GFR (GFR > 90 mL/min/1.73 square meters)  •  Stage 2 Mild CKD (GFR = 60-89 mL/min/1.73 square meters)  •  Stage 3A Moderate CKD (GFR = 45-59 mL/min/1.73 square meters)  •  Stage 3B Moderate CKD (GFR = 30-44 mL/min/1.73 square meters)  •  Stage 4 Severe CKD (GFR = 15-29 mL/min/1.73 square meters)  •  Stage 5 End Stage CKD (GFR <15 mL/min/1.73 square meters)  Note: GFR calculation is accurate only with a steady state creatinine    CBC and differential [[id number]]  (Abnormal) Collected: 07/21/23 1245    Lab Status: Final result Specimen: Blood from Arm, Left Updated: 07/21/23 1302     WBC 8.69 Thousand/uL      RBC 5.01 Million/uL      Hemoglobin 13.9 g/dL      Hematocrit 41.9 %      MCV 84 fL      MCH 27.7 pg      MCHC 33.2 g/dL      RDW 12.9 %      MPV 9.8 fL      Platelets 930 Thousands/uL      nRBC 0 /100 WBCs      Neutrophils Relative 79 %      Immat GRANS % 0 %      Lymphocytes Relative 12 %      Monocytes Relative 8 %      Eosinophils Relative 1 %      Basophils Relative 0 %      Neutrophils Absolute 6.85 Thousands/µL      Immature Grans Absolute 0.03 Thousand/uL      Lymphocytes Absolute 1.03 Thousands/µL      Monocytes Absolute 0.67 Thousand/µL      Eosinophils Absolute 0.09 Thousand/µL      Basophils Absolute 0.02 Thousands/µL                  CT abdomen pelvis with contrast ED Interpretation by Nicki Oconnor DO (07/21 1344)   Multiple large renal cysts on b/l kidneys. Large stool burden. Bladder distended. Final Result by Ananda Schmitz MD (07/21 1419)      Large colonic stool burden in the sigmoid colon with a large rectal stool ball. Workstation performed: [id number]               Procedures  Procedures      ED Course  ED Course as of 07/21/23 1728   Fri Jul 21, 2023   1301 Post void residual 600 cc, will have lion inserted. 1408 On re-evaluation, pt's pain now 0/10, 700 cc in lion bag. Patient hypertensive, did not take home medications this morning. Will give home dose of antihypertensive. 1409 Urine Macroscopic, POC(!)  Trace blood likely due to lion insertion. No signs of infection. 56 Will order fleet enema for large stool burden noted on CT.    1510 Fleet enema unsuccessful due to patient unable to hold enema for long enough duration. Had long discussion with patient and family regarding admission vs sending home for outpatient clean out. Family would prefer outpatient clean out, lion catheter removal and follow up with outpatient urology and GI at this time. Family not comfortable taking care of lion at home and refused case management for setting up out patient resources. Discussed importance of returning to ED if pt has not urinated in 8-12 hours and close follow up with urology and GI. MDM   Patient is 69 y.o. 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 Vital signs stable, afebrile. On exam diffuse lower abdomen TTP, worse suprapubic and LLQ, voluntary guarding       History and physical exam most consistent with constipation. However, differential diagnosis included but not limited to SBO, diverticulitis, UTI, tumor.  Plan CBC, CMP, UA, CT abd/pelvis with contrast; will give 1 L fluids and IV torodol for pain. View ED course above for further discussion on patient workup. On review of previous records dermatology pathology indicates squamous cell carcinoma in situ and basal cell carcinoma on scalp. Dermatology office was unable to get in touch to schedule MOHs to remove. All labs reviewed and utilized in the medical decision making process  All radiology studies independently viewed by me and interpreted by the radiologist.  I reviewed all testing with the patient. Upon re-evaluation patient's pain resolved with lion placement. Attempted fleet enema for constipation which was unsuccessful. Had long conversation with patient regarding admission vs home bowel clean out. Patient and family prefer home clean out and removal of lion. I have reviewed the patient's vital signs, nursing notes, and other relevant tests/information. I had a detailed discussion with the patient regarding the history, exam findings, and any diagnostic results. Plan to discharge home in stable condition with constipation and urinary retention, follow up with GI, Urology and PCP. Discussed with patient who is agreeable to plan. I discussed discharge instructions, need for follow-up, and oral return precautions for what to return for in addition to the written return precautions and discharge instructions, specifically highlighting areas of special concern. The patient and family verbalized understanding of the discharge instructions and warnings that would necessitate return to the Emergency Department including but not limited to inability to urinate in 8-12 hours, worsening constipation, vomiting, urinary or bowel incontinence. All questions the patient had were answered prior to discharge to the best of my ability.      Disposition  Final diagnoses:   Constipation   Urinary retention     Time reflects when diagnosis was documented in both MDM as applicable and the Disposition within this note     Time User Action Codes Description Comment    7/21/2023  2:32 PM Deberah Lesch Add [K59.00] Constipation     7/21/2023  2:44 PM Deberah Lesch Add [R33.9] Urinary retention       ED Disposition     ED Disposition   Discharge    Condition   Stable    Date/Time   Fri Jul 21, 2023  2:39 PM    Comment   Isa Angela discharge to home/self care.                Follow-up Information     Follow up With Specialties Details Why Contact Info Additional [address] Schedule an appointment as soon as possible for a visit today to follow up for managment of bowel regimen [address] [facility] [address] 2600 [facility], [address], 2600 [facility] Call  to make follow up apponitnment for urinary retention 629 [facility] [address] [place] [address],[address] Floor [id number] [phone] [facility] J[facility], [address] [place] [address] 61 [phone], [facility]    Cely Bernal, [facility]8 [facility], Internal Medicine, Nurse Practitioner Schedule an appointment as soon as possible for a visit  follow up in 2-3 days [address] [facility] [id number]       [address] [facility] [address] Emergency Department Emergency Medicine Go to  If symptoms worsen [address] [address]  [facility] Emergency Department, 3000 [facility], [facility]          Discharge Medication List as of 7/21/2023  3:37 PM      START taking these medications    Details   polyethylene glycol (GOLYTELY) 4000 mL solution Take 4,000 mL by mouth once for 1 dose, Starting Fri 7/21/2023, Normal         CONTINUE these medications which have NOT CHANGED    Details   acetaminophen (TYLENOL) 325 mg tablet Take 2 tablets (650 mg total) by mouth every 6 (six) hours as needed for mild pain or fever, Starting Sun 5/2/2021, Print      amLODIPine (NORVASC) 5 mg tablet Take 1 tablet (5 mg total) by mouth daily, Starting Mon 6/5/2023, Normal      amoxicillin (AMOXIL) 500 mg capsule Starting Mon 3/21/2022, Historical Med      aspirin 81 MG tablet Take 81 mg by mouth daily , Starting Tue 3/1/2016, Historical Med      atorvastatin (LIPITOR) 10 mg tablet Take 1 tablet (10 mg total) by mouth daily, Starting Mon 6/5/2023, Normal      bisacodyl (DULCOLAX) 5 mg EC tablet Take 2 tablets (10 mg total) by mouth once for 1 dose, Starting Mon 6/12/2023, Normal      Blood Glucose Monitoring Suppl (OneTouch Verio Reflect) w/Device KIT Check blood sugars once daily. Please substitute with appropriate alternative as covered by patient's insurance. Dx: E11.65, Normal      calcium carbonate (OS-ANGELIKA) 600 MG tablet Take 1 tablet (600 mg total) by mouth daily, Starting Thu 3/31/2022, Normal      colchicine (COLCRYS) 0.6 mg tablet Take 1 tablet (0.6 mg total) by mouth daily, Starting Mon 6/5/2023, Normal      diclofenac sodium (VOLTAREN) 1 % Apply 2 g topically 4 (four) times a day, Starting Wed 9/16/2020, Normal      famotidine (PEPCID) 20 mg tablet TAKE 1 TABLET BY MOUTH TWICE A DAY, Normal      gabapentin (Neurontin) 100 mg capsule Take 1 capsule (100 mg total) by mouth daily at bedtime, Starting Tue 1/31/2023, Normal      glucose blood (OneTouch Verio) test strip Check blood sugars once daily. Please substitute with appropriate alternative as covered by patient's insurance. Dx: E11.65, Normal      hydrocortisone 2.5 % cream APPLY TO AFFECTED AREA TWICE A DAY, Normal      !!  Lancets (OneTouch Delica Plus [id number]) MISC TEST DAILY, Z45.9--ES CONFIMED DELICA 33 G, Normal      linaCLOtide (Linzess) 145 MCG CAPS Take 1 capsule (145 mcg total) by mouth daily, Starting Mon 6/5/2023, Normal      lisinopril (ZESTRIL) 20 mg tablet Take 1 tablet (20 mg total) by mouth daily, Starting Mon 6/5/2023, Normal      MELATONIN PO Take 10 mg by mouth daily at bedtime, Historical Med      metFORMIN (GLUCOPHAGE) 500 mg tablet Take 1 tablet (500 mg total) by mouth 2 (two) times a day, Starting Mon 6/5/2023, Normal      methocarbamol (ROBAXIN) 500 mg tablet Take 1 tablet (500 mg total) by mouth daily at bedtime, Starting Mon 6/5/2023, Normal      omeprazole (PriLOSEC) 40 MG capsule Take 1 capsule (40 mg total) by mouth daily, Starting Mon 6/5/2023, Normal      !! OneTouch Delica Lancets 30V MISC Check blood sugars once daily. Please substitute with appropriate alternative as covered by patient's insurance. Dx: E11.65, Normal      polyethylene glycol (GOLYTELY) 4000 mL solution Take 4,000 mL by mouth once for 1 dose, Starting Mon 6/12/2023, Normal       !! - Potential duplicate medications found. Please discuss with provider. PDMP Review       Value Time User    PDMP Reviewed  Yes 6/21/2022  5:44 PM Payton [place], [address] [facility]           ED Provider  Attending physically available and evaluated [facility]. I managed the patient along with the ED Attending.     Electronically Signed by         Carito Borges DO  07/21/23 9336

## 2023-07-27 ENCOUNTER — OFFICE VISIT (OUTPATIENT)
Dept: FAMILY MEDICINE CLINIC | Facility: CLINIC | Age: 78
End: 2023-07-27
Payer: COMMERCIAL

## 2023-07-27 VITALS
SYSTOLIC BLOOD PRESSURE: 140 MMHG | HEART RATE: 72 BPM | HEIGHT: 69 IN | WEIGHT: 192.4 LBS | TEMPERATURE: 96.1 F | OXYGEN SATURATION: 97 % | RESPIRATION RATE: 17 BRPM | BODY MASS INDEX: 28.5 KG/M2 | DIASTOLIC BLOOD PRESSURE: 80 MMHG

## 2023-07-27 DIAGNOSIS — Z00.00 MEDICARE ANNUAL WELLNESS VISIT, SUBSEQUENT: ICD-10-CM

## 2023-07-27 DIAGNOSIS — K59.09 CHRONIC CONSTIPATION: ICD-10-CM

## 2023-07-27 DIAGNOSIS — R33.9 URINARY RETENTION: Primary | ICD-10-CM

## 2023-07-27 PROCEDURE — G0439 PPPS, SUBSEQ VISIT: HCPCS | Performed by: NURSE PRACTITIONER

## 2023-07-27 PROCEDURE — 99213 OFFICE O/P EST LOW 20 MIN: CPT | Performed by: NURSE PRACTITIONER

## 2023-07-27 RX ORDER — CLINDAMYCIN HYDROCHLORIDE 150 MG/1
CAPSULE ORAL
COMMUNITY
Start: 2023-06-21

## 2023-07-27 RX ORDER — ACETAMINOPHEN AND CODEINE PHOSPHATE 300; 30 MG/1; MG/1
TABLET ORAL
COMMUNITY
Start: 2023-06-21

## 2023-07-27 RX ORDER — LINACLOTIDE 145 UG/1
145 CAPSULE, GELATIN COATED ORAL DAILY
Qty: 30 CAPSULE | Refills: 2 | Status: SHIPPED | OUTPATIENT
Start: 2023-07-27

## 2023-07-27 NOTE — PROGRESS NOTES
Assessment and Plan:     Problem List Items Addressed This Visit        Digestive    Chronic constipation    Relevant Medications    linaCLOtide (Linzess) 145 MCG CAPS       Genitourinary    Urinary retention - Primary    Relevant Orders    Ambulatory Referral to Urology       Other    Medicare annual wellness visit, subsequent       Depression Screening and Follow-up Plan: Patient was screened for depression during today's encounter. They screened negative with a PHQ-2 score of 1. Falls Plan of Care: balance, strength, and gait training instructions were provided. Preventive health issues were discussed with patient, and age appropriate screening tests were ordered as noted in patient's After Visit Summary. Personalized health advice and appropriate referrals for health education or preventive services given if needed, as noted in patient's After Visit Summary. History of Present Illness:     Patient presents for a Medicare Wellness Visit    Here for f/u to ED visit for constipation and urinary retention  Here for medicare wellness    Continues to have constipation  Not sure on compliance with constipation meds, linzess    Would like me to talk to beto so she can make sure he will go to see urologist for follow up  Patient needs f/u with urology for urinary retention  Has appt with dr Caleb Dale   Was in ER for constipation and urinary retention       CT abdomen pelvis with contrast  ED Interpretation by Erlinda Singh DO (07/21 3822)  Multiple large renal cysts on b/l kidneys. Large stool burden.  Bladder distended.      Final Result by Laurel Collier MD (07/21 7586)     Large colonic stool burden in the sigmoid colon with a large rectal stool ball.            Patient Care Team:  Katlyn Roach as PCP - General (Family Medicine)  Carter Juárez DO as PCP - [facility]Roll[facility] ([facility])  MD Aric Glass MD as Fabio Cunha MD (Dermatology)  Sunny Espinoza MD (Oncology)     Review of Systems:     Review of Systems   Constitutional: Negative for fatigue and fever. HENT: Negative for congestion, postnasal drip and rhinorrhea. Eyes: Negative for photophobia and visual disturbance. Respiratory: Negative for cough, shortness of breath and wheezing. Cardiovascular: Negative for chest pain and palpitations. Gastrointestinal: Positive for constipation. Negative for diarrhea, nausea and vomiting. Genitourinary: Positive for difficulty urinating. Negative for dysuria, flank pain, frequency, hematuria and urgency. Musculoskeletal: Negative for arthralgias and myalgias. Skin: Negative for rash. Neurological: Negative for dizziness, light-headedness and headaches. Hematological: Negative for adenopathy. Psychiatric/Behavioral: Negative for dysphoric mood and sleep disturbance. The patient is not nervous/anxious.          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[address])   • Chronic constipation   • Episodic cluster headache, not intractable   • GERD (gastroesophageal reflux disease)   • Nonintractable headache   • Hypertension   • Acute pain of both knees   • Meningioma (HCC)   • Varicose 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   • Spondylolisthesis at L5-S1 level   • Other chest pain   • Renal cyst   • History of hepatitis C   • Scalp lesion   • 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HCC)   • Primary osteoarthritis of both knees   • Epigastric abdominal pain   • Unstable angina (HCC)   • Palpitations   • Type 2 diabetes mellitus without complication, without long-term current use of insulin (HCC)   • Abnormal liver enzymes   • Acute pain of left shoulder   • Blurry vision, bilateral   • Right upper quadrant abdominal pain   • Other constipation   • Urinary retention   • Medicare annual wellness visit, subsequent      Past Medical and Surgical History:     Past Medical History:   Diagnosis Date   • Brain mass     Last Assessed; 11/5/2015   • Chest pain     Last Assessed: 1/22/2015   • Diabetes type 2, uncontrolled     Last Assessed: 3/1/2016   • Edema of left lower extremity     Last Assessed: 3/22/2017   • GERD (gastroesophageal reflux disease)    • Hyperlipidemia    • Hypertension    • Impaired fasting glucose     Last Assessed: 11/10/2015   • Irregular heartbeat    • Skin cancer (melanoma) (HCC)     Scalp    • Varicose 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     Last Assessed: 3/22/2017     Past Surgical History:   Procedure Laterality Date   • COLONOSCOPY     • COLONOSCOPY N/A 7/10/2018    Procedure: COLONOSCOPY;  Surgeon: Nica Mckeon MD;  Location: [facility];   Service: Colorectal   • INGUINAL HERNIA REPAIR      20+ years ago - [address]; Last Assessed: 10/23/2014   • TONSILLECTOMY     • VARICOSE VEIN SURGERY Left     x2 left leg - 40+ yrs ago, [address] and [place]      Family History:     Family History   Problem Relation Age of Onset   • No Known Problems Mother    • No Known Problems Father    • Melanoma Daughter 16      Social History:     Social History     Socioeconomic History   • Marital status: /Civil Union     Spouse name: None   • Number of children: 5   • Years of education: None   • Highest education level: None   Occupational History   • Occupation: Retired: Construction   Tobacco Use   • Smoking status: Never   • Smokeless tobacco: Never   Vaping Use   • Vaping Use: Never used   Substance and Sexual Activity   • Alcohol use: No   • Drug use: No   • Sexual activity: Not Currently     Partners: Female   Other Topics Concern   • None   Social History Narrative    Uses safety equipment         Social Determinants of Health     Financial Resource Strain: Low Risk  (7/27/2023)    Overall Financial Resource Strain (CARDIA)    • Difficulty of Paying Living Expenses: Not very hard   Food Insecurity: Not on file   Transportation Needs: No Transportation Needs (7/27/2023) PRAPARE - Transportation    • Lack of Transportation (Medical): No    • Lack of Transportation (Non-Medical): No   Physical Activity: Not on file   Stress: Not on file   Social Connections: Not on file   Intimate Partner Violence: Not on file   Housing Stability: Not on file      Medications and Allergies:     Current Outpatient Medications   Medication Sig Dispense Refill   • acetaminophen (TYLENOL) 325 mg tablet Take 2 tablets (650 mg total) by mouth every 6 (six) hours as needed for mild pain or fever 30 tablet 0   • amLODIPine (NORVASC) 5 mg tablet Take 1 tablet (5 mg total) by mouth daily 90 tablet 0   • aspirin 81 MG tablet Take 81 mg by mouth daily     • atorvastatin (LIPITOR) 10 mg tablet Take 1 tablet (10 mg total) by mouth daily 90 tablet 0   • Blood Glucose Monitoring Suppl (OneTouch Verio Reflect) w/Device KIT Check blood sugars once daily. Please substitute with appropriate alternative as covered by patient's insurance. Dx: E11.65 1 kit 0   • glucose blood (OneTouch Verio) test strip Check blood sugars once daily. Please substitute with appropriate alternative as covered by patient's insurance. Dx: E11.65 100 each 3   • Lancets (OneTouch Delica Plus [id number]) MISC TEST DAILY, B99.1--KF CONFIMED DELICA 33 G 379 each 11   • linaCLOtide (Linzess) 145 MCG CAPS Take 1 capsule (145 mcg total) by mouth daily 30 capsule 2   • lisinopril (ZESTRIL) 20 mg tablet Take 1 tablet (20 mg total) by mouth daily 90 tablet 0   • metFORMIN (GLUCOPHAGE) 500 mg tablet Take 1 tablet (500 mg total) by mouth 2 (two) times a day 180 tablet 0   • methocarbamol (ROBAXIN) 500 mg tablet Take 1 tablet (500 mg total) by mouth daily at bedtime 30 tablet 0   • omeprazole (PriLOSEC) 40 MG capsule Take 1 capsule (40 mg total) by mouth daily 90 capsule 0   • OneTouch Delica Lancets 08P MISC Check blood sugars once daily. Please substitute with appropriate alternative as covered by patient's insurance.  Dx: E11.65 100 each 3   • acetaminophen-codeine (TYLENOL with CODEINE #3) 300-30 MG per tablet TAKE 1-2 TABLETS BY MOUTH EVERY 6 HOURS FOR PAIN ONLY     • amoxicillin (AMOXIL) 500 mg capsule  (Patient not taking: Reported on 7/18/2022)     • bisacodyl (DULCOLAX) 5 mg EC tablet Take 2 tablets (10 mg total) by mouth once for 1 dose 2 tablet 0   • calcium carbonate (OS-ANGELIKA) 600 MG tablet Take 1 tablet (600 mg total) by mouth daily (Patient not taking: Reported on 6/12/2023) 30 tablet 3   • clindamycin (CLEOCIN) 150 mg capsule TAKE 2 CAPSULES BY MOUTH EVERY 8 HOURS UNTIL GONE     • colchicine (COLCRYS) 0.6 mg tablet Take 1 tablet (0.6 mg total) by mouth daily (Patient not taking: Reported on 7/27/2023) 90 tablet 0   • diclofenac sodium (VOLTAREN) 1 % Apply 2 g topically 4 (four) times a day (Patient not taking: Reported on 6/12/2023) 150 g 1   • famotidine (PEPCID) 20 mg tablet TAKE 1 TABLET BY MOUTH TWICE A DAY (Patient not taking: Reported on 7/27/2023) 180 tablet 1   • gabapentin (Neurontin) 100 mg capsule Take 1 capsule (100 mg total) by mouth daily at bedtime (Patient not taking: Reported on 7/27/2023) 30 capsule 2   • hydrocortisone 2.5 % cream APPLY TO AFFECTED AREA TWICE A DAY (Patient not taking: Reported on 6/12/2023) 28.35 g 0   • MELATONIN PO Take 10 mg by mouth daily at bedtime (Patient not taking: Reported on 6/12/2023)     • polyethylene glycol (GOLYTELY) 4000 mL solution Take 4,000 mL by mouth once for 1 dose 4000 mL 0   • polyethylene glycol (GOLYTELY) 4000 mL solution Take 4,000 mL by mouth once for 1 dose 4000 mL 0     No current facility-administered medications for this visit.      No Known Allergies   Immunizations:     Immunization History   Administered Date(s) Administered   • COVID-19 PFIZER VACCINE 0.3 ML IM 03/29/2021, 04/21/2021, 11/09/2021   • INFLUENZA 10/27/2015, 10/01/2016, 10/13/2016, 12/19/2018   • Influenza Split High Dose Preservative Free IM 10/23/2014, 10/27/2015, 10/01/2016, 10/13/2016, 10/13/2016, 10/13/2016   • Influenza, high dose seasonal 0.7 mL 12/19/2018, 10/14/2019, 10/05/2021   • Pneumococcal Conjugate 13-Valent 08/25/2015, 01/19/2017, 10/14/2019   • Pneumococcal Polysaccharide PPV23 10/05/2021      Health Maintenance:         Topic Date Due   • Hepatitis C Screening  Discontinued   • Colorectal Cancer Screening  Discontinued         Topic Date Due   • Influenza Vaccine (1) 09/01/2023      Medicare Screening Tests and Risk Assessments:     Amor Johnson is here for his Subsequent Wellness visit. Last Medicare Wellness visit information reviewed, patient interviewed and updates made to the record today. Health Risk Assessment:   Patient rates overall health as good. Patient feels that their physical health rating is slightly better. Patient is satisfied with their life. Eyesight was rated as same. Hearing was rated as slightly worse. Patient feels that their emotional and mental health rating is much better. Patients states they are never, rarely angry. Patient states they are sometimes unusually tired/fatigued. Pain experienced in the last 7 days has been a lot. Patient's pain rating has been 10/10. Patient states that he has experienced no weight loss or gain in last 6 months. Depression Screening:   PHQ-2 Score: 1      Fall Risk Screening: In the past year, patient has experienced: history of falling in past year    Number of falls: 2 or more  Injured during fall?: No    Feels unsteady when standing or walking?: Yes    Worried about falling?: Yes      Home Safety:  Patient does not have trouble with stairs inside or outside of their home. Patient has working smoke alarms and has working carbon monoxide detector. Home safety hazards include: none. Nutrition:   Current diet is Regular. Medications:   Patient is currently taking over-the-counter supplements. OTC medications include: see medication list. Patient is able to manage medications.      Activities of Daily Living (ADLs)/Instrumental Activities of Daily Living (IADLs):   Walk and transfer into and out of bed and chair?: Yes  Dress and groom yourself?: Yes    Bathe or shower yourself?: Yes    Feed yourself? Yes  Do your laundry/housekeeping?: Yes  Manage your money, pay your bills and track your expenses?: Yes  Make your own meals?: Yes    Do your own shopping?: Yes    Previous Hospitalizations:   Any hospitalizations or ED visits within the last 12 months?: Yes    How many hospitalizations have you had in the last year?: more than 4    Advance Care Planning:   Living will: No    Durable POA for healthcare: No    Advanced directive: No    Advanced directive counseling given: Yes    Five wishes given: No    Patient declined ACP directive: Yes    End of Life Decisions reviewed with patient: Yes      Cognitive Screening:   Provider or family/friend/caregiver concerned regarding cognition?: No    PREVENTIVE SCREENINGS      Cardiovascular Screening:    General: Screening Not Indicated and History Lipid Disorder      Diabetes Screening:     General: Screening Not Indicated and History Diabetes      Colorectal Cancer Screening:     General: Risks and Benefits Discussed    Due for: Colonoscopy - Low Risk      Prostate Cancer Screening:    General: Screening Not Indicated      Osteoporosis Screening:    General: Screening Not Indicated      Abdominal Aortic Aneurysm (AAA) Screening:        General: Screening Not Indicated      Lung Cancer Screening:     General: Screening Not Indicated      Hepatitis C Screening:    General: Screening Not Indicated and History Hepatitis C    Screening, Brief Intervention, and Referral to Treatment (SBIRT)    Screening  Typical number of drinks in a day: 0  Typical number of drinks in a week: 0  Interpretation: Low risk drinking behavior.     Single Item Drug Screening:  How often have you used an illegal drug (including marijuana) or a prescription medication for non-medical reasons in the past year? never    Single Item Drug Screen Score: 0  Interpretation: Negative screen for possible drug use disorder    Brief Intervention  Alcohol & drug use screenings were reviewed. No concerns regarding substance use disorder identified. Other Counseling Topics:   Car/seat belt/driving safety, skin self-exam, sunscreen and calcium and vitamin D intake and regular weightbearing exercise. No results found. Physical Exam:     /80 (BP Location: Left arm, Patient Position: Sitting, Cuff Size: Standard)   Pulse 72   Temp (!) 96.1 °F (35.6 °C) (Tympanic)   Resp 17   Ht 5' 8.7" (1.745 m)   Wt 87.3 kg (192 lb 6.4 oz)   SpO2 97%   BMI 28.66 kg/m²     Physical Exam  Vitals and nursing note reviewed. Constitutional:       Appearance: Normal appearance. HENT:      Head: Normocephalic and atraumatic. Right Ear: Tympanic membrane, ear canal and external ear normal.      Left Ear: Tympanic membrane, ear canal and external ear normal.      Nose: Nose normal.      Mouth/Throat:      Mouth: Mucous membranes are moist.   Eyes:      Conjunctiva/sclera: Conjunctivae normal.   Cardiovascular:      Rate and Rhythm: Normal rate and regular rhythm. Pulses: Normal pulses. Heart sounds: Normal heart sounds. Pulmonary:      Effort: Pulmonary effort is normal.      Breath sounds: Normal breath sounds. Abdominal:      General: Bowel sounds are normal.   Musculoskeletal:         General: Normal range of motion. Cervical back: Normal range of motion and neck supple. Skin:     General: Skin is warm and dry. Capillary Refill: Capillary refill takes less than 2 seconds. Neurological:      General: No focal deficit present. Mental Status: He is alert and oriented to person, place, and time. Psychiatric:         Mood and Affect: Mood normal.         Behavior: Behavior normal.         Thought Content:  Thought content normal.         Judgment: Judgment normal.          ZAHRA Davies

## 2023-07-31 ENCOUNTER — OFFICE VISIT (OUTPATIENT)
Dept: FAMILY MEDICINE CLINIC | Facility: CLINIC | Age: 78
End: 2023-07-31
Payer: COMMERCIAL

## 2023-07-31 VITALS
SYSTOLIC BLOOD PRESSURE: 110 MMHG | OXYGEN SATURATION: 97 % | HEART RATE: 75 BPM | BODY MASS INDEX: 28.53 KG/M2 | RESPIRATION RATE: 17 BRPM | WEIGHT: 192.6 LBS | HEIGHT: 69 IN | DIASTOLIC BLOOD PRESSURE: 70 MMHG | TEMPERATURE: 97.4 F

## 2023-07-31 DIAGNOSIS — J04.0 LARYNGITIS: Primary | ICD-10-CM

## 2023-07-31 DIAGNOSIS — K59.09 OTHER CONSTIPATION: ICD-10-CM

## 2023-07-31 PROCEDURE — 99213 OFFICE O/P EST LOW 20 MIN: CPT | Performed by: NURSE PRACTITIONER

## 2023-07-31 RX ORDER — AMOXICILLIN 875 MG/1
875 TABLET, COATED ORAL 2 TIMES DAILY
Qty: 20 TABLET | Refills: 0 | Status: SHIPPED | OUTPATIENT
Start: 2023-07-31 | End: 2023-08-10

## 2023-07-31 RX ORDER — METHYLPREDNISOLONE 4 MG/1
TABLET ORAL
Qty: 21 EACH | Refills: 0 | Status: SHIPPED | OUTPATIENT
Start: 2023-07-31

## 2023-07-31 NOTE — PROGRESS NOTES
FAMILY PRACTICE OFFICE VISIT       NAME: Bryn Gosselin  AGE: 69 y.o. SEX: male       : 1945        MRN: [id number]    DATE: 2023  TIME: 3:36 PM    Assessment and Plan   1. Laryngitis  -     methylPREDNISolone 4 MG tablet therapy pack; Use as directed on package  -     amoxicillin (AMOXIL) 875 mg tablet; Take 1 tablet (875 mg total) by mouth 2 (two) times a day for 10 days    2. Other constipation  Assessment & Plan: To continue stool softener  Increase fluids  F/u with GI                   Chief Complaint     Chief Complaint   Patient presents with   • Abdominal Pain   • Hoarse   • Earache     Patient being seen for Hoarse Voice, Left pain, and Abdominal Pain x 2-3 days       History of Present Illness   Bryn Gosselin is a 69y.o.-year-old male who is here for 2 to 3 day his history of lost voice  Having intermittent abd pain  Was in er last week with constipation/abd pain  Most likely the same  Is to f/u with GI  No n/v/d  No fevers      Review of Systems   Review of Systems   Constitutional: Negative for fatigue and fever. HENT: Positive for congestion, postnasal drip, rhinorrhea and voice change. Eyes: Negative for photophobia and visual disturbance. Respiratory: Negative for cough, shortness of breath and wheezing. Cardiovascular: Negative for chest pain and palpitations. Gastrointestinal: Positive for abdominal pain. Negative for constipation, diarrhea, nausea and vomiting. Musculoskeletal: Negative for arthralgias and myalgias. Skin: Negative for rash. Neurological: Negative for dizziness, light-headedness and headaches. Hematological: Negative for adenopathy. Psychiatric/Behavioral: Negative for dysphoric mood and sleep disturbance. The patient is not nervous/anxious.         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[address])   • Chronic constipation   • Episodic cluster headache, not intractable   • GERD (gastroesophageal reflux disease)   • Nonintractable headache   • Hypertension   • Acute pain of both knees   • Meningioma (HCC)   • Varicose 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   • Spondylolisthesis at L5-S1 level   • Other chest pain   • Renal cyst   • History of hepatitis C   • Scalp lesion   • 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HCC)   • Primary osteoarthritis of both knees   • Epigastric abdominal pain   • Unstable angina (HCC)   • Palpitations   • Type 2 diabetes mellitus without complication, without long-term current use of insulin (HCC)   • Abnormal liver enzymes   • Acute pain of left shoulder   • Blurry vision, bilateral   • Right upper quadrant abdominal pain   • Other constipation   • Urinary retention   • Medicare annual wellness visit, subsequent         Past Medical History:  Past Medical History:   Diagnosis Date   • Brain mass     Last Assessed; 11/5/2015   • Chest pain     Last Assessed: 1/22/2015   • Diabetes type 2, uncontrolled     Last Assessed: 3/1/2016   • Edema of left lower extremity     Last Assessed: 3/22/2017   • GERD (gastroesophageal reflux disease)    • Hyperlipidemia    • Hypertension    • Impaired fasting glucose     Last Assessed: 11/10/2015   • Irregular heartbeat    • Skin cancer (melanoma) (HCC)     Scalp    • Varicose 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     Last Assessed: 3/22/2017       Past Surgical History:  Past Surgical History:   Procedure Laterality Date   • COLONOSCOPY     • COLONOSCOPY N/A 7/10/2018    Procedure: COLONOSCOPY;  Surgeon: Audrey Rosas MD;  Location: [facility];   Service: Colorectal   • INGUINAL HERNIA REPAIR      20+ years ago - [address]; Last Assessed: 10/23/2014   • TONSILLECTOMY     • VARICOSE VEIN SURGERY Left     x2 left leg - 40+ yrs ago, [address] and [place]       Family History:  Family History   Problem Relation Age of Onset   • No Known Problems Mother    • No Known Problems Father    • Melanoma Daughter 16       Social History:  Social History Socioeconomic History   • Marital status: /Civil Union     Spouse name: Not on file   • Number of children: 5   • Years of education: Not on file   • Highest education level: Not on file   Occupational History   • Occupation: Retired: Construction   Tobacco Use   • Smoking status: Never   • Smokeless tobacco: Never   Vaping Use   • Vaping Use: Never used   Substance and Sexual Activity   • Alcohol use: No   • Drug use: No   • Sexual activity: Not Currently     Partners: Female   Other Topics Concern   • Not on file   Social History Narrative    Uses safety equipment         Social Determinants of Health     Financial Resource Strain: Low Risk  (7/27/2023)    Overall Financial Resource Strain (CARDIA)    • Difficulty of Paying Living Expenses: Not very hard   Food Insecurity: Not on file   Transportation Needs: No Transportation Needs (7/27/2023)    PRAPARE - Transportation    • Lack of Transportation (Medical): No    • Lack of Transportation (Non-Medical): No   Physical Activity: Not on file   Stress: Not on file   Social Connections: Not on file   Intimate Partner Violence: Not on file   Housing Stability: Not on file       Objective     Vitals:    07/31/23 1447   BP: 110/70   Pulse: 75   Resp: 17   Temp: (!) 97.4 °F (36.3 °C)   SpO2: 97%     Wt Readings from Last 3 Encounters:   07/31/23 87.4 kg (192 lb 9.6 oz)   07/27/23 87.3 kg (192 lb 6.4 oz)   06/12/23 88.1 kg (194 lb 3.2 oz)       Physical Exam  Vitals and nursing note reviewed. Constitutional:       Appearance: Normal appearance. He is well-developed. HENT:      Head: Normocephalic and atraumatic. Right Ear: Tympanic membrane, ear canal and external ear normal.      Left Ear: Tympanic membrane, ear canal and external ear normal.      Nose: Nose normal.      Mouth/Throat:      Mouth: Mucous membranes are moist.   Eyes:      Conjunctiva/sclera: Conjunctivae normal.   Cardiovascular:      Rate and Rhythm: Normal rate and regular rhythm. Heart sounds: Normal heart sounds. Pulmonary:      Effort: Pulmonary effort is normal.      Breath sounds: Normal breath sounds. Abdominal:      General: Bowel sounds are normal.      Palpations: Abdomen is soft. Musculoskeletal:         General: Normal range of motion. Cervical back: Normal range of motion and neck supple. Skin:     General: Skin is warm and dry. Capillary Refill: Capillary refill takes less than 2 seconds. Neurological:      General: No focal deficit present. Mental Status: He is alert and oriented to person, place, and time. Psychiatric:         Mood and Affect: Mood normal.         Behavior: Behavior normal.         Thought Content:  Thought content normal.         Judgment: Judgment normal.         Pertinent Laboratory/Diagnostic Studies:  Lab Results   Component Value Date    GLUCOSE 121 10/28/2015    BUN 17 07/21/2023    CREATININE 0.79 07/21/2023    CALCIUM 9.4 07/21/2023     10/28/2015    K 3.9 07/21/2023    CO2 27 07/21/2023     (H) 07/21/2023     Lab Results   Component Value Date    ALT 23 07/21/2023    AST 24 07/21/2023    GGT 12 12/31/2022    ALKPHOS 127 (H) 07/21/2023    BILITOT 0.53 08/27/2015       Lab Results   Component Value Date    WBC 8.69 07/21/2023    HGB 13.9 07/21/2023    HCT 41.9 07/21/2023    MCV 84 07/21/2023     07/21/2023       No results found for: "TSH"    Lab Results   Component Value Date    CHOL 154 08/27/2015     Lab Results   Component Value Date    TRIG 92 05/13/2023     Lab Results   Component Value Date    HDL 32 (L) 05/13/2023     Lab Results   Component Value Date    LDLCALC 65 05/13/2023     Lab Results   Component Value Date    HGBA1C 6.4 (H) 05/13/2023       Results for orders placed or performed during the hospital encounter of 07/21/23   CBC and differential   Result Value Ref Range    WBC 8.69 4.31 - 10.16 Thousand/uL    RBC 5.01 3.88 - 5.62 Million/uL    Hemoglobin 13.9 12.0 - 17.0 g/dL    Hematocrit 41.9 36.5 - 49.3 %    MCV 84 82 - 98 fL    MCH 27.7 26.8 - 34.3 pg    MCHC 33.2 31.4 - 37.4 g/dL    RDW 12.9 11.6 - 15.1 %    MPV 9.8 8.9 - 12.7 fL    Platelets 849 634 - 834 Thousands/uL    nRBC 0 /100 WBCs    Neutrophils Relative 79 (H) 43 - 75 %    Immat GRANS % 0 0 - 2 %    Lymphocytes Relative 12 (L) 14 - 44 %    Monocytes Relative 8 4 - 12 %    Eosinophils Relative 1 0 - 6 %    Basophils Relative 0 0 - 1 %    Neutrophils Absolute 6.85 1.85 - 7.62 Thousands/µL    Immature Grans Absolute 0.03 0.00 - 0.20 Thousand/uL    Lymphocytes Absolute 1.03 0.60 - 4.47 Thousands/µL    Monocytes Absolute 0.67 0.17 - 1.22 Thousand/µL    Eosinophils Absolute 0.09 0.00 - 0.61 Thousand/µL    Basophils Absolute 0.02 0.00 - 0.10 Thousands/µL   Comprehensive metabolic panel   Result Value Ref Range    Sodium 141 135 - 147 mmol/L    Potassium 3.9 3.5 - 5.3 mmol/L    Chloride 109 (H) 96 - 108 mmol/L    CO2 27 21 - 32 mmol/L    ANION GAP 5 mmol/L    BUN 17 5 - 25 mg/dL    Creatinine 0.79 0.60 - 1.30 mg/dL    Glucose 142 (H) 65 - 140 mg/dL    Calcium 9.4 8.3 - 10.1 mg/dL    AST 24 5 - 45 U/L    ALT 23 12 - 78 U/L    Alkaline Phosphatase 127 (H) 46 - 116 U/L    Total Protein 7.6 6.4 - 8.4 g/dL    Albumin 4.2 3.5 - 5.0 g/dL    Total Bilirubin 0.80 0.20 - 1.00 mg/dL    eGFR 86 ml/min/1.73sq m   Urine Microscopic   Result Value Ref Range    RBC, UA 1-2 None Seen, 1-2 /hpf    WBC, UA None Seen None Seen, 1-2 /hpf    Epithelial Cells Occasional None Seen, Occasional /hpf    Bacteria, UA None Seen None Seen, Occasional /hpf    MUCUS THREADS Occasional (A) None Seen   Urine Macroscopic, POC   Result Value Ref Range    Color, UA Yellow     Clarity, UA Clear     pH, UA 5.5 4.5 - 8.0    Leukocytes, UA Negative Negative    Nitrite, UA Negative Negative    Protein, UA Negative Negative mg/dl    Glucose, UA Negative Negative mg/dl    Ketones, UA Negative Negative mg/dl    Urobilinogen, UA 0.2 0.2, 1.0 E.U./dl E.U./dl    Bilirubin, UA Negative Negative Occult Blood, UA Trace (A) Negative    Specific Gravity, UA 1.020 1.003 - 1.030       No orders of the defined types were placed in this encounter. ALLERGIES:  No Known Allergies    Current Medications     Current Outpatient Medications   Medication Sig Dispense Refill   • acetaminophen (TYLENOL) 325 mg tablet Take 2 tablets (650 mg total) by mouth every 6 (six) hours as needed for mild pain or fever 30 tablet 0   • acetaminophen-codeine (TYLENOL with CODEINE #3) 300-30 MG per tablet TAKE 1-2 TABLETS BY MOUTH EVERY 6 HOURS FOR PAIN ONLY     • amLODIPine (NORVASC) 5 mg tablet Take 1 tablet (5 mg total) by mouth daily 90 tablet 0   • amoxicillin (AMOXIL) 875 mg tablet Take 1 tablet (875 mg total) by mouth 2 (two) times a day for 10 days 20 tablet 0   • aspirin 81 MG tablet Take 81 mg by mouth daily     • atorvastatin (LIPITOR) 10 mg tablet Take 1 tablet (10 mg total) by mouth daily 90 tablet 0   • Blood Glucose Monitoring Suppl (OneTouch Verio Reflect) w/Device KIT Check blood sugars once daily. Please substitute with appropriate alternative as covered by patient's insurance. Dx: E11.65 1 kit 0   • clindamycin (CLEOCIN) 150 mg capsule TAKE 2 CAPSULES BY MOUTH EVERY 8 HOURS UNTIL GONE     • glucose blood (OneTouch Verio) test strip Check blood sugars once daily. Please substitute with appropriate alternative as covered by patient's insurance.  Dx: E11.65 100 each 3   • linaCLOtide (Linzess) 145 MCG CAPS Take 1 capsule (145 mcg total) by mouth daily 30 capsule 2   • lisinopril (ZESTRIL) 20 mg tablet Take 1 tablet (20 mg total) by mouth daily 90 tablet 0   • metFORMIN (GLUCOPHAGE) 500 mg tablet Take 1 tablet (500 mg total) by mouth 2 (two) times a day 180 tablet 0   • methocarbamol (ROBAXIN) 500 mg tablet Take 1 tablet (500 mg total) by mouth daily at bedtime 30 tablet 0   • methylPREDNISolone 4 MG tablet therapy pack Use as directed on package 21 each 0   • omeprazole (PriLOSEC) 40 MG capsule Take 1 capsule (40 mg total) by mouth daily 90 capsule 0   • OneTouch Delica Lancets 62R MISC Check blood sugars once daily. Please substitute with appropriate alternative as covered by patient's insurance. Dx: E11.65 100 each 3   • amoxicillin (AMOXIL) 500 mg capsule  (Patient not taking: Reported on 7/18/2022)     • bisacodyl (DULCOLAX) 5 mg EC tablet Take 2 tablets (10 mg total) by mouth once for 1 dose 2 tablet 0   • calcium carbonate (OS-ANGELIKA) 600 MG tablet Take 1 tablet (600 mg total) by mouth daily (Patient not taking: Reported on 6/12/2023) 30 tablet 3   • colchicine (COLCRYS) 0.6 mg tablet Take 1 tablet (0.6 mg total) by mouth daily (Patient not taking: Reported on 7/27/2023) 90 tablet 0   • diclofenac sodium (VOLTAREN) 1 % Apply 2 g topically 4 (four) times a day (Patient not taking: Reported on 6/12/2023) 150 g 1   • famotidine (PEPCID) 20 mg tablet TAKE 1 TABLET BY MOUTH TWICE A DAY (Patient not taking: Reported on 7/27/2023) 180 tablet 1   • gabapentin (Neurontin) 100 mg capsule Take 1 capsule (100 mg total) by mouth daily at bedtime (Patient not taking: Reported on 7/27/2023) 30 capsule 2   • hydrocortisone 2.5 % cream APPLY TO AFFECTED AREA TWICE A DAY (Patient not taking: Reported on 6/12/2023) 28.35 g 0   • Lancets (OneTouch Delica Plus APYRCV91O) MISC TEST DAILY, A46.1--SD CONFIMED DELICA 33 G 104 each 11   • MELATONIN PO Take 10 mg by mouth daily at bedtime (Patient not taking: Reported on 6/12/2023)     • polyethylene glycol (GOLYTELY) 4000 mL solution Take 4,000 mL by mouth once for 1 dose 4000 mL 0   • polyethylene glycol (GOLYTELY) 4000 mL solution Take 4,000 mL by mouth once for 1 dose 4000 mL 0     No current facility-administered medications for this visit.          Health Maintenance     Health Maintenance   Topic Date Due   • DM Eye Exam  Never done   • Influenza Vaccine (1) 09/01/2023   • BMI: Followup Plan  01/31/2024   • Hepatitis B Vaccine (1 of 3 - Risk 3-dose series) 08/27/2023 (Originally 7/11/2006)   • COVID-19 Vaccine (4 - Pfizer series) 10/27/2023 (Originally 1/4/2022)   • HEMOGLOBIN A1C  11/13/2023   • Kidney Health Evaluation: Albumin/Creatinine Ratio  01/31/2024   • Diabetic Foot Exam  02/05/2024   • Kidney Health Evaluation: GFR  07/21/2024   • Fall Risk  07/27/2024   • Depression Screening  07/27/2024   • Medicare Annual Wellness Visit (AWV)  07/27/2024   • Falls: Plan of Care  07/27/2024   • BMI: Adult  07/31/2024   • Pneumococcal Vaccine: 65+ Years  Completed   • HIB Vaccine  Aged Out   • IPV Vaccine  Aged Out   • Hepatitis A Vaccine  Aged Out   • Meningococcal ACWY Vaccine  Aged Out   • HPV Vaccine  Aged Out   • Hepatitis C Screening  Discontinued   • Colorectal Cancer Screening  Discontinued     Immunization History   Administered Date(s) Administered   • COVID-19 PFIZER VACCINE 0.3 ML IM 03/29/2021, 04/21/2021, 11/09/2021   • INFLUENZA 10/27/2015, 10/01/2016, 10/13/2016, 12/19/2018   • Influenza Split High Dose Preservative Free IM 10/23/2014, 10/27/2015, 10/01/2016, 10/13/2016, 10/13/2016, 10/13/2016   • Influenza, high dose seasonal 0.7 mL 12/19/2018, 10/14/2019, 10/05/2021   • Pneumococcal Conjugate 13-Valent 08/25/2015, 01/19/2017, 10/14/2019   • Pneumococcal Polysaccharide PPV23 10/05/2021          ZAHRA Nur

## 2023-08-04 ENCOUNTER — TELEPHONE (OUTPATIENT)
Dept: UROLOGY | Facility: AMBULATORY SURGERY CENTER | Age: 78
End: 2023-08-04

## 2023-08-04 NOTE — TELEPHONE ENCOUNTER
New Patient    What is the reason for the patient’s appointment? NP- Urinary Retention    Patient can be reached at [phone]    What office location does the patient prefer? [place]    Does patient have Imaging/Lab Results? In Epic    Have patient records been requested? If No, are the records showing in Epic:   n/a    INSURANCE:   Do we accept the patient's insurance or is the patient Self-Pay? [employer]     HISTORY:   Has the patient had any previous Urologist(s)? No    Was the patient seen in the ED? No    Has the patient had any outside testing done? No    Has the patient ever been diagnosed with any type of cancer?  Daughter not sure

## 2023-08-14 DIAGNOSIS — Z12.11 COLON CANCER SCREENING: Primary | ICD-10-CM

## 2023-08-15 ENCOUNTER — TELEPHONE (OUTPATIENT)
Dept: FAMILY MEDICINE CLINIC | Facility: CLINIC | Age: 78
End: 2023-08-15

## 2023-08-15 NOTE — TELEPHONE ENCOUNTER
Frank Cordova    Patient had a home visit with [facility], and the NP who visited with him is reporting a few concerns and some requests of the  patient. 1) She is concerned that he had a fall that he reported to her ( he was unable to say when)   but she is unsure if you were aware of it. Therefore she is concerned of his balance and is requesting if he could possibly have PT ordered. 2) Patient is possibly requesting a cane for balance. 3) Patient has a lot of pain in his knees and is requesting a referral to orthopedic. 4) Patient has a glucometer meter he does not know how to use, and to possibly if you could  order diabetic education for him. NP said you may call her if you have any questions for her.     Please advise

## 2023-08-21 ENCOUNTER — HOSPITAL ENCOUNTER (OUTPATIENT)
Dept: GASTROENTEROLOGY | Facility: AMBULARY SURGERY CENTER | Age: 78
Setting detail: OUTPATIENT SURGERY
Discharge: HOME | End: 2023-08-21
Attending: INTERNAL MEDICINE | Admitting: INTERNAL MEDICINE
Payer: COMMERCIAL

## 2023-08-21 ENCOUNTER — ANESTHESIA (OUTPATIENT)
Dept: GASTROENTEROLOGY | Facility: AMBULARY SURGERY CENTER | Age: 78
End: 2023-08-21

## 2023-08-21 ENCOUNTER — ANESTHESIA EVENT (OUTPATIENT)
Dept: GASTROENTEROLOGY | Facility: AMBULARY SURGERY CENTER | Age: 78
End: 2023-08-21

## 2023-08-21 VITALS
TEMPERATURE: 97.2 F | WEIGHT: 185 LBS | DIASTOLIC BLOOD PRESSURE: 79 MMHG | HEIGHT: 68 IN | RESPIRATION RATE: 18 BRPM | HEART RATE: 60 BPM | BODY MASS INDEX: 28.04 KG/M2 | OXYGEN SATURATION: 98 % | SYSTOLIC BLOOD PRESSURE: 148 MMHG

## 2023-08-21 DIAGNOSIS — M25.562 CHRONIC PAIN OF BOTH KNEES: ICD-10-CM

## 2023-08-21 DIAGNOSIS — R10.11 RIGHT UPPER QUADRANT ABDOMINAL PAIN: ICD-10-CM

## 2023-08-21 DIAGNOSIS — G89.29 CHRONIC PAIN OF BOTH KNEES: ICD-10-CM

## 2023-08-21 DIAGNOSIS — R26.9 GAIT ABNORMALITY: Primary | ICD-10-CM

## 2023-08-21 DIAGNOSIS — K59.09 CHRONIC CONSTIPATION: ICD-10-CM

## 2023-08-21 DIAGNOSIS — M25.561 CHRONIC PAIN OF BOTH KNEES: ICD-10-CM

## 2023-08-21 DIAGNOSIS — K21.9 GASTROESOPHAGEAL REFLUX DISEASE WITHOUT ESOPHAGITIS: ICD-10-CM

## 2023-08-21 DIAGNOSIS — E11.9 TYPE 2 DIABETES MELLITUS WITHOUT COMPLICATION, WITHOUT LONG-TERM CURRENT USE OF INSULIN (HCC): ICD-10-CM

## 2023-08-21 DIAGNOSIS — K59.09 OTHER CONSTIPATION: ICD-10-CM

## 2023-08-21 LAB — GLUCOSE SERPL-MCNC: 98 MG/DL (ref 65–140)

## 2023-08-21 PROCEDURE — 82948 REAGENT STRIP/BLOOD GLUCOSE: CPT

## 2023-08-21 PROCEDURE — 88305 TISSUE EXAM BY PATHOLOGIST: CPT | Performed by: PATHOLOGY

## 2023-08-21 PROCEDURE — 45378 DIAGNOSTIC COLONOSCOPY: CPT | Performed by: INTERNAL MEDICINE

## 2023-08-21 PROCEDURE — 43239 EGD BIOPSY SINGLE/MULTIPLE: CPT | Performed by: INTERNAL MEDICINE

## 2023-08-21 RX ORDER — LINACLOTIDE 290 UG/1
290 CAPSULE, GELATIN COATED ORAL DAILY
Qty: 30 CAPSULE | Refills: 2 | Status: SHIPPED | OUTPATIENT
Start: 2023-08-21 | End: 2023-11-19

## 2023-08-21 RX ORDER — PROPOFOL 10 MG/ML
INJECTION, EMULSION INTRAVENOUS AS NEEDED
Status: DISCONTINUED | OUTPATIENT
Start: 2023-08-21 | End: 2023-08-21

## 2023-08-21 RX ORDER — SODIUM CHLORIDE, SODIUM LACTATE, POTASSIUM CHLORIDE, CALCIUM CHLORIDE 600; 310; 30; 20 MG/100ML; MG/100ML; MG/100ML; MG/100ML
INJECTION, SOLUTION INTRAVENOUS CONTINUOUS PRN
Status: DISCONTINUED | OUTPATIENT
Start: 2023-08-21 | End: 2023-08-21

## 2023-08-21 RX ADMIN — PROPOFOL 50 MG: 10 INJECTION, EMULSION INTRAVENOUS at 08:17

## 2023-08-21 RX ADMIN — PROPOFOL 50 MG: 10 INJECTION, EMULSION INTRAVENOUS at 08:25

## 2023-08-21 RX ADMIN — PROPOFOL 100 MG: 10 INJECTION, EMULSION INTRAVENOUS at 08:15

## 2023-08-21 RX ADMIN — SODIUM CHLORIDE, SODIUM LACTATE, POTASSIUM CHLORIDE, AND CALCIUM CHLORIDE: .6; .31; .03; .02 INJECTION, SOLUTION INTRAVENOUS at 08:37

## 2023-08-21 RX ADMIN — PROPOFOL 10 MG: 10 INJECTION, EMULSION INTRAVENOUS at 08:34

## 2023-08-21 RX ADMIN — SODIUM CHLORIDE, SODIUM LACTATE, POTASSIUM CHLORIDE, AND CALCIUM CHLORIDE: .6; .31; .03; .02 INJECTION, SOLUTION INTRAVENOUS at 08:08

## 2023-08-21 NOTE — H&P
History and Physical - [facility]  Maco Siu 69 y.o. male MRN: [id number]        HPI: 67-year-old male with history of GERD reports having right upper quadrant pain. Has problems with constipation. 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     Last Assessed: 3/22/2017     Past Surgical History:   Procedure Laterality Date   • COLONOSCOPY     • COLONOSCOPY N/A 7/10/2018    Procedure: COLONOSCOPY;  Surgeon: Luzma Velazquez MD;  Location:  GI LAB; Service: Colorectal   • INGUINAL HERNIA REPAIR      20+ years ago - [address]; Last Assessed: 10/23/2014   • TONSILLECTOMY     • VARICOSE VEIN SURGERY Left     x2 left leg - 40+ yrs ago, [facility] and [place]     Social History   Social History     Substance and Sexual Activity   Alcohol Use No     Social History     Substance and Sexual Activity   Drug Use No     Social History     Tobacco Use   Smoking Status Never   Smokeless Tobacco Never     Family History   Problem Relation Age of Onset   • No Known Problems Mother    • No Known Problems Father    • Melanoma Daughter 16       Meds/Allergies     (Not in a hospital admission)      No Known Allergies    Objective     Blood pressure (!) 202/83, pulse 66, temperature (!) 97.3 °F (36.3 °C), temperature source Temporal, resp. rate 16, height 5' 8" (1.727 m), weight 83.9 kg (185 lb), SpO2 97 %.     PHYSICAL EXAM:    Gen: NAD  CV: S1 & S2 normal, RRR  CHEST: Clear to auscultate  ABD: soft, NT/ND, good bowel sounds  EXT: no edema    ASSESSMENT:     GERD, right upper quadrant pain, constipation    PLAN:    EGD and colonoscopy

## 2023-08-21 NOTE — ANESTHESIA POSTPROCEDURE EVALUATION
Post-Op Assessment Note    CV Status:  Stable  Pain Score: 0    Pain management: adequate     Mental Status:  Alert and awake   Hydration Status:  Euvolemic   PONV Controlled:  Controlled   Airway Patency:  Patent      Post Op Vitals Reviewed: Yes      Staff: CRNA         No notable events documented.     BP  128/74   Temp  98   Pulse  62   Resp   16   SpO2   100

## 2023-08-21 NOTE — ANESTHESIA PREPROCEDURE EVALUATION
Procedure:  COLONOSCOPY  EGD    Relevant Problems   ANESTHESIA (within normal limits)      CARDIO   (+) Hypertension   (+) Other chest pain   (+) Unstable angina (HCC)      ENDO   (+) Type 2 diabetes mellitus without complication, without long-term current use of insulin (HCC)      GI/HEPATIC   (+) GERD (gastroesophageal reflux disease)      /RENAL   (+) Renal cyst      MUSCULOSKELETAL   (+) Primary osteoarthritis of both knees      NEURO/PSYCH   (+) Episodic cluster headache, not intractable   (+) Nonintractable headache      PULMONARY (within normal limits)      Digestive   (+) Chronic constipation      Nervous and Auditory   (+) Brain tumor (HCC)   (+) 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HCC)   (+) Meningioma (HCC)      Other   (+) Abnormal liver enzymes   (+) 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HCC)        Physical Exam    Airway    Mallampati score: II  TM Distance: >3 FB  Neck ROM: full     Dental   upper dentures,     Cardiovascular      Pulmonary      Other Findings  Poor dentition, no loose teeth      Anesthesia Plan  ASA Score- 2     Anesthesia Type- IV sedation with anesthesia with ASA Monitors. Additional Monitors:   Airway Plan:     Comment:  used. Plan Factors-Exercise tolerance (METS): >4 METS. Chart reviewed. EKG reviewed. Existing labs reviewed. Patient summary reviewed. Patient is not a current smoker. Induction-     Postoperative Plan-     Informed Consent- Anesthetic plan and risks discussed with patient. I personally reviewed this patient with the CRNA. Discussed and agreed on the Anesthesia Plan with the CRNA. Champ Anthony

## 2023-08-23 PROCEDURE — 88305 TISSUE EXAM BY PATHOLOGIST: CPT | Performed by: PATHOLOGY

## 2023-08-24 ENCOUNTER — OFFICE VISIT (OUTPATIENT)
Dept: UROLOGY | Facility: CLINIC | Age: 78
End: 2023-08-24
Payer: COMMERCIAL

## 2023-08-24 VITALS
HEART RATE: 97 BPM | WEIGHT: 188.4 LBS | OXYGEN SATURATION: 98 % | SYSTOLIC BLOOD PRESSURE: 140 MMHG | DIASTOLIC BLOOD PRESSURE: 80 MMHG | HEIGHT: 68 IN | BODY MASS INDEX: 28.55 KG/M2 | RESPIRATION RATE: 16 BRPM

## 2023-08-24 DIAGNOSIS — R33.9 URINARY RETENTION: ICD-10-CM

## 2023-08-24 LAB — POST-VOID RESIDUAL VOLUME, ML POC: 39 ML

## 2023-08-24 PROCEDURE — 51798 US URINE CAPACITY MEASURE: CPT

## 2023-08-24 PROCEDURE — 99203 OFFICE O/P NEW LOW 30 MIN: CPT

## 2023-08-24 RX ORDER — TAMSULOSIN HYDROCHLORIDE 0.4 MG/1
0.4 CAPSULE ORAL
Qty: 30 CAPSULE | Refills: 1 | Status: SHIPPED | OUTPATIENT
Start: 2023-08-24

## 2023-08-24 NOTE — PROGRESS NOTES
Office Visit- Urology  Carmina Ram 1945 MRN: [id number]      Assessment/Discussion/Plan    69 y.o. male managed by     1. Acute urinary retention  -At this time I believe that the predominant factor for patient's acute urinary retention was his constipation as he had no normal bowel movements for 6 days prior to his presentation to the ER  -Continue to follow-up with GI for constipation purposes  -There is likely some degree of prostatic involvement as well  -PVR today is 39 mL  -Trial Flomax 0.4 mg for lower urinary tract symptoms and for prevention of further episodes of acute urinary retention BPH is an etiologic factor. Discussed administration and potential side effects including orthostatic hypotension.  -Follow-up in 6 weeks    2. Prostate cancer screening  -Not interested in prostate cancer screening at this time         Chief Complaint:   Parvin Lopez is a 69 y.o. male presenting to the office for an initial evaluation regarding  Acute urinary retention        Subjective    -Present to the office today with daughter Coni Dumont. Denied use of professional  and Cece translated  -Was seen in the ER on 7/21 for constipation, urinary retention, lower abdominal pain. A a Pryor catheter was placed  -With resolution of patient's pain. They attempted a Fleet enema within the ER but that was unsuccessful  -Patient requested Pryor to be removed prior to discharge from the ER  -He has not had difficulties with urination since  -At baseline patient states that he has nocturia 2 times a day as well as some straining to urinate.   Intermittent dysuria  -He denies any blood in the urine  -No past surgeries on the kidneys, bladder, prostate  -No known family history  -He has not trialed medication before  -He had a colonoscopy this past Monday but was incomplete due to not drinking the entirety of the prep  -He is on Linzess 200 mg now  -Denies any dizziness or lightheadedness  -- Past neurologic history for meningioma. ROS:   Review of Systems   Constitutional: Negative. Negative for chills, fatigue and fever. HENT: Negative. Respiratory: Negative for shortness of breath. Cardiovascular: Negative for chest pain. Gastrointestinal: Negative. Negative for abdominal pain. Endocrine: Negative. Musculoskeletal: Negative. Skin: Negative. Neurological: Negative. Negative for dizziness and light-headedness. Hematological: Negative. Psychiatric/Behavioral: Negative. Past Medical History  Past Medical History:   Diagnosis Date   • Brain mass     Last Assessed; 11/5/2015   • Chest pain     Last Assessed: 1/22/2015   • Diabetes type 2, uncontrolled     Last Assessed: 3/1/2016   • Edema of left lower extremity     Last Assessed: 3/22/2017   • GERD (gastroesophageal reflux disease)    • Hyperlipidemia    • Hypertension    • Impaired fasting glucose     Last Assessed: 11/10/2015   • Irregular heartbeat    • Skin cancer (melanoma) (HCC)     Scalp    • Varicose 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     Last Assessed: 3/22/2017       Past Surgical History  Past Surgical History:   Procedure Laterality Date   • COLONOSCOPY     • COLONOSCOPY N/A 7/10/2018    Procedure: COLONOSCOPY;  Surgeon: Forrest Puri MD;  Location: [facility];   Service: Colorectal   • INGUINAL HERNIA REPAIR      20+ years ago - [address]; Last Assessed: 10/23/2014   • TONSILLECTOMY     • VARICOSE VEIN SURGERY Left     x2 left leg - 40+ yrs ago, [address] and [place]       Past Family History  Family History   Problem Relation Age of Onset   • No Known Problems Mother    • No Known Problems Father    • Melanoma Daughter 16       Past Social history  Social History     Socioeconomic History   • Marital status: /Civil Union     Spouse name: Not on file   • Number of children: 5   • Years of education: Not on file   • Highest education level: Not on file   Occupational History   • Occupation: Retired: Construction   Tobacco Use • Smoking status: Never   • Smokeless tobacco: Never   Vaping Use   • Vaping Use: Never used   Substance and Sexual Activity   • Alcohol use: No   • Drug use: No   • Sexual activity: Not Currently     Partners: Female   Other Topics Concern   • Not on file   Social History Narrative    Uses safety equipment         Social Determinants of Health     Financial Resource Strain: Low Risk  (7/27/2023)    Overall Financial Resource Strain (CARDIA)    • Difficulty of Paying Living Expenses: Not very hard   Food Insecurity: Not on file   Transportation Needs: No Transportation Needs (7/27/2023)    PRAPARE - Transportation    • Lack of Transportation (Medical): No    • Lack of Transportation (Non-Medical): No   Physical Activity: Not on file   Stress: Not on file   Social Connections: Not on file   Intimate Partner Violence: Not on file   Housing Stability: Not on file       Current Medications  Current Outpatient Medications   Medication Sig Dispense Refill   • acetaminophen (TYLENOL) 325 mg tablet Take 2 tablets (650 mg total) by mouth every 6 (six) hours as needed for mild pain or fever 30 tablet 0   • amLODIPine (NORVASC) 5 mg tablet Take 1 tablet (5 mg total) by mouth daily 90 tablet 0   • aspirin 81 MG tablet Take 81 mg by mouth daily     • atorvastatin (LIPITOR) 10 mg tablet Take 1 tablet (10 mg total) by mouth daily 90 tablet 0   • Blood Glucose Monitoring Suppl (OneTouch Verio Reflect) w/Device KIT Check blood sugars once daily. Please substitute with appropriate alternative as covered by patient's insurance. Dx: E11.65 1 kit 0   • clindamycin (CLEOCIN) 150 mg capsule TAKE 2 CAPSULES BY MOUTH EVERY 8 HOURS UNTIL GONE     • gabapentin (Neurontin) 100 mg capsule Take 1 capsule (100 mg total) by mouth daily at bedtime 30 capsule 2   • glucose blood (OneTouch Verio) test strip Check blood sugars once daily. Please substitute with appropriate alternative as covered by patient's insurance.  Dx: E11.65 100 each 3   • Lancets (OneTouch Delica Plus [id number]) MISC TEST DAILY, S64.0--CK CONFIMED DELICA 33 G 559 each 11   • linaCLOtide (Linzess) 290 MCG CAPS Take 1 capsule by mouth in the morning 30 capsule 2   • lisinopril (ZESTRIL) 20 mg tablet Take 1 tablet (20 mg total) by mouth daily 90 tablet 0   • MELATONIN PO Take 10 mg by mouth daily at bedtime     • metFORMIN (GLUCOPHAGE) 500 mg tablet Take 1 tablet (500 mg total) by mouth 2 (two) times a day 180 tablet 0   • methocarbamol (ROBAXIN) 500 mg tablet Take 1 tablet (500 mg total) by mouth daily at bedtime 30 tablet 0   • methylPREDNISolone 4 MG tablet therapy pack Use as directed on package 21 each 0   • omeprazole (PriLOSEC) 40 MG capsule Take 1 capsule (40 mg total) by mouth daily 90 capsule 0   • OneTouch Delica Lancets 85F MISC Check blood sugars once daily. Please substitute with appropriate alternative as covered by patient's insurance. Dx: E11.65 100 each 3   • amoxicillin (AMOXIL) 500 mg capsule  (Patient not taking: Reported on 7/18/2022)     • bisacodyl (DULCOLAX) 5 mg EC tablet Take 2 tablets (10 mg total) by mouth once for 1 dose 2 tablet 0   • calcium carbonate (OS-ANGELIKA) 600 MG tablet Take 1 tablet (600 mg total) by mouth daily (Patient not taking: Reported on 6/12/2023) 30 tablet 3   • colchicine (COLCRYS) 0.6 mg tablet Take 1 tablet (0.6 mg total) by mouth daily (Patient not taking: Reported on 7/27/2023) 90 tablet 0   • diclofenac sodium (VOLTAREN) 1 % Apply 2 g topically 4 (four) times a day (Patient not taking: Reported on 6/12/2023) 150 g 1   • famotidine (PEPCID) 20 mg tablet TAKE 1 TABLET BY MOUTH TWICE A DAY (Patient not taking: Reported on 7/27/2023) 180 tablet 1   • hydrocortisone 2.5 % cream APPLY TO AFFECTED AREA TWICE A DAY (Patient not taking: Reported on 6/12/2023) 28.35 g 0     No current facility-administered medications for this visit.        Allergies  No Known Allergies    OBJECTIVE    Vitals   Vitals:    08/24/23 0948   Height: 5' 8" (1.727 m) PVR:    Physical Exam  Constitutional:       General: He is not in acute distress. Appearance: Normal appearance. He is normal weight. He is not ill-appearing or toxic-appearing. HENT:      Head: Normocephalic and atraumatic. Cardiovascular:      Rate and Rhythm: Normal rate. Pulmonary:      Effort: Pulmonary effort is normal. No respiratory distress. Skin:     General: Skin is warm and dry. Neurological:      General: No focal deficit present. Mental Status: He is alert and oriented to person, place, and time. Cranial Nerves: No cranial nerve deficit. Psychiatric:         Mood and Affect: Mood normal.         Behavior: Behavior normal.         Thought Content: Thought content normal.          Labs:     Lab Results   Component Value Date    PSA 0.7 12/18/2019    PSA 0.6 07/27/2017    PSA 0.6 08/27/2015     Lab Results   Component Value Date    CREATININE 0.79 07/21/2023      Lab Results   Component Value Date    HGBA1C 6.4 (H) 05/13/2023     Lab Results   Component Value Date    GLUCOSE 121 10/28/2015    CALCIUM 9.4 07/21/2023     10/28/2015    K 3.9 07/21/2023    CO2 27 07/21/2023     (H) 07/21/2023    BUN 17 07/21/2023    CREATININE 0.79 07/21/2023       I have personally reviewed all pertinent lab results and reviewed with patient    Imaging       Yoli Mcnulty PA-C  Date: 8/24/2023 Time: 9:53 AM  [facility]    This note was written using fluency dictation software. Please excuse any resulting minor grammatical errors.

## 2023-09-01 ENCOUNTER — NEW PATIENT COMPREHENSIVE (OUTPATIENT)
Dept: URBAN - METROPOLITAN AREA CLINIC 6 | Facility: CLINIC | Age: 78
End: 2023-09-01

## 2023-09-01 DIAGNOSIS — H53.012: ICD-10-CM

## 2023-09-01 DIAGNOSIS — E11.9: ICD-10-CM

## 2023-09-01 LAB
LEFT EYE DIABETIC RETINOPATHY: NORMAL
RIGHT EYE DIABETIC RETINOPATHY: NORMAL

## 2023-09-01 PROCEDURE — 92004 COMPRE OPH EXAM NEW PT 1/>: CPT

## 2023-09-01 ASSESSMENT — VISUAL ACUITY
OS_CC: J7
OD_CC: 20/40-2
OD_CC: J2
OS_CC: 20/100-2

## 2023-09-01 ASSESSMENT — TONOMETRY
OS_IOP_MMHG: 12
OD_IOP_MMHG: 11

## 2023-09-06 DIAGNOSIS — E78.49 OTHER HYPERLIPIDEMIA: ICD-10-CM

## 2023-09-06 DIAGNOSIS — I10 ESSENTIAL HYPERTENSION: ICD-10-CM

## 2023-09-06 DIAGNOSIS — K21.9 GASTROESOPHAGEAL REFLUX DISEASE WITHOUT ESOPHAGITIS: ICD-10-CM

## 2023-09-06 RX ORDER — AMLODIPINE BESYLATE 5 MG/1
5 TABLET ORAL DAILY
Qty: 90 TABLET | Refills: 0 | Status: SHIPPED | OUTPATIENT
Start: 2023-09-06

## 2023-09-06 RX ORDER — ATORVASTATIN CALCIUM 10 MG/1
10 TABLET, FILM COATED ORAL DAILY
Qty: 90 TABLET | Refills: 0 | Status: SHIPPED | OUTPATIENT
Start: 2023-09-06

## 2023-09-06 RX ORDER — LISINOPRIL 20 MG/1
20 TABLET ORAL DAILY
Qty: 90 TABLET | Refills: 0 | Status: SHIPPED | OUTPATIENT
Start: 2023-09-06

## 2023-09-06 RX ORDER — OMEPRAZOLE 40 MG/1
40 CAPSULE, DELAYED RELEASE ORAL DAILY
Qty: 90 CAPSULE | Refills: 0 | Status: SHIPPED | OUTPATIENT
Start: 2023-09-06

## 2023-09-25 PROBLEM — Z00.00 MEDICARE ANNUAL WELLNESS VISIT, SUBSEQUENT: Status: RESOLVED | Noted: 2023-07-27 | Resolved: 2023-09-25

## 2023-09-28 ENCOUNTER — OFFICE VISIT (OUTPATIENT)
Dept: FAMILY MEDICINE CLINIC | Facility: CLINIC | Age: 78
End: 2023-09-28
Payer: COMMERCIAL

## 2023-09-28 VITALS
TEMPERATURE: 93.9 F | DIASTOLIC BLOOD PRESSURE: 60 MMHG | OXYGEN SATURATION: 97 % | RESPIRATION RATE: 16 BRPM | WEIGHT: 188.6 LBS | BODY MASS INDEX: 28.68 KG/M2 | HEART RATE: 71 BPM | SYSTOLIC BLOOD PRESSURE: 136 MMHG

## 2023-09-28 DIAGNOSIS — K59.09 CHRONIC CONSTIPATION: ICD-10-CM

## 2023-09-28 DIAGNOSIS — E11.9 TYPE 2 DIABETES MELLITUS WITHOUT COMPLICATION, WITHOUT LONG-TERM CURRENT USE OF INSULIN (HCC): Primary | ICD-10-CM

## 2023-09-28 LAB — SL AMB POCT HEMOGLOBIN AIC: 6.6 (ref ?–6.5)

## 2023-09-28 PROCEDURE — 99213 OFFICE O/P EST LOW 20 MIN: CPT | Performed by: NURSE PRACTITIONER

## 2023-09-28 PROCEDURE — 83036 HEMOGLOBIN GLYCOSYLATED A1C: CPT | Performed by: NURSE PRACTITIONER

## 2023-09-28 RX ORDER — LINACLOTIDE 290 UG/1
290 CAPSULE, GELATIN COATED ORAL DAILY
Qty: 30 CAPSULE | Refills: 2 | Status: SHIPPED | OUTPATIENT
Start: 2023-09-28 | End: 2023-12-27

## 2023-09-28 RX ORDER — DOCUSATE SODIUM 100 MG/1
100 CAPSULE, LIQUID FILLED ORAL DAILY
Qty: 30 CAPSULE | Refills: 1 | Status: SHIPPED | OUTPATIENT
Start: 2023-09-28

## 2023-09-28 NOTE — PROGRESS NOTES
FAMILY PRACTICE OFFICE VISIT       NAME: Silverio Severs  AGE: 69 y.o. SEX: male       : 1945        MRN: [id number]    DATE: 2023  TIME: 4:08 PM    Assessment and Plan   1. Type 2 diabetes mellitus without complication, without long-term current use of insulin ([facility])  -     POCT hemoglobin A1c    2. Chronic constipation  -     linaCLOtide (Linzess) 290 MCG CAPS; Take 1 capsule by mouth in the morning  -     docusate sodium (COLACE) 100 mg capsule; Take 1 capsule (100 mg total) by mouth in the morning                 Chief Complaint     Chief Complaint   Patient presents with   • Follow-up     Headache, constipation         History of Present Illness   Silverio Severs is a 69y.o.-year-old male who is here for constipation and headache  On linzess but still having trouble with a bm  Goes every 3 to 5 days to the bathroom      Review of Systems   Review of Systems   Constitutional: Negative for fatigue and fever. HENT: Negative for congestion, postnasal drip and rhinorrhea. Respiratory: Negative for cough, shortness of breath and wheezing. Cardiovascular: Negative for chest pain and palpitations. Gastrointestinal: Positive for constipation. Negative for diarrhea, nausea and vomiting. Genitourinary: Negative for dysuria and frequency. Musculoskeletal: Negative for arthralgias and myalgias. Skin: Negative for rash. Neurological: Negative for dizziness, light-headedness and headaches. Hematological: Negative for adenopathy. Psychiatric/Behavioral: Negative for dysphoric mood and sleep disturbance. The patient is not nervous/anxious.         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[address])   • Chronic constipation   • Episodic cluster headache, not intractable   • GERD (gastroesophageal reflux disease)   • Nonintractable headache   • Hypertension   • Acute pain of both knees   • Meningioma (HCC)   • Varicose 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   • Spondylolisthesis at L5-S1 level   • Other chest pain   • Renal cyst   • History of hepatitis C   • Scalp lesion   • 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HCC)   • Primary osteoarthritis of both knees   • Epigastric abdominal pain   • Unstable angina (HCC)   • Palpitations   • Type 2 diabetes mellitus without complication, without long-term current use of insulin (HCC)   • Abnormal liver enzymes   • Acute pain of left shoulder   • Blurry vision, bilateral   • Right upper quadrant abdominal pain   • Other constipation   • Urinary retention         Past Medical History:  Past Medical History:   Diagnosis Date   • Brain mass     Last Assessed; 11/5/2015   • Chest pain     Last Assessed: 1/22/2015   • Diabetes type 2, uncontrolled     Last Assessed: 3/1/2016   • Edema of left lower extremity     Last Assessed: 3/22/2017   • GERD (gastroesophageal reflux disease)    • Hyperlipidemia    • Hypertension    • Impaired fasting glucose     Last Assessed: 11/10/2015   • Irregular heartbeat    • Skin cancer (melanoma) (HCC)     Scalp    • Varicose 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     Last Assessed: 3/22/2017       Past Surgical History:  Past Surgical History:   Procedure Laterality Date   • COLONOSCOPY     • COLONOSCOPY N/A 7/10/2018    Procedure: COLONOSCOPY;  Surgeon: Yazan Rivera MD;  Location: [facility];   Service: Colorectal   • INGUINAL HERNIA REPAIR      20+ years ago - [address]; Last Assessed: 10/23/2014   • TONSILLECTOMY     • VARICOSE VEIN SURGERY Left     x2 left leg - 40+ yrs ago, [address] and [place]       Family History:  Family History   Problem Relation Age of Onset   • No Known Problems Mother    • No Known Problems Father    • Melanoma Daughter 16       Social History:  Social History     Socioeconomic History   • Marital status: /Civil Union     Spouse name: Not on file   • Number of children: 5   • Years of education: Not on file   • Highest education level: Not on file Occupational History   • Occupation: Retired: Construction   Tobacco Use   • Smoking status: Never   • Smokeless tobacco: Never   Vaping Use   • Vaping Use: Never used   Substance and Sexual Activity   • Alcohol use: No   • Drug use: No   • Sexual activity: Not Currently     Partners: Female   Other Topics Concern   • Not on file   Social History Narrative    Uses safety equipment         Social Determinants of Health     Financial Resource Strain: Low Risk  (7/27/2023)    Overall Financial Resource Strain (CARDIA)    • Difficulty of Paying Living Expenses: Not very hard   Food Insecurity: Not on file   Transportation Needs: No Transportation Needs (7/27/2023)    PRAPARE - Transportation    • Lack of Transportation (Medical): No    • Lack of Transportation (Non-Medical): No   Physical Activity: Not on file   Stress: Not on file   Social Connections: Not on file   Intimate Partner Violence: Not on file   Housing Stability: Not on file       Objective     Vitals:    09/28/23 1527   BP: 136/60   Pulse: 71   Resp: 16   Temp: (!) 93.9 °F (34.4 °C)   SpO2: 97%     Wt Readings from Last 3 Encounters:   09/28/23 85.5 kg (188 lb 9.6 oz)   08/24/23 85.5 kg (188 lb 6.4 oz)   08/21/23 83.9 kg (185 lb)       Physical Exam  Vitals and nursing note reviewed. Constitutional:       Appearance: Normal appearance. HENT:      Head: Normocephalic and atraumatic. Eyes:      Conjunctiva/sclera: Conjunctivae normal.   Cardiovascular:      Rate and Rhythm: Normal rate and regular rhythm. Heart sounds: Normal heart sounds. Pulmonary:      Effort: Pulmonary effort is normal.      Breath sounds: Normal breath sounds. Abdominal:      General: Bowel sounds are normal.   Musculoskeletal:         General: Normal range of motion. Cervical back: Normal range of motion and neck supple. Skin:     General: Skin is warm and dry. Capillary Refill: Capillary refill takes less than 2 seconds.    Neurological:      Mental Status: He is alert and oriented to person, place, and time. Psychiatric:         Mood and Affect: Mood normal.         Behavior: Behavior normal.         Pertinent Laboratory/Diagnostic Studies:  Lab Results   Component Value Date    GLUCOSE 121 10/28/2015    BUN 17 07/21/2023    CREATININE 0.79 07/21/2023    CALCIUM 9.4 07/21/2023     10/28/2015    K 3.9 07/21/2023    CO2 27 07/21/2023     (H) 07/21/2023     Lab Results   Component Value Date    ALT 23 07/21/2023    AST 24 07/21/2023    GGT 12 12/31/2022    ALKPHOS 127 (H) 07/21/2023    BILITOT 0.53 08/27/2015       Lab Results   Component Value Date    WBC 8.69 07/21/2023    HGB 13.9 07/21/2023    HCT 41.9 07/21/2023    MCV 84 07/21/2023     07/21/2023       No results found for: "TSH"    Lab Results   Component Value Date    CHOL 154 08/27/2015     Lab Results   Component Value Date    TRIG 92 05/13/2023     Lab Results   Component Value Date    HDL 32 (L) 05/13/2023     Lab Results   Component Value Date    LDLCALC 65 05/13/2023     Lab Results   Component Value Date    HGBA1C 6.4 (H) 05/13/2023       Results for orders placed or performed in visit on 09/01/23    Diabetes Eye Exam   Result Value Ref Range    Right Eye Diabetic Retinopathy None     Left Eye Diabetic Retinopathy None        Orders Placed This Encounter   Procedures   • POCT hemoglobin A1c       ALLERGIES:  No Known Allergies    Current Medications     Current Outpatient Medications   Medication Sig Dispense Refill   • acetaminophen (TYLENOL) 325 mg tablet Take 2 tablets (650 mg total) by mouth every 6 (six) hours as needed for mild pain or fever 30 tablet 0   • amLODIPine (NORVASC) 5 mg tablet TAKE 1 TABLET (5 MG TOTAL) BY MOUTH DAILY.  90 tablet 0   • aspirin 81 MG tablet Take 81 mg by mouth daily     • atorvastatin (LIPITOR) 10 mg tablet TAKE 1 TABLET BY MOUTH EVERY DAY 90 tablet 0   • Blood Glucose Monitoring Suppl (OneTouch Verio Reflect) w/Device KIT Check blood sugars once daily. Please substitute with appropriate alternative as covered by patient's insurance. Dx: E11.65 1 kit 0   • clindamycin (CLEOCIN) 150 mg capsule TAKE 2 CAPSULES BY MOUTH EVERY 8 HOURS UNTIL GONE     • docusate sodium (COLACE) 100 mg capsule Take 1 capsule (100 mg total) by mouth in the morning 30 capsule 1   • gabapentin (Neurontin) 100 mg capsule Take 1 capsule (100 mg total) by mouth daily at bedtime 30 capsule 2   • glucose blood (OneTouch Verio) test strip Check blood sugars once daily. Please substitute with appropriate alternative as covered by patient's insurance. Dx: E11.65 100 each 3   • Lancets (OneTouch Delica Plus [id number]) MISC TEST DAILY, J62.9--KH CONFIMED DELICA 33 G 687 each 11   • linaCLOtide (Linzess) 290 MCG CAPS Take 1 capsule by mouth in the morning 30 capsule 2   • lisinopril (ZESTRIL) 20 mg tablet TAKE 1 TABLET BY MOUTH EVERY DAY 90 tablet 0   • MELATONIN PO Take 10 mg by mouth daily at bedtime     • metFORMIN (GLUCOPHAGE) 500 mg tablet Take 1 tablet (500 mg total) by mouth 2 (two) times a day 180 tablet 0   • methocarbamol (ROBAXIN) 500 mg tablet Take 1 tablet (500 mg total) by mouth daily at bedtime 30 tablet 0   • omeprazole (PriLOSEC) 40 MG capsule TAKE 1 CAPSULE (40 MG TOTAL) BY MOUTH DAILY. 90 capsule 0   • OneTouch Delica Lancets 08V MISC Check blood sugars once daily. Please substitute with appropriate alternative as covered by patient's insurance.  Dx: E11.65 100 each 3   • tamsulosin (FLOMAX) 0.4 mg Take 1 capsule (0.4 mg total) by mouth daily with dinner 30 capsule 1   • amoxicillin (AMOXIL) 500 mg capsule  (Patient not taking: Reported on 7/18/2022)     • bisacodyl (DULCOLAX) 5 mg EC tablet Take 2 tablets (10 mg total) by mouth once for 1 dose 2 tablet 0   • calcium carbonate (OS-ANGELIKA) 600 MG tablet Take 1 tablet (600 mg total) by mouth daily (Patient not taking: Reported on 6/12/2023) 30 tablet 3   • colchicine (COLCRYS) 0.6 mg tablet Take 1 tablet (0.6 mg total) by mouth daily (Patient not taking: Reported on 7/27/2023) 90 tablet 0   • diclofenac sodium (VOLTAREN) 1 % Apply 2 g topically 4 (four) times a day (Patient not taking: Reported on 6/12/2023) 150 g 1   • famotidine (PEPCID) 20 mg tablet TAKE 1 TABLET BY MOUTH TWICE A DAY (Patient not taking: Reported on 7/27/2023) 180 tablet 1   • hydrocortisone 2.5 % cream APPLY TO AFFECTED AREA TWICE A DAY (Patient not taking: Reported on 6/12/2023) 28.35 g 0   • methylPREDNISolone 4 MG tablet therapy pack Use as directed on package (Patient not taking: Reported on 9/28/2023) 21 each 0     No current facility-administered medications for this visit.          Health Maintenance     Health Maintenance   Topic Date Due   • Hepatitis B Vaccine (1 of 3 - Risk 3-dose series) Never done   • Influenza Vaccine (1) 09/01/2023   • HEMOGLOBIN A1C  11/13/2023   • BMI: Followup Plan  01/31/2024   • COVID-19 Vaccine (4 - Pfizer series) 10/27/2023 (Originally 1/4/2022)   • Kidney Health Evaluation: Albumin/Creatinine Ratio  01/31/2024   • Diabetic Foot Exam  02/05/2024   • Kidney Health Evaluation: GFR  07/21/2024   • Fall Risk  07/27/2024   • Depression Screening  07/27/2024   • Medicare Annual Wellness Visit (AWV)  07/27/2024   • Falls: Plan of Care  07/27/2024   • BMI: Adult  09/28/2024   • DM Eye Exam  09/01/2025   • Pneumococcal Vaccine: 65+ Years  Completed   • HIB Vaccine  Aged Out   • IPV Vaccine  Aged Out   • Hepatitis A Vaccine  Aged Out   • Meningococcal ACWY Vaccine  Aged Out   • HPV Vaccine  Aged Out   • Hepatitis C Screening  Discontinued   • Colorectal Cancer Screening  Discontinued     Immunization History   Administered Date(s) Administered   • COVID-19 PFIZER VACCINE 0.3 ML IM 03/29/2021, 04/21/2021, 11/09/2021   • INFLUENZA 10/27/2015, 10/01/2016, 10/13/2016, 12/19/2018   • Influenza Split High Dose Preservative Free IM 10/23/2014, 10/27/2015, 10/01/2016, 10/13/2016, 10/13/2016, 10/13/2016   • Influenza, high dose seasonal 0.7 mL 12/19/2018, 10/14/2019, 10/05/2021   • Pneumococcal Conjugate 13-Valent 08/25/2015, 01/19/2017, 10/14/2019   • Pneumococcal Polysaccharide PPV23 10/05/2021          ZAHRA Barajas

## 2023-10-25 ENCOUNTER — OFFICE VISIT (OUTPATIENT)
Dept: FAMILY MEDICINE CLINIC | Facility: CLINIC | Age: 78
End: 2023-10-25
Payer: COMMERCIAL

## 2023-10-25 VITALS
SYSTOLIC BLOOD PRESSURE: 133 MMHG | RESPIRATION RATE: 16 BRPM | HEIGHT: 68 IN | WEIGHT: 184.8 LBS | BODY MASS INDEX: 28.01 KG/M2 | HEART RATE: 63 BPM | DIASTOLIC BLOOD PRESSURE: 61 MMHG | TEMPERATURE: 96.5 F

## 2023-10-25 DIAGNOSIS — H61.22 IMPACTED CERUMEN OF LEFT EAR: Primary | ICD-10-CM

## 2023-10-25 PROCEDURE — 69210 REMOVE IMPACTED EAR WAX UNI: CPT | Performed by: NURSE PRACTITIONER

## 2023-10-25 PROCEDURE — 99213 OFFICE O/P EST LOW 20 MIN: CPT | Performed by: NURSE PRACTITIONER

## 2023-10-26 NOTE — PROGRESS NOTES
FAMILY PRACTICE OFFICE VISIT       NAME: Dianne Mirza  AGE: 69 y.o. SEX: male       : 1945        MRN: [id number]    DATE: 10/26/2023  TIME: 7:37 AM    Assessment and Plan   1. Impacted cerumen of left ear     Pt tolerated well              Chief Complaint     Chief Complaint   Patient presents with    Follow-up      ear clogged       History of Present Illness   Dianne Mirza is a 69y.o.-year-old male who is here for c/o unable to hear out of left ear   Hx of wax in ears  No pain      Review of Systems   Review of Systems   Constitutional:  Negative for fatigue and fever. HENT:  Positive for hearing loss. Negative for congestion, ear discharge and ear pain. Respiratory:  Negative for cough and shortness of breath. Cardiovascular:  Negative for chest pain and palpitations. Gastrointestinal:  Negative for constipation, diarrhea, nausea and vomiting. Musculoskeletal:  Negative for arthralgias and myalgias. Skin:  Negative for rash. Neurological:  Negative for dizziness, light-headedness and headaches. Hematological:  Negative for adenopathy. Psychiatric/Behavioral:  Negative for dysphoric mood and sleep disturbance. The patient is not nervous/anxious.         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[address])    Chronic constipation    Episodic cluster headache, not intractable    GERD (gastroesophageal reflux disease)    Nonintractable headache    Hypertension    Acute pain of both knees    Meningioma (HCC)    Varicose 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    Spondylolisthesis at L5-S1 level    Other chest pain    Renal cyst    History of hepatitis C    Scalp lesion    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HCC)    Primary osteoarthritis of both knees    Epigastric abdominal pain    Unstable angina (HCC)    Palpitations    Type 2 diabetes mellitus without complication, without long-term current use of insulin (HCC)    Abnormal liver enzymes    Acute pain of left shoulder    Blurry vision, bilateral    Right upper quadrant abdominal pain    Other constipation    Urinary retention         Past Medical History:  Past Medical History:   Diagnosis Date    Brain mass     Last Assessed; 11/5/2015    Chest pain     Last Assessed: 1/22/2015    Diabetes type 2, uncontrolled     Last Assessed: 3/1/2016    Edema of left lower extremity     Last Assessed: 3/22/2017    GERD (gastroesophageal reflux disease)     Hyperlipidemia     Hypertension     Impaired fasting glucose     Last Assessed: 11/10/2015    Irregular heartbeat     Skin cancer (melanoma) ([address])     Scalp     Varicose 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     Last Assessed: 3/22/2017       Past Surgical History:  Past Surgical History:   Procedure Laterality Date    COLONOSCOPY      COLONOSCOPY N/A 7/10/2018    Procedure: COLONOSCOPY;  Surgeon: Heather Peres MD;  Location: [facility];   Service: Colorectal    INGUINAL HERNIA REPAIR      20+ years ago - [address]; Last Assessed: 10/23/2014    TONSILLECTOMY      VARICOSE VEIN SURGERY Left     x2 left leg - 40+ yrs ago, [address] and [place]       Family History:  Family History   Problem Relation Age of Onset    No Known Problems Mother     No Known Problems Father     Melanoma Daughter 16       Social History:  Social History     Socioeconomic History    Marital status: /Civil Union     Spouse name: Not on file    Number of children: 5    Years of education: Not on file    Highest education level: Not on file   Occupational History    Occupation: Retired: Construction   Tobacco Use    Smoking status: Never    Smokeless tobacco: Never   Vaping Use    Vaping Use: Never used   Substance and Sexual Activity    Alcohol use: No    Drug use: No    Sexual activity: Not Currently     Partners: Female   Other Topics Concern    Not on file   Social History Narrative    Uses safety equipment         Social Determinants of Health     Financial Resource Strain: Low Risk  (7/27/2023)    Overall Financial Resource Strain (CARDIA)     Difficulty of Paying Living Expenses: Not very hard   Food Insecurity: Not on file   Transportation Needs: No Transportation Needs (7/27/2023)    PRAPARE - Transportation     Lack of Transportation (Medical): No     Lack of Transportation (Non-Medical): No   Physical Activity: Not on file   Stress: Not on file   Social Connections: Not on file   Intimate Partner Violence: Not on file   Housing Stability: Not on file       Objective     Vitals:    10/25/23 1300   BP: 133/61   Pulse: 63   Resp: 16   Temp: (!) 96.5 °F (35.8 °C)     Wt Readings from Last 3 Encounters:   10/25/23 83.8 kg (184 lb 12.8 oz)   09/28/23 85.5 kg (188 lb 9.6 oz)   08/24/23 85.5 kg (188 lb 6.4 oz)       Physical Exam  Vitals and nursing note reviewed. Constitutional:       Appearance: Normal appearance. HENT:      Head: Normocephalic and atraumatic. Right Ear: Tympanic membrane, ear canal and external ear normal.      Left Ear: Tympanic membrane and external ear normal. There is impacted cerumen. Cardiovascular:      Rate and Rhythm: Normal rate and regular rhythm. Heart sounds: Normal heart sounds. Pulmonary:      Effort: Pulmonary effort is normal.      Breath sounds: Normal breath sounds. Musculoskeletal:         General: Normal range of motion. Cervical back: Normal range of motion. Skin:     General: Skin is warm and dry. Capillary Refill: Capillary refill takes less than 2 seconds. Neurological:      Mental Status: He is alert and oriented to person, place, and time.    Psychiatric:         Mood and Affect: Mood normal.         Behavior: Behavior normal.         Pertinent Laboratory/Diagnostic Studies:  Lab Results   Component Value Date    GLUCOSE 121 10/28/2015    BUN 17 07/21/2023    CREATININE 0.79 07/21/2023    CALCIUM 9.4 07/21/2023     10/28/2015    K 3.9 07/21/2023    CO2 27 07/21/2023     (H) 07/21/2023 Lab Results   Component Value Date    ALT 23 07/21/2023    AST 24 07/21/2023    GGT 12 12/31/2022    ALKPHOS 127 (H) 07/21/2023    BILITOT 0.53 08/27/2015       Lab Results   Component Value Date    WBC 8.69 07/21/2023    HGB 13.9 07/21/2023    HCT 41.9 07/21/2023    MCV 84 07/21/2023     07/21/2023       No results found for: "TSH"    Lab Results   Component Value Date    CHOL 154 08/27/2015     Lab Results   Component Value Date    TRIG 92 05/13/2023     Lab Results   Component Value Date    HDL 32 (L) 05/13/2023     Lab Results   Component Value Date    LDLCALC 65 05/13/2023     Lab Results   Component Value Date    HGBA1C 6.6 (A) 09/28/2023       Results for orders placed or performed in visit on 09/28/23   POCT hemoglobin A1c   Result Value Ref Range    Hemoglobin A1C 6.6 (A) 6.5       Orders Placed This Encounter   Procedures    Ear cerumen removal       ALLERGIES:  No Known Allergies    Current Medications     Current Outpatient Medications   Medication Sig Dispense Refill    acetaminophen (TYLENOL) 325 mg tablet Take 2 tablets (650 mg total) by mouth every 6 (six) hours as needed for mild pain or fever 30 tablet 0    amLODIPine (NORVASC) 5 mg tablet TAKE 1 TABLET (5 MG TOTAL) BY MOUTH DAILY. 90 tablet 0    aspirin 81 MG tablet Take 81 mg by mouth daily      atorvastatin (LIPITOR) 10 mg tablet TAKE 1 TABLET BY MOUTH EVERY DAY 90 tablet 0    Blood Glucose Monitoring Suppl (OneTouch Verio Reflect) w/Device KIT Check blood sugars once daily. Please substitute with appropriate alternative as covered by patient's insurance.  Dx: E11.65 1 kit 0    clindamycin (CLEOCIN) 150 mg capsule TAKE 2 CAPSULES BY MOUTH EVERY 8 HOURS UNTIL GONE      docusate sodium (COLACE) 100 mg capsule Take 1 capsule (100 mg total) by mouth in the morning 30 capsule 1    gabapentin (Neurontin) 100 mg capsule Take 1 capsule (100 mg total) by mouth daily at bedtime 30 capsule 2    glucose blood (OneTouch Verio) test strip Check blood sugars once daily. Please substitute with appropriate alternative as covered by patient's insurance. Dx: E11.65 100 each 3    Lancets (OneTouch Delica Plus [id number]) MISC TEST DAILY, K25.4--YR CONFIMED DELICA 33 G 330 each 11    lisinopril (ZESTRIL) 20 mg tablet TAKE 1 TABLET BY MOUTH EVERY DAY 90 tablet 0    metFORMIN (GLUCOPHAGE) 500 mg tablet Take 1 tablet (500 mg total) by mouth 2 (two) times a day 180 tablet 0    methocarbamol (ROBAXIN) 500 mg tablet Take 1 tablet (500 mg total) by mouth daily at bedtime 30 tablet 0    omeprazole (PriLOSEC) 40 MG capsule TAKE 1 CAPSULE (40 MG TOTAL) BY MOUTH DAILY. 90 capsule 0    OneTouch Delica Lancets 63M MISC Check blood sugars once daily. Please substitute with appropriate alternative as covered by patient's insurance.  Dx: E11.65 100 each 3    tamsulosin (FLOMAX) 0.4 mg Take 1 capsule (0.4 mg total) by mouth daily with dinner 30 capsule 1    amoxicillin (AMOXIL) 500 mg capsule  (Patient not taking: Reported on 7/18/2022)      bisacodyl (DULCOLAX) 5 mg EC tablet Take 2 tablets (10 mg total) by mouth once for 1 dose 2 tablet 0    calcium carbonate (OS-ANGELIKA) 600 MG tablet Take 1 tablet (600 mg total) by mouth daily (Patient not taking: Reported on 6/12/2023) 30 tablet 3    colchicine (COLCRYS) 0.6 mg tablet Take 1 tablet (0.6 mg total) by mouth daily (Patient not taking: Reported on 7/27/2023) 90 tablet 0    diclofenac sodium (VOLTAREN) 1 % Apply 2 g topically 4 (four) times a day (Patient not taking: Reported on 6/12/2023) 150 g 1    famotidine (PEPCID) 20 mg tablet TAKE 1 TABLET BY MOUTH TWICE A DAY (Patient not taking: Reported on 7/27/2023) 180 tablet 1    hydrocortisone 2.5 % cream APPLY TO AFFECTED AREA TWICE A DAY (Patient not taking: Reported on 6/12/2023) 28.35 g 0    linaCLOtide (Linzess) 290 MCG CAPS Take 1 capsule by mouth in the morning 30 capsule 2    MELATONIN PO Take 10 mg by mouth daily at bedtime (Patient not taking: Reported on 10/25/2023) methylPREDNISolone 4 MG tablet therapy pack Use as directed on package (Patient not taking: Reported on 9/28/2023) 21 each 0     No current facility-administered medications for this visit. Health Maintenance     Health Maintenance   Topic Date Due    Influenza Vaccine (1) 09/01/2023    BMI: Followup Plan  01/31/2024    COVID-19 Vaccine (4 - Pfizer series) 10/27/2023 (Originally 1/4/2022)    Hepatitis B Vaccine (1 of 3 - Risk 3-dose series) 11/26/2023 (Originally 7/11/2006)    Kidney Health Evaluation: Albumin/Creatinine Ratio  01/31/2024    Diabetic Foot Exam  02/05/2024    HEMOGLOBIN A1C  03/28/2024    Kidney Health Evaluation: GFR  07/21/2024    Fall Risk  07/27/2024    Depression Screening  07/27/2024    Medicare Annual Wellness Visit (AWV)  07/27/2024    Falls: Plan of Care  07/27/2024    BMI: Adult  10/25/2024    DM Eye Exam  09/01/2025    Pneumococcal Vaccine: 65+ Years  Completed    HIB Vaccine  Aged Out    IPV Vaccine  Aged Out    Hepatitis A Vaccine  Aged Out    Meningococcal ACWY Vaccine  Aged Out    HPV Vaccine  Aged Out    Hepatitis C Screening  Discontinued    Colorectal Cancer Screening  Discontinued     Immunization History   Administered Date(s) Administered    COVID-19 PFIZER VACCINE 0.3 ML IM 03/29/2021, 04/21/2021, 11/09/2021    INFLUENZA 10/27/2015, 10/01/2016, 10/13/2016, 12/19/2018    Influenza Split High Dose Preservative Free IM 10/23/2014, 10/27/2015, 10/01/2016, 10/13/2016, 10/13/2016, 10/13/2016    Influenza, high dose seasonal 0.7 mL 12/19/2018, 10/14/2019, 10/05/2021    Pneumococcal Conjugate 13-Valent 08/25/2015, 01/19/2017, 10/14/2019    Pneumococcal Polysaccharide PPV23 10/05/2021      Ear cerumen removal    Date/Time: 10/25/2023 2:30 PM    Performed by: ZAHRA Johnston  Authorized by: ZAHRA Johnston  Universal Protocol:  Consent: Verbal consent obtained. Written consent not obtained.   Risks and benefits: risks, benefits and alternatives were discussed  Consent given by: patient  Patient understanding: patient states understanding of the procedure being performed  Patient consent: the patient's understanding of the procedure matches consent given    Patient location:  Clinic  Procedure details:     Local anesthetic:  None    Location:  L ear    Procedure type: curette      Approach:  Natural orifice  Post-procedure details:     Complication:  None    Hearing quality:  Improved    Patient tolerance of procedure:   Tolerated well, no immediate complications       ZAHRA Hartley

## 2023-11-02 DIAGNOSIS — E11.9 TYPE 2 DIABETES MELLITUS WITHOUT COMPLICATION, WITHOUT LONG-TERM CURRENT USE OF INSULIN (HCC): ICD-10-CM

## 2023-11-02 DIAGNOSIS — I10 ESSENTIAL HYPERTENSION: ICD-10-CM

## 2023-11-02 DIAGNOSIS — R21 RASH: ICD-10-CM

## 2023-11-02 DIAGNOSIS — E78.49 OTHER HYPERLIPIDEMIA: ICD-10-CM

## 2023-11-02 DIAGNOSIS — K21.9 GASTROESOPHAGEAL REFLUX DISEASE WITHOUT ESOPHAGITIS: ICD-10-CM

## 2023-11-02 DIAGNOSIS — R07.89 OTHER CHEST PAIN: ICD-10-CM

## 2023-11-02 DIAGNOSIS — M54.2 CERVICALGIA: ICD-10-CM

## 2023-11-02 RX ORDER — AMLODIPINE BESYLATE 5 MG/1
5 TABLET ORAL DAILY
Qty: 90 TABLET | Refills: 0 | Status: SHIPPED | OUTPATIENT
Start: 2023-11-02

## 2023-11-02 RX ORDER — LISINOPRIL 20 MG/1
20 TABLET ORAL DAILY
Qty: 90 TABLET | Refills: 0 | Status: SHIPPED | OUTPATIENT
Start: 2023-11-02

## 2023-11-02 RX ORDER — OMEPRAZOLE 40 MG/1
40 CAPSULE, DELAYED RELEASE ORAL DAILY
Qty: 90 CAPSULE | Refills: 1 | Status: SHIPPED | OUTPATIENT
Start: 2023-11-02

## 2023-11-02 RX ORDER — COLCHICINE 0.6 MG/1
0.6 TABLET ORAL DAILY
Qty: 90 TABLET | Refills: 0 | Status: SHIPPED | OUTPATIENT
Start: 2023-11-02

## 2023-11-02 RX ORDER — ATORVASTATIN CALCIUM 10 MG/1
10 TABLET, FILM COATED ORAL DAILY
Qty: 90 TABLET | Refills: 1 | Status: SHIPPED | OUTPATIENT
Start: 2023-11-02

## 2023-11-03 RX ORDER — METHOCARBAMOL 500 MG/1
500 TABLET, FILM COATED ORAL
Qty: 30 TABLET | Refills: 0 | Status: SHIPPED | OUTPATIENT
Start: 2023-11-03

## 2023-11-30 DIAGNOSIS — K59.09 CHRONIC CONSTIPATION: ICD-10-CM

## 2023-11-30 RX ORDER — DOCUSATE SODIUM 100 MG/1
100 CAPSULE, LIQUID FILLED ORAL DAILY
Qty: 90 CAPSULE | Refills: 1 | Status: SHIPPED | OUTPATIENT
Start: 2023-11-30

## 2023-12-23 DIAGNOSIS — K59.09 CHRONIC CONSTIPATION: ICD-10-CM

## 2023-12-26 RX ORDER — DOCUSATE SODIUM 100 MG/1
100 CAPSULE, LIQUID FILLED ORAL DAILY
Qty: 90 CAPSULE | Refills: 1 | Status: SHIPPED | OUTPATIENT
Start: 2023-12-26

## 2024-01-07 DIAGNOSIS — K21.9 GASTROESOPHAGEAL REFLUX DISEASE WITHOUT ESOPHAGITIS: ICD-10-CM

## 2024-01-08 RX ORDER — FAMOTIDINE 20 MG/1
TABLET, FILM COATED ORAL
Qty: 180 TABLET | Refills: 1 | Status: SHIPPED | OUTPATIENT
Start: 2024-01-08

## 2024-01-16 DIAGNOSIS — K59.09 CHRONIC CONSTIPATION: Primary | ICD-10-CM

## 2024-01-16 RX ORDER — LACTULOSE 10 G/15ML
SOLUTION ORAL
Qty: 100 ML | Refills: 0 | Status: ON HOLD | OUTPATIENT
Start: 2024-01-16 | End: 2024-01-19

## 2024-01-18 ENCOUNTER — HOSPITAL ENCOUNTER (OUTPATIENT)
Facility: HOSPITAL | Age: 79
Setting detail: OBSERVATION
Discharge: HOME | End: 2024-01-19
Attending: EMERGENCY MEDICINE | Admitting: INTERNAL MEDICINE
Payer: COMMERCIAL

## 2024-01-18 ENCOUNTER — APPOINTMENT (EMERGENCY)
Dept: RADIOLOGY | Facility: HOSPITAL | Age: 79
End: 2024-01-18
Payer: COMMERCIAL

## 2024-01-18 DIAGNOSIS — K59.09 CHRONIC CONSTIPATION: Primary | ICD-10-CM

## 2024-01-18 DIAGNOSIS — R33.9 URINARY RETENTION: ICD-10-CM

## 2024-01-18 LAB
BASOPHILS # BLD AUTO: 0.01 THOUSANDS/ÂΜL (ref 0–0.1)
BASOPHILS NFR BLD AUTO: 0 % (ref 0–1)
BILIRUB UR QL STRIP: NEGATIVE
CLARITY UR: CLEAR
COLOR UR: NORMAL
EOSINOPHIL # BLD AUTO: 0.06 THOUSAND/ÂΜL (ref 0–0.61)
EOSINOPHIL NFR BLD AUTO: 1 % (ref 0–6)
ERYTHROCYTE [DISTWIDTH] IN BLOOD BY AUTOMATED COUNT: 13.6 % (ref 11.6–15.1)
GLUCOSE UR STRIP-MCNC: NEGATIVE MG/DL
HCT VFR BLD AUTO: 37.6 % (ref 36.5–49.3)
HGB BLD-MCNC: 12.1 G/DL (ref 12–17)
HGB UR QL STRIP.AUTO: NEGATIVE
IMM GRANULOCYTES # BLD AUTO: 0.03 THOUSAND/UL (ref 0–0.2)
IMM GRANULOCYTES NFR BLD AUTO: 1 % (ref 0–2)
KETONES UR STRIP-MCNC: NEGATIVE MG/DL
LACTATE SERPL-SCNC: 1.3 MMOL/L (ref 0.5–2)
LEUKOCYTE ESTERASE UR QL STRIP: NEGATIVE
LIPASE SERPL-CCNC: <6 U/L (ref 11–82)
LYMPHOCYTES # BLD AUTO: 1.05 THOUSANDS/ÂΜL (ref 0.6–4.47)
LYMPHOCYTES NFR BLD AUTO: 17 % (ref 14–44)
MCH RBC QN AUTO: 28.1 PG (ref 26.8–34.3)
MCHC RBC AUTO-ENTMCNC: 32.2 G/DL (ref 31.4–37.4)
MCV RBC AUTO: 87 FL (ref 82–98)
MONOCYTES # BLD AUTO: 0.6 THOUSAND/ÂΜL (ref 0.17–1.22)
MONOCYTES NFR BLD AUTO: 9 % (ref 4–12)
NEUTROPHILS # BLD AUTO: 4.6 THOUSANDS/ÂΜL (ref 1.85–7.62)
NEUTS SEG NFR BLD AUTO: 72 % (ref 43–75)
NITRITE UR QL STRIP: NEGATIVE
NRBC BLD AUTO-RTO: 0 /100 WBCS
PH UR STRIP.AUTO: 5.5 [PH]
PLATELET # BLD AUTO: 153 THOUSANDS/UL (ref 149–390)
PMV BLD AUTO: 9.5 FL (ref 8.9–12.7)
PROT UR STRIP-MCNC: NEGATIVE MG/DL
RBC # BLD AUTO: 4.3 MILLION/UL (ref 3.88–5.62)
SP GR UR STRIP.AUTO: 1.01 (ref 1–1.03)
UROBILINOGEN UR STRIP-ACNC: <2 MG/DL
WBC # BLD AUTO: 6.35 THOUSAND/UL (ref 4.31–10.16)

## 2024-01-18 PROCEDURE — 96374 THER/PROPH/DIAG INJ IV PUSH: CPT

## 2024-01-18 PROCEDURE — 83690 ASSAY OF LIPASE: CPT | Performed by: PHYSICIAN ASSISTANT

## 2024-01-18 PROCEDURE — 80053 COMPREHEN METABOLIC PANEL: CPT | Performed by: PHYSICIAN ASSISTANT

## 2024-01-18 PROCEDURE — 36415 COLL VENOUS BLD VENIPUNCTURE: CPT | Performed by: PHYSICIAN ASSISTANT

## 2024-01-18 PROCEDURE — 99284 EMERGENCY DEPT VISIT MOD MDM: CPT

## 2024-01-18 PROCEDURE — 51798 US URINE CAPACITY MEASURE: CPT

## 2024-01-18 PROCEDURE — 96361 HYDRATE IV INFUSION ADD-ON: CPT

## 2024-01-18 PROCEDURE — 99285 EMERGENCY DEPT VISIT HI MDM: CPT | Performed by: PHYSICIAN ASSISTANT

## 2024-01-18 PROCEDURE — 85025 COMPLETE CBC W/AUTO DIFF WBC: CPT | Performed by: PHYSICIAN ASSISTANT

## 2024-01-18 PROCEDURE — 81003 URINALYSIS AUTO W/O SCOPE: CPT | Performed by: PHYSICIAN ASSISTANT

## 2024-01-18 PROCEDURE — 74177 CT ABD & PELVIS W/CONTRAST: CPT

## 2024-01-18 PROCEDURE — 83605 ASSAY OF LACTIC ACID: CPT | Performed by: PHYSICIAN ASSISTANT

## 2024-01-18 PROCEDURE — 99223 1ST HOSP IP/OBS HIGH 75: CPT | Performed by: INTERNAL MEDICINE

## 2024-01-18 RX ORDER — FAMOTIDINE 20 MG/1
20 TABLET, FILM COATED ORAL 2 TIMES DAILY
Status: DISCONTINUED | OUTPATIENT
Start: 2024-01-18 | End: 2024-01-19 | Stop reason: HOSPADM

## 2024-01-18 RX ORDER — POLYETHYLENE GLYCOL 3350 17 G/17G
17 POWDER, FOR SOLUTION ORAL DAILY
Qty: 20 EACH | Refills: 0 | Status: SHIPPED | OUTPATIENT
Start: 2024-01-18

## 2024-01-18 RX ORDER — ASPIRIN 81 MG/1
81 TABLET, CHEWABLE ORAL DAILY
Status: DISCONTINUED | OUTPATIENT
Start: 2024-01-19 | End: 2024-01-19 | Stop reason: HOSPADM

## 2024-01-18 RX ORDER — AMOXICILLIN 250 MG
2 CAPSULE ORAL DAILY
Qty: 20 TABLET | Refills: 0 | Status: SHIPPED | OUTPATIENT
Start: 2024-01-18

## 2024-01-18 RX ORDER — AMLODIPINE BESYLATE 5 MG/1
5 TABLET ORAL DAILY
Status: DISCONTINUED | OUTPATIENT
Start: 2024-01-19 | End: 2024-01-19 | Stop reason: HOSPADM

## 2024-01-18 RX ORDER — ACETAMINOPHEN 325 MG/1
650 TABLET ORAL EVERY 6 HOURS PRN
Status: DISCONTINUED | OUTPATIENT
Start: 2024-01-18 | End: 2024-01-19 | Stop reason: HOSPADM

## 2024-01-18 RX ORDER — ASPIRIN 81 MG/1
81 TABLET, CHEWABLE ORAL DAILY
Status: DISCONTINUED | OUTPATIENT
Start: 2024-01-19 | End: 2024-01-18

## 2024-01-18 RX ORDER — PANTOPRAZOLE SODIUM 40 MG/1
40 TABLET, DELAYED RELEASE ORAL
Status: DISCONTINUED | OUTPATIENT
Start: 2024-01-19 | End: 2024-01-19 | Stop reason: HOSPADM

## 2024-01-18 RX ORDER — POLYETHYLENE GLYCOL 3350 17 G/17G
17 POWDER, FOR SOLUTION ORAL ONCE
Status: COMPLETED | OUTPATIENT
Start: 2024-01-18 | End: 2024-01-18

## 2024-01-18 RX ORDER — METHOCARBAMOL 500 MG/1
500 TABLET, FILM COATED ORAL
Status: DISCONTINUED | OUTPATIENT
Start: 2024-01-18 | End: 2024-01-19 | Stop reason: HOSPADM

## 2024-01-18 RX ORDER — KETOROLAC TROMETHAMINE 30 MG/ML
15 INJECTION, SOLUTION INTRAMUSCULAR; INTRAVENOUS ONCE
Status: COMPLETED | OUTPATIENT
Start: 2024-01-18 | End: 2024-01-18

## 2024-01-18 RX ORDER — ATORVASTATIN CALCIUM 10 MG/1
10 TABLET, FILM COATED ORAL DAILY
Status: DISCONTINUED | OUTPATIENT
Start: 2024-01-19 | End: 2024-01-19 | Stop reason: HOSPADM

## 2024-01-18 RX ORDER — SODIUM CHLORIDE, SODIUM LACTATE, POTASSIUM CHLORIDE, CALCIUM CHLORIDE 600; 310; 30; 20 MG/100ML; MG/100ML; MG/100ML; MG/100ML
100 INJECTION, SOLUTION INTRAVENOUS CONTINUOUS
Status: DISCONTINUED | OUTPATIENT
Start: 2024-01-18 | End: 2024-01-19

## 2024-01-18 RX ORDER — GABAPENTIN 100 MG/1
100 CAPSULE ORAL
Status: DISCONTINUED | OUTPATIENT
Start: 2024-01-18 | End: 2024-01-19 | Stop reason: HOSPADM

## 2024-01-18 RX ORDER — ENEMA 19; 7 G/133ML; G/133ML
1 ENEMA RECTAL ONCE
Status: COMPLETED | OUTPATIENT
Start: 2024-01-18 | End: 2024-01-18

## 2024-01-18 RX ORDER — LISINOPRIL 20 MG/1
20 TABLET ORAL DAILY
Status: DISCONTINUED | OUTPATIENT
Start: 2024-01-19 | End: 2024-01-19 | Stop reason: HOSPADM

## 2024-01-18 RX ORDER — TAMSULOSIN HYDROCHLORIDE 0.4 MG/1
0.4 CAPSULE ORAL
Status: DISCONTINUED | OUTPATIENT
Start: 2024-01-19 | End: 2024-01-19 | Stop reason: HOSPADM

## 2024-01-18 RX ADMIN — METHOCARBAMOL 500 MG: 500 TABLET ORAL at 21:02

## 2024-01-18 RX ADMIN — POLYETHYLENE GLYCOL 3350, SODIUM SULFATE ANHYDROUS, SODIUM BICARBONATE, SODIUM CHLORIDE, POTASSIUM CHLORIDE 4000 ML: 236; 22.74; 6.74; 5.86; 2.97 POWDER, FOR SOLUTION ORAL at 18:41

## 2024-01-18 RX ADMIN — SODIUM CHLORIDE, SODIUM LACTATE, POTASSIUM CHLORIDE, AND CALCIUM CHLORIDE 100 ML/HR: .6; .31; .03; .02 INJECTION, SOLUTION INTRAVENOUS at 21:05

## 2024-01-18 RX ADMIN — SODIUM PHOSPHATE, DIBASIC AND SODIUM PHOSPHATE, MONOBASIC 1 ENEMA: 7; 19 ENEMA RECTAL at 17:02

## 2024-01-18 RX ADMIN — KETOROLAC TROMETHAMINE 15 MG: 30 INJECTION, SOLUTION INTRAMUSCULAR; INTRAVENOUS at 12:08

## 2024-01-18 RX ADMIN — IOHEXOL 100 ML: 350 INJECTION, SOLUTION INTRAVENOUS at 13:38

## 2024-01-18 RX ADMIN — FAMOTIDINE 20 MG: 20 TABLET, FILM COATED ORAL at 21:02

## 2024-01-18 RX ADMIN — SODIUM CHLORIDE 1000 ML: 0.9 INJECTION, SOLUTION INTRAVENOUS at 12:09

## 2024-01-18 RX ADMIN — GABAPENTIN 100 MG: 100 CAPSULE ORAL at 21:02

## 2024-01-18 RX ADMIN — POLYETHYLENE GLYCOL 3350 17 G: 17 POWDER, FOR SOLUTION ORAL at 17:03

## 2024-01-18 NOTE — ED PROVIDER NOTES
History  Chief Complaint   Patient presents with    Constipation     Has not had bowel movement since Sunday. -N/V/abd pain     Patient is a 78-year-old male with a PMHx of chronic constipation, DM2, GERD, HLD and HTN, presenting to the ED for evaluation of constipation and abdominal pain x4 days. Patient's daughter is at bedside translating per patient's request. Patient states that he has not had a bowel movement for the past 4 days and is now experiencing generalized abdominal pain. He denies any fevers, chest pain, SOB, nausea or vomiting. Patient also states that he was not able to urinate for the past 24 hours; however, he was able to urinate a large amount upon arrival to the ED. He reports burning with urination but denies any hematuria or flank pain.  He denies any urinary incontinence, saddle anesthesia or numbness/weakness in lower extremities.  Patient's daughter states that he has a history of urinary retention requiring Pryor catheter placement in July 2023.  He was evaluated by urology who felt that his urinary retention was secondary to severe constipation.         Prior to Admission Medications   Prescriptions Last Dose Informant Patient Reported? Taking?   Blood Glucose Monitoring Suppl (OneTouch Verio Reflect) w/Device KIT  Child No No   Sig: Check blood sugars once daily. Please substitute with appropriate alternative as covered by patient's insurance. Dx: E11.65   Lancets (OneTouch Delica Plus [id number]) MISC  Child No No   Sig: TEST DAILY, E11.5--PT CONFIMED DELICA 33 G   MELATONIN PO  Child Yes No   Sig: Take 10 mg by mouth daily at bedtime   Patient not taking: Reported on 10/25/2023   OneTouch Delica Lancets 33G MISC  Child No No   Sig: Check blood sugars once daily. Please substitute with appropriate alternative as covered by patient's insurance. Dx: E11.65   acetaminophen (TYLENOL) 325 mg tablet  Child No No   Sig: Take 2 tablets (650 mg total) by mouth every 6 (six) hours as needed for  mild pain or fever   amLODIPine (NORVASC) 5 mg tablet   No No   Sig: TAKE 1 TABLET (5 MG TOTAL) BY MOUTH DAILY.   amoxicillin (AMOXIL) 500 mg capsule  Child Yes No   Patient not taking: Reported on 7/18/2022   aspirin 81 MG tablet  Child Yes No   Sig: Take 81 mg by mouth daily   atorvastatin (LIPITOR) 10 mg tablet   No No   Sig: TAKE 1 TABLET BY MOUTH EVERY DAY   bisacodyl (DULCOLAX) 5 mg EC tablet   No No   Sig: Take 2 tablets (10 mg total) by mouth once for 1 dose   calcium carbonate (OS-ANGELIKA) 600 MG tablet  Child No No   Sig: Take 1 tablet (600 mg total) by mouth daily   Patient not taking: Reported on 6/12/2023   clindamycin (CLEOCIN) 150 mg capsule   Yes No   Sig: TAKE 2 CAPSULES BY MOUTH EVERY 8 HOURS UNTIL GONE   colchicine (COLCRYS) 0.6 mg tablet   No No   Sig: TAKE 1 TABLET BY MOUTH EVERY DAY   diclofenac sodium (VOLTAREN) 1 %  Child No No   Sig: Apply 2 g topically 4 (four) times a day   Patient not taking: Reported on 6/12/2023   docusate sodium (COLACE) 100 mg capsule   No No   Sig: TAKE 1 CAPSULE (100 MG TOTAL) BY MOUTH IN THE MORNING   famotidine (PEPCID) 20 mg tablet   No No   Sig: TAKE 1 TABLET BY MOUTH TWICE A DAY   gabapentin (Neurontin) 100 mg capsule  Child No No   Sig: Take 1 capsule (100 mg total) by mouth daily at bedtime   glucose blood (OneTouch Verio) test strip  Child No No   Sig: Check blood sugars once daily. Please substitute with appropriate alternative as covered by patient's insurance. Dx: E11.65   hydrocortisone 2.5 % cream   No No   Sig: APPLY TO AFFECTED AREA TWICE A DAY   lactulose (CHRONULAC) 10 g/15 mL solution   No No   Sig: Daily prn for severe constipation   linaCLOtide (Linzess) 290 MCG CAPS   No No   Sig: Take 1 capsule by mouth in the morning   lisinopril (ZESTRIL) 20 mg tablet   No No   Sig: TAKE 1 TABLET BY MOUTH EVERY DAY   metFORMIN (GLUCOPHAGE) 500 mg tablet   No No   Sig: TAKE 1 TABLET BY MOUTH TWICE A DAY   methocarbamol (ROBAXIN) 500 mg tablet   No No   Sig: TAKE 1  TABLET (500 MG TOTAL) BY MOUTH DAILY AT BEDTIME   methylPREDNISolone 4 MG tablet therapy pack   No No   Sig: Use as directed on package   Patient not taking: Reported on 9/28/2023   omeprazole (PriLOSEC) 40 MG capsule   No No   Sig: TAKE 1 CAPSULE (40 MG TOTAL) BY MOUTH DAILY.   tamsulosin (FLOMAX) 0.4 mg   No No   Sig: Take 1 capsule (0.4 mg total) by mouth daily with dinner      Facility-Administered Medications: None       Past Medical History:   Diagnosis Date    Brain mass     Last Assessed; 11/5/2015    Chest pain     Last Assessed: 1/22/2015    Diabetes type 2, uncontrolled     Last Assessed: 3/1/2016    Edema of left lower extremity     Last Assessed: 3/22/2017    GERD (gastroesophageal reflux disease)     Hyperlipidemia     Hypertension     Impaired fasting glucose     Last Assessed: 11/10/2015    Irregular heartbeat     Skin cancer (melanoma) (HCC)     Scalp     Varicose 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     Last Assessed: 3/22/2017       Past Surgical History:   Procedure Laterality Date    COLONOSCOPY      COLONOSCOPY N/A 7/10/2018    Procedure: COLONOSCOPY;  Surgeon: Jose Alejandro Rodrigues MD;  Location: [facility];  Service: Colorectal    INGUINAL HERNIA REPAIR      20+ years ago - [place]; Last Assessed: 10/23/2014    TONSILLECTOMY      VARICOSE VEIN SURGERY Left     x2 left leg - 40+ yrs ago, [place] and [place]       Family History   Problem Relation Age of Onset    No Known Problems Mother     No Known Problems Father     Melanoma Daughter 17     I have reviewed and agree with the history as documented.    E-Cigarette/Vaping    E-Cigarette Use Never User      E-Cigarette/Vaping Substances    Nicotine No     THC No     CBD No     Flavoring No     Other No     Unknown No      Social History     Tobacco Use    Smoking status: Never    Smokeless tobacco: Never   Vaping Use    Vaping status: Never Used   Substance Use Topics    Alcohol use: No    Drug use: No       Review of Systems   Constitutional:  Positive  for appetite change. Negative for chills and fever.   HENT:  Negative for congestion, rhinorrhea and sore throat.    Respiratory:  Negative for cough and shortness of breath.    Cardiovascular:  Negative for chest pain.   Gastrointestinal:  Positive for abdominal pain and constipation. Negative for diarrhea, nausea and vomiting.   Genitourinary:  Positive for decreased urine volume, difficulty urinating and dysuria. Negative for flank pain, frequency, hematuria, penile pain and testicular pain.   Musculoskeletal:  Negative for back pain and neck pain.   Skin:  Negative for rash.   Neurological:  Negative for dizziness, syncope, weakness, numbness and headaches.   All other systems reviewed and are negative.      Physical Exam  Physical Exam  Vitals and nursing note reviewed. Exam conducted with a chaperone present.   Constitutional:       General: He is awake. He is not in acute distress.     Appearance: Normal appearance. He is well-developed. He is not ill-appearing or diaphoretic.   HENT:      Head: Normocephalic and atraumatic.      Right Ear: External ear normal.      Left Ear: External ear normal.      Nose: Nose normal.      Mouth/Throat:      Lips: Pink.      Mouth: Mucous membranes are moist.   Eyes:      General: Lids are normal. No scleral icterus.     Conjunctiva/sclera: Conjunctivae normal.      Pupils: Pupils are equal, round, and reactive to light.   Cardiovascular:      Rate and Rhythm: Normal rate and regular rhythm.      Pulses: Normal pulses.           Radial pulses are 2+ on the right side and 2+ on the left side.      Heart sounds: Normal heart sounds, S1 normal and S2 normal.   Pulmonary:      Effort: Pulmonary effort is normal. No accessory muscle usage.      Breath sounds: Normal breath sounds. No stridor. No decreased breath sounds, wheezing, rhonchi or rales.   Abdominal:      General: Abdomen is flat. Bowel sounds are normal. There is no distension.      Palpations: Abdomen is soft.       Tenderness: There is generalized abdominal tenderness and tenderness in the suprapubic area. There is no right CVA tenderness, left CVA tenderness, guarding or rebound.      Comments: Generalized tenderness, primarily in the lower abdomen and suprapubic region.  No rebound tenderness, guarding or rigidity.  Bowel sounds present throughout.   Genitourinary:     Penis: Normal and circumcised.       Comments: No stool/fecal impaction in the distal rectal vault. No evidence of hemorrhoids.   Musculoskeletal:      Cervical back: Full passive range of motion without pain, normal range of motion and neck supple. No signs of trauma. No pain with movement. Normal range of motion.      Right lower leg: No edema.      Left lower leg: No edema.   Lymphadenopathy:      Cervical: No cervical adenopathy.   Skin:     General: Skin is warm and dry.      Capillary Refill: Capillary refill takes less than 2 seconds.      Coloration: Skin is not cyanotic, jaundiced or pale.   Neurological:      Mental Status: He is alert and oriented to person, place, and time.      GCS: GCS eye subscore is 4. GCS verbal subscore is 5. GCS motor subscore is 6.      Cranial Nerves: No dysarthria or facial asymmetry.      Gait: Gait normal.   Psychiatric:         Attention and Perception: Attention normal.         Mood and Affect: Mood normal.         Speech: Speech normal.         Behavior: Behavior normal. Behavior is cooperative.         Vital Signs  ED Triage Vitals   Temperature Pulse Respirations Blood Pressure SpO2   01/18/24 1149 01/18/24 1117 01/18/24 1117 01/18/24 1117 01/18/24 1117   97.7 °F (36.5 °C) 81 18 142/83 100 %      Temp Source Heart Rate Source Patient Position - Orthostatic VS BP Location FiO2 (%)   01/18/24 1149 01/18/24 1117 01/18/24 1751 01/18/24 1117 --   Oral Monitor Lying Right arm       Pain Score       01/18/24 1117       No Pain           Vitals:    01/18/24 1751 01/18/24 2151 01/18/24 2251 01/19/24 0706   BP: 168/80 145/81  150/78 147/77   Pulse: 80 89     Patient Position - Orthostatic VS: Lying Lying Lying          Visual Acuity      ED Medications  Medications   acetaminophen (TYLENOL) tablet 650 mg (has no administration in time range)   amLODIPine (NORVASC) tablet 5 mg (5 mg Oral Given 1/19/24 0841)   atorvastatin (LIPITOR) tablet 10 mg (10 mg Oral Given 1/19/24 0841)   famotidine (PEPCID) tablet 20 mg (20 mg Oral Given 1/19/24 0841)   gabapentin (NEURONTIN) capsule 100 mg (100 mg Oral Given 1/18/24 2102)   lisinopril (ZESTRIL) tablet 20 mg (20 mg Oral Given 1/19/24 0841)   methocarbamol (ROBAXIN) tablet 500 mg (500 mg Oral Given 1/18/24 2102)   pantoprazole (PROTONIX) EC tablet 40 mg (40 mg Oral Not Given 1/19/24 0537)   tamsulosin (FLOMAX) capsule 0.4 mg (has no administration in time range)   lactated ringers infusion (100 mL/hr Intravenous New Bag 1/18/24 2105)   aspirin chewable tablet 81 mg (81 mg Oral Given 1/19/24 0841)   sodium chloride 0.9 % bolus 1,000 mL (0 mL Intravenous Stopped 1/18/24 1309)   ketorolac (TORADOL) injection 15 mg (15 mg Intravenous Given 1/18/24 1208)   iohexol (OMNIPAQUE) 350 MG/ML injection (MULTI-DOSE) 100 mL (100 mL Intravenous Given 1/18/24 1338)   polyethylene glycol (MIRALAX) packet 17 g (17 g Oral Given 1/18/24 1703)   sodium phosphate-biphosphate (FLEET) enema 1 enema (1 enema Rectal Given 1/18/24 1702)   polyethylene glycol (GOLYTELY) bowel prep 4,000 mL (4,000 mL Oral Given 1/18/24 1841)       Diagnostic Studies  Results Reviewed       Procedure Component Value Units Date/Time    Comprehensive metabolic panel [[id number]]  (Abnormal) Collected: 01/19/24 0514    Lab Status: Final result Specimen: Blood from Arm, Left Updated: 01/19/24 0640     Sodium 143 mmol/L      Potassium 3.7 mmol/L      Chloride 106 mmol/L      CO2 26 mmol/L      ANION GAP 11 mmol/L      BUN 12 mg/dL      Creatinine 0.56 mg/dL      Glucose 97 mg/dL      Calcium 8.9 mg/dL      AST 12 U/L      ALT 9 U/L      Alkaline  Phosphatase 76 U/L      Total Protein 5.5 g/dL      Albumin 3.7 g/dL      Total Bilirubin 0.87 mg/dL      eGFR 99 ml/min/1.73sq m     Narrative:      National Kidney Disease Foundation guidelines for Chronic Kidney Disease (CKD):     Stage 1 with normal or high GFR (GFR > 90 mL/min/1.73 square meters)    Stage 2 Mild CKD (GFR = 60-89 mL/min/1.73 square meters)    Stage 3A Moderate CKD (GFR = 45-59 mL/min/1.73 square meters)    Stage 3B Moderate CKD (GFR = 30-44 mL/min/1.73 square meters)    Stage 4 Severe CKD (GFR = 15-29 mL/min/1.73 square meters)    Stage 5 End Stage CKD (GFR <15 mL/min/1.73 square meters)  Note: GFR calculation is accurate only with a steady state creatinine    Magnesium [[id number]]  (Normal) Collected: 01/19/24 0514    Lab Status: Final result Specimen: Blood from Arm, Left Updated: 01/19/24 0640     Magnesium 2.0 mg/dL     Protime-INR [[id number]]  (Abnormal) Collected: 01/19/24 0514    Lab Status: Final result Specimen: Blood from Arm, Left Updated: 01/19/24 0625     Protime 15.2 seconds      INR 1.21    APTT [[id number]]  (Normal) Collected: 01/19/24 0514    Lab Status: Final result Specimen: Blood from Arm, Left Updated: 01/19/24 0625     PTT 26 seconds     CBC and differential [[id number]]  (Abnormal) Collected: 01/19/24 0514    Lab Status: Final result Specimen: Blood from Arm, Left Updated: 01/19/24 0617     WBC 5.33 Thousand/uL      RBC 4.10 Million/uL      Hemoglobin 11.8 g/dL      Hematocrit 35.0 %      MCV 85 fL      MCH 28.8 pg      MCHC 33.7 g/dL      RDW 13.6 %      MPV 9.4 fL      Platelets 155 Thousands/uL      nRBC 0 /100 WBCs      Neutrophils Relative 72 %      Immat GRANS % 1 %      Lymphocytes Relative 16 %      Monocytes Relative 10 %      Eosinophils Relative 1 %      Basophils Relative 0 %      Neutrophils Absolute 3.80 Thousands/µL      Immature Grans Absolute 0.03 Thousand/uL      Lymphocytes Absolute 0.87 Thousands/µL      Monocytes Absolute 0.55 Thousand/µL       Eosinophils Absolute 0.07 Thousand/µL      Basophils Absolute 0.01 Thousands/µL     Comprehensive metabolic panel [[id number]]  (Abnormal) Collected: 01/18/24 1208    Lab Status: Edited Result - FINAL Specimen: Blood from Arm, Left Updated: 01/19/24 0436     Sodium 156 mmol/L      Potassium 4.7 mmol/L      Chloride 119 mmol/L      CO2 25 mmol/L      ANION GAP 12 mmol/L      BUN 15 mg/dL      Creatinine 0.66 mg/dL      Glucose 128 mg/dL      Calcium 9.0 mg/dL      AST 12 U/L      ALT 8 U/L      Alkaline Phosphatase 81 U/L      Total Protein 6.6 g/dL      Albumin 3.9 g/dL      Total Bilirubin 0.79 mg/dL      eGFR 93 ml/min/1.73sq m     Narrative:      National Kidney Disease Foundation guidelines for Chronic Kidney Disease (CKD):   ·  Stage 1 with normal or high GFR (GFR > 90 mL/min/1.73 square meters)  ·  Stage 2 Mild CKD (GFR = 60-89 mL/min/1.73 square meters)  ·  Stage 3A Moderate CKD (GFR = 45-59 mL/min/1.73 square meters)  ·  Stage 3B Moderate CKD (GFR = 30-44 mL/min/1.73 square meters)  ·  Stage 4 Severe CKD (GFR = 15-29 mL/min/1.73 square meters)  ·  Stage 5 End Stage CKD (GFR <15 mL/min/1.73 square meters)  Note: GFR calculation is accurate only with a steady state creatinine    UA w Reflex to Microscopic w Reflex to Culture [[id number]] Collected: 01/18/24 1326    Lab Status: Final result Specimen: Urine, Clean Catch Updated: 01/18/24 1406     Color, UA Light Yellow     Clarity, UA Clear     Specific Gravity, UA 1.014     pH, UA 5.5     Leukocytes, UA Negative     Nitrite, UA Negative     Protein, UA Negative mg/dl      Glucose, UA Negative mg/dl      Ketones, UA Negative mg/dl      Urobilinogen, UA <2.0 mg/dl      Bilirubin, UA Negative     Occult Blood, UA Negative    Lactic acid, plasma (w/reflex if result > 2.0) [[id number]]  (Normal) Collected: 01/18/24 1208    Lab Status: Final result Specimen: Blood from Arm, Left Updated: 01/18/24 1306     LACTIC ACID 1.3 mmol/L     Narrative:      Result may be elevated  if tourniquet was used during collection.    Lipase [[id number]]  (Abnormal) Collected: 01/18/24 1208    Lab Status: Final result Specimen: Blood from Arm, Left Updated: 01/18/24 1306     Lipase <6 u/L     CBC and differential [[id number]] Collected: 01/18/24 1208    Lab Status: Final result Specimen: Blood from Arm, Left Updated: 01/18/24 1226     WBC 6.35 Thousand/uL      RBC 4.30 Million/uL      Hemoglobin 12.1 g/dL      Hematocrit 37.6 %      MCV 87 fL      MCH 28.1 pg      MCHC 32.2 g/dL      RDW 13.6 %      MPV 9.5 fL      Platelets 153 Thousands/uL      nRBC 0 /100 WBCs      Neutrophils Relative 72 %      Immat GRANS % 1 %      Lymphocytes Relative 17 %      Monocytes Relative 9 %      Eosinophils Relative 1 %      Basophils Relative 0 %      Neutrophils Absolute 4.60 Thousands/µL      Immature Grans Absolute 0.03 Thousand/uL      Lymphocytes Absolute 1.05 Thousands/µL      Monocytes Absolute 0.60 Thousand/µL      Eosinophils Absolute 0.06 Thousand/µL      Basophils Absolute 0.01 Thousands/µL                    CT abdomen pelvis with contrast   Final Result by Dontae Bravo MD (01/18 1514)      Large amount of stool throughout the colon and rectum. Correlate for rectal fecal impaction.            Workstation performed: [id number]                    Procedures  Procedures         ED Course  ED Course as of 01/19/24 1109   Thu Jan 18, 2024   1456 Patient has urge to urinate but is unable to pass any urine at this time - bladder scan 650 mL. Patient has a hx of urinary retention 2/2 to severe constipation. Straight cath ordered.                                              Medical Decision Making  Patient is a 78-year-old male with a PMHx of chronic constipation, DM2, GERD, HLD and HTN, presenting to the ED for evaluation of constipation and abdominal pain x4 days.    DDx including but not limited to: bowel obstruction, constipation, fecal impaction, ileus, gastroparesis, volvulus, perforated viscus, tumor.      I reviewed all of patient's labs which are unremarkable.  UA without evidence of infection.  Patient was able to urinate upon arrival to the ED; however, developed urinary retention again while in the ED. Bladder scan showed 650 mL of urine in the bladder - urinary retention likely secondary to constipation as patient has a history of this happening in the past. A straight cath was performed by RN while awaiting results of CT. CT abdomen/pelvis showed a large amount of stool throughout the colon and rectum but was otherwise unremarkable with no evidence of bowel obstruction. Attempted manual disimpaction prior to enema; however, no stool reachable in rectal vault. A fleet enema was administered; however this was not successful as patient was unable to hold it in for long enough duration. Advised patient that I would like to admit him to the hospital for management of severe constipation and concern for recurrent urinary retention; however, patient is adamant that he would like to go home.  Patient is agreeable to trying a soapsuds enema prior to discharge.  If this does not produce a bowel movement will need to ensure that patient is able to urinate prior to discharge, otherwise will need to be discharged home with a Pryor catheter in place. He will need close follow-up with GI and urology. Will also switch patient from daily colace to daily senna and miralax in addition to his lactulose.     Patient signed out to ED resident pending results of enema and assessment for urinary retention.         Amount and/or Complexity of Data Reviewed  Labs: ordered.  Radiology: ordered.    Risk  OTC drugs.  Prescription drug management.  Decision regarding hospitalization.             Disposition  Final diagnoses:   Chronic constipation   Urinary retention     Time reflects when diagnosis was documented in both MDM as applicable and the Disposition within this note       Time User Action Codes Description Comment    1/18/2024   5:57 PM Arlin Gonzalez Add [K59.09] Chronic constipation     1/18/2024  5:57 PM Arlin Gonzalez Add [R33.9] Urinary retention           ED Disposition       ED Disposition   Admit    Condition   Stable    Date/Time   Thu Jan 18, 2024  8:37 PM    Comment   Case was discussed with internal medicine and the patient's admission status was agreed to be Admission Status: observation status to the service of Dr. Batista .               Follow-up Information       Follow up With Specialties Details Why Contact Info Additional Information    [facility] [place] [facility] Schedule an appointment as soon as possible for a visit   [address]   [address]  [facility] [address] [phone] [facility] [place], [address], [place], [place], [address]   [phone]    [facility] [place] [facility] Schedule an appointment as soon as possible for a visit   [address]  [address]  [facility] [address]  [phone] [facility] [place], [address], [place], [address]   [phone]    [facility] Emergency Medicine  If symptoms worsen [address] [facility] [address]  [phone] [facility], [address], [place], [address]   [phone]            Current Discharge Medication List        START taking these medications    Details   polyethylene glycol (MIRALAX) 17 g packet Take 17 g by mouth daily Prn constipation  Qty: 20 each, Refills: 0    Associated Diagnoses: Chronic constipation      senna-docusate sodium (SENOKOT S) 8.6-50 mg per tablet Take 2 tablets by mouth daily  Qty: 20 tablet, Refills: 0    Associated Diagnoses: Chronic constipation           CONTINUE these medications which have NOT CHANGED    Details   acetaminophen (TYLENOL) 325 mg  tablet Take 2 tablets (650 mg total) by mouth every 6 (six) hours as needed for mild pain or fever  Qty: 30 tablet, Refills: 0    Associated Diagnoses: Pryor catheter problem, initial encounter ([facility])      amLODIPine (NORVASC) 5 mg tablet TAKE 1 TABLET (5 MG TOTAL) BY MOUTH DAILY.  Qty: 90 tablet, Refills: 0    Associated Diagnoses: Essential hypertension      amoxicillin (AMOXIL) 500 mg capsule       aspirin 81 MG tablet Take 81 mg by mouth daily      atorvastatin (LIPITOR) 10 mg tablet TAKE 1 TABLET BY MOUTH EVERY DAY  Qty: 90 tablet, Refills: 1    Associated Diagnoses: Other hyperlipidemia      Blood Glucose Monitoring Suppl (OneTouch Verio Reflect) w/Device KIT Check blood sugars once daily. Please substitute with appropriate alternative as covered by patient's insurance. Dx: E11.65  Qty: 1 kit, Refills: 0    Associated Diagnoses: Type 2 diabetes mellitus without complication, without long-term current use of insulin ([facility])      calcium carbonate (OS-ANGELIKA) 600 MG tablet Take 1 tablet (600 mg total) by mouth daily  Qty: 30 tablet, Refills: 3    Associated Diagnoses: Gastroesophageal reflux disease without esophagitis      clindamycin (CLEOCIN) 150 mg capsule TAKE 2 CAPSULES BY MOUTH EVERY 8 HOURS UNTIL GONE      colchicine (COLCRYS) 0.6 mg tablet TAKE 1 TABLET BY MOUTH EVERY DAY  Qty: 90 tablet, Refills: 0    Associated Diagnoses: Other chest pain      diclofenac sodium (VOLTAREN) 1 % Apply 2 g topically 4 (four) times a day  Qty: 150 g, Refills: 1    Associated Diagnoses: Primary osteoarthritis of both knees      docusate sodium (COLACE) 100 mg capsule TAKE 1 CAPSULE (100 MG TOTAL) BY MOUTH IN THE MORNING  Qty: 90 capsule, Refills: 1    Associated Diagnoses: Chronic constipation      famotidine (PEPCID) 20 mg tablet TAKE 1 TABLET BY MOUTH TWICE A DAY  Qty: 180 tablet, Refills: 1    Associated Diagnoses: Gastroesophageal reflux disease without esophagitis      gabapentin (Neurontin) 100 mg capsule Take 1 capsule  (100 mg total) by mouth daily at bedtime  Qty: 30 capsule, Refills: 2    Associated Diagnoses: Nonintractable episodic headache, unspecified headache type      glucose blood (OneTouch Verio) test strip Check blood sugars once daily. Please substitute with appropriate alternative as covered by patient's insurance. Dx: E11.65  Qty: 100 each, Refills: 3    Associated Diagnoses: Type 2 diabetes mellitus without complication, without long-term current use of insulin ([facility])      hydrocortisone 2.5 % cream APPLY TO AFFECTED AREA TWICE A DAY  Qty: 28.35 g, Refills: 0    Associated Diagnoses: Rash      lactulose (CHRONULAC) 10 g/15 mL solution Daily prn for severe constipation  Qty: 100 mL, Refills: 0    Associated Diagnoses: Chronic constipation      !! Lancets (OneTouch Delica Plus Hbclrs75M) MISC TEST DAILY, E11.5--PT CONFIMED DELICA 33 G  Qty: 100 each, Refills: 11    Associated Diagnoses: Type 2 diabetes mellitus without complication, without long-term current use of insulin ([facility])      linaCLOtide (Linzess) 290 MCG CAPS Take 1 capsule by mouth in the morning  Qty: 30 capsule, Refills: 2    Associated Diagnoses: Chronic constipation      lisinopril (ZESTRIL) 20 mg tablet TAKE 1 TABLET BY MOUTH EVERY DAY  Qty: 90 tablet, Refills: 0    Associated Diagnoses: Essential hypertension      MELATONIN PO Take 10 mg by mouth daily at bedtime      metFORMIN (GLUCOPHAGE) 500 mg tablet TAKE 1 TABLET BY MOUTH TWICE A DAY  Qty: 180 tablet, Refills: 1    Associated Diagnoses: Type 2 diabetes mellitus without complication, without long-term current use of insulin ([facility])      methocarbamol (ROBAXIN) 500 mg tablet TAKE 1 TABLET (500 MG TOTAL) BY MOUTH DAILY AT BEDTIME  Qty: 30 tablet, Refills: 0    Associated Diagnoses: Cervicalgia      methylPREDNISolone 4 MG tablet therapy pack Use as directed on package  Qty: 21 each, Refills: 0    Associated Diagnoses: Laryngitis      omeprazole (PriLOSEC) 40 MG capsule TAKE 1 CAPSULE (40 MG TOTAL) BY  MOUTH DAILY.  Qty: 90 capsule, Refills: 1    Associated Diagnoses: Gastroesophageal reflux disease without esophagitis      !! OneTouch Delica Lancets 33G MISC Check blood sugars once daily. Please substitute with appropriate alternative as covered by patient's insurance. Dx: E11.65  Qty: 100 each, Refills: 3    Associated Diagnoses: Type 2 diabetes mellitus without complication, without long-term current use of insulin ([facility])      tamsulosin (FLOMAX) 0.4 mg Take 1 capsule (0.4 mg total) by mouth daily with dinner  Qty: 30 capsule, Refills: 1    Associated Diagnoses: Urinary retention       !! - Potential duplicate medications found. Please discuss with provider.        STOP taking these medications       bisacodyl (DULCOLAX) 5 mg EC tablet Comments:   Reason for Stopping:                   PDMP Review         Value Time User    PDMP Reviewed  Yes 6/21/2022  5:44 PM ZAHRA Rice            ED Provider  Electronically Signed by             Arlin Gonzalez PA-C  01/19/24 9674

## 2024-01-19 ENCOUNTER — TRANSITIONAL CARE MANAGEMENT (OUTPATIENT)
Dept: FAMILY MEDICINE CLINIC | Facility: CLINIC | Age: 79
End: 2024-01-19

## 2024-01-19 ENCOUNTER — RA CDI HCC (OUTPATIENT)
Dept: OTHER | Facility: HOSPITAL | Age: 79
End: 2024-01-19

## 2024-01-19 VITALS
OXYGEN SATURATION: 96 % | HEART RATE: 89 BPM | SYSTOLIC BLOOD PRESSURE: 147 MMHG | DIASTOLIC BLOOD PRESSURE: 77 MMHG | RESPIRATION RATE: 16 BRPM | TEMPERATURE: 98.4 F

## 2024-01-19 LAB
ALBUMIN SERPL BCP-MCNC: 3.7 G/DL (ref 3.5–5)
ALBUMIN SERPL BCP-MCNC: 3.9 G/DL (ref 3.5–5)
ALP SERPL-CCNC: 76 U/L (ref 34–104)
ALP SERPL-CCNC: 81 U/L (ref 34–104)
ALT SERPL W P-5'-P-CCNC: 8 U/L (ref 7–52)
ALT SERPL W P-5'-P-CCNC: 9 U/L (ref 7–52)
ANION GAP SERPL CALCULATED.3IONS-SCNC: 11 MMOL/L
ANION GAP SERPL CALCULATED.3IONS-SCNC: 12 MMOL/L
APTT PPP: 26 SECONDS (ref 23–37)
AST SERPL W P-5'-P-CCNC: 12 U/L (ref 13–39)
AST SERPL W P-5'-P-CCNC: 12 U/L (ref 13–39)
BASOPHILS # BLD AUTO: 0.01 THOUSANDS/ÂΜL (ref 0–0.1)
BASOPHILS NFR BLD AUTO: 0 % (ref 0–1)
BILIRUB SERPL-MCNC: 0.79 MG/DL (ref 0.2–1)
BILIRUB SERPL-MCNC: 0.87 MG/DL (ref 0.2–1)
BUN SERPL-MCNC: 12 MG/DL (ref 5–25)
BUN SERPL-MCNC: 15 MG/DL (ref 5–25)
CALCIUM SERPL-MCNC: 8.9 MG/DL (ref 8.4–10.2)
CALCIUM SERPL-MCNC: 9 MG/DL (ref 8.4–10.2)
CHLORIDE SERPL-SCNC: 106 MMOL/L (ref 96–108)
CHLORIDE SERPL-SCNC: 119 MMOL/L (ref 96–108)
CO2 SERPL-SCNC: 25 MMOL/L (ref 21–32)
CO2 SERPL-SCNC: 26 MMOL/L (ref 21–32)
CREAT SERPL-MCNC: 0.56 MG/DL (ref 0.6–1.3)
CREAT SERPL-MCNC: 0.66 MG/DL (ref 0.6–1.3)
EOSINOPHIL # BLD AUTO: 0.07 THOUSAND/ÂΜL (ref 0–0.61)
EOSINOPHIL NFR BLD AUTO: 1 % (ref 0–6)
ERYTHROCYTE [DISTWIDTH] IN BLOOD BY AUTOMATED COUNT: 13.6 % (ref 11.6–15.1)
GFR SERPL CREATININE-BSD FRML MDRD: 93 ML/MIN/1.73SQ M
GFR SERPL CREATININE-BSD FRML MDRD: 99 ML/MIN/1.73SQ M
GLUCOSE SERPL-MCNC: 101 MG/DL (ref 65–140)
GLUCOSE SERPL-MCNC: 128 MG/DL (ref 65–140)
GLUCOSE SERPL-MCNC: 97 MG/DL (ref 65–140)
HCT VFR BLD AUTO: 35 % (ref 36.5–49.3)
HGB BLD-MCNC: 11.8 G/DL (ref 12–17)
IMM GRANULOCYTES # BLD AUTO: 0.03 THOUSAND/UL (ref 0–0.2)
IMM GRANULOCYTES NFR BLD AUTO: 1 % (ref 0–2)
INR PPP: 1.21 (ref 0.84–1.19)
LYMPHOCYTES # BLD AUTO: 0.87 THOUSANDS/ÂΜL (ref 0.6–4.47)
LYMPHOCYTES NFR BLD AUTO: 16 % (ref 14–44)
MAGNESIUM SERPL-MCNC: 2 MG/DL (ref 1.9–2.7)
MCH RBC QN AUTO: 28.8 PG (ref 26.8–34.3)
MCHC RBC AUTO-ENTMCNC: 33.7 G/DL (ref 31.4–37.4)
MCV RBC AUTO: 85 FL (ref 82–98)
MONOCYTES # BLD AUTO: 0.55 THOUSAND/ÂΜL (ref 0.17–1.22)
MONOCYTES NFR BLD AUTO: 10 % (ref 4–12)
NEUTROPHILS # BLD AUTO: 3.8 THOUSANDS/ÂΜL (ref 1.85–7.62)
NEUTS SEG NFR BLD AUTO: 72 % (ref 43–75)
NRBC BLD AUTO-RTO: 0 /100 WBCS
PLATELET # BLD AUTO: 155 THOUSANDS/UL (ref 149–390)
PMV BLD AUTO: 9.4 FL (ref 8.9–12.7)
POTASSIUM SERPL-SCNC: 3.7 MMOL/L (ref 3.5–5.3)
POTASSIUM SERPL-SCNC: 4.7 MMOL/L (ref 3.5–5.3)
PROT SERPL-MCNC: 5.5 G/DL (ref 6.4–8.4)
PROT SERPL-MCNC: 6.6 G/DL (ref 6.4–8.4)
PROTHROMBIN TIME: 15.2 SECONDS (ref 11.6–14.5)
RBC # BLD AUTO: 4.1 MILLION/UL (ref 3.88–5.62)
SODIUM SERPL-SCNC: 143 MMOL/L (ref 135–147)
SODIUM SERPL-SCNC: 156 MMOL/L (ref 135–147)
WBC # BLD AUTO: 5.33 THOUSAND/UL (ref 4.31–10.16)

## 2024-01-19 PROCEDURE — 97163 PT EVAL HIGH COMPLEX 45 MIN: CPT

## 2024-01-19 PROCEDURE — 85730 THROMBOPLASTIN TIME PARTIAL: CPT | Performed by: INTERNAL MEDICINE

## 2024-01-19 PROCEDURE — 85025 COMPLETE CBC W/AUTO DIFF WBC: CPT | Performed by: INTERNAL MEDICINE

## 2024-01-19 PROCEDURE — 80053 COMPREHEN METABOLIC PANEL: CPT | Performed by: INTERNAL MEDICINE

## 2024-01-19 PROCEDURE — 85610 PROTHROMBIN TIME: CPT | Performed by: INTERNAL MEDICINE

## 2024-01-19 PROCEDURE — 82948 REAGENT STRIP/BLOOD GLUCOSE: CPT

## 2024-01-19 PROCEDURE — 83735 ASSAY OF MAGNESIUM: CPT | Performed by: INTERNAL MEDICINE

## 2024-01-19 PROCEDURE — 97166 OT EVAL MOD COMPLEX 45 MIN: CPT

## 2024-01-19 PROCEDURE — 99239 HOSP IP/OBS DSCHRG MGMT >30: CPT | Performed by: INTERNAL MEDICINE

## 2024-01-19 RX ORDER — LACTULOSE 10 G/15ML
SOLUTION ORAL
Qty: 473 ML | Refills: 0 | Status: SHIPPED | OUTPATIENT
Start: 2024-01-19

## 2024-01-19 RX ADMIN — FAMOTIDINE 20 MG: 20 TABLET, FILM COATED ORAL at 08:41

## 2024-01-19 RX ADMIN — ASPIRIN 81 MG CHEWABLE TABLET 81 MG: 81 TABLET CHEWABLE at 08:41

## 2024-01-19 RX ADMIN — ATORVASTATIN CALCIUM 10 MG: 10 TABLET, FILM COATED ORAL at 08:41

## 2024-01-19 RX ADMIN — LISINOPRIL 20 MG: 20 TABLET ORAL at 08:41

## 2024-01-19 RX ADMIN — AMLODIPINE BESYLATE 5 MG: 5 TABLET ORAL at 08:41

## 2024-01-19 NOTE — PROGRESS NOTES
HCC coding opportunities          Chart Reviewed number of suggestions sent to Provider: 1     Patients Insurance   E11.36  Medicare Insurance: [employer] Medicare Complete

## 2024-01-19 NOTE — ASSESSMENT & PLAN NOTE
Abdominal pain for 5 days   CT A/P with significant amount of stool burden and correlation for fecal impaction  Afebrile, no leukocytosis   Concurrent signifcant urinary retention and lion catheter placed in ER  Given Golyteyl in the ER without bowel movement   Admit to medicine. Will start aggressive bowel regimen. If no BM overnight with aggressive bowel regimen, may need GI consult. With regards to the lion placed in the ER, this urinary retention thought 2/2 constipation. Will maintain lion until bowel movement had and after bowel movement, will trial off lion. If at this point patient still has urinary retetnion, will need urology consutlt.   IVF

## 2024-01-19 NOTE — ASSESSMENT & PLAN NOTE
Presents with abdominal pain  Abdominal imaging revealed significant stool burden concerns for fecal impaction  Patient had bowel movements with relief of constipation with bowel regimen  Abdominal symptoms are improved  Tolerating oral feeds  Fiber diet adequate hydration stool softeners at discharge

## 2024-01-19 NOTE — UTILIZATION REVIEW
Initial Clinical Review    Admission: Date/Time/Statement:   Admission Orders (From admission, onward)       Ordered        01/18/24 2037  Place in Observation  Once            01/18/24 2037  Place in Observation  Once                          Orders Placed This Encounter   Procedures    Place in Observation     Standing Status:   Standing     Number of Occurrences:   1     Order Specific Question:   Level of Care     Answer:   Med Surg [16]     Order Specific Question:   Bed request comments     Answer:   ED obs unit    Place in Observation     Standing Status:   Standing     Number of Occurrences:   1     Order Specific Question:   Level of Care     Answer:   Med Surg [16]     ED Arrival Information       Expected   -    Arrival   1/18/2024 11:11    Acuity   Urgent              Means of arrival   Walk-In    Escorted by   Family Member    Service   Hospitalist    Admission type   Emergency              Arrival complaint   constipation             Chief Complaint   Patient presents with    Constipation     Has not had bowel movement since Sunday. -N/V/abd pain       Initial Presentation: 78 y.o. male who has past medical history significant for HTN, Brain Mass and follows with surgery, DM who presented to [facility] ER on 1/18 with abdominal pain for the past 4-5 days. Patient reports constipation and lack of bowel movement. Patient denies any current nausea or vomiting. Patient denies any headache. Denies any fevers.        ADMIT OBSERVATION STATUS      ED Triage Vitals   Temperature Pulse Respirations Blood Pressure SpO2   01/18/24 1149 01/18/24 1117 01/18/24 1117 01/18/24 1117 01/18/24 1117   97.7 °F (36.5 °C) 81 18 142/83 100 %      Temp Source Heart Rate Source Patient Position - Orthostatic VS BP Location FiO2 (%)   01/18/24 1149 01/18/24 1117 01/18/24 1751 01/18/24 1117 --   Oral Monitor Lying Right arm       Pain Score       01/18/24 1117       No Pain          Wt Readings from Last 1 Encounters:   01/11/24 75.9 kg  "(167 lb 6.4 oz)     Additional Vital Signs:         Pertinent Labs/Diagnostic Test Results:   CT abdomen pelvis with contrast   Final Result by Dontae Bravo MD (01/18 1514)      Large amount of stool throughout the colon and rectum. Correlate for rectal fecal impaction.            Workstation performed: [id number]               Results from last 7 days   Lab Units 01/19/24  0514 01/18/24  1208   WBC Thousand/uL 5.33 6.35   HEMOGLOBIN g/dL 11.8* 12.1   HEMATOCRIT % 35.0* 37.6   PLATELETS Thousands/uL 155 153   NEUTROS ABS Thousands/µL 3.80 4.60         Results from last 7 days   Lab Units 01/19/24  0514 01/18/24  1208   SODIUM mmol/L 143 156*   POTASSIUM mmol/L 3.7 4.7   CHLORIDE mmol/L 106 119*   CO2 mmol/L 26 25   ANION GAP mmol/L 11 12   BUN mg/dL 12 15   CREATININE mg/dL 0.56* 0.66   EGFR ml/min/1.73sq m 99 93   CALCIUM mg/dL 8.9 9.0   MAGNESIUM mg/dL 2.0  --      Results from last 7 days   Lab Units 01/19/24  0514 01/18/24  1208   AST U/L 12* 12*   ALT U/L 9 8   ALK PHOS U/L 76 81   TOTAL PROTEIN g/dL 5.5* 6.6   ALBUMIN g/dL 3.7 3.9   TOTAL BILIRUBIN mg/dL 0.87 0.79         Results from last 7 days   Lab Units 01/19/24  0514 01/18/24  1208   GLUCOSE RANDOM mg/dL 97 128             No results found for: \"BETA-HYDROXYBUTYRATE\"                           Results from last 7 days   Lab Units 01/19/24  0514   PROTIME seconds 15.2*   INR  1.21*   PTT seconds 26             Results from last 7 days   Lab Units 01/18/24  1208   LACTIC ACID mmol/L 1.3                                 Results from last 7 days   Lab Units 01/18/24  1208   LIPASE u/L <6*                 Results from last 7 days   Lab Units 01/18/24  1326   CLARITY UA  Clear   COLOR UA  Light Yellow   SPEC GRAV UA  1.014   PH UA  5.5   GLUCOSE UA mg/dl Negative   KETONES UA mg/dl Negative   BLOOD UA  Negative   PROTEIN UA mg/dl Negative   NITRITE UA  Negative   BILIRUBIN UA  Negative   UROBILINOGEN UA (BE) mg/dl <2.0   LEUKOCYTES UA  Negative           "                                         ED Treatment:   Medication Administration from 01/18/2024 1111 to 01/18/2024 2249         Date/Time Order Dose Route Action Action by Comments     01/18/2024 1309 EST sodium chloride 0.9 % bolus 1,000 mL 0 mL Intravenous Stopped Gracie Newton RN --     01/18/2024 1209 EST sodium chloride 0.9 % bolus 1,000 mL 1,000 mL Intravenous New Bag Mercy Quezada, RN --     01/18/2024 1208 EST ketorolac (TORADOL) injection 15 mg 15 mg Intravenous Given Mercytracy Quezada, RN --     01/18/2024 1338 EST iohexol (OMNIPAQUE) 350 MG/ML injection (MULTI-DOSE) 100 mL 100 mL Intravenous Given Kimberlee Parker --     01/18/2024 1703 EST polyethylene glycol (MIRALAX) packet 17 g 17 g Oral Given Gracie Newton RN --     01/18/2024 1702 EST sodium phosphate-biphosphate (FLEET) enema 1 enema 1 enema Rectal Given Gracie Newton RN --     01/18/2024 1841 EST polyethylene glycol (GOLYTELY) bowel prep 4,000 mL 4,000 mL Oral Given Gracie Newton RN --     01/18/2024 2102 EST famotidine (PEPCID) tablet 20 mg 20 mg Oral Given Brandyn Torres RN --     01/18/2024 2102 EST gabapentin (NEURONTIN) capsule 100 mg 100 mg Oral Given Brandyn Torres RN --     01/18/2024 2102 EST methocarbamol (ROBAXIN) tablet 500 mg 500 mg Oral Given Brandyn Torres RN --     01/18/2024 2105 EST lactated ringers infusion 100 mL/hr Intravenous New Bag Brandyn Torres RN --          Past Medical History:   Diagnosis Date    Brain mass     Last Assessed; 11/5/2015    Chest pain     Last Assessed: 1/22/2015    Diabetes type 2, uncontrolled     Last Assessed: 3/1/2016    Edema of left lower extremity     Last Assessed: 3/22/2017    GERD (gastroesophageal reflux disease)     Hyperlipidemia     Hypertension     Impaired fasting glucose     Last Assessed: 11/10/2015    Irregular heartbeat     Skin cancer (melanoma) (HCC)     Scalp     Varicose 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     Last Assessed: 3/22/2017     Present on  Admission:   Brain tumor (HCC)   Other constipation   Urinary retention   Hypertension      Admitting Diagnosis: Urinary retention [R33.9]  Chronic constipation [K59.09]  Age/Sex: 78 y.o. male  Admission Orders:  Scheduled Medications:  amLODIPine, 5 mg, Oral, Daily  aspirin, 81 mg, Oral, Daily  atorvastatin, 10 mg, Oral, Daily  famotidine, 20 mg, Oral, BID  gabapentin, 100 mg, Oral, HS  lisinopril, 20 mg, Oral, Daily  methocarbamol, 500 mg, Oral, HS  pantoprazole, 40 mg, Oral, Early Morning  tamsulosin, 0.4 mg, Oral, Daily With Dinner      Continuous IV Infusions:  lactated ringers, 100 mL/hr, Intravenous, Continuous      PRN Meds:  acetaminophen, 650 mg, Oral, Q6H PRN        None    Network Utilization Review Department  ATTENTION: Please call with any questions or concerns to [phone] and carefully listen to the prompts so that you are directed to the right person. All voicemails are confidential.   For Discharge needs, contact Care Management DC Support Team at [phone] opt. [phone]  Send all requests for admission clinical reviews, approved or denied determinations and any other requests to dedicated fax number below belonging to the campus where the patient is receiving treatment. List of dedicated fax numbers for the Facilities:  FACILITY NAME UR FAX NUMBER   ADMISSION DENIALS (Administrative/Medical Necessity) [phone]   DISCHARGE SUPPORT TEAM (NETWORK) [phone]   PARENT CHILD HEALTH (Maternity/NICU/Pediatrics) [phone]   [facility] [phone]   [facility] [phone]   [facility] [phone]   [facility] [phone]   [facility] [phone]   [facility] [phone]   [facility] [phone]   [facility] [phone]   [facility]  [phone]   [facility] [phone]   [facility] [phone]

## 2024-01-19 NOTE — QUICK NOTE
-Patient with large bowel movement after being given golytely as miralax and enema in ER. Will hold off any additional laxatives currently overnight.

## 2024-01-19 NOTE — PLAN OF CARE
Problem: PAIN - ADULT  Goal: Verbalizes/displays adequate comfort level or baseline comfort level  Description: Interventions:  - Encourage patient to monitor pain and request assistance  - Assess pain using appropriate pain scale  - Administer analgesics based on type and severity of pain and evaluate response  - Implement non-pharmacological measures as appropriate and evaluate response  - Consider cultural and social influences on pain and pain management  - Notify physician/advanced practitioner if interventions unsuccessful or patient reports new pain  Outcome: Progressing     Problem: INFECTION - ADULT  Goal: Absence or prevention of progression during hospitalization  Description: INTERVENTIONS:  - Assess and monitor for signs and symptoms of infection  - Monitor lab/diagnostic results  - Monitor all insertion sites, i.e. indwelling lines, tubes, and drains  - Monitor endotracheal if appropriate and nasal secretions for changes in amount and color  - [place] appropriate cooling/warming therapies per order  - Administer medications as ordered  - Instruct and encourage patient and family to use good hand hygiene technique  - Identify and instruct in appropriate isolation precautions for identified infection/condition  Outcome: Progressing  Goal: Absence of fever/infection during neutropenic period  Description: INTERVENTIONS:  - Monitor WBC    Outcome: Progressing     Problem: SAFETY ADULT  Goal: Patient will remain free of falls  Description: INTERVENTIONS:  - Educate patient/family on patient safety including physical limitations  - Instruct patient to call for assistance with activity   - Consult OT/PT to assist with strengthening/mobility   - Keep Call bell within reach  - Keep bed low and locked with side rails adjusted as appropriate  - Keep care items and personal belongings within reach  - Initiate and maintain comfort rounds  - Make Fall Risk Sign visible to staff  - Offer Toileting every   Hours,  in advance of need  - Initiate/Maintain  alarm  - Obtain necessary fall risk management equipment:    - Apply yellow socks and bracelet for high fall risk patients  - Consider moving patient to room near nurses station  Outcome: Progressing  Goal: Maintain or return to baseline ADL function  Description: INTERVENTIONS:  -  Assess patient's ability to carry out ADLs; assess patient's baseline for ADL function and identify physical deficits which impact ability to perform ADLs (bathing, care of mouth/teeth, toileting, grooming, dressing, etc.)  - Assess/evaluate cause of self-care deficits   - Assess range of motion  - Assess patient's mobility; develop plan if impaired  - Assess patient's need for assistive devices and provide as appropriate  - Encourage maximum independence but intervene and supervise when necessary  - Involve family in performance of ADLs  - Assess for home care needs following discharge   - Consider OT consult to assist with ADL evaluation and planning for discharge  - Provide patient education as appropriate  Outcome: Progressing  Goal: Maintains/Returns to pre admission functional level  Description: INTERVENTIONS:  - Perform AM-PAC 6 Click Basic Mobility/ Daily Activity assessment daily.  - Set and communicate daily mobility goal to care team and patient/family/caregiver.   - Collaborate with rehabilitation services on mobility goals if consulted  - Perform Range of Motion   times a day.  - Reposition patient every   hours.  - Dangle patient   times a day  - Stand patient   times a day  - Ambulate patient   times a day  - Out of bed to chair   times a day   - Out of bed for meals   times a day  - Out of bed for toileting  - Record patient progress and toleration of activity level   Outcome: Progressing     Problem: DISCHARGE PLANNING  Goal: Discharge to home or other facility with appropriate resources  Description: INTERVENTIONS:  - Identify barriers to discharge w/patient and caregiver  -  Arrange for needed discharge resources and transportation as appropriate  - Identify discharge learning needs (meds, wound care, etc.)  - Arrange for interpretive services to assist at discharge as needed  - Refer to Case Management Department for coordinating discharge planning if the patient needs post-hospital services based on physician/advanced practitioner order or complex needs related to functional status, cognitive ability, or social support system  Outcome: Progressing     Problem: Knowledge Deficit  Goal: Patient/family/caregiver demonstrates understanding of disease process, treatment plan, medications, and discharge instructions  Description: Complete learning assessment and assess knowledge base.  Interventions:  - Provide teaching at level of understanding  - Provide teaching via preferred learning methods  Outcome: Progressing

## 2024-01-19 NOTE — CASE MANAGEMENT
Case Management Progress Note    Patient name Zaira Morley  Location 2 213/CW2 - MRN [id number]  : 1945 Date 2024       LOS (days): 0  Geometric Mean LOS (GMLOS) (days):   Days to GMLOS:        OBJECTIVE:        Current admission status: Observation  Preferred Pharmacy:   [id number] - [place], [place] - [address]  [address] [address]  Phone: [phone] Fax: [phone]    Primary Care Provider: ZAHRA Rice    Primary Insurance: [facility] RE[facility]  Secondary Insurance:     PROGRESS NOTE:    CM met with pt at bedside to introduce role. Obs notice was given to pt and signed. Signed obs notice to be uploaded to medical records.

## 2024-01-19 NOTE — DISCHARGE SUMMARY
[facility]  Discharge- Zaira Morley 1945, 78 y.o. male MRN: [id number]  Unit/Bed#: CW2 213-01 Encounter: [id number]  Primary Care Provider: ZAHRA Rice   Date and time admitted to hospital: 1/18/2024 11:20 AM    * Other constipation  Assessment & Plan  Presents with abdominal pain  Abdominal imaging revealed significant stool burden concerns for fecal impaction  Patient had bowel movements with relief of constipation with bowel regimen  Abdominal symptoms are improved  Tolerating oral feeds  Fiber diet adequate hydration stool softeners at discharge      Urinary retention  Assessment & Plan  Likely due to constipation  Pryor catheter discontinued  Voiding volitionally    Hypertension  Assessment & Plan  Continue amlodipine, lisinopril  Outpatient follow-up    Brain tumor (HCC)  Assessment & Plan  Outpatient follow-up            Discharge Summary - [facility]    Patient Information: Zaira Morley 78 y.o. male MRN: [id number]  Unit/Bed#: CW2 213-01 Encounter: [id number]    Discharging Physician / Practitioner: Judie Padilla MD  PCP: ZAHRA Rice  Admission Date: 1/18/2024  Discharge Date: 01/19/24    Disposition:     Home    Reason for Admission: Abdominal pain    Discharge Diagnoses:     Principal Problem:    Other constipation  Active Problems:    Brain tumor (HCC)    Hypertension    Urinary retention  Resolved Problems:    * No resolved hospital problems. *      Procedures Performed:     CT abdomen pelvis large amount of stool throughout colon and rectum      Hospital Course:     Zaira Morley is a 78 y.o. male patient who originally presented to the hospital on 1/18/2024 due to abdominal pain.  Patient was admitted abdomen imaging concerning for large amount of stool constipation.  He was provided with enema bowel regimen with multiple bowel movements and relief of constipation.  He is tolerating oral feeds.  Reports feeling  better.  Agreeable to discharge plan.  Kindly review the chart for details    Condition at Discharge: fair     Discharge Day Visit / Exam:     Subjective:      Comfortably sitting up in bed  Reports feeling better  Reports abdominal pain resolved  Agreeable to discharge plan    Vitals: Blood Pressure: 147/77 (01/19/24 0706)  Pulse: 89 (01/18/24 2151)  Temperature: 98.4 °F (36.9 °C) (01/19/24 0706)  Temp Source: Oral (01/18/24 2251)  Respirations: 16 (01/18/24 1751)  SpO2: 96 % (01/19/24 0246)  Exam:   Physical Exam    Comfortably in bed  Neck supple  Lungs diminished breath sounds bases  Vesicular breath sounds  Heart sounds S1-S2 noted  Abdomen soft  Awake follows simple commands  No rash    Discharge instructions/Information to patient and family:   See after visit summary for information provided to patient and family.      Discharge plan discussed with the patient, called updated daughter Cece  Outpatient follow-up with primary care physician    Provisions for Follow-Up Care:  See after visit summary for information related to follow-up care and any pertinent home health orders.      Planned Readmission: no     Discharge Statement:  I spent 36 minutes discharging the patient. This time was spent on the day of discharge. I had direct contact with the patient on the day of discharge. Greater than 50% of the total time was spent examining patient, answering all patient questions, arranging and discussing plan of care with patient as well as directly providing post-discharge instructions.  Additional time then spent on discharge activities.    Discharge Medications:  See after visit summary for reconciled discharge medications provided to patient and family.      ** Please Note: This note has been constructed using a voice recognition system **                 Never

## 2024-01-19 NOTE — H&P
[facility]  H&P  Name: Zaira Morley 78 y.o. male I MRN: [id number]  Unit/Bed#: ED 28 I Date of Admission: 1/18/2024   Date of Service: 1/18/2024 I Hospital Day: 0      Assessment/Plan   * Other constipation  Assessment & Plan  Abdominal pain for 5 days   CT A/P with significant amount of stool burden and correlation for fecal impaction  Afebrile, no leukocytosis   Concurrent signifcant urinary retention and lion catheter placed in ER  Given Golyteyl in the ER without bowel movement   Admit to medicine. Will start aggressive bowel regimen. If no BM overnight with aggressive bowel regimen, may need GI consult. With regards to the lion placed in the ER, this urinary retention thought 2/2 constipation. Will maintain lion until bowel movement had and after bowel movement, will trial off lion. If at this point patient still has urinary retetnion, will need urology consutlt.   IVF     Urinary retention  Assessment & Plan  Likely 2/2 Constipation  Lion placed in ER  See constipation section     Hypertension  Assessment & Plan  Continue lisnopril and amlodipine     Brain tumor (HCC)  Assessment & Plan  Most recent imaging reported as stable   Denies Headache or any unilateral weakness  Follows with NS outpatient               VTE Prophylaxis: sequential compression device and foot pump applied   Code Status: Level 1 - Full Code       Anticipated Length of Stay:  Patient will be admitted on an Observation basis with an anticipated length of stay of  < 2 midnights.   Justification for Hospital Stay: Please see detailed plans noted above.    Chief Complaint:     Constipation, Urinary retention  History of Present Illness:  Zaira Morley is a 78 y.o. male who has past medical history significant for HTN, Brain Mass and follows with surgery, DM who presented to \[facility]\"" ER on 1/18 with abdominal pain for the past 4-5 days. Patient reports constipation and lack of bowel movement. Patient  denies any current nausea or vomiting. Patient denies any headache. Denies any fevers.       Review of Systems:    Constitutional:  Denies fever or chills   Eyes:  Denies change in visual acuity   HENT:  Denies nasal congestion or sore throat   Respiratory:  Denies cough or shortness of breath   Cardiovascular:  Denies chest pain or edema   GI:  +abd pain, +constipation  :  Denies dysuria   Musculoskeletal:  Denies back pain or joint pain   Integument:  Denies rash   Neurologic:  Denies headache or sensory changes   Endocrine:  Denies polyuria or polydipsia   Lymphatic:  Denies swollen glands   Psychiatric:  Denies depression or anxiety     Past Medical and Surgical History:   Past Medical History:   Diagnosis Date    Brain mass     Last Assessed; 11/5/2015    Chest pain     Last Assessed: 1/22/2015    Diabetes type 2, uncontrolled     Last Assessed: 3/1/2016    Edema of left lower extremity     Last Assessed: 3/22/2017    GERD (gastroesophageal reflux disease)     Hyperlipidemia     Hypertension     Impaired fasting glucose     Last Assessed: 11/10/2015    Irregular heartbeat     Skin cancer (melanoma) (HCC)     Scalp     Varicose 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     Last Assessed: 3/22/2017     Past Surgical History:   Procedure Laterality Date    COLONOSCOPY      COLONOSCOPY N/A 7/10/2018    Procedure: COLONOSCOPY;  Surgeon: Jose Alejandro Rodrigues MD;  Location: [facility];  Service: Colorectal    INGUINAL HERNIA REPAIR      20+ years ago - [place]; Last Assessed: 10/23/2014    TONSILLECTOMY      VARICOSE VEIN SURGERY Left     x2 left leg - 40+ yrs ago, [place] and [place]       Meds/Allergies:  (Not in a hospital admission)      Allergies: No Known Allergies    History:  Marital Status: /Civil Union     Substance Use History:   Social History     Substance and Sexual Activity   Alcohol Use No     Social History     Tobacco Use   Smoking Status Never   Smokeless Tobacco Never     Social History     Substance and  Sexual Activity   Drug Use No       Family History:  Family History   Problem Relation Age of Onset    No Known Problems Mother     No Known Problems Father     Melanoma Daughter 17       Physical Exam:     Vitals:   Blood Pressure: 168/80 (01/18/24 1751)  Pulse: 80 (01/18/24 1751)  Temperature: 97.7 °F (36.5 °C) (01/18/24 1149)  Temp Source: Oral (01/18/24 1149)  Respirations: 16 (01/18/24 1751)  SpO2: 100 % (01/18/24 1751)    Constitutional:  Non-toxic appearance  Eyes:  EOMI, No scleral icterus   HENT:   oropharynx moist, external ears normal, external nose normal   Respiratory:  No respiratory distress, no wheezing   Cardiovascular:  Normal rate, no murmurs   GI:  Soft, +tenderness, +bowel sounds  :  No costovertebral angle tenderness   Musculoskeletal:  no tenderness, no deformities. Back- no tenderness  Integument:  no jaundice, no rash   Neurologic:  Alert &awake, communicative, CN 2-12 normal,  no focal deficits noted   Psychiatric:  Speech and behavior appropriate       Lab Results: I have personally reviewed pertinent reports.  , I have personally reviewed pertinent films in PACS, and I have personally reviewed pertinent films in PACS with a Radiologist.    Results from last 7 days   Lab Units 01/18/24  1208   WBC Thousand/uL 6.35   HEMOGLOBIN g/dL 12.1   HEMATOCRIT % 37.6   PLATELETS Thousands/uL 153   NEUTROS PCT % 72   LYMPHS PCT % 17   MONOS PCT % 9   EOS PCT % 1     Results from last 7 days   Lab Units 01/18/24  1208   POTASSIUM mmol/L 3.9   CHLORIDE mmol/L 102   CO2 mmol/L 25   BUN mg/dL 15   CREATININE mg/dL 0.66   CALCIUM mg/dL 9.0   ALK PHOS U/L 81   ALT U/L 8   AST U/L 12*             Imaging: I have personally reviewed pertinent reports.  , I have personally reviewed pertinent films in PACS, and I have personally reviewed pertinent films in PACS with a Radiologist.    CT abdomen pelvis with contrast    Result Date: 1/18/2024  Narrative: CT ABDOMEN AND PELVIS WITH IV CONTRAST INDICATION:  diffuse lower abd pain, constipation, difficulty urinating, dysuria. COMPARISON: CT abdomen/pelvis 7/21/2023. TECHNIQUE: CT examination of the abdomen and pelvis was performed. Multiplanar 2D reformatted images were created from the source data. This examination, like all CT scans performed in the [facility], was performed utilizing techniques to minimize radiation dose exposure, including the use of iterative reconstruction and automated exposure control. Radiation dose length product (DLP) for this visit: 475.2 mGy-cm IV Contrast: 100 mL of iohexol (OMNIPAQUE) Enteric Contrast: Enteric contrast was not administered. FINDINGS: ABDOMEN LOWER CHEST: No clinically significant abnormality identified in the visualized lower chest. LIVER/BILIARY TREE: Unremarkable. GALLBLADDER: No calcified gallstones. No pericholecystic inflammatory change. SPLEEN: Unremarkable. PANCREAS: Unremarkable. ADRENAL GLANDS: Unremarkable. KIDNEYS/URETERS: No hydronephrosis or urinary tract calculus. One or more simple cysts and sharply circumscribed subcentimeter renal hypodensities are present, too small to accurately characterize, and statistically most likely benign findings. According  to recent literature (Radiology 2019) no further workup of these findings is recommended. STOMACH AND BOWEL: Large amount of stool throughout the colon, including within the rectum. No wall thickening or surrounding fat stranding to suggest stercoral colitis. Small bowel is within normal limits APPENDIX: No findings to suggest appendicitis. ABDOMINOPELVIC CAVITY: No ascites. No pneumoperitoneum. No lymphadenopathy. VESSELS: Atherosclerotic changes are present. No evidence of aneurysm. Mild bilateral common iliac artery ectasia. PELVIS REPRODUCTIVE ORGANS: Unremarkable for patient's age. URINARY BLADDER: Distended, but otherwise within normal limits. ABDOMINAL WALL/INGUINAL REGIONS: Unremarkable. OSSEOUS STRUCTURES: No acute fracture or  destructive osseous lesion.  Spinal degenerative changes are noted. Bilateral L5 pars defects with grade 1 anterolisthesis of L5 on S1. Moderate bilateral hip joint osteoarthritis.     Impression: Large amount of stool throughout the colon and rectum. Correlate for rectal fecal impaction. Workstation performed: [id number]       Total time for visit, including counseling/coordination of care: 45 minutes. Greater than 50% of this total time spent on direct patient counseling and coorination of care.     Epic Records Reviewed as well as Records in Care Everywhere    ** Please Note: Dragon 360 Dictation voice to text software was used in the creation of this document. **

## 2024-01-19 NOTE — ASSESSMENT & PLAN NOTE
Most recent imaging reported as stable   Denies Headache or any unilateral weakness  Follows with NS outpatient

## 2024-01-19 NOTE — PHYSICAL THERAPY NOTE
"   PT Evaluation       01/19/24 1003   PT Last Visit   PT Visit Date 01/19/24   Note Type   Note type Evaluation   Pain Assessment   Pain Score   (\"better than yesterday\", pt was unable to provide a numeric value)   Pain Location/Orientation Location: Abdomen   Restrictions/Precautions   Weight Bearing Precautions Per Order No   Braces or Orthoses   (none)   Other Precautions Pain;Impulsive;Cognitive   Home Living   Type of Home House   Home Layout One level   Bathroom Shower/Tub Walk-in shower   Bathroom Toilet Standard   Home Equipment   (none)   Prior Function   Level of [place] Independent with ADLs;Independent with functional mobility;Independent with IADLS   Lives With Spouse   IADLs Independent with driving;Independent with meal prep;Independent with medication management   Falls in the last 6 months 0   Vocational Retired  (Pension)   General   Family/Caregiver Present No   Cognition   Overall Cognitive Status WFL   Attention Attends with cues to redirect   Orientation Level Oriented X4   Following Commands Follows one step commands with increased time or repetition   Subjective   Subjective My pain is better than yesterday   RUE Assessment   RUE Assessment   (See OT eval)   LUE Assessment   LUE Assessment   (See OT eval)   RLE Assessment   RLE Assessment WFL   LLE Assessment   LLE Assessment WFL   Bed Mobility   Sit to Supine 5  Supervision   Additional items HOB elevated;Increased time required;Impulsive;Verbal cues   Additional Comments   (pt retrieved supine in his bed and returned to his bed sitting at EOB)   Transfers   Sit to Stand 5  Supervision   Additional items Impulsive;Verbal cues   Stand to Sit 5  Supervision   Additional items Impulsive;Verbal cues   Ambulation/Elevation   Gait pattern Forward Flexion;Antalgic;Short stride   Gait Assistance 5  Supervision   Additional items Verbal cues   Assistive Device   (none)   Distance 80 ft x 2   Balance   Static Sitting Fair +   Dynamic Sitting " Fair +   Static Standing Fair +   Dynamic Standing Fair +   Ambulatory Fair +   Endurance Deficit   Endurance Deficit No   Activity Tolerance   Activity Tolerance Patient tolerated treatment well   Medical Staff Made Aware OT   Nurse Made Aware yes   Assessment   Prognosis Good   Problem List Impaired balance;Decreased safety awareness;Pain   Assessment Pt is a 78 y.o. male presenting to [facility] on  1/18/2024 1120 w/ primary complaint of abdominal pain secondary to stool constipation. Pt's PMH is significant for type 2 diabetes, RUQ abdominal pain, unstable angina, and HTN. Pt's PLOF is independent w/ ADLs, iADLs, and mobility. Pt's home set up consists of 1 level house where he lives w/ his wife. Upon evaluation today, pt presents with the following impairments: decreased balance and impulsivity w/ mobility. Pt was noted walking around the halls independently before physical therapy entered for evaluation. Pt was able to perform a supine to sit independently (supervision provided, but did not require any physical assist). Pt was able to perform a sit to stand independently(supervision provided,but did not require any physical assist). Pt was able to ambulate 100 ft x 2 w/ supervision (contact guard was provided until patient was deemed safe to ambulate without physical assist). Pt does not require physical therapy at this time. Pt's prognosis is good secondary to PLOF, age, motivation to return home, and lack of comorbidities. The patient's AM-[facility] Basic Mobility Inpatient Standardized Score is greater than 42.9, suggesting this patient may benefit from discharge to home. Please also refer to the recommendation of the Physical Therapist for safe discharge planning.   Plan   PT Frequency   (DC PT)   Discharge Recommendation   Rehab Resource Intensity Level, PT No post-acute rehabilitation needs   AM-PAC Basic Mobility Inpatient   Turning in Flat Bed Without Bedrails 4   Lying on Back to Sitting on Edge of  Flat Bed Without Bedrails 4   Moving Bed to Chair 4   Standing Up From Chair Using Arms 4   Walk in Room 4   Climb 3-5 Stairs With Railing 4   Basic Mobility Inpatient Raw Score 24   Basic Mobility Standardized Score 57.68   Highest Level Of Mobility   JH-HLM Goal 8: Walk 250 feet or more   JH-HLM Achieved 7: Walk 25 feet or more     Mckayla Portillo, SPT

## 2024-01-22 ENCOUNTER — OFFICE VISIT (OUTPATIENT)
Dept: FAMILY MEDICINE CLINIC | Facility: CLINIC | Age: 79
End: 2024-01-22
Payer: COMMERCIAL

## 2024-01-22 VITALS
SYSTOLIC BLOOD PRESSURE: 120 MMHG | WEIGHT: 168 LBS | OXYGEN SATURATION: 96 % | HEART RATE: 68 BPM | HEIGHT: 68 IN | RESPIRATION RATE: 17 BRPM | BODY MASS INDEX: 25.46 KG/M2 | TEMPERATURE: 94.9 F | DIASTOLIC BLOOD PRESSURE: 70 MMHG

## 2024-01-22 DIAGNOSIS — R63.4 WEIGHT LOSS, ABNORMAL: ICD-10-CM

## 2024-01-22 DIAGNOSIS — K59.09 OTHER CONSTIPATION: Primary | ICD-10-CM

## 2024-01-22 DIAGNOSIS — E11.9 TYPE 2 DIABETES MELLITUS WITHOUT COMPLICATION, WITHOUT LONG-TERM CURRENT USE OF INSULIN (HCC): ICD-10-CM

## 2024-01-22 DIAGNOSIS — C80.1 MALIGNANT SPINDLE CELL NEOPLASM (HCC): ICD-10-CM

## 2024-01-22 PROBLEM — M25.512 ACUTE PAIN OF LEFT SHOULDER: Status: RESOLVED | Noted: 2023-03-15 | Resolved: 2024-01-22

## 2024-01-22 PROBLEM — R00.2 PALPITATIONS: Status: RESOLVED | Noted: 2021-12-17 | Resolved: 2024-01-22

## 2024-01-22 PROBLEM — R07.89 OTHER CHEST PAIN: Status: RESOLVED | Noted: 2018-10-24 | Resolved: 2024-01-22

## 2024-01-22 PROBLEM — R10.13 EPIGASTRIC ABDOMINAL PAIN: Status: RESOLVED | Noted: 2021-11-18 | Resolved: 2024-01-22

## 2024-01-22 PROCEDURE — 99495 TRANSJ CARE MGMT MOD F2F 14D: CPT | Performed by: NURSE PRACTITIONER

## 2024-01-22 NOTE — ASSESSMENT & PLAN NOTE
Concerned and will monitor this    
Has declined treatment and follow up   Lesion on scalp    
Refer back to GI  Needs repeat colonoscopy  Try smooth move tea or lactulose for severe constipation  Has linzess, not sure he is taking    
Stable  Cont meds    Lab Results   Component Value Date    HGBA1C 6.6 (A) 09/28/2023     
No

## 2024-01-26 DIAGNOSIS — E11.9 TYPE 2 DIABETES MELLITUS WITHOUT COMPLICATION, WITHOUT LONG-TERM CURRENT USE OF INSULIN (HCC): ICD-10-CM

## 2024-01-26 RX ORDER — BLOOD SUGAR DIAGNOSTIC
STRIP MISCELLANEOUS
Qty: 100 STRIP | Refills: 1 | Status: SHIPPED | OUTPATIENT
Start: 2024-01-26

## 2024-01-29 ENCOUNTER — TELEPHONE (OUTPATIENT)
Dept: FAMILY MEDICINE CLINIC | Facility: CLINIC | Age: 79
End: 2024-01-29

## 2024-01-29 NOTE — TELEPHONE ENCOUNTER
Patient walked into office and stated that he hasn't had a bowl movent in 3-4 days. He wanted to know what he can do or take? Please advise

## 2024-02-24 DIAGNOSIS — K59.09 CHRONIC CONSTIPATION: ICD-10-CM

## 2024-02-26 DIAGNOSIS — K59.09 CHRONIC CONSTIPATION: ICD-10-CM

## 2024-02-26 RX ORDER — AMOXICILLIN 250 MG
2 CAPSULE ORAL DAILY
Qty: 60 TABLET | Refills: 3 | Status: SHIPPED | OUTPATIENT
Start: 2024-02-26

## 2024-02-26 RX ORDER — DOCUSATE SODIUM 100 MG/1
100 CAPSULE, LIQUID FILLED ORAL DAILY
Qty: 90 CAPSULE | Refills: 0 | Status: SHIPPED | OUTPATIENT
Start: 2024-02-26

## 2024-03-06 ENCOUNTER — OFFICE VISIT (OUTPATIENT)
Dept: GASTROENTEROLOGY | Facility: AMBULARY SURGERY CENTER | Age: 79
End: 2024-03-06
Payer: COMMERCIAL

## 2024-03-06 VITALS
OXYGEN SATURATION: 97 % | DIASTOLIC BLOOD PRESSURE: 68 MMHG | HEIGHT: 68 IN | SYSTOLIC BLOOD PRESSURE: 126 MMHG | BODY MASS INDEX: 25.25 KG/M2 | WEIGHT: 166.6 LBS | HEART RATE: 65 BPM

## 2024-03-06 DIAGNOSIS — Z86.010 HISTORY OF COLON POLYPS: ICD-10-CM

## 2024-03-06 DIAGNOSIS — K59.09 CHRONIC CONSTIPATION: Primary | ICD-10-CM

## 2024-03-06 PROBLEM — Z86.0100 HISTORY OF COLON POLYPS: Status: ACTIVE | Noted: 2024-03-06

## 2024-03-06 PROCEDURE — 99214 OFFICE O/P EST MOD 30 MIN: CPT | Performed by: INTERNAL MEDICINE

## 2024-03-06 RX ORDER — BISACODYL 5 MG/1
10 TABLET, DELAYED RELEASE ORAL ONCE
Qty: 2 TABLET | Refills: 0 | Status: SHIPPED | OUTPATIENT
Start: 2024-03-06 | End: 2024-03-06

## 2024-03-06 RX ORDER — LUBIPROSTONE 24 UG/1
24 CAPSULE ORAL 2 TIMES DAILY WITH MEALS
Qty: 60 CAPSULE | Refills: 11 | Status: SHIPPED | OUTPATIENT
Start: 2024-03-06

## 2024-03-06 NOTE — PATIENT INSTRUCTIONS
Scheduled date of colonoscopy (as of today): May 6, 2024  Physician performing colonoscopy: Dr. Bernal  Location of colonoscopy: [facility]  Bowel prep reviewed with patient: Golytely 2 day  Instructions reviewed with patient by: Sulema  Clearances: N/A

## 2024-03-06 NOTE — PROGRESS NOTES
Follow-up Note -  [facility]  Zaira Morley 1945 male         ASSESSMENT & PLAN:    Chronic constipation  Patient with history of chronic cough patient and reports weight loss because of decreased appetite.    -Will start on Amitiza 24 mcg twice daily    -Advised to continue current regimen and also may take lactulose as needed    -Schedule for colonoscopy.    -High-fiber diet.    -Patient was given instructions about the colonoscopy prep.    -Patient was explained about the risks and benefits of the procedure. Risks including but not limited to bleeding, infection, perforation were explained in detail. Also explained about less than 100% sensitivity with the exam and other alternatives.    History of colon polyps    -Colonoscopy      Reason: For follow-up colonoscopy    HPI:  Mr. Meneses was brought in by his daughter who helped with interpretation.  He has been having issues with constipation and was even hospitalized in January.  He was prescribed on lactulose but did not pick it up yet.  Linzess was stopped a few months ago since it was not helping him.  Currently he is taking Senokot, MiraLAX and stool softeners.  Denies any abdominal pain, nausea or vomiting.  Complaining about some burning.  Denies any blood or mucus in the stool.  He is not eating much because of constipation and losing weight.  CT scan was unremarkable other than constipation    Last EGD and colonoscopies were in August 2023 and was advised for repeat colonoscopy in 6 months because of poor bowel prep.    Chaperon: MsTiti Damari    REVIEW OF SYSTEMS: Review of Systems   Constitutional:  Positive for appetite change and unexpected weight change. Negative for activity change, chills, diaphoresis, fatigue and fever.   HENT:  Negative for ear discharge, ear pain, facial swelling, hearing loss, nosebleeds, sore throat, tinnitus and voice change.    Eyes:  Negative for pain, discharge, redness, itching and visual disturbance.    Respiratory:  Negative for apnea, cough, chest tightness, shortness of breath and wheezing.    Cardiovascular:  Negative for chest pain and palpitations.   Gastrointestinal:         As noted in HPI   Endocrine: Negative for cold intolerance, heat intolerance and polyuria.   Genitourinary:  Negative for difficulty urinating, dysuria, flank pain, hematuria and urgency.   Musculoskeletal:  Negative for arthralgias, back pain, gait problem, joint swelling and myalgias.   Skin:  Negative for rash and wound.   Neurological:  Negative for dizziness, tremors, seizures, speech difficulty, light-headedness, numbness and headaches.   Hematological:  Negative for adenopathy. Does not bruise/bleed easily.   Psychiatric/Behavioral:  Negative for agitation, behavioral problems and confusion. The patient is not nervous/anxious.         Past Medical History:   Diagnosis Date    Brain mass     Last Assessed; 11/5/2015    Chest pain     Last Assessed: 1/22/2015    Diabetes type 2, uncontrolled     Last Assessed: 3/1/2016    Edema of left lower extremity     Last Assessed: 3/22/2017    GERD (gastroesophageal reflux disease)     Hyperlipidemia     Hypertension     Impaired fasting glucose     Last Assessed: 11/10/2015    Irregular heartbeat     Skin cancer (melanoma) (HCC)     Scalp     Varicose 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     Last Assessed: 3/22/2017      Past Surgical History:   Procedure Laterality Date    COLONOSCOPY      COLONOSCOPY N/A 7/10/2018    Procedure: COLONOSCOPY;  Surgeon: Jose Alejandro Rodrigues MD;  Location: [facility];  Service: Colorectal    INGUINAL HERNIA REPAIR      20+ years ago - [place]; Last Assessed: 10/23/2014    TONSILLECTOMY      VARICOSE VEIN SURGERY Left     x2 left leg - 40+ yrs ago, [place] and [place]     Social History     Socioeconomic History    Marital status: /Civil Union     Spouse name: Not on file    Number of children: 5    Years of education: Not on file    Highest education level: Not  on file   Occupational History    Occupation: Retired: Construction   Tobacco Use    Smoking status: Never    Smokeless tobacco: Never   Vaping Use    Vaping status: Never Used   Substance and Sexual Activity    Alcohol use: No    Drug use: No    Sexual activity: Not Currently     Partners: Female   Other Topics Concern    Not on file   Social History Narrative    Uses safety equipment         Social Determinants of Health     Financial Resource Strain: Low Risk  (7/27/2023)    Overall Financial Resource Strain (CARDIA)     Difficulty of Paying Living Expenses: Not very hard   Food Insecurity: Not on file   Transportation Needs: No Transportation Needs (7/27/2023)    PRAPARE - Transportation     Lack of Transportation (Medical): No     Lack of Transportation (Non-Medical): No   Physical Activity: Not on file   Stress: Not on file   Social Connections: Not on file   Intimate Partner Violence: Not on file   Housing Stability: Not on file     Family History   Problem Relation Age of Onset    No Known Problems Mother     No Known Problems Father     Melanoma Daughter 17     Patient has no known allergies.  Current Outpatient Medications   Medication Sig Dispense Refill    acetaminophen (TYLENOL) 325 mg tablet Take 2 tablets (650 mg total) by mouth every 6 (six) hours as needed for mild pain or fever 30 tablet 0    amLODIPine (NORVASC) 5 mg tablet TAKE 1 TABLET (5 MG TOTAL) BY MOUTH DAILY. 90 tablet 0    aspirin 81 MG tablet Take 81 mg by mouth daily      atorvastatin (LIPITOR) 10 mg tablet TAKE 1 TABLET BY MOUTH EVERY DAY 90 tablet 1    bisacodyl (DULCOLAX) 5 mg EC tablet Take 2 tablets (10 mg total) by mouth once for 1 dose 2 tablet 0    Blood Glucose Monitoring Suppl (OneTouch Verio Reflect) w/Device KIT Check blood sugars once daily. Please substitute with appropriate alternative as covered by patient's insurance. Dx: E11.65 1 kit 0    colchicine (COLCRYS) 0.6 mg tablet TAKE 1 TABLET BY MOUTH EVERY DAY 90 tablet 0     "docusate sodium (COLACE) 100 mg capsule Take 1 capsule (100 mg total) by mouth in the morning 90 capsule 0    famotidine (PEPCID) 20 mg tablet TAKE 1 TABLET BY MOUTH TWICE A  tablet 1    gabapentin (Neurontin) 100 mg capsule Take 1 capsule (100 mg total) by mouth daily at bedtime 30 capsule 2    glucose blood (OneTouch Verio) test strip CHECK BLOOD SUGARS ONCE DAILY. 100 strip 1    hydrocortisone 2.5 % cream APPLY TO AFFECTED AREA TWICE A DAY 28.35 g 0    lactulose (CHRONULAC) 10 g/15 mL solution Daily prn for severe constipation 473 mL 0    Lancets (OneTouch Delica Plus Geufdt87D) MISC TEST DAILY, E11.5--PT CONFIMED DELICA 33 G 100 each 11    lisinopril (ZESTRIL) 20 mg tablet TAKE 1 TABLET BY MOUTH EVERY DAY 90 tablet 0    lubiprostone (AMITIZA) 24 mcg capsule Take 1 capsule (24 mcg total) by mouth 2 (two) times a day with meals 60 capsule 11    metFORMIN (GLUCOPHAGE) 500 mg tablet TAKE 1 TABLET BY MOUTH TWICE A  tablet 1    methocarbamol (ROBAXIN) 500 mg tablet TAKE 1 TABLET (500 MG TOTAL) BY MOUTH DAILY AT BEDTIME 30 tablet 0    omeprazole (PriLOSEC) 40 MG capsule TAKE 1 CAPSULE (40 MG TOTAL) BY MOUTH DAILY. 90 capsule 1    OneTouch Delica Lancets 33G MISC Check blood sugars once daily. Please substitute with appropriate alternative as covered by patient's insurance. Dx: E11.65 100 each 3    polyethylene glycol (GOLYTELY) 4000 mL solution Take 4,000 mL by mouth once for 1 dose 4000 mL 0    polyethylene glycol (MIRALAX) 17 g packet Take 17 g by mouth daily Prn constipation 20 each 0    senna-docusate sodium (SENOKOT S) 8.6-50 mg per tablet Take 2 tablets by mouth daily 60 tablet 3    tamsulosin (FLOMAX) 0.4 mg Take 1 capsule (0.4 mg total) by mouth daily with dinner 30 capsule 1     No current facility-administered medications for this visit.       Blood pressure 126/68, pulse 65, height 5' 8\" (1.727 m), weight 75.6 kg (166 lb 9.6 oz), SpO2 97%.    PHYSICAL EXAM: Physical Exam  Constitutional:       " Appearance: He is well-developed.   HENT:      Head: Normocephalic and atraumatic.   Eyes:      General: No scleral icterus.        Right eye: No discharge.         Left eye: No discharge.      Conjunctiva/sclera: Conjunctivae normal.      Pupils: Pupils are equal, round, and reactive to light.   Neck:      Thyroid: No thyromegaly.      Vascular: No JVD.      Trachea: No tracheal deviation.   Cardiovascular:      Rate and Rhythm: Normal rate and regular rhythm.      Heart sounds: Normal heart sounds. No murmur heard.     No friction rub. No gallop.   Pulmonary:      Effort: Pulmonary effort is normal. No respiratory distress.      Breath sounds: Normal breath sounds. No wheezing or rales.   Chest:      Chest wall: No tenderness.   Abdominal:      General: Bowel sounds are normal. There is no distension.      Palpations: Abdomen is soft. There is no mass.      Tenderness: There is no abdominal tenderness. There is no guarding or rebound.      Hernia: No hernia is present.   Musculoskeletal:      Cervical back: Neck supple.   Lymphadenopathy:      Cervical: No cervical adenopathy.   Skin:     General: Skin is warm and dry.      Findings: No erythema or rash.   Neurological:      Mental Status: He is alert and oriented to person, place, and time.   Psychiatric:         Behavior: Behavior normal.         Thought Content: Thought content normal.          Lab Results   Component Value Date    WBC 5.33 01/19/2024    HGB 11.8 (L) 01/19/2024    HCT 35.0 (L) 01/19/2024    MCV 85 01/19/2024     01/19/2024     Lab Results   Component Value Date    GLUCOSE 121 10/28/2015    CALCIUM 8.9 01/19/2024     10/28/2015    K 3.7 01/19/2024    CO2 26 01/19/2024     01/19/2024    BUN 12 01/19/2024    CREATININE 0.56 (L) 01/19/2024     Lab Results   Component Value Date    ALT 9 01/19/2024    AST 12 (L) 01/19/2024    GGT 12 12/31/2022    ALKPHOS 76 01/19/2024    BILITOT 0.53 08/27/2015     Lab Results   Component Value  Date    INR 1.21 (H) 01/19/2024    INR 1.12 04/30/2021    PROTIME 15.2 (H) 01/19/2024    PROTIME 14.5 04/30/2021       CT abdomen pelvis with contrast    Result Date: 1/18/2024  Impression: Large amount of stool throughout the colon and rectum. Correlate for rectal fecal impaction. Workstation performed: [id number]

## 2024-03-06 NOTE — ASSESSMENT & PLAN NOTE
Patient with history of chronic cough patient and reports weight loss because of decreased appetite.    -Will start on Amitiza 24 mcg twice daily    -Advised to continue current regimen and also may take lactulose as needed    -Schedule for colonoscopy.    -High-fiber diet.    -Patient was given instructions about the colonoscopy prep.    -Patient was explained about the risks and benefits of the procedure. Risks including but not limited to bleeding, infection, perforation were explained in detail. Also explained about less than 100% sensitivity with the exam and other alternatives.

## 2024-03-07 DIAGNOSIS — K59.09 CHRONIC CONSTIPATION: ICD-10-CM

## 2024-03-07 RX ORDER — LACTULOSE 10 G/15ML
SOLUTION ORAL
Qty: 473 ML | Refills: 0 | Status: SHIPPED | OUTPATIENT
Start: 2024-03-07

## 2024-03-25 ENCOUNTER — APPOINTMENT (OUTPATIENT)
Dept: LAB | Facility: CLINIC | Age: 79
End: 2024-03-25
Payer: COMMERCIAL

## 2024-03-25 ENCOUNTER — OFFICE VISIT (OUTPATIENT)
Dept: FAMILY MEDICINE CLINIC | Facility: CLINIC | Age: 79
End: 2024-03-25
Payer: COMMERCIAL

## 2024-03-25 ENCOUNTER — HOSPITAL ENCOUNTER (OUTPATIENT)
Dept: RADIOLOGY | Facility: HOSPITAL | Age: 79
Discharge: HOME | End: 2024-03-25
Payer: COMMERCIAL

## 2024-03-25 VITALS
HEART RATE: 62 BPM | RESPIRATION RATE: 17 BRPM | OXYGEN SATURATION: 99 % | HEIGHT: 68 IN | SYSTOLIC BLOOD PRESSURE: 130 MMHG | BODY MASS INDEX: 25.01 KG/M2 | TEMPERATURE: 96.3 F | WEIGHT: 165 LBS | DIASTOLIC BLOOD PRESSURE: 60 MMHG

## 2024-03-25 DIAGNOSIS — R41.3 MEMORY PROBLEM: ICD-10-CM

## 2024-03-25 DIAGNOSIS — R50.9 FEVER, UNSPECIFIED FEVER CAUSE: ICD-10-CM

## 2024-03-25 DIAGNOSIS — R05.1 ACUTE COUGH: ICD-10-CM

## 2024-03-25 DIAGNOSIS — E11.9 TYPE 2 DIABETES MELLITUS WITHOUT COMPLICATION, WITHOUT LONG-TERM CURRENT USE OF INSULIN (HCC): Primary | ICD-10-CM

## 2024-03-25 DIAGNOSIS — E11.36 TYPE 2 DIABETES MELLITUS WITH DIABETIC CATARACT, WITHOUT LONG-TERM CURRENT USE OF INSULIN (HCC): ICD-10-CM

## 2024-03-25 DIAGNOSIS — C80.1 MALIGNANT SPINDLE CELL NEOPLASM (HCC): ICD-10-CM

## 2024-03-25 DIAGNOSIS — I10 PRIMARY HYPERTENSION: ICD-10-CM

## 2024-03-25 DIAGNOSIS — I20.0 UNSTABLE ANGINA (HCC): ICD-10-CM

## 2024-03-25 DIAGNOSIS — E11.9 TYPE 2 DIABETES MELLITUS WITHOUT COMPLICATION, WITHOUT LONG-TERM CURRENT USE OF INSULIN (HCC): ICD-10-CM

## 2024-03-25 DIAGNOSIS — K59.09 CHRONIC CONSTIPATION: ICD-10-CM

## 2024-03-25 DIAGNOSIS — D32.9 MENINGIOMA (HCC): ICD-10-CM

## 2024-03-25 LAB
ALBUMIN SERPL BCP-MCNC: 4 G/DL (ref 3.5–5)
ALP SERPL-CCNC: 75 U/L (ref 34–104)
ALT SERPL W P-5'-P-CCNC: 7 U/L (ref 7–52)
ANION GAP SERPL CALCULATED.3IONS-SCNC: 4 MMOL/L (ref 4–13)
AST SERPL W P-5'-P-CCNC: 10 U/L (ref 13–39)
BASOPHILS # BLD AUTO: 0.02 THOUSANDS/ÂΜL (ref 0–0.1)
BASOPHILS NFR BLD AUTO: 0 % (ref 0–1)
BILIRUB SERPL-MCNC: 0.64 MG/DL (ref 0.2–1)
BUN SERPL-MCNC: 17 MG/DL (ref 5–25)
CALCIUM SERPL-MCNC: 8.8 MG/DL (ref 8.4–10.2)
CHLORIDE SERPL-SCNC: 108 MMOL/L (ref 96–108)
CO2 SERPL-SCNC: 28 MMOL/L (ref 21–32)
CREAT SERPL-MCNC: 0.64 MG/DL (ref 0.6–1.3)
EOSINOPHIL # BLD AUTO: 0.08 THOUSAND/ÂΜL (ref 0–0.61)
EOSINOPHIL NFR BLD AUTO: 1 % (ref 0–6)
ERYTHROCYTE [DISTWIDTH] IN BLOOD BY AUTOMATED COUNT: 13.8 % (ref 11.6–15.1)
GFR SERPL CREATININE-BSD FRML MDRD: 94 ML/MIN/1.73SQ M
GLUCOSE SERPL-MCNC: 113 MG/DL (ref 65–140)
HCT VFR BLD AUTO: 37.9 % (ref 36.5–49.3)
HGB BLD-MCNC: 12.3 G/DL (ref 12–17)
IMM GRANULOCYTES # BLD AUTO: 0.03 THOUSAND/UL (ref 0–0.2)
IMM GRANULOCYTES NFR BLD AUTO: 0 % (ref 0–2)
LYMPHOCYTES # BLD AUTO: 1.4 THOUSANDS/ÂΜL (ref 0.6–4.47)
LYMPHOCYTES NFR BLD AUTO: 17 % (ref 14–44)
MCH RBC QN AUTO: 28.6 PG (ref 26.8–34.3)
MCHC RBC AUTO-ENTMCNC: 32.5 G/DL (ref 31.4–37.4)
MCV RBC AUTO: 88 FL (ref 82–98)
MONOCYTES # BLD AUTO: 0.72 THOUSAND/ÂΜL (ref 0.17–1.22)
MONOCYTES NFR BLD AUTO: 9 % (ref 4–12)
NEUTROPHILS # BLD AUTO: 6.01 THOUSANDS/ÂΜL (ref 1.85–7.62)
NEUTS SEG NFR BLD AUTO: 73 % (ref 43–75)
NRBC BLD AUTO-RTO: 0 /100 WBCS
PLATELET # BLD AUTO: 147 THOUSANDS/UL (ref 149–390)
PMV BLD AUTO: 9.7 FL (ref 8.9–12.7)
POTASSIUM SERPL-SCNC: 3.8 MMOL/L (ref 3.5–5.3)
PROT SERPL-MCNC: 6.4 G/DL (ref 6.4–8.4)
RBC # BLD AUTO: 4.3 MILLION/UL (ref 3.88–5.62)
SL AMB  POCT GLUCOSE, UA: NORMAL
SL AMB LEUKOCYTE ESTERASE,UA: NORMAL
SL AMB POCT BILIRUBIN,UA: NORMAL
SL AMB POCT BLOOD,UA: NORMAL
SL AMB POCT CLARITY,UA: NORMAL
SL AMB POCT COLOR,UA: YELLOW
SL AMB POCT HEMOGLOBIN AIC: 5.6 (ref ?–6.5)
SL AMB POCT KETONES,UA: NORMAL
SL AMB POCT NITRITE,UA: NORMAL
SL AMB POCT PH,UA: 5
SL AMB POCT SPECIFIC GRAVITY,UA: 1.01
SL AMB POCT URINE PROTEIN: NORMAL
SL AMB POCT UROBILINOGEN: 0.2
SODIUM SERPL-SCNC: 140 MMOL/L (ref 135–147)
TSH SERPL DL<=0.05 MIU/L-ACNC: 0.97 UIU/ML (ref 0.45–4.5)
WBC # BLD AUTO: 8.26 THOUSAND/UL (ref 4.31–10.16)

## 2024-03-25 PROCEDURE — 80053 COMPREHEN METABOLIC PANEL: CPT

## 2024-03-25 PROCEDURE — 36415 COLL VENOUS BLD VENIPUNCTURE: CPT

## 2024-03-25 PROCEDURE — 85025 COMPLETE CBC W/AUTO DIFF WBC: CPT

## 2024-03-25 PROCEDURE — 83036 HEMOGLOBIN GLYCOSYLATED A1C: CPT | Performed by: NURSE PRACTITIONER

## 2024-03-25 PROCEDURE — 71046 X-RAY EXAM CHEST 2 VIEWS: CPT

## 2024-03-25 PROCEDURE — 99214 OFFICE O/P EST MOD 30 MIN: CPT | Performed by: NURSE PRACTITIONER

## 2024-03-25 PROCEDURE — 81002 URINALYSIS NONAUTO W/O SCOPE: CPT | Performed by: NURSE PRACTITIONER

## 2024-03-25 PROCEDURE — 87086 URINE CULTURE/COLONY COUNT: CPT

## 2024-03-25 PROCEDURE — 84443 ASSAY THYROID STIM HORMONE: CPT

## 2024-03-25 NOTE — PROGRESS NOTES
Name: Zaira Morley      : 1945      MRN: [id number]  Encounter Provider: ZAHRA Rice  Encounter Date: 3/25/2024   Encounter department: [facility]    Assessment & Plan     1. Type 2 diabetes mellitus without complication, without long-term current use of insulin (HCC)  Assessment & Plan:  Decrease metformin to once daily     Lab Results   Component Value Date    HGBA1C 5.6 2024       Orders:  -     POCT hemoglobin A1c  -     Urine culture; Future  -     POCT urine dip  -     Comprehensive metabolic panel; Future  -     CBC and differential; Future  -     TSH, 3rd generation with Free T4 reflex; Future  -     metFORMIN (GLUCOPHAGE) 500 mg tablet; Take 1 tablet (500 mg total) by mouth daily with breakfast    2. Type 2 diabetes mellitus with diabetic cataract, without long-term current use of insulin (HCC)  Assessment & Plan:  Decrease metformin to once daily     Lab Results   Component Value Date    HGBA1C 5.6 2024       Orders:  -     Comprehensive metabolic panel; Future  -     CBC and differential; Future  -     TSH, 3rd generation with Free T4 reflex; Future    3. 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HCC)  Assessment & Plan:  Discussed with patient and daughter again about skin lesion and treatment/follow up  Now willing to get treatment and follow up  Will refer back to dr schultz who last saw patient in   Patient had seen but never went back for follow up      Orders:  -     Ambulatory Referral to Surgical Oncology; Future  -     Comprehensive metabolic panel; Future  -     CBC and differential; Future  -     TSH, 3rd generation with Free T4 reflex; Future    4. Memory problem  -     MRI brain NeuroQuant wo and w contrast; Future; Expected date: 2024  -     Urine culture; Future  -     POCT urine dip  -     Comprehensive metabolic panel; Future  -     CBC and differential; Future  -     TSH, 3rd generation with Free T4 reflex; Future    5. Acute cough  -      XR chest pa & lateral; Future; Expected date: 03/25/2024  -     Comprehensive metabolic panel; Future  -     CBC and differential; Future  -     TSH, 3rd generation with Free T4 reflex; Future    6. Fever, unspecified fever cause  -     Urine culture; Future  -     POCT urine dip  -     Comprehensive metabolic panel; Future  -     CBC and differential; Future  -     TSH, 3rd generation with Free T4 reflex; Future    7. Primary hypertension  Assessment & Plan:  Stable  Cont meds      Orders:  -     Comprehensive metabolic panel; Future  -     CBC and differential; Future  -     TSH, 3rd generation with Free T4 reflex; Future    8. Chronic constipation  Assessment & Plan:  Cont meds  Cont f/u with gi      Orders:  -     Comprehensive metabolic panel; Future  -     CBC and differential; Future  -     TSH, 3rd generation with Free T4 reflex; Future    9. Meningioma (HCC)  Assessment & Plan:  Cont f/u with neurosurgery               Subjective     Here for f/u to chronic medical conditions  Had chills and shakes last night  Coughing  Fever  Was confused last night  No congestion  No sore throat  Started two days ago  Took no meds  Better today  Daughter concerned he has been more confused lately, agustin here with patient today  Daughter will make sure patient will follow through with testing and visits with specialty    Here for f/u to diabetes, htn,   Denies cp or sob    Long discussion with daughter and patient regarding his dx of spindle cell skin cancer and not getting any treatment  They are now willing to get treatment for this  Last seen by dr schultz in 2021     Bp stable and taking meds without side affects            Review of Systems   Constitutional:  Positive for fatigue, fever and unexpected weight change.   HENT:  Negative for congestion, ear pain, postnasal drip, rhinorrhea, sinus pressure and sore throat.    Eyes:  Negative for photophobia and visual disturbance.   Respiratory:  Positive for cough.  Negative for shortness of breath and wheezing.    Cardiovascular:  Negative for chest pain and palpitations.   Gastrointestinal:  Positive for constipation. Negative for diarrhea, nausea and vomiting.   Genitourinary:  Negative for dysuria and frequency.   Musculoskeletal:  Positive for arthralgias and myalgias. Negative for gait problem.   Skin:  Negative for rash.   Neurological:  Positive for headaches. Negative for dizziness, light-headedness and numbness.   Hematological:  Negative for adenopathy.   Psychiatric/Behavioral:  Negative for dysphoric mood and sleep disturbance. The patient is not nervous/anxious.        Past Medical History:   Diagnosis Date    Brain mass     Last Assessed; 11/5/2015    Chest pain     Last Assessed: 1/22/2015    Diabetes type 2, uncontrolled     Last Assessed: 3/1/2016    Edema of left lower extremity     Last Assessed: 3/22/2017    GERD (gastroesophageal reflux disease)     Hyperlipidemia     Hypertension     Impaired fasting glucose     Last Assessed: 11/10/2015    Irregular heartbeat     Skin cancer (melanoma) (HCC)     Scalp     Varicose 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     Last Assessed: 3/22/2017     Past Surgical History:   Procedure Laterality Date    COLONOSCOPY      COLONOSCOPY N/A 7/10/2018    Procedure: COLONOSCOPY;  Surgeon: Jose Alejandro Rodrigues MD;  Location: [facility];  Service: Colorectal    INGUINAL HERNIA REPAIR      20+ years ago - [place]; Last Assessed: 10/23/2014    TONSILLECTOMY      VARICOSE VEIN SURGERY Left     x2 left leg - 40+ yrs ago, [place] and [place]     Family History   Problem Relation Age of Onset    No Known Problems Mother     No Known Problems Father     Melanoma Daughter 17     Social History     Socioeconomic History    Marital status: /Civil Union     Spouse name: None    Number of children: 5    Years of education: None    Highest education level: None   Occupational History    Occupation: Retired: Construction   Tobacco Use    Smoking  status: Never    Smokeless tobacco: Never   Vaping Use    Vaping status: Never Used   Substance and Sexual Activity    Alcohol use: No    Drug use: No    Sexual activity: Not Currently     Partners: Female   Other Topics Concern    None   Social History Narrative    Uses safety equipment         Social Determinants of Health     Financial Resource Strain: Low Risk  (7/27/2023)    Overall Financial Resource Strain (CARDIA)     Difficulty of Paying Living Expenses: Not very hard   Food Insecurity: Not on file   Transportation Needs: No Transportation Needs (7/27/2023)    PRAPARE - Transportation     Lack of Transportation (Medical): No     Lack of Transportation (Non-Medical): No   Physical Activity: Not on file   Stress: Not on file   Social Connections: Not on file   Intimate Partner Violence: Not on file   Housing Stability: Not on file     Current Outpatient Medications on File Prior to Visit   Medication Sig    acetaminophen (TYLENOL) 325 mg tablet Take 2 tablets (650 mg total) by mouth every 6 (six) hours as needed for mild pain or fever    amLODIPine (NORVASC) 5 mg tablet TAKE 1 TABLET (5 MG TOTAL) BY MOUTH DAILY.    aspirin 81 MG tablet Take 81 mg by mouth daily    atorvastatin (LIPITOR) 10 mg tablet TAKE 1 TABLET BY MOUTH EVERY DAY    Blood Glucose Monitoring Suppl (OneTouch Verio Reflect) w/Device KIT Check blood sugars once daily. Please substitute with appropriate alternative as covered by patient's insurance. Dx: E11.65    colchicine (COLCRYS) 0.6 mg tablet TAKE 1 TABLET BY MOUTH EVERY DAY    docusate sodium (COLACE) 100 mg capsule Take 1 capsule (100 mg total) by mouth in the morning    famotidine (PEPCID) 20 mg tablet TAKE 1 TABLET BY MOUTH TWICE A DAY    gabapentin (Neurontin) 100 mg capsule Take 1 capsule (100 mg total) by mouth daily at bedtime    glucose blood (OneTouch Verio) test strip CHECK BLOOD SUGARS ONCE DAILY.    hydrocortisone 2.5 % cream APPLY TO AFFECTED AREA TWICE A DAY    lactulose  (CHRONULAC) 10 g/15 mL solution Daily prn for severe constipation    Lancets (OneTouch Delica Plus [id number]) MISC TEST DAILY, E11.5--PT CONFIMED DELICA 33 G    lisinopril (ZESTRIL) 20 mg tablet TAKE 1 TABLET BY MOUTH EVERY DAY    lubiprostone (AMITIZA) 24 mcg capsule Take 1 capsule (24 mcg total) by mouth 2 (two) times a day with meals    methocarbamol (ROBAXIN) 500 mg tablet TAKE 1 TABLET (500 MG TOTAL) BY MOUTH DAILY AT BEDTIME    omeprazole (PriLOSEC) 40 MG capsule TAKE 1 CAPSULE (40 MG TOTAL) BY MOUTH DAILY.    OneTouch Delica Lancets 33G MISC Check blood sugars once daily. Please substitute with appropriate alternative as covered by patient's insurance. Dx: E11.65    polyethylene glycol (MIRALAX) 17 g packet Take 17 g by mouth daily Prn constipation    senna-docusate sodium (SENOKOT S) 8.6-50 mg per tablet Take 2 tablets by mouth daily    tamsulosin (FLOMAX) 0.4 mg Take 1 capsule (0.4 mg total) by mouth daily with dinner    bisacodyl (DULCOLAX) 5 mg EC tablet Take 2 tablets (10 mg total) by mouth once for 1 dose    polyethylene glycol (GOLYTELY) 4000 mL solution Take 4,000 mL by mouth once for 1 dose     No Known Allergies  Immunization History   Administered Date(s) Administered    COVID-19 PFIZER VACCINE 0.3 ML IM 03/29/2021, 04/21/2021, 11/09/2021    COVID-19 Pfizer Vac BIVALENT Raphael-sucrose 12 Yr+ IM 11/21/2022    INFLUENZA 10/27/2015, 10/01/2016, 10/13/2016, 12/19/2018    Influenza Split High Dose Preservative Free IM 10/23/2014, 10/27/2015, 10/01/2016, 10/13/2016, 10/13/2016, 10/13/2016    Influenza, high dose seasonal 0.7 mL 12/19/2018, 10/14/2019, 10/05/2021    Pneumococcal Conjugate 13-Valent 08/25/2015, 01/19/2017, 10/14/2019    Pneumococcal Conjugate Vaccine 20-valent (Pcv20), Polysace 06/01/2023    Pneumococcal Polysaccharide PPV23 10/05/2021    Tdap 06/01/2023       Objective     /60 (BP Location: Right arm, Patient Position: Sitting, Cuff Size: Large)   Pulse 62   Temp (!) 96.3 °F (35.7  "°C) (Tympanic)   Resp 17   Ht 5' 8\" (1.727 m)   Wt 74.8 kg (165 lb)   SpO2 99%   BMI 25.09 kg/m²     Physical Exam  Vitals and nursing note reviewed.   Constitutional:       Appearance: Normal appearance.   HENT:      Head: Normocephalic and atraumatic.      Right Ear: Tympanic membrane, ear canal and external ear normal.      Left Ear: Tympanic membrane, ear canal and external ear normal.      Nose: Nose normal.      Mouth/Throat:      Mouth: Mucous membranes are moist.   Eyes:      Conjunctiva/sclera: Conjunctivae normal.   Cardiovascular:      Rate and Rhythm: Normal rate.      Heart sounds: Normal heart sounds.   Pulmonary:      Effort: Pulmonary effort is normal.      Breath sounds: Normal breath sounds.   Abdominal:      General: Bowel sounds are normal.   Musculoskeletal:         General: Normal range of motion.      Cervical back: Normal range of motion and neck supple.   Skin:     General: Skin is warm and dry.      Capillary Refill: Capillary refill takes less than 2 seconds.   Neurological:      General: No focal deficit present.      Mental Status: He is alert and oriented to person, place, and time.   Psychiatric:         Mood and Affect: Mood normal.         Behavior: Behavior normal.         Thought Content: Thought content normal.         Judgment: Judgment normal.       ZAHRA Rice    "

## 2024-03-26 ENCOUNTER — TELEPHONE (OUTPATIENT)
Dept: HEMATOLOGY ONCOLOGY | Facility: CLINIC | Age: 79
End: 2024-03-26

## 2024-03-26 PROBLEM — R10.11 RIGHT UPPER QUADRANT ABDOMINAL PAIN: Status: RESOLVED | Noted: 2023-06-12 | Resolved: 2024-03-26

## 2024-03-26 PROBLEM — R41.3 MEMORY PROBLEM: Status: ACTIVE | Noted: 2024-03-26

## 2024-03-26 PROBLEM — R74.8 ABNORMAL LIVER ENZYMES: Status: RESOLVED | Noted: 2023-01-31 | Resolved: 2024-03-26

## 2024-03-26 PROBLEM — R50.9 FEVER: Status: ACTIVE | Noted: 2024-03-26

## 2024-03-26 PROBLEM — R05.1 ACUTE COUGH: Status: ACTIVE | Noted: 2024-03-26

## 2024-03-26 PROBLEM — I20.0 UNSTABLE ANGINA (HCC): Status: RESOLVED | Noted: 2021-12-10 | Resolved: 2024-03-26

## 2024-03-26 LAB — BACTERIA UR CULT: NORMAL

## 2024-03-26 NOTE — ASSESSMENT & PLAN NOTE
Decrease metformin to once daily     Lab Results   Component Value Date    HGBA1C 5.6 03/25/2024

## 2024-03-26 NOTE — ASSESSMENT & PLAN NOTE
Discussed with patient and daughter again about skin lesion and treatment/follow up  Now willing to get treatment and follow up  Will refer back to dr schultz who last saw patient in 2021  Patient had seen but never went back for follow up

## 2024-03-26 NOTE — TELEPHONE ENCOUNTER
I called Zaira in response to a referral that was received for patient to establish care with Surgical Oncology.     Outreach was made to schedule a consultation.    I left a voicemail explaining the reason for my call and advised patient to call [facility] at [phone].  Another attempt will be made to contact patient.

## 2024-04-01 ENCOUNTER — OFFICE VISIT (OUTPATIENT)
Dept: FAMILY MEDICINE CLINIC | Facility: CLINIC | Age: 79
End: 2024-04-01
Payer: COMMERCIAL

## 2024-04-01 VITALS
HEIGHT: 68 IN | DIASTOLIC BLOOD PRESSURE: 70 MMHG | TEMPERATURE: 95.3 F | RESPIRATION RATE: 17 BRPM | HEART RATE: 72 BPM | SYSTOLIC BLOOD PRESSURE: 120 MMHG | OXYGEN SATURATION: 100 % | BODY MASS INDEX: 24.25 KG/M2 | WEIGHT: 160 LBS

## 2024-04-01 DIAGNOSIS — H61.23 BILATERAL IMPACTED CERUMEN: Primary | ICD-10-CM

## 2024-04-01 DIAGNOSIS — H91.93 HEARING DIFFICULTY OF BOTH EARS: ICD-10-CM

## 2024-04-01 PROBLEM — R05.1 ACUTE COUGH: Status: RESOLVED | Noted: 2024-03-26 | Resolved: 2024-04-01

## 2024-04-01 PROBLEM — R50.9 FEVER: Status: RESOLVED | Noted: 2024-03-26 | Resolved: 2024-04-01

## 2024-04-01 PROCEDURE — 69210 REMOVE IMPACTED EAR WAX UNI: CPT | Performed by: NURSE PRACTITIONER

## 2024-04-01 PROCEDURE — 99213 OFFICE O/P EST LOW 20 MIN: CPT | Performed by: NURSE PRACTITIONER

## 2024-04-01 NOTE — PROGRESS NOTES
FAMILY PRACTICE OFFICE VISIT       NAME: Zaira Morley  AGE: 78 y.o. SEX: male       : 1945        MRN: [id number]    DATE: 2024  TIME: 1:29 PM    Assessment and Plan   1. Bilateral impacted cerumen  Assessment & Plan:  Ear lavage, patient tolerated well      Orders:  -     Ear cerumen removal    2. Hearing difficulty of both ears  Assessment & Plan:  Improved after ear lavage      Orders:  -     Ear cerumen removal         There are no Patient Instructions on file for this visit.        Chief Complaint     Chief Complaint   Patient presents with    Cerumen Impaction     Pt is here for ear flush       History of Present Illness   Zaira Morley is a 78 y.o.-year-old male who is here for trouble hearing due to wax in ears      Review of Systems   Review of Systems   Constitutional:  Negative for fatigue and fever.   HENT:  Positive for hearing loss.    Respiratory:  Negative for cough, shortness of breath and wheezing.    Skin:  Negative for rash.   Neurological:  Negative for dizziness, light-headedness and headaches.   Hematological:  Negative for adenopathy.   Psychiatric/Behavioral:  Negative for dysphoric mood and sleep disturbance. The patient is not nervous/anxious.        Active Problem List     Patient Active Problem List   Diagnosis    Brain tumor (HCC)    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HCC)    Chronic constipation    Episodic cluster headache, not intractable    GERD (gastroesophageal reflux disease)    Nonintractable headache    Hypertension    Meningioma (HCC)    Varicose 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    Spondylolisthesis at L5-S1 level    Renal cyst    History of hepatitis C    Scalp lesion    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HCC)    Bilateral impacted cerumen    Primary osteoarthritis of both knees    Type 2 diabetes mellitus with diabetic cataract, without long-term current use of insulin (HCC)    Blurry vision, bilateral    Other constipation    Urinary retention    Weight loss,  abnormal    History of colon polyps    Memory problem    Hearing difficulty of both ears         Past Medical History:  Past Medical History:   Diagnosis Date    Brain mass     Last Assessed; 11/5/2015    Chest pain     Last Assessed: 1/22/2015    Diabetes type 2, uncontrolled     Last Assessed: 3/1/2016    Edema of left lower extremity     Last Assessed: 3/22/2017    GERD (gastroesophageal reflux disease)     Hyperlipidemia     Hypertension     Impaired fasting glucose     Last Assessed: 11/10/2015    Irregular heartbeat     Skin cancer (melanoma) (HCC)     Scalp     Varicose 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     Last Assessed: 3/22/2017       Past Surgical History:  Past Surgical History:   Procedure Laterality Date    COLONOSCOPY      COLONOSCOPY N/A 7/10/2018    Procedure: COLONOSCOPY;  Surgeon: Jose Alejandro Rodrigues MD;  Location:  GI LAB;  Service: Colorectal    INGUINAL HERNIA REPAIR      20+ years ago - [place]; Last Assessed: 10/23/2014    TONSILLECTOMY      VARICOSE VEIN SURGERY Left     x2 left leg - 40+ yrs ago, [place] and [place]       Family History:  Family History   Problem Relation Age of Onset    No Known Problems Mother     No Known Problems Father     Melanoma Daughter 17       Social History:  Social History     Socioeconomic History    Marital status: /Civil Union     Spouse name: Not on file    Number of children: 5    Years of education: Not on file    Highest education level: Not on file   Occupational History    Occupation: Retired: Construction   Tobacco Use    Smoking status: Never    Smokeless tobacco: Never   Vaping Use    Vaping status: Never Used   Substance and Sexual Activity    Alcohol use: No    Drug use: No    Sexual activity: Not Currently     Partners: Female   Other Topics Concern    Not on file   Social History Narrative    Uses safety equipment         Social Determinants of Health     Financial Resource Strain: Low Risk  (7/27/2023)    Overall Financial Resource Strain  (CARDIA)     Difficulty of Paying Living Expenses: Not very hard   Food Insecurity: Not on file   Transportation Needs: No Transportation Needs (7/27/2023)    PRAPARE - Transportation     Lack of Transportation (Medical): No     Lack of Transportation (Non-Medical): No   Physical Activity: Not on file   Stress: Not on file   Social Connections: Not on file   Intimate Partner Violence: Not on file   Housing Stability: Not on file       Objective     Vitals:    04/01/24 1018   BP: 120/70   Pulse: 72   Resp: 17   Temp: (!) 95.3 °F (35.2 °C)   SpO2: 100%     Wt Readings from Last 3 Encounters:   04/01/24 72.6 kg (160 lb)   03/25/24 74.8 kg (165 lb)   03/06/24 75.6 kg (166 lb 9.6 oz)       Physical Exam  Vitals and nursing note reviewed.   Constitutional:       Appearance: Normal appearance.   HENT:      Head: Normocephalic and atraumatic.      Right Ear: Tympanic membrane and external ear normal. There is impacted cerumen.      Left Ear: Tympanic membrane and external ear normal. There is impacted cerumen.      Nose: Nose normal.      Mouth/Throat:      Mouth: Mucous membranes are moist.   Eyes:      Conjunctiva/sclera: Conjunctivae normal.   Cardiovascular:      Rate and Rhythm: Regular rhythm.      Pulses: Normal pulses.      Heart sounds: Normal heart sounds.   Musculoskeletal:         General: Normal range of motion.      Cervical back: Normal range of motion and neck supple.   Skin:     General: Skin is warm and dry.   Neurological:      Mental Status: He is alert and oriented to person, place, and time.   Psychiatric:         Mood and Affect: Mood normal.         Behavior: Behavior normal.         Pertinent Laboratory/Diagnostic Studies:  Lab Results   Component Value Date    GLUCOSE 121 10/28/2015    BUN 17 03/25/2024    CREATININE 0.64 03/25/2024    CALCIUM 8.8 03/25/2024     10/28/2015    K 3.8 03/25/2024    CO2 28 03/25/2024     03/25/2024     Lab Results   Component Value Date    ALT 7 03/25/2024  "   AST 10 (L) 03/25/2024    GGT 12 12/31/2022    ALKPHOS 75 03/25/2024    BILITOT 0.53 08/27/2015       Lab Results   Component Value Date    WBC 8.26 03/25/2024    HGB 12.3 03/25/2024    HCT 37.9 03/25/2024    MCV 88 03/25/2024     (L) 03/25/2024       No results found for: \"TSH\"    Lab Results   Component Value Date    CHOL 154 08/27/2015     Lab Results   Component Value Date    TRIG 92 05/13/2023     Lab Results   Component Value Date    HDL 32 (L) 05/13/2023     Lab Results   Component Value Date    LDLCALC 65 05/13/2023     Lab Results   Component Value Date    HGBA1C 5.6 03/25/2024       Results for orders placed or performed in visit on 03/26/24   Albumin / creatinine urine ratio   Result Value Ref Range    Creatinine, Ur 80.0 Reference range not established. mg/dL    Albumin,U,Random 8.8 <20.0 mg/L    Albumin Creat Ratio 11 0 - 30 mg/g creatinine       Orders Placed This Encounter   Procedures    Ear cerumen removal       ALLERGIES:  No Known Allergies    Current Medications     Current Outpatient Medications   Medication Sig Dispense Refill    acetaminophen (TYLENOL) 325 mg tablet Take 2 tablets (650 mg total) by mouth every 6 (six) hours as needed for mild pain or fever 30 tablet 0    amLODIPine (NORVASC) 5 mg tablet TAKE 1 TABLET (5 MG TOTAL) BY MOUTH DAILY. 90 tablet 0    aspirin 81 MG tablet Take 81 mg by mouth daily      atorvastatin (LIPITOR) 10 mg tablet TAKE 1 TABLET BY MOUTH EVERY DAY 90 tablet 1    Blood Glucose Monitoring Suppl (OneTouch Verio Reflect) w/Device KIT Check blood sugars once daily. Please substitute with appropriate alternative as covered by patient's insurance. Dx: E11.65 1 kit 0    colchicine (COLCRYS) 0.6 mg tablet TAKE 1 TABLET BY MOUTH EVERY DAY 90 tablet 0    docusate sodium (COLACE) 100 mg capsule Take 1 capsule (100 mg total) by mouth in the morning 90 capsule 0    famotidine (PEPCID) 20 mg tablet TAKE 1 TABLET BY MOUTH TWICE A  tablet 1    gabapentin " (Neurontin) 100 mg capsule Take 1 capsule (100 mg total) by mouth daily at bedtime 30 capsule 2    glucose blood (OneTouch Verio) test strip CHECK BLOOD SUGARS ONCE DAILY. 100 strip 1    hydrocortisone 2.5 % cream APPLY TO AFFECTED AREA TWICE A DAY 28.35 g 0    lactulose (CHRONULAC) 10 g/15 mL solution Daily prn for severe constipation 473 mL 0    Lancets (OneTouch Delica Plus Bylyas10P) MISC TEST DAILY, E11.5--PT CONFIMED DELICA 33 G 100 each 11    lisinopril (ZESTRIL) 20 mg tablet TAKE 1 TABLET BY MOUTH EVERY DAY 90 tablet 0    lubiprostone (AMITIZA) 24 mcg capsule Take 1 capsule (24 mcg total) by mouth 2 (two) times a day with meals 60 capsule 11    metFORMIN (GLUCOPHAGE) 500 mg tablet Take 1 tablet (500 mg total) by mouth daily with breakfast      methocarbamol (ROBAXIN) 500 mg tablet TAKE 1 TABLET (500 MG TOTAL) BY MOUTH DAILY AT BEDTIME 30 tablet 0    omeprazole (PriLOSEC) 40 MG capsule TAKE 1 CAPSULE (40 MG TOTAL) BY MOUTH DAILY. 90 capsule 1    OneTouch Delica Lancets 33G MISC Check blood sugars once daily. Please substitute with appropriate alternative as covered by patient's insurance. Dx: E11.65 100 each 3    polyethylene glycol (MIRALAX) 17 g packet Take 17 g by mouth daily Prn constipation 20 each 0    senna-docusate sodium (SENOKOT S) 8.6-50 mg per tablet Take 2 tablets by mouth daily 60 tablet 3    tamsulosin (FLOMAX) 0.4 mg Take 1 capsule (0.4 mg total) by mouth daily with dinner 30 capsule 1    bisacodyl (DULCOLAX) 5 mg EC tablet Take 2 tablets (10 mg total) by mouth once for 1 dose 2 tablet 0    polyethylene glycol (GOLYTELY) 4000 mL solution Take 4,000 mL by mouth once for 1 dose 4000 mL 0     No current facility-administered medications for this visit.         Health Maintenance     Health Maintenance   Topic Date Due    Zoster Vaccine (1 of 2) Never done    Hepatitis B Vaccine (1 of 3 - Risk 3-dose series) 05/01/2024 (Originally 7/11/2006)    Influenza Vaccine (1) 06/30/2024 (Originally  9/1/2023)    COVID-19 Vaccine (5 - 2023-24 season) 07/01/2024 (Originally 9/1/2023)    Fall Risk  07/27/2024    Medicare Annual Wellness Visit (AWV)  07/27/2024    HEMOGLOBIN A1C  09/25/2024    Depression Screening  01/22/2025    Falls: Plan of Care  01/22/2025    Diabetic Foot Exam  01/22/2025    Kidney Health Evaluation: GFR  03/25/2025    Kidney Health Evaluation: Albumin/Creatinine Ratio  03/25/2025    DM Eye Exam  09/01/2025    Pneumococcal Vaccine: 65+ Years  Completed    HIB Vaccine  Aged Out    IPV Vaccine  Aged Out    Hepatitis A Vaccine  Aged Out    Meningococcal ACWY Vaccine  Aged Out    HPV Vaccine  Aged Out    Hepatitis C Screening  Discontinued    Colorectal Cancer Screening  Discontinued     Immunization History   Administered Date(s) Administered    COVID-19 PFIZER VACCINE 0.3 ML IM 03/29/2021, 04/21/2021, 11/09/2021    COVID-19 Pfizer Vac BIVALENT Raphael-sucrose 12 Yr+ IM 11/21/2022    INFLUENZA 10/27/2015, 10/01/2016, 10/13/2016, 12/19/2018    Influenza Split High Dose Preservative Free IM 10/23/2014, 10/27/2015, 10/01/2016, 10/13/2016, 10/13/2016, 10/13/2016    Influenza, high dose seasonal 0.7 mL 12/19/2018, 10/14/2019, 10/05/2021    Pneumococcal Conjugate 13-Valent 08/25/2015, 01/19/2017, 10/14/2019    Pneumococcal Conjugate Vaccine 20-valent (Pcv20), Polysace 06/01/2023    Pneumococcal Polysaccharide PPV23 10/05/2021    Tdap 06/01/2023        Ear cerumen removal    Date/Time: 4/1/2024 10:30 AM    Performed by: ZAHRA Rice  Authorized by: ZAHRA Rice  Universal Protocol:  Consent: Verbal consent obtained. Written consent obtained.  Risks and benefits: risks, benefits and alternatives were discussed  Consent given by: patient  Patient understanding: patient states understanding of the procedure being performed  Patient consent: the patient's understanding of the procedure matches consent given  Procedure consent: procedure consent matches procedure scheduled  Patient identity  confirmed: verbally with patient    Patient location:  Clinic  Procedure details:     Local anesthetic:  None    Location:  L ear and R ear    Procedure type: irrigation with instrumentation      Instrumentation: curette      Approach:  Natural orifice  Post-procedure details:     Complication:  None    Hearing quality:  Improved    Patient tolerance of procedure:  Tolerated well, no immediate complications     ZAHRA Rice

## 2024-04-11 ENCOUNTER — CONSULT (OUTPATIENT)
Dept: SURGICAL ONCOLOGY | Facility: CLINIC | Age: 79
End: 2024-04-11
Payer: COMMERCIAL

## 2024-04-11 VITALS
TEMPERATURE: 98.1 F | OXYGEN SATURATION: 98 % | HEART RATE: 63 BPM | DIASTOLIC BLOOD PRESSURE: 60 MMHG | WEIGHT: 165 LBS | HEIGHT: 68 IN | BODY MASS INDEX: 25.01 KG/M2 | SYSTOLIC BLOOD PRESSURE: 130 MMHG

## 2024-04-11 DIAGNOSIS — C80.1 MALIGNANT SPINDLE CELL NEOPLASM (HCC): Primary | ICD-10-CM

## 2024-04-11 PROCEDURE — 99203 OFFICE O/P NEW LOW 30 MIN: CPT | Performed by: SURGERY

## 2024-04-11 NOTE — PROGRESS NOTES
Surgical Oncology Consult       [facility] [place]  [address] [facility] [address]  [phone]    Zaira Morley  1945  [id number]  [facility] [place]  [address] [facility] [address]  [phone]    Diagnoses and all orders for this visit:    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HCC)  -     Ambulatory Referral to Surgical Oncology  -     Ambulatory Referral to Dermatology; Future        Chief Complaint   Patient presents with    Consult       No follow-ups on file.    Oncology History    No history exists.       History of Present Illness: Patient returns from when he was last seen in 2020.  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  His daughter brings him in.  The patient is not complaining of any suspicious lesions on his scalp.  He has 1 area on his scalp that has had some difficulty healing, but he states this has happened in the past.  This does not appear to be at the site of his previous biopsy.  He is also a diabetic, possibly inhibiting his wound healing.    Review of Systems  Complete ROS Surg Onc:   Constitutional: The patient denies new or recent history of general fatigue, no recent weight loss, no change in appetite.   Eyes: No complaints of visual problems, no scleral icterus.   ENT: no complaints of ear pain, no hoarseness, no difficulty swallowing,  no tinnitus and no new masses in head, oral cavity, or neck.   Cardiovascular: No complaints of chest pain, no palpitations, no ankle edema.   Respiratory: No complaints of shortness of breath, no cough.   Gastrointestinal: No complaints of jaundice, no bloody stools, no pale stools.   Genitourinary: No complaints of dysuria, no hematuria, no nocturia, no frequent urination, no urethral discharge.   Musculoskeletal: No complaints of weakness,  paralysis, joint stiffness or arthralgias.  Integumentary: No complaints of rash, no new lesions.   Neurological: No complaints of convulsions, no seizures, no dizziness.   Hematologic/Lymphatic: No complaints of easy bruising.   Endocrine:  No hot or cold intolerance.  No polydipsia, polyphagia, or polyuria.  Allergy/immunology:  No environmental allergies.  No food allergies.  Not immunocompromised.  Skin:  No pallor or rash.  No wound.          Patient Active Problem List   Diagnosis    Brain tumor (HCC)    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HCC)    Chronic constipation    Episodic cluster headache, not intractable    GERD (gastroesophageal reflux disease)    Nonintractable headache    Hypertension    Meningioma (HCC)    Varicose 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    Spondylolisthesis at L5-S1 level    Renal cyst    History of hepatitis C    Scalp lesion    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HCC)    Bilateral impacted cerumen    Primary osteoarthritis of both knees    Type 2 diabetes mellitus with diabetic cataract, without long-term current use of insulin (HCC)    Blurry vision, bilateral    Other constipation    Urinary retention    Weight loss, abnormal    History of colon polyps    Memory problem    Hearing difficulty of both ears     Past Medical History:   Diagnosis Date    Brain mass     Last Assessed; 11/5/2015    Chest pain     Last Assessed: 1/22/2015    Diabetes type 2, uncontrolled     Last Assessed: 3/1/2016    Edema of left lower extremity     Last Assessed: 3/22/2017    GERD (gastroesophageal reflux disease)     Hyperlipidemia     Hypertension     Impaired fasting glucose     Last Assessed: 11/10/2015    Irregular heartbeat     Skin cancer (melanoma) (HCC)     Scalp     Varicose 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     Last Assessed: 3/22/2017     Past Surgical History:   Procedure Laterality Date    COLONOSCOPY      COLONOSCOPY N/A 7/10/2018    Procedure: COLONOSCOPY;  Surgeon: Jose Alejandro Rodrigues,  MD;  Location: [facility];  Service: Colorectal    INGUINAL HERNIA REPAIR      20+ years ago - [place]; Last Assessed: 10/23/2014    TONSILLECTOMY      VARICOSE VEIN SURGERY Left     x2 left leg - 40+ yrs ago, [place] and [place]     Family History   Problem Relation Age of Onset    No Known Problems Mother     No Known Problems Father     Melanoma Daughter 17     Social History     Socioeconomic History    Marital status: /Civil Union     Spouse name: Not on file    Number of children: 5    Years of education: Not on file    Highest education level: Not on file   Occupational History    Occupation: Retired: Construction   Tobacco Use    Smoking status: Never    Smokeless tobacco: Never   Vaping Use    Vaping status: Never Used   Substance and Sexual Activity    Alcohol use: No    Drug use: No    Sexual activity: Not Currently     Partners: Female   Other Topics Concern    Not on file   Social History Narrative    Uses safety equipment         Social Determinants of Health     Financial Resource Strain: Low Risk  (7/27/2023)    Overall Financial Resource Strain (CARDIA)     Difficulty of Paying Living Expenses: Not very hard   Food Insecurity: Not on file   Transportation Needs: No Transportation Needs (7/27/2023)    PRAPARE - Transportation     Lack of Transportation (Medical): No     Lack of Transportation (Non-Medical): No   Physical Activity: Not on file   Stress: Not on file   Social Connections: Not on file   Intimate Partner Violence: Not on file   Housing Stability: Not on file       Current Outpatient Medications:     acetaminophen (TYLENOL) 325 mg tablet, Take 2 tablets (650 mg total) by mouth every 6 (six) hours as needed for mild pain or fever, Disp: 30 tablet, Rfl: 0    amLODIPine (NORVASC) 5 mg tablet, TAKE 1 TABLET (5 MG TOTAL) BY MOUTH DAILY., Disp: 90 tablet, Rfl: 0    aspirin 81 MG tablet, Take 81 mg by mouth daily, Disp: , Rfl:     atorvastatin (LIPITOR) 10 mg tablet, TAKE 1 TABLET BY MOUTH EVERY DAY,  Disp: 90 tablet, Rfl: 1    bisacodyl (DULCOLAX) 5 mg EC tablet, Take 2 tablets (10 mg total) by mouth once for 1 dose, Disp: 2 tablet, Rfl: 0    Blood Glucose Monitoring Suppl (OneTouch Verio Reflect) w/Device KIT, Check blood sugars once daily. Please substitute with appropriate alternative as covered by patient's insurance. Dx: E11.65, Disp: 1 kit, Rfl: 0    colchicine (COLCRYS) 0.6 mg tablet, TAKE 1 TABLET BY MOUTH EVERY DAY, Disp: 90 tablet, Rfl: 0    docusate sodium (COLACE) 100 mg capsule, Take 1 capsule (100 mg total) by mouth in the morning, Disp: 90 capsule, Rfl: 0    famotidine (PEPCID) 20 mg tablet, TAKE 1 TABLET BY MOUTH TWICE A DAY, Disp: 180 tablet, Rfl: 1    gabapentin (Neurontin) 100 mg capsule, Take 1 capsule (100 mg total) by mouth daily at bedtime, Disp: 30 capsule, Rfl: 2    glucose blood (OneTouch Verio) test strip, CHECK BLOOD SUGARS ONCE DAILY., Disp: 100 strip, Rfl: 1    hydrocortisone 2.5 % cream, APPLY TO AFFECTED AREA TWICE A DAY, Disp: 28.35 g, Rfl: 0    lactulose (CHRONULAC) 10 g/15 mL solution, Daily prn for severe constipation, Disp: 473 mL, Rfl: 0    Lancets (OneTouch Delica Plus Alusuo30L) MISC, TEST DAILY, E11.5--PT CONFIMED DELICA 33 G, Disp: 100 each, Rfl: 11    lisinopril (ZESTRIL) 20 mg tablet, TAKE 1 TABLET BY MOUTH EVERY DAY, Disp: 90 tablet, Rfl: 0    lubiprostone (AMITIZA) 24 mcg capsule, Take 1 capsule (24 mcg total) by mouth 2 (two) times a day with meals, Disp: 60 capsule, Rfl: 11    metFORMIN (GLUCOPHAGE) 500 mg tablet, Take 1 tablet (500 mg total) by mouth daily with breakfast, Disp: , Rfl:     methocarbamol (ROBAXIN) 500 mg tablet, TAKE 1 TABLET (500 MG TOTAL) BY MOUTH DAILY AT BEDTIME, Disp: 30 tablet, Rfl: 0    omeprazole (PriLOSEC) 40 MG capsule, TAKE 1 CAPSULE (40 MG TOTAL) BY MOUTH DAILY., Disp: 90 capsule, Rfl: 1    OneTouch Delica Lancets 33G MISC, Check blood sugars once daily. Please substitute with appropriate alternative as covered by patient's insurance.  Dx: E11.65, Disp: 100 each, Rfl: 3    polyethylene glycol (MIRALAX) 17 g packet, Take 17 g by mouth daily Prn constipation, Disp: 20 each, Rfl: 0    senna-docusate sodium (SENOKOT S) 8.6-50 mg per tablet, Take 2 tablets by mouth daily, Disp: 60 tablet, Rfl: 3    tamsulosin (FLOMAX) 0.4 mg, Take 1 capsule (0.4 mg total) by mouth daily with dinner, Disp: 30 capsule, Rfl: 1    polyethylene glycol (GOLYTELY) 4000 mL solution, Take 4,000 mL by mouth once for 1 dose (Patient not taking: Reported on 4/11/2024), Disp: 4000 mL, Rfl: 0  No Known Allergies  Vitals:    04/11/24 1024   BP: 130/60   Pulse: 63   Temp: 98.1 °F (36.7 °C)   SpO2: 98%       Physical Exam   Constitutional: General appearance: The Patient is well-developed and well-nourished who appears the stated age in no acute distress. Patient is pleasant and talkative.     HEENT:  Normocephalic.  Sclerae are anicteric. Mucous membranes are moist. Neck is supple without adenopathy. No JVD.     Extremities: There is no clubbing or cyanosis. There is no edema.  Symmetric.  Neuro: Grossly nonfocal. Gait is normal.     Lymphatic: No evidence of cervical adenopathy bilaterally.   Skin: Warm, anicteric.  I do not see any suspicious lesions.  There is 1 lesion on his scalp that has a 2 to 3 mm opening.  Based on the imaging that is in the chart, this appears to be at a completely separate place that was biopsied 4 years ago.    Psych:  Patient is pleasant and talkative.  Breasts:      Pathology:  [unfilled]    Labs:      Imaging  XR chest pa & lateral    Result Date: 3/26/2024  Narrative: XR CHEST PA & LATERAL DUAL ENERGY SUBTRACTION. INDICATION:  R05.1: Acute cough. Mediastinal and left chest pain. Fever. COMPARISON: Abdomen CT 1/18/2024. FINDINGS: Clear lungs. No pneumothorax or pleural effusion. Unremarkable cardiomediastinal silhouette. Bones are unremarkable for age. Unremarkable upper abdomen.     Impression: No acute cardiopulmonary disease. Workstation  performed: [id number]     I personally reviewed and interpreted the above laboratory and imaging data.    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  The patient decided not to have any treatment.  At this point, I am not convinced there is any obvious recurrence of this lesion based on his previous clinical images that are in the chart as well as it appears that the 1 area that has not completely healed is not related to that previous biopsy site.  He has multiple other skin lesions, none of which appear very suspicious to my eye.  I would  him to follow-up with dermatology.  I did place the referral.  I would only plan on surgical resection if any of these lesions would require extensive resection.  He and his daughter are agreeable to this plan.  All their questions were answered.

## 2024-04-18 DIAGNOSIS — R07.89 OTHER CHEST PAIN: ICD-10-CM

## 2024-04-18 DIAGNOSIS — K59.09 CHRONIC CONSTIPATION: ICD-10-CM

## 2024-04-19 RX ORDER — COLCHICINE 0.6 MG/1
0.6 TABLET ORAL DAILY
Qty: 30 TABLET | Refills: 0 | Status: SHIPPED | OUTPATIENT
Start: 2024-04-19

## 2024-04-19 RX ORDER — LACTULOSE 10 G/15ML
SOLUTION ORAL
Qty: 473 ML | Refills: 0 | Status: SHIPPED | OUTPATIENT
Start: 2024-04-19

## 2024-05-01 PROBLEM — H61.23 BILATERAL IMPACTED CERUMEN: Status: RESOLVED | Noted: 2020-09-16 | Resolved: 2024-05-01

## 2024-05-06 ENCOUNTER — ANESTHESIA (OUTPATIENT)
Dept: GASTROENTEROLOGY | Facility: AMBULARY SURGERY CENTER | Age: 79
End: 2024-05-06

## 2024-05-06 ENCOUNTER — HOSPITAL ENCOUNTER (OUTPATIENT)
Dept: GASTROENTEROLOGY | Facility: AMBULARY SURGERY CENTER | Age: 79
Setting detail: OUTPATIENT SURGERY
Discharge: HOME | End: 2024-05-06
Attending: INTERNAL MEDICINE
Payer: COMMERCIAL

## 2024-05-06 ENCOUNTER — ANESTHESIA EVENT (OUTPATIENT)
Dept: GASTROENTEROLOGY | Facility: AMBULARY SURGERY CENTER | Age: 79
End: 2024-05-06

## 2024-05-06 VITALS
RESPIRATION RATE: 23 BRPM | WEIGHT: 155 LBS | OXYGEN SATURATION: 100 % | BODY MASS INDEX: 22.96 KG/M2 | HEIGHT: 69 IN | TEMPERATURE: 97.2 F | HEART RATE: 65 BPM | SYSTOLIC BLOOD PRESSURE: 169 MMHG | DIASTOLIC BLOOD PRESSURE: 82 MMHG

## 2024-05-06 DIAGNOSIS — Z86.010 HISTORY OF COLON POLYPS: ICD-10-CM

## 2024-05-06 DIAGNOSIS — K59.09 CHRONIC CONSTIPATION: ICD-10-CM

## 2024-05-06 LAB — GLUCOSE SERPL-MCNC: 96 MG/DL (ref 65–140)

## 2024-05-06 PROCEDURE — 45378 DIAGNOSTIC COLONOSCOPY: CPT | Performed by: INTERNAL MEDICINE

## 2024-05-06 PROCEDURE — 82948 REAGENT STRIP/BLOOD GLUCOSE: CPT

## 2024-05-06 RX ORDER — PROPOFOL 10 MG/ML
INJECTION, EMULSION INTRAVENOUS AS NEEDED
Status: DISCONTINUED | OUTPATIENT
Start: 2024-05-06 | End: 2024-05-06

## 2024-05-06 RX ORDER — SODIUM CHLORIDE, SODIUM LACTATE, POTASSIUM CHLORIDE, CALCIUM CHLORIDE 600; 310; 30; 20 MG/100ML; MG/100ML; MG/100ML; MG/100ML
100 INJECTION, SOLUTION INTRAVENOUS CONTINUOUS
Status: CANCELLED | OUTPATIENT
Start: 2024-05-06

## 2024-05-06 RX ORDER — SODIUM CHLORIDE, SODIUM LACTATE, POTASSIUM CHLORIDE, CALCIUM CHLORIDE 600; 310; 30; 20 MG/100ML; MG/100ML; MG/100ML; MG/100ML
100 INJECTION, SOLUTION INTRAVENOUS CONTINUOUS
Status: DISCONTINUED | OUTPATIENT
Start: 2024-05-06 | End: 2024-05-10 | Stop reason: HOSPADM

## 2024-05-06 RX ORDER — SODIUM CHLORIDE, SODIUM LACTATE, POTASSIUM CHLORIDE, CALCIUM CHLORIDE 600; 310; 30; 20 MG/100ML; MG/100ML; MG/100ML; MG/100ML
INJECTION, SOLUTION INTRAVENOUS CONTINUOUS PRN
Status: DISCONTINUED | OUTPATIENT
Start: 2024-05-06 | End: 2024-05-06

## 2024-05-06 RX ADMIN — PROPOFOL 70 MG: 10 INJECTION, EMULSION INTRAVENOUS at 09:27

## 2024-05-06 RX ADMIN — PROPOFOL 20 MG: 10 INJECTION, EMULSION INTRAVENOUS at 09:29

## 2024-05-06 RX ADMIN — PROPOFOL 20 MG: 10 INJECTION, EMULSION INTRAVENOUS at 09:36

## 2024-05-06 RX ADMIN — SODIUM CHLORIDE, SODIUM LACTATE, POTASSIUM CHLORIDE, AND CALCIUM CHLORIDE: .6; .31; .03; .02 INJECTION, SOLUTION INTRAVENOUS at 09:14

## 2024-05-06 RX ADMIN — PROPOFOL 20 MG: 10 INJECTION, EMULSION INTRAVENOUS at 09:32

## 2024-05-06 NOTE — ANESTHESIA PREPROCEDURE EVALUATION
Medical History    History Comments   Brain mass Last Assessed; 11/5/2015   Diabetes type 2, uncontrolled Last Assessed: 3/1/2016   Edema of left lower extremity Last Assessed: 3/22/2017   Chest pain Last Assessed: 1/22/2015   Irregular heartbeat    Varicose 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 Last Assessed: 3/22/2017   Impaired fasting glucose Last Assessed: 11/10/2015   Hyperlipidemia    Hypertension    GERD (gastroesophageal reflux disease)    Skin cancer (melanoma) (HCC) Scalp   Procedure:  COLONOSCOPY    Relevant Problems   ANESTHESIA (within normal limits)      CARDIO   (+) Hypertension      ENDO   (+) Type 2 diabetes mellitus with diabetic cataract, without long-term current use of insulin (HCC)      GI/HEPATIC   (+) GERD (gastroesophageal reflux disease)      /RENAL   (+) Renal cyst      MUSCULOSKELETAL   (+) Primary osteoarthritis of both knees      NEURO/PSYCH   (+) Episodic cluster headache, not intractable   (+) Nonintractable headache        Physical Exam    Airway    Mallampati score: II  TM Distance: >3 FB  Neck ROM: full     Dental       Cardiovascular  Rate: normal    Pulmonary  Pulmonary exam normal     Other Findings  Per pt denies anything remaining that is loose or removeable      Anesthesia Plan  ASA Score- 2     Anesthesia Type- IV sedation with anesthesia with ASA Monitors.         Additional Monitors:     Airway Plan:     Comment: Per patient, appropriately NPO, denies active CP/SOB/wheezing/symptoms related to heartburn/nausea/vomiting  .       Plan Factors-Exercise tolerance (METS): >4 METS.    Chart reviewed.    Patient summary reviewed.    Patient is not a current smoker.              Induction- intravenous.    Postoperative Plan-     Informed Consent- Anesthetic plan and risks discussed with patient.  I personally reviewed this patient with the CRNA. Discussed and agreed on the Anesthesia Plan with the CRNA..

## 2024-05-06 NOTE — ANESTHESIA POSTPROCEDURE EVALUATION
Post-Op Assessment Note    CV Status:  Stable  Pain Score: 0    Pain management: adequate       Mental Status:  Sleepy and arousable   Hydration Status:  Stable   PONV Controlled:  None   Airway Patency:  Patent     Post Op Vitals Reviewed: Yes    No anethesia notable event occurred.    Staff: CRNA               BP  108/55    Temp 97.2   Pulse 62   Resp 16   SpO2 99 RA

## 2024-05-08 DIAGNOSIS — E11.9 TYPE 2 DIABETES MELLITUS WITHOUT COMPLICATION, WITHOUT LONG-TERM CURRENT USE OF INSULIN (HCC): ICD-10-CM

## 2024-05-12 DIAGNOSIS — R07.89 OTHER CHEST PAIN: ICD-10-CM

## 2024-05-13 ENCOUNTER — OFFICE VISIT (OUTPATIENT)
Dept: FAMILY MEDICINE CLINIC | Facility: CLINIC | Age: 79
End: 2024-05-13
Payer: COMMERCIAL

## 2024-05-13 VITALS — DIASTOLIC BLOOD PRESSURE: 60 MMHG | HEART RATE: 67 BPM | SYSTOLIC BLOOD PRESSURE: 126 MMHG | TEMPERATURE: 95.2 F

## 2024-05-13 DIAGNOSIS — H61.23 BILATERAL IMPACTED CERUMEN: ICD-10-CM

## 2024-05-13 DIAGNOSIS — K59.09 CHRONIC CONSTIPATION: ICD-10-CM

## 2024-05-13 DIAGNOSIS — K21.9 GASTROESOPHAGEAL REFLUX DISEASE WITHOUT ESOPHAGITIS: ICD-10-CM

## 2024-05-13 DIAGNOSIS — H91.93 DECREASED HEARING OF BOTH EARS: Primary | ICD-10-CM

## 2024-05-13 DIAGNOSIS — Z86.010 HISTORY OF COLON POLYPS: ICD-10-CM

## 2024-05-13 DIAGNOSIS — K59.09 OTHER CONSTIPATION: ICD-10-CM

## 2024-05-13 PROCEDURE — 99214 OFFICE O/P EST MOD 30 MIN: CPT | Performed by: NURSE PRACTITIONER

## 2024-05-13 PROCEDURE — 69210 REMOVE IMPACTED EAR WAX UNI: CPT | Performed by: NURSE PRACTITIONER

## 2024-05-13 RX ORDER — LACTULOSE 10 G/15ML
SOLUTION ORAL
Qty: 473 ML | Refills: 0 | Status: SHIPPED | OUTPATIENT
Start: 2024-05-13 | End: 2024-05-14

## 2024-05-13 RX ORDER — OMEPRAZOLE 40 MG/1
40 CAPSULE, DELAYED RELEASE ORAL DAILY
Qty: 90 CAPSULE | Refills: 1 | Status: SHIPPED | OUTPATIENT
Start: 2024-05-13

## 2024-05-13 RX ORDER — LUBIPROSTONE 24 UG/1
24 CAPSULE ORAL 2 TIMES DAILY WITH MEALS
Qty: 60 CAPSULE | Refills: 11 | Status: SHIPPED | OUTPATIENT
Start: 2024-05-13

## 2024-05-13 NOTE — PROGRESS NOTES
FAMILY PRACTICE OFFICE VISIT       NAME: Zaira Morley  AGE: 78 y.o. SEX: male       : 1945        MRN: [id number]    DATE: 2024  TIME: 2:09 PM    Assessment and Plan   1. Decreased hearing of both ears  Assessment & Plan:  Patient tolerated lavage without complications      Orders:  -     Ear cerumen removal    2. Bilateral impacted cerumen  -     Ear cerumen removal    3. Gastroesophageal reflux disease without esophagitis  -     omeprazole (PriLOSEC) 40 MG capsule; Take 1 capsule (40 mg total) by mouth daily    4. Other constipation    5. Chronic constipation  -     lubiprostone (AMITIZA) 24 mcg capsule; Take 1 capsule (24 mcg total) by mouth 2 (two) times a day with meals  -     lactulose (CHRONULAC) 10 g/15 mL solution; DAILY AS NEEDED FOR SEVERE CONSTIPATION    6. History of colon polyps  -     lubiprostone (AMITIZA) 24 mcg capsule; Take 1 capsule (24 mcg total) by mouth 2 (two) times a day with meals         There are no Patient Instructions on file for this visit.        Chief Complaint     Chief Complaint   Patient presents with    Cerumen Impaction     Pt being seen for ear clogged     Abdominal Pain     Pt c/o abdominal pain on and off       History of Present Illness   Zaira Morley is a 78 y.o.-year-old male who is here for wax in both ears causing decreased hearing  No ear pain    C/o epigastric pain  Follows with  gi  Recent colonoscopy, wnl  Having constipation, no bm in 3 days per patient  On meds  Instructed to make sure to take them  He has had a 60 pound weight loss in over one year      Review of Systems   Review of Systems   Constitutional:  Positive for unexpected weight change. Negative for fatigue and fever.   HENT:  Negative for congestion, ear discharge, ear pain and tinnitus.    Respiratory:  Negative for cough, shortness of breath and wheezing.    Cardiovascular:  Negative for chest pain and palpitations.   Gastrointestinal:  Positive for abdominal pain and constipation.  Negative for diarrhea, nausea and vomiting.   Genitourinary:  Negative for dysuria and frequency.   Musculoskeletal:  Negative for arthralgias and myalgias.   Skin:  Negative for rash.   Neurological:  Negative for dizziness, light-headedness and headaches.   Hematological:  Negative for adenopathy.   Psychiatric/Behavioral:  Negative for dysphoric mood and sleep disturbance. The patient is not nervous/anxious.        Active Problem List     Patient Active Problem List   Diagnosis    Brain tumor (HCC)    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HCC)    Chronic constipation    Episodic cluster headache, not intractable    GERD (gastroesophageal reflux disease)    Nonintractable headache    Hypertension    Meningioma (HCC)    Varicose 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    Spondylolisthesis at L5-S1 level    Renal cyst    History of hepatitis C    Scalp lesion    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HCC)    Bilateral impacted cerumen    Primary osteoarthritis of both knees    Type 2 diabetes mellitus with diabetic cataract, without long-term current use of insulin (HCC)    Blurry vision, bilateral    Other constipation    Urinary retention    Weight loss, abnormal    History of colon polyps    Memory problem    Decreased hearing of both ears         Past Medical History:  Past Medical History:   Diagnosis Date    Brain mass     Last Assessed; 11/5/2015    Chest pain     Last Assessed: 1/22/2015    Diabetes type 2, uncontrolled     Last Assessed: 3/1/2016    Edema of left lower extremity     Last Assessed: 3/22/2017    GERD (gastroesophageal reflux disease)     Hyperlipidemia     Hypertension     Impaired fasting glucose     Last Assessed: 11/10/2015    Irregular heartbeat     Skin cancer (melanoma) (HCC)     Scalp     Varicose 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     Last Assessed: 3/22/2017       Past Surgical History:  Past Surgical History:   Procedure Laterality Date    COLONOSCOPY      COLONOSCOPY N/A 7/10/2018     Procedure: COLONOSCOPY;  Surgeon: Jose Alejandro Rodrigues MD;  Location: [facility];  Service: Colorectal    INGUINAL HERNIA REPAIR      20+ years ago - [place]; Last Assessed: 10/23/2014    TONSILLECTOMY      VARICOSE VEIN SURGERY Left     x2 left leg - 40+ yrs ago, [place] and [place]       Family History:  Family History   Problem Relation Age of Onset    No Known Problems Mother     No Known Problems Father     Melanoma Daughter 17       Social History:  Social History     Socioeconomic History    Marital status: /Civil Union     Spouse name: Not on file    Number of children: 5    Years of education: Not on file    Highest education level: Not on file   Occupational History    Occupation: Retired: Construction   Tobacco Use    Smoking status: Never    Smokeless tobacco: Never   Vaping Use    Vaping status: Never Used   Substance and Sexual Activity    Alcohol use: No    Drug use: No    Sexual activity: Not Currently     Partners: Female   Other Topics Concern    Not on file   Social History Narrative    Uses safety equipment         Social Determinants of Health     Financial Resource Strain: Low Risk  (7/27/2023)    Overall Financial Resource Strain (CARDIA)     Difficulty of Paying Living Expenses: Not very hard   Food Insecurity: Not on file   Transportation Needs: No Transportation Needs (7/27/2023)    PRAPARE - Transportation     Lack of Transportation (Medical): No     Lack of Transportation (Non-Medical): No   Physical Activity: Not on file   Stress: Not on file   Social Connections: Not on file   Intimate Partner Violence: Not on file   Housing Stability: Not on file       Objective     Vitals:    05/13/24 1321   BP: 126/60   Pulse: 67   Temp: (!) 95.2 °F (35.1 °C)     Wt Readings from Last 3 Encounters:   05/06/24 70.3 kg (155 lb)   04/11/24 74.8 kg (165 lb)   04/01/24 72.6 kg (160 lb)       Physical Exam  Vitals and nursing note reviewed.   Constitutional:       Appearance: He is well-developed.   HENT:       "Head: Normocephalic and atraumatic.      Right Ear: Tympanic membrane, ear canal and external ear normal. There is impacted cerumen.      Left Ear: Tympanic membrane, ear canal and external ear normal. There is impacted cerumen.      Nose: Nose normal.      Mouth/Throat:      Mouth: Mucous membranes are moist.   Eyes:      Conjunctiva/sclera: Conjunctivae normal.   Cardiovascular:      Rate and Rhythm: Normal rate and regular rhythm.      Heart sounds: Normal heart sounds.   Pulmonary:      Effort: Pulmonary effort is normal.      Breath sounds: Normal breath sounds.   Abdominal:      General: Bowel sounds are normal.      Palpations: Abdomen is soft.   Musculoskeletal:         General: Normal range of motion.      Cervical back: Normal range of motion and neck supple.   Skin:     General: Skin is warm and dry.      Capillary Refill: Capillary refill takes less than 2 seconds.   Neurological:      General: No focal deficit present.      Mental Status: He is alert and oriented to person, place, and time.   Psychiatric:         Mood and Affect: Mood normal.         Behavior: Behavior normal.         Thought Content: Thought content normal.         Judgment: Judgment normal.         Pertinent Laboratory/Diagnostic Studies:  Lab Results   Component Value Date    GLUCOSE 121 10/28/2015    BUN 17 03/25/2024    CREATININE 0.64 03/25/2024    CALCIUM 8.8 03/25/2024     10/28/2015    K 3.8 03/25/2024    CO2 28 03/25/2024     03/25/2024     Lab Results   Component Value Date    ALT 7 03/25/2024    AST 10 (L) 03/25/2024    GGT 12 12/31/2022    ALKPHOS 75 03/25/2024    BILITOT 0.53 08/27/2015       Lab Results   Component Value Date    WBC 8.26 03/25/2024    HGB 12.3 03/25/2024    HCT 37.9 03/25/2024    MCV 88 03/25/2024     (L) 03/25/2024       No results found for: \"TSH\"    Lab Results   Component Value Date    CHOL 154 08/27/2015     Lab Results   Component Value Date    TRIG 92 05/13/2023     Lab Results "   Component Value Date    HDL 32 (L) 05/13/2023     Lab Results   Component Value Date    LDLCALC 65 05/13/2023     Lab Results   Component Value Date    HGBA1C 5.6 03/25/2024       Results for orders placed or performed during the hospital encounter of 05/06/24   Fingerstick Glucose (POCT)   Result Value Ref Range    POC Glucose 96 65 - 140 mg/dl       Orders Placed This Encounter   Procedures    Ear cerumen removal       ALLERGIES:  No Known Allergies    Current Medications     Current Outpatient Medications   Medication Sig Dispense Refill    acetaminophen (TYLENOL) 325 mg tablet Take 2 tablets (650 mg total) by mouth every 6 (six) hours as needed for mild pain or fever 30 tablet 0    amLODIPine (NORVASC) 5 mg tablet TAKE 1 TABLET (5 MG TOTAL) BY MOUTH DAILY. 90 tablet 0    aspirin 81 MG tablet Take 81 mg by mouth daily      atorvastatin (LIPITOR) 10 mg tablet TAKE 1 TABLET BY MOUTH EVERY DAY 90 tablet 1    Blood Glucose Monitoring Suppl (OneTouch Verio Reflect) w/Device KIT Check blood sugars once daily. Please substitute with appropriate alternative as covered by patient's insurance. Dx: E11.65 1 kit 0    colchicine (COLCRYS) 0.6 mg tablet TAKE 1 TABLET BY MOUTH EVERY DAY 30 tablet 0    docusate sodium (COLACE) 100 mg capsule Take 1 capsule (100 mg total) by mouth in the morning 90 capsule 0    famotidine (PEPCID) 20 mg tablet TAKE 1 TABLET BY MOUTH TWICE A  tablet 1    gabapentin (Neurontin) 100 mg capsule Take 1 capsule (100 mg total) by mouth daily at bedtime 30 capsule 2    glucose blood (OneTouch Verio) test strip CHECK BLOOD SUGARS ONCE DAILY. 100 strip 1    hydrocortisone 2.5 % cream APPLY TO AFFECTED AREA TWICE A DAY 28.35 g 0    lactulose (CHRONULAC) 10 g/15 mL solution DAILY AS NEEDED FOR SEVERE CONSTIPATION 473 mL 0    Lancets (OneTouch Delica Plus [id number]) MISC TEST DAILY, E11.5--PT CONFIMED DELICA 33 G 100 each 11    lisinopril (ZESTRIL) 20 mg tablet TAKE 1 TABLET BY MOUTH EVERY DAY 90  tablet 0    lubiprostone (AMITIZA) 24 mcg capsule Take 1 capsule (24 mcg total) by mouth 2 (two) times a day with meals 60 capsule 11    metFORMIN (GLUCOPHAGE) 500 mg tablet TAKE 1 TABLET BY MOUTH TWICE A  tablet 1    methocarbamol (ROBAXIN) 500 mg tablet TAKE 1 TABLET (500 MG TOTAL) BY MOUTH DAILY AT BEDTIME 30 tablet 0    omeprazole (PriLOSEC) 40 MG capsule Take 1 capsule (40 mg total) by mouth daily 90 capsule 1    OneTouch Delica Lancets 33G MISC Check blood sugars once daily. Please substitute with appropriate alternative as covered by patient's insurance. Dx: E11.65 100 each 3    polyethylene glycol (MIRALAX) 17 g packet Take 17 g by mouth daily Prn constipation 20 each 0    senna-docusate sodium (SENOKOT S) 8.6-50 mg per tablet Take 2 tablets by mouth daily 60 tablet 3    tamsulosin (FLOMAX) 0.4 mg Take 1 capsule (0.4 mg total) by mouth daily with dinner 30 capsule 1    bisacodyl (DULCOLAX) 5 mg EC tablet Take 2 tablets (10 mg total) by mouth once for 1 dose 2 tablet 0    polyethylene glycol (GOLYTELY) 4000 mL solution Take 4,000 mL by mouth once for 1 dose (Patient not taking: Reported on 4/11/2024) 4000 mL 0     No current facility-administered medications for this visit.         Health Maintenance     Health Maintenance   Topic Date Due    Zoster Vaccine (1 of 2) Never done    Hepatitis B Vaccine (1 of 3 - Risk 3-dose series) Never done    Medicare Annual Wellness Visit (AWV)  07/27/2024    COVID-19 Vaccine (5 - 2023-24 season) 07/01/2024 (Originally 9/1/2023)    Influenza Vaccine (Season Ended) 09/01/2024    HEMOGLOBIN A1C  09/25/2024    Depression Screening  01/22/2025    Falls: Plan of Care  01/22/2025    Diabetic Foot Exam  01/22/2025    Kidney Health Evaluation: GFR  03/25/2025    Kidney Health Evaluation: Albumin/Creatinine Ratio  03/25/2025    Fall Risk  05/13/2025    DM Eye Exam  09/01/2025    Pneumococcal Vaccine: 65+ Years  Completed    HIB Vaccine  Aged Out    IPV Vaccine  Aged Out     Hepatitis A Vaccine  Aged Out    Meningococcal ACWY Vaccine  Aged Out    HPV Vaccine  Aged Out    Hepatitis C Screening  Discontinued    Colorectal Cancer Screening  Discontinued     Immunization History   Administered Date(s) Administered    COVID-19 PFIZER VACCINE 0.3 ML IM 03/29/2021, 04/21/2021, 11/09/2021    COVID-19 Pfizer Vac BIVALENT Raphael-sucrose 12 Yr+ IM 11/21/2022    INFLUENZA 10/27/2015, 10/01/2016, 10/13/2016, 12/19/2018    Influenza Split High Dose Preservative Free IM 10/23/2014, 10/27/2015, 10/01/2016, 10/13/2016, 10/13/2016, 10/13/2016    Influenza, high dose seasonal 0.7 mL 12/19/2018, 10/14/2019, 10/05/2021    Pneumococcal Conjugate 13-Valent 08/25/2015, 01/19/2017, 10/14/2019    Pneumococcal Conjugate Vaccine 20-valent (Pcv20), Polysace 06/01/2023    Pneumococcal Polysaccharide PPV23 10/05/2021    Tdap 06/01/2023        Ear cerumen removal    Date/Time: 5/13/2024 1:30 PM    Performed by: ZAHRA Rice  Authorized by: ZAHRA Rice  Universal Protocol:  Consent: Verbal consent obtained. Written consent not obtained.  Risks and benefits: risks, benefits and alternatives were discussed  Consent given by: patient  Patient understanding: patient states understanding of the procedure being performed  Patient consent: the patient's understanding of the procedure matches consent given  Procedure consent: procedure consent matches procedure scheduled    Patient location:  Clinic  Procedure details:     Local anesthetic:  None    Location:  L ear and R ear    Procedure type: irrigation with instrumentation      Instrumentation: curette and forceps      Approach:  Natural orifice  Post-procedure details:     Complication:  None    Hearing quality:  Improved    Patient tolerance of procedure:  Tolerated well, no immediate complications     ZAHRA Rice

## 2024-05-14 DIAGNOSIS — K59.09 CHRONIC CONSTIPATION: ICD-10-CM

## 2024-05-14 RX ORDER — COLCHICINE 0.6 MG/1
0.6 TABLET ORAL DAILY
Qty: 90 TABLET | Refills: 1 | Status: SHIPPED | OUTPATIENT
Start: 2024-05-14

## 2024-05-14 RX ORDER — LACTULOSE 10 G/15ML
10 SOLUTION ORAL DAILY PRN
Qty: 473 ML | Refills: 0 | Status: ON HOLD | OUTPATIENT
Start: 2024-05-14 | End: 2024-05-19

## 2024-05-14 RX ORDER — LACTULOSE 10 G/15ML
SOLUTION ORAL
Qty: 473 ML | Refills: 0 | Status: SHIPPED | OUTPATIENT
Start: 2024-05-14 | End: 2024-05-14 | Stop reason: SDUPTHER

## 2024-05-17 ENCOUNTER — HOSPITAL ENCOUNTER (INPATIENT)
Facility: HOSPITAL | Age: 79
LOS: 1 days | Discharge: HOME | DRG: 392 | End: 2024-05-19
Attending: EMERGENCY MEDICINE | Admitting: INTERNAL MEDICINE
Payer: COMMERCIAL

## 2024-05-17 ENCOUNTER — NURSE TRIAGE (OUTPATIENT)
Age: 79
End: 2024-05-17

## 2024-05-17 ENCOUNTER — TELEPHONE (OUTPATIENT)
Age: 79
End: 2024-05-17

## 2024-05-17 DIAGNOSIS — K59.00 CONSTIPATION: ICD-10-CM

## 2024-05-17 DIAGNOSIS — R33.8 ACUTE URINARY RETENTION: Primary | ICD-10-CM

## 2024-05-17 DIAGNOSIS — K59.09 CHRONIC CONSTIPATION: ICD-10-CM

## 2024-05-17 LAB
ALBUMIN SERPL BCP-MCNC: 4.2 G/DL (ref 3.5–5)
ALP SERPL-CCNC: 72 U/L (ref 34–104)
ALT SERPL W P-5'-P-CCNC: 8 U/L (ref 7–52)
ANION GAP SERPL CALCULATED.3IONS-SCNC: 7 MMOL/L (ref 4–13)
AST SERPL W P-5'-P-CCNC: 11 U/L (ref 13–39)
BASOPHILS # BLD AUTO: 0.01 THOUSANDS/ÂΜL (ref 0–0.1)
BASOPHILS NFR BLD AUTO: 0 % (ref 0–1)
BILIRUB SERPL-MCNC: 0.7 MG/DL (ref 0.2–1)
BUN SERPL-MCNC: 16 MG/DL (ref 5–25)
CALCIUM SERPL-MCNC: 9.2 MG/DL (ref 8.4–10.2)
CHLORIDE SERPL-SCNC: 103 MMOL/L (ref 96–108)
CO2 SERPL-SCNC: 28 MMOL/L (ref 21–32)
CREAT SERPL-MCNC: 0.83 MG/DL (ref 0.6–1.3)
EOSINOPHIL # BLD AUTO: 0.07 THOUSAND/ÂΜL (ref 0–0.61)
EOSINOPHIL NFR BLD AUTO: 1 % (ref 0–6)
ERYTHROCYTE [DISTWIDTH] IN BLOOD BY AUTOMATED COUNT: 13.2 % (ref 11.6–15.1)
GFR SERPL CREATININE-BSD FRML MDRD: 84 ML/MIN/1.73SQ M
GLUCOSE SERPL-MCNC: 127 MG/DL (ref 65–140)
HCT VFR BLD AUTO: 36.4 % (ref 36.5–49.3)
HGB BLD-MCNC: 12.3 G/DL (ref 12–17)
IMM GRANULOCYTES # BLD AUTO: 0.03 THOUSAND/UL (ref 0–0.2)
IMM GRANULOCYTES NFR BLD AUTO: 1 % (ref 0–2)
LYMPHOCYTES # BLD AUTO: 1.32 THOUSANDS/ÂΜL (ref 0.6–4.47)
LYMPHOCYTES NFR BLD AUTO: 20 % (ref 14–44)
MCH RBC QN AUTO: 29.1 PG (ref 26.8–34.3)
MCHC RBC AUTO-ENTMCNC: 33.8 G/DL (ref 31.4–37.4)
MCV RBC AUTO: 86 FL (ref 82–98)
MONOCYTES # BLD AUTO: 0.87 THOUSAND/ÂΜL (ref 0.17–1.22)
MONOCYTES NFR BLD AUTO: 13 % (ref 4–12)
NEUTROPHILS # BLD AUTO: 4.17 THOUSANDS/ÂΜL (ref 1.85–7.62)
NEUTS SEG NFR BLD AUTO: 65 % (ref 43–75)
NRBC BLD AUTO-RTO: 0 /100 WBCS
PLATELET # BLD AUTO: 167 THOUSANDS/UL (ref 149–390)
PMV BLD AUTO: 9.6 FL (ref 8.9–12.7)
POTASSIUM SERPL-SCNC: 3.9 MMOL/L (ref 3.5–5.3)
PROT SERPL-MCNC: 6.5 G/DL (ref 6.4–8.4)
RBC # BLD AUTO: 4.23 MILLION/UL (ref 3.88–5.62)
SODIUM SERPL-SCNC: 138 MMOL/L (ref 135–147)
WBC # BLD AUTO: 6.47 THOUSAND/UL (ref 4.31–10.16)

## 2024-05-17 PROCEDURE — 99284 EMERGENCY DEPT VISIT MOD MDM: CPT

## 2024-05-17 PROCEDURE — 36415 COLL VENOUS BLD VENIPUNCTURE: CPT

## 2024-05-17 PROCEDURE — 80053 COMPREHEN METABOLIC PANEL: CPT

## 2024-05-17 PROCEDURE — 85025 COMPLETE CBC W/AUTO DIFF WBC: CPT

## 2024-05-17 PROCEDURE — 99285 EMERGENCY DEPT VISIT HI MDM: CPT | Performed by: EMERGENCY MEDICINE

## 2024-05-17 NOTE — TELEPHONE ENCOUNTER
LOV 3/6/24 Dr. Bernal (chronic constipation), Procedure colon 5/6/24  The cecum appeared normal.  All observed locations appeared normal, including the ascending colon, hepatic flexure, transverse colon, splenic flexure, descending colon, sigmoid colon, rectosigmoid and rectum.  Hemorrhoids    Returned call, no answer, left message requesting she call back.

## 2024-05-17 NOTE — TELEPHONE ENCOUNTER
Patients GI provider:  Dr. JOSHUA    Number to return call: ( [phone]     Reason for call: Pt TuyetCece (Daughter)calling with issues after procedure stomach pain and constipation for 5 days SENT TRIAGE NOTE TO RN    Scheduled procedure/appointment date if applicable: Apt/procedure  5/6 COLONOSCOPY

## 2024-05-17 NOTE — TELEPHONE ENCOUNTER
Regarding: STOMACH PAIN / CONSTIPATION  ----- Message from Nancy JESUS sent at 5/17/2024  8:36 AM EDT -----  Pt Cece Benz (Daughter)calling with issues after procedure stomach pain and constipation for 5 days trans to RN

## 2024-05-18 ENCOUNTER — APPOINTMENT (EMERGENCY)
Dept: RADIOLOGY | Facility: HOSPITAL | Age: 79
DRG: 392 | End: 2024-05-18
Payer: COMMERCIAL

## 2024-05-18 PROBLEM — K59.00 CONSTIPATION: Status: ACTIVE | Noted: 2024-05-18

## 2024-05-18 PROBLEM — R10.84 GENERALIZED ABDOMINAL PAIN: Status: ACTIVE | Noted: 2024-05-18

## 2024-05-18 LAB
BILIRUB UR QL STRIP: NEGATIVE
CLARITY UR: CLEAR
COLOR UR: COLORLESS
GLUCOSE SERPL-MCNC: 102 MG/DL (ref 65–140)
GLUCOSE SERPL-MCNC: 123 MG/DL (ref 65–140)
GLUCOSE SERPL-MCNC: 137 MG/DL (ref 65–140)
GLUCOSE SERPL-MCNC: 171 MG/DL (ref 65–140)
GLUCOSE UR STRIP-MCNC: NEGATIVE MG/DL
HGB UR QL STRIP.AUTO: NEGATIVE
KETONES UR STRIP-MCNC: NEGATIVE MG/DL
LEUKOCYTE ESTERASE UR QL STRIP: NEGATIVE
NITRITE UR QL STRIP: NEGATIVE
PH UR STRIP.AUTO: 6.5 [PH]
PROT UR STRIP-MCNC: NEGATIVE MG/DL
SP GR UR STRIP.AUTO: 1.02 (ref 1–1.03)
UROBILINOGEN UR STRIP-ACNC: <2 MG/DL

## 2024-05-18 PROCEDURE — 81003 URINALYSIS AUTO W/O SCOPE: CPT

## 2024-05-18 PROCEDURE — RECHECK: Performed by: NURSE PRACTITIONER

## 2024-05-18 PROCEDURE — 74177 CT ABD & PELVIS W/CONTRAST: CPT

## 2024-05-18 PROCEDURE — 82948 REAGENT STRIP/BLOOD GLUCOSE: CPT

## 2024-05-18 PROCEDURE — 99222 1ST HOSP IP/OBS MODERATE 55: CPT | Performed by: INTERNAL MEDICINE

## 2024-05-18 RX ORDER — DOCUSATE SODIUM 100 MG/1
100 CAPSULE, LIQUID FILLED ORAL 2 TIMES DAILY
Status: DISCONTINUED | OUTPATIENT
Start: 2024-05-18 | End: 2024-05-19 | Stop reason: HOSPADM

## 2024-05-18 RX ORDER — ACETAMINOPHEN 325 MG/1
650 TABLET ORAL EVERY 6 HOURS PRN
Status: DISCONTINUED | OUTPATIENT
Start: 2024-05-18 | End: 2024-05-19 | Stop reason: HOSPADM

## 2024-05-18 RX ORDER — INSULIN LISPRO 100 [IU]/ML
1-5 INJECTION, SOLUTION INTRAVENOUS; SUBCUTANEOUS
Status: DISCONTINUED | OUTPATIENT
Start: 2024-05-18 | End: 2024-05-19 | Stop reason: HOSPADM

## 2024-05-18 RX ORDER — LUBIPROSTONE 24 UG/1
24 CAPSULE ORAL 2 TIMES DAILY WITH MEALS
Status: DISCONTINUED | OUTPATIENT
Start: 2024-05-18 | End: 2024-05-19 | Stop reason: HOSPADM

## 2024-05-18 RX ORDER — LISINOPRIL 20 MG/1
20 TABLET ORAL DAILY
Status: DISCONTINUED | OUTPATIENT
Start: 2024-05-18 | End: 2024-05-19 | Stop reason: HOSPADM

## 2024-05-18 RX ORDER — HYDROMORPHONE HCL IN WATER/PF 6 MG/30 ML
0.2 PATIENT CONTROLLED ANALGESIA SYRINGE INTRAVENOUS ONCE
Status: DISCONTINUED | OUTPATIENT
Start: 2024-05-18 | End: 2024-05-18

## 2024-05-18 RX ORDER — MINERAL OIL 100 G/100G
1 OIL RECTAL ONCE
Status: COMPLETED | OUTPATIENT
Start: 2024-05-18 | End: 2024-05-18

## 2024-05-18 RX ORDER — ENOXAPARIN SODIUM 100 MG/ML
40 INJECTION SUBCUTANEOUS DAILY
Status: DISCONTINUED | OUTPATIENT
Start: 2024-05-18 | End: 2024-05-19 | Stop reason: HOSPADM

## 2024-05-18 RX ORDER — SODIUM PHOSPHATE,MONO-DIBASIC 19G-7G/118
1 ENEMA (ML) RECTAL ONCE
Status: COMPLETED | OUTPATIENT
Start: 2024-05-18 | End: 2024-05-18

## 2024-05-18 RX ORDER — PANTOPRAZOLE SODIUM 40 MG/1
40 TABLET, DELAYED RELEASE ORAL
Status: DISCONTINUED | OUTPATIENT
Start: 2024-05-18 | End: 2024-05-19 | Stop reason: HOSPADM

## 2024-05-18 RX ORDER — AMLODIPINE BESYLATE 5 MG/1
5 TABLET ORAL DAILY
Status: DISCONTINUED | OUTPATIENT
Start: 2024-05-18 | End: 2024-05-19 | Stop reason: HOSPADM

## 2024-05-18 RX ORDER — TAMSULOSIN HYDROCHLORIDE 0.4 MG/1
0.4 CAPSULE ORAL
Status: DISCONTINUED | OUTPATIENT
Start: 2024-05-18 | End: 2024-05-19 | Stop reason: HOSPADM

## 2024-05-18 RX ORDER — FAMOTIDINE 20 MG/1
20 TABLET, FILM COATED ORAL 2 TIMES DAILY
Status: DISCONTINUED | OUTPATIENT
Start: 2024-05-18 | End: 2024-05-19 | Stop reason: HOSPADM

## 2024-05-18 RX ORDER — GABAPENTIN 100 MG/1
100 CAPSULE ORAL
Status: DISCONTINUED | OUTPATIENT
Start: 2024-05-18 | End: 2024-05-19 | Stop reason: HOSPADM

## 2024-05-18 RX ORDER — ATORVASTATIN CALCIUM 10 MG/1
10 TABLET, FILM COATED ORAL DAILY
Status: DISCONTINUED | OUTPATIENT
Start: 2024-05-18 | End: 2024-05-19 | Stop reason: HOSPADM

## 2024-05-18 RX ORDER — LACTULOSE 10 G/15ML
20 SOLUTION ORAL 2 TIMES DAILY
Status: DISCONTINUED | OUTPATIENT
Start: 2024-05-18 | End: 2024-05-18

## 2024-05-18 RX ORDER — POLYETHYLENE GLYCOL 3350 17 G/17G
17 POWDER, FOR SOLUTION ORAL 2 TIMES DAILY
Status: DISCONTINUED | OUTPATIENT
Start: 2024-05-18 | End: 2024-05-18

## 2024-05-18 RX ORDER — AMOXICILLIN 250 MG
2 CAPSULE ORAL DAILY
Status: DISCONTINUED | OUTPATIENT
Start: 2024-05-18 | End: 2024-05-19 | Stop reason: HOSPADM

## 2024-05-18 RX ORDER — ONDANSETRON 2 MG/ML
4 INJECTION INTRAMUSCULAR; INTRAVENOUS EVERY 6 HOURS PRN
Status: DISCONTINUED | OUTPATIENT
Start: 2024-05-18 | End: 2024-05-19 | Stop reason: HOSPADM

## 2024-05-18 RX ORDER — METHOCARBAMOL 500 MG/1
500 TABLET, FILM COATED ORAL
Status: DISCONTINUED | OUTPATIENT
Start: 2024-05-18 | End: 2024-05-19 | Stop reason: HOSPADM

## 2024-05-18 RX ADMIN — INSULIN LISPRO 1 UNITS: 100 INJECTION, SOLUTION INTRAVENOUS; SUBCUTANEOUS at 11:52

## 2024-05-18 RX ADMIN — GABAPENTIN 100 MG: 100 CAPSULE ORAL at 21:02

## 2024-05-18 RX ADMIN — LISINOPRIL 20 MG: 20 TABLET ORAL at 09:10

## 2024-05-18 RX ADMIN — POLYETHYLENE GLYCOL 3350 17 G: 17 POWDER, FOR SOLUTION ORAL at 09:15

## 2024-05-18 RX ADMIN — POLYETHYLENE GLYCOL 3350, SODIUM SULFATE ANHYDROUS, SODIUM BICARBONATE, SODIUM CHLORIDE, POTASSIUM CHLORIDE 4000 ML: 236; 22.74; 6.74; 5.86; 2.97 POWDER, FOR SOLUTION ORAL at 11:59

## 2024-05-18 RX ADMIN — METHOCARBAMOL 500 MG: 500 TABLET ORAL at 21:02

## 2024-05-18 RX ADMIN — SODIUM PHOSPHATE 1 ENEMA: 7; 19 ENEMA RECTAL at 02:02

## 2024-05-18 RX ADMIN — DOCUSATE SODIUM 100 MG: 100 CAPSULE, LIQUID FILLED ORAL at 09:10

## 2024-05-18 RX ADMIN — IOHEXOL 100 ML: 350 INJECTION, SOLUTION INTRAVENOUS at 00:23

## 2024-05-18 RX ADMIN — ATORVASTATIN CALCIUM 10 MG: 10 TABLET, FILM COATED ORAL at 09:10

## 2024-05-18 RX ADMIN — ASPIRIN 81 MG: 81 TABLET, COATED ORAL at 09:10

## 2024-05-18 RX ADMIN — PANTOPRAZOLE SODIUM 40 MG: 40 TABLET, DELAYED RELEASE ORAL at 05:53

## 2024-05-18 RX ADMIN — MINERAL OIL 1 ENEMA: 118 ENEMA RECTAL at 13:33

## 2024-05-18 RX ADMIN — LUBIPROSTONE 24 MCG: 24 CAPSULE, GELATIN COATED ORAL at 07:38

## 2024-05-18 RX ADMIN — DOCUSATE SODIUM 100 MG: 100 CAPSULE, LIQUID FILLED ORAL at 16:23

## 2024-05-18 RX ADMIN — FAMOTIDINE 20 MG: 20 TABLET, FILM COATED ORAL at 09:10

## 2024-05-18 RX ADMIN — ENOXAPARIN SODIUM 40 MG: 40 INJECTION SUBCUTANEOUS at 09:16

## 2024-05-18 RX ADMIN — SENNOSIDES, DOCUSATE SODIUM 2 TABLET: 8.6; 5 TABLET ORAL at 09:10

## 2024-05-18 RX ADMIN — FAMOTIDINE 20 MG: 20 TABLET, FILM COATED ORAL at 16:23

## 2024-05-18 RX ADMIN — TAMSULOSIN HYDROCHLORIDE 0.4 MG: 0.4 CAPSULE ORAL at 16:23

## 2024-05-18 RX ADMIN — AMLODIPINE BESYLATE 5 MG: 5 TABLET ORAL at 09:10

## 2024-05-18 NOTE — PROGRESS NOTES
Post Admit Check:    Patient seen and examined, continues with abdominal pain and distention, + BS. Had small liquid BM earlier today. Will order Golytely. Spoke with RN and ED charge, asked for patient to be moved to a room with a toilet for patient comfort.     Golytely ordered  Mineral oil enema x 1  Clear liquid diet  Will also continue Senna/Amitiza.   Maintain lion, void trial when constipation improved  Updated son at bedside    ZAHRA Park  SLIM

## 2024-05-18 NOTE — ED ATTENDING ATTESTATION
5/17/2024  I, Ozzie Burrell MD, saw and evaluated the patient. I have discussed the patient with the resident/non-physician practitioner and agree with the resident's/non-physician practitioner's findings, Plan of Care, and MDM as documented in the resident's/non-physician practitioner's note, except where noted. All available labs and Radiology studies were reviewed.  I was present for key portions of any procedure(s) performed by the resident/non-physician practitioner and I was immediately available to provide assistance.       At this point I agree with the current assessment done in the Emergency Department.  I have conducted an independent evaluation of this patient a history and physical is as follows:    ED Course  ED Course as of 05/17/24 2247   Fri May 17, 2024   2213 Per resident h&p 77 YO M presents abdominal pain since colonoscopy that was done to evaluate his constipation; patient has been taking lactulose for the constipation; decreased UO; h/o appendectomy I/P likely constipation symptoms; recent colonoscopy noted; plan CTAP and screening abd labs; UA; bladder scan     Emergency Department Note- Zaira Morley 78 y.o. male MRN: [id number]    Unit/Bed#: [facility] Encounter: [id number]    Zaira Morley is a 78 y.o. male who presents with   Chief Complaint   Patient presents with    Constipation     Constipation since have colonoscopy several days ago; also difficulty urinating          History of Present Illness   HPI:  Zaira Morley is a 78 y.o. male who presents for evaluation of:  Intermittent right upper quadrant abdominal discomfort over the last week since his colonoscopy.  Patient also has had persistent constipation despite the colonoscopy procedure.  He also has been having some difficulty urinating throughout the day today.  He denies nausea, vomiting, dysuria, hematuria, flank pain, melena, and hematochezia.  He has had a 60 pound weight loss over the last 3 months.  The abdominal discomfort is  intermittent, mild in intensity, provoked by constipation, and improved by bowel motions.    Review of Systems   Constitutional:  Negative for fatigue and fever.   HENT:  Negative for congestion and sore throat.    Respiratory:  Negative for cough and shortness of breath.    Cardiovascular:  Negative for chest pain and palpitations.   Gastrointestinal:  Positive for abdominal pain and constipation. Negative for diarrhea and nausea.   Genitourinary:  Negative for flank pain and frequency.   Neurological:  Negative for light-headedness and headaches.   Psychiatric/Behavioral:  Negative for dysphoric mood and hallucinations.    All other systems reviewed and are negative.      Historical Information   Past Medical History:   Diagnosis Date    Brain mass     Last Assessed; 11/5/2015    Chest pain     Last Assessed: 1/22/2015    Diabetes type 2, uncontrolled     Last Assessed: 3/1/2016    Edema of left lower extremity     Last Assessed: 3/22/2017    GERD (gastroesophageal reflux disease)     Hyperlipidemia     Hypertension     Impaired fasting glucose     Last Assessed: 11/10/2015    Irregular heartbeat     Skin cancer (melanoma) (HCC)     Scalp     Varicose 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     Last Assessed: 3/22/2017     Past Surgical History:   Procedure Laterality Date    COLONOSCOPY      COLONOSCOPY N/A 7/10/2018    Procedure: COLONOSCOPY;  Surgeon: Jose Alejandro Rodrigues MD;  Location: [facility];  Service: Colorectal    INGUINAL HERNIA REPAIR      20+ years ago - [place]; Last Assessed: 10/23/2014    TONSILLECTOMY      VARICOSE VEIN SURGERY Left     x2 left leg - 40+ yrs ago, [place] and [place]     Social History   Social History     Substance and Sexual Activity   Alcohol Use No     Social History     Substance and Sexual Activity   Drug Use No     Social History     Tobacco Use   Smoking Status Never   Smokeless Tobacco Never     Family History:   Family History   Problem Relation Age of Onset    No Known Problems  Mother     No Known Problems Father     Melanoma Daughter 17       Meds/Allergies   PTA meds:   Prior to Admission Medications   Prescriptions Last Dose Informant Patient Reported? Taking?   Blood Glucose Monitoring Suppl (OneTouch Verio Reflect) w/Device KIT  Child No No   Sig: Check blood sugars once daily. Please substitute with appropriate alternative as covered by patient's insurance. Dx: E11.65   Lancets (OneTouch Delica Plus [id number]) MISC  Child No No   Sig: TEST DAILY, E11.5--PT CONFIMED DELICA 33 G   OneTouch Delica Lancets 33G MISC  Child No No   Sig: Check blood sugars once daily. Please substitute with appropriate alternative as covered by patient's insurance. Dx: E11.65   acetaminophen (TYLENOL) 325 mg tablet  Child No No   Sig: Take 2 tablets (650 mg total) by mouth every 6 (six) hours as needed for mild pain or fever   amLODIPine (NORVASC) 5 mg tablet   No No   Sig: TAKE 1 TABLET (5 MG TOTAL) BY MOUTH DAILY.   aspirin 81 MG tablet  Child Yes No   Sig: Take 81 mg by mouth daily   atorvastatin (LIPITOR) 10 mg tablet   No No   Sig: TAKE 1 TABLET BY MOUTH EVERY DAY   bisacodyl (DULCOLAX) 5 mg EC tablet   No No   Sig: Take 2 tablets (10 mg total) by mouth once for 1 dose   colchicine (COLCRYS) 0.6 mg tablet   No No   Sig: TAKE 1 TABLET BY MOUTH EVERY DAY   docusate sodium (COLACE) 100 mg capsule   No No   Sig: Take 1 capsule (100 mg total) by mouth in the morning   famotidine (PEPCID) 20 mg tablet   No No   Sig: TAKE 1 TABLET BY MOUTH TWICE A DAY   gabapentin (Neurontin) 100 mg capsule  Child No No   Sig: Take 1 capsule (100 mg total) by mouth daily at bedtime   glucose blood (OneTouch Verio) test strip   No No   Sig: CHECK BLOOD SUGARS ONCE DAILY.   hydrocortisone 2.5 % cream   No No   Sig: APPLY TO AFFECTED AREA TWICE A DAY   lactulose (CHRONULAC) 10 g/15 mL solution   No No   Sig: Take 15 mL (10 g total) by mouth daily as needed (constipation) DAILY AS NEEDED FOR SEVERE CONSTIPATION   lisinopril  (ZESTRIL) 20 mg tablet   No No   Sig: TAKE 1 TABLET BY MOUTH EVERY DAY   lubiprostone (AMITIZA) 24 mcg capsule   No No   Sig: Take 1 capsule (24 mcg total) by mouth 2 (two) times a day with meals   metFORMIN (GLUCOPHAGE) 500 mg tablet   No No   Sig: TAKE 1 TABLET BY MOUTH TWICE A DAY   methocarbamol (ROBAXIN) 500 mg tablet   No No   Sig: TAKE 1 TABLET (500 MG TOTAL) BY MOUTH DAILY AT BEDTIME   omeprazole (PriLOSEC) 40 MG capsule   No No   Sig: Take 1 capsule (40 mg total) by mouth daily   polyethylene glycol (GOLYTELY) 4000 mL solution   No No   Sig: Take 4,000 mL by mouth once for 1 dose   Patient not taking: Reported on 4/11/2024   polyethylene glycol (MIRALAX) 17 g packet   No No   Sig: Take 17 g by mouth daily Prn constipation   senna-docusate sodium (SENOKOT S) 8.6-50 mg per tablet   No No   Sig: Take 2 tablets by mouth daily   tamsulosin (FLOMAX) 0.4 mg   No No   Sig: Take 1 capsule (0.4 mg total) by mouth daily with dinner      Facility-Administered Medications: None     No Known Allergies    Objective   First Vitals:   Blood Pressure: 121/65 (05/17/24 2140)  Pulse: 73 (05/17/24 2140)  Temperature: 98.8 °F (37.1 °C) (05/17/24 2140)  Temp Source: Temporal (05/17/24 2140)  Respirations: 20 (05/17/24 2140)  SpO2: 99 % (05/17/24 2140)    Current Vitals:   Blood Pressure: 121/65 (05/17/24 2140)  Pulse: 73 (05/17/24 2140)  Temperature: 98.8 °F (37.1 °C) (05/17/24 2140)  Temp Source: Temporal (05/17/24 2140)  Respirations: 20 (05/17/24 2140)  SpO2: 99 % (05/17/24 2140)    No intake or output data in the 24 hours ending 05/17/24 2247    Invasive Devices       Peripheral Intravenous Line  Duration             Peripheral IV 01/18/24 Distal;Left;Upper;Ventral (anterior) Arm 120 days                    Physical Exam  Vitals and nursing note reviewed.   Constitutional:       General: He is not in acute distress.     Appearance: Normal appearance. He is well-developed.   HENT:      Head: Normocephalic and atraumatic.       "Right Ear: External ear normal.      Left Ear: External ear normal.      Nose: Nose normal.      Mouth/Throat:      Pharynx: No oropharyngeal exudate.   Eyes:      Conjunctiva/sclera: Conjunctivae normal.      Pupils: Pupils are equal, round, and reactive to light.   Cardiovascular:      Rate and Rhythm: Normal rate and regular rhythm.   Pulmonary:      Effort: Pulmonary effort is normal. No respiratory distress.   Abdominal:      General: Abdomen is flat. There is no distension.      Palpations: Abdomen is soft.   Musculoskeletal:         General: No deformity. Normal range of motion.      Cervical back: Normal range of motion and neck supple.   Skin:     General: Skin is warm and dry.      Capillary Refill: Capillary refill takes less than 2 seconds.   Neurological:      General: No focal deficit present.      Mental Status: He is alert and oriented to person, place, and time. Mental status is at baseline.      Coordination: Coordination normal.   Psychiatric:         Mood and Affect: Mood normal.         Behavior: Behavior normal.         Thought Content: Thought content normal.         Judgment: Judgment normal.           Medical Decision Makin.  Acute right upper quadrant abdominal discomfort, constipation, difficulty urinating, and weight loss: Plan to obtain a CT scan of the abdomen and pelvis to rule out small bowel obstruction; CBC to rule out leukocytosis and anemia; complete metabolic profile to rule out electrolyte disturbance, uremia, and disorders of glucose homeostasis; lipase rule out pancreatitis.  Plan to do a bladder scan to rule out urinary retention.  Urinalysis will be done to rule out UTI.    No results found for this or any previous visit (from the past 36 hour(s)).  CT abdomen pelvis with contrast    (Results Pending)         Portions of the record may have been created with voice recognition software. Occasional wrong word or \"sound a like\" substitutions may have occurred due to the " inherent limitations of voice recognition software.  Read the chart carefully and recognize, using context, where substitutions have occurred.        Critical Care Time  Procedures

## 2024-05-18 NOTE — ASSESSMENT & PLAN NOTE
Longstanding history of chronic constipation following with [facility].  Of note underwent colonoscopy 5/6/2024 without significant abnormalities noted  Has been several days since last good bowel movement despite trial of outpatient bowel regimen, now with intractable abdominal pain and urinary complaints  CT abdomen/pelvis as below revealing stool-filled colon and stool distended rectum concerning for constipation  Received enema during ED evaluation without production of bowel movement yet  S/p Pryor catheter for urinary retention, hopefully with this and enema will produce bowel movement.  Will intensify bowel regimen tentatively to MiraLAX twice daily, lactulose scheduled twice daily, continue Amitiza/Colace/senna-docusate.  Titrate as needed to produce bowel regimen  Consider repeat enema and/or GI consultation if patient remains with difficulty producing bowel movement

## 2024-05-18 NOTE — ASSESSMENT & PLAN NOTE
Patient presenting with increased generalized abdominal pain, patient with longstanding history of chronic constipation following with gastroenterology  CT abdomen/pelvis revealing stool-filled colon and stool distended rectum without evidence of obstruction, additionally distended urinary bladder  Abdominal pain improved following placement of Pryor catheter for urinary retention, additionally suspect constipation driving etiology.  Management of this as below  Interim pain control as needed, ideally avoid/limit opiates

## 2024-05-18 NOTE — ED PROVIDER NOTES
History  Chief Complaint   Patient presents with    Constipation     Constipation since have colonoscopy several days ago; also difficulty urinating      Patient is a 78-year-old male with a significant past medical history of diabetes, hypertension, hyperlipidemia, appendectomy, hernia, chronic constipation, presenting for evaluation of suspected constipation.  Patient reports he has been dealing with constipation for several years now.  He has been seeing a gastroenterologist secondary to this.  He had a recent colonoscopy on 5/6/2024 which was unremarkable.  He reports that since his colonoscopy he has been having some generalized abdominal discomfort, predominantly in his right upper quadrant.  He was initially having some constipation and was taking his stool softeners and lactulose and over the last 2 days has been having some diarrhea.  He reports ongoing abdominal discomfort and currently he is having some decreased ability to urinate.  He reports that he has urinated once today, and had some discomfort when doing so.  He denies any vomiting.  He denies any blood in his stool.        Prior to Admission Medications   Prescriptions Last Dose Informant Patient Reported? Taking?   Blood Glucose Monitoring Suppl (OneTouch Verio Reflect) w/Device KIT  Child No No   Sig: Check blood sugars once daily. Please substitute with appropriate alternative as covered by patient's insurance. Dx: E11.65   Lancets (OneTouch Delica Plus [id number]) MISC  Child No No   Sig: TEST DAILY, E11.5--PT CONFIMED DELICA 33 G   OneTouch Delica Lancets 33G MISC  Child No No   Sig: Check blood sugars once daily. Please substitute with appropriate alternative as covered by patient's insurance. Dx: E11.65   acetaminophen (TYLENOL) 325 mg tablet  Child No No   Sig: Take 2 tablets (650 mg total) by mouth every 6 (six) hours as needed for mild pain or fever   amLODIPine (NORVASC) 5 mg tablet   No No   Sig: TAKE 1 TABLET (5 MG TOTAL) BY MOUTH  DAILY.   aspirin 81 MG tablet  Child Yes No   Sig: Take 81 mg by mouth daily   atorvastatin (LIPITOR) 10 mg tablet   No No   Sig: TAKE 1 TABLET BY MOUTH EVERY DAY   bisacodyl (DULCOLAX) 5 mg EC tablet   No No   Sig: Take 2 tablets (10 mg total) by mouth once for 1 dose   colchicine (COLCRYS) 0.6 mg tablet   No No   Sig: TAKE 1 TABLET BY MOUTH EVERY DAY   docusate sodium (COLACE) 100 mg capsule   No No   Sig: Take 1 capsule (100 mg total) by mouth in the morning   famotidine (PEPCID) 20 mg tablet   No No   Sig: TAKE 1 TABLET BY MOUTH TWICE A DAY   gabapentin (Neurontin) 100 mg capsule  Child No No   Sig: Take 1 capsule (100 mg total) by mouth daily at bedtime   glucose blood (OneTouch Verio) test strip   No No   Sig: CHECK BLOOD SUGARS ONCE DAILY.   hydrocortisone 2.5 % cream   No No   Sig: APPLY TO AFFECTED AREA TWICE A DAY   lactulose (CHRONULAC) 10 g/15 mL solution   No No   Sig: Take 15 mL (10 g total) by mouth daily as needed (constipation) DAILY AS NEEDED FOR SEVERE CONSTIPATION   lisinopril (ZESTRIL) 20 mg tablet   No No   Sig: TAKE 1 TABLET BY MOUTH EVERY DAY   lubiprostone (AMITIZA) 24 mcg capsule   No No   Sig: Take 1 capsule (24 mcg total) by mouth 2 (two) times a day with meals   metFORMIN (GLUCOPHAGE) 500 mg tablet   No No   Sig: TAKE 1 TABLET BY MOUTH TWICE A DAY   methocarbamol (ROBAXIN) 500 mg tablet   No No   Sig: TAKE 1 TABLET (500 MG TOTAL) BY MOUTH DAILY AT BEDTIME   omeprazole (PriLOSEC) 40 MG capsule   No No   Sig: Take 1 capsule (40 mg total) by mouth daily   polyethylene glycol (GOLYTELY) 4000 mL solution   No No   Sig: Take 4,000 mL by mouth once for 1 dose   Patient not taking: Reported on 4/11/2024   polyethylene glycol (MIRALAX) 17 g packet   No No   Sig: Take 17 g by mouth daily Prn constipation   senna-docusate sodium (SENOKOT S) 8.6-50 mg per tablet   No No   Sig: Take 2 tablets by mouth daily   tamsulosin (FLOMAX) 0.4 mg   No No   Sig: Take 1 capsule (0.4 mg total) by mouth daily with  dinner      Facility-Administered Medications: None       Past Medical History:   Diagnosis Date    Brain mass     Last Assessed; 11/5/2015    Chest pain     Last Assessed: 1/22/2015    Diabetes type 2, uncontrolled     Last Assessed: 3/1/2016    Edema of left lower extremity     Last Assessed: 3/22/2017    GERD (gastroesophageal reflux disease)     Hyperlipidemia     Hypertension     Impaired fasting glucose     Last Assessed: 11/10/2015    Irregular heartbeat     Skin cancer (melanoma) (HCC)     Scalp     Varicose 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     Last Assessed: 3/22/2017       Past Surgical History:   Procedure Laterality Date    COLONOSCOPY      COLONOSCOPY N/A 7/10/2018    Procedure: COLONOSCOPY;  Surgeon: Jose Alejandro Rodrigues MD;  Location:  GI [facility];  Service: Colorectal    INGUINAL HERNIA REPAIR      20+ years ago - [place]; Last Assessed: 10/23/2014    TONSILLECTOMY      VARICOSE VEIN SURGERY Left     x2 left leg - 40+ yrs ago, [place] and [facility]       Family History   Problem Relation Age of Onset    No Known Problems Mother     No Known Problems Father     Melanoma Daughter 17     I have reviewed and agree with the history as documented.    E-Cigarette/Vaping    E-Cigarette Use Never User      E-Cigarette/Vaping Substances    Nicotine No     THC No     CBD No     Flavoring No     Other No     Unknown No      Social History     Tobacco Use    Smoking status: Never    Smokeless tobacco: Never   Vaping Use    Vaping status: Never Used   Substance Use Topics    Alcohol use: No    Drug use: No        Review of Systems   Constitutional:  Negative for fever.   Respiratory:  Negative for shortness of breath.    Cardiovascular:  Negative for chest pain.   Gastrointestinal:  Positive for abdominal pain, constipation and diarrhea. Negative for blood in stool, nausea and vomiting.       Physical Exam  ED Triage Vitals [05/17/24 2140]   Temperature Pulse Respirations Blood Pressure SpO2   98.8 °F (37.1 °C) 73 20 121/65  99 %      Temp Source Heart Rate Source Patient Position - Orthostatic VS BP Location FiO2 (%)   Temporal -- -- -- --      Pain Score       5             Orthostatic Vital Signs  Vitals:    05/17/24 2140   BP: 121/65   Pulse: 73       Physical Exam  Vitals and nursing note reviewed.   Constitutional:       General: He is not in acute distress.     Appearance: Normal appearance. He is not ill-appearing or toxic-appearing.   HENT:      Head: Normocephalic and atraumatic.      Right Ear: External ear normal.      Left Ear: External ear normal.      Nose: Nose normal.   Eyes:      General: No scleral icterus.        Right eye: No discharge.         Left eye: No discharge.      Extraocular Movements: Extraocular movements intact.      Conjunctiva/sclera: Conjunctivae normal.   Cardiovascular:      Rate and Rhythm: Normal rate.      Heart sounds: Normal heart sounds. No murmur heard.     No friction rub. No gallop.   Pulmonary:      Effort: Pulmonary effort is normal. No respiratory distress.      Breath sounds: Normal breath sounds.   Abdominal:      General: Abdomen is flat. There is no distension.      Palpations: Abdomen is soft. There is no mass.      Tenderness: There is generalized abdominal tenderness.   Genitourinary:     Comments: Deferred  Skin:     General: Skin is warm and dry.   Neurological:      General: No focal deficit present.      Mental Status: He is alert.         ED Medications  Medications   acetaminophen (TYLENOL) tablet 650 mg (has no administration in time range)   amLODIPine (NORVASC) tablet 5 mg (has no administration in time range)   aspirin (ECOTRIN LOW STRENGTH) EC tablet 81 mg (has no administration in time range)   atorvastatin (LIPITOR) tablet 10 mg (has no administration in time range)   docusate sodium (COLACE) capsule 100 mg (has no administration in time range)   gabapentin (NEURONTIN) capsule 100 mg (has no administration in time range)   famotidine (PEPCID) tablet 20 mg (has no  administration in time range)   lactulose (CHRONULAC) oral solution 20 g (has no administration in time range)   lisinopril (ZESTRIL) tablet 20 mg (has no administration in time range)   lubiprostone (AMITIZA) capsule 24 mcg (has no administration in time range)   methocarbamol (ROBAXIN) tablet 500 mg (has no administration in time range)   pantoprazole (PROTONIX) EC tablet 40 mg (has no administration in time range)   polyethylene glycol (MIRALAX) packet 17 g (has no administration in time range)   tamsulosin (FLOMAX) capsule 0.4 mg (has no administration in time range)   senna-docusate sodium (SENOKOT S) 8.6-50 mg per tablet 2 tablet (has no administration in time range)   enoxaparin (LOVENOX) subcutaneous injection 40 mg (has no administration in time range)   ondansetron (ZOFRAN) injection 4 mg (has no administration in time range)   insulin lispro (HumALOG/ADMELOG) 100 units/mL subcutaneous injection 1-5 Units (has no administration in time range)   insulin lispro (HumALOG/ADMELOG) 100 units/mL subcutaneous injection 1-5 Units (has no administration in time range)   iohexol (OMNIPAQUE) 350 MG/ML injection (MULTI-DOSE) 100 mL (100 mL Intravenous Given 5/18/24 0023)   sodium phosphate-biphosphate (FLEET) enema 1 enema (1 enema Rectal Given 5/18/24 0202)       Diagnostic Studies  Results Reviewed       Procedure Component Value Units Date/Time    UA w Reflex to Microscopic w Reflex to Culture [[id number]] Collected: 05/18/24 0047    Lab Status: Final result Specimen: Urine, Other Updated: 05/18/24 0104     Color, UA Colorless     Clarity, UA Clear     Specific Gravity, UA 1.018     pH, UA 6.5     Leukocytes, UA Negative     Nitrite, UA Negative     Protein, UA Negative mg/dl      Glucose, UA Negative mg/dl      Ketones, UA Negative mg/dl      Urobilinogen, UA <2.0 mg/dl      Bilirubin, UA Negative     Occult Blood, UA Negative    Comprehensive metabolic panel [[id number]]  (Abnormal) Collected: 05/17/24 2310    Lab  Status: Final result Specimen: Blood from Arm, Left Updated: 05/17/24 2342     Sodium 138 mmol/L      Potassium 3.9 mmol/L      Chloride 103 mmol/L      CO2 28 mmol/L      ANION GAP 7 mmol/L      BUN 16 mg/dL      Creatinine 0.83 mg/dL      Glucose 127 mg/dL      Calcium 9.2 mg/dL      AST 11 U/L      ALT 8 U/L      Alkaline Phosphatase 72 U/L      Total Protein 6.5 g/dL      Albumin 4.2 g/dL      Total Bilirubin 0.70 mg/dL      eGFR 84 ml/min/1.73sq m     Narrative:      [employer] guidelines for Chronic Kidney Disease (CKD):     Stage 1 with normal or high GFR (GFR > 90 mL/min/1.73 square meters)    Stage 2 Mild CKD (GFR = 60-89 mL/min/1.73 square meters)    Stage 3A Moderate CKD (GFR = 45-59 mL/min/1.73 square meters)    Stage 3B Moderate CKD (GFR = 30-44 mL/min/1.73 square meters)    Stage 4 Severe CKD (GFR = 15-29 mL/min/1.73 square meters)    Stage 5 End Stage CKD (GFR <15 mL/min/1.73 square meters)  Note: GFR calculation is accurate only with a steady state creatinine    CBC and differential [[id number]]  (Abnormal) Collected: 05/17/24 2310    Lab Status: Final result Specimen: Blood from Arm, Left Updated: 05/17/24 2327     WBC 6.47 Thousand/uL      RBC 4.23 Million/uL      Hemoglobin 12.3 g/dL      Hematocrit 36.4 %      MCV 86 fL      MCH 29.1 pg      MCHC 33.8 g/dL      RDW 13.2 %      MPV 9.6 fL      Platelets 167 Thousands/uL      nRBC 0 /100 WBCs      Segmented % 65 %      Immature Grans % 1 %      Lymphocytes % 20 %      Monocytes % 13 %      Eosinophils Relative 1 %      Basophils Relative 0 %      Absolute Neutrophils 4.17 Thousands/µL      Absolute Immature Grans 0.03 Thousand/uL      Absolute Lymphocytes 1.32 Thousands/µL      Absolute Monocytes 0.87 Thousand/µL      Eosinophils Absolute 0.07 Thousand/µL      Basophils Absolute 0.01 Thousands/µL                    CT abdomen pelvis with contrast   Final Result by Dev Bucio MD (05/18 0119)      1.  Stool-filled colon and  stool distended rectum, correlate for constipation and fecal impaction.   2.  Distended urinary bladder.         Workstation performed: [id number]               Procedures  Procedures      ED Course                             SBIRT 22yo+      Flowsheet Row Most Recent Value   Initial Alcohol Screen: US AUDIT-C     1. How often do you have a drink containing alcohol? 0 Filed at: 05/17/2024 2140   2. How many drinks containing alcohol do you have on a typical day you are drinking?  0 Filed at: 05/17/2024 2140   3b. FEMALE Any Age, or MALE 65+: How often do you have 4 or more drinks on one occassion? 0 Filed at: 05/17/2024 2140   Audit-C Score 0 Filed at: 05/17/2024 2140   GÉNESIS: How many times in the past year have you...    Used an illegal drug or used a prescription medication for non-medical reasons? Never Filed at: 05/17/2024 2140                  Medical Decision Making  Patient with history as above presented with constipation. History obtained from patient.    Differential diagnosis includes: Acute on chronic constipation, obstruction, acute urinary retention, urinary tract infection    Plan: CBC, CMP, CT abdomen pelvis, urinalysis    Reviewed external records including office visit from 5/13/2024. Labs reviewed and unremarkable. Independently reviewed imaging consistent with constipation, no other acute emergent intra-abdominal pathology. Patient was treated with above an enema, however has persistence of his symptoms.  Pryor catheter placed.  Family with hesitancy and patient's ability to care for Pryor catheter on his own at home.  Discussed with medicine who agreed to admit the patient secondary to his acute on chronic constipation as well as acute urinary retention.    Amount and/or Complexity of Data Reviewed  Labs: ordered.  Radiology: ordered.    Risk  OTC drugs.  Prescription drug management.  Decision regarding hospitalization.          Disposition  Final diagnoses:   Acute urinary retention    Constipation     Time reflects when diagnosis was documented in both MDM as applicable and the Disposition within this note       Time User Action Codes Description Comment    5/18/2024  3:23 AM Justen Pierce [R33.8] Acute urinary retention     5/18/2024  3:23 AM Justen Pierce [K59.00] Constipation           ED Disposition       ED Disposition   Admit    Condition   Stable    Date/Time   Sat May 18, 2024 0323    Comment   Case was discussed with [facility] and the patient's admission status was agreed to be Admission Status: observation status to the service of Dr. Feldman .               Follow-up Information    None         Patient's Medications   Discharge Prescriptions    No medications on file     No discharge procedures on file.    PDMP Review         Value Time User    PDMP Reviewed  Yes 5/18/2024  3:40 AM Matthew Feldman DO             ED Provider  Attending physically available and evaluated Zaira Morley. I managed the patient along with the ED Attending.    Electronically Signed by           Justen Pierce DO  05/18/24 0346

## 2024-05-18 NOTE — ASSESSMENT & PLAN NOTE
"Lab Results   Component Value Date    HGBA1C 5.6 03/25/2024       No results for input(s): \"POCGLU\" in the last 72 hours.    Blood Sugar Average: Last 72 hrs:  Well-controlled as outpatient  Hold home oral medications start correctional insulin coverage with Accu-Cheks while admitted.  Carb controlled diet  Initiate hypoglycemia protocol    "

## 2024-05-18 NOTE — UTILIZATION REVIEW
Initial Clinical Review    WAS OBSERVATION 5/18/24 @ 0323 CONVERTED TO INPATIENT ADMISSION 5/18/24 @ 1422 DUE TO CONTINUED STAY REQUIRED TO CARE FOR PATIENT WITH DX: ABDOMINAL PAIN.    Admission: Date/Time/Statement:   Admission Orders (From admission, onward)       Ordered        05/18/24 1422  INPATIENT ADMISSION  Once            05/18/24 0323  Place in Observation  Once                          Orders Placed This Encounter   Procedures    INPATIENT ADMISSION     Standing Status:   Standing     Number of Occurrences:   1     Order Specific Question:   Level of Care     Answer:   Med Surg [16]     Order Specific Question:   Estimated length of stay     Answer:   More than 2 Midnights     Order Specific Question:   Certification     Answer:   I certify that inpatient services are medically necessary for this patient for a duration of greater than two midnights. See H&P and MD Progress Notes for additional information about the patient's course of treatment.     ED Arrival Information       Expected   -    Arrival   5/17/2024 21:36    Acuity   Urgent              Means of arrival   Wheelchair    Escorted by   Family Member    Service   Hospitalist    Admission type   Emergency              Arrival complaint   Constipation             Chief Complaint   Patient presents with    Constipation     Constipation since have colonoscopy several days ago; also difficulty urinating        Initial Presentation: 78 y.o. male to ED via WC from home  Present to ED with increased abdominal pain/difficulty urinating. Patient underwent recent colonoscopy 5/6/2024 which was noted without abnormalities. Unfortunately since this he has noted increasing generalized abdominal discomfort and constipation despite taking stool softeners as well as as needed lactulose, with pain markedly worsening over the past 2 days additionally associated with decreased urination which prompted presentation. Fleet enema was trialed which did not produce  bowel movement, and patient persisting with urinary difficulties for which Lion catheter was placed draining 1500 cc clear yellow urine, and given ongoing constipation patient admitted for further management.   PMHX: chronic constipation following with gastroenterology, type 2 diabetes, hypertension, hyperlipidemia, prior brain tumor   Admitted to OBS with DX: Generalized abdominal pain   on exam:  generalized abdominal tenderness. Pain 5/10  CT abdomen/pelvis significant for stool-filled colon and stool distended rectum without obstruction and distended urinary bladder.   PLAN: pain control; Accuchecks with ssic; Will intensify bowel regimen tentatively to MiraLAX twice daily, lactulose scheduled twice daily, continue Amitiza/Colace/senna-docusate. Maintain lion      Date: 5/18/24 - CHANGED TO INPATIENT  continues with abdominal pain and distention, + BS. Had small liquid BM earlier today. Will order Golytely.   Plan: mineral oil enema x1; clear liq diet; pain control; Accuchecks with ssic; lactulose scheduled twice daily, continue Amitiza/Colace/senna-docusate. Maintain lion      Date: 5/19/24     Day 3: Has surpassed a 2nd midnight with active treatments and services.  Discharge Summary  Hospital Course:    Zaira Morley is a 78 y.o. male patient with a PMHx of constipation, DM2, HTN, cognitive impairment who originally presented to the hospital on 5/17/2024 due to abdominal pain/constipation. CT A/p showed significant stool and fecal impaction as well as distended bladder consistent with urinary retention.  Patient had Lion catheter placed.  Was started on bowel regimen with MiraLAX and lactulose but had persistent abdominal pain and no significant stools.  Was started on a bowel prep with GoLytely, patient drank approximately half of this and also had mineral oil enema and began having passing large episodes of stool.  Patient had frequent stooling through the night and continues to have BM this morning.   Abdominal pain is improved.  Pryor catheter was removed this morning and patient has voided with negative PVR.     Discussed bowel regimen extensively with son, also written out on discharge instructions.  Patient is stable for discharge home with his family today.  Patient/son was instructed to take MiraLAX daily, can use up to twice a day for constipation, if no improvement with MiraLAX, can take lactulose up to twice a day.  If no improvement with MiraLAX and lactulose, can utilize Dulcolax rectal suppository which was prescribed on discharge.     Disposition: Home / self      ED Triage Vitals   Temperature Pulse Respirations Blood Pressure SpO2   05/17/24 2140 05/17/24 2140 05/17/24 2140 05/17/24 2140 05/17/24 2140   98.8 °F (37.1 °C) 73 20 121/65 99 %      Temp Source Heart Rate Source Patient Position - Orthostatic VS BP Location FiO2 (%)   05/17/24 2140 05/18/24 0600 05/18/24 0600 05/18/24 0600 --   Temporal Monitor Sitting Right arm       Pain Score       05/17/24 2140       5          Wt Readings from Last 1 Encounters:   05/06/24 70.3 kg (155 lb)     Additional Vital Signs:   Date/Time Temp Pulse Resp BP MAP (mmHg) SpO2 O2 Device Patient Position - Orthostatic VS   05/19/24 11:11:48 98.2 °F (36.8 °C) 71 18 129/66 87 99 % -- Lying   05/19/24 07:17:54 98.5 °F (36.9 °C) 74 17 128/65 86 98 % -- --   05/19/24 0716 -- -- -- -- -- -- None (Room air) --   05/18/24 21:32:05 98.8 °F (37.1 °C) 79 17 128/62 84 99 % -- Lying   05/18/24 20:25:20 -- 91 17 133/69 90 99 % -- --   05/18/24 20:14:01 99.4 °F (37.4 °C) -- -- 121/70 87 -- -- --   05/18/24 2000 -- -- -- -- -- -- None (Room air) --   05/18/24 15:13:17 97.5 °F (36.4 °C) -- 17 142/80 101 -- -- Lying   05/18/24 1507 -- -- -- -- -- -- None (Room air) --   05/18/24 1414 -- -- -- 147/73 98 -- -- --   05/18/24 1400 -- 74 -- 160/80 113 -- -- --   05/18/24 1300 -- -- -- -- -- -- None (Room air) --   05/18/24 1200 -- 76 16 130/67 90 98 % None (Room air) Lying     Date/Time  Temp Pulse Resp BP MAP (mmHg) SpO2 O2 Device Patient Position - Orthostatic VS   05/18/24 0915 -- 93 16 117/65 84 97 % None (Room air) Sitting   05/18/24 0910 -- -- -- 117/65 -- -- -- --   05/18/24 0600 -- 73 18 135/63 90 99 % None (Room air) Sitting   05/17/24 2140 98.8 °F (37.1 °C) 73 20 121/65 -- 99 % None          EKG: None obtained      Pertinent Labs/Diagnostic Test Results:   CT abdomen pelvis with contrast   Final Result by Dev uBcio MD (05/18 0119)      1.  Stool-filled colon and stool distended rectum, correlate for constipation and fecal impaction.   2.  Distended urinary bladder.         Workstation performed: [id number]              Results from last 7 days   Lab Units 05/19/24  0623 05/17/24  2310   WBC Thousand/uL 5.00 6.47   HEMOGLOBIN g/dL 11.4* 12.3   HEMATOCRIT % 33.6* 36.4*   PLATELETS Thousands/uL 146* 167   TOTAL NEUT ABS Thousands/µL 2.91 4.17        Results from last 7 days   Lab Units 05/19/24  0623 05/17/24  2310   SODIUM mmol/L 141 138   POTASSIUM mmol/L 3.4* 3.9   CHLORIDE mmol/L 104 103   CO2 mmol/L 28 28   ANION GAP mmol/L 9 7   BUN mg/dL 10 16   CREATININE mg/dL 0.61 0.83   EGFR ml/min/1.73sq m 96 84   CALCIUM mg/dL 8.8 9.2     Results from last 7 days   Lab Units 05/17/24  2310   AST U/L 11*   ALT U/L 8   ALK PHOS U/L 72   TOTAL PROTEIN g/dL 6.5   ALBUMIN g/dL 4.2   TOTAL BILIRUBIN mg/dL 0.70     Results from last 7 days   Lab Units 05/19/24  1049 05/19/24  0607 05/18/24  2103 05/18/24  1613 05/18/24  1146 05/18/24  0740   POC GLUCOSE mg/dl 158* 117 137 102 171* 123     Results from last 7 days   Lab Units 05/19/24  0623 05/17/24  2310   GLUCOSE RANDOM mg/dL 128 127        Results from last 7 days   Lab Units 05/18/24  0047   CLARITY UA  Clear   COLOR UA  Colorless   SPEC GRAV UA  1.018   PH UA  6.5   GLUCOSE UA mg/dl Negative   KETONES UA mg/dl Negative   BLOOD UA  Negative   PROTEIN UA mg/dl Negative   NITRITE UA  Negative   BILIRUBIN UA  Negative   UROBILINOGEN UA (BE) mg/dl  <2.0   LEUKOCYTES UA  Negative        ED Treatment:   Medication Administration from 05/17/2024 2136 to 05/18/2024 1202         Date/Time Order Dose Route Action     05/18/2024 0023 EDT iohexol (OMNIPAQUE) 350 MG/ML injection (MULTI-DOSE) 100 mL 100 mL Intravenous Given     05/18/2024 0202 EDT sodium phosphate-biphosphate (FLEET) enema 1 enema 1 enema Rectal Given     05/18/2024 0910 EDT amLODIPine (NORVASC) tablet 5 mg 5 mg Oral Given     05/18/2024 0910 EDT aspirin (ECOTRIN LOW STRENGTH) EC tablet 81 mg 81 mg Oral Given     05/18/2024 0910 EDT atorvastatin (LIPITOR) tablet 10 mg 10 mg Oral Given     05/18/2024 0910 EDT docusate sodium (COLACE) capsule 100 mg 100 mg Oral Given     05/18/2024 0910 EDT famotidine (PEPCID) tablet 20 mg 20 mg Oral Given     05/18/2024 0910 EDT lisinopril (ZESTRIL) tablet 20 mg 20 mg Oral Given     05/18/2024 0738 EDT lubiprostone (AMITIZA) capsule 24 mcg 24 mcg Oral Given     05/18/2024 0553 EDT pantoprazole (PROTONIX) EC tablet 40 mg 40 mg Oral Given     05/18/2024 0915 EDT polyethylene glycol (MIRALAX) packet 17 g 17 g Oral Given     05/18/2024 0910 EDT senna-docusate sodium (SENOKOT S) 8.6-50 mg per tablet 2 tablet 2 tablet Oral Given     05/18/2024 0916 EDT enoxaparin (LOVENOX) subcutaneous injection 40 mg 40 mg Subcutaneous Given     05/18/2024 1152 EDT insulin lispro (HumALOG/ADMELOG) 100 units/mL subcutaneous injection 1-5 Units 1 Units Subcutaneous Given     05/18/2024 1159 EDT polyethylene glycol (GOLYTELY) bowel prep 4,000 mL 4,000 mL Oral Given            Present on Admission:   (Resolved) Generalized abdominal pain   Constipation   (Resolved) Urinary retention   Hypertension   Type 2 diabetes mellitus with diabetic cataract, without long-term current use of insulin (HCC)      Admitting Diagnosis: Constipation [K59.00]  Acute urinary retention [R33.8]    Age/Sex: 78 y.o. male    Admission Orders: SCDs; I/O; NPO    Scheduled Medications:  amLODIPine, 5 mg, Oral,  Daily  aspirin, 81 mg, Oral, Daily  atorvastatin, 10 mg, Oral, Daily  docusate sodium, 100 mg, Oral, BID  enoxaparin, 40 mg, Subcutaneous, Daily  famotidine, 20 mg, Oral, BID  gabapentin, 100 mg, Oral, HS  insulin lispro, 1-5 Units, Subcutaneous, TID AC  insulin lispro, 1-5 Units, Subcutaneous, HS  lisinopril, 20 mg, Oral, Daily  lubiprostone, 24 mcg, Oral, BID With Meals  methocarbamol, 500 mg, Oral, HS  mineral oil, 1 enema, Rectal, Once  pantoprazole, 40 mg, Oral, Early Morning  senna-docusate sodium, 2 tablet, Oral, Daily  tamsulosin, 0.4 mg, Oral, Daily With Dinner      Continuous IV Infusions: none         PRN Meds:  acetaminophen, 650 mg, Oral, Q6H PRN  ondansetron, 4 mg, Intravenous, Q6H PRN        Network Utilization Review Department  ATTENTION: Please call with any questions or concerns to [phone] and carefully listen to the prompts so that you are directed to the right person. All voicemails are confidential.   For Discharge needs, contact Care Management DC Support Team at [phone] opt. [phone]  Send all requests for admission clinical reviews, approved or denied determinations and any other requests to dedicated fax number below belonging to the campus where the patient is receiving treatment. List of dedicated fax numbers for the Facilities:  FACILITY NAME UR FAX NUMBER   ADMISSION DENIALS (Administrative/Medical Necessity) [phone]   DISCHARGE SUPPORT TEAM (NETWORK) [phone]   PARENT CHILD HEALTH (Maternity/NICU/Pediatrics) [phone]   [facility] [phone]   [facility] [phone]   On license of [facility] [phone]   [facility] [phone]   [facility] [phone]   [facility] [phone]   [facility] [phone]   [facility] [phone]   [facility]  [facility] [place] [phone]   [facility] [phone]   [facility] [phone]   [facility] [phone]

## 2024-05-18 NOTE — ASSESSMENT & PLAN NOTE
Patient noted with markedly distended bladder additionally on CT abdomen/pelvis, additionally complaining of urinary retention  Pryor catheter placed during ED evaluation immediately draining 1500 cc clear yellow urine with relief of symptoms  Suspect provoked by degree of constipation.  Maintain Pryor catheter for now pending production of good bowel movement.  Voiding trial following this

## 2024-05-18 NOTE — H&P
[facility]  H&P  Name: Zaira Morley 78 y.o. male I MRN: [id number]  Unit/Bed#: CRB I Date of Admission: 5/17/2024   Date of Service: 5/18/2024 I Hospital Day: 0      Assessment & Plan   * Generalized abdominal pain  Assessment & Plan  Patient presenting with increased generalized abdominal pain, patient with longstanding history of chronic constipation following with gastroenterology  CT abdomen/pelvis revealing stool-filled colon and stool distended rectum without evidence of obstruction, additionally distended urinary bladder  Abdominal pain improved following placement of Pryor catheter for urinary retention, additionally suspect constipation driving etiology.  Management of this as below  Interim pain control as needed, ideally avoid/limit opiates    Constipation  Assessment & Plan  Longstanding history of chronic constipation following with [facility].  Of note underwent colonoscopy 5/6/2024 without significant abnormalities noted  Has been several days since last good bowel movement despite trial of outpatient bowel regimen, now with intractable abdominal pain and urinary complaints  CT abdomen/pelvis as below revealing stool-filled colon and stool distended rectum concerning for constipation  Received enema during ED evaluation without production of bowel movement yet  S/p Pryor catheter for urinary retention, hopefully with this and enema will produce bowel movement.  Will intensify bowel regimen tentatively to MiraLAX twice daily, lactulose scheduled twice daily, continue Amitiza/Colace/senna-docusate.  Titrate as needed to produce bowel regimen  Consider repeat enema and/or GI consultation if patient remains with difficulty producing bowel movement    Urinary retention  Assessment & Plan  Patient noted with markedly distended bladder additionally on CT abdomen/pelvis, additionally complaining of urinary retention  Pryor catheter placed during ED evaluation  "immediately draining 1500 cc clear yellow urine with relief of symptoms  Suspect provoked by degree of constipation.  Maintain Pryor catheter for now pending production of good bowel movement.  Voiding trial following this    Type 2 diabetes mellitus with diabetic cataract, without long-term current use of insulin ([facility])  Assessment & Plan  Lab Results   Component Value Date    HGBA1C 5.6 03/25/2024       No results for input(s): \"POCGLU\" in the last 72 hours.    Blood Sugar Average: Last 72 hrs:  Well-controlled as outpatient  Hold home oral medications start correctional insulin coverage with Accu-Cheks while admitted.  Carb controlled diet  Initiate hypoglycemia protocol      Hypertension  Assessment & Plan  Continue home antihypertensives with strict hold parameters and monitor blood pressure per protocol             VTE Prophylaxis: Enoxaparin (Lovenox)  / sequential compression device   Code Status: Level 1 - Full Code  POLST: POLST form is not discussed and not completed at this time.  Discussion with family: Son at bedside    Anticipated Length of Stay:  Patient will be admitted on an Observation basis with an anticipated length of stay of less than 2 midnights.   Justification for Hospital Stay: Please see detailed plans noted above.    Chief Complaint:     Abdominal pain  History of Present Illness:  Zaira Morley is a 78 y.o. male who has a past medical history seen for chronic constipation following with gastroenterology, type 2 diabetes, hypertension, hyperlipidemia, prior brain tumor presenting with increased abdominal pain/difficulty urinating.  Patient underwent recent colonoscopy 5/6/2024 which was noted without abnormalities.  Unfortunately since this he has noted increasing generalized abdominal discomfort and constipation despite taking stool softeners as well as as needed lactulose, with pain markedly worsening over the past 2 days additionally associated with decreased urination which prompted " presentation.  He denied any fever/chills, chest pain/pressure, shortness of breath, nausea/vomiting, bryce dysuria, or other systemic symptoms.  Did report isolated episode of small-volume diarrhea approximately 2 days prior to his presentation, denying any melena/hematochezia associated with this.    During ED evaluation patient underwent lab work which was without marked abnormalities compared to prior and CT abdomen/pelvis significant for stool-filled colon and stool distended rectum without obstruction and distended urinary bladder.  Fleet enema was trialed which did not produce bowel movement, and patient persisting with urinary difficulties for which Pryor catheter was placed draining 1500 cc clear yellow urine, and given ongoing constipation patient admitted for further management.    Review of Systems:    Constitutional:  Denies fever or chills   Eyes:  Denies change in visual acuity   HENT:  Denies nasal congestion or sore throat   Respiratory:  Denies cough or shortness of breath   Cardiovascular:  Denies chest pain or edema   GI:  Denies nausea, vomiting, bloody stools or diarrhea but reports abdominal pain and constipation  :  Denies dysuria but reports urinary retention  Musculoskeletal:  Denies back pain or joint pain   Integument:  Denies rash   Neurologic:  Denies headache, focal weakness or sensory changes   Endocrine:  Denies polyuria or polydipsia   Lymphatic:  Denies swollen glands   Psychiatric:  Denies depression or anxiety     Past Medical and Surgical History:   Past Medical History:   Diagnosis Date    Brain mass     Last Assessed; 11/5/2015    Chest pain     Last Assessed: 1/22/2015    Diabetes type 2, uncontrolled     Last Assessed: 3/1/2016    Edema of left lower extremity     Last Assessed: 3/22/2017    GERD (gastroesophageal reflux disease)     Hyperlipidemia     Hypertension     Impaired fasting glucose     Last Assessed: 11/10/2015    Irregular heartbeat     Skin cancer (melanoma)  (HCC)     Scalp     Varicose 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     Last Assessed: 3/22/2017     Past Surgical History:   Procedure Laterality Date    COLONOSCOPY      COLONOSCOPY N/A 7/10/2018    Procedure: COLONOSCOPY;  Surgeon: Jose Alejandro Rodrigues MD;  Location: [facility];  Service: Colorectal    INGUINAL HERNIA REPAIR      20+ years ago - [place]; Last Assessed: 10/23/2014    TONSILLECTOMY      VARICOSE VEIN SURGERY Left     x2 left leg - 40+ yrs ago, [place] and [place]       Meds/Allergies:    Current Facility-Administered Medications:     acetaminophen (TYLENOL) tablet 650 mg, 650 mg, Oral, Q6H PRN, Matthew Feldman DO    amLODIPine (NORVASC) tablet 5 mg, 5 mg, Oral, Daily, Matthew Feldman DO    aspirin (ECOTRIN LOW STRENGTH) EC tablet 81 mg, 81 mg, Oral, Daily, Matthew Feldman DO    atorvastatin (LIPITOR) tablet 10 mg, 10 mg, Oral, Daily, Matthew Feldman DO    docusate sodium (COLACE) capsule 100 mg, 100 mg, Oral, BID, Matthew Feldman DO    enoxaparin (LOVENOX) subcutaneous injection 40 mg, 40 mg, Subcutaneous, Daily, Matthew Feldman DO    famotidine (PEPCID) tablet 20 mg, 20 mg, Oral, BID, Matthew Feldman DO    gabapentin (NEURONTIN) capsule 100 mg, 100 mg, Oral, HS, Matthew Feldman DO    insulin lispro (HumALOG/ADMELOG) 100 units/mL subcutaneous injection 1-5 Units, 1-5 Units, Subcutaneous, TID AC **AND** Fingerstick Glucose (POCT), , , TID AC, Matthew Feldman DO    insulin lispro (HumALOG/ADMELOG) 100 units/mL subcutaneous injection 1-5 Units, 1-5 Units, Subcutaneous, HS, Matthew Feldman DO    lactulose (CHRONULAC) oral solution 20 g, 20 g, Oral, BID, Matthew Feldman DO    lisinopril (ZESTRIL) tablet 20 mg, 20 mg, Oral, Daily, Matthew Feldman DO    lubiprostone (AMITIZA) capsule 24 mcg, 24 mcg, Oral, BID With Meals, Matthew Feldman DO    methocarbamol (ROBAXIN) tablet 500 mg, 500 mg, Oral, HS, Matthew Feldman DO    ondansetron (ZOFRAN) injection 4 mg, 4 mg, Intravenous, Q6H PRN, Matthew Feldman,      pantoprazole (PROTONIX) EC tablet 40 mg, 40 mg, Oral, Early Morning, Matthew Feldman DO    polyethylene glycol (MIRALAX) packet 17 g, 17 g, Oral, BID, Matthew Feldman DO    senna-docusate sodium (SENOKOT S) 8.6-50 mg per tablet 2 tablet, 2 tablet, Oral, Daily, Matthew Feldman DO    tamsulosin (FLOMAX) capsule 0.4 mg, 0.4 mg, Oral, Daily With Dinner, Matthew Feldman DO    Current Outpatient Medications:     acetaminophen (TYLENOL) 325 mg tablet, Take 2 tablets (650 mg total) by mouth every 6 (six) hours as needed for mild pain or fever, Disp: 30 tablet, Rfl: 0    amLODIPine (NORVASC) 5 mg tablet, TAKE 1 TABLET (5 MG TOTAL) BY MOUTH DAILY., Disp: 90 tablet, Rfl: 0    aspirin 81 MG tablet, Take 81 mg by mouth daily, Disp: , Rfl:     atorvastatin (LIPITOR) 10 mg tablet, TAKE 1 TABLET BY MOUTH EVERY DAY, Disp: 90 tablet, Rfl: 1    bisacodyl (DULCOLAX) 5 mg EC tablet, Take 2 tablets (10 mg total) by mouth once for 1 dose, Disp: 2 tablet, Rfl: 0    Blood Glucose Monitoring Suppl (OneTouch Verio Reflect) w/Device KIT, Check blood sugars once daily. Please substitute with appropriate alternative as covered by patient's insurance. Dx: E11.65, Disp: 1 kit, Rfl: 0    colchicine (COLCRYS) 0.6 mg tablet, TAKE 1 TABLET BY MOUTH EVERY DAY, Disp: 90 tablet, Rfl: 1    docusate sodium (COLACE) 100 mg capsule, Take 1 capsule (100 mg total) by mouth in the morning, Disp: 90 capsule, Rfl: 0    famotidine (PEPCID) 20 mg tablet, TAKE 1 TABLET BY MOUTH TWICE A DAY, Disp: 180 tablet, Rfl: 1    gabapentin (Neurontin) 100 mg capsule, Take 1 capsule (100 mg total) by mouth daily at bedtime, Disp: 30 capsule, Rfl: 2    glucose blood (OneTouch Verio) test strip, CHECK BLOOD SUGARS ONCE DAILY., Disp: 100 strip, Rfl: 1    hydrocortisone 2.5 % cream, APPLY TO AFFECTED AREA TWICE A DAY, Disp: 28.35 g, Rfl: 0    lactulose (CHRONULAC) 10 g/15 mL solution, Take 15 mL (10 g total) by mouth daily as needed (constipation) DAILY AS NEEDED FOR SEVERE  CONSTIPATION, Disp: 473 mL, Rfl: 0    Lancets (OneTouch Delica Plus [id number]) MISC, TEST DAILY, E11.5--PT CONFIMED DELICA 33 G, Disp: 100 each, Rfl: 11    lisinopril (ZESTRIL) 20 mg tablet, TAKE 1 TABLET BY MOUTH EVERY DAY, Disp: 90 tablet, Rfl: 0    lubiprostone (AMITIZA) 24 mcg capsule, Take 1 capsule (24 mcg total) by mouth 2 (two) times a day with meals, Disp: 60 capsule, Rfl: 11    metFORMIN (GLUCOPHAGE) 500 mg tablet, TAKE 1 TABLET BY MOUTH TWICE A DAY, Disp: 180 tablet, Rfl: 1    methocarbamol (ROBAXIN) 500 mg tablet, TAKE 1 TABLET (500 MG TOTAL) BY MOUTH DAILY AT BEDTIME, Disp: 30 tablet, Rfl: 0    omeprazole (PriLOSEC) 40 MG capsule, Take 1 capsule (40 mg total) by mouth daily, Disp: 90 capsule, Rfl: 1    OneTouch Delica Lancets 33G MISC, Check blood sugars once daily. Please substitute with appropriate alternative as covered by patient's insurance. Dx: E11.65, Disp: 100 each, Rfl: 3    polyethylene glycol (GOLYTELY) 4000 mL solution, Take 4,000 mL by mouth once for 1 dose (Patient not taking: Reported on 4/11/2024), Disp: 4000 mL, Rfl: 0    polyethylene glycol (MIRALAX) 17 g packet, Take 17 g by mouth daily Prn constipation, Disp: 20 each, Rfl: 0    senna-docusate sodium (SENOKOT S) 8.6-50 mg per tablet, Take 2 tablets by mouth daily, Disp: 60 tablet, Rfl: 3    tamsulosin (FLOMAX) 0.4 mg, Take 1 capsule (0.4 mg total) by mouth daily with dinner, Disp: 30 capsule, Rfl: 1      Allergies: No Known Allergies  History:  Marital Status: /Civil Union     Substance Use History:   Social History     Substance and Sexual Activity   Alcohol Use No     Social History     Tobacco Use   Smoking Status Never   Smokeless Tobacco Never     Social History     Substance and Sexual Activity   Drug Use No       Family History:  Family History   Problem Relation Age of Onset    No Known Problems Mother     No Known Problems Father     Melanoma Daughter 17       Physical Exam:     Vitals:   Blood Pressure: 121/65  (05/17/24 2140)  Pulse: 73 (05/17/24 2140)  Temperature: 98.8 °F (37.1 °C) (05/17/24 2140)  Temp Source: Temporal (05/17/24 2140)  Respirations: 20 (05/17/24 2140)  SpO2: 99 % (05/17/24 2140)    Constitutional:  Well developed, well nourished, no acute distress, non-toxic appearance   Eyes:  PERRL, conjunctiva normal   HENT:  Atraumatic, external ears normal, nose normal, oropharynx moist, no pharyngeal exudates. Neck- normal range of motion, no tenderness, supple   Respiratory:  No respiratory distress, normal breath sounds, no rales, no wheezing   Cardiovascular:  Normal rate, normal rhythm, no murmurs, no gallops, no rubs   GI:  Soft, nondistended, normal bowel sounds, mild primarily right-sided discomfort to palpation, no organomegaly, no mass, no rebound, no guarding   :  No costovertebral angle tenderness, Pryor catheter in place draining clear yellow urine  Musculoskeletal:  No edema, no tenderness, no deformities. Back- no tenderness  Integument:  Well hydrated, no rash   Lymphatic:  No lymphadenopathy noted   Neurologic:  Alert &awake, communicative, CN 2-12 normal, normal motor function, normal sensory function, no focal deficits noted   Psychiatric:  Speech and behavior appropriate       Lab Results: I have personally reviewed pertinent reports.      Results from last 7 days   Lab Units 05/17/24 2310   WBC Thousand/uL 6.47   HEMOGLOBIN g/dL 12.3   HEMATOCRIT % 36.4*   PLATELETS Thousands/uL 167   SEGS PCT % 65   LYMPHO PCT % 20   MONO PCT % 13*   EOS PCT % 1     Results from last 7 days   Lab Units 05/17/24  2310   POTASSIUM mmol/L 3.9   CHLORIDE mmol/L 103   CO2 mmol/L 28   BUN mg/dL 16   CREATININE mg/dL 0.83   CALCIUM mg/dL 9.2   ALK PHOS U/L 72   ALT U/L 8   AST U/L 11*           Imaging: I have personally reviewed pertinent reports.      CT abdomen pelvis with contrast    Result Date: 5/18/2024  Narrative: CT ABDOMEN AND PELVIS WITH IV CONTRAST INDICATION: recent colonoscopy with new abdominal  pain. COMPARISON: CT of the abdomen pelvis on January 18, 2024. TECHNIQUE: CT examination of the abdomen and pelvis was performed. Multiplanar 2D reformatted images were created from the source data. This examination, like all CT scans performed in the [facility], was performed utilizing techniques to minimize radiation dose exposure, including the use of iterative reconstruction and automated exposure control. Radiation dose length product (DLP) for this visit: 333.11 mGy-cm IV Contrast: 100 mL of iohexol (OMNIPAQUE) Enteric Contrast: Not administered. FINDINGS: ABDOMEN LOWER CHEST: No clinically significant abnormality in the visualized lower chest. LIVER/BILIARY TREE: Unremarkable. GALLBLADDER: No calcified gallstones. No pericholecystic inflammatory change. SPLEEN: Unremarkable. PANCREAS: Unremarkable. ADRENAL GLANDS: Unremarkable. KIDNEYS/URETERS: No hydronephrosis or urinary tract calculi. Simple cysts and subcentimeter hypoattenuating renal lesion(s), too small to characterize but statistically likely benign, which do not warrant follow-up (Radiology June 2019). STOMACH AND BOWEL: No bowel obstruction. Stool-filled colon. Stool distended rectum. APPENDIX: Normal. ABDOMINOPELVIC CAVITY: No ascites. No pneumoperitoneum. No lymphadenopathy. VESSELS: Mild atherosclerotic calcifications. PELVIS REPRODUCTIVE ORGANS: Unremarkable for patient's age. URINARY BLADDER: Distended urinary bladder. ABDOMINAL WALL/INGUINAL REGIONS: Unremarkable. BONES: No acute fracture or suspicious osseous lesion. Spinal degenerative changes. Mild bilateral hip osteoarthritis. 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     Impression: 1.  Stool-filled colon and stool distended rectum, correlate for constipation and fecal impaction. 2.  Distended urinary bladder. Workstation performed: [id number]     Colonoscopy    Result Date: 5/6/2024  Narrative: Table formatting from  the original result was not included. [facility] [address] [address] [phone] DATE OF SERVICE: 5/06/24 PHYSICIAN(S): Attending: Lino Bernal MD Fellow: No Staff Documented INDICATION: Chronic constipation, History of colon polyps POST-OP DIAGNOSIS: See the impression below. HISTORY: Prior colonoscopy: Less than 3 years ago. It is being repeated at an interval of less than 3 years because: Last colonoscopy had inadequate bowel prep BOWEL PREPARATION: Golytely/Colyte/Trilyte; Dulcolax PREPROCEDURE: Informed consent was obtained for the procedure, including sedation. Risks including but not limited to bleeding, infection, perforation, adverse drug reaction and aspiration were explained in detail. Also explained about less than 100% sensitivity with the exam and other alternatives. The patient was placed in the left lateral decubitus position. Procedure: Colonoscopy DETAILS OF PROCEDURE: Patient was taken to the procedure room where a time out was performed to confirm correct patient and correct procedure. The patient underwent monitored anesthesia care, which was administered by an anesthesia professional. The patient's blood pressure, heart rate, level of consciousness, oxygen, respirations, ECG and ETCO2 were monitored throughout the procedure. A digital rectal exam was performed. The scope was introduced through the anus and advanced to the cecum. Retroflexion was performed in the rectum. The quality of bowel preparation was evaluated using the Salix Bowel Preparation Scale with scores of: right colon = 2, transverse colon = 2, left colon = 2. The total BBPS score was 6. Bowel prep was adequate. The patient experienced no blood loss. The procedure was not difficult. The patient tolerated the procedure well. There were no apparent adverse events. ANESTHESIA INFORMATION: ASA: II Anesthesia Type: IV Sedation with Anesthesia MEDICATIONS: No administrations  occurring from 0912 to 0940 on 05/06/24 FINDINGS: The cecum appeared normal. All observed locations appeared normal, including the ascending colon, hepatic flexure, transverse colon, splenic flexure, descending colon, sigmoid colon, rectosigmoid and rectum. Hemorrhoids EVENTS: Procedure Events Event Event Time ENDO CECUM REACHED 5/6/2024  9:33 AM ENDO SCOPE OUT TIME 5/6/2024  9:39 AM SPECIMENS: * No specimens in log * EQUIPMENT: Colonoscope -[id number]     Impression: The cecum appeared normal. The ascending colon, hepatic flexure, transverse colon, splenic flexure, descending colon, sigmoid colon, rectosigmoid and rectum appeared normal. Hemorrhoids RECOMMENDATION:  No further screening colonoscopies necessary  Age greater than 65    Lino Bernal MD         ** Please Note: Dragon 360 Dictation voice to text software was used in the creation of this document. **

## 2024-05-18 NOTE — ASSESSMENT & PLAN NOTE
Continue home antihypertensives with strict hold parameters and monitor blood pressure per protocol

## 2024-05-19 VITALS
SYSTOLIC BLOOD PRESSURE: 129 MMHG | HEART RATE: 71 BPM | DIASTOLIC BLOOD PRESSURE: 66 MMHG | OXYGEN SATURATION: 99 % | RESPIRATION RATE: 18 BRPM | TEMPERATURE: 98.2 F

## 2024-05-19 PROBLEM — R10.84 GENERALIZED ABDOMINAL PAIN: Status: RESOLVED | Noted: 2024-05-18 | Resolved: 2024-05-19

## 2024-05-19 PROBLEM — R33.9 URINARY RETENTION: Status: RESOLVED | Noted: 2023-07-27 | Resolved: 2024-05-19

## 2024-05-19 LAB
ANION GAP SERPL CALCULATED.3IONS-SCNC: 9 MMOL/L (ref 4–13)
BASOPHILS # BLD AUTO: 0.02 THOUSANDS/ÂΜL (ref 0–0.1)
BASOPHILS NFR BLD AUTO: 0 % (ref 0–1)
BUN SERPL-MCNC: 10 MG/DL (ref 5–25)
CALCIUM SERPL-MCNC: 8.8 MG/DL (ref 8.4–10.2)
CHLORIDE SERPL-SCNC: 104 MMOL/L (ref 96–108)
CO2 SERPL-SCNC: 28 MMOL/L (ref 21–32)
CREAT SERPL-MCNC: 0.61 MG/DL (ref 0.6–1.3)
EOSINOPHIL # BLD AUTO: 0.04 THOUSAND/ÂΜL (ref 0–0.61)
EOSINOPHIL NFR BLD AUTO: 1 % (ref 0–6)
ERYTHROCYTE [DISTWIDTH] IN BLOOD BY AUTOMATED COUNT: 13.1 % (ref 11.6–15.1)
GFR SERPL CREATININE-BSD FRML MDRD: 96 ML/MIN/1.73SQ M
GLUCOSE SERPL-MCNC: 117 MG/DL (ref 65–140)
GLUCOSE SERPL-MCNC: 128 MG/DL (ref 65–140)
GLUCOSE SERPL-MCNC: 158 MG/DL (ref 65–140)
HCT VFR BLD AUTO: 33.6 % (ref 36.5–49.3)
HGB BLD-MCNC: 11.4 G/DL (ref 12–17)
IMM GRANULOCYTES # BLD AUTO: 0.02 THOUSAND/UL (ref 0–0.2)
IMM GRANULOCYTES NFR BLD AUTO: 0 % (ref 0–2)
LYMPHOCYTES # BLD AUTO: 1.42 THOUSANDS/ÂΜL (ref 0.6–4.47)
LYMPHOCYTES NFR BLD AUTO: 28 % (ref 14–44)
MCH RBC QN AUTO: 29 PG (ref 26.8–34.3)
MCHC RBC AUTO-ENTMCNC: 33.9 G/DL (ref 31.4–37.4)
MCV RBC AUTO: 86 FL (ref 82–98)
MONOCYTES # BLD AUTO: 0.59 THOUSAND/ÂΜL (ref 0.17–1.22)
MONOCYTES NFR BLD AUTO: 12 % (ref 4–12)
NEUTROPHILS # BLD AUTO: 2.91 THOUSANDS/ÂΜL (ref 1.85–7.62)
NEUTS SEG NFR BLD AUTO: 59 % (ref 43–75)
NRBC BLD AUTO-RTO: 0 /100 WBCS
PLATELET # BLD AUTO: 146 THOUSANDS/UL (ref 149–390)
PMV BLD AUTO: 9.3 FL (ref 8.9–12.7)
POTASSIUM SERPL-SCNC: 3.4 MMOL/L (ref 3.5–5.3)
RBC # BLD AUTO: 3.93 MILLION/UL (ref 3.88–5.62)
SODIUM SERPL-SCNC: 141 MMOL/L (ref 135–147)
WBC # BLD AUTO: 5 THOUSAND/UL (ref 4.31–10.16)

## 2024-05-19 PROCEDURE — 99239 HOSP IP/OBS DSCHRG MGMT >30: CPT | Performed by: NURSE PRACTITIONER

## 2024-05-19 PROCEDURE — 85025 COMPLETE CBC W/AUTO DIFF WBC: CPT | Performed by: NURSE PRACTITIONER

## 2024-05-19 PROCEDURE — 82948 REAGENT STRIP/BLOOD GLUCOSE: CPT

## 2024-05-19 PROCEDURE — 80048 BASIC METABOLIC PNL TOTAL CA: CPT | Performed by: NURSE PRACTITIONER

## 2024-05-19 RX ORDER — POTASSIUM CHLORIDE 20 MEQ/1
40 TABLET, EXTENDED RELEASE ORAL ONCE
Status: COMPLETED | OUTPATIENT
Start: 2024-05-19 | End: 2024-05-19

## 2024-05-19 RX ORDER — BISACODYL 10 MG
10 SUPPOSITORY, RECTAL RECTAL AS NEEDED
Qty: 12 SUPPOSITORY | Refills: 0 | Status: SHIPPED | OUTPATIENT
Start: 2024-05-19

## 2024-05-19 RX ORDER — POLYETHYLENE GLYCOL 3350 17 G/17G
POWDER, FOR SOLUTION ORAL
Start: 2024-05-19

## 2024-05-19 RX ORDER — LACTULOSE 10 G/15ML
20 SOLUTION ORAL DAILY PRN
Qty: 473 ML | Refills: 0 | Status: SHIPPED | OUTPATIENT
Start: 2024-05-19

## 2024-05-19 RX ADMIN — FAMOTIDINE 20 MG: 20 TABLET, FILM COATED ORAL at 07:34

## 2024-05-19 RX ADMIN — ENOXAPARIN SODIUM 40 MG: 40 INJECTION SUBCUTANEOUS at 07:34

## 2024-05-19 RX ADMIN — INSULIN LISPRO 1 UNITS: 100 INJECTION, SOLUTION INTRAVENOUS; SUBCUTANEOUS at 11:18

## 2024-05-19 RX ADMIN — PANTOPRAZOLE SODIUM 40 MG: 40 TABLET, DELAYED RELEASE ORAL at 05:58

## 2024-05-19 RX ADMIN — ATORVASTATIN CALCIUM 10 MG: 10 TABLET, FILM COATED ORAL at 07:34

## 2024-05-19 RX ADMIN — LUBIPROSTONE 24 MCG: 24 CAPSULE, GELATIN COATED ORAL at 07:34

## 2024-05-19 RX ADMIN — AMLODIPINE BESYLATE 5 MG: 5 TABLET ORAL at 07:33

## 2024-05-19 RX ADMIN — DOCUSATE SODIUM 100 MG: 100 CAPSULE, LIQUID FILLED ORAL at 07:34

## 2024-05-19 RX ADMIN — SENNOSIDES, DOCUSATE SODIUM 2 TABLET: 8.6; 5 TABLET ORAL at 07:33

## 2024-05-19 RX ADMIN — LISINOPRIL 20 MG: 20 TABLET ORAL at 07:33

## 2024-05-19 RX ADMIN — POTASSIUM CHLORIDE 40 MEQ: 1500 TABLET, EXTENDED RELEASE ORAL at 08:42

## 2024-05-19 RX ADMIN — ASPIRIN 81 MG: 81 TABLET, COATED ORAL at 07:33

## 2024-05-19 NOTE — DISCHARGE SUMMARY
Discharge Summary - [facility] Internal Medicine    Patient Information: Zaira Morley 78 y.o. male MRN: [id number]  Unit/Bed#: [facility] 702-01 Encounter: [id number]    Discharging Physician / Practitioner: ZAHRA Cosby  PCP: ZAHRA Rice  Admission Date: 5/17/2024  Discharge Date: 05/19/24    Reason for Admission: constipation and urinary retention    Discharge Diagnoses:     Principal Problem (Resolved):    Generalized abdominal pain  Active Problems:    Hypertension    Type 2 diabetes mellitus with diabetic cataract, without long-term current use of insulin (HCC)    Constipation  Resolved Problems:    Urinary retention      Consultations During Hospital Stay:  none    Procedures Performed:     None     Significant Findings / Test Results:     CT A/P Stool-filled colon and stool distended rectum, correlate for constipation and fecal impaction. Distended urinary bladder.     Incidental Findings:   none     Test Results Pending at Discharge (will require follow up):   none     Outpatient Tests Requested:  Outpatient f/u with PCP and GI     Complications:  none    Hospital Course:     Zaira Morley is a 78 y.o. male patient with a PMHx of constipation, DM2, HTN, cognitive impairment who originally presented to the hospital on 5/17/2024 due to abdominal pain/constipation. CT A/p showed significant stool and fecal impaction as well as distended bladder consistent with urinary retention.  Patient had Pryor catheter placed.  Was started on bowel regimen with MiraLAX and lactulose but had persistent abdominal pain and no significant stools.  Was started on a bowel prep with GoLytely, patient drank approximately half of this and also had mineral oil enema and began having passing large episodes of stool.  Patient had frequent stooling through the night and continues to have BM this morning.  Abdominal pain is improved.  Pryor catheter was removed this morning and patient has voided with negative PVR.    Discussed  bowel regimen extensively with son, also written out on discharge instructions.  Patient is stable for discharge home with his family today.  Patient/son was instructed to take MiraLAX daily, can use up to twice a day for constipation, if no improvement with MiraLAX, can take lactulose up to twice a day.  If no improvement with MiraLAX and lactulose, can utilize Dulcolax rectal suppository which was prescribed on discharge.     Condition at Discharge: stable     Discharge Day Visit / Exam:     Subjective:  No complaints. Feels better.     Vitals: Blood Pressure: 129/66 (05/19/24 1111)  Pulse: 71 (05/19/24 1111)  Temperature: 98.2 °F (36.8 °C) (05/19/24 1111)  Temp Source: Axillary (05/19/24 1111)  Respirations: 18 (05/19/24 1111)  SpO2: 99 % (05/19/24 1111)  Exam:   Physical Exam  Vitals and nursing note reviewed.   Constitutional:       General: He is not in acute distress.  Cardiovascular:      Rate and Rhythm: Normal rate.   Pulmonary:      Breath sounds: Normal breath sounds.   Abdominal:      General: Bowel sounds are normal.      Palpations: Abdomen is soft.      Tenderness: There is no abdominal tenderness.   Musculoskeletal:         General: No swelling.   Skin:     General: Skin is warm.   Neurological:      Mental Status: He is alert. Mental status is at baseline.   Psychiatric:         Mood and Affect: Mood normal.         Discussion with Family: patient and son    Discharge instructions/Information to patient and family:   See after visit summary for information provided to patient and family.      Provisions for Follow-Up Care:  See after visit summary for information related to follow-up care and any pertinent home health orders.      Disposition:     Home    For Discharges to [facility] SNF:   Not Applicable to this Patient - Not Applicable to this Patient    Planned Readmission: no     Discharge Statement:  I spent 45 minutes discharging the patient. This time was spent on the day of  discharge. I had direct contact with the patient on the day of discharge. Greater than 50% of the total time was spent examining patient, answering all patient questions, arranging and discussing plan of care with patient as well as directly providing post-discharge instructions.  Additional time then spent on discharge activities.    Discharge Medications:  See after visit summary for reconciled discharge medications provided to patient and family.      ** Please Note: This note has been constructed using a voice recognition system **

## 2024-05-19 NOTE — DISCHARGE INSTR - AVS FIRST PAGE
Please use Miralax daily. If having constipation, can use up to two times per day.  If no relief with using Miralax two times per day, can take Lactulose up to 2 times per day. If continued constipation, please use rectal suppository as prescribed and call family doctor or GI.

## 2024-05-20 ENCOUNTER — TRANSITIONAL CARE MANAGEMENT (OUTPATIENT)
Dept: FAMILY MEDICINE CLINIC | Facility: CLINIC | Age: 79
End: 2024-05-20

## 2024-05-20 NOTE — UTILIZATION REVIEW
NOTIFICATION OF ADMISSION DISCHARGE   This is a Notification of Discharge from [facility]. Please be advised that this patient has been discharge from our facility. Below you will find the admission and discharge date and time including the patient’s disposition.   UTILIZATION REVIEW CONTACT:  Roosevelt Marroquin  Utilization   Network Utilization Review Department  Phone: [phone] x carefully listen to the prompts. All voicemails are confidential.  Email: [email or web address]     ADMISSION INFORMATION  PRESENTATION DATE: 5/17/2024  9:42 PM  OBERVATION ADMISSION DATE:   INPATIENT ADMISSION DATE: 5/18/24  2:22 PM   DISCHARGE DATE: 5/19/2024 12:51 PM   DISPOSITION:Home/Self Care    Network Utilization Review Department  ATTENTION: Please call with any questions or concerns to [phone] and carefully listen to the prompts so that you are directed to the right person. All voicemails are confidential.   For Discharge needs, contact Care Management DC Support Team at [phone] opt. 2  Send all requests for admission clinical reviews, approved or denied determinations and any other requests to dedicated fax number below belonging to the campus where the patient is receiving treatment. List of dedicated fax numbers for the Facilities:  FACILITY NAME UR FAX NUMBER   ADMISSION DENIALS (Administrative/Medical Necessity) [phone]   DISCHARGE SUPPORT TEAM ([facility]) [phone]   PARENT CHILD HEALTH (Maternity/NICU/Pediatrics) [phone]   [facility] [phone]   [facility] [phone]   [facility] [phone]   [facility] [phone]   [facility] [phone]   [facility] [phone]   [facility] [phone]   [facility] [phone]   [facility]  [facility] [phone]   [facility] [phone]   [facility] [phone]   [facility] [phone]

## 2024-05-21 ENCOUNTER — TELEPHONE (OUTPATIENT)
Age: 79
End: 2024-05-21

## 2024-05-21 NOTE — TELEPHONE ENCOUNTER
Left voicemail with Cece, if assistance is still needed to call us back. Father was in hospital 5/17-/519 for constipation.

## 2024-05-23 ENCOUNTER — OFFICE VISIT (OUTPATIENT)
Dept: FAMILY MEDICINE CLINIC | Facility: CLINIC | Age: 79
End: 2024-05-23
Payer: COMMERCIAL

## 2024-05-23 VITALS
WEIGHT: 153 LBS | HEART RATE: 65 BPM | RESPIRATION RATE: 18 BRPM | HEIGHT: 69 IN | TEMPERATURE: 96.2 F | DIASTOLIC BLOOD PRESSURE: 60 MMHG | BODY MASS INDEX: 22.66 KG/M2 | OXYGEN SATURATION: 98 % | SYSTOLIC BLOOD PRESSURE: 120 MMHG

## 2024-05-23 DIAGNOSIS — K59.09 CHRONIC CONSTIPATION: Primary | ICD-10-CM

## 2024-05-23 DIAGNOSIS — R63.4 WEIGHT LOSS, ABNORMAL: ICD-10-CM

## 2024-05-23 DIAGNOSIS — E11.36 TYPE 2 DIABETES MELLITUS WITH DIABETIC CATARACT, WITHOUT LONG-TERM CURRENT USE OF INSULIN (HCC): ICD-10-CM

## 2024-05-23 DIAGNOSIS — K21.9 GASTROESOPHAGEAL REFLUX DISEASE WITHOUT ESOPHAGITIS: ICD-10-CM

## 2024-05-23 DIAGNOSIS — I10 PRIMARY HYPERTENSION: ICD-10-CM

## 2024-05-23 PROCEDURE — 99496 TRANSJ CARE MGMT HIGH F2F 7D: CPT | Performed by: NURSE PRACTITIONER

## 2024-05-23 NOTE — PROGRESS NOTES
Transition of Care Visit  Name: Zaira Morley      : 1945      MRN: [id number]  Encounter Provider: ZAHRA Rice  Encounter Date: 2024   Encounter department: [facility]    Assessment & Plan   1. Chronic constipation  Assessment & Plan:  Discussed with patient bowel regimen  Discussed need to increase calories in diet and fiber    2. Gastroesophageal reflux disease without esophagitis  Assessment & Plan:  Stable  Cont meds    3. Primary hypertension  Assessment & Plan:  Stable  Cont meds    4. Type 2 diabetes mellitus with diabetic cataract, without long-term current use of insulin (HCC)  Assessment & Plan:  Stable  Cont meds    Lab Results   Component Value Date    HGBA1C 5.6 2024     5. Weight loss, abnormal  Assessment & Plan:  Discussed need to increase calories in diet           History of Present Illness     Transitional Care Management Review:   Zaira Morley is a 78 y.o. male here for TCM follow up.     During the TCM phone call patient stated:  TCM Call       Date and time call was made  2024 10:00 AM    Patient was hospitialized at  [facility]    Date of Admission  24    Date of discharge  24    Diagnosis  Generalized abdominal pain    Disposition  Home    Were the patients medications reviewed and updated  No          TCM Call       Should patient be enrolled in anticoag monitoring?  No    Scheduled for follow up?  Yes    Did you obtain your prescribed medications  Yes    Do you need help managing your prescriptions or medications  No    Is transportation to your appointment needed  No    I have advised the patient to call PCP with any new or worsening symptoms  MR Chaparrita    Living Arrangements  Spouse or Significiant other    Are you recieving any outpatient services  No    Are you recieving home care services  No    Are you using any community resources  No    Current waiver services  No    Have you fallen in the last 12  months  No    Interperter language line needed  No          Hospital follow up  Presented to hospital with constipation, abd pain and urinary retention  Bowel regimen discussed with patient and son  instructed to take MiraLAX daily, can use up to twice a day for constipation, if no improvement with MiraLAX, can take lactulose up to twice a day.  If no improvement with MiraLAX and lactulose, can utilize Dulcolax rectal suppository which was prescribed       He is getting too thin  He is losing too much weight  He is afraid to eat because he is afraid of constipation  Lost 7 more pounds since 4/1    presented to the hospital on 5/17/2024 due to abdominal pain/constipation.   CT A/p showed significant stool and fecal impaction as well as distended bladder consistent with urinary retention.    Patient had Pryor catheter placed.    Was started on bowel regimen with MiraLAX and lactulose but had persistent abdominal pain and no significant stools.    Was started on a bowel prep with GoLytely, patient drank approximately half of this and also had mineral oil enema and began having passing large episodes of stool.    Patient had frequent stooling through the night and continues to have BM this morning.    Abdominal pain is improved.    Pryor catheter was removed this morning and patient has voided with negative PVR        Review of Systems   Constitutional:  Negative for fatigue and fever.   HENT:  Negative for congestion, postnasal drip and rhinorrhea.    Eyes:  Negative for photophobia and visual disturbance.   Respiratory:  Negative for cough, shortness of breath and wheezing.    Cardiovascular:  Negative for chest pain and palpitations.   Gastrointestinal:  Negative for constipation, diarrhea, nausea and vomiting.   Genitourinary:  Negative for frequency and hematuria.   Musculoskeletal:  Negative for arthralgias and myalgias.   Skin:  Negative for rash.   Neurological:  Negative for dizziness, light-headedness and  "headaches.   Hematological:  Negative for adenopathy.   Psychiatric/Behavioral:  Negative for dysphoric mood and sleep disturbance. The patient is not nervous/anxious.      Objective     /60 (BP Location: Left arm, Patient Position: Sitting, Cuff Size: Standard)   Pulse 65   Temp (!) 96.2 °F (35.7 °C) (Tympanic)   Resp 18   Ht 5' 9\" (1.753 m)   Wt 69.4 kg (153 lb)   SpO2 98%   BMI 22.59 kg/m²     Physical Exam  Vitals and nursing note reviewed.   Constitutional:       Appearance: Normal appearance.   HENT:      Head: Normocephalic and atraumatic.   Eyes:      Conjunctiva/sclera: Conjunctivae normal.   Cardiovascular:      Rate and Rhythm: Normal rate and regular rhythm.      Heart sounds: Normal heart sounds.   Pulmonary:      Effort: Pulmonary effort is normal.      Breath sounds: Normal breath sounds.   Abdominal:      General: Bowel sounds are normal.      Palpations: Abdomen is soft.   Musculoskeletal:         General: Normal range of motion.      Cervical back: Normal range of motion and neck supple.   Skin:     General: Skin is warm and dry.      Capillary Refill: Capillary refill takes less than 2 seconds.   Neurological:      General: No focal deficit present.      Mental Status: He is alert and oriented to person, place, and time.   Psychiatric:         Mood and Affect: Mood normal.         Behavior: Behavior normal.         Thought Content: Thought content normal.         Judgment: Judgment normal.       Medications have been reviewed by provider in current encounter    Administrative Statements   I have spent a total time of 25 minutes on 05/23/24 In caring for this patient including Diagnostic results, Prognosis, Risks and benefits of tx options, Instructions for management, Patient and family education, Importance of tx compliance, Risk factor reductions, Impressions, Counseling / Coordination of care, Documenting in the medical record, Reviewing / ordering tests, medicine, procedures  , " and Obtaining or reviewing history  .

## 2024-06-02 DIAGNOSIS — E78.49 OTHER HYPERLIPIDEMIA: ICD-10-CM

## 2024-06-02 RX ORDER — ATORVASTATIN CALCIUM 10 MG/1
10 TABLET, FILM COATED ORAL DAILY
Qty: 90 TABLET | Refills: 1 | Status: SHIPPED | OUTPATIENT
Start: 2024-06-02

## 2024-06-06 ENCOUNTER — HOSPITAL ENCOUNTER (OUTPATIENT)
Dept: RADIOLOGY | Facility: HOSPITAL | Age: 79
Discharge: HOME | End: 2024-06-06
Payer: COMMERCIAL

## 2024-06-06 DIAGNOSIS — R41.3 MEMORY PROBLEM: ICD-10-CM

## 2024-06-06 PROCEDURE — A9585 GADOBUTROL INJECTION: HCPCS | Performed by: NURSE PRACTITIONER

## 2024-06-06 PROCEDURE — 70553 MRI BRAIN STEM W/O & W/DYE: CPT

## 2024-06-06 RX ORDER — GADOBUTROL 604.72 MG/ML
6 INJECTION INTRAVENOUS
Status: COMPLETED | OUTPATIENT
Start: 2024-06-06 | End: 2024-06-06

## 2024-06-06 RX ADMIN — GADOBUTROL 6 ML: 604.72 INJECTION INTRAVENOUS at 09:25

## 2024-06-12 PROBLEM — H61.23 BILATERAL IMPACTED CERUMEN: Status: RESOLVED | Noted: 2020-09-16 | Resolved: 2024-06-12

## 2024-06-24 ENCOUNTER — OFFICE VISIT (OUTPATIENT)
Dept: URGENT CARE | Age: 79
End: 2024-06-24
Payer: COMMERCIAL

## 2024-06-24 ENCOUNTER — APPOINTMENT (EMERGENCY)
Dept: RADIOLOGY | Facility: HOSPITAL | Age: 79
End: 2024-06-24
Payer: COMMERCIAL

## 2024-06-24 ENCOUNTER — APPOINTMENT (OUTPATIENT)
Dept: RADIOLOGY | Age: 79
End: 2024-06-24
Payer: COMMERCIAL

## 2024-06-24 ENCOUNTER — HOSPITAL ENCOUNTER (EMERGENCY)
Facility: HOSPITAL | Age: 79
Discharge: HOME | End: 2024-06-25
Attending: EMERGENCY MEDICINE
Payer: COMMERCIAL

## 2024-06-24 VITALS
OXYGEN SATURATION: 98 % | TEMPERATURE: 96 F | DIASTOLIC BLOOD PRESSURE: 58 MMHG | HEART RATE: 62 BPM | WEIGHT: 160 LBS | BODY MASS INDEX: 23.7 KG/M2 | HEIGHT: 69 IN | SYSTOLIC BLOOD PRESSURE: 110 MMHG | RESPIRATION RATE: 18 BRPM

## 2024-06-24 VITALS
RESPIRATION RATE: 19 BRPM | OXYGEN SATURATION: 99 % | TEMPERATURE: 97.6 F | DIASTOLIC BLOOD PRESSURE: 68 MMHG | HEART RATE: 60 BPM | SYSTOLIC BLOOD PRESSURE: 121 MMHG

## 2024-06-24 DIAGNOSIS — M79.605 PAIN OF LEFT LOWER EXTREMITY: Primary | ICD-10-CM

## 2024-06-24 DIAGNOSIS — M79.605 PAIN AND SWELLING OF LOWER EXTREMITY, LEFT: ICD-10-CM

## 2024-06-24 DIAGNOSIS — M71.20 BAKER CYST: ICD-10-CM

## 2024-06-24 DIAGNOSIS — M79.89 PAIN AND SWELLING OF LOWER EXTREMITY, LEFT: ICD-10-CM

## 2024-06-24 DIAGNOSIS — S99.922A INJURY OF LEFT FOOT, INITIAL ENCOUNTER: Primary | ICD-10-CM

## 2024-06-24 DIAGNOSIS — S99.922A INJURY OF LEFT FOOT, INITIAL ENCOUNTER: ICD-10-CM

## 2024-06-24 DIAGNOSIS — M79.89 LEG SWELLING: ICD-10-CM

## 2024-06-24 PROCEDURE — 99283 EMERGENCY DEPT VISIT LOW MDM: CPT

## 2024-06-24 PROCEDURE — 99215 OFFICE O/P EST HI 40 MIN: CPT | Performed by: STUDENT IN AN ORGANIZED HEALTH CARE EDUCATION/TRAINING PROGRAM

## 2024-06-24 PROCEDURE — 99284 EMERGENCY DEPT VISIT MOD MDM: CPT | Performed by: EMERGENCY MEDICINE

## 2024-06-24 PROCEDURE — 73590 X-RAY EXAM OF LOWER LEG: CPT

## 2024-06-24 PROCEDURE — 73630 X-RAY EXAM OF FOOT: CPT

## 2024-06-24 NOTE — PROGRESS NOTES
[facility]        NAME: Zaira Morley is a 78 y.o. male  : 1945    MRN: [id number]  DATE: 2024  TIME: 8:15 PM    Assessment and Plan   Injury of left foot, initial encounter [S99.922A]  1. Injury of left foot, initial encounter  XR foot 3+ vw left    Transfer to other facility      2. Pain and swelling of lower extremity, left  Transfer to other facility      Patient presents with acute injury of the left foot and pain and swelling of the lower leg.  Recommend x-ray of the foot for further evaluation.  X-rays demonstrate no acute fracture dislocations based on appearance of the leg recommend further evaluation in the emergency department for rule out DVT versus arterial occlusion.      Patient Instructions     Patient Instructions   Report directly to [facility] for further evaluation.        Chief Complaint     Chief Complaint   Patient presents with    Foot Injury     Pt presents with discoloration and swelling of left lower leg. Hit his foot on the edge of a table to top of foot. 2+ pitting edema with chronic numbness to limb.          History of Present Illness       78 year old male presents with swelling of the left lower extremity with discoloration. Pt reports that his daughter was rasiing the foot on his recliner and his foot got caught between the end of the recliner and a coffee table about 1 week ago. Son who is here with him reports that swelling is worse than baseline particularly to the ankle and foot and the discoloration of the ankle and foot is new. Pt reports numbness in the foot that is new onset since the injury as well.  Callosities of the foot.  Foot is warm to touch        Review of Systems   Review of Systems   Cardiovascular:  Positive for leg swelling.   Skin:  Positive for color change.         Current Medications       Current Outpatient Medications:     acetaminophen (TYLENOL) 325 mg tablet, Take 2 tablets (650 mg total) by mouth every 6 (six) hours as  needed for mild pain or fever, Disp: 30 tablet, Rfl: 0    amLODIPine (NORVASC) 5 mg tablet, TAKE 1 TABLET (5 MG TOTAL) BY MOUTH DAILY., Disp: 90 tablet, Rfl: 0    aspirin 81 MG tablet, Take 81 mg by mouth daily, Disp: , Rfl:     atorvastatin (LIPITOR) 10 mg tablet, TAKE 1 TABLET BY MOUTH EVERY DAY, Disp: 90 tablet, Rfl: 1    bisacodyl (DULCOLAX) 10 mg suppository, Insert 1 suppository (10 mg total) into the rectum if needed for constipation (if no bowel movement despite miralax and lactulose use, can use suppository), Disp: 12 suppository, Rfl: 0    Blood Glucose Monitoring Suppl (OneTouch Verio Reflect) w/Device KIT, Check blood sugars once daily. Please substitute with appropriate alternative as covered by patient's insurance. Dx: E11.65, Disp: 1 kit, Rfl: 0    colchicine (COLCRYS) 0.6 mg tablet, TAKE 1 TABLET BY MOUTH EVERY DAY, Disp: 90 tablet, Rfl: 1    famotidine (PEPCID) 20 mg tablet, TAKE 1 TABLET BY MOUTH TWICE A DAY, Disp: 180 tablet, Rfl: 1    gabapentin (Neurontin) 100 mg capsule, Take 1 capsule (100 mg total) by mouth daily at bedtime, Disp: 30 capsule, Rfl: 2    glucose blood (OneTouch Verio) test strip, CHECK BLOOD SUGARS ONCE DAILY., Disp: 100 strip, Rfl: 1    hydrocortisone 2.5 % cream, APPLY TO AFFECTED AREA TWICE A DAY, Disp: 28.35 g, Rfl: 0    lactulose (CHRONULAC) 10 g/15 mL solution, Take 30 mL (20 g total) by mouth daily as needed (if no BM in 24 hours despite miralax, can take 20 g (30 ML) of lactulose as needed for constipation) DAILY AS NEEDED FOR SEVERE CONSTIPATION, Disp: 473 mL, Rfl: 0    Lancets (OneTouch Delica Plus Clozrp16T) MISC, TEST DAILY, E11.5--PT CONFIMED DELICA 33 G, Disp: 100 each, Rfl: 11    lisinopril (ZESTRIL) 20 mg tablet, TAKE 1 TABLET BY MOUTH EVERY DAY, Disp: 90 tablet, Rfl: 0    lubiprostone (AMITIZA) 24 mcg capsule, Take 1 capsule (24 mcg total) by mouth 2 (two) times a day with meals, Disp: 60 capsule, Rfl: 11    metFORMIN (GLUCOPHAGE) 500 mg tablet, TAKE 1 TABLET  BY MOUTH TWICE A DAY, Disp: 180 tablet, Rfl: 1    methocarbamol (ROBAXIN) 500 mg tablet, TAKE 1 TABLET (500 MG TOTAL) BY MOUTH DAILY AT BEDTIME, Disp: 30 tablet, Rfl: 0    omeprazole (PriLOSEC) 40 MG capsule, Take 1 capsule (40 mg total) by mouth daily, Disp: 90 capsule, Rfl: 1    OneTouch Delica Lancets 33G MISC, Check blood sugars once daily. Please substitute with appropriate alternative as covered by patient's insurance. Dx: E11.65, Disp: 100 each, Rfl: 3    polyethylene glycol (MIRALAX) 17 g packet, Take miralax daily but can use up to 2 times per day as needed for constipation, Disp: , Rfl:     senna-docusate sodium (SENOKOT S) 8.6-50 mg per tablet, Take 2 tablets by mouth daily, Disp: 60 tablet, Rfl: 3    tamsulosin (FLOMAX) 0.4 mg, Take 1 capsule (0.4 mg total) by mouth daily with dinner, Disp: 30 capsule, Rfl: 1    Current Allergies     Allergies as of 06/24/2024    (No Known Allergies)            The following portions of the patient's history were reviewed and updated as appropriate: allergies, current medications, past family history, past medical history, past social history, past surgical history and problem list.     Past Medical History:   Diagnosis Date    Brain mass     Last Assessed; 11/5/2015    Chest pain     Last Assessed: 1/22/2015    Diabetes type 2, uncontrolled     Last Assessed: 3/1/2016    Edema of left lower extremity     Last Assessed: 3/22/2017    GERD (gastroesophageal reflux disease)     Hyperlipidemia     Hypertension     Impaired fasting glucose     Last Assessed: 11/10/2015    Irregular heartbeat     Skin cancer (melanoma) (HCC)     Scalp     Varicose 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     Last Assessed: 3/22/2017       Past Surgical History:   Procedure Laterality Date    COLONOSCOPY      COLONOSCOPY N/A 7/10/2018    Procedure: COLONOSCOPY;  Surgeon: Jose Alejandro Rodrigues MD;  Location: [facility];  Service: Colorectal    INGUINAL HERNIA REPAIR      20+ years ago - [facility]; Last  "Assessed: 10/23/2014    TONSILLECTOMY      VARICOSE VEIN SURGERY Left     x2 left leg - 40+ yrs ago, [place] and [place]       Family History   Problem Relation Age of Onset    No Known Problems Mother     No Known Problems Father     Melanoma Daughter 17         Medications have been verified.        Objective   /58   Pulse 62   Temp (!) 96 °F (35.6 °C) (Tympanic)   Resp 18   Ht 5' 9\" (1.753 m)   Wt 72.6 kg (160 lb)   SpO2 98%   BMI 23.63 kg/m²   No LMP for male patient.       Physical Exam     Physical Exam  Vitals and nursing note reviewed.   Constitutional:       General: He is awake. He is not in acute distress.     Appearance: Normal appearance. He is well-developed and well-groomed. He is not ill-appearing, toxic-appearing or diaphoretic.   HENT:      Head: Normocephalic and atraumatic.      Right Ear: Hearing and external ear normal.      Left Ear: Hearing and external ear normal.   Eyes:      General: Lids are normal. Vision grossly intact. Gaze aligned appropriately.   Cardiovascular:      Rate and Rhythm: Normal rate.   Pulmonary:      Effort: Pulmonary effort is normal.      Comments: Patient is speaking in full sentences with no increased respiratory effort. No audible wheezing or stridor.   Musculoskeletal:      Cervical back: Normal range of motion.      Comments: Moderate swelling about the left foot, ankle, and calf with tenderness about the 4th MT. Pt has mild tenderness of the radha as well. Venous varicosities of the foot.  Pulses are intact.  Foot is warm to touch and equal temperature with the contralateral side.  There is brown mottled discoloration of the foot ankle and calf with loss of hair to the anterior shin and shiny appearance to the skin consistent with chronic vascular issues.   Skin:     General: Skin is warm and dry.   Neurological:      Mental Status: He is alert and oriented to person, place, and time.      Coordination: Coordination is intact.      Gait: Gait is intact. " "  Psychiatric:         Attention and Perception: Attention and perception normal.         Mood and Affect: Mood and affect normal.         Speech: Speech normal.         Behavior: Behavior normal. Behavior is cooperative.               Note: Portions of this record may have been created with voice recognition software. Occasional wrong word or \"sound a like\" substitutions may have occurred due to the inherent limitations of voice recognition software. Please read the chart carefully and recognize, using context, where substitutions have occurred.*      "

## 2024-06-25 NOTE — ED ATTENDING ATTESTATION
6/24/2024  I, Jimy Jimenez DO, saw and evaluated the patient. I have discussed the patient with the resident/non-physician practitioner and agree with the resident's/non-physician practitioner's findings, Plan of Care, and MDM as documented in the resident's/non-physician practitioner's note, except where noted. All available labs and Radiology studies were reviewed.  I was present for key portions of any procedure(s) performed by the resident/non-physician practitioner and I was immediately available to provide assistance.       At this point I agree with the current assessment done in the Emergency Department.  I have conducted an independent evaluation of this patient a history and physical is as follows:    78-year-old male presents for evaluation of left foot swelling after he got it caught in his recliner.  He was seen in urgent care and had x-rays which were negative for fracture or dislocation.  He was referred to the ER for evaluation of possible DVT given swelling about the foot and lower leg.  Patient does have PAD with chronic venous stasis changes mostly pertaining to the left leg.  His family reports history of a vein surgery many years ago to the left leg as well.  No history of DVT.    Patient has some paresthesias over the dorsum of the foot.  Otherwise denies focal weakness, numbness.    Left lower extremity  Chronic venous stasis changes  Multiple small varicosities  Monophasic DP    Impression: Left foot injury negative for fracture plan: POCUS DVT      ED Course         Critical Care Time  Procedures

## 2024-06-25 NOTE — DISCHARGE INSTRUCTIONS
You were seen and evaluated in the emergency room for leg swelling and pain no DVT.  You have a Baker's cyst behind the left knee please see attached instructions.  Please follow-up with your PCP within 48 hours.  If symptoms worsen or persist please return the emergency department further evaluation management

## 2024-06-25 NOTE — ED PROVIDER NOTES
History  Chief Complaint   Patient presents with    Leg Injury     Pt reports hurting his L Leg last week by getting his foot stuck between a recliner footrest and a table. Pt reports increased swelling in LLE, +numbness/tingling. Pt was seen at Urgent Care today, was sent over for an Ultrasound of LLE     Patient is 78-year-old male presenting for evaluation of leg pain and swelling.  Last week head injury got his leg caught between the folding part of his recliner chair.  Has been having swelling and pain was seen and evaluated at an urgent care and they referred him to the emergency department rule out DVT.  He denies any chest pain shortness of breath or any other complaints.  Does note chronic swelling of the left lower extremity not any worse than his baseline          Prior to Admission Medications   Prescriptions Last Dose Informant Patient Reported? Taking?   Blood Glucose Monitoring Suppl (OneTouch Verio Reflect) w/Device KIT  Child No No   Sig: Check blood sugars once daily. Please substitute with appropriate alternative as covered by patient's insurance. Dx: E11.65   Lancets (OneTouch Delica Plus [id number]) MISC  Child No No   Sig: TEST DAILY, E11.5--PT CONFIMED DELICA 33 G   OneTouch Delica Lancets 33G MISC  Child No No   Sig: Check blood sugars once daily. Please substitute with appropriate alternative as covered by patient's insurance. Dx: E11.65   acetaminophen (TYLENOL) 325 mg tablet  Child No No   Sig: Take 2 tablets (650 mg total) by mouth every 6 (six) hours as needed for mild pain or fever   amLODIPine (NORVASC) 5 mg tablet   No No   Sig: TAKE 1 TABLET (5 MG TOTAL) BY MOUTH DAILY.   aspirin 81 MG tablet  Child Yes No   Sig: Take 81 mg by mouth daily   atorvastatin (LIPITOR) 10 mg tablet   No No   Sig: TAKE 1 TABLET BY MOUTH EVERY DAY   bisacodyl (DULCOLAX) 10 mg suppository   No No   Sig: Insert 1 suppository (10 mg total) into the rectum if needed for constipation (if no bowel movement despite  miralax and lactulose use, can use suppository)   colchicine (COLCRYS) 0.6 mg tablet   No No   Sig: TAKE 1 TABLET BY MOUTH EVERY DAY   famotidine (PEPCID) 20 mg tablet   No No   Sig: TAKE 1 TABLET BY MOUTH TWICE A DAY   gabapentin (Neurontin) 100 mg capsule  Child No No   Sig: Take 1 capsule (100 mg total) by mouth daily at bedtime   glucose blood (OneTouch Verio) test strip   No No   Sig: CHECK BLOOD SUGARS ONCE DAILY.   hydrocortisone 2.5 % cream   No No   Sig: APPLY TO AFFECTED AREA TWICE A DAY   lactulose (CHRONULAC) 10 g/15 mL solution   No No   Sig: Take 30 mL (20 g total) by mouth daily as needed (if no BM in 24 hours despite miralax, can take 20 g (30 ML) of lactulose as needed for constipation) DAILY AS NEEDED FOR SEVERE CONSTIPATION   lisinopril (ZESTRIL) 20 mg tablet   No No   Sig: TAKE 1 TABLET BY MOUTH EVERY DAY   lubiprostone (AMITIZA) 24 mcg capsule   No No   Sig: Take 1 capsule (24 mcg total) by mouth 2 (two) times a day with meals   metFORMIN (GLUCOPHAGE) 500 mg tablet   No No   Sig: TAKE 1 TABLET BY MOUTH TWICE A DAY   methocarbamol (ROBAXIN) 500 mg tablet   No No   Sig: TAKE 1 TABLET (500 MG TOTAL) BY MOUTH DAILY AT BEDTIME   omeprazole (PriLOSEC) 40 MG capsule   No No   Sig: Take 1 capsule (40 mg total) by mouth daily   polyethylene glycol (MIRALAX) 17 g packet   No No   Sig: Take miralax daily but can use up to 2 times per day as needed for constipation   senna-docusate sodium (SENOKOT S) 8.6-50 mg per tablet   No No   Sig: Take 2 tablets by mouth daily   tamsulosin (FLOMAX) 0.4 mg   No No   Sig: Take 1 capsule (0.4 mg total) by mouth daily with dinner      Facility-Administered Medications: None       Past Medical History:   Diagnosis Date    Brain mass     Last Assessed; 11/5/2015    Chest pain     Last Assessed: 1/22/2015    Diabetes type 2, uncontrolled     Last Assessed: 3/1/2016    Edema of left lower extremity     Last Assessed: 3/22/2017    GERD (gastroesophageal reflux disease)      Hyperlipidemia     Hypertension     Impaired fasting glucose     Last Assessed: 11/10/2015    Irregular heartbeat     Skin cancer (melanoma) (HCC)     Scalp     Varicose 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     Last Assessed: 3/22/2017       Past Surgical History:   Procedure Laterality Date    COLONOSCOPY      COLONOSCOPY N/A 7/10/2018    Procedure: COLONOSCOPY;  Surgeon: Jose Alejandro Rodrigues MD;  Location:  GI LAB;  Service: Colorectal    INGUINAL HERNIA REPAIR      20+ years ago - [place]; Last Assessed: 10/23/2014    TONSILLECTOMY      VARICOSE VEIN SURGERY Left     x2 left leg - 40+ yrs ago, [place] and [place]       Family History   Problem Relation Age of Onset    No Known Problems Mother     No Known Problems Father     Melanoma Daughter 17     I have reviewed and agree with the history as documented.    E-Cigarette/Vaping    E-Cigarette Use Never User      E-Cigarette/Vaping Substances    Nicotine No     THC No     CBD No     Flavoring No     Other No     Unknown No      Social History     Tobacco Use    Smoking status: Never    Smokeless tobacco: Never   Vaping Use    Vaping status: Never Used   Substance Use Topics    Alcohol use: No    Drug use: No        Review of Systems   Constitutional:  Negative for chills and fever.   HENT:  Negative for ear pain and sore throat.    Eyes:  Negative for pain and visual disturbance.   Respiratory:  Negative for cough and shortness of breath.    Cardiovascular:  Positive for leg swelling (Chronic). Negative for chest pain and palpitations.   Gastrointestinal:  Negative for abdominal pain and vomiting.   Genitourinary:  Negative for dysuria and hematuria.   Musculoskeletal:  Negative for arthralgias and back pain.        Left shin pain   Skin:  Negative for color change and rash.   Neurological:  Negative for seizures and syncope.   All other systems reviewed and are negative.      Physical Exam  ED Triage Vitals [06/24/24 2104]   Temperature Pulse Respirations Blood Pressure  SpO2   97.6 °F (36.4 °C) 60 22 121/68 99 %      Temp Source Heart Rate Source Patient Position - Orthostatic VS BP Location FiO2 (%)   Tympanic Monitor -- Right arm --      Pain Score       6             Orthostatic Vital Signs  Vitals:    06/24/24 2104   BP: 121/68   Pulse: 60       Physical Exam  Vitals and nursing note reviewed.   Constitutional:       General: He is not in acute distress.     Appearance: He is well-developed. He is not ill-appearing.   HENT:      Head: Normocephalic and atraumatic.   Eyes:      Extraocular Movements: Extraocular movements intact.      Conjunctiva/sclera: Conjunctivae normal.   Cardiovascular:      Rate and Rhythm: Normal rate and regular rhythm.      Pulses:           Dorsalis pedis pulses are detected w/ Doppler on the right side and detected w/ Doppler on the left side.      Heart sounds: No murmur heard.  Pulmonary:      Effort: Pulmonary effort is normal. No respiratory distress.      Breath sounds: Normal breath sounds.   Abdominal:      Palpations: Abdomen is soft.      Tenderness: There is no abdominal tenderness.   Musculoskeletal:         General: Swelling (Left lower extremity) and tenderness (Anterior aspect of the leg in the mid to distal tibia) present. Normal range of motion.      Cervical back: Neck supple.      Right lower leg: No edema.      Left lower leg: No edema.   Skin:     General: Skin is warm and dry.      Capillary Refill: Capillary refill takes less than 2 seconds.   Neurological:      General: No focal deficit present.      Mental Status: He is alert.   Psychiatric:         Mood and Affect: Mood normal.         ED Medications  Medications - No data to display    Diagnostic Studies  Results Reviewed       None                   XR tibia fibula 2 views LEFT    (Results Pending)         Procedures  POC DVT/PE US    Date/Time: 6/25/2024 1:16 AM    Performed by: Dontae Linn DO  Authorized by: Dontae Linn DO    Patient location:  ED  Performed by:   Other (comment) (Med student)  Other Assisting Provider: Yes (comment) (Dr. Jimenez, attending)    Procedure details:     Exam Type:  Diagnostic and educational    Indications: swelling of limb and pain in limb      Assessment for:  DVT    Views obtained: common femoral vein, femoral vein, deep femoral vein (profunda) and popliteal vein      Image quality: diagnostic      Image availability:  Not saved  Findings:     Extremities evaluated:  Left lower extremity    Left common femoral vein:  Compressible    Left femoral vein: compressible      Left deep femoral vein:  Compressible    Left popliteal vein:  Compressible  Interpretation:     DVT location:  None  Comments:      Baker cyst L knee        ED Course                             SBIRT 20yo+      Flowsheet Row Most Recent Value   Initial Alcohol Screen: US AUDIT-C     1. How often do you have a drink containing alcohol? 0 Filed at: 06/24/2024 2209   2. How many drinks containing alcohol do you have on a typical day you are drinking?  0 Filed at: 06/24/2024 2209   3a. Male UNDER 65: How often do you have five or more drinks on one occasion? 0 Filed at: 06/24/2024 2209   3b. FEMALE Any Age, or MALE 65+: How often do you have 4 or more drinks on one occassion? 0 Filed at: 06/24/2024 2209   Audit-C Score 0 Filed at: 06/24/2024 2209   GÉNESIS: How many times in the past year have you...    Used an illegal drug or used a prescription medication for non-medical reasons? Never Filed at: 06/24/2024 2209                  Medical Decision Making  Patient 78-year-old male presenting for evaluation of leg pain.  Likely musculoskeletal.  Had negative x-ray of the foot at urgent care however patient's pain is actually in the anterior shin so we will obtain a tib-fib x-ray.  Good pulses with Doppler on ultrasound.  Will assess for DVT this is unlikely    See above procedure note no DVT on ultrasound    Negative tib-fib x-ray    Patient is hemodynamically stable and cleared for  discharge with outpatient follow-up with his PCP.  Return precautions given patient and patient's son verbalized understanding    Amount and/or Complexity of Data Reviewed  Radiology: ordered.          Disposition  Final diagnoses:   Pain of left lower extremity   Leg swelling   Marquez cyst     Time reflects when diagnosis was documented in both MDM as applicable and the Disposition within this note       Time User Action Codes Description Comment    6/25/2024 12:14 AM Dontae Linn [M79.605] Pain of left lower extremity     6/25/2024 12:14 AM Dontae Linn [M79.89] Leg swelling     6/25/2024 12:14 AM Dontae Linn [M71.20] Baker cyst           ED Disposition       ED Disposition   Discharge    Condition   Stable    Date/Time   Tue Jun 25, 2024 0015    Comment   Zaira Morley discharge to home/self care.                   Follow-up Information       Follow up With Specialties Details Why Contact Info    [facility], Internal Medicine, Nurse Practitioner   [address] [place] [address]  [phone]              Discharge Medication List as of 6/25/2024 12:15 AM        CONTINUE these medications which have NOT CHANGED    Details   acetaminophen (TYLENOL) 325 mg tablet Take 2 tablets (650 mg total) by mouth every 6 (six) hours as needed for mild pain or fever, Starting Sun 5/2/2021, Print      amLODIPine (NORVASC) 5 mg tablet TAKE 1 TABLET (5 MG TOTAL) BY MOUTH DAILY., Starting u 11/2/2023, Normal      aspirin 81 MG tablet Take 81 mg by mouth daily, Starting Tue 3/1/2016, Historical Med      atorvastatin (LIPITOR) 10 mg tablet TAKE 1 TABLET BY MOUTH EVERY DAY, Starting Sun 6/2/2024, Normal      bisacodyl (DULCOLAX) 10 mg suppository Insert 1 suppository (10 mg total) into the rectum if needed for constipation (if no bowel movement despite miralax and lactulose use, can use suppository), Starting [place] 5/19/2024, Normal      Blood Glucose Monitoring Suppl  (OneTouch Verio Reflect) w/Device KIT Check blood sugars once daily. Please substitute with appropriate alternative as covered by patient's insurance. Dx: E11.65, Normal      colchicine (COLCRYS) 0.6 mg tablet TAKE 1 TABLET BY MOUTH EVERY DAY, Starting Tue 5/14/2024, Normal      famotidine (PEPCID) 20 mg tablet TAKE 1 TABLET BY MOUTH TWICE A DAY, Normal      gabapentin (Neurontin) 100 mg capsule Take 1 capsule (100 mg total) by mouth daily at bedtime, Starting Tue 1/31/2023, Normal      glucose blood (OneTouch Verio) test strip CHECK BLOOD SUGARS ONCE DAILY., Normal      hydrocortisone 2.5 % cream APPLY TO AFFECTED AREA TWICE A DAY, Normal      lactulose (CHRONULAC) 10 g/15 mL solution Take 30 mL (20 g total) by mouth daily as needed (if no BM in 24 hours despite miralax, can take 20 g (30 ML) of lactulose as needed for constipation) DAILY AS NEEDED FOR SEVERE CONSTIPATION, Starting Sun 5/19/2024, Normal      !! Lancets (OneTouch Delica Plus [id number]) MISC TEST DAILY, E11.5--PT CONFIMED DELICA 33 G, Normal      lisinopril (ZESTRIL) 20 mg tablet TAKE 1 TABLET BY MOUTH EVERY DAY, Starting Thu 11/2/2023, Normal      lubiprostone (AMITIZA) 24 mcg capsule Take 1 capsule (24 mcg total) by mouth 2 (two) times a day with meals, Starting Mon 5/13/2024, Normal      metFORMIN (GLUCOPHAGE) 500 mg tablet TAKE 1 TABLET BY MOUTH TWICE A DAY, Starting Wed 5/8/2024, Normal      methocarbamol (ROBAXIN) 500 mg tablet TAKE 1 TABLET (500 MG TOTAL) BY MOUTH DAILY AT BEDTIME, Starting Fri 11/3/2023, Normal      omeprazole (PriLOSEC) 40 MG capsule Take 1 capsule (40 mg total) by mouth daily, Starting Mon 5/13/2024, Normal      !! OneTouch Delica Lancets 33G MISC Check blood sugars once daily. Please substitute with appropriate alternative as covered by patient's insurance. Dx: E11.65, Normal      polyethylene glycol (MIRALAX) 17 g packet Take miralax daily but can use up to 2 times per day as needed for constipation, No Print       senna-docusate sodium (SENOKOT S) 8.6-50 mg per tablet Take 2 tablets by mouth daily, Starting Mon 2/26/2024, Normal      tamsulosin (FLOMAX) 0.4 mg Take 1 capsule (0.4 mg total) by mouth daily with dinner, Starting Thu 8/24/2023, Normal       !! - Potential duplicate medications found. Please discuss with provider.        No discharge procedures on file.    PDMP Review         Value Time User    PDMP Reviewed  Yes 5/18/2024  3:40 AM Matthew Feldman DO             ED Provider  Attending physically available and evaluated Zaira Morley. I managed the patient along with the ED Attending.    Electronically Signed by           Dontae Linn DO  06/25/24 [id number]

## 2024-06-26 DIAGNOSIS — I10 ESSENTIAL HYPERTENSION: ICD-10-CM

## 2024-06-27 RX ORDER — LISINOPRIL 20 MG/1
20 TABLET ORAL DAILY
Qty: 90 TABLET | Refills: 1 | Status: SHIPPED | OUTPATIENT
Start: 2024-06-27

## 2024-06-30 DIAGNOSIS — K21.9 GASTROESOPHAGEAL REFLUX DISEASE WITHOUT ESOPHAGITIS: ICD-10-CM

## 2024-07-01 RX ORDER — FAMOTIDINE 20 MG/1
TABLET, FILM COATED ORAL
Qty: 180 TABLET | Refills: 1 | Status: SHIPPED | OUTPATIENT
Start: 2024-07-01

## 2024-07-21 ENCOUNTER — RA CDI HCC (OUTPATIENT)
Dept: OTHER | Facility: HOSPITAL | Age: 79
End: 2024-07-21

## 2024-07-22 ENCOUNTER — OFFICE VISIT (OUTPATIENT)
Dept: FAMILY MEDICINE CLINIC | Facility: CLINIC | Age: 79
End: 2024-07-22
Payer: COMMERCIAL

## 2024-07-22 VITALS
RESPIRATION RATE: 17 BRPM | TEMPERATURE: 97 F | HEIGHT: 69 IN | SYSTOLIC BLOOD PRESSURE: 115 MMHG | WEIGHT: 156 LBS | BODY MASS INDEX: 23.11 KG/M2 | HEART RATE: 61 BPM | DIASTOLIC BLOOD PRESSURE: 62 MMHG | OXYGEN SATURATION: 99 %

## 2024-07-22 DIAGNOSIS — I10 PRIMARY HYPERTENSION: Primary | ICD-10-CM

## 2024-07-22 DIAGNOSIS — K59.09 CHRONIC CONSTIPATION: ICD-10-CM

## 2024-07-22 DIAGNOSIS — K21.9 GASTROESOPHAGEAL REFLUX DISEASE WITHOUT ESOPHAGITIS: ICD-10-CM

## 2024-07-22 DIAGNOSIS — E11.36 TYPE 2 DIABETES MELLITUS WITH DIABETIC CATARACT, WITHOUT LONG-TERM CURRENT USE OF INSULIN (HCC): ICD-10-CM

## 2024-07-22 PROCEDURE — G2211 COMPLEX E/M VISIT ADD ON: HCPCS | Performed by: NURSE PRACTITIONER

## 2024-07-22 PROCEDURE — 99214 OFFICE O/P EST MOD 30 MIN: CPT | Performed by: NURSE PRACTITIONER

## 2024-07-22 NOTE — PROGRESS NOTES
FAMILY PRACTICE OFFICE VISIT       NAME: Zaira Morley  AGE: 79 y.o. SEX: male       : 1945        MRN: [id number]    DATE: 2024  TIME: 9:53 PM    Assessment and Plan   1. Primary hypertension  Assessment & Plan:  Stable  Cont meds    2. Gastroesophageal reflux disease without esophagitis  Assessment & Plan:  Cont current meds    3. Type 2 diabetes mellitus with diabetic cataract, without long-term current use of insulin (HCC)  Assessment & Plan:  Stable  Decrease metformin to once daily      Lab Results   Component Value Date    HGBA1C 5.6 2024     4. Chronic constipation  Assessment & Plan:  Cont laxatives and stool softeners  per GI         There are no Patient Instructions on file for this visit.        Chief Complaint     Chief Complaint   Patient presents with    Hearing Loss     Pt is being seen for hearing loss.    Leg Swelling     Pt is being seen for leg swelling x 4 months.    Abdominal Pain     Pt is being seen for stomach pain x 1 week,       History of Present Illness   Zaira Morley is a 79 y.o.-year-old male who is here for f/u to chronic medical conditions  Swelling in left leg  Went to ED on  for leg swelling, doppler neg for dvt  Gained 3 pounds since last visit  Has frequent constipation and abd pain due to constipation  Diabetes well controlled  Declines symptoms of hypoglycemia  C/o was in his ears causing decreased hearing      Review of Systems   Review of Systems   Constitutional:  Negative for fatigue and fever.   HENT:  Positive for hearing loss. Negative for congestion, postnasal drip and rhinorrhea.    Eyes:  Negative for photophobia and visual disturbance.   Respiratory:  Negative for cough, shortness of breath and wheezing.    Cardiovascular:  Positive for leg swelling.   Gastrointestinal:  Positive for abdominal pain. Negative for constipation, diarrhea, nausea and vomiting.   Genitourinary:  Negative for dysuria and frequency.   Musculoskeletal:  Negative for  arthralgias and myalgias.   Skin:  Negative for rash.   Neurological:  Negative for dizziness, light-headedness and headaches.   Hematological:  Negative for adenopathy.   Psychiatric/Behavioral:  Negative for dysphoric mood and sleep disturbance. The patient is not nervous/anxious.        Active Problem List     Patient Active Problem List   Diagnosis    Brain tumor (HCC)    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HCC)    Chronic constipation    Episodic cluster headache, not intractable    GERD (gastroesophageal reflux disease)    Nonintractable headache    Hypertension    Meningioma (HCC)    Varicose 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    Spondylolisthesis at L5-S1 level    Renal cyst    History of hepatitis C    Scalp lesion    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HCC)    Primary osteoarthritis of both knees    Type 2 diabetes mellitus with diabetic cataract, without long-term current use of insulin (HCC)    Blurry vision, bilateral    Other constipation    Weight loss, abnormal    History of colon polyps    Memory problem    Decreased hearing of both ears    Constipation         Past Medical History:  Past Medical History:   Diagnosis Date    Brain mass     Last Assessed; 11/5/2015    Chest pain     Last Assessed: 1/22/2015    Diabetes type 2, uncontrolled     Last Assessed: 3/1/2016    Edema of left lower extremity     Last Assessed: 3/22/2017    GERD (gastroesophageal reflux disease)     Hyperlipidemia     Hypertension     Impaired fasting glucose     Last Assessed: 11/10/2015    Irregular heartbeat     Skin cancer (melanoma) (HCC)     Scalp     Varicose 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     Last Assessed: 3/22/2017       Past Surgical History:  Past Surgical History:   Procedure Laterality Date    COLONOSCOPY      COLONOSCOPY N/A 7/10/2018    Procedure: COLONOSCOPY;  Surgeon: Jose Alejandro Rodrigues MD;  Location: [facility];  Service: Colorectal    INGUINAL HERNIA REPAIR      20+ years ago - [place]; Last Assessed:  10/23/2014    TONSILLECTOMY      VARICOSE VEIN SURGERY Left     x2 left leg - 40+ yrs ago, [place] and [place]       Family History:  Family History   Problem Relation Age of Onset    No Known Problems Mother     No Known Problems Father     Melanoma Daughter 17       Social History:  Social History     Socioeconomic History    Marital status: /Civil Union     Spouse name: Not on file    Number of children: 5    Years of education: Not on file    Highest education level: Not on file   Occupational History    Occupation: Retired: Construction   Tobacco Use    Smoking status: Never    Smokeless tobacco: Never   Vaping Use    Vaping status: Never Used   Substance and Sexual Activity    Alcohol use: No    Drug use: No    Sexual activity: Not Currently     Partners: Female   Other Topics Concern    Not on file   Social History Narrative    Uses safety equipment         Social Determinants of Health     Financial Resource Strain: Low Risk  (7/27/2023)    Overall Financial Resource Strain (CARDIA)     Difficulty of Paying Living Expenses: Not very hard   Food Insecurity: Not on file   Transportation Needs: No Transportation Needs (7/27/2023)    PRAPARE - Transportation     Lack of Transportation (Medical): No     Lack of Transportation (Non-Medical): No   Physical Activity: Not on file   Stress: Not on file   Social Connections: Not on file   Intimate Partner Violence: Not on file   Housing Stability: Not on file       Objective     Vitals:    07/22/24 1120   BP: 115/62   Pulse: 61   Resp: 17   Temp: (!) 97 °F (36.1 °C)   SpO2: 99%     Wt Readings from Last 3 Encounters:   07/22/24 70.8 kg (156 lb)   06/24/24 72.6 kg (160 lb)   05/23/24 69.4 kg (153 lb)       Physical Exam  Vitals and nursing note reviewed.   Constitutional:       Appearance: He is well-developed.   HENT:      Head: Normocephalic and atraumatic.      Right Ear: Tympanic membrane, ear canal and external ear normal.      Left Ear: Tympanic membrane, ear  "canal and external ear normal.      Nose: Nose normal.      Mouth/Throat:      Mouth: Mucous membranes are moist.   Eyes:      Conjunctiva/sclera: Conjunctivae normal.   Cardiovascular:      Rate and Rhythm: Normal rate and regular rhythm.      Heart sounds: Normal heart sounds.   Pulmonary:      Effort: Pulmonary effort is normal.      Breath sounds: Normal breath sounds.   Abdominal:      General: Bowel sounds are normal.      Palpations: Abdomen is soft.   Musculoskeletal:         General: Normal range of motion.      Cervical back: Normal range of motion and neck supple.      Left lower leg: Edema present.   Skin:     Capillary Refill: Capillary refill takes less than 2 seconds.   Neurological:      General: No focal deficit present.      Mental Status: He is alert and oriented to person, place, and time.   Psychiatric:         Mood and Affect: Mood normal.         Behavior: Behavior normal.         Pertinent Laboratory/Diagnostic Studies:  Lab Results   Component Value Date    GLUCOSE 121 10/28/2015    BUN 10 05/19/2024    CREATININE 0.61 05/19/2024    CALCIUM 8.8 05/19/2024     10/28/2015    K 3.4 (L) 05/19/2024    CO2 28 05/19/2024     05/19/2024     Lab Results   Component Value Date    ALT 8 05/17/2024    AST 11 (L) 05/17/2024    GGT 12 12/31/2022    ALKPHOS 72 05/17/2024    BILITOT 0.53 08/27/2015       Lab Results   Component Value Date    WBC 5.00 05/19/2024    HGB 11.4 (L) 05/19/2024    HCT 33.6 (L) 05/19/2024    MCV 86 05/19/2024     (L) 05/19/2024       No results found for: \"TSH\"    Lab Results   Component Value Date    CHOL 154 08/27/2015     Lab Results   Component Value Date    TRIG 92 05/13/2023     Lab Results   Component Value Date    HDL 32 (L) 05/13/2023     Lab Results   Component Value Date    LDLCALC 65 05/13/2023     Lab Results   Component Value Date    HGBA1C 5.6 03/25/2024       Results for orders placed or performed during the hospital encounter of 05/17/24   CBC " and differential   Result Value Ref Range    WBC 6.47 4.31 - 10.16 Thousand/uL    RBC 4.23 3.88 - 5.62 Million/uL    Hemoglobin 12.3 12.0 - 17.0 g/dL    Hematocrit 36.4 (L) 36.5 - 49.3 %    MCV 86 82 - 98 fL    MCH 29.1 26.8 - 34.3 pg    MCHC 33.8 31.4 - 37.4 g/dL    RDW 13.2 11.6 - 15.1 %    MPV 9.6 8.9 - 12.7 fL    Platelets 167 149 - 390 Thousands/uL    nRBC 0 /100 WBCs    Segmented % 65 43 - 75 %    Immature Grans % 1 0 - 2 %    Lymphocytes % 20 14 - 44 %    Monocytes % 13 (H) 4 - 12 %    Eosinophils Relative 1 0 - 6 %    Basophils Relative 0 0 - 1 %    Absolute Neutrophils 4.17 1.85 - 7.62 Thousands/µL    Absolute Immature Grans 0.03 0.00 - 0.20 Thousand/uL    Absolute Lymphocytes 1.32 0.60 - 4.47 Thousands/µL    Absolute Monocytes 0.87 0.17 - 1.22 Thousand/µL    Eosinophils Absolute 0.07 0.00 - 0.61 Thousand/µL    Basophils Absolute 0.01 0.00 - 0.10 Thousands/µL   Comprehensive metabolic panel   Result Value Ref Range    Sodium 138 135 - 147 mmol/L    Potassium 3.9 3.5 - 5.3 mmol/L    Chloride 103 96 - 108 mmol/L    CO2 28 21 - 32 mmol/L    ANION GAP 7 4 - 13 mmol/L    BUN 16 5 - 25 mg/dL    Creatinine 0.83 0.60 - 1.30 mg/dL    Glucose 127 65 - 140 mg/dL    Calcium 9.2 8.4 - 10.2 mg/dL    AST 11 (L) 13 - 39 U/L    ALT 8 7 - 52 U/L    Alkaline Phosphatase 72 34 - 104 U/L    Total Protein 6.5 6.4 - 8.4 g/dL    Albumin 4.2 3.5 - 5.0 g/dL    Total Bilirubin 0.70 0.20 - 1.00 mg/dL    eGFR 84 ml/min/1.73sq m   UA w Reflex to Microscopic w Reflex to Culture    Specimen: Urine, Other   Result Value Ref Range    Color, UA Colorless     Clarity, UA Clear     Specific Gravity, UA 1.018 1.003 - 1.030    pH, UA 6.5 4.5, 5.0, 5.5, 6.0, 6.5, 7.0, 7.5, 8.0    Leukocytes, UA Negative Negative    Nitrite, UA Negative Negative    Protein, UA Negative Negative mg/dl    Glucose, UA Negative Negative mg/dl    Ketones, UA Negative Negative mg/dl    Urobilinogen, UA <2.0 <2.0 mg/dl mg/dl    Bilirubin, UA Negative Negative    Occult  Blood, UA Negative Negative   Basic metabolic panel   Result Value Ref Range    Sodium 141 135 - 147 mmol/L    Potassium 3.4 (L) 3.5 - 5.3 mmol/L    Chloride 104 96 - 108 mmol/L    CO2 28 21 - 32 mmol/L    ANION GAP 9 4 - 13 mmol/L    BUN 10 5 - 25 mg/dL    Creatinine 0.61 0.60 - 1.30 mg/dL    Glucose 128 65 - 140 mg/dL    Calcium 8.8 8.4 - 10.2 mg/dL    eGFR 96 ml/min/1.73sq m   CBC and differential   Result Value Ref Range    WBC 5.00 4.31 - 10.16 Thousand/uL    RBC 3.93 3.88 - 5.62 Million/uL    Hemoglobin 11.4 (L) 12.0 - 17.0 g/dL    Hematocrit 33.6 (L) 36.5 - 49.3 %    MCV 86 82 - 98 fL    MCH 29.0 26.8 - 34.3 pg    MCHC 33.9 31.4 - 37.4 g/dL    RDW 13.1 11.6 - 15.1 %    MPV 9.3 8.9 - 12.7 fL    Platelets 146 (L) 149 - 390 Thousands/uL    nRBC 0 /100 WBCs    Segmented % 59 43 - 75 %    Immature Grans % 0 0 - 2 %    Lymphocytes % 28 14 - 44 %    Monocytes % 12 4 - 12 %    Eosinophils Relative 1 0 - 6 %    Basophils Relative 0 0 - 1 %    Absolute Neutrophils 2.91 1.85 - 7.62 Thousands/µL    Absolute Immature Grans 0.02 0.00 - 0.20 Thousand/uL    Absolute Lymphocytes 1.42 0.60 - 4.47 Thousands/µL    Absolute Monocytes 0.59 0.17 - 1.22 Thousand/µL    Eosinophils Absolute 0.04 0.00 - 0.61 Thousand/µL    Basophils Absolute 0.02 0.00 - 0.10 Thousands/µL   Fingerstick Glucose (POCT)   Result Value Ref Range    POC Glucose 123 65 - 140 mg/dl   Fingerstick Glucose (POCT)   Result Value Ref Range    POC Glucose 171 (H) 65 - 140 mg/dl   Fingerstick Glucose (POCT)   Result Value Ref Range    POC Glucose 102 65 - 140 mg/dl   Fingerstick Glucose (POCT)   Result Value Ref Range    POC Glucose 137 65 - 140 mg/dl   Fingerstick Glucose (POCT)   Result Value Ref Range    POC Glucose 117 65 - 140 mg/dl   Fingerstick Glucose (POCT)   Result Value Ref Range    POC Glucose 158 (H) 65 - 140 mg/dl       No orders of the defined types were placed in this encounter.      ALLERGIES:  No Known Allergies    Current Medications     Current  Outpatient Medications   Medication Sig Dispense Refill    acetaminophen (TYLENOL) 325 mg tablet Take 2 tablets (650 mg total) by mouth every 6 (six) hours as needed for mild pain or fever 30 tablet 0    amLODIPine (NORVASC) 5 mg tablet TAKE 1 TABLET (5 MG TOTAL) BY MOUTH DAILY. 90 tablet 0    aspirin 81 MG tablet Take 81 mg by mouth daily      atorvastatin (LIPITOR) 10 mg tablet TAKE 1 TABLET BY MOUTH EVERY DAY 90 tablet 1    bisacodyl (DULCOLAX) 10 mg suppository Insert 1 suppository (10 mg total) into the rectum if needed for constipation (if no bowel movement despite miralax and lactulose use, can use suppository) 12 suppository 0    Blood Glucose Monitoring Suppl (OneTouch Verio Reflect) w/Device KIT Check blood sugars once daily. Please substitute with appropriate alternative as covered by patient's insurance. Dx: E11.65 1 kit 0    colchicine (COLCRYS) 0.6 mg tablet TAKE 1 TABLET BY MOUTH EVERY DAY 90 tablet 1    famotidine (PEPCID) 20 mg tablet TAKE 1 TABLET BY MOUTH TWICE A  tablet 1    gabapentin (Neurontin) 100 mg capsule Take 1 capsule (100 mg total) by mouth daily at bedtime 30 capsule 2    glucose blood (OneTouch Verio) test strip CHECK BLOOD SUGARS ONCE DAILY. 100 strip 1    hydrocortisone 2.5 % cream APPLY TO AFFECTED AREA TWICE A DAY 28.35 g 0    lactulose (CHRONULAC) 10 g/15 mL solution Take 30 mL (20 g total) by mouth daily as needed (if no BM in 24 hours despite miralax, can take 20 g (30 ML) of lactulose as needed for constipation) DAILY AS NEEDED FOR SEVERE CONSTIPATION 473 mL 0    Lancets (OneTouch Delica Plus [id number]) MISC TEST DAILY, E11.5--PT CONFIMED DELICA 33 G 100 each 11    lisinopril (ZESTRIL) 20 mg tablet TAKE 1 TABLET BY MOUTH EVERY DAY 90 tablet 1    lubiprostone (AMITIZA) 24 mcg capsule Take 1 capsule (24 mcg total) by mouth 2 (two) times a day with meals 60 capsule 11    metFORMIN (GLUCOPHAGE) 500 mg tablet TAKE 1 TABLET BY MOUTH TWICE A  tablet 1    methocarbamol  (ROBAXIN) 500 mg tablet TAKE 1 TABLET (500 MG TOTAL) BY MOUTH DAILY AT BEDTIME 30 tablet 0    omeprazole (PriLOSEC) 40 MG capsule Take 1 capsule (40 mg total) by mouth daily 90 capsule 1    OneTouch Delica Lancets 33G MISC Check blood sugars once daily. Please substitute with appropriate alternative as covered by patient's insurance. Dx: E11.65 100 each 3    polyethylene glycol (MIRALAX) 17 g packet Take miralax daily but can use up to 2 times per day as needed for constipation      senna-docusate sodium (SENOKOT S) 8.6-50 mg per tablet Take 2 tablets by mouth daily 60 tablet 3    tamsulosin (FLOMAX) 0.4 mg Take 1 capsule (0.4 mg total) by mouth daily with dinner 30 capsule 1     No current facility-administered medications for this visit.         Health Maintenance     Health Maintenance   Topic Date Due    Zoster Vaccine (1 of 2) Never done    RSV Vaccine Age 60+ Years (1 - 1-dose 60+ series) Never done    Medicare Annual Wellness Visit (AWV)  07/27/2024    Influenza Vaccine (1) 09/01/2024    HEMOGLOBIN A1C  09/25/2024    Depression Screening  01/22/2025    COVID-19 Vaccine (5 - 2023-24 season) 10/28/2024 (Originally 9/1/2023)    Hepatitis B Vaccine (1 of 3 - Risk 3-dose series) 07/28/2025 (Originally 7/11/2006)    Diabetic Foot Exam  01/22/2025    Kidney Health Evaluation: Albumin/Creatinine Ratio  03/25/2025    Fall Risk  05/13/2025    Kidney Health Evaluation: GFR  05/19/2025    DM Eye Exam  09/01/2025    Pneumococcal Vaccine: 65+ Years  Completed    RSV Vaccine age 0-20 Months  Aged Out    HIB Vaccine  Aged Out    IPV Vaccine  Aged Out    Hepatitis A Vaccine  Aged Out    Meningococcal ACWY Vaccine  Aged Out    HPV Vaccine  Aged Out    Hepatitis C Screening  Discontinued    Colorectal Cancer Screening  Discontinued     Immunization History   Administered Date(s) Administered    COVID-19 PFIZER VACCINE 0.3 ML IM 03/29/2021, 04/21/2021, 11/09/2021    COVID-19 Pfizer Vac BIVALENT Raphael-sucrose 12 Yr+ IM 11/21/2022     INFLUENZA 10/27/2015, 10/01/2016, 10/13/2016, 12/19/2018    Influenza Split High Dose Preservative Free IM 10/23/2014, 10/27/2015, 10/01/2016, 10/13/2016, 10/13/2016, 10/13/2016    Influenza, high dose seasonal 0.7 mL 12/19/2018, 10/14/2019, 10/05/2021    Pneumococcal Conjugate 13-Valent 08/25/2015, 01/19/2017, 10/14/2019    Pneumococcal Conjugate Vaccine 20-valent (Pcv20), Polysace 06/01/2023    Pneumococcal Polysaccharide PPV23 10/05/2021    Tdap 06/01/2023          ZAHRA Rice

## 2024-07-29 NOTE — ASSESSMENT & PLAN NOTE
Stable  Decrease metformin to once daily      Lab Results   Component Value Date    HGBA1C 5.6 03/25/2024

## 2024-07-31 ENCOUNTER — TELEPHONE (OUTPATIENT)
Dept: NEUROSURGERY | Facility: CLINIC | Age: 79
End: 2024-07-31

## 2024-07-31 NOTE — TELEPHONE ENCOUNTER
Called patients daughter Flo and lvmarivel stating that appt on 08/01 is being cancelled due to MRI not being completed, called patient's son Rashi and lvm as well with central scheduling number and office number to reschedule once MRI is scheduled.

## 2024-08-01 ENCOUNTER — OFFICE VISIT (OUTPATIENT)
Dept: NEUROSURGERY | Facility: CLINIC | Age: 79
End: 2024-08-01
Payer: COMMERCIAL

## 2024-08-01 ENCOUNTER — TELEPHONE (OUTPATIENT)
Dept: NEUROSURGERY | Facility: CLINIC | Age: 79
End: 2024-08-01

## 2024-08-01 VITALS
DIASTOLIC BLOOD PRESSURE: 68 MMHG | HEART RATE: 65 BPM | HEIGHT: 69 IN | OXYGEN SATURATION: 98 % | BODY MASS INDEX: 23.11 KG/M2 | WEIGHT: 156 LBS | SYSTOLIC BLOOD PRESSURE: 125 MMHG | TEMPERATURE: 97.8 F

## 2024-08-01 DIAGNOSIS — Z01.818 PRE-PROCEDURAL EXAMINATION: Primary | ICD-10-CM

## 2024-08-01 DIAGNOSIS — R51.9 HEADACHE: ICD-10-CM

## 2024-08-01 DIAGNOSIS — D32.9 MENINGIOMA (HCC): Primary | ICD-10-CM

## 2024-08-01 DIAGNOSIS — R26.81 GAIT INSTABILITY: ICD-10-CM

## 2024-08-01 PROCEDURE — 99213 OFFICE O/P EST LOW 20 MIN: CPT | Performed by: PHYSICIAN ASSISTANT

## 2024-08-01 NOTE — PROGRESS NOTES
Neurosurgery Office Note  Zaira Morley 79 y.o. male MRN: [id number]      Assessment & Plan     Patient is a 79 years old gentleman with past medical history of hypertension, varicose veins of left lower extremity, GERD, hepatitis C infection, diabetes mellitus type 2, episodic cluster headache, osteoarthritis of both knees, malignant spindle cell neoplasm, hearing deficit of both the ears, and right petroclivial extra-axial mass likely calcified meningioma that was diagnosed about 7 to 8 years ago, last seen by neurosurgery in 2022.  Patient has also proceeded pituitary lesion, he is here today self-referred for headache, cognitive function and gait instability issues.  Patient speaks [place] language.  However, his daughter with patient and she was helping with translation.  She reported patient has memory and forgetfulness increased in the past few months, has also gait issues and tendency to fall in the setting of limping gait due to osteoarthritis of the knees, denies any urinary incontinent.  Patient has also occasional headache but denies any nausea, vomiting, baseline visual issues.  No weakness in the extremities, numbness or paresthesia.    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  I compared to the manage with his 2 years earlier MRI and the ventricular dilatation appears the same.    Exam-patient speaks few orders but communicates well and [place] language.  Moves all extremities, PERRL,  EOMI x 3 mm conjugate bilaterally finger-to-nose test is normal and without drift bilaterally.  Strength is 5/5.  Sensation to light intact throughout.  DTR 2+ no clonus bilaterally.  Patient with limping gait.    Hx, Exam and images reviewed with the patient and his daughter.  Management plan discussed.  Image shows  "stable.  Very lesion and also right Pteroclivial calcified meningioma, and more or less stable ventricular enlargement, suspicion for NPH.  Patient's gait is limping and difficult to attribute his gait instability  to  NPH or OA of the knees, patient has no urinary issues, and cognitive deficit is mild and recent, unsure if he has also vascular dementia. I would let the patient try PT for balance training, follow up with Neurology for vascular Dementia and gait dysfunction eval and headache Mx. Advised to follow up with neurosurgery in 1 yr with MRI brain. All questions and concerns were answered to patient's satisfaction. Patient verbalized understandings and agreed with the plan.    Plan:   MRI of brain with and without contrast in 1 years  Ambulatory referral to neurology  Referral to physical therapy  Advised to call office or go to emergency room with worsening or interval development of neurological symptoms  Otherwise follow-up in 1 year    Chief Complaint   Patient presents with    Follow-up     1 Yr F/u Meningioma- Brain MRI on 6/2024       HISTORY    History of Present Illness     C/C: \"79 y.o. year old male here today self referred for eval of gait instability, cognitive issues and brain mass follow up\"    HPI    See detailed discussion above     REVIEW OF SYSTEMS  ROS personally reviwed and updated as follows  Review of Systems   Constitutional: Negative.    HENT:  Positive for tinnitus.    Eyes: Negative.    Respiratory: Negative.     Cardiovascular: Negative.    Gastrointestinal: Negative.    Endocrine: Negative.    Genitourinary: Negative.    Musculoskeletal: Negative.    Skin: Negative.    Allergic/Immunologic: Negative.    Neurological: Negative.         1 Yr GF/u Meningioma- Brain Mri on 6/2024  C/o lightheaded intermittent daily  H/o fall 5 days ago on lawn     Hematological: Negative.    Psychiatric/Behavioral:  Positive for confusion and decreased concentration.         X 2months   All other " systems reviewed and are negative.      ROS obtained by MA. Reviewed. See HPI.     Meds/Allergies     Current Outpatient Medications   Medication Sig Dispense Refill    acetaminophen (TYLENOL) 325 mg tablet Take 2 tablets (650 mg total) by mouth every 6 (six) hours as needed for mild pain or fever 30 tablet 0    amLODIPine (NORVASC) 5 mg tablet TAKE 1 TABLET (5 MG TOTAL) BY MOUTH DAILY. 90 tablet 0    aspirin 81 MG tablet Take 81 mg by mouth daily      atorvastatin (LIPITOR) 10 mg tablet TAKE 1 TABLET BY MOUTH EVERY DAY 90 tablet 1    bisacodyl (DULCOLAX) 10 mg suppository Insert 1 suppository (10 mg total) into the rectum if needed for constipation (if no bowel movement despite miralax and lactulose use, can use suppository) 12 suppository 0    Blood Glucose Monitoring Suppl (OneTouch Verio Reflect) w/Device KIT Check blood sugars once daily. Please substitute with appropriate alternative as covered by patient's insurance. Dx: E11.65 1 kit 0    colchicine (COLCRYS) 0.6 mg tablet TAKE 1 TABLET BY MOUTH EVERY DAY 90 tablet 1    famotidine (PEPCID) 20 mg tablet TAKE 1 TABLET BY MOUTH TWICE A  tablet 1    gabapentin (Neurontin) 100 mg capsule Take 1 capsule (100 mg total) by mouth daily at bedtime 30 capsule 2    glucose blood (OneTouch Verio) test strip CHECK BLOOD SUGARS ONCE DAILY. 100 strip 1    hydrocortisone 2.5 % cream APPLY TO AFFECTED AREA TWICE A DAY 28.35 g 0    lactulose (CHRONULAC) 10 g/15 mL solution Take 30 mL (20 g total) by mouth daily as needed (if no BM in 24 hours despite miralax, can take 20 g (30 ML) of lactulose as needed for constipation) DAILY AS NEEDED FOR SEVERE CONSTIPATION 473 mL 0    Lancets (OneTouch Delica Plus [id number]) MISC TEST DAILY, E11.5--PT CONFIMED DELICA 33 G 100 each 11    lisinopril (ZESTRIL) 20 mg tablet TAKE 1 TABLET BY MOUTH EVERY DAY 90 tablet 1    lubiprostone (AMITIZA) 24 mcg capsule Take 1 capsule (24 mcg total) by mouth 2 (two) times a day with meals 60 capsule  11    metFORMIN (GLUCOPHAGE) 500 mg tablet TAKE 1 TABLET BY MOUTH TWICE A  tablet 1    methocarbamol (ROBAXIN) 500 mg tablet TAKE 1 TABLET (500 MG TOTAL) BY MOUTH DAILY AT BEDTIME 30 tablet 0    omeprazole (PriLOSEC) 40 MG capsule Take 1 capsule (40 mg total) by mouth daily 90 capsule 1    OneTouch Delica Lancets 33G MISC Check blood sugars once daily. Please substitute with appropriate alternative as covered by patient's insurance. Dx: E11.65 100 each 3    polyethylene glycol (MIRALAX) 17 g packet Take miralax daily but can use up to 2 times per day as needed for constipation      senna-docusate sodium (SENOKOT S) 8.6-50 mg per tablet Take 2 tablets by mouth daily 60 tablet 3    tamsulosin (FLOMAX) 0.4 mg Take 1 capsule (0.4 mg total) by mouth daily with dinner 30 capsule 1     No current facility-administered medications for this visit.       No Known Allergies    PAST HISTORY    Past Medical History:   Diagnosis Date    Brain mass     Last Assessed; 11/5/2015    Chest pain     Last Assessed: 1/22/2015    Diabetes type 2, uncontrolled     Last Assessed: 3/1/2016    Edema of left lower extremity     Last Assessed: 3/22/2017    GERD (gastroesophageal reflux disease)     Hyperlipidemia     Hypertension     Impaired fasting glucose     Last Assessed: 11/10/2015    Irregular heartbeat     Skin cancer (melanoma) (HCC)     Scalp     Varicose 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     Last Assessed: 3/22/2017       Past Surgical History:   Procedure Laterality Date    COLONOSCOPY      COLONOSCOPY N/A 7/10/2018    Procedure: COLONOSCOPY;  Surgeon: Jose Alejandro Rodrigues MD;  Location: [facility];  Service: Colorectal    INGUINAL HERNIA REPAIR      20+ years ago - [place]; Last Assessed: 10/23/2014    TONSILLECTOMY      VARICOSE VEIN SURGERY Left     x2 left leg - 40+ yrs ago, [place] and [place]       Social History     Tobacco Use    Smoking status: Never    Smokeless tobacco: Never   Vaping Use    Vaping status: Never Used  "  Substance Use Topics    Alcohol use: No    Drug use: No       Family History   Problem Relation Age of Onset    No Known Problems Mother     No Known Problems Father     Melanoma Daughter 17         Above history personally reviewed.       EXAM    Vitals:Height 5' 9\" (1.753 m), weight 70.8 kg (156 lb).,Body mass index is 23.04 kg/m².     Physical Exam  Constitutional:       Appearance: Normal appearance.   HENT:      Head: Normocephalic and atraumatic.   Eyes:      Extraocular Movements: Extraocular movements intact.      Pupils: Pupils are equal, round, and reactive to light.   Abdominal:      General: Abdomen is flat.   Musculoskeletal:         General: Normal range of motion.      Cervical back: Normal range of motion.   Neurological:      General: No focal deficit present.      Mental Status: He is alert and oriented to person, place, and time.      Coordination: Finger-Nose-Finger Test normal.      Deep Tendon Reflexes:      Reflex Scores:       Tricep reflexes are 2+ on the right side and 2+ on the left side.       Bicep reflexes are 2+ on the right side and 2+ on the left side.       Brachioradialis reflexes are 2+ on the right side and 2+ on the left side.       Patellar reflexes are 2+ on the right side and 2+ on the left side.       Achilles reflexes are 2+ on the right side and 2+ on the left side.  Psychiatric:         Speech: Speech normal.         Neurologic Exam     Mental Status   Oriented to person, place, and time.   Speech: speech is normal   Level of consciousness: alert    Cranial Nerves     CN III, IV, VI   Pupils are equal, round, and reactive to light.  Right pupil: Size: 3 mm. Shape: regular. Reactivity: brisk.   Left pupil: Size: 3 mm. Shape: regular. Reactivity: brisk.   Nystagmus: none     CN XI   CN XI normal.     Motor Exam   Muscle bulk: normal  Overall muscle tone: normal  Right arm tone: normal  Left arm tone: normal  Right arm pronator drift: absent  Left arm pronator drift: " absent  Right leg tone: normal  Left leg tone: normal    Sensory Exam   Light touch normal.     Gait, Coordination, and Reflexes     Coordination   Finger to nose coordination: normal    Reflexes   Right brachioradialis: 2+  Left brachioradialis: 2+  Right biceps: 2+  Left biceps: 2+  Right triceps: 2+  Left triceps: 2+  Right patellar: 2+  Left patellar: 2+  Right achilles: 2+  Left achilles: 2+  Right : 2+  Left : 2+  Right Clark: absent  Left Clark: absent  Right ankle clonus: absent  Left ankle clonus: absent        MEDICAL DECISION MAKING    Imaging Studies:     No results found.    I have personally reviewed pertinent reports.   and I have personally reviewed pertinent films in PACS

## 2024-08-05 ENCOUNTER — OFFICE VISIT (OUTPATIENT)
Dept: FAMILY MEDICINE CLINIC | Facility: CLINIC | Age: 79
End: 2024-08-05
Payer: COMMERCIAL

## 2024-08-05 VITALS
HEIGHT: 69 IN | HEART RATE: 65 BPM | SYSTOLIC BLOOD PRESSURE: 120 MMHG | OXYGEN SATURATION: 99 % | WEIGHT: 157 LBS | BODY MASS INDEX: 23.25 KG/M2 | TEMPERATURE: 97.5 F | RESPIRATION RATE: 17 BRPM | DIASTOLIC BLOOD PRESSURE: 70 MMHG

## 2024-08-05 DIAGNOSIS — H61.23 BILATERAL IMPACTED CERUMEN: ICD-10-CM

## 2024-08-05 DIAGNOSIS — Z00.00 MEDICARE ANNUAL WELLNESS VISIT, SUBSEQUENT: Primary | ICD-10-CM

## 2024-08-05 PROCEDURE — 69210 REMOVE IMPACTED EAR WAX UNI: CPT | Performed by: NURSE PRACTITIONER

## 2024-08-05 PROCEDURE — G0439 PPPS, SUBSEQ VISIT: HCPCS | Performed by: NURSE PRACTITIONER

## 2024-08-05 PROCEDURE — 99214 OFFICE O/P EST MOD 30 MIN: CPT | Performed by: NURSE PRACTITIONER

## 2024-08-05 NOTE — PATIENT INSTRUCTIONS
Medicare Preventive Visit Patient Instructions  Thank you for completing your Welcome to Medicare Visit or Medicare Annual Wellness Visit today. Your next wellness visit will be due in one year (8/6/2025).  The screening/preventive services that you may require over the next 5-10 years are detailed below. Some tests may not apply to you based off risk factors and/or age. Screening tests ordered at today's visit but not completed yet may show as past due. Also, please note that scanned in results may not display below.  Preventive Screenings:  Service Recommendations Previous Testing/Comments   Colorectal Cancer Screening  Colonoscopy    Fecal Occult Blood Test (FOBT)/Fecal Immunochemical Test (FIT)  Fecal DNA/Cologuard Test  Flexible Sigmoidoscopy Age: 45-75 years old   Colonoscopy: every 10 years (May be performed more frequently if at higher risk)  OR  FOBT/FIT: every 1 year  OR  Cologuard: every 3 years  OR  Sigmoidoscopy: every 5 years  Screening may be recommended earlier than age 45 if at higher risk for colorectal cancer. Also, an individualized decision between you and your healthcare provider will decide whether screening between the ages of 76-85 would be appropriate. Colonoscopy: 05/06/2024  FOBT/FIT: Not on file  Cologuard: Not on file  Sigmoidoscopy: Not on file          Prostate Cancer Screening Individualized decision between patient and health care provider in men between ages of 55-69   Medicare will cover every 12 months beginning on the day after your 50th birthday PSA: 0.7 ng/mL     Screening Not Indicated     Hepatitis C Screening Once for adults born between 1945 and 1965  More frequently in patients at high risk for Hepatitis C Hep C Antibody: 06/05/2018    Screening Not Indicated  History Hepatitis C   Diabetes Screening 1-2 times per year if you're at risk for diabetes or have pre-diabetes Fasting glucose: 157 mg/dL (5/13/2023)  A1C: 5.6 (3/25/2024)  Screening Not Indicated  History Diabetes    Cholesterol Screening Once every 5 years if you don't have a lipid disorder. May order more often based on risk factors. Lipid panel: 05/13/2023  Screening Not Indicated  History Lipid Disorder      Other Preventive Screenings Covered by Medicare:  Abdominal Aortic Aneurysm (AAA) Screening: covered once if your at risk. You're considered to be at risk if you have a family history of AAA or a male between the age of 65-75 who smoking at least 100 cigarettes in your lifetime.  Lung Cancer Screening: covers low dose CT scan once per year if you meet all of the following conditions: (1) Age 55-77; (2) No signs or symptoms of lung cancer; (3) Current smoker or have quit smoking within the last 15 years; (4) You have a tobacco smoking history of at least 20 pack years (packs per day x number of years you smoked); (5) You get a written order from a healthcare provider.  Glaucoma Screening: covered annually if you're considered high risk: (1) You have diabetes OR (2) Family history of glaucoma OR (3)  aged 50 and older OR (4)  American aged 65 and older  Osteoporosis Screening: covered every 2 years if you meet one of the following conditions: (1) Have a vertebral abnormality; (2) On glucocorticoid therapy for more than 3 months; (3) Have primary hyperparathyroidism; (4) On osteoporosis medications and need to assess response to drug therapy.  HIV Screening: covered annually if you're between the age of 15-65. Also covered annually if you are younger than 15 and older than 65 with risk factors for HIV infection. For pregnant patients, it is covered up to 3 times per pregnancy.    Immunizations:  Immunization Recommendations   Influenza Vaccine Annual influenza vaccination during flu season is recommended for all persons aged >= 6 months who do not have contraindications   Pneumococcal Vaccine   * Pneumococcal conjugate vaccine = PCV13 (Prevnar 13), PCV15 (Vaxneuvance), PCV20 (Prevnar 20)  *  Pneumococcal polysaccharide vaccine = PPSV23 (Pneumovax) Adults 19-63 yo with certain risk factors or if 65+ yo  If never received any pneumonia vaccine: recommend Prevnar 20 (PCV20)  Give PCV20 if previously received 1 dose of PCV13 or PPSV23   Hepatitis B Vaccine 3 dose series if at intermediate or high risk (ex: diabetes, end stage renal disease, liver disease)   Respiratory syncytial virus (RSV) Vaccine - COVERED BY MEDICARE PART D  * RSVPreF3 (Arexvy) CDC recommends that adults 60 years of age and older may receive a single dose of RSV vaccine using shared clinical decision-making (SCDM)   Tetanus (Td) Vaccine - COST NOT COVERED BY MEDICARE PART B Following completion of primary series, a booster dose should be given every 10 years to maintain immunity against tetanus. Td may also be given as tetanus wound prophylaxis.   Tdap Vaccine - COST NOT COVERED BY MEDICARE PART B Recommended at least once for all adults. For pregnant patients, recommended with each pregnancy.   Shingles Vaccine (Shingrix) - COST NOT COVERED BY MEDICARE PART B  2 shot series recommended in those 19 years and older who have or will have weakened immune systems or those 50 years and older     Health Maintenance Due:      Topic Date Due   • Hepatitis C Screening  Discontinued   • Colorectal Cancer Screening  Discontinued     Immunizations Due:      Topic Date Due   • Influenza Vaccine (1) 09/01/2024     Advance Directives   What are advance directives?  Advance directives are legal documents that state your wishes and plans for medical care. These plans are made ahead of time in case you lose your ability to make decisions for yourself. Advance directives can apply to any medical decision, such as the treatments you want, and if you want to donate organs.   What are the types of advance directives?  There are many types of advance directives, and each state has rules about how to use them. You may choose a combination of any of the  following:  Living will:  This is a written record of the treatment you want. You can also choose which treatments you do not want, which to limit, and which to stop at a certain time. This includes surgery, medicine, IV fluid, and tube feedings.   Durable power of  for healthcare (DPAHC):  This is a written record that states who you want to make healthcare choices for you when you are unable to make them for yourself. This person, called a proxy, is usually a family member or a friend. You may choose more than 1 proxy.  Do not resuscitate (DNR) order:  A DNR order is used in case your heart stops beating or you stop breathing. It is a request not to have certain forms of treatment, such as CPR. A DNR order may be included in other types of advance directives.  Medical directive:  This covers the care that you want if you are in a coma, near death, or unable to make decisions for yourself. You can list the treatments you want for each condition. Treatment may include pain medicine, surgery, blood transfusions, dialysis, IV or tube feedings, and a ventilator (breathing machine).  Values history:  This document has questions about your views, beliefs, and how you feel and think about life. This information can help others choose the care that you would choose.  Why are advance directives important?  An advance directive helps you control your care. Although spoken wishes may be used, it is better to have your wishes written down. Spoken wishes can be misunderstood, or not followed. Treatments may be given even if you do not want them. An advance directive may make it easier for your family to make difficult choices about your care.       © Copyright [employer] 2018 Information is for End User's use only and may not be sold, redistributed or otherwise used for commercial purposes. All illustrations and images included in CareNotes® are the copyrighted property of [employer] or [employer]

## 2024-08-05 NOTE — PROGRESS NOTES
Ambulatory Visit  Name: Zaira Morley      : 1945      MRN: [id number]  Encounter Provider: ZAHRA Rice  Encounter Date: 2024   Encounter department: [facility]    Assessment & Plan   1. Medicare annual wellness visit, subsequent  2. Bilateral impacted cerumen      Depression Screening and Follow-up Plan: Patient was screened for depression during today's encounter. They screened negative with a PHQ-2 score of 1.      Preventive health issues were discussed with patient, and age appropriate screening tests were ordered as noted in patient's After Visit Summary. Personalized health advice and appropriate referrals for health education or preventive services given if needed, as noted in patient's After Visit Summary.    History of Present Illness     Here for medicare wellness and ear flush         Patient Care Team:  ZAHRA Rice as PCP - General (Family Medicine)  Jessica Prajapati MD as PCP - PCP-[facility] ([facility])  MD Jose Alejandro Simms MD as Endoscopist  Ellie Miranda MD (Dermatology)  Polo Hooker MD (Oncology)    Review of Systems   Constitutional:  Negative for fatigue and fever.   HENT:  Positive for hearing loss. Negative for congestion, postnasal drip, rhinorrhea, sinus pressure and sinus pain.    Eyes:  Negative for photophobia and visual disturbance.   Respiratory:  Negative for cough, shortness of breath and wheezing.    Cardiovascular:  Negative for chest pain and palpitations.   Gastrointestinal:  Negative for constipation, diarrhea, nausea and vomiting.   Genitourinary:  Negative for dysuria and frequency.   Musculoskeletal:  Negative for arthralgias and myalgias.   Skin:  Negative for rash.   Neurological:  Negative for dizziness, light-headedness and headaches.   Hematological:  Negative for adenopathy.   Psychiatric/Behavioral:  Negative for dysphoric mood. The patient is not nervous/anxious.      Medical  History Reviewed by provider this encounter:  Tobacco  Allergies  Meds  Problems  Med Hx  Surg Hx  Fam Hx       Annual Wellness Visit Questionnaire   Zaira is here for his Subsequent Wellness visit. Last Medicare Wellness visit information reviewed, patient interviewed, no change since last AWV.     Health Risk Assessment:   Patient rates overall health as good. Patient feels that their physical health rating is slightly better. Patient is satisfied with their life. Eyesight was rated as same. Hearing was rated as slightly worse. Patient feels that their emotional and mental health rating is much better. Patients states they are never, rarely angry. Patient states they are sometimes unusually tired/fatigued. Pain experienced in the last 7 days has been a lot. Patient's pain rating has been 8/10. Patient states that he has experienced no weight loss or gain in last 6 months.     Depression Screening:   PHQ-2 Score: 1      Fall Risk Screening:   In the past year, patient has experienced: no history of falling in past year      Home Safety:  Patient does not have trouble with stairs inside or outside of their home. Patient has working smoke alarms and has working carbon monoxide detector. Home safety hazards include: none.     Nutrition:   Current diet is Regular.     Medications:   Patient is currently taking over-the-counter supplements. OTC medications include: see medication list. Patient is able to manage medications.     Activities of Daily Living (ADLs)/Instrumental Activities of Daily Living (IADLs):   Walk and transfer into and out of bed and chair?: Yes  Dress and groom yourself?: Yes    Bathe or shower yourself?: Yes    Feed yourself? Yes  Do your laundry/housekeeping?: Yes  Manage your money, pay your bills and track your expenses?: Yes  Make your own meals?: Yes    Do your own shopping?: Yes    Previous Hospitalizations:   Any hospitalizations or ED visits within the last 12 months?: No      Advance  Care Planning:   Living will: No    Durable POA for healthcare: No    Advanced directive: No    Advanced directive counseling given: Yes    ACP document given: Yes    Patient declined ACP directive: No      Cognitive Screening:   Provider or family/friend/caregiver concerned regarding cognition?: No    PREVENTIVE SCREENINGS      Cardiovascular Screening:    General: Screening Not Indicated and History Lipid Disorder      Diabetes Screening:     General: Screening Not Indicated and History Diabetes      Colorectal Cancer Screening:     General: Screening Current      Prostate Cancer Screening:    General: Screening Not Indicated      Osteoporosis Screening:    General: Screening Not Indicated      Abdominal Aortic Aneurysm (AAA) Screening:        General: Screening Not Indicated      Lung Cancer Screening:     General: Screening Not Indicated      Hepatitis C Screening:    General: Screening Not Indicated and History Hepatitis C    Screening, Brief Intervention, and Referral to Treatment (SBIRT)    Screening  Typical number of drinks in a day: 0  Typical number of drinks in a week: 0  Interpretation: Low risk drinking behavior.    Single Item Drug Screening:  How often have you used an illegal drug (including marijuana) or a prescription medication for non-medical reasons in the past year? never    Single Item Drug Screen Score: 0  Interpretation: Negative screen for possible drug use disorder    Brief Intervention  Alcohol & drug use screenings were reviewed. No concerns regarding substance use disorder identified.     Other Counseling Topics:   Car/seat belt/driving safety, skin self-exam, sunscreen and calcium and vitamin D intake and regular weightbearing exercise.     Social Determinants of Health     Financial Resource Strain: Low Risk  (7/27/2023)    Overall Financial Resource Strain (CARDIA)     Difficulty of Paying Living Expenses: Not very hard   Food Insecurity: No Food Insecurity (8/5/2024)    Hunger  "Vital Sign     Worried About Running Out of Food in the Last Year: Never true     Ran Out of Food in the Last Year: Never true   Transportation Needs: No Transportation Needs (8/5/2024)    PRAPARE - Transportation     Lack of Transportation (Medical): No     Lack of Transportation (Non-Medical): No   Housing Stability: Low Risk  (8/5/2024)    Housing Stability Vital Sign     Unable to Pay for Housing in the Last Year: No     Number of Times Moved in the Last Year: 1     Homeless in the Last Year: No   Utilities: Not At Risk (8/5/2024)    [facility] Utilities     Threatened with loss of utilities: No     No results found.    Objective     /70 (BP Location: Left arm, Patient Position: Sitting, Cuff Size: Standard)   Pulse 65   Temp 97.5 °F (36.4 °C) (Tympanic)   Resp 17   Ht 5' 9\" (1.753 m)   Wt 71.2 kg (157 lb)   SpO2 99%   BMI 23.18 kg/m²     Physical Exam  Vitals and nursing note reviewed.   Constitutional:       Appearance: Normal appearance.   HENT:      Head: Normocephalic and atraumatic.      Right Ear: Tympanic membrane, ear canal and external ear normal.      Left Ear: Tympanic membrane, ear canal and external ear normal.      Nose: Nose normal.      Mouth/Throat:      Mouth: Mucous membranes are moist.   Eyes:      Extraocular Movements: Extraocular movements intact.      Conjunctiva/sclera: Conjunctivae normal.   Cardiovascular:      Rate and Rhythm: Normal rate and regular rhythm.      Heart sounds: Normal heart sounds.   Pulmonary:      Effort: Pulmonary effort is normal.      Breath sounds: Normal breath sounds.   Abdominal:      General: Bowel sounds are normal.      Palpations: Abdomen is soft.   Musculoskeletal:         General: Normal range of motion.      Cervical back: Normal range of motion and neck supple.   Skin:     General: Skin is warm and dry.      Capillary Refill: Capillary refill takes less than 2 seconds.   Neurological:      General: No focal deficit present.      Mental Status: " He is alert and oriented to person, place, and time.   Psychiatric:         Mood and Affect: Mood normal.         Behavior: Behavior normal.         Thought Content: Thought content normal.     Ear cerumen removal    Date/Time: 8/5/2024 9:00 AM    Performed by: ZAHRA Rice  Authorized by: ZAHRA Rice  Universal Protocol:  Consent: Verbal consent obtained. Written consent not obtained.  Consent given by: patient  Patient understanding: patient states understanding of the procedure being performed  Patient consent: the patient's understanding of the procedure matches consent given  Procedure consent: procedure consent matches procedure scheduled  Patient identity confirmed: verbally with patient    Patient location:  Clinic  Indications / Diagnosis:  Cerumen impaction  Procedure details:     Local anesthetic:  None    Location:  L ear and R ear    Procedure type: irrigation with instrumentation      Instrumentation: forceps      Approach:  Natural orifice  Post-procedure details:     Complication:  None    Hearing quality:  Improved    Patient tolerance of procedure:  Tolerated well, no immediate complications

## 2024-08-19 ENCOUNTER — OFFICE VISIT (OUTPATIENT)
Dept: FAMILY MEDICINE CLINIC | Facility: CLINIC | Age: 79
End: 2024-08-19
Payer: COMMERCIAL

## 2024-08-19 VITALS
RESPIRATION RATE: 17 BRPM | TEMPERATURE: 95.8 F | SYSTOLIC BLOOD PRESSURE: 126 MMHG | BODY MASS INDEX: 24.14 KG/M2 | WEIGHT: 163 LBS | HEART RATE: 54 BPM | DIASTOLIC BLOOD PRESSURE: 74 MMHG | OXYGEN SATURATION: 98 % | HEIGHT: 69 IN

## 2024-08-19 DIAGNOSIS — R60.0 LEG EDEMA, LEFT: ICD-10-CM

## 2024-08-19 DIAGNOSIS — I10 PRIMARY HYPERTENSION: ICD-10-CM

## 2024-08-19 DIAGNOSIS — E11.36 TYPE 2 DIABETES MELLITUS WITH DIABETIC CATARACT, WITHOUT LONG-TERM CURRENT USE OF INSULIN (HCC): Primary | ICD-10-CM

## 2024-08-19 DIAGNOSIS — K59.09 CHRONIC CONSTIPATION: ICD-10-CM

## 2024-08-19 LAB — SL AMB POCT HEMOGLOBIN AIC: 5.8 (ref ?–6.5)

## 2024-08-19 PROCEDURE — 83036 HEMOGLOBIN GLYCOSYLATED A1C: CPT | Performed by: NURSE PRACTITIONER

## 2024-08-19 PROCEDURE — 99214 OFFICE O/P EST MOD 30 MIN: CPT | Performed by: NURSE PRACTITIONER

## 2024-08-19 NOTE — PROGRESS NOTES
FAMILY PRACTICE OFFICE VISIT       NAME: Zaira Morley  AGE: 79 y.o. SEX: male       : 1945        MRN: [id number]    DATE: 2024  TIME: 11:21 AM    Assessment and Plan   1. Type 2 diabetes mellitus with diabetic cataract, without long-term current use of insulin (HCC)  Assessment & Plan:  Stable  Cont meds    Lab Results   Component Value Date    HGBA1C 5.8 2024     Orders:  -     POCT hemoglobin A1c  -     Comprehensive metabolic panel; Future  -     Lipid Panel with Direct LDL reflex; Future  -     CBC and differential; Future  2. Leg edema, left  Assessment & Plan:  Stat doppler    Orders:  -     VAS VENOUS DUPLEX -LOWER LIMB UNILATERAL; Future; Expected date: 2024  -     Comprehensive metabolic panel; Future  -     Lipid Panel with Direct LDL reflex; Future  -     CBC and differential; Future  3. Primary hypertension  Assessment & Plan:  Stable  Cont meds    Orders:  -     Comprehensive metabolic panel; Future  -     Lipid Panel with Direct LDL reflex; Future  -     CBC and differential; Future  4. Chronic constipation  Assessment & Plan:  Cont high fiber foods and fluids  Cont lactulose, sennakot         There are no Patient Instructions on file for this visit.        Chief Complaint     Chief Complaint   Patient presents with    Follow-up     Pt being seen for follow up       History of Present Illness   Zaira Morley is a 79 y.o.-year-old male who is here for left leg swelling  Noticed it started to swell after injury one month ago  To see podiatry tomorrowat 1:30  Continues with constipation problems  Not taking meds correctly all the time  Diabetes well controlled    Review of Systems   Review of Systems   Constitutional:  Positive for fatigue. Negative for fever.   HENT:  Negative for congestion, postnasal drip and rhinorrhea.    Eyes:  Negative for photophobia and visual disturbance.   Respiratory:  Negative for cough, shortness of breath and wheezing.    Cardiovascular:  Positive  for leg swelling.   Gastrointestinal:  Positive for constipation. Negative for diarrhea, nausea and vomiting.   Genitourinary:  Negative for dysuria and frequency.   Musculoskeletal:  Negative for arthralgias and myalgias.   Skin:  Negative for rash.   Neurological:  Negative for dizziness, light-headedness and headaches.   Hematological:  Negative for adenopathy.   Psychiatric/Behavioral:  Negative for dysphoric mood and sleep disturbance.        Active Problem List     Patient Active Problem List   Diagnosis    Brain tumor (HCC)    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HCC)    Chronic constipation    Episodic cluster headache, not intractable    GERD (gastroesophageal reflux disease)    Nonintractable headache    Hypertension    Meningioma (HCC)    Varicose 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    Spondylolisthesis at L5-S1 level    Renal cyst    History of hepatitis C    Scalp lesion    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HCC)    Bilateral impacted cerumen    Primary osteoarthritis of both knees    Type 2 diabetes mellitus with diabetic cataract, without long-term current use of insulin (HCC)    Blurry vision, bilateral    Other constipation    Medicare annual wellness visit, subsequent    Weight loss, abnormal    History of colon polyps    Memory problem    Decreased hearing of both ears    Constipation    Leg edema, left         Past Medical History:  Past Medical History:   Diagnosis Date    Brain mass     Last Assessed; 11/5/2015    Chest pain     Last Assessed: 1/22/2015    Diabetes type 2, uncontrolled     Last Assessed: 3/1/2016    Edema of left lower extremity     Last Assessed: 3/22/2017    GERD (gastroesophageal reflux disease)     Hyperlipidemia     Hypertension     Impaired fasting glucose     Last Assessed: 11/10/2015    Irregular heartbeat     Skin cancer (melanoma) (HCC)     Scalp     Varicose 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     Last Assessed: 3/22/2017       Past Surgical History:  Past Surgical  History:   Procedure Laterality Date    COLONOSCOPY      COLONOSCOPY N/A 7/10/2018    Procedure: COLONOSCOPY;  Surgeon: Jose Alejandro Rodrigues MD;  Location: [facility];  Service: Colorectal    INGUINAL HERNIA REPAIR      20+ years ago - [place]; Last Assessed: 10/23/2014    TONSILLECTOMY      VARICOSE VEIN SURGERY Left     x2 left leg - 40+ yrs ago, [place] and [place]       Family History:  Family History   Problem Relation Age of Onset    No Known Problems Mother     No Known Problems Father     Melanoma Daughter 17       Social History:  Social History     Socioeconomic History    Marital status: /Civil Union     Spouse name: Not on file    Number of children: 5    Years of education: Not on file    Highest education level: Not on file   Occupational History    Occupation: Retired: Construction   Tobacco Use    Smoking status: Never    Smokeless tobacco: Never   Vaping Use    Vaping status: Never Used   Substance and Sexual Activity    Alcohol use: No    Drug use: No    Sexual activity: Not Currently     Partners: Female   Other Topics Concern    Not on file   Social History Narrative    Uses safety equipment         Social Determinants of Health     Financial Resource Strain: Low Risk  (7/27/2023)    Overall Financial Resource Strain (CARDIA)     Difficulty of Paying Living Expenses: Not very hard   Food Insecurity: No Food Insecurity (8/5/2024)    Hunger Vital Sign     Worried About Running Out of Food in the Last Year: Never true     Ran Out of Food in the Last Year: Never true   Transportation Needs: No Transportation Needs (8/5/2024)    PRAPARE - Transportation     Lack of Transportation (Medical): No     Lack of Transportation (Non-Medical): No   Physical Activity: Not on file   Stress: Not on file   Social Connections: Not on file   Intimate Partner Violence: Not on file   Housing Stability: Low Risk  (8/5/2024)    Housing Stability Vital Sign     Unable to Pay for Housing in the Last Year: No     Number of Times  Moved in the Last Year: 1     Homeless in the Last Year: No       Objective     Vitals:    08/19/24 1057   BP: 126/74   Pulse: (!) 54   Resp: 17   Temp: (!) 95.8 °F (35.4 °C)   SpO2: 98%     Wt Readings from Last 3 Encounters:   08/19/24 73.9 kg (163 lb)   08/05/24 71.2 kg (157 lb)   08/01/24 70.8 kg (156 lb)       Physical Exam  Vitals and nursing note reviewed.   Constitutional:       Appearance: Normal appearance.   HENT:      Head: Normocephalic and atraumatic.      Right Ear: Tympanic membrane, ear canal and external ear normal.      Left Ear: Tympanic membrane, ear canal and external ear normal.      Nose: Nose normal.      Mouth/Throat:      Mouth: Mucous membranes are moist.   Eyes:      Conjunctiva/sclera: Conjunctivae normal.   Cardiovascular:      Rate and Rhythm: Normal rate and regular rhythm.      Heart sounds: Normal heart sounds.   Pulmonary:      Effort: Pulmonary effort is normal.      Breath sounds: Normal breath sounds.   Abdominal:      General: Bowel sounds are normal.   Musculoskeletal:      Cervical back: Normal range of motion and neck supple.      Left lower leg: Edema present.   Skin:     General: Skin is warm and dry.      Capillary Refill: Capillary refill takes less than 2 seconds.   Neurological:      Mental Status: He is alert and oriented to person, place, and time.   Psychiatric:         Mood and Affect: Mood normal.         Behavior: Behavior normal.         Pertinent Laboratory/Diagnostic Studies:  Lab Results   Component Value Date    GLUCOSE 121 10/28/2015    BUN 10 05/19/2024    CREATININE 0.61 05/19/2024    CALCIUM 8.8 05/19/2024     10/28/2015    K 3.4 (L) 05/19/2024    CO2 28 05/19/2024     05/19/2024     Lab Results   Component Value Date    ALT 8 05/17/2024    AST 11 (L) 05/17/2024    GGT 12 12/31/2022    ALKPHOS 72 05/17/2024    BILITOT 0.53 08/27/2015       Lab Results   Component Value Date    WBC 5.00 05/19/2024    HGB 11.4 (L) 05/19/2024    HCT 33.6 (L)  "05/19/2024    MCV 86 05/19/2024     (L) 05/19/2024       No results found for: \"TSH\"    Lab Results   Component Value Date    CHOL 154 08/27/2015     Lab Results   Component Value Date    TRIG 92 05/13/2023     Lab Results   Component Value Date    HDL 32 (L) 05/13/2023     Lab Results   Component Value Date    LDLCALC 65 05/13/2023     Lab Results   Component Value Date    HGBA1C 5.8 08/19/2024       Results for orders placed or performed in visit on 08/19/24   POCT hemoglobin A1c   Result Value Ref Range    Hemoglobin A1C 5.8 6.5       Orders Placed This Encounter   Procedures    Comprehensive metabolic panel    Lipid Panel with Direct LDL reflex    CBC and differential    POCT hemoglobin A1c       ALLERGIES:  No Known Allergies    Current Medications     Current Outpatient Medications   Medication Sig Dispense Refill    acetaminophen (TYLENOL) 325 mg tablet Take 2 tablets (650 mg total) by mouth every 6 (six) hours as needed for mild pain or fever 30 tablet 0    amLODIPine (NORVASC) 5 mg tablet TAKE 1 TABLET (5 MG TOTAL) BY MOUTH DAILY. 90 tablet 0    aspirin 81 MG tablet Take 81 mg by mouth daily      atorvastatin (LIPITOR) 10 mg tablet TAKE 1 TABLET BY MOUTH EVERY DAY 90 tablet 1    bisacodyl (DULCOLAX) 10 mg suppository Insert 1 suppository (10 mg total) into the rectum if needed for constipation (if no bowel movement despite miralax and lactulose use, can use suppository) 12 suppository 0    Blood Glucose Monitoring Suppl (OneTouch Verio Reflect) w/Device KIT Check blood sugars once daily. Please substitute with appropriate alternative as covered by patient's insurance. Dx: E11.65 1 kit 0    colchicine (COLCRYS) 0.6 mg tablet TAKE 1 TABLET BY MOUTH EVERY DAY 90 tablet 1    famotidine (PEPCID) 20 mg tablet TAKE 1 TABLET BY MOUTH TWICE A  tablet 1    gabapentin (Neurontin) 100 mg capsule Take 1 capsule (100 mg total) by mouth daily at bedtime 30 capsule 2    glucose blood (OneTouch Verio) test " strip CHECK BLOOD SUGARS ONCE DAILY. 100 strip 1    hydrocortisone 2.5 % cream APPLY TO AFFECTED AREA TWICE A DAY 28.35 g 0    lactulose (CHRONULAC) 10 g/15 mL solution Take 30 mL (20 g total) by mouth daily as needed (if no BM in 24 hours despite miralax, can take 20 g (30 ML) of lactulose as needed for constipation) DAILY AS NEEDED FOR SEVERE CONSTIPATION 473 mL 0    Lancets (OneTouch Delica Plus [id number]) MISC TEST DAILY, E11.5--PT CONFIMED DELICA 33 G 100 each 11    lisinopril (ZESTRIL) 20 mg tablet TAKE 1 TABLET BY MOUTH EVERY DAY 90 tablet 1    lubiprostone (AMITIZA) 24 mcg capsule Take 1 capsule (24 mcg total) by mouth 2 (two) times a day with meals 60 capsule 11    metFORMIN (GLUCOPHAGE) 500 mg tablet TAKE 1 TABLET BY MOUTH TWICE A  tablet 1    methocarbamol (ROBAXIN) 500 mg tablet TAKE 1 TABLET (500 MG TOTAL) BY MOUTH DAILY AT BEDTIME 30 tablet 0    omeprazole (PriLOSEC) 40 MG capsule Take 1 capsule (40 mg total) by mouth daily 90 capsule 1    OneTouch Delica Lancets 33G MISC Check blood sugars once daily. Please substitute with appropriate alternative as covered by patient's insurance. Dx: E11.65 100 each 3    polyethylene glycol (MIRALAX) 17 g packet Take miralax daily but can use up to 2 times per day as needed for constipation      senna-docusate sodium (SENOKOT S) 8.6-50 mg per tablet Take 2 tablets by mouth daily 60 tablet 3    tamsulosin (FLOMAX) 0.4 mg Take 1 capsule (0.4 mg total) by mouth daily with dinner 30 capsule 1     No current facility-administered medications for this visit.         Health Maintenance     Health Maintenance   Topic Date Due    Influenza Vaccine (1) 09/01/2024    COVID-19 Vaccine (5 - 2023-24 season) 10/28/2024 (Originally 9/1/2023)    Hepatitis B Vaccine (1 of 3 - Risk 3-dose series) 07/28/2025 (Originally 7/11/2006)    RSV Vaccine Age 60+ Years (1 - 1-dose 60+ series) 08/05/2025 (Originally 7/11/2006)    Zoster Vaccine (1 of 2) 08/05/2025 (Originally 7/11/1996)     Diabetic Foot Exam  01/22/2025    HEMOGLOBIN A1C  02/19/2025    Kidney Health Evaluation: Albumin/Creatinine Ratio  03/25/2025    Kidney Health Evaluation: GFR  05/19/2025    Fall Risk  08/05/2025    Depression Screening  08/05/2025    Medicare Annual Wellness Visit (AWV)  08/05/2025    DM Eye Exam  09/01/2025    Pneumococcal Vaccine: 65+ Years  Completed    RSV Vaccine age 0-20 Months  Aged Out    HIB Vaccine  Aged Out    IPV Vaccine  Aged Out    Hepatitis A Vaccine  Aged Out    Meningococcal ACWY Vaccine  Aged Out    HPV Vaccine  Aged Out    Hepatitis C Screening  Discontinued    Colorectal Cancer Screening  Discontinued     Immunization History   Administered Date(s) Administered    COVID-19 PFIZER VACCINE 0.3 ML IM 03/29/2021, 04/21/2021, 11/09/2021    COVID-19 Pfizer Vac BIVALENT Raphael-sucrose 12 Yr+ IM 11/21/2022    INFLUENZA 10/27/2015, 10/01/2016, 10/13/2016, 12/19/2018    Influenza Split High Dose Preservative Free IM 10/23/2014, 10/27/2015, 10/01/2016, 10/13/2016, 10/13/2016, 10/13/2016    Influenza, high dose seasonal 0.7 mL 12/19/2018, 10/14/2019, 10/05/2021    Pneumococcal Conjugate 13-Valent 08/25/2015, 01/19/2017, 10/14/2019    Pneumococcal Conjugate Vaccine 20-valent (Pcv20), Polysace 06/01/2023    Pneumococcal Polysaccharide PPV23 10/05/2021    Tdap 06/01/2023          ZAHRA Rice

## 2024-08-20 ENCOUNTER — HOSPITAL ENCOUNTER (OUTPATIENT)
Dept: NON INVASIVE DIAGNOSTICS | Facility: HOSPITAL | Age: 79
Discharge: HOME | End: 2024-08-20
Payer: COMMERCIAL

## 2024-08-20 DIAGNOSIS — R60.0 LEG EDEMA, LEFT: ICD-10-CM

## 2024-08-20 PROCEDURE — 93971 EXTREMITY STUDY: CPT

## 2024-08-21 PROBLEM — R60.0 LEG EDEMA, LEFT: Status: ACTIVE | Noted: 2024-08-21

## 2024-08-23 PROCEDURE — 93971 EXTREMITY STUDY: CPT | Performed by: SURGERY

## 2024-09-04 PROBLEM — H61.23 BILATERAL IMPACTED CERUMEN: Status: RESOLVED | Noted: 2020-09-16 | Resolved: 2024-09-04

## 2024-09-04 PROBLEM — Z00.00 MEDICARE ANNUAL WELLNESS VISIT, SUBSEQUENT: Status: RESOLVED | Noted: 2023-07-27 | Resolved: 2024-09-04

## 2024-09-12 ENCOUNTER — OFFICE VISIT (OUTPATIENT)
Dept: CARDIOLOGY CLINIC | Facility: CLINIC | Age: 79
End: 2024-09-12
Payer: COMMERCIAL

## 2024-09-12 VITALS
HEIGHT: 69 IN | OXYGEN SATURATION: 97 % | DIASTOLIC BLOOD PRESSURE: 52 MMHG | HEART RATE: 64 BPM | WEIGHT: 161 LBS | SYSTOLIC BLOOD PRESSURE: 104 MMHG | BODY MASS INDEX: 23.85 KG/M2

## 2024-09-12 DIAGNOSIS — E78.5 DYSLIPIDEMIA: ICD-10-CM

## 2024-09-12 DIAGNOSIS — R07.89 OTHER CHEST PAIN: ICD-10-CM

## 2024-09-12 DIAGNOSIS — I10 PRIMARY HYPERTENSION: Primary | ICD-10-CM

## 2024-09-12 PROCEDURE — 99214 OFFICE O/P EST MOD 30 MIN: CPT | Performed by: INTERNAL MEDICINE

## 2024-09-12 NOTE — PATIENT INSTRUCTIONS
Recommendations:  1. Discontinue amlodipine.  2. Discontinue colchicine.  3. Continue remainder of medications.  4. Check fasting lipid panel and complete metabolic profile.  5. Follow up in 3 months.

## 2024-09-12 NOTE — PROGRESS NOTES
Cardiology   Zaira Morley 79 y.o. male MRN: [id number]        IImpression:  1. Chest pain - atypical.  Unclear etiology. Does not appear coronary in etiology.   2. Hypertension - now occasionally lightheaded.   3. Dyslipidemia - on statin.   4. Palpitations - resolved.      Recommendations:  1. Discontinue amlodipine.  2. Discontinue colchicine.  3. Continue remainder of medications.  4. Check fasting lipid panel and complete metabolic profile.  5. Follow up in 3 months.         HPI: Zaira Morley is a 79 y.o. year old male with meningioma, chest pain, GERD, hypertension, who presents for follow up after 3 years. Pharm stress test 2022 - no ischemia. Echo 2022 - normal cardiac function, mild AI/MR. Has chest pain every other day.  Lasts for several minutes.  Dull.  No shortness of breath or palpitations. Has LLE edema.        Review of Systems   Constitutional: Negative.    HENT: Negative.     Eyes: Negative.    Respiratory:  Negative for chest tightness and shortness of breath.    Cardiovascular:  Positive for chest pain and leg swelling. Negative for palpitations.   Gastrointestinal: Negative.    Endocrine: Negative.    Genitourinary: Negative.    Musculoskeletal: Negative.    Skin: Negative.    Allergic/Immunologic: Negative.    Neurological: Negative.    Hematological: Negative.    Psychiatric/Behavioral: Negative.     All other systems reviewed and are negative.        Past Medical History:   Diagnosis Date    Brain mass     Last Assessed; 11/5/2015    Chest pain     Last Assessed: 1/22/2015    Diabetes type 2, uncontrolled     Last Assessed: 3/1/2016    Edema of left lower extremity     Last Assessed: 3/22/2017    GERD (gastroesophageal reflux disease)     Hyperlipidemia     Hypertension     Impaired fasting glucose     Last Assessed: 11/10/2015    Irregular heartbeat     Skin cancer (melanoma) (HCC)     Scalp     Varicose 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     Last Assessed: 3/22/2017     Past Surgical  History:   Procedure Laterality Date    COLONOSCOPY      COLONOSCOPY N/A 7/10/2018    Procedure: COLONOSCOPY;  Surgeon: Jose Alejandro Rodrigues MD;  Location: [facility];  Service: Colorectal    INGUINAL HERNIA REPAIR      20+ years ago - [place]; Last Assessed: 10/23/2014    TONSILLECTOMY      VARICOSE VEIN SURGERY Left     x2 left leg - 40+ yrs ago, [place] and [place]     Social History     Substance and Sexual Activity   Alcohol Use No     Social History     Substance and Sexual Activity   Drug Use No     Social History     Tobacco Use   Smoking Status Never   Smokeless Tobacco Never     Family History   Problem Relation Age of Onset    No Known Problems Mother     No Known Problems Father     Melanoma Daughter 17       Allergies:  No Known Allergies    Medications:     Current Outpatient Medications:     acetaminophen (TYLENOL) 325 mg tablet, Take 2 tablets (650 mg total) by mouth every 6 (six) hours as needed for mild pain or fever, Disp: 30 tablet, Rfl: 0    amLODIPine (NORVASC) 5 mg tablet, TAKE 1 TABLET (5 MG TOTAL) BY MOUTH DAILY., Disp: 90 tablet, Rfl: 0    aspirin 81 MG tablet, Take 81 mg by mouth daily, Disp: , Rfl:     atorvastatin (LIPITOR) 10 mg tablet, TAKE 1 TABLET BY MOUTH EVERY DAY, Disp: 90 tablet, Rfl: 1    bisacodyl (DULCOLAX) 10 mg suppository, Insert 1 suppository (10 mg total) into the rectum if needed for constipation (if no bowel movement despite miralax and lactulose use, can use suppository), Disp: 12 suppository, Rfl: 0    Blood Glucose Monitoring Suppl (OneTouch Verio Reflect) w/Device KIT, Check blood sugars once daily. Please substitute with appropriate alternative as covered by patient's insurance. Dx: E11.65, Disp: 1 kit, Rfl: 0    colchicine (COLCRYS) 0.6 mg tablet, TAKE 1 TABLET BY MOUTH EVERY DAY, Disp: 90 tablet, Rfl: 1    famotidine (PEPCID) 20 mg tablet, TAKE 1 TABLET BY MOUTH TWICE A DAY, Disp: 180 tablet, Rfl: 1    gabapentin (Neurontin) 100 mg capsule, Take 1 capsule (100 mg total) by  mouth daily at bedtime, Disp: 30 capsule, Rfl: 2    glucose blood (OneTouch Verio) test strip, CHECK BLOOD SUGARS ONCE DAILY., Disp: 100 strip, Rfl: 1    hydrocortisone 2.5 % cream, APPLY TO AFFECTED AREA TWICE A DAY, Disp: 28.35 g, Rfl: 0    lactulose (CHRONULAC) 10 g/15 mL solution, Take 30 mL (20 g total) by mouth daily as needed (if no BM in 24 hours despite miralax, can take 20 g (30 ML) of lactulose as needed for constipation) DAILY AS NEEDED FOR SEVERE CONSTIPATION, Disp: 473 mL, Rfl: 0    Lancets (OneTouch Delica Plus [id number]) MISC, TEST DAILY, E11.5--PT CONFIMED DELICA 33 G, Disp: 100 each, Rfl: 11    lisinopril (ZESTRIL) 20 mg tablet, TAKE 1 TABLET BY MOUTH EVERY DAY, Disp: 90 tablet, Rfl: 1    lubiprostone (AMITIZA) 24 mcg capsule, Take 1 capsule (24 mcg total) by mouth 2 (two) times a day with meals, Disp: 60 capsule, Rfl: 11    metFORMIN (GLUCOPHAGE) 500 mg tablet, TAKE 1 TABLET BY MOUTH TWICE A DAY, Disp: 180 tablet, Rfl: 1    methocarbamol (ROBAXIN) 500 mg tablet, TAKE 1 TABLET (500 MG TOTAL) BY MOUTH DAILY AT BEDTIME, Disp: 30 tablet, Rfl: 0    omeprazole (PriLOSEC) 40 MG capsule, Take 1 capsule (40 mg total) by mouth daily, Disp: 90 capsule, Rfl: 1    OneTouch Delica Lancets 33G MISC, Check blood sugars once daily. Please substitute with appropriate alternative as covered by patient's insurance. Dx: E11.65, Disp: 100 each, Rfl: 3    polyethylene glycol (MIRALAX) 17 g packet, Take miralax daily but can use up to 2 times per day as needed for constipation, Disp: , Rfl:     senna-docusate sodium (SENOKOT S) 8.6-50 mg per tablet, Take 2 tablets by mouth daily, Disp: 60 tablet, Rfl: 3    tamsulosin (FLOMAX) 0.4 mg, Take 1 capsule (0.4 mg total) by mouth daily with dinner (Patient not taking: Reported on 9/12/2024), Disp: 30 capsule, Rfl: 1      Wt Readings from Last 3 Encounters:   09/12/24 73 kg (161 lb)   08/19/24 73.9 kg (163 lb)   08/05/24 71.2 kg (157 lb)     Temp Readings from Last 3 Encounters:    24 (!) 95.8 °F (35.4 °C) (Tympanic)   24 97.5 °F (36.4 °C) (Tympanic)   24 97.8 °F (36.6 °C) (Temporal)     BP Readings from Last 3 Encounters:   24 104/52   24 126/74   24 120/70     Pulse Readings from Last 3 Encounters:   24 64   24 (!) 54   24 65         Physical Exam  HENT:      Head: Atraumatic.      Mouth/Throat:      Mouth: Mucous membranes are moist.   Eyes:      Extraocular Movements: Extraocular movements intact.   Cardiovascular:      Rate and Rhythm: Normal rate and regular rhythm.      Heart sounds: Normal heart sounds.   Pulmonary:      Effort: Pulmonary effort is normal.      Breath sounds: Normal breath sounds.   Abdominal:      General: Abdomen is flat.   Musculoskeletal:      Cervical back: Normal range of motion.      Left lower le+ Edema present.   Skin:     General: Skin is warm.   Neurological:      General: No focal deficit present.      Mental Status: He is alert and oriented to person, place, and time.   Psychiatric:         Mood and Affect: Mood normal.           Laboratory Studies:  CMP:  Lab Results   Component Value Date     10/28/2015    K 3.4 (L) 2024     2024    CO2 28 2024    ANIONGAP 6 10/28/2015    BUN 10 2024    CREATININE 0.61 2024    GLUCOSE 121 10/28/2015    AST 11 (L) 2024    ALT 8 2024    BILITOT 0.53 2015    EGFR 96 2024       Lipid Profile:   Lab Results   Component Value Date    CHOL 154 2015     Lab Results   Component Value Date    HDL 32 (L) 2023     Lab Results   Component Value Date    LDLCALC 65 2023     Lab Results   Component Value Date    TRIG 92 2023       Cardiac testing:   EKG reviewed personally:   No results found for this or any previous visit.    No results found for this or any previous visit.    No results found for this or any previous visit.    No results found for this or any previous visit.

## 2024-09-13 DIAGNOSIS — E78.49 OTHER HYPERLIPIDEMIA: ICD-10-CM

## 2024-09-13 DIAGNOSIS — K21.9 GASTROESOPHAGEAL REFLUX DISEASE WITHOUT ESOPHAGITIS: ICD-10-CM

## 2024-09-16 ENCOUNTER — OFFICE VISIT (OUTPATIENT)
Dept: FAMILY MEDICINE CLINIC | Facility: CLINIC | Age: 79
End: 2024-09-16
Payer: COMMERCIAL

## 2024-09-16 ENCOUNTER — APPOINTMENT (OUTPATIENT)
Dept: LAB | Facility: CLINIC | Age: 79
End: 2024-09-16
Payer: COMMERCIAL

## 2024-09-16 VITALS
SYSTOLIC BLOOD PRESSURE: 118 MMHG | OXYGEN SATURATION: 98 % | BODY MASS INDEX: 23.99 KG/M2 | WEIGHT: 162 LBS | RESPIRATION RATE: 17 BRPM | DIASTOLIC BLOOD PRESSURE: 70 MMHG | TEMPERATURE: 97.7 F | HEIGHT: 69 IN | HEART RATE: 62 BPM

## 2024-09-16 DIAGNOSIS — I10 PRIMARY HYPERTENSION: Primary | ICD-10-CM

## 2024-09-16 DIAGNOSIS — E78.5 DYSLIPIDEMIA: ICD-10-CM

## 2024-09-16 DIAGNOSIS — K59.09 CHRONIC CONSTIPATION: ICD-10-CM

## 2024-09-16 DIAGNOSIS — Z01.818 PRE-PROCEDURAL EXAMINATION: ICD-10-CM

## 2024-09-16 DIAGNOSIS — E11.36 TYPE 2 DIABETES MELLITUS WITH DIABETIC CATARACT, WITHOUT LONG-TERM CURRENT USE OF INSULIN (HCC): ICD-10-CM

## 2024-09-16 DIAGNOSIS — R60.0 LEG EDEMA, LEFT: ICD-10-CM

## 2024-09-16 DIAGNOSIS — I10 PRIMARY HYPERTENSION: ICD-10-CM

## 2024-09-16 LAB
ALBUMIN SERPL BCG-MCNC: 4.1 G/DL (ref 3.5–5)
ALP SERPL-CCNC: 77 U/L (ref 34–104)
ALT SERPL W P-5'-P-CCNC: 7 U/L (ref 7–52)
ANION GAP SERPL CALCULATED.3IONS-SCNC: 3 MMOL/L (ref 4–13)
AST SERPL W P-5'-P-CCNC: 10 U/L (ref 13–39)
BASOPHILS # BLD AUTO: 0.02 THOUSANDS/ΜL (ref 0–0.1)
BASOPHILS NFR BLD AUTO: 1 % (ref 0–1)
BILIRUB SERPL-MCNC: 0.6 MG/DL (ref 0.2–1)
BUN SERPL-MCNC: 16 MG/DL (ref 5–25)
CALCIUM SERPL-MCNC: 9.2 MG/DL (ref 8.4–10.2)
CHLORIDE SERPL-SCNC: 106 MMOL/L (ref 96–108)
CHOLEST SERPL-MCNC: 135 MG/DL
CO2 SERPL-SCNC: 32 MMOL/L (ref 21–32)
CREAT SERPL-MCNC: 0.7 MG/DL (ref 0.6–1.3)
EOSINOPHIL # BLD AUTO: 0.13 THOUSAND/ΜL (ref 0–0.61)
EOSINOPHIL NFR BLD AUTO: 4 % (ref 0–6)
ERYTHROCYTE [DISTWIDTH] IN BLOOD BY AUTOMATED COUNT: 13.2 % (ref 11.6–15.1)
GFR SERPL CREATININE-BSD FRML MDRD: 90 ML/MIN/1.73SQ M
GLUCOSE P FAST SERPL-MCNC: 110 MG/DL (ref 65–99)
HCT VFR BLD AUTO: 37.7 % (ref 36.5–49.3)
HDLC SERPL-MCNC: 54 MG/DL
HGB BLD-MCNC: 12.1 G/DL (ref 12–17)
IMM GRANULOCYTES # BLD AUTO: 0.01 THOUSAND/UL (ref 0–0.2)
IMM GRANULOCYTES NFR BLD AUTO: 0 % (ref 0–2)
LDLC SERPL CALC-MCNC: 70 MG/DL (ref 0–100)
LYMPHOCYTES # BLD AUTO: 1.08 THOUSANDS/ΜL (ref 0.6–4.47)
LYMPHOCYTES NFR BLD AUTO: 31 % (ref 14–44)
MCH RBC QN AUTO: 28.7 PG (ref 26.8–34.3)
MCHC RBC AUTO-ENTMCNC: 32.1 G/DL (ref 31.4–37.4)
MCV RBC AUTO: 90 FL (ref 82–98)
MONOCYTES # BLD AUTO: 0.45 THOUSAND/ΜL (ref 0.17–1.22)
MONOCYTES NFR BLD AUTO: 13 % (ref 4–12)
NEUTROPHILS # BLD AUTO: 1.84 THOUSANDS/ΜL (ref 1.85–7.62)
NEUTS SEG NFR BLD AUTO: 51 % (ref 43–75)
NRBC BLD AUTO-RTO: 0 /100 WBCS
PLATELET # BLD AUTO: 140 THOUSANDS/UL (ref 149–390)
PMV BLD AUTO: 9.8 FL (ref 8.9–12.7)
POTASSIUM SERPL-SCNC: 4.7 MMOL/L (ref 3.5–5.3)
PROT SERPL-MCNC: 6.7 G/DL (ref 6.4–8.4)
RBC # BLD AUTO: 4.21 MILLION/UL (ref 3.88–5.62)
SODIUM SERPL-SCNC: 141 MMOL/L (ref 135–147)
TRIGL SERPL-MCNC: 53 MG/DL
WBC # BLD AUTO: 3.53 THOUSAND/UL (ref 4.31–10.16)

## 2024-09-16 PROCEDURE — 99213 OFFICE O/P EST LOW 20 MIN: CPT | Performed by: NURSE PRACTITIONER

## 2024-09-16 PROCEDURE — 85025 COMPLETE CBC W/AUTO DIFF WBC: CPT

## 2024-09-16 PROCEDURE — 80053 COMPREHEN METABOLIC PANEL: CPT

## 2024-09-16 PROCEDURE — 80061 LIPID PANEL: CPT

## 2024-09-16 PROCEDURE — 36415 COLL VENOUS BLD VENIPUNCTURE: CPT

## 2024-09-16 RX ORDER — ATORVASTATIN CALCIUM 10 MG/1
10 TABLET, FILM COATED ORAL DAILY
Qty: 90 TABLET | Refills: 0 | Status: SHIPPED | OUTPATIENT
Start: 2024-09-16

## 2024-09-16 RX ORDER — OMEPRAZOLE 40 MG/1
40 CAPSULE, DELAYED RELEASE ORAL DAILY
Qty: 90 CAPSULE | Refills: 1 | Status: SHIPPED | OUTPATIENT
Start: 2024-09-16

## 2024-09-16 NOTE — TELEPHONE ENCOUNTER
Patient needs updated blood work and was given labs 09.12.24 (lipid panel) -   Atorvastatin 90D courtesy supply provided to allow enough time for patient to have labs completed.

## 2024-09-16 NOTE — PROGRESS NOTES
FAMILY PRACTICE OFFICE VISIT       NAME: Zaira Morley  AGE: 79 y.o. SEX: male       : 1945        MRN: [id number]    DATE: 10/5/2024  TIME: 9:51 AM    Assessment and Plan   1. Primary hypertension  Assessment & Plan:  Stable  Cont meds    Orders:  -     POCT ECG  2. Chronic constipation  Assessment & Plan:  Make sure to take amitiza daily  Take lactulose today 20g         There are no Patient Instructions on file for this visit.        Chief Complaint     Chief Complaint   Patient presents with    GI Problem     Pt being seen for GI issues       History of Present Illness   Zaira Morley is a 79 y.o.-year-old male who is here for c/o constipation  On and off  Does not always take his meds for constipation  Bp stable on meds      Review of Systems   Review of Systems   Constitutional:  Negative for fatigue and fever.   Respiratory:  Negative for cough, shortness of breath and wheezing.    Cardiovascular:  Negative for chest pain and palpitations.   Gastrointestinal:  Positive for abdominal pain and constipation. Negative for diarrhea and nausea.   Genitourinary:  Negative for dysuria and frequency.   Musculoskeletal:  Negative for arthralgias and myalgias.   Skin:  Negative for rash.   Neurological:  Negative for dizziness, light-headedness and headaches.   Hematological:  Negative for adenopathy.   Psychiatric/Behavioral:  Negative for dysphoric mood and sleep disturbance. The patient is not nervous/anxious.        Active Problem List     Patient Active Problem List   Diagnosis    Brain tumor (HCC)    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HCC)    Chronic constipation    Episodic cluster headache, not intractable    GERD (gastroesophageal reflux disease)    Nonintractable headache    Hypertension    Meningioma (HCC)    Varicose 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    Spondylolisthesis at L5-S1 level    Other chest pain    Renal cyst    History of hepatitis C    Scalp lesion    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HCC)     Primary osteoarthritis of both knees    Type 2 diabetes mellitus with diabetic cataract, without long-term current use of insulin (HCC)    Blurry vision, bilateral    Other constipation    Weight loss, abnormal    History of colon polyps    Memory problem    Decreased hearing of both ears    Constipation    Leg edema, left    Dyslipidemia         Past Medical History:  Past Medical History:   Diagnosis Date    Brain mass     Last Assessed; 11/5/2015    Chest pain     Last Assessed: 1/22/2015    Diabetes type 2, uncontrolled     Last Assessed: 3/1/2016    Edema of left lower extremity     Last Assessed: 3/22/2017    GERD (gastroesophageal reflux disease)     Hyperlipidemia     Hypertension     Impaired fasting glucose     Last Assessed: 11/10/2015    Irregular heartbeat     Skin cancer (melanoma) (HCC)     Scalp     Varicose 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     Last Assessed: 3/22/2017       Past Surgical History:  Past Surgical History:   Procedure Laterality Date    COLONOSCOPY      COLONOSCOPY N/A 7/10/2018    Procedure: COLONOSCOPY;  Surgeon: Jose Alejandro Rodrigues MD;  Location:  [facility];  Service: Colorectal    INGUINAL HERNIA REPAIR      20+ years ago - [place]; Last Assessed: 10/23/2014    TONSILLECTOMY      VARICOSE VEIN SURGERY Left     x2 left leg - 40+ yrs ago, [place] and [place]       Family History:  Family History   Problem Relation Age of Onset    No Known Problems Mother     No Known Problems Father     Melanoma Daughter 17       Social History:  Social History     Socioeconomic History    Marital status: /Civil Union     Spouse name: Not on file    Number of children: 5    Years of education: Not on file    Highest education level: Not on file   Occupational History    Occupation: Retired: Construction   Tobacco Use    Smoking status: Never    Smokeless tobacco: Never   Vaping Use    Vaping status: Never Used   Substance and Sexual Activity    Alcohol use: No    Drug use: No    Sexual activity:  Not Currently     Partners: Female   Other Topics Concern    Not on file   Social History Narrative    Uses safety equipment         Social Determinants of Health     Financial Resource Strain: Low Risk  (7/27/2023)    Overall Financial Resource Strain (CARDIA)     Difficulty of Paying Living Expenses: Not very hard   Food Insecurity: No Food Insecurity (8/5/2024)    Hunger Vital Sign     Worried About Running Out of Food in the Last Year: Never true     Ran Out of Food in the Last Year: Never true   Transportation Needs: No Transportation Needs (8/5/2024)    PRAPARE - Transportation     Lack of Transportation (Medical): No     Lack of Transportation (Non-Medical): No   Physical Activity: Not on file   Stress: Not on file   Social Connections: Not on file   Intimate Partner Violence: Not on file   Housing Stability: Low Risk  (8/5/2024)    Housing Stability Vital Sign     Unable to Pay for Housing in the Last Year: No     Number of Times Moved in the Last Year: 1     Homeless in the Last Year: No       Objective     Vitals:    09/16/24 1606   BP: 118/70   Pulse: 62   Resp: 17   Temp: 97.7 °F (36.5 °C)   SpO2: 98%     Wt Readings from Last 3 Encounters:   09/16/24 73.5 kg (162 lb)   09/12/24 73 kg (161 lb)   08/19/24 73.9 kg (163 lb)       Physical Exam  Vitals and nursing note reviewed.   Constitutional:       Appearance: Normal appearance.   HENT:      Head: Normocephalic and atraumatic.   Eyes:      Conjunctiva/sclera: Conjunctivae normal.   Cardiovascular:      Rate and Rhythm: Normal rate and regular rhythm.      Heart sounds: Normal heart sounds.   Pulmonary:      Effort: Pulmonary effort is normal.      Breath sounds: Normal breath sounds.   Abdominal:      General: Bowel sounds are normal.      Palpations: Abdomen is soft.   Musculoskeletal:         General: Normal range of motion.      Cervical back: Normal range of motion and neck supple.   Skin:     General: Skin is warm and dry.      Capillary Refill:  "Capillary refill takes less than 2 seconds.   Neurological:      General: No focal deficit present.      Mental Status: He is alert and oriented to person, place, and time.   Psychiatric:         Mood and Affect: Mood normal.         Behavior: Behavior normal.         Thought Content: Thought content normal.         Judgment: Judgment normal.         Pertinent Laboratory/Diagnostic Studies:  Lab Results   Component Value Date    GLUCOSE 121 10/28/2015    BUN 16 09/16/2024    CREATININE 0.70 09/16/2024    CALCIUM 9.2 09/16/2024     10/28/2015    K 4.7 09/16/2024    CO2 32 09/16/2024     09/16/2024     Lab Results   Component Value Date    ALT 7 09/16/2024    AST 10 (L) 09/16/2024    GGT 12 12/31/2022    ALKPHOS 77 09/16/2024    BILITOT 0.53 08/27/2015       Lab Results   Component Value Date    WBC 3.53 (L) 09/16/2024    HGB 12.1 09/16/2024    HCT 37.7 09/16/2024    MCV 90 09/16/2024     (L) 09/16/2024       No results found for: \"TSH\"    Lab Results   Component Value Date    CHOL 154 08/27/2015     Lab Results   Component Value Date    TRIG 53 09/16/2024     Lab Results   Component Value Date    HDL 54 09/16/2024     Lab Results   Component Value Date    LDLCALC 70 09/16/2024     Lab Results   Component Value Date    HGBA1C 5.8 08/19/2024       Results for orders placed or performed in visit on 09/16/24   Comprehensive metabolic panel    Collection Time: 09/16/24 10:15 AM   Result Value Ref Range    Sodium 141 135 - 147 mmol/L    Potassium 4.7 3.5 - 5.3 mmol/L    Chloride 106 96 - 108 mmol/L    CO2 32 21 - 32 mmol/L    ANION GAP 3 (L) 4 - 13 mmol/L    BUN 16 5 - 25 mg/dL    Creatinine 0.70 0.60 - 1.30 mg/dL    Glucose, Fasting 110 (H) 65 - 99 mg/dL    Calcium 9.2 8.4 - 10.2 mg/dL    AST 10 (L) 13 - 39 U/L    ALT 7 7 - 52 U/L    Alkaline Phosphatase 77 34 - 104 U/L    Total Protein 6.7 6.4 - 8.4 g/dL    Albumin 4.1 3.5 - 5.0 g/dL    Total Bilirubin 0.60 0.20 - 1.00 mg/dL    eGFR 90 ml/min/1.73sq m "   Lipid Panel with Direct LDL reflex    Collection Time: 09/16/24 10:15 AM   Result Value Ref Range    Cholesterol 135 See Comment mg/dL    Triglycerides 53 See Comment mg/dL    HDL, Direct 54 >=40 mg/dL    LDL Calculated 70 0 - 100 mg/dL   CBC and differential    Collection Time: 09/16/24 10:15 AM   Result Value Ref Range    WBC 3.53 (L) 4.31 - 10.16 Thousand/uL    RBC 4.21 3.88 - 5.62 Million/uL    Hemoglobin 12.1 12.0 - 17.0 g/dL    Hematocrit 37.7 36.5 - 49.3 %    MCV 90 82 - 98 fL    MCH 28.7 26.8 - 34.3 pg    MCHC 32.1 31.4 - 37.4 g/dL    RDW 13.2 11.6 - 15.1 %    MPV 9.8 8.9 - 12.7 fL    Platelets 140 (L) 149 - 390 Thousands/uL    nRBC 0 /100 WBCs    Segmented % 51 43 - 75 %    Immature Grans % 0 0 - 2 %    Lymphocytes % 31 14 - 44 %    Monocytes % 13 (H) 4 - 12 %    Eosinophils Relative 4 0 - 6 %    Basophils Relative 1 0 - 1 %    Absolute Neutrophils 1.84 (L) 1.85 - 7.62 Thousands/µL    Absolute Immature Grans 0.01 0.00 - 0.20 Thousand/uL    Absolute Lymphocytes 1.08 0.60 - 4.47 Thousands/µL    Absolute Monocytes 0.45 0.17 - 1.22 Thousand/µL    Eosinophils Absolute 0.13 0.00 - 0.61 Thousand/µL    Basophils Absolute 0.02 0.00 - 0.10 Thousands/µL       Orders Placed This Encounter   Procedures    POCT ECG       ALLERGIES:  No Known Allergies    Current Medications     Current Outpatient Medications   Medication Sig Dispense Refill    acetaminophen (TYLENOL) 325 mg tablet Take 2 tablets (650 mg total) by mouth every 6 (six) hours as needed for mild pain or fever 30 tablet 0    aspirin 81 MG tablet Take 81 mg by mouth daily      atorvastatin (LIPITOR) 10 mg tablet TAKE 1 TABLET BY MOUTH EVERY DAY 90 tablet 0    bisacodyl (DULCOLAX) 10 mg suppository Insert 1 suppository (10 mg total) into the rectum if needed for constipation (if no bowel movement despite miralax and lactulose use, can use suppository) 12 suppository 0    Blood Glucose Monitoring Suppl (OneTouch Verio Reflect) w/Device KIT Check blood sugars  once daily. Please substitute with appropriate alternative as covered by patient's insurance. Dx: E11.65 1 kit 0    famotidine (PEPCID) 20 mg tablet TAKE 1 TABLET BY MOUTH TWICE A  tablet 1    gabapentin (Neurontin) 100 mg capsule Take 1 capsule (100 mg total) by mouth daily at bedtime 30 capsule 2    glucose blood (OneTouch Verio) test strip CHECK BLOOD SUGARS ONCE DAILY. 100 strip 1    hydrocortisone 2.5 % cream APPLY TO AFFECTED AREA TWICE A DAY 28.35 g 0    lactulose (CHRONULAC) 10 g/15 mL solution Take 30 mL (20 g total) by mouth daily as needed (if no BM in 24 hours despite miralax, can take 20 g (30 ML) of lactulose as needed for constipation) DAILY AS NEEDED FOR SEVERE CONSTIPATION 473 mL 0    Lancets (OneTouch Delica Plus [id number]) MISC TEST DAILY, E11.5--PT CONFIMED DELICA 33 G 100 each 11    lisinopril (ZESTRIL) 20 mg tablet TAKE 1 TABLET BY MOUTH EVERY DAY 90 tablet 1    lubiprostone (AMITIZA) 24 mcg capsule Take 1 capsule (24 mcg total) by mouth 2 (two) times a day with meals 60 capsule 11    metFORMIN (GLUCOPHAGE) 500 mg tablet TAKE 1 TABLET BY MOUTH TWICE A  tablet 1    methocarbamol (ROBAXIN) 500 mg tablet TAKE 1 TABLET (500 MG TOTAL) BY MOUTH DAILY AT BEDTIME 30 tablet 0    omeprazole (PriLOSEC) 40 MG capsule TAKE 1 CAPSULE (40 MG TOTAL) BY MOUTH DAILY. 90 capsule 1    OneTouch Delica Lancets 33G MISC Check blood sugars once daily. Please substitute with appropriate alternative as covered by patient's insurance. Dx: E11.65 100 each 3    polyethylene glycol (MIRALAX) 17 g packet Take miralax daily but can use up to 2 times per day as needed for constipation      senna-docusate sodium (SENOKOT S) 8.6-50 mg per tablet Take 2 tablets by mouth daily 60 tablet 3    tamsulosin (FLOMAX) 0.4 mg Take 1 capsule (0.4 mg total) by mouth daily with dinner (Patient not taking: Reported on 9/12/2024) 30 capsule 1     No current facility-administered medications for this visit.         Health  Maintenance     Health Maintenance   Topic Date Due    Influenza Vaccine (1) 09/01/2024    COVID-19 Vaccine (5 - 2023-24 season) 09/01/2024    Hepatitis B Vaccine (1 of 3 - Risk 3-dose series) 07/28/2025 (Originally 7/11/2006)    RSV Vaccine Age 60+ Years (1 - 1-dose 60+ series) 08/05/2025 (Originally 7/11/2006)    Zoster Vaccine (1 of 2) 08/05/2025 (Originally 7/11/1996)    Diabetic Foot Exam  01/22/2025    HEMOGLOBIN A1C  02/19/2025    Kidney Health Evaluation: Albumin/Creatinine Ratio  03/25/2025    Fall Risk  08/05/2025    Depression Screening  08/05/2025    Medicare Annual Wellness Visit (AWV)  08/05/2025    DM Eye Exam  09/01/2025    Kidney Health Evaluation: GFR  09/16/2025    Pneumococcal Vaccine: 65+ Years  Completed    RSV Vaccine age 0-20 Months  Aged Out    HIB Vaccine  Aged Out    IPV Vaccine  Aged Out    Hepatitis A Vaccine  Aged Out    Meningococcal ACWY Vaccine  Aged Out    HPV Vaccine  Aged Out    Hepatitis C Screening  Discontinued    Colorectal Cancer Screening  Discontinued     Immunization History   Administered Date(s) Administered    COVID-19 PFIZER VACCINE 0.3 ML IM 03/29/2021, 04/21/2021, 11/09/2021    COVID-19 Pfizer Vac BIVALENT Raphael-sucrose 12 Yr+ IM 11/21/2022    INFLUENZA 10/27/2015, 10/01/2016, 10/13/2016, 12/19/2018    Influenza Split High Dose Preservative Free IM 10/23/2014, 10/27/2015, 10/01/2016, 10/13/2016, 10/13/2016, 10/13/2016    Influenza, high dose seasonal 0.7 mL 12/19/2018, 10/14/2019, 10/05/2021    Pneumococcal Conjugate 13-Valent 08/25/2015, 01/19/2017, 10/14/2019    Pneumococcal Conjugate Vaccine 20-valent (Pcv20), Polysace 06/01/2023    Pneumococcal Polysaccharide PPV23 10/05/2021    Tdap 06/01/2023          ZAHRA Rice

## 2024-10-05 PROCEDURE — 93000 ELECTROCARDIOGRAM COMPLETE: CPT | Performed by: NURSE PRACTITIONER

## 2024-10-23 DIAGNOSIS — K59.09 CHRONIC CONSTIPATION: ICD-10-CM

## 2024-10-24 RX ORDER — AMOXICILLIN 250 MG
2 CAPSULE ORAL DAILY
Qty: 60 TABLET | Refills: 0 | Status: SHIPPED | OUTPATIENT
Start: 2024-10-24

## 2024-10-28 ENCOUNTER — RA CDI HCC (OUTPATIENT)
Dept: OTHER | Facility: HOSPITAL | Age: 79
End: 2024-10-28

## 2024-10-31 ENCOUNTER — OFFICE VISIT (OUTPATIENT)
Dept: FAMILY MEDICINE CLINIC | Facility: CLINIC | Age: 79
End: 2024-10-31
Payer: COMMERCIAL

## 2024-10-31 VITALS
HEIGHT: 69 IN | TEMPERATURE: 94.8 F | DIASTOLIC BLOOD PRESSURE: 64 MMHG | WEIGHT: 161.4 LBS | HEART RATE: 54 BPM | OXYGEN SATURATION: 99 % | SYSTOLIC BLOOD PRESSURE: 124 MMHG | RESPIRATION RATE: 16 BRPM | BODY MASS INDEX: 23.91 KG/M2

## 2024-10-31 DIAGNOSIS — H61.23 BILATERAL IMPACTED CERUMEN: Primary | ICD-10-CM

## 2024-10-31 DIAGNOSIS — H91.90 HARD OF HEARING: ICD-10-CM

## 2024-10-31 DIAGNOSIS — E11.9 TYPE 2 DIABETES MELLITUS WITHOUT COMPLICATION, WITHOUT LONG-TERM CURRENT USE OF INSULIN (HCC): ICD-10-CM

## 2024-10-31 PROCEDURE — 99213 OFFICE O/P EST LOW 20 MIN: CPT | Performed by: NURSE PRACTITIONER

## 2024-10-31 PROCEDURE — 69210 REMOVE IMPACTED EAR WAX UNI: CPT | Performed by: NURSE PRACTITIONER

## 2024-10-31 NOTE — PROGRESS NOTES
FAMILY PRACTICE OFFICE VISIT       NAME: Zaira Morley  AGE: 79 y.o. SEX: male       : 1945        MRN: [id number]    DATE: 10/31/2024  TIME: 9:32 AM    Assessment and Plan   1. Bilateral impacted cerumen  Assessment & Plan:  Patient tolerated well    Orders:  -     Ear cerumen removal  2. Hard of hearing       There are no Patient Instructions on file for this visit.        Chief Complaint     Chief Complaint   Patient presents with    Ear Fullness     Patient being seen for ear fullness and hard to hear       History of Present Illness   Zaira Morley is a 79 y.o.-year-old male who is here for c/o wax in ear and trouble with their hearing      Review of Systems   Review of Systems   Constitutional:  Negative for diaphoresis and fatigue.   HENT:  Positive for hearing loss. Negative for congestion, postnasal drip and rhinorrhea.    Respiratory:  Negative for cough, shortness of breath and wheezing.    Cardiovascular:  Negative for chest pain and palpitations.   Musculoskeletal:  Negative for arthralgias and myalgias.   Skin:  Negative for rash.   Neurological:  Positive for headaches. Negative for light-headedness and numbness.   Hematological:  Negative for adenopathy.   Psychiatric/Behavioral:  Negative for dysphoric mood and sleep disturbance. The patient is not nervous/anxious.        Active Problem List     Patient Active Problem List   Diagnosis    Brain tumor (HCC)    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HCC)    Chronic constipation    Episodic cluster headache, not intractable    GERD (gastroesophageal reflux disease)    Nonintractable headache    Hypertension    Meningioma (HCC)    Varicose 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    Spondylolisthesis at L5-S1 level    Other chest pain    Renal cyst    History of hepatitis C    Scalp lesion    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HCC)    Bilateral impacted cerumen    Primary osteoarthritis of both knees    Type 2 diabetes mellitus with diabetic cataract, without  long-term current use of insulin (HCC)    Blurry vision, bilateral    Other constipation    Weight loss, abnormal    History of colon polyps    Memory problem    Decreased hearing of both ears    Constipation    Leg edema, left    Dyslipidemia    Hard of hearing         Past Medical History:  Past Medical History:   Diagnosis Date    Brain mass     Last Assessed; 11/5/2015    Chest pain     Last Assessed: 1/22/2015    Diabetes type 2, uncontrolled     Last Assessed: 3/1/2016    Edema of left lower extremity     Last Assessed: 3/22/2017    GERD (gastroesophageal reflux disease)     Hyperlipidemia     Hypertension     Impaired fasting glucose     Last Assessed: 11/10/2015    Irregular heartbeat     Skin cancer (melanoma) (HCC)     Scalp     Varicose 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     Last Assessed: 3/22/2017       Past Surgical History:  Past Surgical History:   Procedure Laterality Date    COLONOSCOPY      COLONOSCOPY N/A 7/10/2018    Procedure: COLONOSCOPY;  Surgeon: Jose Alejandro Rodrigues MD;  Location: [facility];  Service: Colorectal    INGUINAL HERNIA REPAIR      20+ years ago - [place]; Last Assessed: 10/23/2014    TONSILLECTOMY      VARICOSE VEIN SURGERY Left     x2 left leg - 40+ yrs ago, [place] and [place]       Family History:  Family History   Problem Relation Age of Onset    No Known Problems Mother     No Known Problems Father     Melanoma Daughter 17       Social History:  Social History     Socioeconomic History    Marital status: /Civil Union     Spouse name: Not on file    Number of children: 5    Years of education: Not on file    Highest education level: Not on file   Occupational History    Occupation: Retired: Construction   Tobacco Use    Smoking status: Never    Smokeless tobacco: Never   Vaping Use    Vaping status: Never Used   Substance and Sexual Activity    Alcohol use: No    Drug use: No    Sexual activity: Not Currently     Partners: Female   Other Topics Concern    Not on file    Social History Narrative    Uses safety equipment         Social Determinants of Health     Financial Resource Strain: Low Risk  (7/27/2023)    Overall Financial Resource Strain (CARDIA)     Difficulty of Paying Living Expenses: Not very hard   Food Insecurity: No Food Insecurity (8/5/2024)    Nursing - Inadequate Food Risk Classification     Worried About Running Out of Food in the Last Year: Never true     Ran Out of Food in the Last Year: Never true     Ran Out of Food in the Last Year: Not on file   Transportation Needs: No Transportation Needs (8/5/2024)    PRAPARE - Transportation     Lack of Transportation (Medical): No     Lack of Transportation (Non-Medical): No   Physical Activity: Not on file   Stress: Not on file   Social Connections: Not on file   Intimate Partner Violence: Not on file   Housing Stability: Low Risk  (8/5/2024)    Housing Stability Vital Sign     Unable to Pay for Housing in the Last Year: No     Number of Times Moved in the Last Year: 1     Homeless in the Last Year: No       Objective     Vitals:    10/31/24 0851   BP: 124/64   Pulse: (!) 54   Resp: 16   Temp: (!) 94.8 °F (34.9 °C)   SpO2: 99%     Wt Readings from Last 3 Encounters:   10/31/24 73.2 kg (161 lb 6.4 oz)   09/16/24 73.5 kg (162 lb)   09/12/24 73 kg (161 lb)       Physical Exam  Vitals and nursing note reviewed.   Constitutional:       Appearance: Normal appearance.   HENT:      Head: Normocephalic and atraumatic.      Right Ear: Tympanic membrane and ear canal normal. There is impacted cerumen.      Left Ear: Tympanic membrane and ear canal normal. There is impacted cerumen.      Nose: Nose normal.      Mouth/Throat:      Mouth: Mucous membranes are moist.   Eyes:      Conjunctiva/sclera: Conjunctivae normal.   Cardiovascular:      Rate and Rhythm: Normal rate and regular rhythm.      Heart sounds: Normal heart sounds.   Pulmonary:      Effort: Pulmonary effort is normal.      Breath sounds: Normal breath sounds.    Musculoskeletal:         General: Normal range of motion.      Cervical back: Normal range of motion and neck supple.   Skin:     General: Skin is warm and dry.      Capillary Refill: Capillary refill takes less than 2 seconds.   Neurological:      Mental Status: He is alert and oriented to person, place, and time.   Psychiatric:         Mood and Affect: Mood normal.         Behavior: Behavior normal.     Ear cerumen removal    Date/Time: 10/31/2024 9:15 AM    Performed by: ZAHRA Rice  Authorized by: ZAHRA Rice  Universal Protocol:  Consent: Verbal consent obtained. Written consent not obtained.  Risks and benefits: risks, benefits and alternatives were discussed  Consent given by: patient  Patient understanding: patient states understanding of the procedure being performed  Patient consent: the patient's understanding of the procedure matches consent given  Procedure consent: procedure consent matches procedure scheduled    Patient location:  Clinic  Procedure details:     Local anesthetic:  None    Location:  Both ears    Procedure type: irrigation with instrumentation      Instrumentation: curette and forceps      Approach:  Natural orifice  Post-procedure details:     Complication:  None    Hearing quality:  Improved    Patient tolerance of procedure:  Tolerated well, no immediate complications       Pertinent Laboratory/Diagnostic Studies:  Lab Results   Component Value Date    GLUCOSE 121 10/28/2015    BUN 16 09/16/2024    CREATININE 0.70 09/16/2024    CALCIUM 9.2 09/16/2024     10/28/2015    K 4.7 09/16/2024    CO2 32 09/16/2024     09/16/2024     Lab Results   Component Value Date    ALT 7 09/16/2024    AST 10 (L) 09/16/2024    GGT 12 12/31/2022    ALKPHOS 77 09/16/2024    BILITOT 0.53 08/27/2015       Lab Results   Component Value Date    WBC 3.53 (L) 09/16/2024    HGB 12.1 09/16/2024    HCT 37.7 09/16/2024    MCV 90 09/16/2024     (L) 09/16/2024       No results  "found for: \"TSH\"    Lab Results   Component Value Date    CHOL 154 08/27/2015     Lab Results   Component Value Date    TRIG 53 09/16/2024     Lab Results   Component Value Date    HDL 54 09/16/2024     Lab Results   Component Value Date    LDLCALC 70 09/16/2024     Lab Results   Component Value Date    HGBA1C 5.8 08/19/2024       Results for orders placed or performed in visit on 09/16/24   Comprehensive metabolic panel    Collection Time: 09/16/24 10:15 AM   Result Value Ref Range    Sodium 141 135 - 147 mmol/L    Potassium 4.7 3.5 - 5.3 mmol/L    Chloride 106 96 - 108 mmol/L    CO2 32 21 - 32 mmol/L    ANION GAP 3 (L) 4 - 13 mmol/L    BUN 16 5 - 25 mg/dL    Creatinine 0.70 0.60 - 1.30 mg/dL    Glucose, Fasting 110 (H) 65 - 99 mg/dL    Calcium 9.2 8.4 - 10.2 mg/dL    AST 10 (L) 13 - 39 U/L    ALT 7 7 - 52 U/L    Alkaline Phosphatase 77 34 - 104 U/L    Total Protein 6.7 6.4 - 8.4 g/dL    Albumin 4.1 3.5 - 5.0 g/dL    Total Bilirubin 0.60 0.20 - 1.00 mg/dL    eGFR 90 ml/min/1.73sq m   Lipid Panel with Direct LDL reflex    Collection Time: 09/16/24 10:15 AM   Result Value Ref Range    Cholesterol 135 See Comment mg/dL    Triglycerides 53 See Comment mg/dL    HDL, Direct 54 >=40 mg/dL    LDL Calculated 70 0 - 100 mg/dL   CBC and differential    Collection Time: 09/16/24 10:15 AM   Result Value Ref Range    WBC 3.53 (L) 4.31 - 10.16 Thousand/uL    RBC 4.21 3.88 - 5.62 Million/uL    Hemoglobin 12.1 12.0 - 17.0 g/dL    Hematocrit 37.7 36.5 - 49.3 %    MCV 90 82 - 98 fL    MCH 28.7 26.8 - 34.3 pg    MCHC 32.1 31.4 - 37.4 g/dL    RDW 13.2 11.6 - 15.1 %    MPV 9.8 8.9 - 12.7 fL    Platelets 140 (L) 149 - 390 Thousands/uL    nRBC 0 /100 WBCs    Segmented % 51 43 - 75 %    Immature Grans % 0 0 - 2 %    Lymphocytes % 31 14 - 44 %    Monocytes % 13 (H) 4 - 12 %    Eosinophils Relative 4 0 - 6 %    Basophils Relative 1 0 - 1 %    Absolute Neutrophils 1.84 (L) 1.85 - 7.62 Thousands/µL    Absolute Immature Grans 0.01 0.00 - 0.20 " Thousand/uL    Absolute Lymphocytes 1.08 0.60 - 4.47 Thousands/µL    Absolute Monocytes 0.45 0.17 - 1.22 Thousand/µL    Eosinophils Absolute 0.13 0.00 - 0.61 Thousand/µL    Basophils Absolute 0.02 0.00 - 0.10 Thousands/µL       Orders Placed This Encounter   Procedures    Ear cerumen removal       ALLERGIES:  No Known Allergies    Current Medications     Current Outpatient Medications   Medication Sig Dispense Refill    acetaminophen (TYLENOL) 325 mg tablet Take 2 tablets (650 mg total) by mouth every 6 (six) hours as needed for mild pain or fever 30 tablet 0    aspirin 81 MG tablet Take 81 mg by mouth daily      atorvastatin (LIPITOR) 10 mg tablet TAKE 1 TABLET BY MOUTH EVERY DAY 90 tablet 0    bisacodyl (DULCOLAX) 10 mg suppository Insert 1 suppository (10 mg total) into the rectum if needed for constipation (if no bowel movement despite miralax and lactulose use, can use suppository) 12 suppository 0    Blood Glucose Monitoring Suppl (OneTouch Verio Reflect) w/Device KIT Check blood sugars once daily. Please substitute with appropriate alternative as covered by patient's insurance. Dx: E11.65 1 kit 0    famotidine (PEPCID) 20 mg tablet TAKE 1 TABLET BY MOUTH TWICE A  tablet 1    gabapentin (Neurontin) 100 mg capsule Take 1 capsule (100 mg total) by mouth daily at bedtime 30 capsule 2    glucose blood (OneTouch Verio) test strip CHECK BLOOD SUGARS ONCE DAILY. 100 strip 1    hydrocortisone 2.5 % cream APPLY TO AFFECTED AREA TWICE A DAY 28.35 g 0    lactulose (CHRONULAC) 10 g/15 mL solution Take 30 mL (20 g total) by mouth daily as needed (if no BM in 24 hours despite miralax, can take 20 g (30 ML) of lactulose as needed for constipation) DAILY AS NEEDED FOR SEVERE CONSTIPATION 473 mL 0    Lancets (OneTouch Delica Plus Gdasmt83C) MISC TEST DAILY, E11.5--PT CONFIMED DELICA 33 G 100 each 11    lisinopril (ZESTRIL) 20 mg tablet TAKE 1 TABLET BY MOUTH EVERY DAY 90 tablet 1    lubiprostone (AMITIZA) 24 mcg capsule  Take 1 capsule (24 mcg total) by mouth 2 (two) times a day with meals 60 capsule 11    metFORMIN (GLUCOPHAGE) 500 mg tablet TAKE 1 TABLET BY MOUTH TWICE A  tablet 1    methocarbamol (ROBAXIN) 500 mg tablet TAKE 1 TABLET (500 MG TOTAL) BY MOUTH DAILY AT BEDTIME 30 tablet 0    omeprazole (PriLOSEC) 40 MG capsule TAKE 1 CAPSULE (40 MG TOTAL) BY MOUTH DAILY. 90 capsule 1    OneTouch Delica Lancets 33G MISC Check blood sugars once daily. Please substitute with appropriate alternative as covered by patient's insurance. Dx: E11.65 100 each 3    polyethylene glycol (MIRALAX) 17 g packet Take miralax daily but can use up to 2 times per day as needed for constipation      senna-docusate sodium (SENOKOT S) 8.6-50 mg per tablet Take 2 tablets by mouth daily 60 tablet 0    tamsulosin (FLOMAX) 0.4 mg Take 1 capsule (0.4 mg total) by mouth daily with dinner (Patient not taking: Reported on 9/12/2024) 30 capsule 1     No current facility-administered medications for this visit.         Health Maintenance     Health Maintenance   Topic Date Due    Influenza Vaccine (1) 09/01/2024    Diabetic Foot Exam  01/22/2025    COVID-19 Vaccine (5 - 2023-24 season) 01/31/2025 (Originally 9/1/2024)    Hepatitis B Vaccine (1 of 3 - Risk 3-dose series) 07/28/2025 (Originally 7/11/2006)    RSV Vaccine Age 60+ Years (1 - 1-dose 60+ series) 08/05/2025 (Originally 7/11/2006)    Zoster Vaccine (1 of 2) 08/05/2025 (Originally 7/11/1996)    HEMOGLOBIN A1C  02/19/2025    Kidney Health Evaluation: Albumin/Creatinine Ratio  03/25/2025    Fall Risk  08/05/2025    Depression Screening  08/05/2025    Medicare Annual Wellness Visit (AWV)  08/05/2025    DM Eye Exam  09/01/2025    Kidney Health Evaluation: GFR  09/16/2025    Pneumococcal Vaccine: 65+ Years  Completed    RSV Vaccine age 0-20 Months  Aged Out    HIB Vaccine  Aged Out    IPV Vaccine  Aged Out    Hepatitis A Vaccine  Aged Out    Meningococcal ACWY Vaccine  Aged Out    HPV Vaccine  Aged Out     Hepatitis C Screening  Discontinued    Colorectal Cancer Screening  Discontinued     Immunization History   Administered Date(s) Administered    COVID-19 PFIZER VACCINE 0.3 ML IM 03/29/2021, 04/21/2021, 11/09/2021    COVID-19 Pfizer Vac BIVALENT Raphael-sucrose 12 Yr+ IM 11/21/2022    INFLUENZA 10/27/2015, 10/01/2016, 10/13/2016, 12/19/2018    Influenza Split High Dose Preservative Free IM 10/23/2014, 10/27/2015, 10/01/2016, 10/13/2016, 10/13/2016, 10/13/2016    Influenza, high dose seasonal 0.7 mL 12/19/2018, 10/14/2019, 10/05/2021    Pneumococcal Conjugate 13-Valent 08/25/2015, 01/19/2017, 10/14/2019    Pneumococcal Conjugate Vaccine 20-valent (Pcv20), Polysace 06/01/2023    Pneumococcal Polysaccharide PPV23 10/05/2021    Tdap 06/01/2023          ZAHRA Rice

## 2024-11-24 DIAGNOSIS — R21 RASH: ICD-10-CM

## 2024-11-26 RX ORDER — HYDROCORTISONE 25 MG/G
1 CREAM TOPICAL 2 TIMES DAILY
Qty: 28.35 G | Refills: 0 | Status: SHIPPED | OUTPATIENT
Start: 2024-11-26

## 2024-11-30 PROBLEM — H61.23 BILATERAL IMPACTED CERUMEN: Status: RESOLVED | Noted: 2020-09-16 | Resolved: 2024-11-30

## 2024-12-06 ENCOUNTER — TELEPHONE (OUTPATIENT)
Dept: FAMILY MEDICINE CLINIC | Facility: CLINIC | Age: 79
End: 2024-12-06

## 2024-12-06 NOTE — TELEPHONE ENCOUNTER
Pt walked into office asking for scripts to be ordered for         glucose blood (OneTouch Verio) test strip        Blood Glucose Monitoring Suppl (OneTouch Verio Reflect) w/Device KIT      OneTouch Delica Lancets 33G [id number] - [place], [place] - [address]  [address] [place] [id number]  Phone: [phone] Fax: [phone]

## 2024-12-11 ENCOUNTER — OFFICE VISIT (OUTPATIENT)
Dept: FAMILY MEDICINE CLINIC | Facility: CLINIC | Age: 79
End: 2024-12-11
Payer: COMMERCIAL

## 2024-12-11 VITALS
HEART RATE: 61 BPM | RESPIRATION RATE: 18 BRPM | SYSTOLIC BLOOD PRESSURE: 120 MMHG | OXYGEN SATURATION: 98 % | TEMPERATURE: 96.7 F | WEIGHT: 162 LBS | BODY MASS INDEX: 23.99 KG/M2 | HEIGHT: 69 IN | DIASTOLIC BLOOD PRESSURE: 80 MMHG

## 2024-12-11 DIAGNOSIS — R07.9 CHEST PAIN, UNSPECIFIED TYPE: ICD-10-CM

## 2024-12-11 DIAGNOSIS — E11.36 TYPE 2 DIABETES MELLITUS WITH DIABETIC CATARACT, WITHOUT LONG-TERM CURRENT USE OF INSULIN (HCC): Primary | ICD-10-CM

## 2024-12-11 DIAGNOSIS — Z11.59 ENCOUNTER FOR SCREENING FOR OTHER VIRAL DISEASES: ICD-10-CM

## 2024-12-11 DIAGNOSIS — D69.6 THROMBOCYTOPENIA (HCC): ICD-10-CM

## 2024-12-11 DIAGNOSIS — I10 PRIMARY HYPERTENSION: ICD-10-CM

## 2024-12-11 DIAGNOSIS — E11.9 TYPE 2 DIABETES MELLITUS WITHOUT COMPLICATION, WITHOUT LONG-TERM CURRENT USE OF INSULIN (HCC): ICD-10-CM

## 2024-12-11 PROBLEM — R63.4 WEIGHT LOSS, ABNORMAL: Status: RESOLVED | Noted: 2024-01-22 | Resolved: 2024-12-11

## 2024-12-11 PROCEDURE — 93000 ELECTROCARDIOGRAM COMPLETE: CPT | Performed by: NURSE PRACTITIONER

## 2024-12-11 PROCEDURE — 99214 OFFICE O/P EST MOD 30 MIN: CPT | Performed by: NURSE PRACTITIONER

## 2024-12-11 RX ORDER — BLOOD-GLUCOSE METER
KIT MISCELLANEOUS
Qty: 1 KIT | Refills: 0 | Status: SHIPPED | OUTPATIENT
Start: 2024-12-11 | End: 2024-12-16

## 2024-12-11 RX ORDER — BLOOD SUGAR DIAGNOSTIC
STRIP MISCELLANEOUS
Qty: 100 STRIP | Refills: 1 | Status: SHIPPED | OUTPATIENT
Start: 2024-12-11 | End: 2024-12-16

## 2024-12-11 NOTE — PROGRESS NOTES
FAMILY PRACTICE OFFICE VISIT       NAME: Zaira Morley  AGE: 79 y.o. SEX: male       : 1945        MRN: [id number]    DATE: 2024  TIME: 1:46 PM    Assessment and Plan   1. Type 2 diabetes mellitus with diabetic cataract, without long-term current use of insulin (HCC)  Assessment & Plan:  Stable  Cont meds    Lab Results   Component Value Date    HGBA1C 5.8 2024     Orders:  -     POCT ECG  2. Primary hypertension  Assessment & Plan:  Stable  Cont meds    Orders:  -     POCT ECG  3. Thrombocytopenia (HCC)  Assessment & Plan:  Further testing    Orders:  -     CBC and differential; Future; Expected date: 2024  -     Comprehensive metabolic panel; Future; Expected date: 2024  -     Iron Panel (Includes Ferritin, Iron Sat%, Iron, and TIBC); Future; Expected date: 2024  -     Alpha-1-antitrypsin; Future; Expected date: 2024  -     US right upper quadrant; Future; Expected date: 2024  4. Chest pain, unspecified type  -     XR chest pa and lateral; Future; Expected date: 2024  5. Encounter for screening for other viral diseases       There are no Patient Instructions on file for this visit.        Chief Complaint     Chief Complaint   Patient presents with    Follow-up     Pt being seen for follow up       History of Present Illness   Zaira Morley is a 79 y.o.-year-old male who is here for f/u to chronic medical conditions  Daughter contacted me and stated he has been having elevated blood pressures at home and was concerned      Review of Systems   Review of Systems   Constitutional:  Negative for fatigue and fever.   HENT:  Negative for congestion, postnasal drip and rhinorrhea.    Eyes:  Negative for photophobia and visual disturbance.   Respiratory:  Negative for cough, shortness of breath and wheezing.    Cardiovascular:  Negative for chest pain and palpitations.   Gastrointestinal:  Negative for constipation, diarrhea, nausea and vomiting.   Genitourinary:   Negative for dysuria and frequency.   Musculoskeletal:  Negative for arthralgias and myalgias.   Skin:  Negative for rash.   Neurological:  Negative for dizziness, light-headedness and headaches.   Hematological:  Negative for adenopathy.   Psychiatric/Behavioral:  Negative for dysphoric mood and sleep disturbance. The patient is not nervous/anxious.        Active Problem List     Patient Active Problem List   Diagnosis    Brain tumor (HCC)    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HCC)    Chronic constipation    Episodic cluster headache, not intractable    GERD (gastroesophageal reflux disease)    Nonintractable headache    Hypertension    Meningioma (HCC)    Varicose 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    Spondylolisthesis at L5-S1 level    Other chest pain    Chest pain    Renal cyst    History of hepatitis C    Scalp lesion    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HCC)    Primary osteoarthritis of both knees    Type 2 diabetes mellitus with diabetic cataract, without long-term current use of insulin (HCC)    Blurry vision, bilateral    Other constipation    History of colon polyps    Memory problem    Decreased hearing of both ears    Constipation    Leg edema, left    Dyslipidemia    Hard of hearing    Thrombocytopenia (HCC)         Past Medical History:  Past Medical History:   Diagnosis Date    Brain mass     Last Assessed; 11/5/2015    Chest pain     Last Assessed: 1/22/2015    Diabetes type 2, uncontrolled     Last Assessed: 3/1/2016    Edema of left lower extremity     Last Assessed: 3/22/2017    GERD (gastroesophageal reflux disease)     Hyperlipidemia     Hypertension     Impaired fasting glucose     Last Assessed: 11/10/2015    Irregular heartbeat     Skin cancer (melanoma) (HCC)     Scalp     Varicose 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     Last Assessed: 3/22/2017       Past Surgical History:  Past Surgical History:   Procedure Laterality Date    COLONOSCOPY      COLONOSCOPY N/A 7/10/2018    Procedure:  COLONOSCOPY;  Surgeon: Jose Alejandro Rodrigues MD;  Location: [facility];  Service: Colorectal    INGUINAL HERNIA REPAIR      20+ years ago - [place]; Last Assessed: 10/23/2014    TONSILLECTOMY      VARICOSE VEIN SURGERY Left     x2 left leg - 40+ yrs ago, [place] and [place]       Family History:  Family History   Problem Relation Age of Onset    No Known Problems Mother     No Known Problems Father     Melanoma Daughter 17       Social History:  Social History     Socioeconomic History    Marital status: /Civil Union     Spouse name: Not on file    Number of children: 5    Years of education: Not on file    Highest education level: Not on file   Occupational History    Occupation: Retired: Construction   Tobacco Use    Smoking status: Never    Smokeless tobacco: Never   Vaping Use    Vaping status: Never Used   Substance and Sexual Activity    Alcohol use: No    Drug use: No    Sexual activity: Not Currently     Partners: Female   Other Topics Concern    Not on file   Social History Narrative    Uses safety equipment         Social Drivers of Health     Financial Resource Strain: Low Risk  (7/27/2023)    Overall Financial Resource Strain (CARDIA)     Difficulty of Paying Living Expenses: Not very hard   Food Insecurity: No Food Insecurity (8/5/2024)    Nursing - Inadequate Food Risk Classification     Worried About Running Out of Food in the Last Year: Never true     Ran Out of Food in the Last Year: Never true     Ran Out of Food in the Last Year: Not on file   Transportation Needs: No Transportation Needs (8/5/2024)    PRAPARE - Transportation     Lack of Transportation (Medical): No     Lack of Transportation (Non-Medical): No   Physical Activity: Not on file   Stress: Not on file   Social Connections: Not on file   Intimate Partner Violence: Not on file   Housing Stability: Low Risk  (8/5/2024)    Housing Stability Vital Sign     Unable to Pay for Housing in the Last Year: No     Number of Times Moved in the Last Year:  1     Homeless in the Last Year: No       Objective     Vitals:    12/11/24 1250   BP: 120/80   Pulse: 61   Resp: 18   Temp: (!) 96.7 °F (35.9 °C)   SpO2: 98%     Wt Readings from Last 3 Encounters:   12/11/24 73.5 kg (162 lb)   10/31/24 73.2 kg (161 lb 6.4 oz)   09/16/24 73.5 kg (162 lb)       Physical Exam  Vitals and nursing note reviewed.   Constitutional:       Appearance: Normal appearance.   HENT:      Head: Normocephalic and atraumatic.      Right Ear: Tympanic membrane, ear canal and external ear normal.      Left Ear: Tympanic membrane, ear canal and external ear normal.      Nose: Nose normal.      Mouth/Throat:      Mouth: Mucous membranes are moist.   Eyes:      Conjunctiva/sclera: Conjunctivae normal.   Cardiovascular:      Rate and Rhythm: Normal rate and regular rhythm.      Heart sounds: Normal heart sounds.   Pulmonary:      Effort: Pulmonary effort is normal.      Breath sounds: Normal breath sounds.   Abdominal:      General: Bowel sounds are normal.   Musculoskeletal:         General: Normal range of motion.      Cervical back: Normal range of motion and neck supple.   Skin:     General: Skin is warm and dry.      Capillary Refill: Capillary refill takes less than 2 seconds.   Neurological:      General: No focal deficit present.      Mental Status: He is alert and oriented to person, place, and time.   Psychiatric:         Mood and Affect: Mood normal.         Behavior: Behavior normal.         Thought Content: Thought content normal.         Judgment: Judgment normal.         Pertinent Laboratory/Diagnostic Studies:  Lab Results   Component Value Date    GLUCOSE 121 10/28/2015    BUN 16 09/16/2024    CREATININE 0.70 09/16/2024    CALCIUM 9.2 09/16/2024     10/28/2015    K 4.7 09/16/2024    CO2 32 09/16/2024     09/16/2024     Lab Results   Component Value Date    ALT 7 09/16/2024    AST 10 (L) 09/16/2024    GGT 12 12/31/2022    ALKPHOS 77 09/16/2024    BILITOT 0.53 08/27/2015  "      Lab Results   Component Value Date    WBC 3.53 (L) 09/16/2024    HGB 12.1 09/16/2024    HCT 37.7 09/16/2024    MCV 90 09/16/2024     (L) 09/16/2024       No results found for: \"TSH\"    Lab Results   Component Value Date    CHOL 154 08/27/2015     Lab Results   Component Value Date    TRIG 53 09/16/2024     Lab Results   Component Value Date    HDL 54 09/16/2024     Lab Results   Component Value Date    LDLCALC 70 09/16/2024     Lab Results   Component Value Date    HGBA1C 5.8 08/19/2024       Results for orders placed or performed in visit on 09/16/24   Comprehensive metabolic panel    Collection Time: 09/16/24 10:15 AM   Result Value Ref Range    Sodium 141 135 - 147 mmol/L    Potassium 4.7 3.5 - 5.3 mmol/L    Chloride 106 96 - 108 mmol/L    CO2 32 21 - 32 mmol/L    ANION GAP 3 (L) 4 - 13 mmol/L    BUN 16 5 - 25 mg/dL    Creatinine 0.70 0.60 - 1.30 mg/dL    Glucose, Fasting 110 (H) 65 - 99 mg/dL    Calcium 9.2 8.4 - 10.2 mg/dL    AST 10 (L) 13 - 39 U/L    ALT 7 7 - 52 U/L    Alkaline Phosphatase 77 34 - 104 U/L    Total Protein 6.7 6.4 - 8.4 g/dL    Albumin 4.1 3.5 - 5.0 g/dL    Total Bilirubin 0.60 0.20 - 1.00 mg/dL    eGFR 90 ml/min/1.73sq m   Lipid Panel with Direct LDL reflex    Collection Time: 09/16/24 10:15 AM   Result Value Ref Range    Cholesterol 135 See Comment mg/dL    Triglycerides 53 See Comment mg/dL    HDL, Direct 54 >=40 mg/dL    LDL Calculated 70 0 - 100 mg/dL   CBC and differential    Collection Time: 09/16/24 10:15 AM   Result Value Ref Range    WBC 3.53 (L) 4.31 - 10.16 Thousand/uL    RBC 4.21 3.88 - 5.62 Million/uL    Hemoglobin 12.1 12.0 - 17.0 g/dL    Hematocrit 37.7 36.5 - 49.3 %    MCV 90 82 - 98 fL    MCH 28.7 26.8 - 34.3 pg    MCHC 32.1 31.4 - 37.4 g/dL    RDW 13.2 11.6 - 15.1 %    MPV 9.8 8.9 - 12.7 fL    Platelets 140 (L) 149 - 390 Thousands/uL    nRBC 0 /100 WBCs    Segmented % 51 43 - 75 %    Immature Grans % 0 0 - 2 %    Lymphocytes % 31 14 - 44 %    Monocytes % 13 (H) " 4 - 12 %    Eosinophils Relative 4 0 - 6 %    Basophils Relative 1 0 - 1 %    Absolute Neutrophils 1.84 (L) 1.85 - 7.62 Thousands/µL    Absolute Immature Grans 0.01 0.00 - 0.20 Thousand/uL    Absolute Lymphocytes 1.08 0.60 - 4.47 Thousands/µL    Absolute Monocytes 0.45 0.17 - 1.22 Thousand/µL    Eosinophils Absolute 0.13 0.00 - 0.61 Thousand/µL    Basophils Absolute 0.02 0.00 - 0.10 Thousands/µL       Orders Placed This Encounter   Procedures    US right upper quadrant    XR chest pa and lateral    CBC and differential    Comprehensive metabolic panel    Alpha-1-antitrypsin    POCT ECG       ALLERGIES:  No Known Allergies    Current Medications     Current Outpatient Medications   Medication Sig Dispense Refill    acetaminophen (TYLENOL) 325 mg tablet Take 2 tablets (650 mg total) by mouth every 6 (six) hours as needed for mild pain or fever 30 tablet 0    aspirin 81 MG tablet Take 81 mg by mouth daily      atorvastatin (LIPITOR) 10 mg tablet TAKE 1 TABLET BY MOUTH EVERY DAY 90 tablet 0    bisacodyl (DULCOLAX) 10 mg suppository Insert 1 suppository (10 mg total) into the rectum if needed for constipation (if no bowel movement despite miralax and lactulose use, can use suppository) 12 suppository 0    Blood Glucose Monitoring Suppl (OneTouch Verio Reflect) w/Device KIT Check blood sugars once daily. Please substitute with appropriate alternative as covered by patient's insurance. Dx: E11.65 1 kit 0    famotidine (PEPCID) 20 mg tablet TAKE 1 TABLET BY MOUTH TWICE A  tablet 1    gabapentin (Neurontin) 100 mg capsule Take 1 capsule (100 mg total) by mouth daily at bedtime 30 capsule 2    glucose blood (OneTouch Verio) test strip CHECK BLOOD SUGARS ONCE DAILY. 100 strip 1    hydrocortisone 2.5 % cream APPLY TO AFFECTED AREA TWICE A DAY 28.35 g 0    lactulose (CHRONULAC) 10 g/15 mL solution Take 30 mL (20 g total) by mouth daily as needed (if no BM in 24 hours despite miralax, can take 20 g (30 ML) of lactulose as  needed for constipation) DAILY AS NEEDED FOR SEVERE CONSTIPATION 473 mL 0    Lancets (OneTouch Delica Plus [id number]) MISC TEST DAILY, E11.5--PT CONFIMED DELICA 33 G 100 each 11    lisinopril (ZESTRIL) 20 mg tablet TAKE 1 TABLET BY MOUTH EVERY DAY 90 tablet 1    lubiprostone (AMITIZA) 24 mcg capsule Take 1 capsule (24 mcg total) by mouth 2 (two) times a day with meals 60 capsule 11    metFORMIN (GLUCOPHAGE) 500 mg tablet TAKE 1 TABLET BY MOUTH TWICE A  tablet 1    methocarbamol (ROBAXIN) 500 mg tablet TAKE 1 TABLET (500 MG TOTAL) BY MOUTH DAILY AT BEDTIME 30 tablet 0    omeprazole (PriLOSEC) 40 MG capsule TAKE 1 CAPSULE (40 MG TOTAL) BY MOUTH DAILY. 90 capsule 1    OneTouch Delica Lancets 33G MISC Check blood sugars once daily. Please substitute with appropriate alternative as covered by patient's insurance. Dx: E11.65 100 each 3    polyethylene glycol (MIRALAX) 17 g packet Take miralax daily but can use up to 2 times per day as needed for constipation      senna-docusate sodium (SENOKOT S) 8.6-50 mg per tablet Take 2 tablets by mouth daily 60 tablet 0    tamsulosin (FLOMAX) 0.4 mg Take 1 capsule (0.4 mg total) by mouth daily with dinner (Patient not taking: Reported on 9/12/2024) 30 capsule 1     No current facility-administered medications for this visit.         Health Maintenance     Health Maintenance   Topic Date Due    Diabetic Foot Exam  01/22/2025    HEMOGLOBIN A1C  02/19/2025    COVID-19 Vaccine (5 - 2024-25 season) 01/31/2025 (Originally 9/1/2024)    Hepatitis B Vaccine (1 of 3 - Risk 3-dose series) 07/28/2025 (Originally 7/11/2006)    RSV Vaccine Age 60+ Years (1 - 1-dose 75+ series) 08/05/2025 (Originally 7/11/2021)    Zoster Vaccine (1 of 2) 08/05/2025 (Originally 7/11/1996)    Kidney Health Evaluation: Albumin/Creatinine Ratio  03/25/2025    Fall Risk  08/05/2025    Depression Screening  08/05/2025    Medicare Annual Wellness Visit (AWV)  08/05/2025    DM Eye Exam  09/01/2025    Kidney Health  Evaluation: GFR  09/16/2025    Pneumococcal Vaccine: 65+ Years  Completed    Influenza Vaccine  Completed    RSV Vaccine age 0-20 Months  Aged Out    HIB Vaccine  Aged Out    IPV Vaccine  Aged Out    Hepatitis A Vaccine  Aged Out    Meningococcal ACWY Vaccine  Aged Out    HPV Vaccine  Aged Out    Hepatitis C Screening  Discontinued    Colorectal Cancer Screening  Discontinued     Immunization History   Administered Date(s) Administered    COVID-19 PFIZER VACCINE 0.3 ML IM 03/29/2021, 04/21/2021, 11/09/2021    COVID-19 Pfizer Vac BIVALENT Raphael-sucrose 12 Yr+ IM 11/21/2022    INFLUENZA 10/27/2015, 10/01/2016, 10/13/2016, 12/19/2018, 10/24/2022    Influenza Split High Dose Preservative Free IM 10/23/2014, 10/27/2015, 10/01/2016, 10/13/2016, 10/13/2016, 10/13/2016, 11/07/2024    Influenza, high dose seasonal 0.7 mL 12/19/2018, 10/14/2019, 10/05/2021    Pneumococcal Conjugate 13-Valent 08/25/2015, 01/19/2017, 10/14/2019    Pneumococcal Conjugate Vaccine 20-valent (Pcv20), Polysace 06/01/2023    Pneumococcal Polysaccharide PPV23 10/05/2021    Tdap 06/01/2023          ZAHRA Rice

## 2024-12-11 NOTE — ADDENDUM NOTE
Addended by: MARSHA POWERS on: 12/11/2024 02:22 PM     Modules accepted: Orders, Level of Service

## 2024-12-16 DIAGNOSIS — E11.9 TYPE 2 DIABETES MELLITUS WITHOUT COMPLICATION, WITHOUT LONG-TERM CURRENT USE OF INSULIN (HCC): ICD-10-CM

## 2024-12-16 RX ORDER — BLOOD SUGAR DIAGNOSTIC
STRIP MISCELLANEOUS
Qty: 100 EACH | Refills: 3 | Status: CANCELLED | OUTPATIENT
Start: 2024-12-16

## 2024-12-16 RX ORDER — BLOOD-GLUCOSE METER
KIT MISCELLANEOUS
Qty: 1 KIT | Refills: 0 | Status: CANCELLED | OUTPATIENT
Start: 2024-12-16

## 2024-12-16 RX ORDER — BLOOD SUGAR DIAGNOSTIC
STRIP MISCELLANEOUS
Qty: 100 STRIP | Refills: 1 | Status: SHIPPED | OUTPATIENT
Start: 2024-12-16

## 2024-12-16 RX ORDER — BLOOD-GLUCOSE METER
KIT MISCELLANEOUS
Qty: 1 KIT | Refills: 0 | Status: SHIPPED | OUTPATIENT
Start: 2024-12-16

## 2024-12-16 RX ORDER — LANCETS 33 GAUGE
EACH MISCELLANEOUS
Qty: 100 EACH | Refills: 3 | Status: CANCELLED | OUTPATIENT
Start: 2024-12-16

## 2024-12-26 ENCOUNTER — HOSPITAL ENCOUNTER (OUTPATIENT)
Dept: RADIOLOGY | Facility: HOSPITAL | Age: 79
Discharge: HOME | End: 2024-12-26
Payer: COMMERCIAL

## 2024-12-26 ENCOUNTER — APPOINTMENT (OUTPATIENT)
Dept: LAB | Facility: CLINIC | Age: 79
End: 2024-12-26
Payer: COMMERCIAL

## 2024-12-26 DIAGNOSIS — D69.6 THROMBOCYTOPENIA (HCC): ICD-10-CM

## 2024-12-26 DIAGNOSIS — R07.9 CHEST PAIN, UNSPECIFIED TYPE: ICD-10-CM

## 2024-12-26 LAB
ALBUMIN SERPL BCG-MCNC: 4.2 G/DL (ref 3.5–5)
ALP SERPL-CCNC: 83 U/L (ref 34–104)
ALT SERPL W P-5'-P-CCNC: 7 U/L (ref 7–52)
ANION GAP SERPL CALCULATED.3IONS-SCNC: 5 MMOL/L (ref 4–13)
AST SERPL W P-5'-P-CCNC: 12 U/L (ref 13–39)
BASOPHILS # BLD AUTO: 0.01 THOUSANDS/ÂΜL (ref 0–0.1)
BASOPHILS NFR BLD AUTO: 0 % (ref 0–1)
BILIRUB SERPL-MCNC: 0.66 MG/DL (ref 0.2–1)
BUN SERPL-MCNC: 17 MG/DL (ref 5–25)
CALCIUM SERPL-MCNC: 9.4 MG/DL (ref 8.4–10.2)
CHLORIDE SERPL-SCNC: 106 MMOL/L (ref 96–108)
CO2 SERPL-SCNC: 31 MMOL/L (ref 21–32)
CREAT SERPL-MCNC: 0.76 MG/DL (ref 0.6–1.3)
EOSINOPHIL # BLD AUTO: 0.22 THOUSAND/ÂΜL (ref 0–0.61)
EOSINOPHIL NFR BLD AUTO: 5 % (ref 0–6)
ERYTHROCYTE [DISTWIDTH] IN BLOOD BY AUTOMATED COUNT: 13.2 % (ref 11.6–15.1)
FERRITIN SERPL-MCNC: 325 NG/ML (ref 24–336)
GFR SERPL CREATININE-BSD FRML MDRD: 87 ML/MIN/1.73SQ M
GLUCOSE P FAST SERPL-MCNC: 103 MG/DL (ref 65–99)
HCT VFR BLD AUTO: 37.7 % (ref 36.5–49.3)
HGB BLD-MCNC: 12.3 G/DL (ref 12–17)
IMM GRANULOCYTES # BLD AUTO: 0.04 THOUSAND/UL (ref 0–0.2)
IMM GRANULOCYTES NFR BLD AUTO: 1 % (ref 0–2)
IRON SATN MFR SERPL: 48 % (ref 15–50)
IRON SERPL-MCNC: 133 UG/DL (ref 50–212)
LYMPHOCYTES # BLD AUTO: 1.18 THOUSANDS/ÂΜL (ref 0.6–4.47)
LYMPHOCYTES NFR BLD AUTO: 28 % (ref 14–44)
MCH RBC QN AUTO: 28.6 PG (ref 26.8–34.3)
MCHC RBC AUTO-ENTMCNC: 32.6 G/DL (ref 31.4–37.4)
MCV RBC AUTO: 88 FL (ref 82–98)
MONOCYTES # BLD AUTO: 0.43 THOUSAND/ÂΜL (ref 0.17–1.22)
MONOCYTES NFR BLD AUTO: 10 % (ref 4–12)
NEUTROPHILS # BLD AUTO: 2.28 THOUSANDS/ÂΜL (ref 1.85–7.62)
NEUTS SEG NFR BLD AUTO: 56 % (ref 43–75)
NRBC BLD AUTO-RTO: 0 /100 WBCS
PLATELET # BLD AUTO: 143 THOUSANDS/UL (ref 149–390)
PMV BLD AUTO: 10 FL (ref 8.9–12.7)
POTASSIUM SERPL-SCNC: 4.5 MMOL/L (ref 3.5–5.3)
PROT SERPL-MCNC: 6.3 G/DL (ref 6.4–8.4)
RBC # BLD AUTO: 4.3 MILLION/UL (ref 3.88–5.62)
SODIUM SERPL-SCNC: 142 MMOL/L (ref 135–147)
TIBC SERPL-MCNC: 275.8 UG/DL (ref 250–450)
TRANSFERRIN SERPL-MCNC: 197 MG/DL (ref 203–362)
UIBC SERPL-MCNC: 143 UG/DL (ref 155–355)
WBC # BLD AUTO: 4.16 THOUSAND/UL (ref 4.31–10.16)

## 2024-12-26 PROCEDURE — 71046 X-RAY EXAM CHEST 2 VIEWS: CPT

## 2024-12-26 PROCEDURE — 80053 COMPREHEN METABOLIC PANEL: CPT

## 2024-12-26 PROCEDURE — 76705 ECHO EXAM OF ABDOMEN: CPT

## 2024-12-26 PROCEDURE — 83550 IRON BINDING TEST: CPT

## 2024-12-26 PROCEDURE — 85025 COMPLETE CBC W/AUTO DIFF WBC: CPT

## 2024-12-26 PROCEDURE — 82103 ALPHA-1-ANTITRYPSIN TOTAL: CPT

## 2024-12-26 PROCEDURE — 82728 ASSAY OF FERRITIN: CPT

## 2024-12-26 PROCEDURE — 83540 ASSAY OF IRON: CPT

## 2024-12-26 PROCEDURE — 36415 COLL VENOUS BLD VENIPUNCTURE: CPT

## 2024-12-27 LAB — A1AT SERPL-MCNC: 143 MG/DL (ref 101–187)

## 2025-01-13 ENCOUNTER — RA CDI HCC (OUTPATIENT)
Dept: OTHER | Facility: HOSPITAL | Age: 80
End: 2025-01-13

## 2025-01-15 ENCOUNTER — OFFICE VISIT (OUTPATIENT)
Dept: FAMILY MEDICINE CLINIC | Facility: CLINIC | Age: 80
End: 2025-01-15
Payer: COMMERCIAL

## 2025-01-15 VITALS
HEIGHT: 69 IN | BODY MASS INDEX: 24.56 KG/M2 | RESPIRATION RATE: 18 BRPM | OXYGEN SATURATION: 98 % | SYSTOLIC BLOOD PRESSURE: 118 MMHG | DIASTOLIC BLOOD PRESSURE: 76 MMHG | HEART RATE: 61 BPM | WEIGHT: 165.8 LBS | TEMPERATURE: 96.8 F

## 2025-01-15 DIAGNOSIS — R21 RASH: ICD-10-CM

## 2025-01-15 DIAGNOSIS — K21.9 GASTROESOPHAGEAL REFLUX DISEASE WITHOUT ESOPHAGITIS: ICD-10-CM

## 2025-01-15 DIAGNOSIS — I10 PRIMARY HYPERTENSION: ICD-10-CM

## 2025-01-15 DIAGNOSIS — E11.36 TYPE 2 DIABETES MELLITUS WITH DIABETIC CATARACT, WITHOUT LONG-TERM CURRENT USE OF INSULIN (HCC): ICD-10-CM

## 2025-01-15 DIAGNOSIS — D69.6 THROMBOCYTOPENIA (HCC): ICD-10-CM

## 2025-01-15 DIAGNOSIS — K59.09 CHRONIC CONSTIPATION: Primary | ICD-10-CM

## 2025-01-15 DIAGNOSIS — D32.9 MENINGIOMA (HCC): ICD-10-CM

## 2025-01-15 PROCEDURE — 99214 OFFICE O/P EST MOD 30 MIN: CPT | Performed by: NURSE PRACTITIONER

## 2025-01-15 PROCEDURE — G2211 COMPLEX E/M VISIT ADD ON: HCPCS | Performed by: NURSE PRACTITIONER

## 2025-01-15 RX ORDER — FAMOTIDINE 20 MG/1
20 TABLET, FILM COATED ORAL 2 TIMES DAILY
Qty: 180 TABLET | Refills: 1 | Status: SHIPPED | OUTPATIENT
Start: 2025-01-15

## 2025-01-15 NOTE — ASSESSMENT & PLAN NOTE
Cont amitiza  Cont lactulose prn  Cont colace prn  May try psyllium husk prn as needed (metamucil)

## 2025-01-15 NOTE — PROGRESS NOTES
FAMILY PRACTICE OFFICE VISIT       NAME: Zaira Morley  AGE: 79 y.o. SEX: male       : 1945        MRN: [id number]    DATE: 2025  TIME: 12:59 PM    Assessment and Plan   1. Chronic constipation  Assessment & Plan:  Cont amitiza  Cont lactulose prn  Cont colace prn  May try psyllium husk prn as needed (metamucil)    2. Thrombocytopenia (HCC)  Assessment & Plan:  Monitor labs    3. Meningioma ([facility])  Assessment & Plan:  Stable  Cont f/u with neurosurgery    4. Type 2 diabetes mellitus with diabetic cataract, without long-term current use of insulin ([facility])  Assessment & Plan:  Stable  Cont meds    Lab Results   Component Value Date    HGBA1C 5.8 2024     5. Gastroesophageal reflux disease without esophagitis  -     famotidine (PEPCID) 20 mg tablet; Take 1 tablet (20 mg total) by mouth 2 (two) times a day  6. Primary hypertension  Assessment & Plan:  Stable  Cont meds         There are no Patient Instructions on file for this visit.        Chief Complaint     Chief Complaint   Patient presents with    Headache     Patient being seen for headache     Abdominal Pain     Patient being seen for abdominal pain     Leg Pain     Patient being seen for left leg pain        History of Present Illness   Zaira Morley is a 79 y.o.-year-old male who is here with multiple complaints  C/o constipation    Did have a bm today  Hard formed stool  Continues to be constipated  Is on amitiza  Also c/o ha back of head  Stopped taking gabapentin in the past    Review of Systems   Review of Systems   Constitutional:  Negative for fatigue and fever.   HENT:  Negative for congestion, postnasal drip and rhinorrhea.    Respiratory:  Negative for cough, shortness of breath and wheezing.    Cardiovascular:  Negative for chest pain and palpitations.   Gastrointestinal:  Positive for abdominal pain and constipation. Negative for diarrhea, nausea and vomiting.   Genitourinary:  Negative for dysuria and frequency.   Musculoskeletal:   Negative for arthralgias and myalgias.   Skin:  Negative for rash.   Neurological:  Positive for headaches. Negative for dizziness and light-headedness.   Hematological:  Negative for adenopathy.   Psychiatric/Behavioral:  Negative for dysphoric mood and sleep disturbance. The patient is not nervous/anxious.        Active Problem List     Patient Active Problem List   Diagnosis    Brain tumor (HCC)    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HCC)    Chronic constipation    Episodic cluster headache, not intractable    GERD (gastroesophageal reflux disease)    Nonintractable headache    Hypertension    Meningioma (HCC)    Varicose 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    Spondylolisthesis at L5-S1 level    Renal cyst    History of hepatitis C    Scalp lesion    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HCC)    Primary osteoarthritis of both knees    Type 2 diabetes mellitus with diabetic cataract, without long-term current use of insulin (HCC)    Blurry vision, bilateral    Other constipation    History of colon polyps    Decreased hearing of both ears    Constipation    Leg edema, left    Dyslipidemia    Hard of hearing    Thrombocytopenia (HCC)         Past Medical History:  Past Medical History:   Diagnosis Date    Brain mass     Last Assessed; 11/5/2015    Chest pain     Last Assessed: 1/22/2015    Diabetes type 2, uncontrolled     Last Assessed: 3/1/2016    Edema of left lower extremity     Last Assessed: 3/22/2017    GERD (gastroesophageal reflux disease)     Hyperlipidemia     Hypertension     Impaired fasting glucose     Last Assessed: 11/10/2015    Irregular heartbeat     Skin cancer (melanoma) (HCC)     Scalp     Varicose 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     Last Assessed: 3/22/2017       Past Surgical History:  Past Surgical History:   Procedure Laterality Date    COLONOSCOPY      COLONOSCOPY N/A 7/10/2018    Procedure: COLONOSCOPY;  Surgeon: Jose Alejandro Rodrigues MD;  Location: [facility];  Service: Colorectal    INGUINAL  HERNIA REPAIR      20+ years ago - [place]; Last Assessed: 10/23/2014    TONSILLECTOMY      VARICOSE VEIN SURGERY Left     x2 left leg - 40+ yrs ago, [place] and [place]       Family History:  Family History   Problem Relation Age of Onset    No Known Problems Mother     No Known Problems Father     Melanoma Daughter 17       Social History:  Social History     Socioeconomic History    Marital status: /Civil Union     Spouse name: Not on file    Number of children: 5    Years of education: Not on file    Highest education level: Not on file   Occupational History    Occupation: Retired: Construction   Tobacco Use    Smoking status: Never     Passive exposure: Never    Smokeless tobacco: Never   Vaping Use    Vaping status: Never Used   Substance and Sexual Activity    Alcohol use: No    Drug use: No    Sexual activity: Not Currently     Partners: Female   Other Topics Concern    Not on file   Social History Narrative    Uses safety equipment         Social Drivers of Health     Financial Resource Strain: Low Risk  (7/27/2023)    Overall Financial Resource Strain (CARDIA)     Difficulty of Paying Living Expenses: Not very hard   Food Insecurity: No Food Insecurity (8/5/2024)    Nursing - Inadequate Food Risk Classification     Worried About Running Out of Food in the Last Year: Never true     Ran Out of Food in the Last Year: Never true     Ran Out of Food in the Last Year: Not on file   Transportation Needs: No Transportation Needs (8/5/2024)    PRAPARE - Transportation     Lack of Transportation (Medical): No     Lack of Transportation (Non-Medical): No   Physical Activity: Not on file   Stress: Not on file   Social Connections: Not on file   Intimate Partner Violence: Not on file   Housing Stability: Low Risk  (8/5/2024)    Housing Stability Vital Sign     Unable to Pay for Housing in the Last Year: No     Number of Times Moved in the Last Year: 1     Homeless in the Last Year: No       Objective     Vitals:     01/15/25 1331   BP: 118/76   Pulse: 61   Resp: 18   Temp: (!) 96.8 °F (36 °C)   SpO2: 98%     Wt Readings from Last 3 Encounters:   01/15/25 75.2 kg (165 lb 12.8 oz)   12/11/24 73.5 kg (162 lb)   10/31/24 73.2 kg (161 lb 6.4 oz)       Physical Exam  Vitals and nursing note reviewed.   Constitutional:       Appearance: Normal appearance.   HENT:      Head: Normocephalic and atraumatic.      Right Ear: Tympanic membrane, ear canal and external ear normal.      Left Ear: Tympanic membrane, ear canal and external ear normal.      Nose: Nose normal.      Mouth/Throat:      Mouth: Mucous membranes are moist.   Eyes:      Conjunctiva/sclera: Conjunctivae normal.   Cardiovascular:      Rate and Rhythm: Normal rate and regular rhythm.      Heart sounds: Normal heart sounds.   Pulmonary:      Effort: Pulmonary effort is normal.      Breath sounds: Normal breath sounds.   Abdominal:      General: Bowel sounds are normal.      Palpations: Abdomen is soft.   Musculoskeletal:         General: Normal range of motion.      Cervical back: Normal range of motion and neck supple.   Skin:     General: Skin is warm and dry.      Capillary Refill: Capillary refill takes less than 2 seconds.   Neurological:      General: No focal deficit present.      Mental Status: He is alert and oriented to person, place, and time.   Psychiatric:         Mood and Affect: Mood normal.         Behavior: Behavior normal.         Pertinent Laboratory/Diagnostic Studies:  Lab Results   Component Value Date    GLUCOSE 121 10/28/2015    BUN 17 12/26/2024    CREATININE 0.76 12/26/2024    CALCIUM 9.4 12/26/2024     10/28/2015    K 4.5 12/26/2024    CO2 31 12/26/2024     12/26/2024     Lab Results   Component Value Date    ALT 7 12/26/2024    AST 12 (L) 12/26/2024    GGT 12 12/31/2022    ALKPHOS 83 12/26/2024    BILITOT 0.53 08/27/2015       Lab Results   Component Value Date    WBC 4.16 (L) 12/26/2024    HGB 12.3 12/26/2024    HCT 37.7 12/26/2024     "MCV 88 12/26/2024     (L) 12/26/2024       No results found for: \"TSH\"    Lab Results   Component Value Date    CHOL 154 08/27/2015     Lab Results   Component Value Date    TRIG 53 09/16/2024     Lab Results   Component Value Date    HDL 54 09/16/2024     Lab Results   Component Value Date    LDLCALC 70 09/16/2024     Lab Results   Component Value Date    HGBA1C 5.8 08/19/2024       Results for orders placed or performed in visit on 12/26/24   CBC and differential    Collection Time: 12/26/24  9:57 AM   Result Value Ref Range    WBC 4.16 (L) 4.31 - 10.16 Thousand/uL    RBC 4.30 3.88 - 5.62 Million/uL    Hemoglobin 12.3 12.0 - 17.0 g/dL    Hematocrit 37.7 36.5 - 49.3 %    MCV 88 82 - 98 fL    MCH 28.6 26.8 - 34.3 pg    MCHC 32.6 31.4 - 37.4 g/dL    RDW 13.2 11.6 - 15.1 %    MPV 10.0 8.9 - 12.7 fL    Platelets 143 (L) 149 - 390 Thousands/uL    nRBC 0 /100 WBCs    Segmented % 56 43 - 75 %    Immature Grans % 1 0 - 2 %    Lymphocytes % 28 14 - 44 %    Monocytes % 10 4 - 12 %    Eosinophils Relative 5 0 - 6 %    Basophils Relative 0 0 - 1 %    Absolute Neutrophils 2.28 1.85 - 7.62 Thousands/µL    Absolute Immature Grans 0.04 0.00 - 0.20 Thousand/uL    Absolute Lymphocytes 1.18 0.60 - 4.47 Thousands/µL    Absolute Monocytes 0.43 0.17 - 1.22 Thousand/µL    Eosinophils Absolute 0.22 0.00 - 0.61 Thousand/µL    Basophils Absolute 0.01 0.00 - 0.10 Thousands/µL   Comprehensive metabolic panel    Collection Time: 12/26/24  9:57 AM   Result Value Ref Range    Sodium 142 135 - 147 mmol/L    Potassium 4.5 3.5 - 5.3 mmol/L    Chloride 106 96 - 108 mmol/L    CO2 31 21 - 32 mmol/L    ANION GAP 5 4 - 13 mmol/L    BUN 17 5 - 25 mg/dL    Creatinine 0.76 0.60 - 1.30 mg/dL    Glucose, Fasting 103 (H) 65 - 99 mg/dL    Calcium 9.4 8.4 - 10.2 mg/dL    AST 12 (L) 13 - 39 U/L    ALT 7 7 - 52 U/L    Alkaline Phosphatase 83 34 - 104 U/L    Total Protein 6.3 (L) 6.4 - 8.4 g/dL    Albumin 4.2 3.5 - 5.0 g/dL    Total Bilirubin 0.66 0.20 - " 1.00 mg/dL    eGFR 87 ml/min/1.73sq m   Alpha-1-antitrypsin    Collection Time: 12/26/24  9:57 AM   Result Value Ref Range    A-1 Antitrypsin 143 101 - 187 mg/dL   TIBC Panel (incl. Iron, TIBC, % Iron Saturation)    Collection Time: 12/26/24  9:57 AM   Result Value Ref Range    Iron Saturation 48 15 - 50 %    TIBC 275.8 250 - 450 ug/dL    Iron 133 50 - 212 ug/dL    Transferrin 197 (L) 203 - 362 mg/dL    UIBC 143 (L) 155 - 355 ug/dL   Ferritin    Collection Time: 12/26/24  9:57 AM   Result Value Ref Range    Ferritin 325 24 - 336 ng/mL       No orders of the defined types were placed in this encounter.      ALLERGIES:  No Known Allergies    Current Medications     Current Outpatient Medications   Medication Sig Dispense Refill    acetaminophen (TYLENOL) 325 mg tablet Take 2 tablets (650 mg total) by mouth every 6 (six) hours as needed for mild pain or fever 30 tablet 0    aspirin 81 MG tablet Take 81 mg by mouth daily      atorvastatin (LIPITOR) 10 mg tablet TAKE 1 TABLET BY MOUTH EVERY DAY 90 tablet 0    bisacodyl (DULCOLAX) 10 mg suppository Insert 1 suppository (10 mg total) into the rectum if needed for constipation (if no bowel movement despite miralax and lactulose use, can use suppository) 12 suppository 0    Blood Glucose Monitoring Suppl (OneTouch Verio Reflect) w/Device KIT Check blood sugars once daily. Please substitute with appropriate alternative as covered by patient's insurance. Dx: E11.65 1 kit 0    famotidine (PEPCID) 20 mg tablet Take 1 tablet (20 mg total) by mouth 2 (two) times a day 180 tablet 1    gabapentin (Neurontin) 100 mg capsule Take 1 capsule (100 mg total) by mouth daily at bedtime 30 capsule 2    glucose blood (OneTouch Verio) test strip CHECK BLOOD SUGARS ONCE DAILY. 100 strip 1    lactulose (CHRONULAC) 10 g/15 mL solution Take 30 mL (20 g total) by mouth daily as needed (if no BM in 24 hours despite miralax, can take 20 g (30 ML) of lactulose as needed for constipation) DAILY AS  NEEDED FOR SEVERE CONSTIPATION 473 mL 0    Lancets (OneTouch Delica Plus Lwiuli21O) MISC TEST DAILY, E11.5--PT CONFIMED DELICA 33 G 100 each 11    lisinopril (ZESTRIL) 20 mg tablet TAKE 1 TABLET BY MOUTH EVERY DAY 90 tablet 1    lubiprostone (AMITIZA) 24 mcg capsule Take 1 capsule (24 mcg total) by mouth 2 (two) times a day with meals 60 capsule 11    metFORMIN (GLUCOPHAGE) 500 mg tablet TAKE 1 TABLET BY MOUTH TWICE A  tablet 1    methocarbamol (ROBAXIN) 500 mg tablet TAKE 1 TABLET (500 MG TOTAL) BY MOUTH DAILY AT BEDTIME 30 tablet 0    omeprazole (PriLOSEC) 40 MG capsule TAKE 1 CAPSULE (40 MG TOTAL) BY MOUTH DAILY. 90 capsule 1    OneTouch Delica Lancets 33G MISC Check blood sugars once daily. Please substitute with appropriate alternative as covered by patient's insurance. Dx: E11.65 100 each 3    polyethylene glycol (MIRALAX) 17 g packet Take miralax daily but can use up to 2 times per day as needed for constipation      senna-docusate sodium (SENOKOT S) 8.6-50 mg per tablet Take 2 tablets by mouth daily 60 tablet 0    hydrocortisone 2.5 % cream APPLY TO AFFECTED AREA TWICE A DAY 28.35 g 0    tamsulosin (FLOMAX) 0.4 mg Take 1 capsule (0.4 mg total) by mouth daily with dinner (Patient not taking: Reported on 9/12/2024) 30 capsule 1     No current facility-administered medications for this visit.         Health Maintenance     Health Maintenance   Topic Date Due    Diabetic Foot Exam  01/22/2025    HEMOGLOBIN A1C  02/19/2025    Kidney Health Evaluation: Albumin/Creatinine Ratio  03/25/2025    COVID-19 Vaccine (5 - 2024-25 season) 01/31/2025 (Originally 9/1/2024)    Hepatitis B Vaccine (1 of 3 - Risk 3-dose series) 07/28/2025 (Originally 7/11/2006)    RSV Vaccine for Pregnant Patients and Patients Age 60+ Years (1 - 1-dose 75+ series) 08/05/2025 (Originally 7/11/2021)    Zoster Vaccine (1 of 2) 08/05/2025 (Originally 7/11/1996)    Fall Risk  08/05/2025    Depression Screening  08/05/2025    Medicare Annual  Wellness Visit (AWV)  08/05/2025    DM Eye Exam  09/01/2025    Kidney Health Evaluation: GFR  12/26/2025    Pneumococcal Vaccine: 65+ Years  Completed    Influenza Vaccine  Completed    Meningococcal B Vaccine  Aged Out    RSV Vaccine age 0-20 Months  Aged Out    HIB Vaccine  Aged Out    IPV Vaccine  Aged Out    Hepatitis A Vaccine  Aged Out    Meningococcal ACWY Vaccine  Aged Out    HPV Vaccine  Aged Out    Hepatitis C Screening  Discontinued    Colorectal Cancer Screening  Discontinued     Immunization History   Administered Date(s) Administered    COVID-19 PFIZER VACCINE 0.3 ML IM 03/29/2021, 04/21/2021, 11/09/2021    COVID-19 Pfizer Vac BIVALENT Raphael-sucrose 12 Yr+ IM 11/21/2022    INFLUENZA 10/27/2015, 10/01/2016, 10/13/2016, 12/19/2018, 10/24/2022    Influenza Split High Dose Preservative Free IM 10/23/2014, 10/27/2015, 10/01/2016, 10/13/2016, 10/13/2016, 10/13/2016, 11/07/2024    Influenza, high dose seasonal 0.7 mL 12/19/2018, 10/14/2019, 10/05/2021    Pneumococcal Conjugate 13-Valent 08/25/2015, 01/19/2017, 10/14/2019    Pneumococcal Conjugate Vaccine 20-valent (Pcv20), Polysace 06/01/2023    Pneumococcal Polysaccharide PPV23 10/05/2021    Tdap 06/01/2023          ZAHRA Rice

## 2025-01-16 RX ORDER — HYDROCORTISONE 25 MG/G
1 CREAM TOPICAL 2 TIMES DAILY
Qty: 28.35 G | Refills: 0 | Status: SHIPPED | OUTPATIENT
Start: 2025-01-16

## 2025-01-18 PROBLEM — R07.89 OTHER CHEST PAIN: Status: RESOLVED | Noted: 2018-10-24 | Resolved: 2025-01-18

## 2025-01-18 PROBLEM — R07.9 CHEST PAIN: Status: RESOLVED | Noted: 2019-02-18 | Resolved: 2025-01-18

## 2025-01-18 PROBLEM — R41.3 MEMORY PROBLEM: Status: RESOLVED | Noted: 2024-03-26 | Resolved: 2025-01-18

## 2025-02-20 ENCOUNTER — OFFICE VISIT (OUTPATIENT)
Dept: FAMILY MEDICINE CLINIC | Facility: CLINIC | Age: 80
End: 2025-02-20
Payer: COMMERCIAL

## 2025-02-20 VITALS
HEART RATE: 65 BPM | DIASTOLIC BLOOD PRESSURE: 80 MMHG | RESPIRATION RATE: 18 BRPM | TEMPERATURE: 96.1 F | OXYGEN SATURATION: 99 % | SYSTOLIC BLOOD PRESSURE: 150 MMHG | WEIGHT: 172.2 LBS | HEIGHT: 69 IN | BODY MASS INDEX: 25.51 KG/M2

## 2025-02-20 DIAGNOSIS — E11.36 TYPE 2 DIABETES MELLITUS WITH DIABETIC CATARACT, WITHOUT LONG-TERM CURRENT USE OF INSULIN (HCC): Primary | ICD-10-CM

## 2025-02-20 DIAGNOSIS — I10 PRIMARY HYPERTENSION: ICD-10-CM

## 2025-02-20 DIAGNOSIS — K59.04 CHRONIC IDIOPATHIC CONSTIPATION: ICD-10-CM

## 2025-02-20 LAB — SL AMB POCT HEMOGLOBIN AIC: 5.8 (ref ?–6.5)

## 2025-02-20 PROCEDURE — 99214 OFFICE O/P EST MOD 30 MIN: CPT | Performed by: NURSE PRACTITIONER

## 2025-02-20 PROCEDURE — 83036 HEMOGLOBIN GLYCOSYLATED A1C: CPT | Performed by: NURSE PRACTITIONER

## 2025-02-20 NOTE — PROGRESS NOTES
Diabetic Foot Exam    Patient's shoes and socks removed.    Right Foot/Ankle   Right Foot Inspection  Skin Exam: skin normal, skin intact and dry skin. No warmth, no callus, no erythema, no maceration, no abnormal color, no pre-ulcer, no ulcer and no callus.     Toe Exam: ROM and strength within normal limits.     Sensory   Monofilament testing: intact    Vascular  Capillary refills: < 3 seconds  The right DP pulse is 2+. The right PT pulse is 2+.     Left Foot/Ankle  Left Foot Inspection  Skin Exam: skin normal, skin intact, dry skin and abnormal color. No warmth, no erythema, no maceration, no pre-ulcer, no ulcer and no callus.     Toe Exam: ROM and strength within normal limits.     Sensory   Monofilament testing: intact    Vascular  Capillary refills: < 3 seconds  The left DP pulse is 2+. The left PT pulse is 2+.     Assign Risk Category  No deformity present  No loss of protective sensation  No weak pulses  Risk: 0  Name: Zaira Morley      : 1945      MRN: [id number]  Encounter Provider: ZAHRA Rice  Encounter Date: 2025   [facility]    Assessment & Plan  Type 2 diabetes mellitus with diabetic cataract, without long-term current use of insulin ([facility])    Lab Results   Component Value Date    HGBA1C 5.8 2025       Orders:    POCT hemoglobin A1c    Primary hypertension  Stable  Cont meds           Chronic idiopathic constipation  Reviewed medications for constipation with patient             Depression Screening and Follow-up Plan: Patient was screened for depression during today's encounter. They screened negative with a PHQ-2 score of 2.          History of Present Illness     Here for c/o constipation  Daughter is in the car per patient    Having headaches on and off   This is a chronic problem for patient  No new symptoms or features  Chronic as well for patient    F/u to chronic medical conditions          Abdominal Pain  Associated  symptoms include arthralgias, constipation, headaches and myalgias. Pertinent negatives include no diarrhea, dysuria, fever, frequency, nausea or vomiting.   Leg Pain       Review of Systems   Constitutional:  Negative for activity change, appetite change, fatigue, fever and unexpected weight change.   HENT:  Negative for congestion, postnasal drip and rhinorrhea.    Eyes:  Negative for photophobia and visual disturbance.   Respiratory:  Negative for cough, shortness of breath and wheezing.    Cardiovascular:  Negative for chest pain and palpitations.   Gastrointestinal:  Positive for constipation. Negative for diarrhea, nausea and vomiting.   Genitourinary:  Negative for dysuria and frequency.   Musculoskeletal:  Positive for arthralgias and myalgias.   Skin:  Negative for rash.   Neurological:  Positive for headaches. Negative for dizziness and light-headedness.   Hematological:  Negative for adenopathy.   Psychiatric/Behavioral:  Negative for decreased concentration, dysphoric mood and sleep disturbance. The patient is not nervous/anxious.      Past Medical History:   Diagnosis Date    Brain mass     Last Assessed; 11/5/2015    Chest pain     Last Assessed: 1/22/2015    Diabetes type 2, uncontrolled     Last Assessed: 3/1/2016    Edema of left lower extremity     Last Assessed: 3/22/2017    GERD (gastroesophageal reflux disease)     Hyperlipidemia     Hypertension     Impaired fasting glucose     Last Assessed: 11/10/2015    Irregular heartbeat     Skin cancer (melanoma) (HCC)     Scalp     Varicose 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     Last Assessed: 3/22/2017     Past Surgical History:   Procedure Laterality Date    COLONOSCOPY      COLONOSCOPY N/A 7/10/2018    Procedure: COLONOSCOPY;  Surgeon: Jose Alejandro Rodrigues MD;  Location: [facility];  Service: Colorectal    INGUINAL HERNIA REPAIR      20+ years ago - [place]; Last Assessed: 10/23/2014    TONSILLECTOMY      VARICOSE VEIN SURGERY Left     x2 left leg - 40+  yrs ago, [facility] and [place]     Family History   Problem Relation Age of Onset    No Known Problems Mother     No Known Problems Father     Melanoma Daughter 17     Social History     Tobacco Use    Smoking status: Never     Passive exposure: Never    Smokeless tobacco: Never   Vaping Use    Vaping status: Never Used   Substance and Sexual Activity    Alcohol use: No    Drug use: No    Sexual activity: Not Currently     Partners: Female     Current Outpatient Medications on File Prior to Visit   Medication Sig    acetaminophen (TYLENOL) 325 mg tablet Take 2 tablets (650 mg total) by mouth every 6 (six) hours as needed for mild pain or fever    aspirin 81 MG tablet Take 81 mg by mouth daily    atorvastatin (LIPITOR) 10 mg tablet TAKE 1 TABLET BY MOUTH EVERY DAY    bisacodyl (DULCOLAX) 10 mg suppository Insert 1 suppository (10 mg total) into the rectum if needed for constipation (if no bowel movement despite miralax and lactulose use, can use suppository)    Blood Glucose Monitoring Suppl (OneTouch Verio Reflect) w/Device KIT Check blood sugars once daily. Please substitute with appropriate alternative as covered by patient's insurance. Dx: E11.65    famotidine (PEPCID) 20 mg tablet Take 1 tablet (20 mg total) by mouth 2 (two) times a day    gabapentin (Neurontin) 100 mg capsule Take 1 capsule (100 mg total) by mouth daily at bedtime    glucose blood (OneTouch Verio) test strip CHECK BLOOD SUGARS ONCE DAILY.    hydrocortisone 2.5 % cream APPLY TO AFFECTED AREA TWICE A DAY    lactulose (CHRONULAC) 10 g/15 mL solution Take 30 mL (20 g total) by mouth daily as needed (if no BM in 24 hours despite miralax, can take 20 g (30 ML) of lactulose as needed for constipation) DAILY AS NEEDED FOR SEVERE CONSTIPATION    Lancets (OneTouch Delica Plus Puscto75G) MISC TEST DAILY, E11.5--PT CONFIMED DELICA 33 G    lisinopril (ZESTRIL) 20 mg tablet TAKE 1 TABLET BY MOUTH EVERY DAY    lubiprostone (AMITIZA) 24 mcg capsule Take 1 capsule (24  "mcg total) by mouth 2 (two) times a day with meals    metFORMIN (GLUCOPHAGE) 500 mg tablet TAKE 1 TABLET BY MOUTH TWICE A DAY    methocarbamol (ROBAXIN) 500 mg tablet TAKE 1 TABLET (500 MG TOTAL) BY MOUTH DAILY AT BEDTIME    omeprazole (PriLOSEC) 40 MG capsule TAKE 1 CAPSULE (40 MG TOTAL) BY MOUTH DAILY.    OneTouch Delica Lancets 33G MISC Check blood sugars once daily. Please substitute with appropriate alternative as covered by patient's insurance. Dx: E11.65    polyethylene glycol (MIRALAX) 17 g packet Take miralax daily but can use up to 2 times per day as needed for constipation    senna-docusate sodium (SENOKOT S) 8.6-50 mg per tablet Take 2 tablets by mouth daily    tamsulosin (FLOMAX) 0.4 mg Take 1 capsule (0.4 mg total) by mouth daily with dinner (Patient not taking: Reported on 9/12/2024)     No Known Allergies  Immunization History   Administered Date(s) Administered    COVID-19 PFIZER VACCINE 0.3 ML IM 03/29/2021, 04/21/2021, 11/09/2021    COVID-19 Pfizer Vac BIVALENT Raphael-sucrose 12 Yr+ IM 11/21/2022    INFLUENZA 10/27/2015, 10/01/2016, 10/13/2016, 12/19/2018, 10/24/2022    Influenza Split High Dose Preservative Free IM 10/23/2014, 10/27/2015, 10/01/2016, 10/13/2016, 10/13/2016, 10/13/2016, 11/07/2024    Influenza, high dose seasonal 0.7 mL 12/19/2018, 10/14/2019, 10/05/2021    Pneumococcal Conjugate 13-Valent 08/25/2015, 01/19/2017, 10/14/2019    Pneumococcal Conjugate Vaccine 20-valent (Pcv20), Polysace 06/01/2023    Pneumococcal Polysaccharide PPV23 10/05/2021    Tdap 06/01/2023     Objective   /80 (BP Location: Left arm, Patient Position: Sitting, Cuff Size: Standard)   Pulse 65   Temp (!) 96.1 °F (35.6 °C) (Tympanic)   Resp 18   Ht 5' 9\" (1.753 m)   Wt 78.1 kg (172 lb 3.2 oz)   SpO2 99%   BMI 25.43 kg/m²     Physical Exam  Vitals and nursing note reviewed.   Constitutional:       Appearance: Normal appearance.   HENT:      Head: Normocephalic and atraumatic.      Right Ear: Tympanic " membrane, ear canal and external ear normal.      Left Ear: Tympanic membrane, ear canal and external ear normal.      Nose: Nose normal.      Mouth/Throat:      Mouth: Mucous membranes are moist.   Eyes:      Conjunctiva/sclera: Conjunctivae normal.   Cardiovascular:      Rate and Rhythm: Normal rate and regular rhythm.      Pulses: no weak pulses.           Dorsalis pedis pulses are 2+ on the right side and 2+ on the left side.        Posterior tibial pulses are 2+ on the right side and 2+ on the left side.      Heart sounds: Normal heart sounds.   Pulmonary:      Effort: Pulmonary effort is normal.      Breath sounds: Normal breath sounds.   Abdominal:      General: Bowel sounds are normal.   Musculoskeletal:         General: Normal range of motion.      Cervical back: Normal range of motion and neck supple.   Feet:      Right foot:      Skin integrity: Dry skin present. No ulcer, skin breakdown, erythema, warmth or callus.      Left foot:      Skin integrity: Dry skin present. No ulcer, skin breakdown, erythema, warmth or callus.   Skin:     General: Skin is warm and dry.      Capillary Refill: Capillary refill takes less than 2 seconds.   Neurological:      General: No focal deficit present.      Mental Status: He is alert and oriented to person, place, and time.   Psychiatric:         Mood and Affect: Mood normal.         Behavior: Behavior normal.         Thought Content: Thought content normal.         Judgment: Judgment normal.     BMI Counseling: Body mass index is 25.43 kg/m². The BMI is above normal. Nutrition recommendations include reducing portion sizes, decreasing overall calorie intake, 3-5 servings of fruits/vegetables daily, reducing fast food intake, consuming healthier snacks, decreasing soda and/or juice intake, moderation in carbohydrate intake, increasing intake of lean protein, reducing intake of saturated fat and trans fat, and reducing intake of cholesterol. Exercise recommendations  include moderate aerobic physical activity for 150 minutes/week, exercising 3-5 times per week, and strength training exercises.

## 2025-02-20 NOTE — ASSESSMENT & PLAN NOTE
Lab Results   Component Value Date    HGBA1C 5.8 02/20/2025       Orders:    POCT hemoglobin A1c

## 2025-02-21 ENCOUNTER — OFFICE VISIT (OUTPATIENT)
Dept: CARDIOLOGY CLINIC | Facility: CLINIC | Age: 80
End: 2025-02-21
Payer: COMMERCIAL

## 2025-02-21 VITALS
HEART RATE: 66 BPM | WEIGHT: 171.2 LBS | BODY MASS INDEX: 25.28 KG/M2 | SYSTOLIC BLOOD PRESSURE: 136 MMHG | DIASTOLIC BLOOD PRESSURE: 72 MMHG | OXYGEN SATURATION: 98 %

## 2025-02-21 DIAGNOSIS — I10 PRIMARY HYPERTENSION: Primary | ICD-10-CM

## 2025-02-21 DIAGNOSIS — D32.9 MENINGIOMA (HCC): ICD-10-CM

## 2025-02-21 DIAGNOSIS — I30.0 IDIOPATHIC PERICARDITIS, UNSPECIFIED CHRONICITY: ICD-10-CM

## 2025-02-21 DIAGNOSIS — E78.5 DYSLIPIDEMIA: ICD-10-CM

## 2025-02-21 PROCEDURE — 99214 OFFICE O/P EST MOD 30 MIN: CPT | Performed by: INTERNAL MEDICINE

## 2025-02-21 RX ORDER — COLCHICINE 0.6 MG/1
0.6 TABLET ORAL DAILY
Qty: 90 TABLET | Refills: 3 | Status: SHIPPED | OUTPATIENT
Start: 2025-02-21

## 2025-02-21 NOTE — PROGRESS NOTES
Cardiology   Zaira Morley 79 y.o. male MRN: [id number]      IImpression:  1. Chest pain - atypical.  Unclear etiology. Does not appear coronary in etiology. Worsening.   2. Hypertension - now occasionally lightheaded.   3. Dyslipidemia - on statin.   4. Palpitations - resolved.      Recommendations:  1. Start colchicine 0.6 mg daily.  2. Continue remainder of medications.  3. Increase fluid intake to 3-4 bottles a day.  4. Follow up in 3 months.         HPI: Zaira Morley is a 79 y.o. year old male with meningioma, chest pain, GERD, hypertension, who presents for follow up.. Pharm stress test 2022 - no ischemia. Echo 2022 - normal cardiac function, mild AI/MR. Has chest pain every other day.  Lasts for several minutes.  Dull.  No shortness of breath or palpitations. Has LLE edema.         Review of Systems   Constitutional: Negative.    HENT: Negative.     Eyes: Negative.    Respiratory:  Negative for chest tightness and shortness of breath.    Cardiovascular:  Positive for chest pain. Negative for palpitations and leg swelling.   Gastrointestinal: Negative.    Endocrine: Negative.    Genitourinary: Negative.    Musculoskeletal: Negative.    Skin: Negative.    Allergic/Immunologic: Negative.    Neurological:  Positive for light-headedness.   Hematological: Negative.    Psychiatric/Behavioral: Negative.     All other systems reviewed and are negative.        Past Medical History:   Diagnosis Date    Brain mass     Last Assessed; 11/5/2015    Chest pain     Last Assessed: 1/22/2015    Diabetes type 2, uncontrolled     Last Assessed: 3/1/2016    Edema of left lower extremity     Last Assessed: 3/22/2017    GERD (gastroesophageal reflux disease)     Hyperlipidemia     Hypertension     Impaired fasting glucose     Last Assessed: 11/10/2015    Irregular heartbeat     Skin cancer (melanoma) (HCC)     Scalp     Varicose 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     Last Assessed: 3/22/2017     Past Surgical History:    Procedure Laterality Date    COLONOSCOPY      COLONOSCOPY N/A 7/10/2018    Procedure: COLONOSCOPY;  Surgeon: Jose Alejandro Rodrigues MD;  Location: [facility];  Service: Colorectal    INGUINAL HERNIA REPAIR      20+ years ago - [place]; Last Assessed: 10/23/2014    TONSILLECTOMY      VARICOSE VEIN SURGERY Left     x2 left leg - 40+ yrs ago, [place] and [place]     Social History     Substance and Sexual Activity   Alcohol Use No     Social History     Substance and Sexual Activity   Drug Use No     Social History     Tobacco Use   Smoking Status Never    Passive exposure: Never   Smokeless Tobacco Never     Family History   Problem Relation Age of Onset    No Known Problems Mother     No Known Problems Father     Melanoma Daughter 17       Allergies:  No Known Allergies    Medications:     Current Outpatient Medications:     acetaminophen (TYLENOL) 325 mg tablet, Take 2 tablets (650 mg total) by mouth every 6 (six) hours as needed for mild pain or fever, Disp: 30 tablet, Rfl: 0    aspirin 81 MG tablet, Take 81 mg by mouth daily, Disp: , Rfl:     atorvastatin (LIPITOR) 10 mg tablet, TAKE 1 TABLET BY MOUTH EVERY DAY, Disp: 90 tablet, Rfl: 0    bisacodyl (DULCOLAX) 10 mg suppository, Insert 1 suppository (10 mg total) into the rectum if needed for constipation (if no bowel movement despite miralax and lactulose use, can use suppository), Disp: 12 suppository, Rfl: 0    Blood Glucose Monitoring Suppl (OneTouch Verio Reflect) w/Device KIT, Check blood sugars once daily. Please substitute with appropriate alternative as covered by patient's insurance. Dx: E11.65, Disp: 1 kit, Rfl: 0    famotidine (PEPCID) 20 mg tablet, Take 1 tablet (20 mg total) by mouth 2 (two) times a day, Disp: 180 tablet, Rfl: 1    gabapentin (Neurontin) 100 mg capsule, Take 1 capsule (100 mg total) by mouth daily at bedtime, Disp: 30 capsule, Rfl: 2    glucose blood (OneTouch Verio) test strip, CHECK BLOOD SUGARS ONCE DAILY., Disp: 100 strip, Rfl: 1     hydrocortisone 2.5 % cream, APPLY TO AFFECTED AREA TWICE A DAY, Disp: 28.35 g, Rfl: 0    lactulose (CHRONULAC) 10 g/15 mL solution, Take 30 mL (20 g total) by mouth daily as needed (if no BM in 24 hours despite miralax, can take 20 g (30 ML) of lactulose as needed for constipation) DAILY AS NEEDED FOR SEVERE CONSTIPATION, Disp: 473 mL, Rfl: 0    Lancets (OneTouch Delica Plus [id number]) MISC, TEST DAILY, E11.5--PT CONFIMED DELICA 33 G, Disp: 100 each, Rfl: 11    lisinopril (ZESTRIL) 20 mg tablet, TAKE 1 TABLET BY MOUTH EVERY DAY, Disp: 90 tablet, Rfl: 1    lubiprostone (AMITIZA) 24 mcg capsule, Take 1 capsule (24 mcg total) by mouth 2 (two) times a day with meals, Disp: 60 capsule, Rfl: 11    metFORMIN (GLUCOPHAGE) 500 mg tablet, TAKE 1 TABLET BY MOUTH TWICE A DAY, Disp: 180 tablet, Rfl: 1    methocarbamol (ROBAXIN) 500 mg tablet, TAKE 1 TABLET (500 MG TOTAL) BY MOUTH DAILY AT BEDTIME, Disp: 30 tablet, Rfl: 0    omeprazole (PriLOSEC) 40 MG capsule, TAKE 1 CAPSULE (40 MG TOTAL) BY MOUTH DAILY., Disp: 90 capsule, Rfl: 1    OneTouch Delica Lancets 33G MISC, Check blood sugars once daily. Please substitute with appropriate alternative as covered by patient's insurance. Dx: E11.65, Disp: 100 each, Rfl: 3    polyethylene glycol (MIRALAX) 17 g packet, Take miralax daily but can use up to 2 times per day as needed for constipation, Disp: , Rfl:     senna-docusate sodium (SENOKOT S) 8.6-50 mg per tablet, Take 2 tablets by mouth daily, Disp: 60 tablet, Rfl: 0    tamsulosin (FLOMAX) 0.4 mg, Take 1 capsule (0.4 mg total) by mouth daily with dinner (Patient not taking: Reported on 9/12/2024), Disp: 30 capsule, Rfl: 1      Wt Readings from Last 3 Encounters:   02/21/25 77.7 kg (171 lb 3.2 oz)   02/20/25 78.1 kg (172 lb 3.2 oz)   01/15/25 75.2 kg (165 lb 12.8 oz)     Temp Readings from Last 3 Encounters:   02/20/25 (!) 96.1 °F (35.6 °C) (Tympanic)   01/15/25 (!) 96.8 °F (36 °C) (Tympanic)   12/11/24 (!) 96.7 °F (35.9 °C) (Tympanic)      BP Readings from Last 3 Encounters:   02/21/25 136/72   02/20/25 150/80   01/15/25 118/76     Pulse Readings from Last 3 Encounters:   02/21/25 66   02/20/25 65   01/15/25 61         Physical Exam  HENT:      Head: Atraumatic.      Mouth/Throat:      Mouth: Mucous membranes are moist.   Eyes:      Extraocular Movements: Extraocular movements intact.   Cardiovascular:      Rate and Rhythm: Normal rate and regular rhythm.      Heart sounds: Normal heart sounds.   Pulmonary:      Effort: Pulmonary effort is normal.      Breath sounds: Normal breath sounds.   Abdominal:      General: Abdomen is flat.   Musculoskeletal:         General: Normal range of motion.      Cervical back: Normal range of motion.   Skin:     General: Skin is warm.   Neurological:      General: No focal deficit present.      Mental Status: He is alert and oriented to person, place, and time.   Psychiatric:         Mood and Affect: Mood normal.         Behavior: Behavior normal.           Laboratory Studies:  CMP:  Lab Results   Component Value Date     10/28/2015    K 4.5 12/26/2024     12/26/2024    CO2 31 12/26/2024    ANIONGAP 6 10/28/2015    BUN 17 12/26/2024    CREATININE 0.76 12/26/2024    GLUCOSE 121 10/28/2015    AST 12 (L) 12/26/2024    ALT 7 12/26/2024    BILITOT 0.53 08/27/2015    EGFR 87 12/26/2024       Lipid Profile:   Lab Results   Component Value Date    CHOL 154 08/27/2015     Lab Results   Component Value Date    HDL 54 09/16/2024     Lab Results   Component Value Date    LDLCALC 70 09/16/2024     Lab Results   Component Value Date    TRIG 53 09/16/2024       Cardiac testing:   EKG reviewed personally:   No results found for this or any previous visit.    No results found for this or any previous visit.    No results found for this or any previous visit.    No results found for this or any previous visit.

## 2025-02-21 NOTE — PATIENT INSTRUCTIONS
Recommendations:  1. Start colchicine 0.6 mg daily.  2. Continue remainder of medications.  3. Increase fluid intake to 3-4 bottles a day.  4. Follow up in 3 months.

## 2025-04-03 ENCOUNTER — APPOINTMENT (EMERGENCY)
Dept: RADIOLOGY | Facility: HOSPITAL | Age: 80
DRG: 177 | End: 2025-04-03
Payer: COMMERCIAL

## 2025-04-03 ENCOUNTER — HOSPITAL ENCOUNTER (INPATIENT)
Facility: HOSPITAL | Age: 80
LOS: 4 days | Discharge: HOME HEALTH | DRG: 177 | End: 2025-04-07
Attending: EMERGENCY MEDICINE | Admitting: INTERNAL MEDICINE
Payer: COMMERCIAL

## 2025-04-03 DIAGNOSIS — U07.1 COVID-19 VIRUS INFECTION: ICD-10-CM

## 2025-04-03 DIAGNOSIS — R26.2 AMBULATORY DYSFUNCTION: ICD-10-CM

## 2025-04-03 DIAGNOSIS — R93.0 ABNORMAL HEAD CT: ICD-10-CM

## 2025-04-03 DIAGNOSIS — G93.41 METABOLIC ENCEPHALOPATHY: ICD-10-CM

## 2025-04-03 DIAGNOSIS — R53.1 GENERALIZED WEAKNESS: Primary | ICD-10-CM

## 2025-04-03 DIAGNOSIS — R07.89 CHEST WALL PAIN: ICD-10-CM

## 2025-04-03 DIAGNOSIS — Z78.1 NONVIOLENT BEHAVIOR WITH RESTRAINT USE: ICD-10-CM

## 2025-04-03 DIAGNOSIS — R46.89 NONVIOLENT BEHAVIOR WITH RESTRAINT USE: ICD-10-CM

## 2025-04-03 LAB
ALBUMIN SERPL BCG-MCNC: 4.2 G/DL (ref 3.5–5)
ALP SERPL-CCNC: 96 U/L (ref 34–104)
ALT SERPL W P-5'-P-CCNC: 10 U/L (ref 7–52)
ANION GAP SERPL CALCULATED.3IONS-SCNC: 7 MMOL/L (ref 4–13)
AST SERPL W P-5'-P-CCNC: 15 U/L (ref 13–39)
ATRIAL RATE: 59 BPM
BASOPHILS # BLD AUTO: 0.01 THOUSANDS/ÂΜL (ref 0–0.1)
BASOPHILS NFR BLD AUTO: 0 % (ref 0–1)
BILIRUB SERPL-MCNC: 0.62 MG/DL (ref 0.2–1)
BUN SERPL-MCNC: 18 MG/DL (ref 5–25)
CALCIUM SERPL-MCNC: 8.6 MG/DL (ref 8.4–10.2)
CHLORIDE SERPL-SCNC: 103 MMOL/L (ref 96–108)
CO2 SERPL-SCNC: 26 MMOL/L (ref 21–32)
CREAT SERPL-MCNC: 0.8 MG/DL (ref 0.6–1.3)
EOSINOPHIL # BLD AUTO: 0.02 THOUSAND/ÂΜL (ref 0–0.61)
EOSINOPHIL NFR BLD AUTO: 1 % (ref 0–6)
ERYTHROCYTE [DISTWIDTH] IN BLOOD BY AUTOMATED COUNT: 13.2 % (ref 11.6–15.1)
FLUAV RNA RESP QL NAA+PROBE: NEGATIVE
FLUBV RNA RESP QL NAA+PROBE: NEGATIVE
GFR SERPL CREATININE-BSD FRML MDRD: 85 ML/MIN/1.73SQ M
GLUCOSE SERPL-MCNC: 103 MG/DL (ref 65–140)
GLUCOSE SERPL-MCNC: 130 MG/DL (ref 65–140)
HCT VFR BLD AUTO: 36.7 % (ref 36.5–49.3)
HGB BLD-MCNC: 12.2 G/DL (ref 12–17)
IMM GRANULOCYTES # BLD AUTO: 0.01 THOUSAND/UL (ref 0–0.2)
IMM GRANULOCYTES NFR BLD AUTO: 0 % (ref 0–2)
LYMPHOCYTES # BLD AUTO: 0.69 THOUSANDS/ÂΜL (ref 0.6–4.47)
LYMPHOCYTES NFR BLD AUTO: 25 % (ref 14–44)
MCH RBC QN AUTO: 29.5 PG (ref 26.8–34.3)
MCHC RBC AUTO-ENTMCNC: 33.2 G/DL (ref 31.4–37.4)
MCV RBC AUTO: 89 FL (ref 82–98)
MONOCYTES # BLD AUTO: 0.62 THOUSAND/ÂΜL (ref 0.17–1.22)
MONOCYTES NFR BLD AUTO: 23 % (ref 4–12)
NEUTROPHILS # BLD AUTO: 1.39 THOUSANDS/ÂΜL (ref 1.85–7.62)
NEUTS SEG NFR BLD AUTO: 51 % (ref 43–75)
NRBC BLD AUTO-RTO: 0 /100 WBCS
P AXIS: -8 DEGREES
PLATELET # BLD AUTO: 115 THOUSANDS/UL (ref 149–390)
PMV BLD AUTO: 9.6 FL (ref 8.9–12.7)
POTASSIUM SERPL-SCNC: 3.7 MMOL/L (ref 3.5–5.3)
PR INTERVAL: 162 MS
PROT SERPL-MCNC: 6.5 G/DL (ref 6.4–8.4)
QRS AXIS: 9 DEGREES
QRSD INTERVAL: 88 MS
QT INTERVAL: 440 MS
QTC INTERVAL: 436 MS
RBC # BLD AUTO: 4.13 MILLION/UL (ref 3.88–5.62)
RSV RNA RESP QL NAA+PROBE: NEGATIVE
SARS-COV-2 RNA RESP QL NAA+PROBE: POSITIVE
SODIUM SERPL-SCNC: 136 MMOL/L (ref 135–147)
T WAVE AXIS: -29 DEGREES
VENTRICULAR RATE: 59 BPM
WBC # BLD AUTO: 2.74 THOUSAND/UL (ref 4.31–10.16)

## 2025-04-03 PROCEDURE — 99285 EMERGENCY DEPT VISIT HI MDM: CPT | Performed by: EMERGENCY MEDICINE

## 2025-04-03 PROCEDURE — 70450 CT HEAD/BRAIN W/O DYE: CPT

## 2025-04-03 PROCEDURE — 72125 CT NECK SPINE W/O DYE: CPT

## 2025-04-03 PROCEDURE — 93005 ELECTROCARDIOGRAM TRACING: CPT

## 2025-04-03 PROCEDURE — 71250 CT THORAX DX C-: CPT

## 2025-04-03 PROCEDURE — 0241U HB NFCT DS VIR RESP RNA 4 TRGT: CPT

## 2025-04-03 PROCEDURE — 93010 ELECTROCARDIOGRAM REPORT: CPT | Performed by: INTERNAL MEDICINE

## 2025-04-03 PROCEDURE — 36415 COLL VENOUS BLD VENIPUNCTURE: CPT | Performed by: INTERNAL MEDICINE

## 2025-04-03 PROCEDURE — 73564 X-RAY EXAM KNEE 4 OR MORE: CPT

## 2025-04-03 PROCEDURE — 80053 COMPREHEN METABOLIC PANEL: CPT | Performed by: INTERNAL MEDICINE

## 2025-04-03 PROCEDURE — 99285 EMERGENCY DEPT VISIT HI MDM: CPT

## 2025-04-03 PROCEDURE — 99223 1ST HOSP IP/OBS HIGH 75: CPT | Performed by: INTERNAL MEDICINE

## 2025-04-03 PROCEDURE — 82948 REAGENT STRIP/BLOOD GLUCOSE: CPT

## 2025-04-03 PROCEDURE — 85025 COMPLETE CBC W/AUTO DIFF WBC: CPT | Performed by: INTERNAL MEDICINE

## 2025-04-03 PROCEDURE — 96372 THER/PROPH/DIAG INJ SC/IM: CPT

## 2025-04-03 RX ORDER — GABAPENTIN 100 MG/1
100 CAPSULE ORAL
Status: DISCONTINUED | OUTPATIENT
Start: 2025-04-03 | End: 2025-04-07 | Stop reason: HOSPADM

## 2025-04-03 RX ORDER — LUBIPROSTONE 24 UG/1
24 CAPSULE ORAL 2 TIMES DAILY WITH MEALS
Status: DISCONTINUED | OUTPATIENT
Start: 2025-04-04 | End: 2025-04-07 | Stop reason: HOSPADM

## 2025-04-03 RX ORDER — LISINOPRIL 20 MG/1
20 TABLET ORAL DAILY
Status: DISCONTINUED | OUTPATIENT
Start: 2025-04-04 | End: 2025-04-07 | Stop reason: HOSPADM

## 2025-04-03 RX ORDER — ATORVASTATIN CALCIUM 10 MG/1
10 TABLET, FILM COATED ORAL DAILY
Status: DISCONTINUED | OUTPATIENT
Start: 2025-04-04 | End: 2025-04-07 | Stop reason: HOSPADM

## 2025-04-03 RX ORDER — KETOROLAC TROMETHAMINE 30 MG/ML
15 INJECTION, SOLUTION INTRAMUSCULAR; INTRAVENOUS ONCE
Status: COMPLETED | OUTPATIENT
Start: 2025-04-03 | End: 2025-04-03

## 2025-04-03 RX ORDER — FAMOTIDINE 20 MG/1
20 TABLET, FILM COATED ORAL 2 TIMES DAILY
Status: DISCONTINUED | OUTPATIENT
Start: 2025-04-04 | End: 2025-04-07 | Stop reason: HOSPADM

## 2025-04-03 RX ORDER — PANTOPRAZOLE SODIUM 40 MG/1
40 TABLET, DELAYED RELEASE ORAL
Status: DISCONTINUED | OUTPATIENT
Start: 2025-04-04 | End: 2025-04-07 | Stop reason: HOSPADM

## 2025-04-03 RX ORDER — INSULIN LISPRO 100 [IU]/ML
1-5 INJECTION, SOLUTION INTRAVENOUS; SUBCUTANEOUS
Status: DISCONTINUED | OUTPATIENT
Start: 2025-04-03 | End: 2025-04-07 | Stop reason: HOSPADM

## 2025-04-03 RX ORDER — ENOXAPARIN SODIUM 100 MG/ML
40 INJECTION SUBCUTANEOUS DAILY
Status: DISCONTINUED | OUTPATIENT
Start: 2025-04-04 | End: 2025-04-07 | Stop reason: HOSPADM

## 2025-04-03 RX ORDER — ACETAMINOPHEN 325 MG/1
650 TABLET ORAL EVERY 6 HOURS PRN
Status: DISCONTINUED | OUTPATIENT
Start: 2025-04-03 | End: 2025-04-07 | Stop reason: HOSPADM

## 2025-04-03 RX ORDER — INSULIN LISPRO 100 [IU]/ML
1-6 INJECTION, SOLUTION INTRAVENOUS; SUBCUTANEOUS
Status: DISCONTINUED | OUTPATIENT
Start: 2025-04-04 | End: 2025-04-07 | Stop reason: HOSPADM

## 2025-04-03 RX ORDER — ASPIRIN 81 MG/1
81 TABLET ORAL DAILY
Status: DISCONTINUED | OUTPATIENT
Start: 2025-04-04 | End: 2025-04-07 | Stop reason: HOSPADM

## 2025-04-03 RX ORDER — AMOXICILLIN 250 MG
2 CAPSULE ORAL DAILY
Status: DISCONTINUED | OUTPATIENT
Start: 2025-04-04 | End: 2025-04-07 | Stop reason: HOSPADM

## 2025-04-03 RX ORDER — ONDANSETRON 2 MG/ML
4 INJECTION INTRAMUSCULAR; INTRAVENOUS EVERY 6 HOURS PRN
Status: DISCONTINUED | OUTPATIENT
Start: 2025-04-03 | End: 2025-04-07 | Stop reason: HOSPADM

## 2025-04-03 RX ORDER — ACETAMINOPHEN 325 MG/1
975 TABLET ORAL ONCE
Status: COMPLETED | OUTPATIENT
Start: 2025-04-03 | End: 2025-04-03

## 2025-04-03 RX ORDER — BISACODYL 10 MG
10 SUPPOSITORY, RECTAL RECTAL DAILY
Status: DISCONTINUED | OUTPATIENT
Start: 2025-04-04 | End: 2025-04-07 | Stop reason: HOSPADM

## 2025-04-03 RX ORDER — METHOCARBAMOL 500 MG/1
500 TABLET, FILM COATED ORAL
Status: DISCONTINUED | OUTPATIENT
Start: 2025-04-03 | End: 2025-04-07 | Stop reason: HOSPADM

## 2025-04-03 RX ADMIN — KETOROLAC TROMETHAMINE 15 MG: 30 INJECTION, SOLUTION INTRAMUSCULAR; INTRAVENOUS at 18:40

## 2025-04-03 RX ADMIN — GABAPENTIN 100 MG: 100 CAPSULE ORAL at 23:06

## 2025-04-03 RX ADMIN — METHOCARBAMOL 500 MG: 500 TABLET ORAL at 23:06

## 2025-04-03 RX ADMIN — ACETAMINOPHEN 975 MG: 325 TABLET, FILM COATED ORAL at 18:40

## 2025-04-03 NOTE — ED PROVIDER NOTES
Time reflects when diagnosis was documented in both MDM as applicable and the Disposition within this note       Time User Action Codes Description Comment    4/3/2025 10:20 PM Krissy Polo Add [R53.1] Generalized weakness     4/3/2025 10:20 PM Krissy Polo Add [R26.2] Ambulatory dysfunction     4/3/2025 10:21 PM Krissy Polo Add [U07.1] COVID-19 virus infection     4/3/2025 10:21 PM Krissy Polo Add [R07.89] Chest wall pain     4/3/2025 10:21 PM Krissy Polo Add [R93.0] Abnormal head CT           ED Disposition       ED Disposition   Admit    Condition   Stable    Date/Time   Thu Apr 3, 2025 10:20 PM    Comment   Case was discussed with Dr. Feldman and the patient's admission status was agreed to be Admission Status: inpatient status to the service of Dr. Feldman .               Assessment & Plan       Medical Decision Making  Amount and/or Complexity of Data Reviewed  Labs:  Decision-making details documented in ED Course.  Radiology: ordered and independent interpretation performed. Decision-making details documented in ED Course.  ECG/medicine tests:  Decision-making details documented in ED Course.    Risk  OTC drugs.  Prescription drug management.  Decision regarding hospitalization.      Patient is a 79 y.o. male with a past medical history of brain mass and diabetes presenting for dizziness and a fall outside the hospital when he arrived to visit his wife.      Vital signs stable.. On exam left rib pain, bilateral knee pain and swelling.    History and physical exam most consistent with dizziness and ambulatory dysfunction. However, differential diagnosis included but not limited to arrhythmia, fracture, COVID/flu/RSV.     Plan: CT head, CT C-spine, CT chest, Tylenol, Toradol, bilateral knee x-ray, COVID/flu/RSV, EKG    View ED course for further discussion on patient workup.    All labs reviewed and utilized in the medical decision making process  All radiology studies independently viewed by me  "and interpreted by the radiologist.  I reviewed all testing with the patient.     Upon re-evaluation patient is resting comfortably in no acute distress.    Disposition: Admitted to [facility] for ambulatory dysfunction and dizziness.    Portions of the record may have been created with voice recognition software. Occasional wrong word or \"sound a like\" substitutions may have occurred due to the inherent limitations of voice recognition software. Read the chart carefully and recognize, using context, where substitutions have occurred.    ED Course as of 04/07/25 1432   Thu Apr 03, 2025 2000 ECG 12 lead  EKG limited by artifact likely secondary to patient movement.  Mildly slow rate, regular rhythm, normal axis.  P waves present.  No obvious ST segment abnormalities.  Low voltage QRS complex present in aVF EKG interpreted as sinus bradycardia.  EKG similar to prior from April 2021.   2018 CT chest wo contrast  IMPRESSION:     No acute displaced left rib fracture but the lower ribs are incompletely imaged.     No acute pulmonary disease     2043 CT head without contrast  IMPRESSION:     No acute displaced left rib fracture but the lower ribs are incompletely imaged.     No acute pulmonary disease     2043 CT spine cervical without contrast  IMPRESSION:     No cervical spine fracture or traumatic malalignment     2109 SARS-COV-2(!): Positive   2211 Patient reassessed and is resting comfortably in stretcher.  Shared decision making with patient and family.  Patient does not feel stable walking due to recurrent dizziness.  Family agrees.       Medications   ketorolac (TORADOL) injection 15 mg (15 mg Intramuscular Given 4/3/25 1840)   acetaminophen (TYLENOL) tablet 975 mg (975 mg Oral Given 4/3/25 1840)       ED Risk Strat Scores                            SBIRT 22yo+      Flowsheet Row Most Recent Value   Initial Alcohol Screen: US AUDIT-C     1. How often do you have a drink containing alcohol? 0 Filed at: 04/03/2025 1757 "   2. How many drinks containing alcohol do you have on a typical day you are drinking?  0 Filed at: 04/03/2025 1757   3a. Male UNDER 65: How often do you have five or more drinks on one occasion? 0 Filed at: 04/03/2025 1757   3b. FEMALE Any Age, or MALE 65+: How often do you have 4 or more drinks on one occassion? 0 Filed at: 04/03/2025 1757   Audit-C Score 0 Filed at: 04/03/2025 1757   GÉNESIS: How many times in the past year have you...    Used an illegal drug or used a prescription medication for non-medical reasons? Never Filed at: 04/03/2025 1757                            History of Present Illness       Chief Complaint   Patient presents with    Fall     Pt reports feeling dizzy and slipped/ fell. Unknown HS, unknown thinners. Complains of rib, neck, knee, and back pain.        Past Medical History:   Diagnosis Date    Brain mass     Last Assessed; 11/5/2015    Chest pain     Last Assessed: 1/22/2015    Diabetes type 2, uncontrolled     Last Assessed: 3/1/2016    Edema of left lower extremity     Last Assessed: 3/22/2017    GERD (gastroesophageal reflux disease)     Hyperlipidemia     Hypertension     Impaired fasting glucose     Last Assessed: 11/10/2015    Irregular heartbeat     Skin cancer (melanoma) (HCC)     Scalp     Varicose 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     Last Assessed: 3/22/2017      Past Surgical History:   Procedure Laterality Date    COLONOSCOPY      COLONOSCOPY N/A 7/10/2018    Procedure: COLONOSCOPY;  Surgeon: Jose Alejandro Rodrigues MD;  Location: [facility];  Service: Colorectal    INGUINAL HERNIA REPAIR      20+ years ago - [place]; Last Assessed: 10/23/2014    TONSILLECTOMY      VARICOSE VEIN SURGERY Left     x2 left leg - 40+ yrs ago, [place] and [place]      Family History   Problem Relation Age of Onset    No Known Problems Mother     No Known Problems Father     Melanoma Daughter 17      Social History     Tobacco Use    Smoking status: Never     Passive exposure: Never    Smokeless tobacco:  Never   Vaping Use    Vaping status: Never Used   Substance Use Topics    Alcohol use: No    Drug use: No      E-Cigarette/Vaping    E-Cigarette Use Never User       E-Cigarette/Vaping Substances    Nicotine No     THC No     CBD No     Flavoring No     Other No     Unknown No       I have reviewed and agree with the history as documented.       Fall  Associated symptoms: back pain and neck pain      Patient is a 79-year-old male with a past medical history of brain mass and diabetes presenting for dizziness and a fall outside the hospital when he arrived to visit his wife.  Patient is a poor historian.  Patient and family deny syncope and blood thinner use.  Patient and family are unsure if patient struck head or loss consciousness.  He is currently complaining of neck pain, bilateral knee pain and swelling, neck pain, and left rib pain.  Patient denies vomiting.    Review of Systems   Musculoskeletal:  Positive for arthralgias, back pain, myalgias and neck pain.   All other systems reviewed and are negative.          Objective       ED Triage Vitals [04/03/25 1804]   Temperature Pulse Blood Pressure Respirations SpO2 Patient Position - Orthostatic VS   (!) 97.3 °F (36.3 °C) 66 145/72 18 97 % Lying      Temp Source Heart Rate Source BP Location FiO2 (%) Pain Score    Oral Monitor Right arm -- --      Vitals      Date and Time Temp Pulse SpO2 Resp BP Pain Score FACES Pain Rating User   04/03/25 2003 -- 57 99 % 20 175/84 -- -- OO   04/03/25 1804 97.3 °F (36.3 °C) 66 97 % 18 145/72 -- -- CM            Physical Exam  Vitals and nursing note reviewed.   Constitutional:       General: He is not in acute distress.     Appearance: Normal appearance. He is well-developed. He is not ill-appearing or toxic-appearing.   HENT:      Head: Normocephalic and atraumatic.   Eyes:      General: No scleral icterus.        Right eye: No discharge.         Left eye: No discharge.      Extraocular Movements: Extraocular movements intact.       Conjunctiva/sclera: Conjunctivae normal.      Pupils: Pupils are equal, round, and reactive to light.   Cardiovascular:      Rate and Rhythm: Normal rate and regular rhythm.      Pulses: Normal pulses.      Heart sounds: Normal heart sounds. No murmur heard.  Pulmonary:      Effort: Pulmonary effort is normal. No respiratory distress.      Breath sounds: Normal breath sounds. No stridor. No wheezing, rhonchi or rales.   Chest:      Chest wall: Tenderness present.       Abdominal:      General: Bowel sounds are normal. There is no distension.      Palpations: Abdomen is soft.      Tenderness: There is no abdominal tenderness. There is no right CVA tenderness, left CVA tenderness, guarding or rebound. Negative signs include Ryan's sign and McBurney's sign.   Musculoskeletal:         General: No swelling.      Cervical back: Normal range of motion and neck supple. Tenderness (Right-sided) present. No bony tenderness.      Thoracic back: No tenderness or bony tenderness.      Lumbar back: No tenderness or bony tenderness.      Right lower leg: Swelling and tenderness present. No bony tenderness. No edema.      Left lower leg: Swelling and tenderness present. No bony tenderness. No edema.   Skin:     General: Skin is warm and dry.      Capillary Refill: Capillary refill takes less than 2 seconds.      Coloration: Skin is not jaundiced.      Findings: No erythema.   Neurological:      General: No focal deficit present.      Mental Status: He is alert and oriented to person, place, and time.      Cranial Nerves: Cranial nerves 2-12 are intact. No cranial nerve deficit.      Sensory: Sensation is intact. No sensory deficit.      Motor: Motor function is intact. No weakness.   Psychiatric:         Mood and Affect: Mood normal.         Behavior: Behavior normal.         Thought Content: Thought content normal.         Judgment: Judgment normal.         Results Reviewed       Procedure Component Value Units Date/Time     FLU/RSV/COVID - if FLU/RSV clinically relevant (2hr TAT) [[id number]]  (Abnormal) Collected: 04/03/25 1840    Lab Status: Final result Specimen: Nares from Nose Updated: 04/03/25 2054     SARS-CoV-2 Positive     INFLUENZA A PCR Negative     INFLUENZA B PCR Negative     RSV PCR Negative    Narrative:      This test has been performed using the CoV-2/Flu/RSV plus assay on the [employer] GeneXpert platform. This test has been validated by the  and verified by the performing laboratory.     This test is designed to amplify and detect the following: nucleocapsid (N), envelope (E), and RNA-dependent RNA polymerase (RdRP) genes of the SARS-CoV-2 genome; matrix (M), basic polymerase (PB2), and acidic protein (PA) segments of the influenza A genome; matrix (M) and non-structural protein (NS) segments of the influenza B genome, and the nucleocapsid genes of RSV A and RSV B.     Positive results are indicative of the presence of Flu A, Flu B, RSV, and/or SARS-CoV-2 RNA. Positive results for SARS-CoV-2 or suspected novel influenza should be reported to state, local, or federal health departments according to local reporting requirements.      All results should be assessed in conjunction with clinical presentation and other laboratory markers for clinical management.     FOR PEDIATRIC PATIENTS - copy/paste COVID Guidelines URL to browser: [email or web address]               CT head without contrast   Final Interpretation by Krzysztof Beal MD (04/03 2022)      No acute intracranial abnormality.   Unchanged moderate chronic microangiopathic change.   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      The study was marked in EPIC for immediate notification.                  Workstation performed: [id number]         CT spine cervical without contrast   Final Interpretation by Krzysztof Beal MD (04/03  2027)      No cervical spine fracture or traumatic malalignment.                  Workstation performed: [id number]         CT chest wo contrast   Final Interpretation by Roxanne Negron MD (04/03 2013)      No acute displaced left rib fracture but the lower ribs are incompletely imaged.      No acute pulmonary disease.         Workstation performed: [id number]         XR knee 4+ vw left injury   ED Interpretation by Esteban Daniels DO (04/03 2211)   Arthritic changes, no acute osseous abnormality.      XR knee 4+ vw right injury   ED Interpretation by Esteban Daniels DO (04/03 2211)   Arthritic changes, no acute osseous abnormality          Procedures    ED Medication and Procedure Management   Prior to Admission Medications   Prescriptions Last Dose Informant Patient Reported? Taking?   Blood Glucose Monitoring Suppl (OneTouch Verio Reflect) w/Device KIT  Family Member No No   Sig: Check blood sugars once daily. Please substitute with appropriate alternative as covered by patient's insurance. Dx: E11.65   Lancets (OneTouch Delica Plus [id number]) MISC  Child, Family Member No No   Sig: TEST DAILY, E11.5--PT CONFIMED DELICA 33 G   OneTouch Delica Lancets 33G MISC  Child, Family Member No No   Sig: Check blood sugars once daily. Please substitute with appropriate alternative as covered by patient's insurance. Dx: E11.65   acetaminophen (TYLENOL) 325 mg tablet  Child, Family Member No No   Sig: Take 2 tablets (650 mg total) by mouth every 6 (six) hours as needed for mild pain or fever   aspirin 81 MG tablet  Child, Family Member Yes No   Sig: Take 81 mg by mouth daily   atorvastatin (LIPITOR) 10 mg tablet  Family Member No No   Sig: TAKE 1 TABLET BY MOUTH EVERY DAY   bisacodyl (DULCOLAX) 10 mg suppository  Child, Family Member No No   Sig: Insert 1 suppository (10 mg total) into the rectum if needed for constipation (if no bowel movement despite miralax and lactulose use, can use suppository)   colchicine (COLCRYS) 0.6  mg tablet   No No   Sig: Take 1 tablet (0.6 mg total) by mouth daily   famotidine (PEPCID) 20 mg tablet  Family Member No No   Sig: Take 1 tablet (20 mg total) by mouth 2 (two) times a day   gabapentin (Neurontin) 100 mg capsule  Child, Family Member No No   Sig: Take 1 capsule (100 mg total) by mouth daily at bedtime   glucose blood (OneTouch Verio) test strip  Family Member No No   Sig: CHECK BLOOD SUGARS ONCE DAILY.   hydrocortisone 2.5 % cream  Family Member No No   Sig: APPLY TO AFFECTED AREA TWICE A DAY   lactulose (CHRONULAC) 10 g/15 mL solution  Child, Family Member No No   Sig: Take 30 mL (20 g total) by mouth daily as needed (if no BM in 24 hours despite miralax, can take 20 g (30 ML) of lactulose as needed for constipation) DAILY AS NEEDED FOR SEVERE CONSTIPATION   lisinopril (ZESTRIL) 20 mg tablet  Child, Family Member No No   Sig: TAKE 1 TABLET BY MOUTH EVERY DAY   lubiprostone (AMITIZA) 24 mcg capsule  Child, Family Member No No   Sig: Take 1 capsule (24 mcg total) by mouth 2 (two) times a day with meals   metFORMIN (GLUCOPHAGE) 500 mg tablet  Family Member No No   Sig: TAKE 1 TABLET BY MOUTH TWICE A DAY   methocarbamol (ROBAXIN) 500 mg tablet  Child, Family Member No No   Sig: TAKE 1 TABLET (500 MG TOTAL) BY MOUTH DAILY AT BEDTIME   omeprazole (PriLOSEC) 40 MG capsule  Family Member No No   Sig: TAKE 1 CAPSULE (40 MG TOTAL) BY MOUTH DAILY.   polyethylene glycol (MIRALAX) 17 g packet  Child, Family Member No No   Sig: Take miralax daily but can use up to 2 times per day as needed for constipation   senna-docusate sodium (SENOKOT S) 8.6-50 mg per tablet  Family Member No No   Sig: Take 2 tablets by mouth daily   tamsulosin (FLOMAX) 0.4 mg  Child, Family Member No No   Sig: Take 1 capsule (0.4 mg total) by mouth daily with dinner   Patient not taking: Reported on 9/12/2024      Facility-Administered Medications: None     Patient's Medications   Discharge Prescriptions    No medications on file     No  discharge procedures on file.  ED SEPSIS DOCUMENTATION   Time reflects when diagnosis was documented in both MDM as applicable and the Disposition within this note       Time User Action Codes Description Comment    4/3/2025 10:20 PM Krissy Polo Add [R53.1] Generalized weakness     4/3/2025 10:20 PM Krissy Polo Add [R26.2] Ambulatory dysfunction     4/3/2025 10:21 PM Krissy Polo Add [U07.1] COVID-19 virus infection     4/3/2025 10:21 PM Krissy Polo Add [R07.89] Chest wall pain     4/3/2025 10:21 PM Krissy Polo Add [R93.0] Abnormal head CT                  Esteban Daniels DO  04/07/25 9949

## 2025-04-03 NOTE — ED ATTENDING ATTESTATION
4/3/2025   IKrissy MD, saw and evaluated the patient. I have discussed the patient with the resident/non-physician practitioner and agree with the resident's/non-physician practitioner's findings, Plan of Care, and MDM as documented in the resident's/non-physician practitioner's note, except where noted. All available labs and Radiology studies were reviewed.  I was present for key portions of any procedure(s) performed by the resident/non-physician practitioner and I was immediately available to provide assistance.       At this point I agree with the current assessment done in the Emergency Department.  I have conducted an independent evaluation of this patient a history and physical is as follows:    Unit/Bed#: ED 23 Encounter: [id number]    Chief Complaint   Patient presents with    Fall     Pt reports feeling dizzy and slipped/ fell. Unknown HS, unknown thinners. Complains of rib, neck, knee, and back pain.      79-year-old male presenting after a fall.  History obtained from patient with assistance of his daughters.  Patient speaks Portuguese.  Patient was accompanying his wife to the emergency department when he felt dizzy and either slipped and tripped or fell dizzy and fell.  He did fall forward onto his hands and knees.  Unclear head strike.  No loss of consciousness.  Patient is not anticoagulated.  At present, patient reports pain to his left chest as well as to bilateral knees.  Per daughters, patient does have intermittent confusion over the past several months.    Physical Exam  ED Triage Vitals [04/03/25 1804]   Temperature Pulse Respirations Blood Pressure SpO2   (!) 97.3 °F (36.3 °C) 66 18 145/72 97 %      Temp Source Heart Rate Source Patient Position - Orthostatic VS BP Location FiO2 (%)   Oral Monitor Lying Right arm --      Pain Score       --           Vital signs and nursing notes reviewed    CONSTITUTIONAL: male appearing stated age resting in bed, in no acute distress  HEENT:  atraumatic, normocephalic. Sclera anicteric, conjunctiva are not injected. Moist oral mucosa  CARDIOVASCULAR/CHEST: RRR, no M/R/G. 2+ radial pulses.  Tender to palpation over left upper costochondral joints and along the sternum.  PULMONARY: Breathing comfortably on RA. Breath sounds are equal and clear to auscultation  ABDOMEN: non-distended. BS present, normoactive. Non-tender  MSK: Diffuse C-spine tenderness.  Moves all extremities, no deformities, no peripheral edema, no calf asymmetry  NEURO: Awake, alert, and oriented x 3. Face symmetric.  Patient has baseline cataract which affects his vision.  Extraocular movements intact.  Moves all extremities spontaneously. No focal neurologic deficits  SKIN: Warm, appears well-perfused  MENTAL STATUS: Answers questions inconsistently, per family, this is baseline.      Labs and Imaging  Labs Reviewed   COVID19, INFLUENZA A/B, RSV PCR, [facility] - Abnormal       Result Value Ref Range Status    SARS-CoV-2 Positive (*) Negative Final    Comment:      INFLUENZA A PCR Negative  Negative Final    Comment:      INFLUENZA B PCR Negative  Negative Final    Comment:      RSV PCR Negative  Negative Final    Comment:      Narrative:     This test has been performed using the CoV-2/Flu/RSV plus assay on the [employer] GeneXpert platform. This test has been validated by the  and verified by the performing laboratory.     This test is designed to amplify and detect the following: nucleocapsid (N), envelope (E), and RNA-dependent RNA polymerase (RdRP) genes of the SARS-CoV-2 genome; matrix (M), basic polymerase (PB2), and acidic protein (PA) segments of the influenza A genome; matrix (M) and non-structural protein (NS) segments of the influenza B genome, and the nucleocapsid genes of RSV A and RSV B.     Positive results are indicative of the presence of Flu A, Flu B, RSV, and/or SARS-CoV-2 RNA. Positive results for SARS-CoV-2 or suspected novel influenza should be reported to  state, local, or federal health departments according to local reporting requirements.      All results should be assessed in conjunction with clinical presentation and other laboratory markers for clinical management.     FOR PEDIATRIC PATIENTS - copy/paste COVID Guidelines URL to browser: [email or web address]      POCT GLUCOSE - Normal    POC Glucose 130  65 - 140 mg/dl Final   COMPREHENSIVE METABOLIC PANEL   CBC AND DIFFERENTIAL       CT head without contrast   Final Result      No acute intracranial abnormality.   Unchanged moderate chronic microangiopathic change.   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      The study was marked in EPIC for immediate notification.                  Workstation performed: [id number]         CT spine cervical without contrast   Final Result      No cervical spine fracture or traumatic malalignment.                  Workstation performed: [id number]         CT chest wo contrast   Final Result      No acute displaced left rib fracture but the lower ribs are incompletely imaged.      No acute pulmonary disease.         Workstation performed: [id number]         XR knee 4+ vw left injury   ED Interpretation   Arthritic changes, no acute osseous abnormality.      XR knee 4+ vw right injury   ED Interpretation   Arthritic changes, no acute osseous abnormality            Procedures  ECG 12 Lead Documentation Only    Date/Time: 4/3/2025 9:56 AM    Performed by: Krissy Polo MD  Authorized by: Krissy Polo MD    Comments:      Sinus bradycardia, ventricular rate 59, SD interval 162, QRS 88, QTc 436, normal axis, nonspecific ST/T wave abnormality, no STEMI, no proarrhythmic changes.  Compared to prior EKG dated/30/21, patient is mildly bradycardic.          ED Course  Medications   acetaminophen (TYLENOL) tablet 650 mg (has no administration in time range)   aspirin  (ECOTRIN LOW STRENGTH) EC tablet 81 mg (has no administration in time range)   atorvastatin (LIPITOR) tablet 10 mg (has no administration in time range)   bisacodyl (DULCOLAX) rectal suppository 10 mg (has no administration in time range)   famotidine (PEPCID) tablet 20 mg (has no administration in time range)   gabapentin (NEURONTIN) capsule 100 mg (100 mg Oral Given 4/3/25 2306)   lisinopril (ZESTRIL) tablet 20 mg (has no administration in time range)   lubiprostone (AMITIZA) capsule 24 mcg (has no administration in time range)   methocarbamol (ROBAXIN) tablet 500 mg (500 mg Oral Given 4/3/25 2306)   pantoprazole (PROTONIX) EC tablet 40 mg (has no administration in time range)   senna-docusate sodium (SENOKOT S) 8.6-50 mg per tablet 2 tablet (has no administration in time range)   ondansetron (ZOFRAN) injection 4 mg (has no administration in time range)   enoxaparin (LOVENOX) subcutaneous injection 40 mg (has no administration in time range)   insulin lispro (HumALOG/ADMELOG) 100 units/mL subcutaneous injection 1-6 Units (has no administration in time range)   insulin lispro (HumALOG/ADMELOG) 100 units/mL subcutaneous injection 1-5 Units ( Subcutaneous Not Given 4/3/25 2302)   ketorolac (TORADOL) injection 15 mg (15 mg Intramuscular Given 4/3/25 1840)   acetaminophen (TYLENOL) tablet 975 mg (975 mg Oral Given 4/3/25 1840)     79-year-old male presenting after a fall.  Vital signs reviewed, afebrile and within normal limits.  Differential diagnosis includes mechanical fall versus fall due to generalized weakness, dehydration, underlying illness, dysrhythmia, versus another etiology of symptoms.  Plan for CT imaging of head, C-spine, and chest.  Plan for x-rays of bilateral knees.  Plan for EKG, plan for symptomatic treatment.

## 2025-04-04 PROBLEM — G93.41 METABOLIC ENCEPHALOPATHY: Status: ACTIVE | Noted: 2025-04-04

## 2025-04-04 PROBLEM — R26.2 AMBULATORY DYSFUNCTION: Status: ACTIVE | Noted: 2025-04-04

## 2025-04-04 LAB
ATRIAL RATE: 83 BPM
GLUCOSE SERPL-MCNC: 109 MG/DL (ref 65–140)
GLUCOSE SERPL-MCNC: 110 MG/DL (ref 65–140)
GLUCOSE SERPL-MCNC: 114 MG/DL (ref 65–140)
GLUCOSE SERPL-MCNC: 120 MG/DL (ref 65–140)
P AXIS: 43 DEGREES
PR INTERVAL: 176 MS
QRS AXIS: 16 DEGREES
QRSD INTERVAL: 86 MS
QT INTERVAL: 366 MS
QTC INTERVAL: 430 MS
T WAVE AXIS: 32 DEGREES
VENTRICULAR RATE: 83 BPM

## 2025-04-04 PROCEDURE — 93005 ELECTROCARDIOGRAM TRACING: CPT

## 2025-04-04 PROCEDURE — 99232 SBSQ HOSP IP/OBS MODERATE 35: CPT | Performed by: PHYSICIAN ASSISTANT

## 2025-04-04 PROCEDURE — 93010 ELECTROCARDIOGRAM REPORT: CPT | Performed by: INTERNAL MEDICINE

## 2025-04-04 PROCEDURE — 97163 PT EVAL HIGH COMPLEX 45 MIN: CPT

## 2025-04-04 PROCEDURE — 97167 OT EVAL HIGH COMPLEX 60 MIN: CPT

## 2025-04-04 PROCEDURE — 82948 REAGENT STRIP/BLOOD GLUCOSE: CPT

## 2025-04-04 RX ORDER — PREDNISOLONE ACETATE 10 MG/ML
1 SUSPENSION/ DROPS OPHTHALMIC 3 TIMES DAILY
Status: DISCONTINUED | OUTPATIENT
Start: 2025-04-04 | End: 2025-04-07 | Stop reason: HOSPADM

## 2025-04-04 RX ADMIN — ATORVASTATIN CALCIUM 10 MG: 10 TABLET, FILM COATED ORAL at 08:57

## 2025-04-04 RX ADMIN — ACETAMINOPHEN 650 MG: 325 TABLET, FILM COATED ORAL at 12:34

## 2025-04-04 RX ADMIN — LUBIPROSTONE 24 MCG: 24 CAPSULE, GELATIN COATED ORAL at 08:58

## 2025-04-04 RX ADMIN — GABAPENTIN 100 MG: 100 CAPSULE ORAL at 22:19

## 2025-04-04 RX ADMIN — NIRMATRELVIR AND RITONAVIR 3 TABLET: KIT at 17:42

## 2025-04-04 RX ADMIN — METHOCARBAMOL 500 MG: 500 TABLET ORAL at 22:18

## 2025-04-04 RX ADMIN — LUBIPROSTONE 24 MCG: 24 CAPSULE, GELATIN COATED ORAL at 17:43

## 2025-04-04 RX ADMIN — ASPIRIN 81 MG: 81 TABLET, COATED ORAL at 08:57

## 2025-04-04 RX ADMIN — PREDNISOLONE ACETATE 1 DROP: 10 SUSPENSION/ DROPS OPHTHALMIC at 17:43

## 2025-04-04 RX ADMIN — FAMOTIDINE 20 MG: 20 TABLET, FILM COATED ORAL at 08:58

## 2025-04-04 RX ADMIN — FAMOTIDINE 20 MG: 20 TABLET, FILM COATED ORAL at 17:42

## 2025-04-04 RX ADMIN — LISINOPRIL 20 MG: 20 TABLET ORAL at 08:57

## 2025-04-04 RX ADMIN — ACETAMINOPHEN 650 MG: 325 TABLET, FILM COATED ORAL at 22:18

## 2025-04-04 RX ADMIN — SENNOSIDES AND DOCUSATE SODIUM 2 TABLET: 50; 8.6 TABLET ORAL at 08:57

## 2025-04-04 RX ADMIN — ENOXAPARIN SODIUM 40 MG: 40 INJECTION SUBCUTANEOUS at 08:58

## 2025-04-04 RX ADMIN — PREDNISOLONE ACETATE 1 DROP: 10 SUSPENSION/ DROPS OPHTHALMIC at 22:19

## 2025-04-04 NOTE — ASSESSMENT & PLAN NOTE
Patient developed reported congestion and low-grade fevers after exposure to a sick family member, found positive for COVID-19 during ED evaluation  Thus far he is minimally symptomatic and maintaining appropriate oxygenation on room air  Supportive management at this juncture, can escalate therapy if patient should develop new oxygen requirement

## 2025-04-04 NOTE — ASSESSMENT & PLAN NOTE
Blood pressure stable on PTA lisinopril 20 mg daily restrict hold parameters and monitor blood pressure per protocol

## 2025-04-04 NOTE — PLAN OF CARE
Problem: Prexisting or High Potential for Compromised Skin Integrity  Goal: Skin integrity is maintained or improved  Description: INTERVENTIONS:- Identify patients at risk for skin breakdown- Assess and monitor skin integrity- Assess and monitor nutrition and hydration status- Monitor labs - Assess for incontinence - Turn and reposition patient- Assist with mobility/ambulation- Relieve pressure over bony prominences- Avoid friction and shearing- Provide appropriate hygiene as needed including keeping skin clean and dry- Evaluate need for skin moisturizer/barrier cream- Collaborate with interdisciplinary team - Patient/family teaching- Consider wound care consult   Outcome: Progressing     Problem: Nutrition/Hydration-ADULT  Goal: Nutrient/Hydration intake appropriate for improving, restoring or maintaining nutritional needs  Description: Monitor and assess patient's nutrition/hydration status for malnutrition. Collaborate with interdisciplinary team and initiate plan and interventions as ordered.  Monitor patient's weight and dietary intake as ordered or per policy. Utilize nutrition screening tool and intervene as necessary. Determine patient's food preferences and provide high-protein, high-caloric foods as appropriate. INTERVENTIONS:- Monitor oral intake, urinary output, labs, and treatment plans- Assess nutrition and hydration status and recommend course of action- Evaluate amount of meals eaten- Assist patient with eating if necessary - Allow adequate time for meals- Recommend/ encourage appropriate diets, oral nutritional supplements, and vitamin/mineral supplements- Order, calculate, and assess calorie counts as needed- Recommend, monitor, and adjust tube feedings and TPN/PPN based on assessed needs- Assess need for intravenous fluids- Provide specific nutrition/hydration education as appropriate- Include patient/family/caregiver in decisions related to nutrition  Outcome: Progressing

## 2025-04-04 NOTE — ASSESSMENT & PLAN NOTE
Presented after developing dizziness with a fall whilst assisting transporting his wife here, persisted with unsteadiness during ED evaluation  CT imaging negative for acute traumatic injuries, patient found positive for COVID-19 infection  Suspect his generalized weakness is due to the underlying COVID-19 infection and debility  PT/OT evaluations, CM consult for assistance with disposition   Yes - the patient is able to be screened

## 2025-04-04 NOTE — ASSESSMENT & PLAN NOTE
Lab Results   Component Value Date    HGBA1C 5.8 02/20/2025       Recent Labs     04/03/25  2302   POCGLU 130       Blood Sugar Average: Last 72 hrs:  (P) 130  Hold home metformin while admitted, start correctional insulin coverage with Accu-Cheks  Carb controlled diet  Initiate hypoglycemia protocol

## 2025-04-04 NOTE — UTILIZATION REVIEW
Initial Clinical Review    Admission: Date/Time/Statement:   Admission Orders (From admission, onward)       Ordered        04/03/25 2221  INPATIENT ADMISSION  Once                          Orders Placed This Encounter   Procedures    INPATIENT ADMISSION     Standing Status:   Standing     Number of Occurrences:   1     Level of Care:   Med Surg [16]     Estimated length of stay:   More than 2 Midnights     Certification:   I certify that inpatient services are medically necessary for this patient for a duration of greater than two midnights. See H&P and MD Progress Notes for additional information about the patient's course of treatment.     ED Arrival Information       Expected   -    Arrival   4/3/2025 17:54    Acuity   Urgent              Means of arrival   Wheelchair    Escorted by   Family Member    Service   Hospitalist    Admission type   Emergency              Arrival complaint   Fall             Chief Complaint   Patient presents with    Fall     Pt reports feeling dizzy and slipped/ fell. Unknown HS, unknown thinners. Complains of rib, neck, knee, and back pain.        Initial Presentation: 79 y.o. male with PMHx including HTN, T2DM, brain tumor who presented on 4/3/25 to ED due to generalized weakness and after a fall and per family, development of some URI symptoms of low-grade fever and nasal congestion after exposure to a sick family member with COVID-19 infection.  Patient was accompanying his wife to the emergency department so patient's wife could be evaluated, however felt dizzy and ultimately fell to the ground without loss of consciousness but unclear if there was head strike.  Noted positive for COVID-19 infection.  Ambulatory trial was attempted during ED evaluation however patient was noted with marked unsteadiness thus patient admitted due to his ambulatory dysfunction and generalized weakness likely provoked by COVID-19 infection.     Plan:  Admit Inpatient status Dx Generalized weakness :    med surg, PT OT eval, CM consult for dispo planning. Monitor resp status, O2 sats, VS, labs. Continue home cardiac meds. Monitor blood sugars and start SSI.     Anticipated Length of Stay/Certification Statement: Patient will be admitted on an Inpatient basis with an anticipated length of stay of greater than 2 midnights.       ED Treatment-Medication Administration from 04/03/2025 1754 to 04/04/2025 0057         Date/Time Order Dose Route Action     04/03/2025 1840 ketorolac (TORADOL) injection 15 mg 15 mg Intramuscular Given     04/03/2025 1840 acetaminophen (TYLENOL) tablet 975 mg 975 mg Oral Given     04/03/2025 2306 gabapentin (NEURONTIN) capsule 100 mg 100 mg Oral Given     04/03/2025 2306 methocarbamol (ROBAXIN) tablet 500 mg 500 mg Oral Given            Scheduled Medications:  aspirin, 81 mg, Oral, Daily  atorvastatin, 10 mg, Oral, Daily  bisacodyl, 10 mg, Rectal, Daily  enoxaparin, 40 mg, Subcutaneous, Daily  famotidine, 20 mg, Oral, BID  gabapentin, 100 mg, Oral, HS  insulin lispro, 1-5 Units, Subcutaneous, HS  insulin lispro, 1-6 Units, Subcutaneous, TID AC  lisinopril, 20 mg, Oral, Daily  lubiprostone, 24 mcg, Oral, BID With Meals  methocarbamol, 500 mg, Oral, HS  pantoprazole, 40 mg, Oral, Early Morning  senna-docusate sodium, 2 tablet, Oral, Daily      Continuous IV Infusions: none     PRN Meds:  acetaminophen, 650 mg, Oral, Q6H PRN x1  ondansetron, 4 mg, Intravenous, Q6H PRN      ED Triage Vitals   Temperature Pulse Respirations Blood Pressure SpO2 Pain Score   04/03/25 1804 04/03/25 1804 04/03/25 1804 04/03/25 1804 04/03/25 1804 04/04/25 0133   (!) 97.3 °F (36.3 °C) 66 18 145/72 97 % No Pain     Weight (last 2 days)       None            Vital Signs (last 3 days)       Date/Time Temp Pulse Resp BP MAP (mmHg) SpO2 O2 Device Patient Position - Orthostatic VS Pain    04/04/25 06:59:26 98.1 °F (36.7 °C) -- 18 151/77 102 -- -- -- --    04/04/25 0146 -- -- -- -- -- 97 % None (Room air) -- No Pain     04/04/25 0133 -- -- -- -- -- -- -- -- No Pain    04/04/25 01:15:30 98.1 °F (36.7 °C) 59 18 160/79 106 97 % None (Room air) Lying --    04/03/25 2003 -- 57 20 175/84 121 99 % None (Room air) Sitting --    04/03/25 1804 97.3 °F (36.3 °C) 66 18 145/72 -- 97 % None (Room air) Lying --              Pertinent Labs/Diagnostic Test Results:   Radiology:  CT head without contrast   Final Interpretation by Krzysztof Beal MD (04/03 2022)      No acute intracranial abnormality.   Unchanged moderate chronic microangiopathic change.   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      The study was marked in EPIC for immediate notification.                  Workstation performed: [id number]         CT spine cervical without contrast   Final Interpretation by Krzysztof Beal MD (04/03 2027)      No cervical spine fracture or traumatic malalignment.                  Workstation performed: [id number]         CT chest wo contrast   Final Interpretation by Roxanne Negron MD (04/03 2013)      No acute displaced left rib fracture but the lower ribs are incompletely imaged.      No acute pulmonary disease.         Workstation performed: [id number]         XR knee 4+ vw left injury   ED Interpretation by Esteban Daniels DO (04/03 2211)   Arthritic changes, no acute osseous abnormality.      Final Interpretation by Bradley Landon Kocher, MD (04/04 0805)      No acute osseous abnormality.      Degenerative changes as described.         Computerized Assisted Algorithm (CAA) may have been used to analyze all applicable images.         Workstation performed: [id number]         XR knee 4+ vw right injury   ED Interpretation by Esteban Daniels DO (04/03 2211)   Arthritic changes, no acute osseous abnormality      Final Interpretation by Bradley Landon Kocher, MD (04/04 0805)      No acute osseous abnormality.      Degenerative changes as described.         Computerized  Assisted Algorithm (CAA) may have been used to analyze all applicable images.         Workstation performed: [id number]           Cardiology:  ECG 12 lead   Final Result by Adam Gonzales MD (04/03 2137)   Sinus bradycardia   Nonspecific ST and T wave abnormality   Baseline Artifact   Abnormal ECG   Confirmed by Adam Gonzales ([id number]) on 4/3/2025 9:37:38 PM        GI:  No orders to display       Results from last 7 days   Lab Units 04/03/25  1840   SARS-COV-2  Positive*     Results from last 7 days   Lab Units 04/03/25  2256   WBC Thousand/uL 2.74*   HEMOGLOBIN g/dL 12.2   HEMATOCRIT % 36.7   PLATELETS Thousands/uL 115*   TOTAL NEUT ABS Thousands/µL 1.39*         Results from last 7 days   Lab Units 04/03/25  2256   SODIUM mmol/L 136   POTASSIUM mmol/L 3.7   CHLORIDE mmol/L 103   CO2 mmol/L 26   ANION GAP mmol/L 7   BUN mg/dL 18   CREATININE mg/dL 0.80   EGFR ml/min/1.73sq m 85   CALCIUM mg/dL 8.6     Results from last 7 days   Lab Units 04/03/25  2256   AST U/L 15   ALT U/L 10   ALK PHOS U/L 96   TOTAL PROTEIN g/dL 6.5   ALBUMIN g/dL 4.2   TOTAL BILIRUBIN mg/dL 0.62     Results from last 7 days   Lab Units 04/04/25  0658 04/03/25  2302   POC GLUCOSE mg/dl 110 130     Results from last 7 days   Lab Units 04/03/25  2256   GLUCOSE RANDOM mg/dL 103     Results from last 7 days   Lab Units 04/03/25  1840   INFLUENZA A PCR  Negative   INFLUENZA B PCR  Negative   RSV PCR  Negative     Past Medical History:   Diagnosis Date    Brain mass     Last Assessed; 11/5/2015    Chest pain     Last Assessed: 1/22/2015    Diabetes type 2, uncontrolled     Last Assessed: 3/1/2016    Edema of left lower extremity     Last Assessed: 3/22/2017    GERD (gastroesophageal reflux disease)     Hyperlipidemia     Hypertension     Impaired fasting glucose     Last Assessed: 11/10/2015    Irregular heartbeat     Skin cancer (melanoma) (HCC)     Scalp     Varicose 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     Last Assessed: 3/22/2017     Present on  Admission:   Generalized weakness   COVID-19   Type 2 diabetes mellitus with diabetic cataract, without long-term current use of insulin (HCC)   Ambulatory dysfunction   Hypertension      Admitting Diagnosis: Chest wall pain [R07.89]  Abnormal head CT [R93.0]  Generalized weakness [R53.1]  Ambulatory dysfunction [R26.2]  Unspecified multiple injuries, initial encounter [T07.XXXA]  COVID-19 virus infection [U07.1]  Age/Sex: 79 y.o. male    Network Utilization Review Department  ATTENTION: Please call with any questions or concerns to [phone] and carefully listen to the prompts so that you are directed to the right person. All voicemails are confidential.   For Discharge needs, contact Care Management DC Support Team at [phone] opt. 2  Send all requests for admission clinical reviews, approved or denied determinations and any other requests to dedicated fax number below belonging to the campus where the patient is receiving treatment. List of dedicated fax numbers for the Facilities:  FACILITY NAME UR FAX NUMBER   ADMISSION DENIALS (Administrative/Medical Necessity) [phone]   DISCHARGE SUPPORT TEAM (NETWORK) [phone]   PARENT CHILD HEALTH (Maternity/NICU/Pediatrics) [phone]   [facility] [phone]   [facility] [phone]   [facility] [phone]   [facility] [phone]   [facility] [phone]   [facility] [phone]   [facility] [phone]   [facility] [phone]   [facility] [phone]   [facility] [phone]   [facility] [phone]   [facility] [phone]

## 2025-04-04 NOTE — ASSESSMENT & PLAN NOTE
Presented after developing dizziness with a fall whilst assisting transporting his wife here, persisted with unsteadiness during ED evaluation  CT imaging negative for acute traumatic injuries, patient found positive for COVID-19 infection  Suspect his generalized weakness is due to the underlying COVID-19 infection and debility  PT/OT evaluations, CM consult for assistance with disposition

## 2025-04-04 NOTE — PLAN OF CARE
Problem: OCCUPATIONAL THERAPY ADULT  Goal: Performs self-care activities at highest level of function for planned discharge setting.  See evaluation for individualized goals.  Description: Treatment Interventions: ADL retraining, Functional transfer training, UE strengthening/ROM, Endurance training, Patient/family training, Cognitive reorientation, Equipment evaluation/education, Fine motor coordination activities, Compensatory technique education, Continued evaluation, Energy conservation, Activityengagement          See flowsheet documentation for full assessment, interventions and recommendations.   Note: Limitation: Decreased ADL status, Decreased cognition, Decreased Safe judgement during ADL, Decreased endurance, Decreased self-care trans, Decreased high-level ADLs  Prognosis: Fair  Assessment: 79 year old pt seen today for an OT evaluation following admission to [facility] 2/2 generalized weakness, ambulatory dysfunction, COVID+, multiple falls with present symptoms significant for pain, fatigue, weakness, decreased ADL status, decreased functional mobility. Pt  has a past medical history of Brain mass, Chest pain, Diabetes type 2, uncontrolled, Edema of left lower extremity, GERD (gastroesophageal reflux disease), Hyperlipidemia, Hypertension, Impaired fasting glucose, Irregular heartbeat, Skin cancer (melanoma) (HCC), and Varicose 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. Pt is a ?historian, information obtained from chart and Dtr's present in session. Pt lives with spouse in a 1SH, with FFOS to basement. Pt was IND with ADLs PTA, shares IADLs with spouse. Per Dtr's pt spouse also requires assistance, so pt was primary caregiver for spouse. Son checks in but unclear how frequently as he lives in [facility]. Pt has a SPC but often doesn't use it per dtrs. Pt very pleasant and cooperative t/o session. Pt completed functional bed mobility with Min A. Min A for functional STS txfs and functional  mobility with HHA. Pt was SUP for UB ADLs and min A for LB ADLs. The patient's raw score on the AM-PAC Daily Activity Inpatient Short Form is 20. A raw score of greater than or equal to 19 suggests the patient may benefit from discharge to home. Please refer to the recommendation of the Occupational Therapist for safe discharge planning. Pt is functioning below baseline level of function and will continue to benefit from skilled acute OT to promote increased independence and return to PLOF. At this time, current OT recommendation is Level 2 resources - pending progress and increased family assistance/SUP at home. Will continue to follow and assess.     Rehab Resource Intensity Level, OT: II (Moderate Resource Intensity) (pending progress.)

## 2025-04-04 NOTE — PLAN OF CARE
Problem: Potential for Falls  Goal: Patient will remain free of falls  Description: INTERVENTIONS:- Educate patient/family on patient safety including physical limitations- Instruct patient to call for assistance with activity - Consult OT/PT to assist with strengthening/mobility - Keep Call bell within reach- Keep bed low and locked with side rails adjusted as appropriate- Keep care items and personal belongings within reach- Initiate and maintain comfort rounds- Make Fall Risk Sign visible to staff- Offer Toileting every 2 Hours, in advance of need- Initiate/Maintain bed alarm- Obtain necessary fall risk management equipment- Apply yellow socks and bracelet for high fall risk patients- Consider moving patient to room near nurses station  INTERVENTIONS:- Educate patient/family on patient safety including physical limitations- Instruct patient to call for assistance with activity - Consult OT/PT to assist with strengthening/mobility - Keep Call bell within reach- Keep bed low and locked with side rails adjusted as appropriate- Keep care items and personal belongings within reach- Initiate and maintain comfort rounds- Make Fall Risk Sign visible to staff- Offer Toileting every 2 Hours, in advance of need- Initiate/Maintain bed alarm- Obtain necessary fall risk management equipment- Apply yellow socks and bracelet for high fall risk patients- Consider moving patient to room near nurses station  Outcome: Progressing     Problem: PAIN - ADULT  Goal: Verbalizes/displays adequate comfort level or baseline comfort level  Description: Interventions:- Encourage patient to monitor pain and request assistance- Assess pain using appropriate pain scale- Administer analgesics based on type and severity of pain and evaluate response- Implement non-pharmacological measures as appropriate and evaluate response- Consider cultural and social influences on pain and pain management- Notify physician/advanced practitioner if  interventions unsuccessful or patient reports new pain  Outcome: Progressing     Problem: INFECTION - ADULT  Goal: Absence or prevention of progression during hospitalization  Description: INTERVENTIONS:- Assess and monitor for signs and symptoms of infection- Monitor lab/diagnostic results- Monitor all insertion sites, i.e. indwelling lines, tubes, and drains- Monitor endotracheal if appropriate and nasal secretions for changes in amount and color- [place] appropriate cooling/warming therapies per order- Administer medications as ordered- Instruct and encourage patient and family to use good hand hygiene technique- Identify and instruct in appropriate isolation precautions for identified infection/condition  Outcome: Progressing  Goal: Absence of fever/infection during neutropenic period  Description: INTERVENTIONS:- Monitor WBC  Outcome: Progressing     Problem: SAFETY ADULT  Goal: Patient will remain free of falls  Description: INTERVENTIONS:- Educate patient/family on patient safety including physical limitations- Instruct patient to call for assistance with activity - Consult OT/PT to assist with strengthening/mobility - Keep Call bell within reach- Keep bed low and locked with side rails adjusted as appropriate- Keep care items and personal belongings within reach- Initiate and maintain comfort rounds- Make Fall Risk Sign visible to staff- Offer Toileting every 2 Hours, in advance of need- Initiate/Maintain bed alarm- Obtain necessary fall risk management equipment: - Apply yellow socks and bracelet for high fall risk patients- Consider moving patient to room near nurses station  INTERVENTIONS:- Educate patient/family on patient safety including physical limitations- Instruct patient to call for assistance with activity - Consult OT/PT to assist with strengthening/mobility - Keep Call bell within reach- Keep bed low and locked with side rails adjusted as appropriate- Keep care items and personal belongings  within reach- Initiate and maintain comfort rounds- Make Fall Risk Sign visible to staff- Offer Toileting every 2 Hours, in advance of need- Initiate/Maintain bed alarm- Obtain necessary fall risk management equipment: - Apply yellow socks and bracelet for high fall risk patients- Consider moving patient to room near nurses station  Outcome: Progressing  Goal: Maintain or return to baseline ADL function  Description: INTERVENTIONS:-  Assess patient's ability to carry out ADLs; assess patient's baseline for ADL function and identify physical deficits which impact ability to perform ADLs (bathing, care of mouth/teeth, toileting, grooming, dressing, etc.)- Assess/evaluate cause of self-care deficits - Assess range of motion- Assess patient's mobility; develop plan if impaired- Assess patient's need for assistive devices and provide as appropriate- Encourage maximum independence but intervene and supervise when necessary- Involve family in performance of ADLs- Assess for home care needs following discharge - Consider OT consult to assist with ADL evaluation and planning for discharge- Provide patient education as appropriate  Outcome: Progressing  Goal: Maintains/Returns to pre admission functional level  Description: INTERVENTIONS:- Perform AM-PAC 6 Click Basic Mobility/ Daily Activity assessment daily.- Set and communicate daily mobility goal to care team and patient/family/caregiver. - Collaborate with rehabilitation services on mobility goals if consulted- Perform Range of Motion 3 times a day.- Reposition patient every 2 hours.- Dangle patient 3 times a day- Stand patient 3 times a day- Ambulate patient 3 times a day- Out of bed to chair 3 times a day - Out of bed for meals 3 times a day- Out of bed for toileting- Record patient progress and toleration of activity level   Outcome: Progressing     Problem: DISCHARGE PLANNING  Goal: Discharge to home or other facility with appropriate resources  Description:  INTERVENTIONS:- Identify barriers to discharge w/patient and caregiver- Arrange for needed discharge resources and transportation as appropriate- Identify discharge learning needs (meds, wound care, etc.)- Arrange for interpretive services to assist at discharge as needed- Refer to Case Management Department for coordinating discharge planning if the patient needs post-hospital services based on physician/advanced practitioner order or complex needs related to functional status, cognitive ability, or social support system  Outcome: Progressing     Problem: Knowledge Deficit  Goal: Patient/family/caregiver demonstrates understanding of disease process, treatment plan, medications, and discharge instructions  Description: Complete learning assessment and assess knowledge base.Interventions:- Provide teaching at level of understanding- Provide teaching via preferred learning methods  Outcome: Progressing     Problem: RESPIRATORY - ADULT  Goal: Achieves optimal ventilation and oxygenation  Description: INTERVENTIONS:- Assess for changes in respiratory status- Assess for changes in mentation and behavior- Position to facilitate oxygenation and minimize respiratory effort- Oxygen administered by appropriate delivery if ordered- Initiate smoking cessation education as indicated- Encourage broncho-pulmonary hygiene including cough, deep breathe, Incentive Spirometry- Assess the need for suctioning and aspirate as needed- Assess and instruct to report SOB or any respiratory difficulty- Respiratory Therapy support as indicated  Outcome: Progressing     Problem: METABOLIC, FLUID AND ELECTROLYTES - ADULT  Goal: Electrolytes maintained within normal limits  Description: INTERVENTIONS:- Monitor labs and assess patient for signs and symptoms of electrolyte imbalances- Administer electrolyte replacement as ordered- Monitor response to electrolyte replacements, including repeat lab results as appropriate- Instruct patient on fluid  and nutrition as appropriate  Outcome: Progressing  Goal: Fluid balance maintained  Description: INTERVENTIONS:- Monitor labs - Monitor I/O and WT- Instruct patient on fluid and nutrition as appropriate- Assess for signs & symptoms of volume excess or deficit  Outcome: Progressing  Goal: Glucose maintained within target range  Description: INTERVENTIONS:- Monitor Blood Glucose as ordered- Assess for signs and symptoms of hyperglycemia and hypoglycemia- Administer ordered medications to maintain glucose within target range- Assess nutritional intake and initiate nutrition service referral as needed  Outcome: Progressing     Problem: MUSCULOSKELETAL - ADULT  Goal: Maintain or return mobility to safest level of function  Description: INTERVENTIONS:- Assess patient's ability to carry out ADLs; assess patient's baseline for ADL function and identify physical deficits which impact ability to perform ADLs (bathing, care of mouth/teeth, toileting, grooming, dressing, etc.)- Assess/evaluate cause of self-care deficits - Assess range of motion- Assess patient's mobility- Assess patient's need for assistive devices and provide as appropriate- Encourage maximum independence but intervene and supervise when necessary- Involve family in performance of ADLs- Assess for home care needs following discharge - Consider OT consult to assist with ADL evaluation and planning for discharge- Provide patient education as appropriate  Outcome: Progressing  Goal: Maintain proper alignment of affected body part  Description: INTERVENTIONS:- Support, maintain and protect limb and body alignment- Provide patient/ family with appropriate education  Outcome: Progressing

## 2025-04-04 NOTE — ASSESSMENT & PLAN NOTE
Blood pressure thus far acceptable.  Continue PTA lisinopril 20 mg daily restrict hold parameters and monitor blood pressure per protocol

## 2025-04-04 NOTE — ASSESSMENT & PLAN NOTE
Patient developed reported congestion and low-grade fevers after exposure to a sick family member, found positive for COVID-19 during ED evaluation  Thus far he is minimally symptomatic and maintaining appropriate oxygenation on room air  Given patient's acute encephalopathy and fever, we will initiate Paxlovid.

## 2025-04-04 NOTE — PLAN OF CARE
Problem: PHYSICAL THERAPY ADULT  Goal: Performs mobility at highest level of function for planned discharge setting.  See evaluation for individualized goals.  Description: Treatment/Interventions: Functional transfer training, Therapeutic exercise, Bed mobility, Gait training, Spoke to nursing, Spoke to case management, OT  Equipment Recommended: Walker       See flowsheet documentation for full assessment, interventions and recommendations.  Note: Prognosis: Good  Problem List: Decreased endurance, Decreased mobility, Pain, Decreased safety awareness, Decreased cognition, Impaired judgement  Assessment: Pt is a 79 y.o. male seen for PT evaluation s/p admit to [facility] on 4/3/2025. Pt was admitted with a primary dx of: Generalized weakness, Ambulatory dysfunction, COVID 19, DM, HTN.Pt has a past medical history of Brain mass, Chest pain, Diabetes type 2, uncontrolled, Edema of left lower extremity, GERD (gastroesophageal reflux disease), Hyperlipidemia, Hypertension, Impaired fasting glucose, Irregular heartbeat, Skin cancer (melanoma) (HCC), and Varicose 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.   PT now consulted for assessment of mobility and d/c needs. Pt with OOB to chair orders. Pts current clinical presentation is Unstable/ Unpredictable (high complexity) due to Ongoing medical management for primary dx, Increased reliance on more restrictive AD compared to baseline, Decreased activity tolerance compared to baseline, Fall risk, Increased assistance needed from caregiver at current time, Cog status. Prior to admission, pt was Ind for mobility and ADLs, per Dtr uses cane at times.Pt lives with spouse who is also currently in the hospital.  Upon evaluation, pt currently is requiring min assist for be dmobility, transfers and ambulation using HHA, impulsive and lOB during ambulation. Pt presents at PT eval functioning below baseline and currently w/ overall mobility deficits 2* to: impaired balance,  decreased endurance, gait deviations, pain, decreased activity tolerance compared to baseline, decreased functional mobility tolerance compared to baseline, decreased safety awareness, impaired judgement, fall risk. Pt currently at a fall risk 2* to impairments listed above.  Pt will continue to benefit from skilled acute PT interventions to address stated impairments; to maximize functional mobility; for ongoing pt/ family training; and DME needs. At conclusion of PT session pt returned BTB, bed alarm engaged, all needs in reach, and RN notified of session findings/recommendations with phone and call bell within reach. Pt denies any further questions at this time. The patient's AM-PAC Basic Mobility Inpatient Short Form Raw Score is 16. A Raw score of less than or equal to 16 suggests the patient may benefit from discharge to post-acute rehabilitation services. Please also refer to the recommendation of the Physical Therapist for safe discharge planning. Recommend Level II upon hospital D/C.  Barriers to Discharge: Decreased caregiver support     Rehab Resource Intensity Level, PT: II (Moderate Resource Intensity)    See flowsheet documentation for full assessment.

## 2025-04-04 NOTE — PROGRESS NOTES
Progress Note - Hospitalist   Name: Zaira Morley 79 y.o. male I MRN: [id number]  Unit/Bed#: [facility] 817-01 I Date of Admission: 4/3/2025   Date of Service: 4/4/2025 I Hospital Day: 1    Assessment & Plan  Generalized weakness  Presented after developing dizziness with a fall whilst assisting transporting his wife here, persisted with unsteadiness during ED evaluation  CT imaging negative for acute traumatic injuries, patient found positive for COVID-19 infection  Suspect his generalized weakness is due to the underlying COVID-19 infection and debility  PT/OT evaluations, CM consult for assistance with disposition  COVID-19  Patient developed reported congestion and low-grade fevers after exposure to a sick family member, found positive for COVID-19 during ED evaluation  Thus far he is minimally symptomatic and maintaining appropriate oxygenation on room air  Given patient's acute encephalopathy and fever, we will initiate Paxlovid.    Hypertension  Blood pressure stable on PTA lisinopril 20 mg daily restrict hold parameters and monitor blood pressure per protocol  Type 2 diabetes mellitus with diabetic cataract, without long-term current use of insulin ([facility])  Lab Results   Component Value Date    HGBA1C 5.8 02/20/2025     Recent Labs     04/03/25  2302 04/04/25  0658 04/04/25  1102   POCGLU 130 110 114       Blood Sugar Average: Last 72 hrs:  (P) 118  Hold home metformin while admitted and restart on discharge  Continue with accu-checks and SSI  Carb controlled diet  Hypoglycemia protocol  Ambulatory dysfunction  Ambulatory function in the setting of COVID-19 infection and debility  Management as per the primary problem  Metabolic encephalopathy  Patient acutely confused.    Continue VRC monitoring and posey belt.    VTE Pharmacologic Prophylaxis: VTE Score: 4 Moderate Risk (Score 3-4) - Pharmacological DVT Prophylaxis Ordered: enoxaparin (Lovenox).    Mobility:   Basic Mobility Inpatient Raw Score: 19  -[facility] Goal: 6:  Walk 10 steps or more  JH-HLM Achieved: 6: Walk 10 steps or more  JH-HLM Goal achieved. Continue to encourage appropriate mobility.    Patient Centered Rounds: I performed bedside rounds with nursing staff today.   Discussions with Specialists or Other Care Team Provider: None    Education and Discussions with Family / Patient: Updated  (daughter) at bedside.    Current Length of Stay: 1 day(s)  Current Patient Status: Inpatient   Certification Statement: The patient will continue to require additional inpatient hospital stay due to persistent confusion  Discharge Plan: Anticipate discharge in 24-48 hrs to discharge location to be determined pending rehab evaluations.    Code Status: Level 1 - Full Code    Subjective   Patient continues to be pleasantly confused.  He has attempted to get out of bed several times so is on VRC monitor and posey.  He has no complaints.    Objective :  Temp:  [97.3 °F (36.3 °C)-101.4 °F (38.6 °C)] 101.4 °F (38.6 °C)  HR:  [57-69] 69  BP: (123-175)/(65-84) 123/65  Resp:  [18-20] 18  SpO2:  [96 %-99 %] 96 %  O2 Device: None (Room air)    There is no height or weight on file to calculate BMI.     Input and Output Summary (last 24 hours):     Intake/Output Summary (Last 24 hours) at 4/4/2025 1614  Last data filed at 4/4/2025 0401  Gross per 24 hour   Intake --   Output 1775 ml   Net -1775 ml       Physical Exam  Vitals reviewed.   HENT:      Head: Normocephalic and atraumatic.      Mouth/Throat:      Mouth: Mucous membranes are moist.   Eyes:      General: No scleral icterus.  Cardiovascular:      Rate and Rhythm: Normal rate and regular rhythm.      Heart sounds: No murmur heard.  Pulmonary:      Breath sounds: Normal breath sounds. No wheezing or rales.   Chest:      Chest wall: No tenderness.   Abdominal:      General: Bowel sounds are normal. There is no distension.      Palpations: Abdomen is soft.      Tenderness: There is no abdominal tenderness.   Musculoskeletal:          General: Normal range of motion.   Skin:     General: Skin is warm and dry.      Findings: No rash.   Psychiatric:         Mood and Affect: Mood normal.         Cognition and Memory: Cognition is impaired.         Judgment: Judgment is impulsive.           Lab Results: I have reviewed the following results:   Results from last 7 days   Lab Units 04/03/25  2256   WBC Thousand/uL 2.74*   HEMOGLOBIN g/dL 12.2   HEMATOCRIT % 36.7   PLATELETS Thousands/uL 115*   SEGS PCT % 51   LYMPHO PCT % 25   MONO PCT % 23*   EOS PCT % 1     Results from last 7 days   Lab Units 04/03/25  2256   SODIUM mmol/L 136   POTASSIUM mmol/L 3.7   CHLORIDE mmol/L 103   CO2 mmol/L 26   BUN mg/dL 18   CREATININE mg/dL 0.80   ANION GAP mmol/L 7   CALCIUM mg/dL 8.6   ALBUMIN g/dL 4.2   TOTAL BILIRUBIN mg/dL 0.62   ALK PHOS U/L 96   ALT U/L 10   AST U/L 15   GLUCOSE RANDOM mg/dL 103         Results from last 7 days   Lab Units 04/04/25  1102 04/04/25  0658 04/03/25  2302   POC GLUCOSE mg/dl 114 110 130     Imaging Results Review: I reviewed radiology reports from this admission including: CT chest.  Other Study Results Review: No additional pertinent studies reviewed.    Last 24 Hours Medication List:     Current Facility-Administered Medications:     acetaminophen (TYLENOL) tablet 650 mg, Q6H PRN    aspirin (ECOTRIN LOW STRENGTH) EC tablet 81 mg, Daily    atorvastatin (LIPITOR) tablet 10 mg, Daily    bisacodyl (DULCOLAX) rectal suppository 10 mg, Daily    enoxaparin (LOVENOX) subcutaneous injection 40 mg, Daily    famotidine (PEPCID) tablet 20 mg, BID    gabapentin (NEURONTIN) capsule 100 mg, HS    insulin lispro (HumALOG/ADMELOG) 100 units/mL subcutaneous injection 1-5 Units, HS    insulin lispro (HumALOG/ADMELOG) 100 units/mL subcutaneous injection 1-6 Units, TID AC **AND** Fingerstick Glucose (POCT), TID AC    lisinopril (ZESTRIL) tablet 20 mg, Daily    lubiprostone (AMITIZA) capsule 24 mcg, BID With Meals    methocarbamol (ROBAXIN) tablet  500 mg, HS    ondansetron (ZOFRAN) injection 4 mg, Q6H PRN    pantoprazole (PROTONIX) EC tablet 40 mg, Early Morning    senna-docusate sodium (SENOKOT S) 8.6-50 mg per tablet 2 tablet, Daily    Administrative Statements   Today, Patient Was Seen By: Rebeca Yates PA-C    **Please Note: This note may have been constructed using a voice recognition system.**

## 2025-04-04 NOTE — ASSESSMENT & PLAN NOTE
Ambulatory function in the setting of COVID-19 infection and debility  Management as per the primary problem

## 2025-04-04 NOTE — PHYSICAL THERAPY NOTE
Physical Therapy Evaluation     Patient's Name: Zaira Morley    Admitting Diagnosis  Chest wall pain [R07.89]  Abnormal head CT [R93.0]  Generalized weakness [R53.1]  Ambulatory dysfunction [R26.2]  Unspecified multiple injuries, initial encounter [T07.XXXA]  COVID-19 virus infection [U07.1]    Problem List  Patient Active Problem List   Diagnosis    Brain tumor (HCC)    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HCC)    Chronic constipation    Episodic cluster headache, not intractable    GERD (gastroesophageal reflux disease)    Nonintractable headache    Hypertension    Meningioma (HCC)    Varicose 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    Spondylolisthesis at L5-S1 level    Renal cyst    History of hepatitis C    Scalp lesion    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HCC)    Primary osteoarthritis of both knees    Type 2 diabetes mellitus with diabetic cataract, without long-term current use of insulin (HCC)    Blurry vision, bilateral    Other constipation    History of colon polyps    Decreased hearing of both ears    Constipation    Leg edema, left    Dyslipidemia    Hard of hearing    Thrombocytopenia (HCC)    Generalized weakness    COVID-19    Ambulatory dysfunction       Past Medical History  Past Medical History:   Diagnosis Date    Brain mass     Last Assessed; 11/5/2015    Chest pain     Last Assessed: 1/22/2015    Diabetes type 2, uncontrolled     Last Assessed: 3/1/2016    Edema of left lower extremity     Last Assessed: 3/22/2017    GERD (gastroesophageal reflux disease)     Hyperlipidemia     Hypertension     Impaired fasting glucose     Last Assessed: 11/10/2015    Irregular heartbeat     Skin cancer (melanoma) (HCC)     Scalp     Varicose 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     Last Assessed: 3/22/2017       Past Surgical History  Past Surgical History:   Procedure Laterality Date    COLONOSCOPY      COLONOSCOPY N/A 7/10/2018    Procedure: COLONOSCOPY;  Surgeon: Jose Alejandro Rodrigues MD;  Location: [facility];   "Service: Colorectal    INGUINAL HERNIA REPAIR      20+ years ago - [place]; Last Assessed: 10/23/2014    TONSILLECTOMY      VARICOSE VEIN SURGERY Left     x2 left leg - 40+ yrs ago, [place] and [place]          04/04/25 1058   PT Last Visit   PT Visit Date 04/04/25   Note Type   Note type Evaluation   Pain Assessment   Pain Assessment Tool 0-10   Pain Score No Pain   Restrictions/Precautions   Weight Bearing Precautions Per Order No   Other Precautions Contact/isolation;Airborne/isolation;Cognitive;Chair Alarm;Bed Alarm;Restraints;1:1;Multiple lines;Fall Risk;Pain;Impulsive  (+COVID, Virtual 1:1)   Home Living   Type of Home House   Home Layout One level;Work area in basement;Performs ADLs on one level;Able to live on main level with bedroom/bathroom;Stairs to enter with rails   Bathroom Shower/Tub Walk-in shower   Bathroom Toilet Standard   Home Equipment Cane  (Per dtr ,pt does not use it all the time)   Prior Function   Level of [place] Independent with ADLs;Independent with functional mobility;Needs assistance with IADLS   Lives With Spouse   Receives Help From Family   IADLs Independent with meal prep;Independent with driving;Independent with medication management   Falls in the last 6 months (S)  5 to 10   Vocational Retired   Comments Pt appears confused during session, per daughter has been more confused past 2 days with intermittent hallucination \"when he had a fever\". Pt's primary language per chart is Estonian, but pt able ot communicate in simple English terms. Dtrs assists with PLOF information. Pt and spouse share IADLs, but mostly pt takes care of spouse. Spouse also currently admitted in hospital.   General   Family/Caregiver Present Yes   Cognition   Overall Cognitive Status Impaired   Arousal/Participation Responsive   Attention Attends with cues to redirect   Orientation Level Oriented to person;Oriented to place;Disoriented to situation;Disoriented to time   Following Commands Follows one step " commands with increased time or repetition   Comments Pt cooperative and pleasant durin gsession. Impulsive through session, requiring TC and VCs , needed to be redirected to task at times, highly distractable   RLE Assessment   RLE Assessment WFL   LLE Assessment   LLE Assessment WFL   Bed Mobility   Supine to Sit 5  Supervision   Additional items HOB elevated;Verbal cues;Bedrails   Sit to Supine 4  Minimal assistance   Additional items Assist x 1;Increased time required;Verbal cues;LE management   Additional Comments Pt in bed upon arrival. Assisted back to bed , posey restraint reapplied, alarm n   Transfers   Sit to Stand 4  Minimal assistance   Additional items Assist x 1;Increased time required;Verbal cues   Stand to Sit 4  Minimal assistance   Additional items Assist x 1;Verbal cues;Impulsive   Additional Comments w/HHA   Ambulation/Elevation   Gait pattern Improper Weight shift;Wide DARRON;Excessively slow;Short stride   Gait Assistance 4  Minimal assist   Additional items Assist x 1;Verbal cues   Assistive Device Other (Comment)  (HHA)   Distance 26 ft   Stair Management Assistance Not tested   Ambulation/Elevation Additional Comments LOB noted with ambulation requiring assist, impulsive requiring cues for safety   Balance   Static Sitting Fair +   Dynamic Sitting Fair   Static Standing Fair -   Dynamic Standing Poor +   Ambulatory Poor +   Endurance Deficit   Endurance Deficit Yes   Activity Tolerance   Activity Tolerance Patient limited by fatigue;Other (Comment)  (Cognition)   Medical Staff Made Aware Co-eval with OT   Nurse Made Aware RN cleared pt for therapy   Assessment   Prognosis Good   Problem List Decreased endurance;Decreased mobility;Pain;Decreased safety awareness;Decreased cognition;Impaired judgement   Assessment Pt is a 79 y.o. male seen for PT evaluation s/p admit to [facility] on 4/3/[facility]25. Pt was admitted with a primary dx of: Generalized weakness, Ambulatory dysfunction, COVID 19,  DM, HTN.Pt has a past medical history of Brain mass, Chest pain, Diabetes type 2, uncontrolled, Edema of left lower extremity, GERD (gastroesophageal reflux disease), Hyperlipidemia, Hypertension, Impaired fasting glucose, Irregular heartbeat, Skin cancer (melanoma) (HCC), and Varicose 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.   PT now consulted for assessment of mobility and d/c needs. Pt with OOB to chair orders. Pts current clinical presentation is Unstable/ Unpredictable (high complexity) due to Ongoing medical management for primary dx, Increased reliance on more restrictive AD compared to baseline, Decreased activity tolerance compared to baseline, Fall risk, Increased assistance needed from caregiver at current time, Cog status. Prior to admission, pt was Ind for mobility and ADLs, per Dtr uses cane at times.Pt lives with spouse who is also currently in the hospital.  Upon evaluation, pt currently is requiring min assist for be dmobility, transfers and ambulation using HHA, impulsive and lOB during ambulation. Pt presents at PT eval functioning below baseline and currently w/ overall mobility deficits 2* to: impaired balance, decreased endurance, gait deviations, pain, decreased activity tolerance compared to baseline, decreased functional mobility tolerance compared to baseline, decreased safety awareness, impaired judgement, fall risk. Pt currently at a fall risk 2* to impairments listed above.  Pt will continue to benefit from skilled acute PT interventions to address stated impairments; to maximize functional mobility; for ongoing pt/ family training; and DME needs. At conclusion of PT session pt returned BTB, bed alarm engaged, all needs in reach, and RN notified of session findings/recommendations with phone and call bell within reach. Pt denies any further questions at this time. The patient's AM-PAC Basic Mobility Inpatient Short Form Raw Score is 16. A Raw score of less than or equal to 16 suggests  the patient may benefit from discharge to post-acute rehabilitation services. Please also refer to the recommendation of the Physical Therapist for safe discharge planning. Recommend Level II upon hospital D/C.   Barriers to Discharge Decreased caregiver support   Goals   Patient Goals to get up   STG Expiration Date 04/18/25   Short Term Goal #1 STG 1. Pt will be able to perform bed mobility tasks with sup in order to improve overall functional mobility and assist in safe d/c. STG 2. Pt with sit EOB for at least 25 minutes at sup level in order to strengthen abdominal musculature and assist in future transfers/ ambulation. STG 3. Pt will be able to perform functional transfer with sup in order to improve overall functional mobility and assist in safe d/c. STG 4. Pt will be able to ambulate at least 200 feet with least restrictive device with sup A in order to improve overall functional mobility and assist in safe d/c. STG 5. Pt will improve sitting/standing static/dynamic balance 1/2 grade in order to improve functional mobility and assist in safe d/c.   PT Treatment Day 0   Plan   Treatment/Interventions Functional transfer training;Therapeutic exercise;Bed mobility;Gait training;Spoke to nursing;Spoke to case management;OT   PT Frequency 2-3x/wk   Discharge Recommendation   Rehab Resource Intensity Level, PT II (Moderate Resource Intensity)   Equipment Recommended Walker   AM-PAC Basic Mobility Inpatient   Turning in Flat Bed Without Bedrails 3   Lying on Back to Sitting on Edge of Flat Bed Without Bedrails 3   Moving Bed to Chair 3   Standing Up From Chair Using Arms 3   Walk in Room 3   Climb 3-5 Stairs With Railing 1   Basic Mobility Inpatient Raw Score 16   Basic Mobility Standardized Score 38.32   [facility] Highest Level Of Mobility   -HLM Goal 5: Stand one or more mins   -HLM Achieved 7: Walk 25 feet or more   Modified Nova Scale   Modified Nova Scale 4   End of Consult   Patient Position at End  of Consult All needs within reach;Bed/Chair alarm activated;Supine  (Melody belt on , Virtual 1:1)         Marika Mock, PT DPT

## 2025-04-04 NOTE — PLAN OF CARE
Problem: Potential for Falls  Goal: Patient will remain free of falls  Description: INTERVENTIONS:- Educate patient/family on patient safety including physical limitations- Instruct patient to call for assistance with activity - Consult OT/PT to assist with strengthening/mobility - Keep Call bell within reach- Keep bed low and locked with side rails adjusted as appropriate- Keep care items and personal belongings within reach- Initiate and maintain comfort rounds- Make Fall Risk Sign visible to staff- Offer Toileting every 2 Hours, in advance of need- Initiate/Maintain bed alarm- Obtain necessary fall risk management equipment: none- Apply yellow socks and bracelet for high fall risk patients- Consider moving patient to room near nurses station  INTERVENTIONS:- Educate patient/family on patient safety including physical limitations- Instruct patient to call for assistance with activity - Consult OT/PT to assist with strengthening/mobility - Keep Call bell within reach- Keep bed low and locked with side rails adjusted as appropriate- Keep care items and personal belongings within reach- Initiate and maintain comfort rounds- Make Fall Risk Sign visible to staff- Offer Toileting every 2 Hours, in advance of need- Initiate/Maintain bedalarm- Obtain necessary fall risk management equipment: none- Apply yellow socks and bracelet for high fall risk patients- Consider moving patient to room near nurses station  Outcome: Progressing     Problem: PAIN - ADULT  Goal: Verbalizes/displays adequate comfort level or baseline comfort level  Description: Interventions:- Encourage patient to monitor pain and request assistance- Assess pain using appropriate pain scale- Administer analgesics based on type and severity of pain and evaluate response- Implement non-pharmacological measures as appropriate and evaluate response- Consider cultural and social influences on pain and pain management- Notify physician/advanced practitioner if  interventions unsuccessful or patient reports new pain  Outcome: Progressing     Problem: INFECTION - ADULT  Goal: Absence or prevention of progression during hospitalization  Description: INTERVENTIONS:- Assess and monitor for signs and symptoms of infection- Monitor lab/diagnostic results- Monitor all insertion sites, i.e. indwelling lines, tubes, and drains- Monitor endotracheal if appropriate and nasal secretions for changes in amount and color- [place] appropriate cooling/warming therapies per order- Administer medications as ordered- Instruct and encourage patient and family to use good hand hygiene technique- Identify and instruct in appropriate isolation precautions for identified infection/condition  Outcome: Progressing  Goal: Absence of fever/infection during neutropenic period  Description: INTERVENTIONS:- Monitor WBC  Outcome: Progressing     Problem: SAFETY ADULT  Goal: Patient will remain free of falls  Description: INTERVENTIONS:- Educate patient/family on patient safety including physical limitations- Instruct patient to call for assistance with activity - Consult OT/PT to assist with strengthening/mobility - Keep Call bell within reach- Keep bed low and locked with side rails adjusted as appropriate- Keep care items and personal belongings within reach- Initiate and maintain comfort rounds- Make Fall Risk Sign visible to staff- Offer Toileting every 2 Hours, in advance of need- Initiate/Maintain bed alarm- Obtain necessary fall risk management equipment: none- Apply yellow socks and bracelet for high fall risk patients- Consider moving patient to room near nurses station  INTERVENTIONS:- Educate patient/family on patient safety including physical limitations- Instruct patient to call for assistance with activity - Consult OT/PT to assist with strengthening/mobility - Keep Call bell within reach- Keep bed low and locked with side rails adjusted as appropriate- Keep care items and personal  belongings within reach- Initiate and maintain comfort rounds- Make Fall Risk Sign visible to staff- Offer Toileting every 2 Hours, in advance of need- Initiate/Maintain bed alarm- Obtain necessary fall risk management equipment: none- Apply yellow socks and bracelet for high fall risk patients- Consider moving patient to room near nurses station  Outcome: Progressing  Goal: Maintain or return to baseline ADL function  Description: INTERVENTIONS:-  Assess patient's ability to carry out ADLs; assess patient's baseline for ADL function and identify physical deficits which impact ability to perform ADLs (bathing, care of mouth/teeth, toileting, grooming, dressing, etc.)- Assess/evaluate cause of self-care deficits - Assess range of motion- Assess patient's mobility; develop plan if impaired- Assess patient's need for assistive devices and provide as appropriate- Encourage maximum independence but intervene and supervise when necessary- Involve family in performance of ADLs- Assess for home care needs following discharge - Consider OT consult to assist with ADL evaluation and planning for discharge- Provide patient education as appropriate  Outcome: Progressing  Goal: Maintains/Returns to pre admission functional level  Description: INTERVENTIONS:- Perform AM-PAC 6 Click Basic Mobility/ Daily Activity assessment daily.- Set and communicate daily mobility goal to care team and patient/family/caregiver.      Problem: DISCHARGE PLANNING  Goal: Discharge to home or other facility with appropriate resources  Description: INTERVENTIONS:- Identify barriers to discharge w/patient and caregiver- Arrange for needed discharge resources and transportation as appropriate- Identify discharge learning needs (meds, wound care, etc.)- Arrange for interpretive services to assist at discharge as needed- Refer to Case Management Department for coordinating discharge planning if the patient needs post-hospital services based on  physician/advanced practitioner order or complex needs related to functional status, cognitive ability, or social support system  Outcome: Progressing     Problem: Knowledge Deficit  Goal: Patient/family/caregiver demonstrates understanding of disease process, treatment plan, medications, and discharge instructions  Description: Complete learning assessment and assess knowledge base.Interventions:- Provide teaching at level of understanding- Provide teaching via preferred learning methods  Outcome: Progressing     Problem: RESPIRATORY - ADULT  Goal: Achieves optimal ventilation and oxygenation  Description: INTERVENTIONS:- Assess for changes in respiratory status- Assess for changes in mentation and behavior- Position to facilitate oxygenation and minimize respiratory effort- Oxygen administered by appropriate delivery if ordered- Initiate smoking cessation education as indicated- Encourage broncho-pulmonary hygiene including cough, deep breathe, Incentive Spirometry- Assess the need for suctioning and aspirate as needed- Assess and instruct to report SOB or any respiratory difficulty- Respiratory Therapy support as indicated  Outcome: Progressing     Problem: METABOLIC, FLUID AND ELECTROLYTES - ADULT  Goal: Electrolytes maintained within normal limits  Description: INTERVENTIONS:- Monitor labs and assess patient for signs and symptoms of electrolyte imbalances- Administer electrolyte replacement as ordered- Monitor response to electrolyte replacements, including repeat lab results as appropriate- Instruct patient on fluid and nutrition as appropriate  Outcome: Progressing  Goal: Fluid balance maintained  Description: INTERVENTIONS:- Monitor labs - Monitor I/O and WT- Instruct patient on fluid and nutrition as appropriate- Assess for signs & symptoms of volume excess or deficit  Outcome: Progressing  Goal: Glucose maintained within target range  Description: INTERVENTIONS:- Monitor Blood Glucose as ordered- Assess  for signs and symptoms of hyperglycemia and hypoglycemia- Administer ordered medications to maintain glucose within target range- Assess nutritional intake and initiate nutrition service referral as needed  Outcome: Progressing     Problem: MUSCULOSKELETAL - ADULT  Goal: Maintain or return mobility to safest level of function  Description: INTERVENTIONS:- Assess patient's ability to carry out ADLs; assess patient's baseline for ADL function and identify physical deficits which impact ability to perform ADLs (bathing, care of mouth/teeth, toileting, grooming, dressing, etc.)- Assess/evaluate cause of self-care deficits - Assess range of motion- Assess patient's mobility- Assess patient's need for assistive devices and provide as appropriate- Encourage maximum independence but intervene and supervise when necessary- Involve family in performance of ADLs- Assess for home care needs following discharge - Consider OT consult to assist with ADL evaluation and planning for discharge- Provide patient education as appropriate  Outcome: Progressing  Goal: Maintain proper alignment of affected body part  Description: INTERVENTIONS:- Support, maintain and protect limb and body alignment- Provide patient/ family with appropriate education  Outcome: Progressing     Problem: Prexisting or High Potential for Compromised Skin Integrity  Goal: Skin integrity is maintained or improved  Description: INTERVENTIONS:- Identify patients at risk for skin breakdown- Assess and monitor skin integrity- Assess and monitor nutrition and hydration status- Monitor labs - Assess for incontinence - Turn and reposition patient- Assist with mobility/ambulation- Relieve pressure over bony prominences- Avoid friction and shearing- Provide appropriate hygiene as needed including keeping skin clean and dry- Evaluate need for skin moisturizer/barrier cream- Collaborate with interdisciplinary team - Patient/family teaching- Consider wound care consult    4/4/2025 1002 by Mamie Meza RN  Outcome: Progressing  4/4/2025 0617 by Mamie Meza RN  Outcome: Progressing     Problem: Nutrition/Hydration-ADULT  Goal: Nutrient/Hydration intake appropriate for improving, restoring or maintaining nutritional needs  Description: Monitor and assess patient's nutrition/hydration status for malnutrition. Collaborate with interdisciplinary team and initiate plan and interventions as ordered.  Monitor patient's weight and dietary intake as ordered or per policy. Utilize nutrition screening tool and intervene as necessary. Determine patient's food preferences and provide high-protein, high-caloric foods as appropriate. INTERVENTIONS:- Monitor oral intake, urinary output, labs, and treatment plans- Assess nutrition and hydration status and recommend course of action- Evaluate amount of meals eaten- Assist patient with eating if necessary - Allow adequate time for meals- Recommend/ encourage appropriate diets, oral nutritional supplements, and vitamin/mineral supplements- Order, calculate, and assess calorie counts as needed- Recommend, monitor, and adjust tube feedings and TPN/PPN based on assessed needs- Assess need for intravenous fluids- Provide specific nutrition/hydration education as appropriate- Include patient/family/caregiver in decisions related to nutrition  4/4/2025 1002 by Mamie Meza RN  Outcome: Progressing  4/4/2025 0617 by Mamie Meza RN  Outcome: Progressing

## 2025-04-04 NOTE — ASSESSMENT & PLAN NOTE
Lab Results   Component Value Date    HGBA1C 5.8 02/20/2025     Recent Labs     04/03/25  2302 04/04/25  0658 04/04/25  1102   POCGLU 130 110 114       Blood Sugar Average: Last 72 hrs:  (P) 118  Hold home metformin while admitted and restart on discharge  Continue with accu-checks and SSI  Carb controlled diet  Hypoglycemia protocol

## 2025-04-04 NOTE — OCCUPATIONAL THERAPY NOTE
Occupational Therapy Evaluation     Patient Name: Zaira Morley  Today's Date: 4/4/2025  Problem List  Principal Problem:    Generalized weakness  Active Problems:    Hypertension    Type 2 diabetes mellitus with diabetic cataract, without long-term current use of insulin (HCC)    COVID-19    Ambulatory dysfunction    Past Medical History  Past Medical History:   Diagnosis Date    Brain mass     Last Assessed; 11/5/2015    Chest pain     Last Assessed: 1/22/2015    Diabetes type 2, uncontrolled     Last Assessed: 3/1/2016    Edema of left lower extremity     Last Assessed: 3/22/2017    GERD (gastroesophageal reflux disease)     Hyperlipidemia     Hypertension     Impaired fasting glucose     Last Assessed: 11/10/2015    Irregular heartbeat     Skin cancer (melanoma) (HCC)     Scalp     Varicose 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     Last Assessed: 3/22/2017     Past Surgical History  Past Surgical History:   Procedure Laterality Date    COLONOSCOPY      COLONOSCOPY N/A 7/10/2018    Procedure: COLONOSCOPY;  Surgeon: Jose Alejandro Rodrigues MD;  Location:  GI LAB;  Service: Colorectal    INGUINAL HERNIA REPAIR      20+ years ago - [place]; Last Assessed: 10/23/2014    TONSILLECTOMY      VARICOSE VEIN SURGERY Left     x2 left leg - 40+ yrs ago, [place] and [place]             04/04/25 1059   OT Last Visit   OT Visit Date 04/04/25   Note Type   Note type Evaluation   Pain Assessment   Pain Assessment Tool 0-10   Pain Score No Pain   Restrictions/Precautions   Weight Bearing Precautions Per Order No   Other Precautions Impulsive;Cognitive;Chair Alarm;Bed Alarm;Restraints;Multiple lines;Fall Risk;1:1;Contact/isolation;Airborne/isolation  (+posey, Virtual 1:1, wears glasses, COVID+)   Home Living   Type of Home House   Home Layout One level;Work area in basement;Performs ADLs on one level;Stairs to enter with rails   Bathroom Shower/Tub Walk-in shower   Bathroom Toilet Standard   Home Equipment Cane  (Dtrs report pt doesn't  "always use the SPC)   Additional Comments 1SH, Dtr reports that pt usually needs to go down the basement with FFOS. Has SPC but often refuses to use it.   Prior Function   Level of [place] Independent with ADLs;Independent with functional mobility;Needs assistance with IADLS  (shares IADLs with spouse, son from [place] checks in but unclear how often)   Lives With Spouse   Receives Help From Family   IADLs Independent with meal prep;Independent with medication management;Independent with driving   Falls in the last 6 months (S)  5 to 10  (Multiple falls per Dtr, unclear number)   Vocational Retired   Comments Pt's primary language is Jordanian per chart, able to communicate in simple terms in English. Pt's Dtrs present in session, able to provide thorough home setup and PLOF. Per Dtr's, pt lives with spouse, and they \"help each other\" at home, but pt is largely spouse's caregiver. Per Dtr's, pt has been more confused in the past 48 hours, requiring increased assist and falling more. Difficulty with stairs.   Lifestyle   Autonomy IND PTA with ADLs/IADLs. Family (son) checks in on pt but unclear how often as son  lives in [place]. Has SPC but often does not use it.   Reciprocal Relationships Lives with spouse, pt is primary caregiver for spouse per Dtrs   Service to Others Retired   Intrinsic Gratification Enjoys spending time with family   General   Additional Pertinent History Pt presents with increased confusion, is a ?historian. Pt primary language is [place]ian per chart. Pt Dtrs present in session to provide accurate information.   Family/Caregiver Present Yes   Subjective   Subjective \"I remember you\"   ADL   Where Assessed Edge of bed   Eating Assistance 5  Supervision/Setup   Grooming Assistance 5  Supervision/Setup   UB Bathing Assistance 5  Supervision/Setup   LB Bathing Assistance 4  Minimal Assistance   UB Dressing Assistance 5  Supervision/Setup   LB Dressing Assistance 4  Minimal Assistance   Toileting " Assistance  5  Supervision/Setup   Functional Assistance 5  Supervision/Setup   Bed Mobility   Supine to Sit 5  Supervision   Additional items HOB elevated;Bedrails;Increased time required;Verbal cues   Sit to Supine 4  Minimal assistance   Additional items HOB elevated;Bedrails;Increased time required;Verbal cues;LE management   Additional Comments pt in bed upon arrival. Returned to bed post session, all needs met, call bell within reach. +bed alarm on +posey on   Transfers   Sit to Stand 4  Minimal assistance   Additional items Assist x 1;Increased time required;Verbal cues   Stand to Sit 4  Minimal assistance   Additional items Assist x 1;Increased time required;Verbal cues   Additional Comments HHA   Functional Mobility   Functional Mobility 4  Minimal assistance   Additional Comments short household distances in room. HHA. Pt mildly unsteady, requiring tactile cues to correct   Additional items Hand hold assistance   Balance   Static Sitting Fair   Dynamic Sitting Fair -   Static Standing Fair -   Dynamic Standing Poor +   Ambulatory Poor +   Activity Tolerance   Activity Tolerance Patient limited by fatigue;Other (Comment)  (cognition)   Medical Staff Made Aware Co-eval w PT 2/2 increased medical complexity and comorbidities.   Nurse Made Aware RN cleared for therapy   RUE Assessment   RUE Assessment WFL   LUE Assessment   LUE Assessment WFL   Hand Function   Gross Motor Coordination Functional   Fine Motor Coordination Functional   Vision-Basic Assessment   Current Vision Wears glasses all the time   Cognition   Overall Cognitive Status Impaired   Arousal/Participation Alert;Cooperative   Attention Attends with cues to redirect   Orientation Level Oriented to person;Oriented to place;Disoriented to situation;Disoriented to time   Memory Decreased recall of precautions;Decreased recall of recent events   Following Commands Follows one step commands with increased time or repetition   Comments Pt overall  pleasant and cooperative. Motivated to participate. Pt noted to be mildly impulsive, requires VCs for safety awareness and insight. Pt Dtrs report decrease in cognition ~48 hours, report some hallucinations and confusion. Per Dtr's, pt has some baseline deficits but worse with this infection. Will continue to assess cognition   Assessment   Limitation Decreased ADL status;Decreased cognition;Decreased Safe judgement during ADL;Decreased endurance;Decreased self-care trans;Decreased high-level ADLs   Prognosis Fair   Assessment 79 year old pt seen today for an OT evaluation following [facility] to [facility] 2/2 generalized weakness, ambulatory dysfunction, COVID+, multiple falls with present symptoms significant for pain, fatigue, weakness, decreased ADL status, decreased functional mobility. Pt  has a past medical history of Brain mass, Chest pain, Diabetes type 2, uncontrolled, Edema of left lower extremity, GERD (gastroesophageal reflux disease), Hyperlipidemia, Hypertension, Impaired fasting glucose, Irregular heartbeat, Skin cancer (melanoma) (HCC), and Varicose 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. Pt is a ?historian, information obtained from chart and Dtr's present in session. Pt lives with spouse in a 1SH, with FFOS to basement. Pt was IND with ADLs PTA, shares IADLs with spouse. Per Dtr's pt spouse also requires assistance, so pt was primary caregiver for spouse. Son checks in but unclear how frequently as he lives in [facility]. Pt has a SPC but often doesn't use it per dtrs. Pt very pleasant and cooperative t/o session. Pt completed functional bed mobility with Min A. Min A for functional STS txfs and functional mobility with HHA. Pt was SUP for UB ADLs and min A for LB ADLs. The patient's raw score on the AM-PAC Daily Activity Inpatient Short Form is 20. A raw score of greater than or equal to 19 suggests the patient may benefit from discharge to home. Please refer to the recommendation of  the Occupational Therapist for safe discharge planning. Pt is functioning below baseline level of function and will continue to benefit from skilled acute OT to promote increased independence and return to PLOF. At this time, current OT recommendation is Level 2 resources - pending progress and increased family assistance/SUP at home. Will continue to follow and assess.   Goals   Patient Goals to get up   LTG Time Frame 10-14   Long Term Goal #1 See below for goals   Plan   Treatment Interventions ADL retraining;Functional transfer training;UE strengthening/ROM;Endurance training;Patient/family training;Cognitive reorientation;Equipment evaluation/education;Fine motor coordination activities;Compensatory technique education;Continued evaluation;Energy conservation;Activityengagement   Goal Expiration Date 04/18/25   OT Frequency 2-3x/wk   Discharge Recommendation   Rehab Resource Intensity Level, OT II (Moderate Resource Intensity)  (pending progress.)   AM-PAC Daily Activity Inpatient   Lower Body Dressing 3   Bathing 3   Toileting 3   Upper Body Dressing 3   Grooming 4   Eating 4   Daily Activity Raw Score 20   Daily Activity Standardized Score (Calc for Raw Score >=11) 42.03   AM-PAC Applied Cognition Inpatient   Following a Speech/Presentation 3   Understanding Ordinary Conversation 3   Taking Medications 2   Remembering Where Things Are Placed or Put Away 2   Remembering List of 4-5 Errands 2   Taking Care of Complicated Tasks 2   Applied Cognition Raw Score 14   Applied Cognition Standardized Score 32.02   End of Consult   Education Provided Yes;Family or social support of family present for education by provider   Patient Position at End of Consult Bed/Chair alarm activated;All needs within reach;Supine;Other (comment)  (posey on)   Nurse Communication Nurse aware of consult         Occupational Therapy Goals    Pt will be Mod I with bed mobility with good sitting balance/tolerance to engage in self care  tasks within this plan of care.      Pt will be Mod I for UB dressing and bathing with use of assistive devices PRN within this plan of care.     Pt will be Mod I for LB dressing and bathing with use of assistive devices PRN within this plan of care.     Pt will demonstrate standing tolerance of 2-3 minutes increase independence for ADLs/tasks with use of assistive devices PRN within this plan of care.     Pt will complete functional transfers with mod I, with use of appropriate assistive devices within this plan of care.     Pt will demonstrate good carryover of safety awareness with use of appropriate assistive devices during functional tasks within this plan of care.     Pt will demonstrate increased activity tolerance to 30 mins for participation in ADLs and functional tasks within this plan of care.     Pt will complete further functional cognitive assessment with good attention/participation to assist with safe d/c planning recommendations.      Stan Simental MS, OTR/L     M[facility] CERTIFIED RATER ID [id number]

## 2025-04-04 NOTE — H&P
H&P - Hospitalist   Name: Zaira Morley 79 y.o. male I MRN: [id number]  Unit/Bed#: ED 23 I Date of Admission: 4/3/2025   Date of Service: 4/4/2025 I Hospital Day: 1     Assessment & Plan  Generalized weakness  Presented after developing dizziness with a fall whilst assisting transporting his wife here, persisted with unsteadiness during ED evaluation  CT imaging negative for acute traumatic injuries, patient found positive for COVID-19 infection  Suspect his generalized weakness is due to the underlying COVID-19 infection and debility  PT/OT evaluations, CM consult for assistance with disposition  COVID-19  Patient developed reported congestion and low-grade fevers after exposure to a sick family member, found positive for COVID-19 during ED evaluation  Thus far he is minimally symptomatic and maintaining appropriate oxygenation on room air  Supportive management at this juncture, can escalate therapy if patient should develop new oxygen requirement  Hypertension  Blood pressure thus far acceptable.  Continue PTA lisinopril 20 mg daily restrict hold parameters and monitor blood pressure per protocol  Type 2 diabetes mellitus with diabetic cataract, without long-term current use of insulin ([facility])  Lab Results   Component Value Date    HGBA1C 5.8 02/20/2025       Recent Labs     04/03/25  2302   POCGLU 130       Blood Sugar Average: Last 72 hrs:  (P) 130  Hold home metformin while admitted, start correctional insulin coverage with Accu-Cheks  Carb controlled diet  Initiate hypoglycemia protocol  Ambulatory dysfunction  Ambulatory function in the setting of COVID-19 infection and debility  Management as per the primary problem      VTE Prophylaxis: Enoxaparin (Lovenox)  / sequential compression device   Code Status: Level 1 - Full Code   POLST: POLST form is not discussed and not completed at this time.  Discussion with family: Daughters at bedside.  Of note patient's wife also was at bedside but is currently also being  admitted at this facility.    Anticipated Length of Stay:  Patient will be admitted on an Inpatient basis with an anticipated length of stay of greater than 2 midnights.   Justification for Hospital Stay: Please see detailed plans noted above.    Chief Complaint:     Generalized weakness, fall  History of Present Illness:  Patient is primarily Kiswahili speaking, interpretation provided with assistance of patient's daughters, who are present at bedside    Zaira Morley is a 79 y.o. male who has a past medical history significant for hypertension, type 2 diabetes on metformin therapy, history of brain tumor who presents with generalized weakness and after a fall.  Family began to notice patient with increasing generalized weakness as well as development of some URI symptoms of low-grade fever and nasal congestion after exposure to a sick family member with COVID-19 infection.  Patient was accompanying his wife to the emergency department so patient's wife could be evaluated, however felt dizzy and ultimately fell to the ground without loss of consciousness but unclear if there was head strike.  Throughout ED evaluation patient remained hemodynamically stable and nontoxic-appearing, and trauma imaging was all negative for acute traumatic injuries and labs without marked abnormality however COVID-19/influenza/RSV PCR was noted positive for COVID-19 infection.  Ambulatory trial was attempted during ED evaluation however patient was noted with marked unsteadiness thus patient admitted as above due to his ambulatory dysfunction and generalized weakness likely provoked by COVID-19 infection.    Review of Systems:    Constitutional:  Denies fever or chills   Eyes:  Denies change in visual acuity   HENT:  Denies sore throat but reported nasal congestion  Respiratory:  Denies cough or shortness of breath   Cardiovascular:  Denies chest pain or edema   GI:  Denies abdominal pain, nausea, vomiting, bloody stools or diarrhea   :   Denies dysuria   Musculoskeletal:  Denies back pain or joint pain   Integument:  Denies rash   Neurologic:  Denies headache, focal weakness or sensory changes   Endocrine:  Denies polyuria or polydipsia   Lymphatic:  Denies swollen glands   Psychiatric:  Denies depression or anxiety     Past Medical and Surgical History:   Past Medical History:   Diagnosis Date    Brain mass     Last Assessed; 11/5/2015    Chest pain     Last Assessed: 1/22/2015    Diabetes type 2, uncontrolled     Last Assessed: 3/1/2016    Edema of left lower extremity     Last Assessed: 3/22/2017    GERD (gastroesophageal reflux disease)     Hyperlipidemia     Hypertension     Impaired fasting glucose     Last Assessed: 11/10/2015    Irregular heartbeat     Skin cancer (melanoma) (HCC)     Scalp     Varicose 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     Last Assessed: 3/22/2017     Past Surgical History:   Procedure Laterality Date    COLONOSCOPY      COLONOSCOPY N/A 7/10/2018    Procedure: COLONOSCOPY;  Surgeon: Jose Alejandro Rodrigues MD;  Location:  GI LAB;  Service: Colorectal    INGUINAL HERNIA REPAIR      20+ years ago - [place]; Last Assessed: 10/23/2014    TONSILLECTOMY      VARICOSE VEIN SURGERY Left     x2 left leg - 40+ yrs ago, [place] and [place]       Meds/Allergies:  Current Outpatient Medications   Medication Instructions    acetaminophen (TYLENOL) 650 mg, Oral, Every 6 hours PRN    aspirin 81 mg, Daily    atorvastatin (LIPITOR) 10 mg, Oral, Daily    bisacodyl (DULCOLAX) 10 mg, Rectal, As needed    Blood Glucose Monitoring Suppl (OneTouch Verio Reflect) w/Device KIT Check blood sugars once daily. Please substitute with appropriate alternative as covered by patient's insurance. Dx: E11.65    colchicine (COLCRYS) 0.6 mg, Oral, Daily    famotidine (PEPCID) 20 mg, Oral, 2 times daily    gabapentin (NEURONTIN) 100 mg, Oral, Daily at bedtime    glucose blood (OneTouch Verio) test strip CHECK BLOOD SUGARS ONCE DAILY.    hydrocortisone 2.5 % cream 1  Application, Topical, 2 times daily, To affected area    lactulose (CHRONULAC) 20 g, Oral, Daily PRN, DAILY AS NEEDED FOR SEVERE CONSTIPATION    Lancets (OneTouch Delica Plus [id number]) MISC TEST DAILY, E11.5--PT CONFIMED DELICA 33 G    lisinopril (ZESTRIL) 20 mg, Oral, Daily    lubiprostone (AMITIZA) 24 mcg, Oral, 2 times daily with meals    metFORMIN (GLUCOPHAGE) 500 mg, Oral, 2 times daily    methocarbamol (ROBAXIN) 500 mg, Oral, Daily at bedtime    omeprazole (PRILOSEC) 40 mg, Oral, Daily    OneTouch Delica Lancets 33G MISC Check blood sugars once daily. Please substitute with appropriate alternative as covered by patient's insurance. Dx: E11.65    polyethylene glycol (MIRALAX) 17 g packet Take miralax daily but can use up to 2 times per day as needed for constipation    senna-docusate sodium (SENOKOT S) 8.6-50 mg per tablet 2 tablets, Oral, Daily    tamsulosin (FLOMAX) 0.4 mg, Oral, Daily with dinner         Allergies: No Known Allergies  History:  Marital Status: /Civil Union     Substance Use History:   Social History     Substance and Sexual Activity   Alcohol Use No     Social History     Tobacco Use   Smoking Status Never    Passive exposure: Never   Smokeless Tobacco Never     Social History     Substance and Sexual Activity   Drug Use No       Family History:  Family History   Problem Relation Age of Onset    No Known Problems Mother     No Known Problems Father     Melanoma Daughter 17       Physical Exam:     Vitals:   Blood Pressure: (!) 175/84 (04/03/25 2003)  Pulse: 57 (04/03/25 2003)  Temperature: (!) 97.3 °F (36.3 °C) (04/03/25 1804)  Temp Source: Oral (04/03/25 1804)  Respirations: 20 (04/03/25 2003)  SpO2: 99 % (04/03/25 2003)    Constitutional:  Well developed, well nourished, no acute distress, non-toxic appearance   Eyes:  PERRL, conjunctiva normal   HENT:  Atraumatic, external ears normal, nose normal, oropharynx moist, no pharyngeal exudates. Neck- normal range of motion, no  tenderness, supple   Respiratory:  No respiratory distress, normal breath sounds, no rales, no wheezing   Cardiovascular:  Normal rate, normal rhythm, no murmurs, no gallops, no rubs   GI:  Soft, nondistended, normal bowel sounds, nontender, no organomegaly, no mass, no rebound, no guarding   :  No costovertebral angle tenderness   Musculoskeletal:  No edema, no tenderness, no deformities. Back- no tenderness  Integument:  Well hydrated, no rash   Lymphatic:  No lymphadenopathy noted   Neurologic:  Alert &awake, communicative, CN 2-12 normal, normal motor function, normal sensory function, no focal deficits noted   Psychiatric:  Speech and behavior appropriate       Lab Results: I have reviewed laboratory reports/results from this admission    Results from last 7 days   Lab Units 04/03/25  2256   WBC Thousand/uL 2.74*   HEMOGLOBIN g/dL 12.2   HEMATOCRIT % 36.7   PLATELETS Thousands/uL 115*   SEGS PCT % 51   LYMPHO PCT % 25   MONO PCT % 23*   EOS PCT % 1     Results from last 7 days   Lab Units 04/03/25  2256   SODIUM mmol/L 136   POTASSIUM mmol/L 3.7   CHLORIDE mmol/L 103   CO2 mmol/L 26   BUN mg/dL 18   CREATININE mg/dL 0.80   ANION GAP mmol/L 7   CALCIUM mg/dL 8.6   ALBUMIN g/dL 4.2   TOTAL BILIRUBIN mg/dL 0.62   ALK PHOS U/L 96   ALT U/L 10   AST U/L 15   GLUCOSE RANDOM mg/dL 103         Results from last 7 days   Lab Units 04/03/25  2302   POC GLUCOSE mg/dl 130               EKG: Personally reviewed, sinus bradycardia HR 59, nonspecific ST-T wave abnormalities fairly stable compared to prior    Imaging: Results Review Statement: I reviewed radiology reports from this admission including: CT chest, CT head, CT C-spine, and xray(s).    CT spine cervical without contrast  Result Date: 4/3/2025  Narrative: CT CERVICAL SPINE - WITHOUT CONTRAST INDICATION:   Fall with questionable HS. COMPARISON:  None. TECHNIQUE:  CT examination of the cervical spine was performed without intravenous contrast.  Contiguous axial  images were obtained. Multiplanar 2D reformatted images were created from the source data. Radiation dose length product (DLP) for this visit:  315.19 mGy-cm .  This examination, like all CT scans performed in the [facility], was performed utilizing techniques to minimize radiation dose exposure, including the use of iterative  reconstruction and automated exposure control. IMAGE QUALITY:  Diagnostic. FINDINGS: ALIGNMENT: There is straightening of normal cervical lordosis.  No subluxation or compression deformity. VERTEBRAE:  No fracture. DEGENERATIVE CHANGES: Moderate multilevel cervical degenerative changes are noted. No critical central canal stenosis. PREVERTEBRAL AND PARASPINAL SOFT TISSUES: Unremarkable THORACIC INLET:  Normal.     Impression: No cervical spine fracture or traumatic malalignment. Workstation performed: [id number]     CT head without contrast  Result Date: 4/3/2025  Narrative: CT BRAIN - WITHOUT CONTRAST INDICATION:   Fall with questionable HS. COMPARISON: MRI brain with contrast 6/6/2024 CT head without contrast 3/1/2019 TECHNIQUE:  CT examination of the brain was performed.  Multiplanar 2D reformatted images were created from the source data. Radiation dose length product (DLP) for this visit:  854.78 mGy-cm .  This examination, like all CT scans performed in the [facility], was performed utilizing techniques to minimize radiation dose exposure, including the use of iterative  reconstruction and automated exposure control. IMAGE QUALITY:  Diagnostic. FINDINGS: PARENCHYMA: Decreased attenuation is noted in periventricular and subcortical white matter demonstrating an appearance that is statistically most likely to represent moderate microangiopathic change; this appearance is similar when compared to most recent prior examination. Stable calcified right petroclival meningioma with unchanged mass effect upon the right anterior oliver. Stable small calcifications  along the right tentorium. No CT signs of acute infarction.  No intracranial mass, mass effect or midline shift.  No acute parenchymal hemorrhage. VENTRICLES AND EXTRA-AXIAL SPACES: Ventricles and extra-axial CSF spaces are prominent commensurate with the degree of volume loss. No hydrocephalus.  No acute extra-axial hemorrhage. VISUALIZED ORBITS: Normal visualized orbits. PARANASAL SINUSES: Normal visualized paranasal sinuses. CALVARIUM AND EXTRACRANIAL SOFT TISSUES: Normal.     Impression: No acute intracranial abnormality. Unchanged moderate chronic microangiopathic change. 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 The study was marked in EPIC for immediate notification. Workstation performed: [id number]     CT chest wo contrast  Result Date: 4/3/2025  Narrative: CT CHEST WITHOUT IV CONTRAST INDICATION:   Fall with questionable HS. Per my review of the medical record, pain in left chest. COMPARISON: CXR 12/26/2024, cervical spine CT 4/3/2025, abdomen CT 5/18/2024. TECHNIQUE: Chest CT without intravenous contrast.  Axial, sagittal, coronal 2D reformats and coronal MIPS from source data. Radiation dose length product (DLP):  523.26 mGy-cm . Radiation dose exposure minimized using iterative reconstruction and automated exposure control. FINDINGS: LUNGS: Slightly compromised by respiratory motion and suboptimal inspiration. No acute disease. Benign calcified granulomas. AIRWAYS: No significant filling defects. PLEURA:  Unremarkable. HEART/GREAT VESSELS: Normal heart size. Mild coronary artery calcification indicating atherosclerotic heart disease. MEDIASTINUM AND FELIPE:  Unremarkable. CHEST WALL AND LOWER NECK: Unremarkable. UPPER ABDOMEN: Bilateral renal cysts. OSSEOUS STRUCTURES: Mild degenerative disease in the spine. No acute displaced left rib fracture but the lower ribs are incompletely imaged.     Impression: No acute displaced left rib fracture but  the lower ribs are incompletely imaged. No acute pulmonary disease. Workstation performed: [id number]         ** Please Note: Dragon 360 Dictation voice to text software was used in the creation of this document. **

## 2025-04-05 LAB
ANION GAP SERPL CALCULATED.3IONS-SCNC: 6 MMOL/L (ref 4–13)
BACTERIA UR QL AUTO: NORMAL /HPF
BASOPHILS # BLD AUTO: 0.01 THOUSANDS/ÂΜL (ref 0–0.1)
BASOPHILS NFR BLD AUTO: 0 % (ref 0–1)
BILIRUB UR QL STRIP: NEGATIVE
BUN SERPL-MCNC: 23 MG/DL (ref 5–25)
CALCIUM SERPL-MCNC: 8.9 MG/DL (ref 8.4–10.2)
CHLORIDE SERPL-SCNC: 103 MMOL/L (ref 96–108)
CLARITY UR: CLEAR
CO2 SERPL-SCNC: 28 MMOL/L (ref 21–32)
COLOR UR: NORMAL
CREAT SERPL-MCNC: 0.88 MG/DL (ref 0.6–1.3)
EOSINOPHIL # BLD AUTO: 0.01 THOUSAND/ÂΜL (ref 0–0.61)
EOSINOPHIL NFR BLD AUTO: 0 % (ref 0–6)
ERYTHROCYTE [DISTWIDTH] IN BLOOD BY AUTOMATED COUNT: 13.1 % (ref 11.6–15.1)
GFR SERPL CREATININE-BSD FRML MDRD: 82 ML/MIN/1.73SQ M
GLUCOSE SERPL-MCNC: 116 MG/DL (ref 65–140)
GLUCOSE SERPL-MCNC: 119 MG/DL (ref 65–140)
GLUCOSE SERPL-MCNC: 126 MG/DL (ref 65–140)
GLUCOSE SERPL-MCNC: 172 MG/DL (ref 65–140)
GLUCOSE SERPL-MCNC: 98 MG/DL (ref 65–140)
GLUCOSE UR STRIP-MCNC: NEGATIVE MG/DL
HCT VFR BLD AUTO: 38.8 % (ref 36.5–49.3)
HGB BLD-MCNC: 12.8 G/DL (ref 12–17)
HGB UR QL STRIP.AUTO: NEGATIVE
IMM GRANULOCYTES # BLD AUTO: 0.01 THOUSAND/UL (ref 0–0.2)
IMM GRANULOCYTES NFR BLD AUTO: 0 % (ref 0–2)
KETONES UR STRIP-MCNC: NEGATIVE MG/DL
LEUKOCYTE ESTERASE UR QL STRIP: NEGATIVE
LYMPHOCYTES # BLD AUTO: 1.06 THOUSANDS/ÂΜL (ref 0.6–4.47)
LYMPHOCYTES NFR BLD AUTO: 37 % (ref 14–44)
MCH RBC QN AUTO: 29 PG (ref 26.8–34.3)
MCHC RBC AUTO-ENTMCNC: 33 G/DL (ref 31.4–37.4)
MCV RBC AUTO: 88 FL (ref 82–98)
MONOCYTES # BLD AUTO: 0.58 THOUSAND/ÂΜL (ref 0.17–1.22)
MONOCYTES NFR BLD AUTO: 20 % (ref 4–12)
NEUTROPHILS # BLD AUTO: 1.17 THOUSANDS/ÂΜL (ref 1.85–7.62)
NEUTS SEG NFR BLD AUTO: 43 % (ref 43–75)
NITRITE UR QL STRIP: NEGATIVE
NON-SQ EPI CELLS URNS QL MICRO: NORMAL /HPF
NRBC BLD AUTO-RTO: 0 /100 WBCS
PH UR STRIP.AUTO: 5 [PH]
PLATELET # BLD AUTO: 114 THOUSANDS/UL (ref 149–390)
PMV BLD AUTO: 10 FL (ref 8.9–12.7)
POTASSIUM SERPL-SCNC: 3.5 MMOL/L (ref 3.5–5.3)
PROT UR STRIP-MCNC: NEGATIVE MG/DL
RBC # BLD AUTO: 4.41 MILLION/UL (ref 3.88–5.62)
RBC #/AREA URNS AUTO: NORMAL /HPF
SODIUM SERPL-SCNC: 137 MMOL/L (ref 135–147)
SP GR UR STRIP.AUTO: 1.01 (ref 1–1.03)
UROBILINOGEN UR STRIP-ACNC: <2 MG/DL
WBC # BLD AUTO: 2.84 THOUSAND/UL (ref 4.31–10.16)
WBC #/AREA URNS AUTO: NORMAL /HPF

## 2025-04-05 PROCEDURE — 81001 URINALYSIS AUTO W/SCOPE: CPT | Performed by: STUDENT IN AN ORGANIZED HEALTH CARE EDUCATION/TRAINING PROGRAM

## 2025-04-05 PROCEDURE — 99232 SBSQ HOSP IP/OBS MODERATE 35: CPT | Performed by: STUDENT IN AN ORGANIZED HEALTH CARE EDUCATION/TRAINING PROGRAM

## 2025-04-05 PROCEDURE — 85025 COMPLETE CBC W/AUTO DIFF WBC: CPT | Performed by: PHYSICIAN ASSISTANT

## 2025-04-05 PROCEDURE — 82948 REAGENT STRIP/BLOOD GLUCOSE: CPT

## 2025-04-05 PROCEDURE — 80048 BASIC METABOLIC PNL TOTAL CA: CPT | Performed by: PHYSICIAN ASSISTANT

## 2025-04-05 RX ORDER — OLANZAPINE 10 MG/2ML
2.5 INJECTION, POWDER, FOR SOLUTION INTRAMUSCULAR ONCE
Status: COMPLETED | OUTPATIENT
Start: 2025-04-05 | End: 2025-04-05

## 2025-04-05 RX ORDER — WATER 10 ML/10ML
INJECTION INTRAMUSCULAR; INTRAVENOUS; SUBCUTANEOUS
Status: COMPLETED
Start: 2025-04-05 | End: 2025-04-05

## 2025-04-05 RX ADMIN — WATER: 1 INJECTION INTRAMUSCULAR; INTRAVENOUS; SUBCUTANEOUS at 08:06

## 2025-04-05 RX ADMIN — GABAPENTIN 100 MG: 100 CAPSULE ORAL at 21:49

## 2025-04-05 RX ADMIN — OLANZAPINE 2.5 MG: 10 INJECTION, POWDER, FOR SOLUTION INTRAMUSCULAR at 07:00

## 2025-04-05 RX ADMIN — FAMOTIDINE 20 MG: 20 TABLET, FILM COATED ORAL at 11:43

## 2025-04-05 RX ADMIN — ASPIRIN 81 MG: 81 TABLET, COATED ORAL at 11:43

## 2025-04-05 RX ADMIN — LUBIPROSTONE 24 MCG: 24 CAPSULE, GELATIN COATED ORAL at 17:20

## 2025-04-05 RX ADMIN — SENNOSIDES AND DOCUSATE SODIUM 2 TABLET: 50; 8.6 TABLET ORAL at 11:43

## 2025-04-05 RX ADMIN — NIRMATRELVIR AND RITONAVIR 3 TABLET: KIT at 17:20

## 2025-04-05 RX ADMIN — NIRMATRELVIR AND RITONAVIR 3 TABLET: KIT at 11:43

## 2025-04-05 RX ADMIN — PREDNISOLONE ACETATE 1 DROP: 10 SUSPENSION/ DROPS OPHTHALMIC at 11:43

## 2025-04-05 RX ADMIN — INSULIN LISPRO 1 UNITS: 100 INJECTION, SOLUTION INTRAVENOUS; SUBCUTANEOUS at 21:49

## 2025-04-05 RX ADMIN — LISINOPRIL 20 MG: 20 TABLET ORAL at 11:43

## 2025-04-05 RX ADMIN — ENOXAPARIN SODIUM 40 MG: 40 INJECTION SUBCUTANEOUS at 11:44

## 2025-04-05 RX ADMIN — ATORVASTATIN CALCIUM 10 MG: 10 TABLET, FILM COATED ORAL at 11:43

## 2025-04-05 RX ADMIN — LUBIPROSTONE 24 MCG: 24 CAPSULE, GELATIN COATED ORAL at 11:43

## 2025-04-05 RX ADMIN — METHOCARBAMOL 500 MG: 500 TABLET ORAL at 21:49

## 2025-04-05 RX ADMIN — PANTOPRAZOLE SODIUM 40 MG: 40 TABLET, DELAYED RELEASE ORAL at 06:21

## 2025-04-05 RX ADMIN — PREDNISOLONE ACETATE 1 DROP: 10 SUSPENSION/ DROPS OPHTHALMIC at 17:28

## 2025-04-05 RX ADMIN — PREDNISOLONE ACETATE 1 DROP: 10 SUSPENSION/ DROPS OPHTHALMIC at 21:02

## 2025-04-05 RX ADMIN — FAMOTIDINE 20 MG: 20 TABLET, FILM COATED ORAL at 17:20

## 2025-04-05 NOTE — ASSESSMENT & PLAN NOTE
Patient acutely confused.    Continue VRC monitoring and posey belt.  Per family, confusion may be improving  Consider MOCA assessment after mentation normalizing

## 2025-04-05 NOTE — UTILIZATION REVIEW
Continued Stay Review    SEE INITIAL REVIEW AT BOTTOM    Date: 4/5  Day 3: Has surpassed a 2nd midnight with active treatments and services.    Current Patient Class: Inpatient  Current Level of Care: med/surg    HPI:79 y.o. male initially admitted on 4/3   Current Diagnosis: generalized weakness, Covid-19 infection    Assessment/Plan: T max 101.4 yesterday. Pt remains pleasantly confused. He has attempted to get OOB several times so is on VRC monitor and posey. No complaints. Remains on RA. Starting Paxlovid d/t pt's acute encephalopathy and fever. Continue PTA po meds. Continue Accu-checks w/ ssi. Continue supportive care.  PT/OT recommending IP SNF level rehab on d/c.     Medications:   Scheduled Medications:  aspirin, 81 mg, Oral, Daily  atorvastatin, 10 mg, Oral, Daily  bisacodyl, 10 mg, Rectal, Daily  enoxaparin, 40 mg, Subcutaneous, Daily  famotidine, 20 mg, Oral, BID  gabapentin, 100 mg, Oral, HS  insulin lispro, 1-5 Units, Subcutaneous, HS  insulin lispro, 1-6 Units, Subcutaneous, TID AC  lisinopril, 20 mg, Oral, Daily  lubiprostone, 24 mcg, Oral, BID With Meals  methocarbamol, 500 mg, Oral, HS  nirmatrelvir & ritonavir, 3 tablet, Oral, BID  pantoprazole, 40 mg, Oral, Early Morning  prednisoLONE acetate, 1 drop, Right Eye, TID  senna-docusate sodium, 2 tablet, Oral, Daily    PRN Meds:  acetaminophen, 650 mg, Oral, Q6H PRN  ondansetron, 4 mg, Intravenous, Q6H PRN      Discharge Plan: TBD    Vital Signs (last 3 days)       Date/Time Temp Pulse Resp BP MAP (mmHg) SpO2 O2 Device Patient Position - Orthostatic VS Pain    04/05/25 06:16:05 98.7 °F (37.1 °C) 60 -- 143/75 98 97 % None (Room air) -- --    04/05/25 0500 -- -- -- -- -- -- None (Room air) -- --    04/05/25 0300 -- -- -- -- -- -- None (Room air) -- --    04/05/25 0100 -- -- -- -- -- -- None (Room air) -- --    04/04/25 2318 -- -- -- -- -- -- None (Room air) -- --    04/04/25 2300 -- -- -- -- -- -- None (Room air) -- --    04/04/25 22:24:51 -- 63 --  149/79 102 97 % None (Room air) -- --    04/04/25 2218 -- -- -- -- -- -- -- -- Med Not Given for Pain - for MAR use only    04/04/25 2142 100.3 °F (37.9 °C) -- -- -- -- -- -- -- --    04/04/25 2100 -- -- -- -- -- -- None (Room air) -- No Pain    04/04/25 20:20:17 98.4 °F (36.9 °C) 70 -- 120/65 83 95 % None (Room air) -- --    04/04/25 1900 -- -- -- -- -- -- None (Room air) -- --    04/04/25 15:06:44 101.4 °F (38.6 °C) 69 -- 123/65 84 96 % -- -- --    04/04/25 1251 101.4 °F (38.6 °C) -- -- -- -- -- -- -- --     Weight (last 2 days)       None       Pertinent Labs/Diagnostic Results:   Radiology:  CT head without contrast   Final Interpretation by Krzysztof Beal MD (04/03 2022)      No acute intracranial abnormality.   Unchanged moderate chronic microangiopathic change.   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         CT spine cervical without contrast   Final Interpretation by Krzysztof Beal MD (04/03 2027)      No cervical spine fracture or traumatic malalignment.         CT chest wo contrast   Final Interpretation by Roxanne Negron MD (04/03 2013)      No acute displaced left rib fracture but the lower ribs are incompletely imaged.      No acute pulmonary disease.         XR knee 4+ vw left injury   ED Interpretation by Esteban Daniels DO (04/03 2211)   Arthritic changes, no acute osseous abnormality.      Final Interpretation by Bradley Landon Kocher, MD (04/04 0805)      No acute osseous abnormality.      Degenerative changes as described.         XR knee 4+ vw right injury   ED Interpretation by Esteban Daniels DO (04/03 2211)   Arthritic changes, no acute osseous abnormality      Final Interpretation by Bradley Landon Kocher, MD (04/04 0805)      No acute osseous abnormality.      Degenerative changes as described.        Cardiology:  ECG 12 lead   Final Result by Twila Solorio DO (04/04 2013)   Sinus rhythm with marked sinus  arrhythmia   Otherwise normal ECG   When compared with ECG of 03-Apr-2025 19:56,   T wave inversion no longer evident in Inferior leads   T wave amplitude has decreased in Lateral leads   Confirmed by Twila Solorio ([id number]) on 4/4/2025 8:13:24 PM      ECG 12 lead   Final Result by Adam Gonzales MD (04/03 2137)   Sinus bradycardia   Nonspecific ST and T wave abnormality   Baseline Artifact   Abnormal ECG   Confirmed by Adam Gonzales ([id number]) on 4/3/2025 9:37:38 PM        Results from last 7 days   Lab Units 04/03/25  1840   SARS-COV-2  Positive*     Results from last 7 days   Lab Units 04/05/25  0610 04/03/25  2256   WBC Thousand/uL 2.84* 2.74*   HEMOGLOBIN g/dL 12.8 12.2   HEMATOCRIT % 38.8 36.7   PLATELETS Thousands/uL 114* 115*   TOTAL NEUT ABS Thousands/µL 1.17* 1.39*     Results from last 7 days   Lab Units 04/05/25  0610 04/03/25  2256   SODIUM mmol/L 137 136   POTASSIUM mmol/L 3.5 3.7   CHLORIDE mmol/L 103 103   CO2 mmol/L 28 26   ANION GAP mmol/L 6 7   BUN mg/dL 23 18   CREATININE mg/dL 0.88 0.80   EGFR ml/min/1.73sq m 82 85   CALCIUM mg/dL 8.9 8.6     Results from last 7 days   Lab Units 04/03/25  2256   AST U/L 15   ALT U/L 10   ALK PHOS U/L 96   TOTAL PROTEIN g/dL 6.5   ALBUMIN g/dL 4.2   TOTAL BILIRUBIN mg/dL 0.62     Results from last 7 days   Lab Units 04/05/25  1137 04/05/25  0759 04/04/25  2113 04/04/25  1614 04/04/25  1102 04/04/25  0658 04/03/25  2302   POC GLUCOSE mg/dl 126 119 120 109 114 110 130     Results from last 7 days   Lab Units 04/05/25  0610 04/03/25  2256   GLUCOSE RANDOM mg/dL 98 103       Results from last 7 days   Lab Units 04/03/25  1840   INFLUENZA A PCR  Negative   INFLUENZA B PCR  Negative   RSV PCR  Negative         Network Utilization Review Department  ATTENTION: Please call with any questions or concerns to [phone] and carefully listen to the prompts so that you are directed to the right person. All voicemails are confidential.   For Discharge needs, contact Care Management DC  Support Team at [phone] opt. 2  Send all requests for admission clinical reviews, approved or denied determinations and any other requests to dedicated fax number below belonging to the campus where the patient is receiving treatment. List of dedicated fax numbers for the Facilities:  FACILITY NAME UR FAX NUMBER   ADMISSION DENIALS (Administrative/Medical Necessity) [phone]   DISCHARGE SUPPORT TEAM (NETWORK) [phone]   PARENT CHILD HEALTH (Maternity/NICU/Pediatrics) [phone]   [facility] [phone]   [facility] [phone]   [facility] [phone]   [facility] [phone]   [facility] [phone]   [facility] [phone]   [facility] [phone]   [facility] [phone]   [facility] [phone]   [facility] [phone]   [facility] [phone]   [facility] [phone]

## 2025-04-05 NOTE — PLAN OF CARE
Problem: Potential for Falls  Goal: Patient will remain free of falls  Description: INTERVENTIONS:- Educate patient/family on patient safety including physical limitations- Instruct patient to call for assistance with activity - Consult OT/PT to assist with strengthening/mobility - Keep Call bell within reach- Keep bed low and locked with side rails adjusted as appropriate- Keep care items and personal belongings within reach- Initiate and maintain comfort rounds- Make Fall Risk Sign visible to staff- Offer Toileting every 2 Hours, in advance of need- Initiate/Maintain bed alarm- Obtain necessary fall risk management equipment: non skid socks- Apply yellow socks and bracelet for high fall risk patients- Consider moving patient to room near nurses station  INTERVENTIONS:- Educate patient/family on patient safety including physical limitations- Instruct patient to call for assistance with activity - Consult OT/PT to assist with strengthening/mobility - Keep Call bell within reach- Keep bed low and locked with side rails adjusted as appropriate- Keep care items and personal belongings within reach- Initiate and maintain comfort rounds- Make Fall Risk Sign visible to staff- Offer Toileting every 2 Hours, in advance of need- Initiate/Maintain bed alarm- Obtain necessary fall risk management equipment: non skid socks- Apply yellow socks and bracelet for high fall risk patients- Consider moving patient to room near nurses station  Outcome: Progressing     Problem: PAIN - ADULT  Goal: Verbalizes/displays adequate comfort level or baseline comfort level  Description: Interventions:- Encourage patient to monitor pain and request assistance- Assess pain using appropriate pain scale- Administer analgesics based on type and severity of pain and evaluate response- Implement non-pharmacological measures as appropriate and evaluate response- Consider cultural and social influences on pain and pain management- Notify  physician/advanced practitioner if interventions unsuccessful or patient reports new pain  Outcome: Progressing     Problem: INFECTION - ADULT  Goal: Absence or prevention of progression during hospitalization  Description: INTERVENTIONS:- Assess and monitor for signs and symptoms of infection- Monitor lab/diagnostic results- Monitor all insertion sites, i.e. indwelling lines, tubes, and drains- Monitor endotracheal if appropriate and nasal secretions for changes in amount and color- [place] appropriate cooling/warming therapies per order- Administer medications as ordered- Instruct and encourage patient and family to use good hand hygiene technique- Identify and instruct in appropriate isolation precautions for identified infection/condition  Outcome: Progressing  Goal: Absence of fever/infection during neutropenic period  Description: INTERVENTIONS:- Monitor WBC  Outcome: Progressing     Problem: SAFETY ADULT  Goal: Patient will remain free of falls  Description: INTERVENTIONS:- Educate patient/family on patient safety including physical limitations- Instruct patient to call for assistance with activity - Consult OT/PT to assist with strengthening/mobility - Keep Call bell within reach- Keep bed low and locked with side rails adjusted as appropriate- Keep care items and personal belongings within reach- Initiate and maintain comfort rounds- Make Fall Risk Sign visible to staff- Offer Toileting every 2 Hours, in advance of need- Initiate/Maintain bed alarm- Obtain necessary fall risk management equipment: non skid socks- Apply yellow socks and bracelet for high fall risk patients- Consider moving patient to room near nurses station  INTERVENTIONS:- Educate patient/family on patient safety including physical limitations- Instruct patient to call for assistance with activity - Consult OT/PT to assist with strengthening/mobility - Keep Call bell within reach- Keep bed low and locked with side rails adjusted as  appropriate- Keep care items and personal belongings within reach- Initiate and maintain comfort rounds- Make Fall Risk Sign visible to staff- Offer Toileting every 2 Hours, in advance of need- Initiate/Maintain bed alarm- Obtain necessary fall risk management equipment: non skid socks- Apply yellow socks and bracelet for high fall risk patients- Consider moving patient to room near nurses station  Outcome: Progressing  Goal: Maintain or return to baseline ADL function  Description: INTERVENTIONS:-  Assess patient's ability to carry out ADLs; assess patient's baseline for ADL function and identify physical deficits which impact ability to perform ADLs (bathing, care of mouth/teeth, toileting, grooming, dressing, etc.)- Assess/evaluate cause of self-care deficits - Assess range of motion- Assess patient's mobility; develop plan if impaired- Assess patient's need for assistive devices and provide as appropriate- Encourage maximum independence but intervene and supervise when necessary- Involve family in performance of ADLs- Assess for home care needs following discharge - Consider OT consult to assist with ADL evaluation and planning for discharge- Provide patient education as appropriate  Outcome: Progressing  Goal: Maintains/Returns to pre admission functional level  Description: INTERVENTIONS:- Perform AM-PAC 6 Click Basic Mobility/ Daily Activity assessment daily.- Set and communicate daily mobility goal to care team and patient/family/caregiver. - Collaborate with rehabilitation services on mobility goals if consulted- Perform Range of Motion 3 times a day.- Reposition patient every 2 hours.- Dangle patient 3 times a day- Stand patient 3 times a day- Ambulate patient 3 times a day- Out of bed to chair 3 times a day - Out of bed for meals 3 times a day- Out of bed for toileting- Record patient progress and toleration of activity level   Outcome: Progressing     Problem: DISCHARGE PLANNING  Goal: Discharge to home  or other facility with appropriate resources  Description: INTERVENTIONS:- Identify barriers to discharge w/patient and caregiver- Arrange for needed discharge resources and transportation as appropriate- Identify discharge learning needs (meds, wound care, etc.)- Arrange for interpretive services to assist at discharge as needed- Refer to Case Management Department for coordinating discharge planning if the patient needs post-hospital services based on physician/advanced practitioner order or complex needs related to functional status, cognitive ability, or social support system  Outcome: Progressing     Problem: Knowledge Deficit  Goal: Patient/family/caregiver demonstrates understanding of disease process, treatment plan, medications, and discharge instructions  Description: Complete learning assessment and assess knowledge base.Interventions:- Provide teaching at level of understanding- Provide teaching via preferred learning methods  Outcome: Progressing     Problem: RESPIRATORY - ADULT  Goal: Achieves optimal ventilation and oxygenation  Description: INTERVENTIONS:- Assess for changes in respiratory status- Assess for changes in mentation and behavior- Position to facilitate oxygenation and minimize respiratory effort- Oxygen administered by appropriate delivery if ordered- Initiate smoking cessation education as indicated- Encourage broncho-pulmonary hygiene including cough, deep breathe, Incentive Spirometry- Assess the need for suctioning and aspirate as needed- Assess and instruct to report SOB or any respiratory difficulty- Respiratory Therapy support as indicated  Outcome: Progressing     Problem: METABOLIC, FLUID AND ELECTROLYTES - ADULT  Goal: Electrolytes maintained within normal limits  Description: INTERVENTIONS:- Monitor labs and assess patient for signs and symptoms of electrolyte imbalances- Administer electrolyte replacement as ordered- Monitor response to electrolyte replacements, including  repeat lab results as appropriate- Instruct patient on fluid and nutrition as appropriate  Outcome: Progressing  Goal: Fluid balance maintained  Description: INTERVENTIONS:- Monitor labs - Monitor I/O and WT- Instruct patient on fluid and nutrition as appropriate- Assess for signs & symptoms of volume excess or deficit  Outcome: Progressing  Goal: Glucose maintained within target range  Description: INTERVENTIONS:- Monitor Blood Glucose as ordered- Assess for signs and symptoms of hyperglycemia and hypoglycemia- Administer ordered medications to maintain glucose within target range- Assess nutritional intake and initiate nutrition service referral as needed  Outcome: Progressing     Problem: MUSCULOSKELETAL - ADULT  Goal: Maintain or return mobility to safest level of function  Description: INTERVENTIONS:- Assess patient's ability to carry out ADLs; assess patient's baseline for ADL function and identify physical deficits which impact ability to perform ADLs (bathing, care of mouth/teeth, toileting, grooming, dressing, etc.)- Assess/evaluate cause of self-care deficits - Assess range of motion- Assess patient's mobility- Assess patient's need for assistive devices and provide as appropriate- Encourage maximum independence but intervene and supervise when necessary- Involve family in performance of ADLs- Assess for home care needs following discharge - Consider OT consult to assist with ADL evaluation and planning for discharge- Provide patient education as appropriate  Outcome: Progressing  Goal: Maintain proper alignment of affected body part  Description: INTERVENTIONS:- Support, maintain and protect limb and body alignment- Provide patient/ family with appropriate education  Outcome: Progressing     Problem: Prexisting or High Potential for Compromised Skin Integrity  Goal: Skin integrity is maintained or improved  Description: INTERVENTIONS:- Identify patients at risk for skin breakdown- Assess and monitor skin  integrity- Assess and monitor nutrition and hydration status- Monitor labs - Assess for incontinence - Turn and reposition patient- Assist with mobility/ambulation- Relieve pressure over bony prominences- Avoid friction and shearing- Provide appropriate hygiene as needed including keeping skin clean and dry- Evaluate need for skin moisturizer/barrier cream- Collaborate with interdisciplinary team - Patient/family teaching- Consider wound care consult   Outcome: Progressing     Problem: Nutrition/Hydration-ADULT  Goal: Nutrient/Hydration intake appropriate for improving, restoring or maintaining nutritional needs  Description: Monitor and assess patient's nutrition/hydration status for malnutrition. Collaborate with interdisciplinary team and initiate plan and interventions as ordered.  Monitor patient's weight and dietary intake as ordered or per policy. Utilize nutrition screening tool and intervene as necessary. Determine patient's food preferences and provide high-protein, high-caloric foods as appropriate. INTERVENTIONS:- Monitor oral intake, urinary output, labs, and treatment plans- Assess nutrition and hydration status and recommend course of action- Evaluate amount of meals eaten- Assist patient with eating if necessary - Allow adequate time for meals- Recommend/ encourage appropriate diets, oral nutritional supplements, and vitamin/mineral supplements- Order, calculate, and assess calorie counts as needed- Recommend, monitor, and adjust tube feedings and TPN/PPN based on assessed needs- Assess need for intravenous fluids- Provide specific nutrition/hydration education as appropriate- Include patient/family/caregiver in decisions related to nutrition  Outcome: Progressing     Problem: SAFETY,RESTRAINT: NV/NON-SELF DESTRUCTIVE BEHAVIOR  Goal: Remains free of harm/injury (restraint for non violent/non self-detsructive behavior)  Description: INTERVENTIONS:- Instruct patient/family regarding restraint use -  Assess and monitor physiologic and psychological status - Provide interventions and comfort measures to meet assessed patient needs - Identify and implement measures to help patient regain control- Assess readiness for release of restraint   Outcome: Progressing  Goal: Returns to optimal restraint-free functioning  Description: INTERVENTIONS:- Assess the patient's behavior and symptoms that indicate continued need for restraint- Identify and implement measures to help patient regain control- Assess readiness for release of restraint   Outcome: Progressing

## 2025-04-05 NOTE — PROGRESS NOTES
Progress Note - Hospitalist   Name: Zaira Morley 79 y.o. male I MRN: [id number]  Unit/Bed#: [facility] 817-01 I Date of Admission: 4/3/2025   Date of Service: 4/5/2025 I Hospital Day: 2    Assessment & Plan  Generalized weakness  Presented after developing dizziness with a fall whilst assisting transporting his wife here, persisted with unsteadiness during ED evaluation  CT imaging negative for acute traumatic injuries, patient found positive for COVID-19 infection  Suspect his generalized weakness is due to the underlying COVID-19 infection and debility  PT/OT evaluations, CM consult for assistance with disposition  COVID-19  Patient developed reported congestion and low-grade fevers after exposure to a sick family member, found positive for COVID-19 during ED evaluation  Thus far he is minimally symptomatic and maintaining appropriate oxygenation on room air  Given patient's acute encephalopathy and fever, we will initiate Paxlovid.    Hypertension  Blood pressure stable on PTA lisinopril 20 mg daily  Type 2 diabetes mellitus with diabetic cataract, without long-term current use of insulin ([facility])  Lab Results   Component Value Date    HGBA1C 5.8 02/20/2025     Recent Labs     04/04/25  1614 04/04/25  2113 04/05/25  0759 04/05/25  1137   POCGLU 109 120 119 126     Blood Sugar Average: Last 72 hrs:  (P) 118.3827426811954736  Hold home metformin while admitted and restart on discharge  Continue with accu-checks and SSI  Carb controlled diet  Hypoglycemia protocol  Ambulatory dysfunction  Ambulatory function in the setting of COVID-19 infection and debility  Management as per the primary problem  Metabolic encephalopathy  Patient acutely confused.    Continue VRC monitoring and posey belt.  Per family, confusion may be improving  Consider MOCA assessment after mentation normalizing    VTE Pharmacologic Prophylaxis: VTE Score: 4 Moderate Risk (Score 3-4) - Pharmacological DVT Prophylaxis Ordered: enoxaparin  (Lovenox).    Mobility:   Basic Mobility Inpatient Raw Score: 19  JH-HLM Goal: 6: Walk 10 steps or more  JH-HLM Achieved: 6: Walk 10 steps or more  JH-HLM Goal achieved. Continue to encourage appropriate mobility.    Patient Centered Rounds: I performed bedside rounds with nursing staff today.   Discussions with Specialists or Other Care Team Provider: N/A    Education and Discussions with Family / Patient: Updated  (son) at bedside.    Current Length of Stay: 2 day(s)  Current Patient Status: Inpatient   Certification Statement: The patient will continue to require additional inpatient hospital stay due to COVID  Discharge Plan: Anticipate discharge in 48-72 hrs to discharge location to be determined pending rehab evaluations.    Code Status: Level 1 - Full Code    Subjective   Seen and examined at bedside, patient resting comfortably.  Son at bedside feels like his father's mentation seems to be improving    Objective :  Temp:  [98 °F (36.7 °C)-100.3 °F (37.9 °C)] 98 °F (36.7 °C)  HR:  [60-70] 60  BP: (120-149)/(65-79) 138/72  Resp:  [16] 16  SpO2:  [95 %-97 %] 97 %  O2 Device: None (Room air)    There is no height or weight on file to calculate BMI.     Input and Output Summary (last 24 hours):     Intake/Output Summary (Last 24 hours) at 4/5/2025 1514  Last data filed at 4/4/2025 1700  Gross per 24 hour   Intake 220 ml   Output --   Net 220 ml     Physical Exam  Vitals and nursing note reviewed.   Constitutional:       General: He is not in acute distress.     Appearance: He is well-developed.   HENT:      Head: Normocephalic and atraumatic.   Eyes:      Conjunctiva/sclera: Conjunctivae normal.   Cardiovascular:      Rate and Rhythm: Normal rate and regular rhythm.      Heart sounds: No murmur heard.  Pulmonary:      Effort: Pulmonary effort is normal. No respiratory distress.      Breath sounds: Normal breath sounds.   Abdominal:      Palpations: Abdomen is soft.      Tenderness: There is no  abdominal tenderness.   Musculoskeletal:         General: No swelling.      Cervical back: Neck supple.   Skin:     General: Skin is warm and dry.      Capillary Refill: Capillary refill takes less than 2 seconds.   Neurological:      General: No focal deficit present.      Mental Status: He is alert. He is disoriented.   Psychiatric:         Mood and Affect: Mood normal.         Lab Results: I have reviewed the following results:   Results from last 7 days   Lab Units 04/05/25  0610   WBC Thousand/uL 2.84*   HEMOGLOBIN g/dL 12.8   HEMATOCRIT % 38.8   PLATELETS Thousands/uL 114*   SEGS PCT % 43   LYMPHO PCT % 37   MONO PCT % 20*   EOS PCT % 0     Results from last 7 days   Lab Units 04/05/25  0610 04/03/25  2256   SODIUM mmol/L 137 136   POTASSIUM mmol/L 3.5 3.7   CHLORIDE mmol/L 103 103   CO2 mmol/L 28 26   BUN mg/dL 23 18   CREATININE mg/dL 0.88 0.80   ANION GAP mmol/L 6 7   CALCIUM mg/dL 8.9 8.6   ALBUMIN g/dL  --  4.2   TOTAL BILIRUBIN mg/dL  --  0.62   ALK PHOS U/L  --  96   ALT U/L  --  10   AST U/L  --  15   GLUCOSE RANDOM mg/dL 98 103         Results from last 7 days   Lab Units 04/05/25  1137 04/05/25  0759 04/04/25  2113 04/04/25  1614 04/04/25  1102 04/04/25  0658 04/03/25  2302   POC GLUCOSE mg/dl 126 119 120 109 114 110 130               Recent Cultures (last 7 days):               Last 24 Hours Medication List:     Current Facility-Administered Medications:     acetaminophen (TYLENOL) tablet 650 mg, Q6H PRN    aspirin (ECOTRIN LOW STRENGTH) EC tablet 81 mg, Daily    atorvastatin (LIPITOR) tablet 10 mg, Daily    bisacodyl (DULCOLAX) rectal suppository 10 mg, Daily    enoxaparin (LOVENOX) subcutaneous injection 40 mg, Daily    famotidine (PEPCID) tablet 20 mg, BID    gabapentin (NEURONTIN) capsule 100 mg, HS    insulin lispro (HumALOG/ADMELOG) 100 units/mL subcutaneous injection 1-5 Units, HS    insulin lispro (HumALOG/ADMELOG) 100 units/mL subcutaneous injection 1-6 Units, TID AC **AND** Fingerstick  Glucose (POCT), TID AC    lisinopril (ZESTRIL) tablet 20 mg, Daily    lubiprostone (AMITIZA) capsule 24 mcg, BID With Meals    methocarbamol (ROBAXIN) tablet 500 mg, HS    nirmatrelvir 300 mg (150 mg x 2) and ritonavir 100 mg x 1 (Paxlovid) therapy pack, BID    ondansetron (ZOFRAN) injection 4 mg, Q6H PRN    pantoprazole (PROTONIX) EC tablet 40 mg, Early Morning    prednisoLONE acetate (PRED FORTE) 1 % ophthalmic suspension 1 drop, TID    senna-docusate sodium (SENOKOT S) 8.6-50 mg per tablet 2 tablet, Daily    Administrative Statements   Today, Patient Was Seen By: Azael Roca DO      **Please Note: This note may have been constructed using a voice recognition system.**

## 2025-04-05 NOTE — ASSESSMENT & PLAN NOTE
Lab Results   Component Value Date    HGBA1C 5.8 02/20/2025     Recent Labs     04/04/25  1614 04/04/25  2113 04/05/25  0759 04/05/25  1137   POCGLU 109 120 119 126     Blood Sugar Average: Last 72 hrs:  (P) 118.1881425058606402  Hold home metformin while admitted and restart on discharge  Continue with accu-checks and SSI  Carb controlled diet  Hypoglycemia protocol

## 2025-04-06 LAB
GLUCOSE SERPL-MCNC: 120 MG/DL (ref 65–140)
GLUCOSE SERPL-MCNC: 123 MG/DL (ref 65–140)
GLUCOSE SERPL-MCNC: 138 MG/DL (ref 65–140)
GLUCOSE SERPL-MCNC: 157 MG/DL (ref 65–140)

## 2025-04-06 PROCEDURE — 99232 SBSQ HOSP IP/OBS MODERATE 35: CPT | Performed by: STUDENT IN AN ORGANIZED HEALTH CARE EDUCATION/TRAINING PROGRAM

## 2025-04-06 PROCEDURE — 82948 REAGENT STRIP/BLOOD GLUCOSE: CPT

## 2025-04-06 RX ADMIN — LUBIPROSTONE 24 MCG: 24 CAPSULE, GELATIN COATED ORAL at 08:13

## 2025-04-06 RX ADMIN — ENOXAPARIN SODIUM 40 MG: 40 INJECTION SUBCUTANEOUS at 08:13

## 2025-04-06 RX ADMIN — ATORVASTATIN CALCIUM 10 MG: 10 TABLET, FILM COATED ORAL at 08:14

## 2025-04-06 RX ADMIN — PREDNISOLONE ACETATE 1 DROP: 10 SUSPENSION/ DROPS OPHTHALMIC at 08:13

## 2025-04-06 RX ADMIN — METHOCARBAMOL 500 MG: 500 TABLET ORAL at 22:53

## 2025-04-06 RX ADMIN — INSULIN LISPRO 1 UNITS: 100 INJECTION, SOLUTION INTRAVENOUS; SUBCUTANEOUS at 18:17

## 2025-04-06 RX ADMIN — LUBIPROSTONE 24 MCG: 24 CAPSULE, GELATIN COATED ORAL at 18:17

## 2025-04-06 RX ADMIN — PREDNISOLONE ACETATE 1 DROP: 10 SUSPENSION/ DROPS OPHTHALMIC at 18:16

## 2025-04-06 RX ADMIN — PANTOPRAZOLE SODIUM 40 MG: 40 TABLET, DELAYED RELEASE ORAL at 06:05

## 2025-04-06 RX ADMIN — GABAPENTIN 100 MG: 100 CAPSULE ORAL at 22:53

## 2025-04-06 RX ADMIN — SENNOSIDES AND DOCUSATE SODIUM 2 TABLET: 50; 8.6 TABLET ORAL at 08:13

## 2025-04-06 RX ADMIN — FAMOTIDINE 20 MG: 20 TABLET, FILM COATED ORAL at 18:16

## 2025-04-06 RX ADMIN — ASPIRIN 81 MG: 81 TABLET, COATED ORAL at 08:14

## 2025-04-06 RX ADMIN — FAMOTIDINE 20 MG: 20 TABLET, FILM COATED ORAL at 08:13

## 2025-04-06 RX ADMIN — NIRMATRELVIR AND RITONAVIR 3 TABLET: KIT at 18:16

## 2025-04-06 RX ADMIN — NIRMATRELVIR AND RITONAVIR 3 TABLET: KIT at 08:13

## 2025-04-06 RX ADMIN — PREDNISOLONE ACETATE 1 DROP: 10 SUSPENSION/ DROPS OPHTHALMIC at 20:45

## 2025-04-06 NOTE — ASSESSMENT & PLAN NOTE
Lab Results   Component Value Date    HGBA1C 5.8 02/20/2025     Recent Labs     04/05/25  1620 04/05/25  2109 04/06/25  0652 04/06/25  1111   POCGLU 116 172* 120 123     Blood Sugar Average: Last 72 hrs:  (P) 123.7725261410087069  Hold home metformin while admitted and restart on discharge  Continue with accu-checks and SSI  Carb controlled diet  Hypoglycemia protocol

## 2025-04-06 NOTE — CASE MANAGEMENT
Case Management Discharge Planning Note    Patient name Zaira Morley  Location [facility] 817/[facility] 817-01 MRN [id number]  : 1945 Date 2025       Current Admission Date: 4/3/2025  Current Admission Diagnosis:Generalized weakness   Patient Active Problem List    Diagnosis Date Noted Date Diagnosed    Ambulatory dysfunction 2025     Metabolic encephalopathy 2025     Generalized weakness 2025     COVID-19 2025     Thrombocytopenia (HCC) 2024     Hard of hearing 10/31/2024     Dyslipidemia 2024     Leg edema, left 2024     Constipation 2024     Decreased hearing of both ears 2024     History of colon polyps 2024     Other constipation 2023     Blurry vision, bilateral 03/15/2023     Type 2 diabetes mellitus with diabetic cataract, without long-term current use of insulin (HCC) 2022     Primary osteoarthritis of both knees 2020     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HCC) 2020     Scalp lesion 2020     History of hepatitis C 2020     Renal cyst 2019     Spondylolisthesis at L5-S1 level 10/12/2018     Chronic constipation 2017     GERD (gastroesophageal reflux disease) 2017     Varicose 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 2017     Episodic cluster headache, not intractable 2017     Brain tumor (HCC) 2015     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HCC) 2015     Meningioma (HCC) 11/10/2015     Hypertension 03/10/2015     Nonintractable headache 2015       LOS (days): 3  Geometric Mean LOS (GMLOS) (days): 2.5  Days to GMLOS:-0.1     OBJECTIVE:  Risk of Unplanned Readmission Score: 15.12         Current admission status: Inpatient   Preferred Pharmacy:   [facility]/pharmacy #[id number] - [place], [place] - [address]  [address] [place] [address]  Phone: [phone] Fax: [phone]    Primary Care Provider: ZAHRA Rice    Primary Insurance: [employer] REP  Secondary Insurance:      DISCHARGE DETAILS:    Discharge planning discussed with:: Cece     Contacts  Patient Contacts: Cece Benz  Relationship to Patient:: Family  Contact Method: Phone  Phone Number: [phone]  Reason/Outcome: Continuity of Care, Emergency Contact, Discharge Planning  Other Referral/Resources/Interventions Provided:  Referral Comments: PT/OT recommend STR at DC.. Call placed to daughter Cece.  She states she will be in this afteroon and would like to discuss in person or on the phone with her sister, they both make medical decisions.  Cece attempted to do a conference call with this writer and sister Flo, but we were unable to connect.  Call was disconnected.  CM called Cece back and left message.  CM following for DC planning.  Pending conversation with daughters.

## 2025-04-06 NOTE — ASSESSMENT & PLAN NOTE
Patient developed reported congestion and low-grade fevers after exposure to a sick family member, found positive for COVID-19 during ED evaluation  Thus far he is minimally symptomatic and maintaining appropriate oxygenation on room air  Given patient's acute encephalopathy and fever, we will initiate Paxlovid. Day 3 of therapy   Fever curve improving

## 2025-04-06 NOTE — PROGRESS NOTES
Progress Note - Hospitalist   Name: Zaira Morley 79 y.o. male I MRN: [id number]  Unit/Bed#: [facility] 817-01 I Date of Admission: 4/3/2025   Date of Service: 4/6/2025 I Hospital Day: 3    Assessment & Plan  Generalized weakness  Presented after developing dizziness with a fall whilst assisting transporting his wife here, persisted with unsteadiness during ED evaluation  CT imaging negative for acute traumatic injuries, patient found positive for COVID-19 infection  Suspect his generalized weakness is due to the underlying COVID-19 infection and debility  PT/OT evaluations, CM consult for assistance with disposition  COVID-19  Patient developed reported congestion and low-grade fevers after exposure to a sick family member, found positive for COVID-19 during ED evaluation  Thus far he is minimally symptomatic and maintaining appropriate oxygenation on room air  Given patient's acute encephalopathy and fever, we will initiate Paxlovid. Day 3 of therapy   Fever curve improving   Hypertension  Blood pressure stable on PTA lisinopril 20 mg daily  Type 2 diabetes mellitus with diabetic cataract, without long-term current use of insulin (Formerly [facility])  Lab Results   Component Value Date    HGBA1C 5.8 02/20/2025     Recent Labs     04/05/25  1620 04/05/25  2109 04/06/25  0652 04/06/25  1111   POCGLU 116 172* 120 123     Blood Sugar Average: Last 72 hrs:  (P) 123.9638944815081379  Hold home metformin while admitted and restart on discharge  Continue with accu-checks and SSI  Carb controlled diet  Hypoglycemia protocol  Ambulatory dysfunction  Ambulatory function in the setting of COVID-19 infection and debility  Management as per the primary problem  Metabolic encephalopathy  Patient acutely confused.    Continue VRC monitoring and posey belt.  Per family, confusion may be improving  Consider MOCA assessment after mentation normalizing    VTE Pharmacologic Prophylaxis: VTE Score: 4 Moderate Risk (Score 3-4) - Pharmacological DVT  Prophylaxis Ordered: enoxaparin (Lovenox).    Mobility:   Basic Mobility Inpatient Raw Score: 21  JH-HLM Goal: 6: Walk 10 steps or more  JH-HLM Achieved: 6: Walk 10 steps or more  JH-HLM Goal achieved. Continue to encourage appropriate mobility.    Patient Centered Rounds: I performed bedside rounds with nursing staff today.   Discussions with Specialists or Other Care Team Provider: N/A    Education and Discussions with Family / Patient: Updated  (son) at bedside.    Current Length of Stay: 3 day(s)  Current Patient Status: Inpatient   Certification Statement: The patient will continue to require additional inpatient hospital stay due to Covid  Discharge Plan: Anticipate discharge in 24-48 hrs to discharge location to be determined pending rehab evaluations.    Code Status: Level 1 - Full Code    Subjective   Seen and examined at bedside.  Patient's son present at bedside nursing and family report that patient's mentation is improving.  Patient states he ambulated about the hallway and feels well    Objective :  Temp:  [97.4 °F (36.3 °C)-98.7 °F (37.1 °C)] 97.4 °F (36.3 °C)  HR:  [63-66] 66  BP: ()/(57-78) 105/67  Resp:  [16-20] 16  O2 Device: None (Room air)    Body mass index is 25.25 kg/m².     Input and Output Summary (last 24 hours):     Intake/Output Summary (Last 24 hours) at 4/6/2025 1614  Last data filed at 4/6/2025 1300  Gross per 24 hour   Intake 718 ml   Output --   Net 718 ml     Physical Exam  Vitals and nursing note reviewed.   Constitutional:       General: He is not in acute distress.     Appearance: He is well-developed.   HENT:      Head: Normocephalic and atraumatic.   Eyes:      Conjunctiva/sclera: Conjunctivae normal.   Cardiovascular:      Rate and Rhythm: Normal rate and regular rhythm.      Heart sounds: No murmur heard.  Pulmonary:      Effort: Pulmonary effort is normal. No respiratory distress.      Breath sounds: Normal breath sounds.   Abdominal:      Palpations:  Abdomen is soft.      Tenderness: There is no abdominal tenderness.   Musculoskeletal:         General: No swelling.      Cervical back: Neck supple.   Skin:     General: Skin is warm and dry.      Capillary Refill: Capillary refill takes less than 2 seconds.   Neurological:      General: No focal deficit present.      Mental Status: He is alert. He is disoriented.   Psychiatric:         Mood and Affect: Mood normal.         Lab Results: I have reviewed the following results:   Results from last 7 days   Lab Units 04/05/25  0610   WBC Thousand/uL 2.84*   HEMOGLOBIN g/dL 12.8   HEMATOCRIT % 38.8   PLATELETS Thousands/uL 114*   SEGS PCT % 43   LYMPHO PCT % 37   MONO PCT % 20*   EOS PCT % 0     Results from last 7 days   Lab Units 04/05/25  0610 04/03/25  2256   SODIUM mmol/L 137 136   POTASSIUM mmol/L 3.5 3.7   CHLORIDE mmol/L 103 103   CO2 mmol/L 28 26   BUN mg/dL 23 18   CREATININE mg/dL 0.88 0.80   ANION GAP mmol/L 6 7   CALCIUM mg/dL 8.9 8.6   ALBUMIN g/dL  --  4.2   TOTAL BILIRUBIN mg/dL  --  0.62   ALK PHOS U/L  --  96   ALT U/L  --  10   AST U/L  --  15   GLUCOSE RANDOM mg/dL 98 103         Results from last 7 days   Lab Units 04/06/25  1111 04/06/25  0652 04/05/25  2109 04/05/25  1620 04/05/25  1137 04/05/25  0759 04/04/25  2113 04/04/25  1614 04/04/25  1102 04/04/25  0658 04/03/25  2302   POC GLUCOSE mg/dl 123 120 172* 116 126 119 120 109 114 110 130               Recent Cultures (last 7 days):               Last 24 Hours Medication List:     Current Facility-Administered Medications:     acetaminophen (TYLENOL) tablet 650 mg, Q6H PRN    aspirin (ECOTRIN LOW STRENGTH) EC tablet 81 mg, Daily    atorvastatin (LIPITOR) tablet 10 mg, Daily    bisacodyl (DULCOLAX) rectal suppository 10 mg, Daily    enoxaparin (LOVENOX) subcutaneous injection 40 mg, Daily    famotidine (PEPCID) tablet 20 mg, BID    gabapentin (NEURONTIN) capsule 100 mg, HS    insulin lispro (HumALOG/ADMELOG) 100 units/mL subcutaneous injection 1-5  Units, HS    insulin lispro (HumALOG/ADMELOG) 100 units/mL subcutaneous injection 1-6 Units, TID AC **AND** Fingerstick Glucose (POCT), TID AC    lisinopril (ZESTRIL) tablet 20 mg, Daily    lubiprostone (AMITIZA) capsule 24 mcg, BID With Meals    methocarbamol (ROBAXIN) tablet 500 mg, HS    nirmatrelvir 300 mg (150 mg x 2) and ritonavir 100 mg x 1 (Paxlovid) therapy pack, BID    ondansetron (ZOFRAN) injection 4 mg, Q6H PRN    pantoprazole (PROTONIX) EC tablet 40 mg, Early Morning    prednisoLONE acetate (PRED FORTE) 1 % ophthalmic suspension 1 drop, TID    senna-docusate sodium (SENOKOT S) 8.6-50 mg per tablet 2 tablet, Daily    Administrative Statements   Today, Patient Was Seen By: Azael Roca DO      **Please Note: This note may have been constructed using a voice recognition system.**

## 2025-04-06 NOTE — CASE MANAGEMENT
Case Management Discharge Planning Note    Patient name Zaira Morley  [facility] MRN [id number]  : 1945 Date 2025       Current Admission Date: 4/3/2025  Current Admission Diagnosis:Generalized weakness   Patient Active Problem List    Diagnosis Date Noted Date Diagnosed    Ambulatory dysfunction 2025     Metabolic encephalopathy 2025     Generalized weakness 2025     COVID-19 2025     Thrombocytopenia (HCC) 2024     Hard of hearing 10/31/2024     Dyslipidemia 2024     Leg edema, left 2024     Constipation 2024     Decreased hearing of both ears 2024     History of colon polyps 2024     Other constipation 2023     Blurry vision, bilateral 03/15/2023     Type 2 diabetes mellitus with diabetic cataract, without long-term current use of insulin (HCC) 2022     Primary osteoarthritis of both knees 2020     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HCC) 2020     Scalp lesion 2020     History of hepatitis C 2020     Renal cyst 2019     Spondylolisthesis at L5-S1 level 10/12/2018     Chronic constipation 2017     GERD (gastroesophageal reflux disease) 2017     Varicose 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 2017     Episodic cluster headache, not intractable 2017     Brain tumor (HCC) 2015     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HCC) 2015     Meningioma (HCC) 11/10/2015     Hypertension 03/10/2015     Nonintractable headache 2015       LOS (days): 3  Geometric Mean LOS (GMLOS) (days): 2.5  Days to GMLOS:-0.2     OBJECTIVE:  Risk of Unplanned Readmission Score: 15.15         Current admission status: Inpatient   Preferred Pharmacy:   [facility]/pharmacy #[id number] - [place], [place] - [address]  [address] [address]  Phone: [phone] Fax: [phone]    Primary Care Provider: ZAHRA Rice    Primary Insurance: [employer] REP  Secondary Insurance:      DISCHARGE DETAILS:  CM met with Cece at bedside. Introduced myself and explained my role.  Cece was provided with the resources for waiver program/family paid caregivers, self pay caregivers, and Advanced directive paperwork.       Pending availability and choice for [facility]

## 2025-04-06 NOTE — PLAN OF CARE
Problem: Potential for Falls  Goal: Patient will remain free of falls  Description: INTERVENTIONS:- Educate patient/family on patient safety including physical limitations- Instruct patient to call for assistance with activity - Consult OT/PT to assist with strengthening/mobility - Keep Call bell within reach- Keep bed low and locked with side rails adjusted as appropriate- Keep care items and personal belongings within reach- Initiate and maintain comfort rounds- Make Fall Risk Sign visible to staff- Offer Toileting every 2 Hours, in advance of need- Initiate/Maintain bed/chair alarm- Obtain necessary fall risk management equipment: alarm equipment- Apply yellow socks and bracelet for high fall risk patients- Consider moving patient to room near nurses station  INTERVENTIONS:- Educate patient/family on patient safety including physical limitations- Instruct patient to call for assistance with activity - Consult OT/PT to assist with strengthening/mobility - Keep Call bell within reach- Keep bed low and locked with side rails adjusted as appropriate- Keep care items and personal belongings within reach- Initiate and maintain comfort rounds- Make Fall Risk Sign visible to staff- Offer Toileting every 2 Hours, in advance of need- Initiate/Maintain bed.chair alarm- Obtain necessary fall risk management equipment: alarm equipment, non slip socks, virtual monitoring, - Apply yellow socks and bracelet for high fall risk patients- Consider moving patient to room near nurses station  Outcome: Progressing     Problem: PAIN - ADULT  Goal: Verbalizes/displays adequate comfort level or baseline comfort level  Description: Interventions:- Encourage patient to monitor pain and request assistance- Assess pain using appropriate pain scale- Administer analgesics based on type and severity of pain and evaluate response- Implement non-pharmacological measures as appropriate and evaluate response- Consider cultural and social  influences on pain and pain management- Notify physician/advanced practitioner if interventions unsuccessful or patient reports new pain  Outcome: Progressing     Problem: INFECTION - ADULT  Goal: Absence or prevention of progression during hospitalization  Description: INTERVENTIONS:- Assess and monitor for signs and symptoms of infection- Monitor lab/diagnostic results- Monitor all insertion sites, i.e. indwelling lines, tubes, and drains- Monitor endotracheal if appropriate and nasal secretions for changes in amount and color- [place] appropriate cooling/warming therapies per order- Administer medications as ordered- Instruct and encourage patient and family to use good hand hygiene technique- Identify and instruct in appropriate isolation precautions for identified infection/condition  Outcome: Progressing     Problem: SAFETY ADULT  Goal: Patient will remain free of falls  Description: INTERVENTIONS:- Educate patient/family on patient safety including physical limitations- Instruct patient to call for assistance with activity - Consult OT/PT to assist with strengthening/mobility - Keep Call bell within reach- Keep bed low and locked with side rails adjusted as appropriate- Keep care items and personal belongings within reach- Initiate and maintain comfort rounds- Make Fall Risk Sign visible to staff- Offer Toileting every 2 Hours, in advance of need- Initiate/Maintain bed/chair alarm- Obtain necessary fall risk management equipment: - Apply yellow socks and bracelet for high fall risk patients- Consider moving patient to room near nurses station  INTERVENTIONS:- Educate patient/family on patient safety including physical limitations- Instruct patient to call for assistance with activity - Consult OT/PT to assist with strengthening/mobility - Keep Call bell within reach- Keep bed low and locked with side rails adjusted as appropriate- Keep care items and personal belongings within reach- Initiate and maintain  comfort rounds- Make Fall Risk Sign visible to staff- Offer Toileting every 2 Hours, in advance of need- Initiate/Maintain bed/chair alarm- Obtain necessary fall risk management equipment: - Apply yellow socks and bracelet for high fall risk patients- Consider moving patient to room near nurses station  Outcome: Progressing     Problem: Prexisting or High Potential for Compromised Skin Integrity  Goal: Skin integrity is maintained or improved  Description: INTERVENTIONS:- Identify patients at risk for skin breakdown- Assess and monitor skin integrity- Assess and monitor nutrition and hydration status- Monitor labs - Assess for incontinence - Turn and reposition patient- Assist with mobility/ambulation- Relieve pressure over bony prominences- Avoid friction and shearing- Provide appropriate hygiene as needed including keeping skin clean and dry- Evaluate need for skin moisturizer/barrier cream- Collaborate with interdisciplinary team - Patient/family teaching- Consider wound care consult   Outcome: Progressing

## 2025-04-06 NOTE — CASE MANAGEMENT
Case Management Assessment & Discharge Planning Note    Patient name Zaira Morley  Location [facility] 817/[facility] 817-01 MRN [id number]  : 1945 Date 2025       Current Admission Date: 4/3/2025  Current Admission Diagnosis:Generalized weakness   Patient Active Problem List    Diagnosis Date Noted Date Diagnosed    Ambulatory dysfunction 2025     Metabolic encephalopathy 2025     Generalized weakness 2025     COVID-19 2025     Thrombocytopenia (HCC) 2024     Hard of hearing 10/31/2024     Dyslipidemia 2024     Leg edema, left 2024     Constipation 2024     Decreased hearing of both ears 2024     History of colon polyps 2024     Other constipation 2023     Blurry vision, bilateral 03/15/2023     Type 2 diabetes mellitus with diabetic cataract, without long-term current use of insulin (HCC) 2022     Primary osteoarthritis of both knees 2020     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HCC) 2020     Scalp lesion 2020     History of hepatitis C 2020     Renal cyst 2019     Spondylolisthesis at L5-S1 level 10/12/2018     Chronic constipation 2017     GERD (gastroesophageal reflux disease) 2017     Varicose 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 2017     Episodic cluster headache, not intractable 2017     Brain tumor (HCC) 2015     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HCC) 2015     Meningioma (HCC) 11/10/2015     Hypertension 03/10/2015     Nonintractable headache 2015       LOS (days): 3  Geometric Mean LOS (GMLOS) (days): 2.5  Days to GMLOS:-0.1     OBJECTIVE:    Risk of Unplanned Readmission Score: 15.12         Current admission status: Inpatient       Preferred Pharmacy:   [id number] - [place], [place] - [address]  [address] [address]  Phone: [phone] Fax: [phone]    Primary Care Provider: ZAHRA Rice    Primary Insurance: [facility]  REP  Secondary Insurance:     ASSESSMENT:  Active Health Care Proxies       JAYDON GRICELDA Health Care Representative - Daughter   Primary Phone: [phone] (Mobile)                 Advance Directives  Does patient have a Health Care POA?: No  Was patient offered paperwork?: Yes (Offered.  CM will provide resources today when Cece comes in before 4:30)  Does patient currently have a Health Care decision maker?: Yes, please see Health Care Proxy section  Does patient have Advance Directives?: No  Was patient offered paperwork?: Yes  Primary Contact: Cece Benz and Gricelda Morley     Readmission Root Cause  30 Day Readmission: No    Patient Information  Admitted from:: Home  Mental Status: Alert  During Assessment patient was accompanied by: Not accompanied during assessment  Assessment information provided by:: Daughter  Primary Caregiver: Self  Support Systems: Daughter, Son, Spouse/significant other  County of Residence: [place]  What city do you live in?: [place]  Home entry access options. Select all that apply.: Stairs  Number of steps to enter home.: 5  Type of Current Residence: [facility]  Living Arrangements: Lives w/ Spouse/significant other  Is patient a ?: No    Activities of Daily Living Prior to Admission  Functional Status: Independent  Completes ADLs independently?: Yes  Ambulates independently?: Yes  Does patient use assisted devices?: Yes  Assisted Devices (DME) used: Straight Cane  Does patient currently own DME?: Yes  What DME does the patient currently own?: Straight Cane  Does patient have a history of Outpatient Therapy (PT/OT)?: No  Does the patient have a history of Short-Term Rehab?: No  Does patient have a history of HHC?: No  Does patient currently have HHC?: No     Patient Information Continued  Income Source: Pension/long-term  Does patient have prescription coverage?: Yes  Can the patient afford their medications and any related supplies (such as glucometers or test  strips)?: Yes  Does patient receive dialysis treatments?: No  Does patient have a history of substance abuse?: No  Does patient have a history of Mental Health Diagnosis?: No     Means of Transportation  Means of Transport to [facility]:: Drives Self    DISCHARGE DETAILS:    Discharge planning discussed with:: Erich  Freedom of Choice: Yes  Comments - Freedom of Choice: FOC discussed.  CM contacted family/caregiver?: Yes  Were Treatment Team discharge recommendations reviewed with patient/caregiver?: Yes  Did patient/caregiver verbalize understanding of patient care needs?: Yes       Contacts  Patient Contacts: Cece Jones  Relationship to Patient:: Family  Contact Method: Phone  Phone Number: [phone]  Reason/Outcome: Continuity of Care, Emergency Contact, Discharge Planning    Requested Home Health Care         Is the patient interested in [facility] at discharge?: Yes  Home Health Discipline requested:: Occupational Therapy, Physical Therapy  Home Health Follow-Up Provider:: PCP  Home Health Services Needed:: Evaluate Functional Status and Safety, Gait/ADL Training, Strengthening/Theraputic Exercises to Improve Function  Homebound Criteria Met:: Uses an Assist Device (i.e. cane, walker, etc), Requires the Assistance of Another Person for Safe Ambulation or to Leave the Home  Supporting Clincal Findings:: Limited Endurance, Fatigues Easliy in Short Distances    DME Referral Provided  Referral made for DME?: Yes  DME referral completed for the following items:: Walker  DME Supplier Name:: [employer]    Other Referral/Resources/Interventions Provided:  Interventions: HHC  Referral Comments: Admitted with COVID and generalized weakness.  STR is recommended.  Family would rather pt DC home with C PT/OT.  Pt is IADL, but daughers and son are available to help.  CM made a folder of resources including Advanced Directive paperwork, paid caregivers, and information on the Waiver program.  Family is in the  process of having patient and his wife move in with one of the daughters.  Referral sent in [facility] for [facility] PT/OT, pending availability and choice.  CM will discuss with daughters and patient when available.  CM will order walker closer to DC with bedside delivery.    Pt IADL. DME includes cane.  Pt will need walker closer to DC.

## 2025-04-07 ENCOUNTER — HOME HEALTH ADMISSION (OUTPATIENT)
Dept: HOME HEALTH SERVICES | Facility: HOME HEALTHCARE | Age: 80
End: 2025-04-07
Payer: COMMERCIAL

## 2025-04-07 VITALS
OXYGEN SATURATION: 97 % | WEIGHT: 171 LBS | RESPIRATION RATE: 18 BRPM | HEART RATE: 66 BPM | SYSTOLIC BLOOD PRESSURE: 114 MMHG | TEMPERATURE: 98.3 F | BODY MASS INDEX: 25.33 KG/M2 | DIASTOLIC BLOOD PRESSURE: 76 MMHG | HEIGHT: 69 IN

## 2025-04-07 LAB
ANION GAP SERPL CALCULATED.3IONS-SCNC: 8 MMOL/L (ref 4–13)
BUN SERPL-MCNC: 22 MG/DL (ref 5–25)
CALCIUM SERPL-MCNC: 9 MG/DL (ref 8.4–10.2)
CHLORIDE SERPL-SCNC: 103 MMOL/L (ref 96–108)
CO2 SERPL-SCNC: 25 MMOL/L (ref 21–32)
CREAT SERPL-MCNC: 0.85 MG/DL (ref 0.6–1.3)
ERYTHROCYTE [DISTWIDTH] IN BLOOD BY AUTOMATED COUNT: 13 % (ref 11.6–15.1)
GFR SERPL CREATININE-BSD FRML MDRD: 83 ML/MIN/1.73SQ M
GLUCOSE SERPL-MCNC: 120 MG/DL (ref 65–140)
GLUCOSE SERPL-MCNC: 129 MG/DL (ref 65–140)
GLUCOSE SERPL-MCNC: 133 MG/DL (ref 65–140)
HCT VFR BLD AUTO: 41.3 % (ref 36.5–49.3)
HGB BLD-MCNC: 13.9 G/DL (ref 12–17)
MAGNESIUM SERPL-MCNC: 1.9 MG/DL (ref 1.9–2.7)
MCH RBC QN AUTO: 29.1 PG (ref 26.8–34.3)
MCHC RBC AUTO-ENTMCNC: 33.7 G/DL (ref 31.4–37.4)
MCV RBC AUTO: 86 FL (ref 82–98)
PLATELET # BLD AUTO: 132 THOUSANDS/UL (ref 149–390)
PMV BLD AUTO: 9.6 FL (ref 8.9–12.7)
POTASSIUM SERPL-SCNC: 4.2 MMOL/L (ref 3.5–5.3)
RBC # BLD AUTO: 4.78 MILLION/UL (ref 3.88–5.62)
SODIUM SERPL-SCNC: 136 MMOL/L (ref 135–147)
WBC # BLD AUTO: 3.12 THOUSAND/UL (ref 4.31–10.16)

## 2025-04-07 PROCEDURE — 82948 REAGENT STRIP/BLOOD GLUCOSE: CPT

## 2025-04-07 PROCEDURE — 83735 ASSAY OF MAGNESIUM: CPT | Performed by: STUDENT IN AN ORGANIZED HEALTH CARE EDUCATION/TRAINING PROGRAM

## 2025-04-07 PROCEDURE — 80048 BASIC METABOLIC PNL TOTAL CA: CPT | Performed by: STUDENT IN AN ORGANIZED HEALTH CARE EDUCATION/TRAINING PROGRAM

## 2025-04-07 PROCEDURE — 99239 HOSP IP/OBS DSCHRG MGMT >30: CPT | Performed by: STUDENT IN AN ORGANIZED HEALTH CARE EDUCATION/TRAINING PROGRAM

## 2025-04-07 PROCEDURE — 85027 COMPLETE CBC AUTOMATED: CPT | Performed by: STUDENT IN AN ORGANIZED HEALTH CARE EDUCATION/TRAINING PROGRAM

## 2025-04-07 RX ADMIN — ASPIRIN 81 MG: 81 TABLET, COATED ORAL at 07:47

## 2025-04-07 RX ADMIN — LISINOPRIL 20 MG: 20 TABLET ORAL at 07:47

## 2025-04-07 RX ADMIN — SENNOSIDES AND DOCUSATE SODIUM 2 TABLET: 50; 8.6 TABLET ORAL at 07:47

## 2025-04-07 RX ADMIN — LUBIPROSTONE 24 MCG: 24 CAPSULE, GELATIN COATED ORAL at 07:46

## 2025-04-07 RX ADMIN — ATORVASTATIN CALCIUM 10 MG: 10 TABLET, FILM COATED ORAL at 07:47

## 2025-04-07 RX ADMIN — NIRMATRELVIR AND RITONAVIR 3 TABLET: KIT at 07:46

## 2025-04-07 RX ADMIN — ENOXAPARIN SODIUM 40 MG: 40 INJECTION SUBCUTANEOUS at 07:46

## 2025-04-07 RX ADMIN — PANTOPRAZOLE SODIUM 40 MG: 40 TABLET, DELAYED RELEASE ORAL at 07:47

## 2025-04-07 RX ADMIN — FAMOTIDINE 20 MG: 20 TABLET, FILM COATED ORAL at 07:47

## 2025-04-07 NOTE — CASE MANAGEMENT
Case Management Discharge Planning Note    Patient name Zaira Morley  Location [facility] 817/[facility] 817-01 MRN [id number]  : 1945 Date 2025       Current Admission Date: 4/3/2025  Current Admission Diagnosis:Generalized weakness   Patient Active Problem List    Diagnosis Date Noted Date Diagnosed    Ambulatory dysfunction 2025     Metabolic encephalopathy 2025     Generalized weakness 2025     COVID-19 2025     Thrombocytopenia (HCC) 2024     Hard of hearing 10/31/2024     Dyslipidemia 2024     Leg edema, left 2024     Constipation 2024     Decreased hearing of both ears 2024     History of colon polyps 2024     Other constipation 2023     Blurry vision, bilateral 03/15/2023     Type 2 diabetes mellitus with diabetic cataract, without long-term current use of insulin (HCC) 2022     Primary osteoarthritis of both knees 2020     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HCC) 2020     Scalp lesion 2020     History of hepatitis C 2020     Renal cyst 2019     Spondylolisthesis at L5-S1 level 10/12/2018     Chronic constipation 2017     GERD (gastroesophageal reflux disease) 2017     Varicose 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 2017     Episodic cluster headache, not intractable 2017     Brain tumor (HCC) 2015     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HCC) 2015     Meningioma (HCC) 11/10/2015     Hypertension 03/10/2015     Nonintractable headache 2015       LOS (days): 4  Geometric Mean LOS (GMLOS) (days): 2.5  Days to GMLOS:-1.1     OBJECTIVE:  Risk of Unplanned Readmission Score: 15.34         Current admission status: Inpatient   Preferred Pharmacy:   [id number] - [place], [place] - [address]  [address] [address]  Phone: [phone] Fax: [phone]    Primary Care Provider: ZAHRA Rice    Primary Insurance: [employer] REP  Secondary Insurance:      DISCHARGE DETAILS:    Discharge planning discussed with:: pt's son and daughter  Freedom of Choice: Yes     CM contacted family/caregiver?: Yes  Were Treatment Team discharge recommendations reviewed with patient/caregiver?: Yes  Did patient/caregiver verbalize understanding of patient care needs?: Yes  Were patient/caregiver advised of the risks associated with not following Treatment Team discharge recommendations?: Yes    Requested Home Health Care         Is the patient interested in HHC at discharge?: Yes  Home Health Discipline requested:: Occupational Therapy, Physical Therapy  Home Health Agency Name:: [facility]  Home Health Follow-Up Provider:: PCP  Home Health Services Needed:: Evaluate Functional Status and Safety, Gait/ADL Training, Strengthening/Theraputic Exercises to Improve Function  Homebound Criteria Met:: Uses an Assist Device (i.e. cane, walker, etc), Requires the Assistance of Another Person for Safe Ambulation or to Leave the Home  Supporting Clincal Findings:: Limited Endurance, Fatigues Easliy in Short Distances    DME Referral Provided  Referral made for DME?: Yes  DME referral completed for the following items:: Walker  DME Supplier Name:: [employer]    Other Referral/Resources/Interventions Provided:  Interventions: C    Treatment Team Recommendation: Home with Home Health Care  Discharge Destination Plan:: Home with Home Health Care     IMM Given (Date):: 04/07/25 (3447)  IMM Given to:: Family    Pt cleared for dc home.  [facility] reserved for home PT/OT.  CM reviewed IMM with pt; No questions or concerns. Pt in agreement with rights, and is aware of the right to appeal d/c once medically stable if desired. IMM signed.  CM met with family at bedside and family is agreeable to dc plan.

## 2025-04-07 NOTE — ASSESSMENT & PLAN NOTE
Presented after developing dizziness with a fall whilst assisting transporting his wife here, persisted with unsteadiness during ED evaluation  CT imaging negative for acute traumatic injuries, patient found positive for COVID-19 infection  Suspect his generalized weakness is due to the underlying COVID-19 infection and debility  PT/OT evaluations, CM consult for assistance with disposition  Plan to return home

## 2025-04-07 NOTE — PROGRESS NOTES
Patient:    MRN:  [id number]    Skylar Request ID:  [id number]    Level of care reserved:  Home Health Agency    Partner Reserved:  [facility], [place] [address] [phone]    Clinical needs requested:    Geography searched:  [phone]    Start of Service:    Request sent:  1:24pm EDT on 4/6/2025 by Michelle Temple    Partner reserved:  10:26am EDT on 4/7/2025 by Trish Allen    Choice list shared:  10:26am EDT on 4/7/2025 by Trish Allen

## 2025-04-07 NOTE — ASSESSMENT & PLAN NOTE
Lab Results   Component Value Date    HGBA1C 5.8 02/20/2025     Recent Labs     04/06/25  1635 04/06/25 2056 04/07/25  0709 04/07/25  1115   POCGLU 157* 138 133 129     Blood Sugar Average: Last 72 hrs:  (P) 127.4505678513224309  Hold home metformin while admitted and restart on discharge  Continue with accu-checks and SSI  Carb controlled diet  Hypoglycemia protocol

## 2025-04-07 NOTE — DISCHARGE SUMMARY
Discharge Summary - Hospitalist   Name: Zaira Morley 79 y.o. male I MRN: [id number]  Unit/Bed#: [facility] 817-01 I Date of Admission: 4/3/2025   Date of Service: 4/7/2025 I Hospital Day: 4     Assessment & Plan  Generalized weakness  Presented after developing dizziness with a fall whilst assisting transporting his wife here, persisted with unsteadiness during ED evaluation  CT imaging negative for acute traumatic injuries, patient found positive for COVID-19 infection  Suspect his generalized weakness is due to the underlying COVID-19 infection and debility  PT/OT evaluations, CM consult for assistance with disposition  Plan to return home  COVID-19  Patient developed reported congestion and low-grade fevers after exposure to a sick family member, found positive for COVID-19 during ED evaluation  Thus far he is minimally symptomatic and maintaining appropriate oxygenation on room air  Given patient's acute encephalopathy and fever, we will initiate Paxlovid. Day 3 of therapy   Fever curve improving   Hypertension  Blood pressure stable on PTA lisinopril 20 mg daily  Type 2 diabetes mellitus with diabetic cataract, without long-term current use of insulin ([facility])  Lab Results   Component Value Date    HGBA1C 5.8 02/20/2025     Recent Labs     04/06/25  1635 04/06/25  2056 04/07/25  0709 04/07/25  1115   POCGLU 157* 138 133 129     Blood Sugar Average: Last 72 hrs:  (P) 127.0911782926094793  Hold home metformin while admitted and restart on discharge  Continue with accu-checks and SSI  Carb controlled diet  Hypoglycemia protocol  Ambulatory dysfunction  Ambulatory function in the setting of COVID-19 infection and debility  Management as per the primary problem  Metabolic encephalopathy  Patient acutely confused in hospital  Continue VRC monitoring and posey belt.  Per family, confusion may be improving  Consider MOCA assessment after mentation normalizing, recommended for PCP followup     Medical Problems       Resolved  Problems  Date Reviewed: 2/26/2025   None       Discharging Physician / Practitioner: Azael Roca DO  PCP: ZAHRA Rice  Admission Date:   Admission Orders (From admission, onward)       Ordered        04/03/25 2221  INPATIENT ADMISSION  Once                          Discharge Date: 04/07/25    Consultations During Hospital Stay:  None    Procedures Performed:   None    Significant Findings / Test Results:   Covid PCR- Positive  CT Head 4/3- No acute intracranial abnormality.  Unchanged moderate chronic microangiopathic change.  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    Incidental Findings:   N/A     Test Results Pending at Discharge (will require follow up):   None     Outpatient Tests Requested:  Outpatient cognitive testing recommended    Complications:  None    Reason for Admission: Covid-19 infection    Hospital Course:   Zaira Morley is a 79 y.o. male patient who originally presented to the hospital on 4/3/2025 due to COVID-19 infection. Patient had initially been a visitor at the emergency department as he accompanied his wife to the hospital. He suffered a fall in the hospital and was taken to ED where he was found to have COVID-19. He was having low-grade fevers at home, however xray didn't demonstrate any airspace disease. He was started on paxlovid and admitted for further observation and PT/OT evaluation. Patient did have some confusion in hospital, likely from metabolic encephalopathy and hospital delirium. Mentation improved with supportive care. Ultimately patient remained medically stable and was deemed appropriate for discharge. Family opted to take patient home after discharge. Recommend cognitive testing with PCP on followup.    Please see above list of diagnoses and related plan for additional information.     Condition at Discharge: good    Discharge Day Visit / Exam:   Subjective:  No new complaints. Resting  "comfortably in bed.  Vitals: Blood Pressure: 114/76 (04/07/25 1456)  Pulse: 66 (04/06/25 0700)  Temperature: 98.3 °F (36.8 °C) (04/07/25 1456)  Temp Source: Oral (04/06/25 0700)  Respirations: 18 (04/07/25 1456)  Height: 5' 9\" (175.3 cm) (04/06/25 0700)  Weight - Scale: 77.6 kg (171 lb) (04/06/25 0700)  SpO2: 97 % (04/05/25 0616)  Physical Exam  Vitals and nursing note reviewed.   Constitutional:       General: He is not in acute distress.     Appearance: He is well-developed.   HENT:      Head: Normocephalic and atraumatic.   Eyes:      Conjunctiva/sclera: Conjunctivae normal.   Cardiovascular:      Rate and Rhythm: Normal rate and regular rhythm.      Heart sounds: No murmur heard.  Pulmonary:      Effort: Pulmonary effort is normal. No respiratory distress.      Breath sounds: Normal breath sounds.   Abdominal:      Palpations: Abdomen is soft.      Tenderness: There is no abdominal tenderness.   Musculoskeletal:         General: No swelling.      Cervical back: Neck supple.   Skin:     General: Skin is warm and dry.      Capillary Refill: Capillary refill takes less than 2 seconds.   Neurological:      General: No focal deficit present.      Mental Status: He is alert and oriented to person, place, and time.   Psychiatric:         Mood and Affect: Mood normal.        Discussion with Family: Updated  (son and daughter) at bedside.    Discharge instructions/Information to patient and family:   See after visit summary for information provided to patient and family.      Provisions for Follow-Up Care:  See after visit summary for information related to follow-up care and any pertinent home health orders.      Mobility at time of Discharge:   Basic Mobility Inpatient Raw Score: 24  JH-HLM Goal: 8: Walk 250 feet or more  JH-HLM Achieved: 7: Walk 25 feet or more  HLM Goal NOT achieved. Continue to encourage mobility in post discharge setting.     Disposition:   Home with VNA Services (Reminder: Complete " face to face encounter)    Planned Readmission: No    Discharge Medications:  See after visit summary for reconciled discharge medications provided to patient and/or family.      Administrative Statements   Discharge Statement:  I have spent a total time of 45 minutes in caring for this patient on the day of the visit/encounter. >30 minutes of time was spent on: Documenting in the medical record.    **Please Note: This note may have been constructed using a voice recognition system**

## 2025-04-07 NOTE — ASSESSMENT & PLAN NOTE
Patient acutely confused in hospital  Continue VRC monitoring and posey belt.  Per family, confusion may be improving  Consider MOCA assessment after mentation normalizing, recommended for PCP followup

## 2025-04-08 ENCOUNTER — HOME CARE VISIT (OUTPATIENT)
Dept: HOME HEALTH SERVICES | Facility: HOME HEALTHCARE | Age: 80
End: 2025-04-08
Payer: COMMERCIAL

## 2025-04-08 ENCOUNTER — TELEPHONE (OUTPATIENT)
Dept: FAMILY MEDICINE CLINIC | Facility: CLINIC | Age: 80
End: 2025-04-08

## 2025-04-08 ENCOUNTER — TRANSITIONAL CARE MANAGEMENT (OUTPATIENT)
Dept: FAMILY MEDICINE CLINIC | Facility: CLINIC | Age: 80
End: 2025-04-08

## 2025-04-08 PROCEDURE — 400013 VN SOC

## 2025-04-08 PROCEDURE — G0299 HHS/HOSPICE OF RN EA 15 MIN: HCPCS

## 2025-04-08 NOTE — CASE COMMUNICATION
Medication discrepancies or Major drug interactions   Abnormal clinical findings limited endurance sob with exertion  This report is informational only, no response is needed  [facility] has Admitted your patient to Home Health service with the following disciplines: SN, PT, OT and MSW  Patient stated goals of care get stronger remain at home  Potential barriers to goal achievement easily fatigued, cough, balance issues  Primary foc us of home health care:Respiratory  Anticipated visit pattern and next visit date 4/11 2w2 1w1  Thank you for allowing us to participate in the care of your patient.      Awilda Mc RN A

## 2025-04-09 ENCOUNTER — TELEPHONE (OUTPATIENT)
Dept: DERMATOLOGY | Facility: CLINIC | Age: 80
End: 2025-04-09

## 2025-04-09 ENCOUNTER — HOME CARE VISIT (OUTPATIENT)
Dept: HOME HEALTH SERVICES | Facility: HOME HEALTHCARE | Age: 80
End: 2025-04-09
Payer: COMMERCIAL

## 2025-04-09 VITALS — HEART RATE: 60 BPM | SYSTOLIC BLOOD PRESSURE: 122 MMHG | OXYGEN SATURATION: 99 % | DIASTOLIC BLOOD PRESSURE: 64 MMHG

## 2025-04-09 LAB
DME PARACHUTE DELIVERY DATE ACTUAL: NORMAL
DME PARACHUTE DELIVERY DATE REQUESTED: NORMAL
DME PARACHUTE ITEM DESCRIPTION: NORMAL
DME PARACHUTE ORDER STATUS: NORMAL
DME PARACHUTE SUPPLIER NAME: NORMAL
DME PARACHUTE SUPPLIER PHONE: NORMAL

## 2025-04-09 PROCEDURE — G0152 HHCP-SERV OF OT,EA 15 MIN: HCPCS

## 2025-04-09 NOTE — TELEPHONE ENCOUNTER
Called pt as pt has a referral in chart for Dermatology (Referral WQ) and wanted to see if pt still needs an appt with us. Would offer an appt with [facility] if pt calls back at [place] or . I spoke to pt daughter and pt just got out of hospital with COVID and she needs to see if he is able to come or if he still needs Derm services. Pt daughter said she will call back. Pt was seen in  with  on 6/22/22. I did inform pt daughter that this would not be with  as she is booked out and pt daughter understood.

## 2025-04-10 ENCOUNTER — HOME CARE VISIT (OUTPATIENT)
Dept: HOME HEALTH SERVICES | Facility: HOME HEALTHCARE | Age: 80
End: 2025-04-10
Payer: COMMERCIAL

## 2025-04-10 ENCOUNTER — TELEPHONE (OUTPATIENT)
Age: 80
End: 2025-04-10

## 2025-04-10 NOTE — TELEPHONE ENCOUNTER
Spoke to pt's pcp who has also spoken to pt's daughter stating she does not have any available appts thurs or fri. Earliest we can do is tues 4/15 if they want to be moved

## 2025-04-10 NOTE — TELEPHONE ENCOUNTER
Pts daughter Cece called asking if Jenn would be able to see pt and his wife today instead of Wednesday.  She said her other sister, Flo can bring them any time today.  Concerned about both pt and their mother.    Told her I would send a message in    If possible (and a miracle happens for cancellations) can Flo be contacted at [phone]

## 2025-04-11 ENCOUNTER — HOME CARE VISIT (OUTPATIENT)
Dept: HOME HEALTH SERVICES | Facility: HOME HEALTHCARE | Age: 80
End: 2025-04-11
Payer: COMMERCIAL

## 2025-04-11 VITALS
RESPIRATION RATE: 20 BRPM | OXYGEN SATURATION: 99 % | SYSTOLIC BLOOD PRESSURE: 118 MMHG | DIASTOLIC BLOOD PRESSURE: 62 MMHG | HEART RATE: 64 BPM

## 2025-04-11 PROCEDURE — G0151 HHCP-SERV OF PT,EA 15 MIN: HCPCS

## 2025-04-11 NOTE — CASE COMMUNICATION
Home PT Marce completed   PT plan ... 1w1  2w1  for progression of timed walk tests, ambulation outside terrain and steps training.   Pt demonstrates moderate cognitive deficits, Has declined moreso than prior episode over a year ago ... resides with spouse who also has Covid but is very weak ... Dtr Flo reports another sister, Rose, will be coming to live with pt and spouse however the timing is TBD ... PT feels both pt and spouse n ow need 24/7 supervision especially spouse who seems to be having an increased didfficulty recovering from her Covid symptoms.

## 2025-04-12 ENCOUNTER — HOME CARE VISIT (OUTPATIENT)
Dept: HOME HEALTH SERVICES | Facility: HOME HEALTHCARE | Age: 80
End: 2025-04-12
Payer: COMMERCIAL

## 2025-04-12 PROCEDURE — G0155 HHCP-SVS OF CSW,EA 15 MIN: HCPCS

## 2025-04-12 NOTE — Clinical Note
Pt seen by A  for eval and assess.  Pt is confused according to family. She was unable to stay awake during assessment.  ALLYN completed community needs assessment  Family will call [facility], apply for Waiver and DIANA JEAN, [facility]  Family will be with Pt 24/7

## 2025-04-12 NOTE — HOME HEALTH
ALLYN assessement completed. Pt very tired. Three daughters in home as caregivers.  Very interested in her care. Reviewed options.  They will apply for TED [facility], obtain life line for , DIANA, AAA and completed Living Will and DMPOA

## 2025-04-15 ENCOUNTER — HOME CARE VISIT (OUTPATIENT)
Dept: HOME HEALTH SERVICES | Facility: HOME HEALTHCARE | Age: 80
End: 2025-04-15
Payer: COMMERCIAL

## 2025-04-15 VITALS
RESPIRATION RATE: 16 BRPM | DIASTOLIC BLOOD PRESSURE: 62 MMHG | HEART RATE: 64 BPM | OXYGEN SATURATION: 98 % | TEMPERATURE: 97.5 F | SYSTOLIC BLOOD PRESSURE: 114 MMHG

## 2025-04-15 PROCEDURE — G0299 HHS/HOSPICE OF RN EA 15 MIN: HCPCS

## 2025-04-15 PROCEDURE — G0180 MD CERTIFICATION HHA PATIENT: HCPCS | Performed by: STUDENT IN AN ORGANIZED HEALTH CARE EDUCATION/TRAINING PROGRAM

## 2025-04-15 PROCEDURE — G0151 HHCP-SERV OF PT,EA 15 MIN: HCPCS

## 2025-04-16 ENCOUNTER — OFFICE VISIT (OUTPATIENT)
Dept: FAMILY MEDICINE CLINIC | Facility: CLINIC | Age: 80
End: 2025-04-16
Payer: COMMERCIAL

## 2025-04-16 VITALS
DIASTOLIC BLOOD PRESSURE: 60 MMHG | OXYGEN SATURATION: 98 % | HEART RATE: 69 BPM | TEMPERATURE: 95.7 F | BODY MASS INDEX: 25.18 KG/M2 | WEIGHT: 170 LBS | RESPIRATION RATE: 18 BRPM | HEIGHT: 69 IN | SYSTOLIC BLOOD PRESSURE: 110 MMHG

## 2025-04-16 VITALS
DIASTOLIC BLOOD PRESSURE: 72 MMHG | OXYGEN SATURATION: 99 % | HEART RATE: 70 BPM | SYSTOLIC BLOOD PRESSURE: 138 MMHG | RESPIRATION RATE: 20 BRPM

## 2025-04-16 DIAGNOSIS — R35.0 URINARY FREQUENCY: ICD-10-CM

## 2025-04-16 DIAGNOSIS — G44.229 CHRONIC TENSION-TYPE HEADACHE, NOT INTRACTABLE: ICD-10-CM

## 2025-04-16 DIAGNOSIS — E11.36 TYPE 2 DIABETES MELLITUS WITH DIABETIC CATARACT, WITHOUT LONG-TERM CURRENT USE OF INSULIN (HCC): ICD-10-CM

## 2025-04-16 DIAGNOSIS — D32.9 MENINGIOMA (HCC): ICD-10-CM

## 2025-04-16 DIAGNOSIS — F32.0 CURRENT MILD EPISODE OF MAJOR DEPRESSIVE DISORDER WITHOUT PRIOR EPISODE (HCC): ICD-10-CM

## 2025-04-16 DIAGNOSIS — I10 PRIMARY HYPERTENSION: ICD-10-CM

## 2025-04-16 DIAGNOSIS — R41.3 MEMORY PROBLEM: Primary | ICD-10-CM

## 2025-04-16 LAB
SL AMB  POCT GLUCOSE, UA: NORMAL
SL AMB LEUKOCYTE ESTERASE,UA: NORMAL
SL AMB POCT BILIRUBIN,UA: NORMAL
SL AMB POCT BLOOD,UA: NORMAL
SL AMB POCT CLARITY,UA: CLEAR
SL AMB POCT COLOR,UA: NORMAL
SL AMB POCT KETONES,UA: NORMAL
SL AMB POCT NITRITE,UA: NORMAL
SL AMB POCT PH,UA: 6
SL AMB POCT SPECIFIC GRAVITY,UA: 1.01
SL AMB POCT URINE PROTEIN: NORMAL
SL AMB POCT UROBILINOGEN: NORMAL

## 2025-04-16 PROCEDURE — 81002 URINALYSIS NONAUTO W/O SCOPE: CPT | Performed by: NURSE PRACTITIONER

## 2025-04-16 PROCEDURE — 87086 URINE CULTURE/COLONY COUNT: CPT | Performed by: NURSE PRACTITIONER

## 2025-04-16 PROCEDURE — 99496 TRANSJ CARE MGMT HIGH F2F 7D: CPT | Performed by: NURSE PRACTITIONER

## 2025-04-16 RX ORDER — SERTRALINE HYDROCHLORIDE 25 MG/1
25 TABLET, FILM COATED ORAL DAILY
Qty: 30 TABLET | Refills: 5 | Status: SHIPPED | OUTPATIENT
Start: 2025-04-16 | End: 2025-10-13

## 2025-04-16 NOTE — PROGRESS NOTES
Transition of Care Visit:  Name: Zaira Morley      : 1945      MRN: [id number]  Encounter Provider: ZAHRA Rice  Encounter Date: 2025   Encounter department: [facility]    Assessment & Plan  Memory problem  Refer to senior care    Orders:    Ambulatory Referral to Senior Care; Future    Type 2 diabetes mellitus with diabetic cataract, without long-term current use of insulin ([facility])  Stable  Cont meds    Lab Results   Component Value Date    HGBA1C 5.8 2025            Chronic tension-type headache, not intractable    Orders:    Ambulatory Referral to Neurology; Future    Current mild episode of major depressive disorder without prior episode (HCC)  Start sertraline      Orders:    sertraline (ZOLOFT) 25 mg tablet; Take 1 tablet (25 mg total) by mouth daily    Meningioma (HCC)  Cont f/u with  neurology    Orders:    Ambulatory Referral to Neurology; Future    Primary hypertension  Stable  Cont meds           Urinary frequency    Orders:    Urine culture    POCT urine dip         History of Present Illness     Transitional Care Management Review:   Zaira Morley is a 79 y.o. male here for TCM follow up.     During the TCM phone call patient stated:  TCM Call (since 2025)       Date and time call was made  2025  2:47 PM    Patient was hospitialized at  [facility]    Date of Admission  25    Date of discharge  25    Diagnosis  Generalized weakness    Disposition  Home    Were the patients medications reviewed and updated  Yes          TCM Call (since 2025)       Scheduled for follow up?  Yes    Did you obtain your prescribed medications  Yes    Do you need help managing your prescriptions or medications  No    Is transportation to your appointment needed  No    I have advised the patient to call PCP with any new or worsening symptoms  Deepa Rodriguez,     Living Arrangements  Spouse or Significiant other    Are you recieving home care  services  Yes    Types of home care services  Nurse visit; Home PT          Here for hospital follow up for fatigue and covid 19    Zaira Morley is a 79 y.o. male patient who originally presented to the hospital on 4/3/2025 due to COVID-19 infection. Patient had initially been a visitor at the emergency department as he accompanied his wife to the hospital. He suffered a fall in the hospital and was taken to ED where he was found to have COVID-19. He was having low-grade fevers at home, however xray didn't demonstrate any airspace disease. He was started on paxlovid and admitted for further observation and PT/OT evaluation. Patient did have some confusion in hospital, likely from metabolic encephalopathy and hospital delirium. Mentation improved with supportive care. Ultimately patient remained medically stable and was deemed appropriate for discharge. Family opted to take patient home after discharge. Recommend cognitive testing with PCP on followup.      Having headaches and drowsy  Never saw neurology for his headaches  Has meningioma and follows with neurosurgery        Review of Systems   Constitutional:  Positive for fatigue. Negative for fever.   HENT:  Negative for congestion, postnasal drip and rhinorrhea.    Eyes:  Negative for photophobia and visual disturbance.   Respiratory:  Negative for cough, shortness of breath and wheezing.    Cardiovascular:  Negative for chest pain and palpitations.   Gastrointestinal:  Positive for constipation. Negative for diarrhea, nausea and vomiting.   Musculoskeletal:  Negative for arthralgias and myalgias.   Skin:  Negative for rash.   Neurological:  Positive for headaches. Negative for dizziness and light-headedness.   Psychiatric/Behavioral:  Positive for confusion, decreased concentration and dysphoric mood. Negative for agitation, behavioral problems, hallucinations, self-injury, sleep disturbance and suicidal ideas. The patient is not nervous/anxious and is not  "hyperactive.      Objective   /60 (BP Location: Left arm, Patient Position: Sitting, Cuff Size: Standard)   Pulse 69   Temp (!) 95.7 °F (35.4 °C) (Tympanic)   Resp 18   Ht 5' 9\" (1.753 m)   Wt 77.1 kg (170 lb)   SpO2 98%   BMI 25.10 kg/m²     Physical Exam  Vitals and nursing note reviewed.   Constitutional:       Appearance: Normal appearance.   HENT:      Head: Normocephalic and atraumatic.      Right Ear: Tympanic membrane, ear canal and external ear normal.      Left Ear: Tympanic membrane, ear canal and external ear normal.      Nose: Nose normal.      Mouth/Throat:      Mouth: Mucous membranes are moist.   Eyes:      Conjunctiva/sclera: Conjunctivae normal.   Cardiovascular:      Rate and Rhythm: Normal rate and regular rhythm.      Heart sounds: Normal heart sounds.   Pulmonary:      Effort: Pulmonary effort is normal.      Breath sounds: Normal breath sounds.   Abdominal:      General: Bowel sounds are normal.      Palpations: Abdomen is soft.   Musculoskeletal:         General: Normal range of motion.      Cervical back: Normal range of motion and neck supple.   Skin:     General: Skin is warm and dry.      Capillary Refill: Capillary refill takes less than 2 seconds.   Neurological:      General: No focal deficit present.      Mental Status: He is alert. Mental status is at baseline.   Psychiatric:         Mood and Affect: Mood normal.         Behavior: Behavior normal.       Medications have been reviewed by provider in current encounter      "

## 2025-04-17 ENCOUNTER — HOME CARE VISIT (OUTPATIENT)
Dept: HOME HEALTH SERVICES | Facility: HOME HEALTHCARE | Age: 80
End: 2025-04-17
Payer: COMMERCIAL

## 2025-04-17 VITALS
SYSTOLIC BLOOD PRESSURE: 162 MMHG | OXYGEN SATURATION: 97 % | DIASTOLIC BLOOD PRESSURE: 70 MMHG | RESPIRATION RATE: 20 BRPM | HEART RATE: 72 BPM

## 2025-04-17 LAB — BACTERIA UR CULT: NORMAL

## 2025-04-17 PROCEDURE — G0151 HHCP-SERV OF PT,EA 15 MIN: HCPCS

## 2025-04-18 ENCOUNTER — HOME CARE VISIT (OUTPATIENT)
Dept: HOME HEALTH SERVICES | Facility: HOME HEALTHCARE | Age: 80
End: 2025-04-18
Payer: COMMERCIAL

## 2025-04-21 PROBLEM — R35.0 URINARY FREQUENCY: Status: ACTIVE | Noted: 2025-04-21

## 2025-04-21 PROBLEM — F32.0 CURRENT MILD EPISODE OF MAJOR DEPRESSIVE DISORDER WITHOUT PRIOR EPISODE (HCC): Status: ACTIVE | Noted: 2025-04-21

## 2025-04-21 NOTE — ASSESSMENT & PLAN NOTE
Start sertraline      Orders:    sertraline (ZOLOFT) 25 mg tablet; Take 1 tablet (25 mg total) by mouth daily

## 2025-04-23 ENCOUNTER — HOME CARE VISIT (OUTPATIENT)
Dept: HOME HEALTH SERVICES | Facility: HOME HEALTHCARE | Age: 80
End: 2025-04-23
Payer: COMMERCIAL

## 2025-04-23 VITALS
SYSTOLIC BLOOD PRESSURE: 108 MMHG | HEART RATE: 72 BPM | TEMPERATURE: 97.6 F | DIASTOLIC BLOOD PRESSURE: 62 MMHG | OXYGEN SATURATION: 98 % | RESPIRATION RATE: 18 BRPM

## 2025-04-23 PROCEDURE — G0299 HHS/HOSPICE OF RN EA 15 MIN: HCPCS

## 2025-05-13 DIAGNOSIS — F32.0 CURRENT MILD EPISODE OF MAJOR DEPRESSIVE DISORDER WITHOUT PRIOR EPISODE (HCC): ICD-10-CM

## 2025-05-13 RX ORDER — SERTRALINE HYDROCHLORIDE 25 MG/1
25 TABLET, FILM COATED ORAL DAILY
Qty: 90 TABLET | Refills: 2 | Status: SHIPPED | OUTPATIENT
Start: 2025-05-13 | End: 2025-11-09

## 2025-05-14 ENCOUNTER — OFFICE VISIT (OUTPATIENT)
Dept: FAMILY MEDICINE CLINIC | Facility: CLINIC | Age: 80
End: 2025-05-14
Payer: COMMERCIAL

## 2025-05-14 VITALS
HEART RATE: 63 BPM | SYSTOLIC BLOOD PRESSURE: 130 MMHG | BODY MASS INDEX: 25.18 KG/M2 | WEIGHT: 170 LBS | DIASTOLIC BLOOD PRESSURE: 70 MMHG | HEIGHT: 69 IN | OXYGEN SATURATION: 99 % | TEMPERATURE: 94.3 F | RESPIRATION RATE: 17 BRPM

## 2025-05-14 DIAGNOSIS — F32.0 CURRENT MILD EPISODE OF MAJOR DEPRESSIVE DISORDER WITHOUT PRIOR EPISODE (HCC): ICD-10-CM

## 2025-05-14 DIAGNOSIS — I10 PRIMARY HYPERTENSION: ICD-10-CM

## 2025-05-14 DIAGNOSIS — M17.0 PRIMARY OSTEOARTHRITIS OF BOTH KNEES: ICD-10-CM

## 2025-05-14 DIAGNOSIS — H25.013 CORTICAL AGE-RELATED CATARACT OF BOTH EYES: ICD-10-CM

## 2025-05-14 DIAGNOSIS — K59.00 CONSTIPATION: ICD-10-CM

## 2025-05-14 DIAGNOSIS — E11.36 TYPE 2 DIABETES MELLITUS WITH DIABETIC CATARACT, WITHOUT LONG-TERM CURRENT USE OF INSULIN (HCC): ICD-10-CM

## 2025-05-14 DIAGNOSIS — K59.09 CHRONIC CONSTIPATION: ICD-10-CM

## 2025-05-14 DIAGNOSIS — Z86.0100 HISTORY OF COLON POLYPS: ICD-10-CM

## 2025-05-14 DIAGNOSIS — Z01.818 PREOP EXAMINATION: Primary | ICD-10-CM

## 2025-05-14 PROCEDURE — 99214 OFFICE O/P EST MOD 30 MIN: CPT | Performed by: NURSE PRACTITIONER

## 2025-05-14 RX ORDER — POLYETHYLENE GLYCOL 3350 17 G/17G
POWDER, FOR SOLUTION ORAL
Qty: 30 EACH | Refills: 5 | Status: SHIPPED | OUTPATIENT
Start: 2025-05-14

## 2025-05-14 RX ORDER — BISACODYL 10 MG
10 SUPPOSITORY, RECTAL RECTAL AS NEEDED
Qty: 12 SUPPOSITORY | Refills: 0 | Status: SHIPPED | OUTPATIENT
Start: 2025-05-14

## 2025-05-14 RX ORDER — LUBIPROSTONE 24 UG/1
24 CAPSULE ORAL 2 TIMES DAILY WITH MEALS
Qty: 60 CAPSULE | Refills: 11 | Status: SHIPPED | OUTPATIENT
Start: 2025-05-14

## 2025-05-14 RX ORDER — LACTULOSE 10 G/15ML
20 SOLUTION ORAL DAILY PRN
Qty: 473 ML | Refills: 0 | Status: SHIPPED | OUTPATIENT
Start: 2025-05-14 | End: 2025-05-23

## 2025-05-14 NOTE — ASSESSMENT & PLAN NOTE
Orders:    lubiprostone (AMITIZA) 24 mcg capsule; Take 1 capsule (24 mcg total) by mouth 2 (two) times a day with meals

## 2025-05-14 NOTE — PROGRESS NOTES
Pre-operative Clearance  Name: Zaira Morley      : 1945      MRN: [id number]  Encounter Provider: ZAHRA Rice  Encounter Date: 2025   Encounter department: [facility]    :  Assessment & Plan  Preop examination         Cortical age-related cataract of both eyes         Constipation  Refill meds    Orders:    bisacodyl (DULCOLAX) 10 mg suppository; Insert 1 suppository (10 mg total) into the rectum if needed for constipation (if no bowel movement despite miralax and lactulose use, can use suppository)    Chronic constipation  Refill meds    Orders:    lactulose (CHRONULAC) 10 g/15 mL solution; Take 30 mL (20 g total) by mouth daily as needed (if no BM in 24 hours despite miralax, can take 20 g (30 ML) of lactulose as needed for constipation) DAILY AS NEEDED FOR SEVERE CONSTIPATION    lubiprostone (AMITIZA) 24 mcg capsule; Take 1 capsule (24 mcg total) by mouth 2 (two) times a day with meals    polyethylene glycol (MIRALAX) 17 g packet; Take miralax daily but can use up to 2 times per day as needed for constipation    History of colon polyps    Orders:    lubiprostone (AMITIZA) 24 mcg capsule; Take 1 capsule (24 mcg total) by mouth 2 (two) times a day with meals    Type 2 diabetes mellitus with diabetic cataract, without long-term current use of insulin (HCC)  Stable cont meds    Lab Results   Component Value Date    HGBA1C 5.8 2025            Primary hypertension  Stable  Cont meds           Primary osteoarthritis of both knees  Stable           Current mild episode of major depressive disorder without prior episode (HCC)  Stable  Cont meds               Pre-operative Clearance:     Revised Cardiac Risk Index:  RCI RISK CLASS I (0 risk factors, risk of major cardiac complications approximately 0.5%)    Clearance:  Patient is medically optimized (CLEARED) for proposed surgery without any additional cardiac testing.      Medication Instructions:   - Avoid herbs or  non-directed vitamins one week prior to surgery    - Avoid aspirin containing medications or non-steroidal anti-inflammatory drugs one week preceding surgery    - May take tylenol for pain up until the night before surgery    - ACE Inhibitors or ARBs: Continue this medication up to the evening before surgery/procedure, but do not take the morning of the day of surgery.  - Antidepressants: Continue to take this medication on your normal schedule.  - Antiepileptic meds: Continue to take this medication on your normal schedule.  - Gout meds: Continue to take this medication on your normal schedule.  - H2 Blocker: Continue to take this medication on your normal schedule.  - Hyperlipidemia meds: Continue to take this medication on your normal schedule.      Medicine Instructions for Adults with Diabetes (NO Bowel Prep)    Follow these instructions when a BOWEL PREP is NOT required for your procedure or surgery!    NOTE:  GLP Agonists taken weekly: do not take in the 7 days before your procedure. **Bariatric surgery: do not take 4 weeks prior to your procedure.    SGLT-2 Inhibitors: do not take in the 4 days before your procedure    On the Day Before Surgery/Procedure  If you are having a procedure (e.g., Colonoscopy) or surgery which DOES NOT require a bowel prep, follow the directions below based on the type of medicine you take for your diabetes.  Type of Medicine You Take Examples What to Do   Pre-Mixed Insulin Intermediate  Snqgydw91/25, Cbsxcpb14/30, Novolog 70/30, Regular Insulin Take 1/2 your regular dose the evening before our procedure.   Rapid/Fast Acting  Insulin and/or Long-Acting Insulin Humalog U200, NovoLog, Apidra,  Lantus, Levemir, Tresiba, Toujeo,  Fias, Basaglar Take your FULL regular dose the day before procedure.   Oral Diabetic Medicines (sulfonylurea) Glipizide/Glimepiride/  Glucotrol Take your regular dose with dinner the evening before your procedure.   Other Oral Diabetic Medicines Metformin,  Glucophage, Glucophage  XR, Riomet, Glumetza, Actose,  Avandia, Gl set, Prandin Take your regular dose with dinner the evening before your procedure   GLP Agonists Adlyxin, Byetta, Bydureon,  Ozempic, Soliqua, Tanzeum,  Trulicity, Victoza, Saxenda,  Rybelsus, Wegovy, Mounjaro, Zepbound If taken daily, take as normal  If taken weekly, do not take this medicine for 7 days before your procedure including the day of the procedure (resume taking after the procedure). **Bariatric surgery: do not take 4 weeks prior to procedure   SGLT-2 Inhibitors Jardiance, Invokana, Farxiga, Steglatro, Brenzavvy, Qtern, Segluromet Glyxambi, Synjardy, Synjardy XR, Invokamet, InvokametXR, Trijary XR, Xigduo X Do not take for 4 days before your procedure including the day of the procedure (resume taking after the procedure)   This educational material has been approved by the Patient Education Advisory Committee.    On the Day of Surgery/Procedure  Follow the directions below based on the type of medicine you take for your diabetes.  Type of Medicine You Take  Examples What to Do   Long-Acting Insulin Lantus, Levemir, Tresiba,  Toujeo, Basaglar, Semglee If you normally take your Long Acting Insulin in the morning, take the full dose as scheduled.   GLP-I Agonists Adlyxin, Byetta, Bydureon,  Ozempic, Soliqua, Tanzeum,  Trulicity, Victoza, Saxenda,  Rybelsus, Mounjaro Do NOT take this medicine on the day of your procedure (resume taking after the procedure)   Except for the morning Long-Acting Insulin, DO NOT take ANY diabetic medicine on the day of your procedure unless you were instructed by the doctor who manages your diabetes medicines.  Continue to check your blood sugars.  If you have an insulin pump, ask your endocrinologist for instructions at least 3 days before your procedure. NOTE: If you are not able to ask your endocrinologist in advance, on the day of the procedure set your insulin pump to your basal rate only. Bring your  insulin pump supplies to the hospital.         History of Present Illness     Pre-Op Examination     Surgery: bilat cataract   Anticipated Date of Surgery: 5/21 and 6/2/2025   Surgeon: dr aguilar     Bilateral cataract       Previous history of bleeding disorders or clots?: No    Previous Anesthesia reaction?: No    Prolonged steroid use in the last 6 months?: No      Assessment of Cardiac Risk:   - Unstable or severe angina or MI in the last 6 weeks or history of stent placement in the last year?: No    - Decompensated heart failure (e.g. New onset heart failure, NYHA  Class IV heart failure, or worsening existing heart failure)?: No    - Significant arrhythmias such as high grade AV block, symptomatic ventricular arrhythmia, newly recognized ventricular tachycardia, supraventricular tachycardia with resting heart rate >100, or symptomatic bradycardia?: No    - Severe heart valve disease including aortic stenosis or symptomatic mitral stenosis?: No      Pre-operative Risk Factors:  - Elevated-risk surgery: No    - History of cerebrovascular disease: No    - History of ischemic heart disease: No    - History of congestive heart failure: No    - Pre-operative treatment with insulin: No    - Pre-operative creatinine >2 mg/dL: No      Medications of Perioperative Concern:    Metformin    Review of Systems   Constitutional:  Negative for fatigue and fever.   HENT:  Negative for congestion, postnasal drip, rhinorrhea and sore throat.    Eyes:  Negative for photophobia and visual disturbance.   Respiratory:  Negative for shortness of breath and wheezing.    Cardiovascular:  Negative for chest pain and palpitations.   Gastrointestinal:  Positive for constipation. Negative for diarrhea, nausea and vomiting.   Genitourinary:  Negative for dysuria and frequency.   Musculoskeletal:  Negative for arthralgias, gait problem and myalgias.   Skin:  Negative for rash.   Neurological:  Negative for dizziness, light-headedness and  "headaches.   Hematological:  Negative for adenopathy.   Psychiatric/Behavioral:  Negative for dysphoric mood and sleep disturbance. The patient is not nervous/anxious.      Past Medical History   Past Medical History:   Diagnosis Date    Brain mass     Last Assessed; 11/5/2015    Chest pain     Last Assessed: 1/22/2015    Diabetes type 2, uncontrolled     Last Assessed: 3/1/2016    Edema of left lower extremity     Last Assessed: 3/22/2017    GERD (gastroesophageal reflux disease)     Hyperlipidemia     Hypertension     Impaired fasting glucose     Last Assessed: 11/10/2015    Irregular heartbeat     Skin cancer (melanoma) (HCC)     Scalp     Varicose 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     Last Assessed: 3/22/2017     Past Surgical History:   Procedure Laterality Date    COLONOSCOPY      COLONOSCOPY N/A 7/10/2018    Procedure: COLONOSCOPY;  Surgeon: Jose Alejandro Rodrigues MD;  Location: [facility];  Service: Colorectal    INGUINAL HERNIA REPAIR      20+ years ago - [place]; Last Assessed: 10/23/2014    TONSILLECTOMY      VARICOSE VEIN SURGERY Left     x2 left leg - 40+ yrs ago, [place] and [place]     Family History   Problem Relation Age of Onset    No Known Problems Mother     No Known Problems Father     Melanoma Daughter 17     Social History     Tobacco Use    Smoking status: Never     Passive exposure: Never    Smokeless tobacco: Never   Vaping Use    Vaping status: Never Used   Substance and Sexual Activity    Alcohol use: Never    Drug use: No    Sexual activity: Not Currently     Partners: Female     Medications[1]  No Known Allergies  Objective   /70 (BP Location: Left arm, Patient Position: Sitting, Cuff Size: Standard)   Pulse 63   Temp (!) 94.3 °F (34.6 °C) (Tympanic)   Resp 17   Ht 5' 9\" (1.753 m)   Wt 77.1 kg (170 lb)   SpO2 99%   BMI 25.10 kg/m²     Physical Exam  Vitals and nursing note reviewed.   Constitutional:       Appearance: Normal appearance.   HENT:      Head: Normocephalic and " atraumatic.      Right Ear: Tympanic membrane, ear canal and external ear normal.      Left Ear: Tympanic membrane, ear canal and external ear normal.      Nose: Nose normal.      Mouth/Throat:      Mouth: Mucous membranes are moist.     Eyes:      Conjunctiva/sclera: Conjunctivae normal.       Cardiovascular:      Rate and Rhythm: Normal rate and regular rhythm.      Heart sounds: Normal heart sounds.   Pulmonary:      Effort: Pulmonary effort is normal.      Breath sounds: Normal breath sounds.   Abdominal:      General: Abdomen is flat.      Palpations: Abdomen is soft.     Musculoskeletal:         General: Normal range of motion.      Cervical back: Normal range of motion and neck supple.     Skin:     General: Skin is warm and dry.      Capillary Refill: Capillary refill takes less than 2 seconds.     Neurological:      General: No focal deficit present.      Mental Status: He is alert and oriented to person, place, and time.     Psychiatric:         Mood and Affect: Mood normal.         Behavior: Behavior normal.         Thought Content: Thought content normal.         Judgment: Judgment normal.           ZAHRA Rice         [1]   Current Outpatient Medications on File Prior to Visit   Medication Sig    acetaminophen (TYLENOL) 325 mg tablet Take 2 tablets (650 mg total) by mouth every 6 (six) hours as needed for mild pain or fever    aspirin 81 MG tablet Take 81 mg by mouth in the morning.    atorvastatin (LIPITOR) 10 mg tablet TAKE 1 TABLET BY MOUTH EVERY DAY    Blood Glucose Monitoring Suppl (OneTouch Verio Reflect) w/Device KIT Check blood sugars once daily. Please substitute with appropriate alternative as covered by patient's insurance. Dx: E11.65    colchicine (COLCRYS) 0.6 mg tablet Take 1 tablet (0.6 mg total) by mouth daily    famotidine (PEPCID) 20 mg tablet Take 1 tablet (20 mg total) by mouth 2 (two) times a day    gabapentin (Neurontin) 100 mg capsule Take 1 capsule (100 mg total) by  mouth daily at bedtime    glucose blood (OneTouch Verio) test strip CHECK BLOOD SUGARS ONCE DAILY.    Lancets (OneTouch Delica Plus [id number]) MISC TEST DAILY, E11.5--PT CONFIMED DELICA 33 G    lisinopril (ZESTRIL) 20 mg tablet TAKE 1 TABLET BY MOUTH EVERY DAY    metFORMIN (GLUCOPHAGE) 500 mg tablet TAKE 1 TABLET BY MOUTH TWICE A DAY    methocarbamol (ROBAXIN) 500 mg tablet TAKE 1 TABLET (500 MG TOTAL) BY MOUTH DAILY AT BEDTIME    omeprazole (PriLOSEC) 40 MG capsule TAKE 1 CAPSULE (40 MG TOTAL) BY MOUTH DAILY.    OneTouch Delica Lancets 33G MISC Check blood sugars once daily. Please substitute with appropriate alternative as covered by patient's insurance. Dx: E11.65    senna-docusate sodium (SENOKOT S) 8.6-50 mg per tablet Take 2 tablets by mouth daily    sertraline (ZOLOFT) 25 mg tablet TAKE 1 TABLET (25 MG TOTAL) BY MOUTH DAILY.    [DISCONTINUED] bisacodyl (DULCOLAX) 10 mg suppository Insert 1 suppository (10 mg total) into the rectum if needed for constipation (if no bowel movement despite miralax and lactulose use, can use suppository)    [DISCONTINUED] lactulose (CHRONULAC) 10 g/15 mL solution Take 30 mL (20 g total) by mouth daily as needed (if no BM in 24 hours despite miralax, can take 20 g (30 ML) of lactulose as needed for constipation) DAILY AS NEEDED FOR SEVERE CONSTIPATION    [DISCONTINUED] lubiprostone (AMITIZA) 24 mcg capsule Take 1 capsule (24 mcg total) by mouth 2 (two) times a day with meals    [DISCONTINUED] polyethylene glycol (MIRALAX) 17 g packet Take miralax daily but can use up to 2 times per day as needed for constipation

## 2025-05-14 NOTE — ASSESSMENT & PLAN NOTE
Refill meds    Orders:    bisacodyl (DULCOLAX) 10 mg suppository; Insert 1 suppository (10 mg total) into the rectum if needed for constipation (if no bowel movement despite miralax and lactulose use, can use suppository)

## 2025-05-14 NOTE — ASSESSMENT & PLAN NOTE
Refill meds    Orders:    lactulose (CHRONULAC) 10 g/15 mL solution; Take 30 mL (20 g total) by mouth daily as needed (if no BM in 24 hours despite miralax, can take 20 g (30 ML) of lactulose as needed for constipation) DAILY AS NEEDED FOR SEVERE CONSTIPATION    lubiprostone (AMITIZA) 24 mcg capsule; Take 1 capsule (24 mcg total) by mouth 2 (two) times a day with meals    polyethylene glycol (MIRALAX) 17 g packet; Take miralax daily but can use up to 2 times per day as needed for constipation

## 2025-05-15 ENCOUNTER — TELEPHONE (OUTPATIENT)
Age: 80
End: 2025-05-15

## 2025-05-15 NOTE — TELEPHONE ENCOUNTER
Dr Cleaning's office called to confirm the patient came in for a pre-op clearance. They were advised he had the appt yesterday and they will retrieve the note from the portal. No further action needed.

## 2025-05-22 DIAGNOSIS — K59.09 CHRONIC CONSTIPATION: ICD-10-CM

## 2025-05-23 RX ORDER — LACTULOSE 10 G/15ML
SOLUTION ORAL
Qty: 2838 ML | Refills: 1 | Status: SHIPPED | OUTPATIENT
Start: 2025-05-23

## 2025-05-28 ENCOUNTER — OFFICE VISIT (OUTPATIENT)
Dept: FAMILY MEDICINE CLINIC | Facility: CLINIC | Age: 80
End: 2025-05-28
Payer: COMMERCIAL

## 2025-05-28 VITALS
SYSTOLIC BLOOD PRESSURE: 130 MMHG | BODY MASS INDEX: 24.5 KG/M2 | WEIGHT: 165.4 LBS | RESPIRATION RATE: 14 BRPM | DIASTOLIC BLOOD PRESSURE: 80 MMHG | TEMPERATURE: 96.6 F | HEART RATE: 63 BPM | HEIGHT: 69 IN | OXYGEN SATURATION: 96 %

## 2025-05-28 DIAGNOSIS — K59.09 CHRONIC CONSTIPATION: Primary | ICD-10-CM

## 2025-05-28 PROCEDURE — 99213 OFFICE O/P EST LOW 20 MIN: CPT | Performed by: NURSE PRACTITIONER

## 2025-05-28 NOTE — PROGRESS NOTES
Name: Zaira Morley      : 1945      MRN: [id number]  Encounter Provider: ZAHRA Hart  Encounter Date: 2025   Encounter department: [facility]  :  Assessment & Plan  Chronic constipation  No bm x 5 days, emesis x 1 today.  Normal bowel sounds, abdomen is soft and non-tender.  Pt not sure what meds he is taking and he has several medications ordered as part of his bowel protocol per his pcp.  Pharmacy confirmed that he picked up dulcolax, amitiza already within the last 1-2 weeks.  Lactulose sent last week but not yet picked up.  We will call daughter to get confirmation of what he is taking.  He is instructed to go to the pharmacy to  the lactulose and take that and if he does not move his bowels to take the dulcolax suppository.  If symptoms worsen or persist he is to go to the ER.  Continue to hydrate.                History of Present Illness   Pt is a 79 y.o. male who is seen today for evaluation of constipation.  Past medical history of HTN, varicose veins, constipation, GERD, hep c, diabetes, brain tumor, brain stem compression, cluster headache, meningioma, osteoarthritis, spondylolisthesis, renal cyst, thrombocytopenia, ambulatory dysfunction, major depression, spindle cell neoplasm, dyslipidemia.  He says he has not had a bm in 5 days.  He reports abdominal pain.  He is passing gas.  His appetite is good, he denies nausea but he did vomit at around 2PM today but he says nothing came up.  He denies fever, chills.  He is not having any trouble urinating.  He has not tried taking anything to help him move his bowels.  He     Review of Systems   Constitutional:  Positive for appetite change. Negative for chills and fever.   Respiratory:  Negative for shortness of breath.    Cardiovascular:  Negative for chest pain.   Gastrointestinal:  Positive for abdominal pain, constipation and vomiting. Negative for diarrhea and nausea.   Genitourinary:  Negative for  "difficulty urinating and dysuria.       Objective   /80 (BP Location: Left arm, Patient Position: Sitting, Cuff Size: Standard)   Pulse 63   Temp (!) 96.6 °F (35.9 °C) (Tympanic)   Resp 14   Ht 5' 9\" (1.753 m)   Wt 75 kg (165 lb 6.4 oz)   SpO2 96%   BMI 24.43 kg/m²      Physical Exam  Vitals reviewed.   Constitutional:       General: He is awake. He is not in acute distress.     Appearance: Normal appearance. He is well-developed and well-groomed. He is not ill-appearing.   HENT:      Right Ear: Decreased hearing noted.      Left Ear: Decreased hearing noted.     Eyes:      General: Lids are normal.      Conjunctiva/sclera: Conjunctivae normal.       Cardiovascular:      Rate and Rhythm: Normal rate and regular rhythm.      Heart sounds: Normal heart sounds. No murmur heard.  Pulmonary:      Effort: Pulmonary effort is normal. No tachypnea, accessory muscle usage or respiratory distress.      Breath sounds: Normal breath sounds.   Abdominal:      General: Abdomen is flat. Bowel sounds are normal.      Palpations: Abdomen is soft.      Tenderness: There is no abdominal tenderness.     Neurological:      Mental Status: He is alert and oriented to person, place, and time.     Psychiatric:         Attention and Perception: Attention normal.         Mood and Affect: Mood normal.         Speech: Speech normal.         Behavior: Behavior normal. Behavior is cooperative.         Cognition and Memory: Memory is impaired.         Judgment: Judgment normal.         "

## 2025-05-28 NOTE — ASSESSMENT & PLAN NOTE
No bm x 5 days, emesis x 1 today.  Normal bowel sounds, abdomen is soft and non-tender.  Pt not sure what meds he is taking and he has several medications ordered as part of his bowel protocol per his pcp.  Pharmacy confirmed that he picked up dulcolax, amitiza already within the last 1-2 weeks.  Lactulose sent last week but not yet picked up.  We will call daughter to get confirmation of what he is taking.  He is instructed to go to the pharmacy to  the lactulose and take that and if he does not move his bowels to take the dulcolax suppository.  If symptoms worsen or persist he is to go to the ER.  Continue to hydrate.

## 2025-08-01 ENCOUNTER — HOSPITAL ENCOUNTER (OUTPATIENT)
Dept: RADIOLOGY | Facility: HOSPITAL | Age: 80
Discharge: HOME | End: 2025-08-01
Payer: COMMERCIAL

## 2025-08-01 DIAGNOSIS — D32.9 MENINGIOMA (HCC): ICD-10-CM

## 2025-08-01 PROCEDURE — A9585 GADOBUTROL INJECTION: HCPCS | Performed by: PHYSICIAN ASSISTANT

## 2025-08-01 PROCEDURE — 70553 MRI BRAIN STEM W/O & W/DYE: CPT

## 2025-08-01 RX ORDER — GADOBUTROL 604.72 MG/ML
7 INJECTION INTRAVENOUS
Status: COMPLETED | OUTPATIENT
Start: 2025-08-01 | End: 2025-08-01

## 2025-08-01 RX ORDER — GADOBUTROL 604.72 MG/ML
7 INJECTION INTRAVENOUS
Status: DISCONTINUED | OUTPATIENT
Start: 2025-08-01 | End: 2025-08-02 | Stop reason: HOSPADM

## 2025-08-01 RX ADMIN — GADOBUTROL 7 ML: 604.72 INJECTION INTRAVENOUS at 09:52

## 2025-08-15 ENCOUNTER — TELEPHONE (OUTPATIENT)
Dept: FAMILY MEDICINE CLINIC | Facility: CLINIC | Age: 80
End: 2025-08-15

## 2025-08-19 ENCOUNTER — CONSULT (OUTPATIENT)
Age: 80
End: 2025-08-19
Attending: NURSE PRACTITIONER
Payer: COMMERCIAL

## 2025-08-19 VITALS
OXYGEN SATURATION: 95 % | TEMPERATURE: 97.9 F | BODY MASS INDEX: 25.71 KG/M2 | DIASTOLIC BLOOD PRESSURE: 56 MMHG | SYSTOLIC BLOOD PRESSURE: 120 MMHG | HEART RATE: 61 BPM | HEIGHT: 69 IN | WEIGHT: 173.6 LBS

## 2025-08-19 DIAGNOSIS — F02.A0 MILD LATE ONSET ALZHEIMER'S DEMENTIA WITHOUT BEHAVIORAL DISTURBANCE, PSYCHOTIC DISTURBANCE, MOOD DISTURBANCE, OR ANXIETY (HCC): Primary | ICD-10-CM

## 2025-08-19 DIAGNOSIS — K21.9 GASTROESOPHAGEAL REFLUX DISEASE WITHOUT ESOPHAGITIS: Chronic | ICD-10-CM

## 2025-08-19 DIAGNOSIS — R41.3 MEMORY PROBLEM: ICD-10-CM

## 2025-08-19 DIAGNOSIS — E11.36 TYPE 2 DIABETES MELLITUS WITH DIABETIC CATARACT, WITHOUT LONG-TERM CURRENT USE OF INSULIN (HCC): Chronic | ICD-10-CM

## 2025-08-19 DIAGNOSIS — D32.9 MENINGIOMA (HCC): Chronic | ICD-10-CM

## 2025-08-19 DIAGNOSIS — Z91.89 DRIVING SAFETY ISSUE: Chronic | ICD-10-CM

## 2025-08-19 DIAGNOSIS — F32.0 CURRENT MILD EPISODE OF MAJOR DEPRESSIVE DISORDER WITHOUT PRIOR EPISODE (HCC): Chronic | ICD-10-CM

## 2025-08-19 DIAGNOSIS — G30.1 MILD LATE ONSET ALZHEIMER'S DEMENTIA WITHOUT BEHAVIORAL DISTURBANCE, PSYCHOTIC DISTURBANCE, MOOD DISTURBANCE, OR ANXIETY (HCC): Primary | ICD-10-CM

## 2025-08-19 PROBLEM — G93.41 METABOLIC ENCEPHALOPATHY: Status: RESOLVED | Noted: 2025-04-04 | Resolved: 2025-08-19

## 2025-08-19 PROCEDURE — 99205 OFFICE O/P NEW HI 60 MIN: CPT | Performed by: INTERNAL MEDICINE

## 2025-08-19 PROCEDURE — 99417 PROLNG OP E/M EACH 15 MIN: CPT | Performed by: INTERNAL MEDICINE

## 2025-08-22 ENCOUNTER — TELEPHONE (OUTPATIENT)
Age: 80
End: 2025-08-22

## 2025-08-22 ENCOUNTER — TELEPHONE (OUTPATIENT)
Dept: NEUROSURGERY | Facility: CLINIC | Age: 80
End: 2025-08-22